# Patient Record
Sex: MALE | Race: WHITE | NOT HISPANIC OR LATINO | Employment: OTHER | URBAN - METROPOLITAN AREA
[De-identification: names, ages, dates, MRNs, and addresses within clinical notes are randomized per-mention and may not be internally consistent; named-entity substitution may affect disease eponyms.]

---

## 2017-01-03 ENCOUNTER — ALLSCRIPTS OFFICE VISIT (OUTPATIENT)
Dept: OTHER | Facility: OTHER | Age: 77
End: 2017-01-03

## 2017-01-03 DIAGNOSIS — I50.43 ACUTE ON CHRONIC COMBINED SYSTOLIC AND DIASTOLIC CONGESTIVE HEART FAILURE (HCC): ICD-10-CM

## 2017-01-03 DIAGNOSIS — I50.32 CHRONIC DIASTOLIC HEART FAILURE (HCC): ICD-10-CM

## 2017-01-03 DIAGNOSIS — N18.9 CHRONIC KIDNEY DISEASE: ICD-10-CM

## 2017-01-03 DIAGNOSIS — I10 ESSENTIAL (PRIMARY) HYPERTENSION: ICD-10-CM

## 2017-01-06 ENCOUNTER — GENERIC CONVERSION - ENCOUNTER (OUTPATIENT)
Dept: OTHER | Facility: OTHER | Age: 77
End: 2017-01-06

## 2017-01-06 ENCOUNTER — APPOINTMENT (OUTPATIENT)
Dept: LAB | Facility: CLINIC | Age: 77
End: 2017-01-06
Payer: MEDICARE

## 2017-01-06 DIAGNOSIS — I50.43 ACUTE ON CHRONIC COMBINED SYSTOLIC AND DIASTOLIC CONGESTIVE HEART FAILURE (HCC): ICD-10-CM

## 2017-01-06 LAB
ANION GAP SERPL CALCULATED.3IONS-SCNC: 8 MMOL/L (ref 4–13)
BUN SERPL-MCNC: 58 MG/DL (ref 5–25)
CALCIUM SERPL-MCNC: 8.7 MG/DL (ref 8.3–10.1)
CHLORIDE SERPL-SCNC: 104 MMOL/L (ref 100–108)
CO2 SERPL-SCNC: 30 MMOL/L (ref 21–32)
CREAT SERPL-MCNC: 3.34 MG/DL (ref 0.6–1.3)
ERYTHROCYTE [DISTWIDTH] IN BLOOD BY AUTOMATED COUNT: 16.2 % (ref 11.6–15.1)
GFR SERPL CREATININE-BSD FRML MDRD: 18 ML/MIN/1.73SQ M
GLUCOSE SERPL-MCNC: 262 MG/DL (ref 65–140)
HCT VFR BLD AUTO: 39.6 % (ref 36.5–49.3)
HGB BLD-MCNC: 12.5 G/DL (ref 12–17)
MCH RBC QN AUTO: 27.9 PG (ref 26.8–34.3)
MCHC RBC AUTO-ENTMCNC: 31.6 G/DL (ref 31.4–37.4)
MCV RBC AUTO: 88 FL (ref 82–98)
PLATELET # BLD AUTO: 268 THOUSANDS/UL (ref 149–390)
PMV BLD AUTO: 10.4 FL (ref 8.9–12.7)
POTASSIUM SERPL-SCNC: 4 MMOL/L (ref 3.5–5.3)
RBC # BLD AUTO: 4.48 MILLION/UL (ref 3.88–5.62)
SODIUM SERPL-SCNC: 142 MMOL/L (ref 136–145)
WBC # BLD AUTO: 7.79 THOUSAND/UL (ref 4.31–10.16)

## 2017-01-06 PROCEDURE — 36415 COLL VENOUS BLD VENIPUNCTURE: CPT

## 2017-01-06 PROCEDURE — 80048 BASIC METABOLIC PNL TOTAL CA: CPT

## 2017-01-06 PROCEDURE — 85027 COMPLETE CBC AUTOMATED: CPT

## 2017-01-12 ENCOUNTER — ALLSCRIPTS OFFICE VISIT (OUTPATIENT)
Dept: OTHER | Facility: OTHER | Age: 77
End: 2017-01-12

## 2017-01-12 ENCOUNTER — TRANSCRIBE ORDERS (OUTPATIENT)
Dept: ADMINISTRATIVE | Facility: HOSPITAL | Age: 77
End: 2017-01-12

## 2017-01-12 ENCOUNTER — TRANSCRIBE ORDERS (OUTPATIENT)
Dept: LAB | Facility: MEDICAL CENTER | Age: 77
End: 2017-01-12

## 2017-01-12 ENCOUNTER — APPOINTMENT (OUTPATIENT)
Dept: LAB | Facility: MEDICAL CENTER | Age: 77
End: 2017-01-12
Payer: MEDICARE

## 2017-01-12 DIAGNOSIS — I50.32 CHRONIC DIASTOLIC HEART FAILURE (HCC): ICD-10-CM

## 2017-01-12 DIAGNOSIS — N17.9 ACUTE RENAL FAILURE, UNSPECIFIED ACUTE RENAL FAILURE TYPE (HCC): Primary | ICD-10-CM

## 2017-01-12 DIAGNOSIS — N18.9 CHRONIC KIDNEY DISEASE: ICD-10-CM

## 2017-01-12 LAB
ANION GAP SERPL CALCULATED.3IONS-SCNC: 6 MMOL/L (ref 4–13)
BUN SERPL-MCNC: 63 MG/DL (ref 5–25)
CALCIUM SERPL-MCNC: 9.1 MG/DL (ref 8.3–10.1)
CHLORIDE SERPL-SCNC: 99 MMOL/L (ref 100–108)
CO2 SERPL-SCNC: 32 MMOL/L (ref 21–32)
CREAT SERPL-MCNC: 3.2 MG/DL (ref 0.6–1.3)
GFR SERPL CREATININE-BSD FRML MDRD: 18.9 ML/MIN/1.73SQ M
GLUCOSE SERPL-MCNC: 221 MG/DL (ref 65–140)
POTASSIUM SERPL-SCNC: 4.5 MMOL/L (ref 3.5–5.3)
SODIUM SERPL-SCNC: 137 MMOL/L (ref 136–145)

## 2017-01-12 PROCEDURE — 80048 BASIC METABOLIC PNL TOTAL CA: CPT

## 2017-01-12 PROCEDURE — 36415 COLL VENOUS BLD VENIPUNCTURE: CPT

## 2017-01-16 ENCOUNTER — HOSPITAL ENCOUNTER (OUTPATIENT)
Dept: CT IMAGING | Facility: HOSPITAL | Age: 77
Discharge: HOME | End: 2017-01-16
Attending: FAMILY MEDICINE
Payer: MEDICARE

## 2017-01-16 ENCOUNTER — GENERIC CONVERSION - ENCOUNTER (OUTPATIENT)
Dept: OTHER | Facility: OTHER | Age: 77
End: 2017-01-16

## 2017-01-16 ENCOUNTER — HOSPITAL ENCOUNTER (OUTPATIENT)
Dept: ULTRASOUND IMAGING | Facility: HOSPITAL | Age: 77
Discharge: HOME | End: 2017-01-16
Attending: INTERNAL MEDICINE
Payer: MEDICARE

## 2017-01-16 DIAGNOSIS — N18.9 CHRONIC KIDNEY DISEASE: ICD-10-CM

## 2017-01-16 DIAGNOSIS — N17.9 ACUTE RENAL FAILURE, UNSPECIFIED ACUTE RENAL FAILURE TYPE (HCC): ICD-10-CM

## 2017-01-16 PROCEDURE — 76770 US EXAM ABDO BACK WALL COMP: CPT

## 2017-01-16 PROCEDURE — 70450 CT HEAD/BRAIN W/O DYE: CPT

## 2017-01-19 ENCOUNTER — TRANSCRIBE ORDERS (OUTPATIENT)
Dept: LAB | Facility: CLINIC | Age: 77
End: 2017-01-19

## 2017-01-19 ENCOUNTER — APPOINTMENT (OUTPATIENT)
Dept: LAB | Facility: CLINIC | Age: 77
End: 2017-01-19
Payer: MEDICARE

## 2017-01-19 DIAGNOSIS — N18.9 CHRONIC KIDNEY DISEASE: ICD-10-CM

## 2017-01-19 DIAGNOSIS — I10 ESSENTIAL (PRIMARY) HYPERTENSION: ICD-10-CM

## 2017-01-19 LAB
ANION GAP SERPL CALCULATED.3IONS-SCNC: 6 MMOL/L (ref 4–13)
BUN SERPL-MCNC: 48 MG/DL (ref 5–25)
CALCIUM SERPL-MCNC: 8.5 MG/DL (ref 8.3–10.1)
CHLORIDE SERPL-SCNC: 103 MMOL/L (ref 100–108)
CO2 SERPL-SCNC: 30 MMOL/L (ref 21–32)
CREAT SERPL-MCNC: 2.78 MG/DL (ref 0.6–1.3)
GFR SERPL CREATININE-BSD FRML MDRD: 22.2 ML/MIN/1.73SQ M
GLUCOSE SERPL-MCNC: 268 MG/DL (ref 65–140)
POTASSIUM SERPL-SCNC: 4.5 MMOL/L (ref 3.5–5.3)
SODIUM SERPL-SCNC: 139 MMOL/L (ref 136–145)

## 2017-01-19 PROCEDURE — 36415 COLL VENOUS BLD VENIPUNCTURE: CPT

## 2017-01-19 PROCEDURE — 80048 BASIC METABOLIC PNL TOTAL CA: CPT

## 2017-01-25 ENCOUNTER — GENERIC CONVERSION - ENCOUNTER (OUTPATIENT)
Dept: OTHER | Facility: OTHER | Age: 77
End: 2017-01-25

## 2017-02-01 ENCOUNTER — APPOINTMENT (OUTPATIENT)
Dept: LAB | Facility: HOSPITAL | Age: 77
End: 2017-02-01
Attending: UROLOGY
Payer: MEDICARE

## 2017-02-01 ENCOUNTER — HOSPITAL ENCOUNTER (OUTPATIENT)
Dept: CT IMAGING | Facility: HOSPITAL | Age: 77
Discharge: HOME | End: 2017-02-01
Attending: UROLOGY
Payer: MEDICARE

## 2017-02-01 ENCOUNTER — TRANSCRIBE ORDERS (OUTPATIENT)
Dept: ADMINISTRATIVE | Facility: HOSPITAL | Age: 77
End: 2017-02-01

## 2017-02-01 DIAGNOSIS — N28.1 ACQUIRED CYST OF KIDNEY: ICD-10-CM

## 2017-02-01 DIAGNOSIS — C61 MALIGNANT NEOPLASM OF PROSTATE (HCC): Primary | ICD-10-CM

## 2017-02-01 DIAGNOSIS — C61 MALIGNANT NEOPLASM OF PROSTATE (HCC): ICD-10-CM

## 2017-02-01 LAB — PSA SERPL-MCNC: 2.3 NG/ML (ref 0–4)

## 2017-02-01 PROCEDURE — 74150 CT ABDOMEN W/O CONTRAST: CPT

## 2017-02-01 PROCEDURE — 84153 ASSAY OF PSA TOTAL: CPT

## 2017-02-06 ENCOUNTER — ALLSCRIPTS OFFICE VISIT (OUTPATIENT)
Dept: OTHER | Facility: OTHER | Age: 77
End: 2017-02-06

## 2017-02-16 ENCOUNTER — ALLSCRIPTS OFFICE VISIT (OUTPATIENT)
Dept: OTHER | Facility: OTHER | Age: 77
End: 2017-02-16

## 2017-02-17 ENCOUNTER — ALLSCRIPTS OFFICE VISIT (OUTPATIENT)
Dept: OTHER | Facility: OTHER | Age: 77
End: 2017-02-17

## 2017-03-01 ENCOUNTER — GENERIC CONVERSION - ENCOUNTER (OUTPATIENT)
Dept: OTHER | Facility: OTHER | Age: 77
End: 2017-03-01

## 2017-03-01 ENCOUNTER — HOSPITAL ENCOUNTER (INPATIENT)
Facility: HOSPITAL | Age: 77
LOS: 5 days | Discharge: HOME HEALTH | DRG: 292 | End: 2017-03-06
Attending: INTERNAL MEDICINE | Admitting: INTERNAL MEDICINE
Payer: MEDICARE

## 2017-03-01 ENCOUNTER — APPOINTMENT (INPATIENT)
Dept: RADIOLOGY | Facility: HOSPITAL | Age: 77
DRG: 292 | End: 2017-03-01
Payer: MEDICARE

## 2017-03-01 ENCOUNTER — TRANSCRIBE ORDERS (OUTPATIENT)
Dept: LAB | Facility: CLINIC | Age: 77
End: 2017-03-01

## 2017-03-01 ENCOUNTER — APPOINTMENT (OUTPATIENT)
Dept: LAB | Facility: CLINIC | Age: 77
DRG: 292 | End: 2017-03-01
Payer: MEDICARE

## 2017-03-01 DIAGNOSIS — I50.43 ACUTE ON CHRONIC COMBINED SYSTOLIC AND DIASTOLIC HEART FAILURE (HCC): ICD-10-CM

## 2017-03-01 DIAGNOSIS — I48.91 ATRIAL FIBRILLATION, UNSPECIFIED TYPE (HCC): ICD-10-CM

## 2017-03-01 DIAGNOSIS — I50.22 CHRONIC SYSTOLIC HEART FAILURE (HCC): ICD-10-CM

## 2017-03-01 DIAGNOSIS — D64.9 ANEMIA, UNSPECIFIED: ICD-10-CM

## 2017-03-01 DIAGNOSIS — S81.802A WOUND OF LEFT LOWER EXTREMITY: ICD-10-CM

## 2017-03-01 DIAGNOSIS — N17.9 ACUTE KIDNEY INJURY (HCC): ICD-10-CM

## 2017-03-01 DIAGNOSIS — E08.00 DIABETES MELLITUS DUE TO UNDERLYING CONDITION WITH HYPEROSMOLARITY WITHOUT COMA, WITHOUT LONG-TERM CURRENT USE OF INSULIN (HCC): ICD-10-CM

## 2017-03-01 DIAGNOSIS — I50.33 ACUTE ON CHRONIC DIASTOLIC CHF (CONGESTIVE HEART FAILURE) (HCC): ICD-10-CM

## 2017-03-01 DIAGNOSIS — I25.5 ISCHEMIC CARDIOMYOPATHY: ICD-10-CM

## 2017-03-01 DIAGNOSIS — S81.801A WOUND OF RIGHT LOWER EXTREMITY: ICD-10-CM

## 2017-03-01 DIAGNOSIS — I12.0 PARENCHYMAL RENAL HYPERTENSION, STAGE 5 CHRONIC KIDNEY DISEASE OR END STAGE RENAL DISEASE (HCC): ICD-10-CM

## 2017-03-01 DIAGNOSIS — I87.8 VENOUS STASIS: ICD-10-CM

## 2017-03-01 DIAGNOSIS — I50.22 CHRONIC SYSTOLIC HEART FAILURE (HCC): Primary | ICD-10-CM

## 2017-03-01 DIAGNOSIS — N18.4 CKD (CHRONIC KIDNEY DISEASE) STAGE 4, GFR 15-29 ML/MIN (HCC): Primary | Chronic | ICD-10-CM

## 2017-03-01 PROBLEM — R09.02 HYPOXIA: Status: ACTIVE | Noted: 2017-03-01

## 2017-03-01 PROBLEM — I50.9 CHF (CONGESTIVE HEART FAILURE) (HCC): Status: RESOLVED | Noted: 2017-03-01 | Resolved: 2017-03-01

## 2017-03-01 PROBLEM — I50.9 CHF (CONGESTIVE HEART FAILURE) (HCC): Status: ACTIVE | Noted: 2017-03-01

## 2017-03-01 LAB
ALBUMIN SERPL BCP-MCNC: 2.9 G/DL (ref 3.5–5)
ALP SERPL-CCNC: 44 U/L (ref 46–116)
ALT SERPL W P-5'-P-CCNC: 28 U/L (ref 12–78)
ANION GAP SERPL CALCULATED.3IONS-SCNC: 6 MMOL/L (ref 4–13)
ANION GAP SERPL CALCULATED.3IONS-SCNC: 6 MMOL/L (ref 4–13)
AST SERPL W P-5'-P-CCNC: 30 U/L (ref 5–45)
BACTERIA UR QL AUTO: ABNORMAL /HPF
BASOPHILS # BLD AUTO: 0.06 THOUSANDS/ΜL (ref 0–0.1)
BASOPHILS # BLD AUTO: 0.08 THOUSANDS/ΜL (ref 0–0.1)
BASOPHILS NFR BLD AUTO: 1 % (ref 0–1)
BASOPHILS NFR BLD AUTO: 1 % (ref 0–1)
BILIRUB SERPL-MCNC: 0.3 MG/DL (ref 0.2–1)
BILIRUB UR QL STRIP: NEGATIVE
BUN SERPL-MCNC: 42 MG/DL (ref 5–25)
BUN SERPL-MCNC: 43 MG/DL (ref 5–25)
CALCIUM SERPL-MCNC: 8.4 MG/DL (ref 8.3–10.1)
CALCIUM SERPL-MCNC: 8.7 MG/DL (ref 8.3–10.1)
CHLORIDE SERPL-SCNC: 103 MMOL/L (ref 100–108)
CHLORIDE SERPL-SCNC: 105 MMOL/L (ref 100–108)
CLARITY UR: CLEAR
CO2 SERPL-SCNC: 31 MMOL/L (ref 21–32)
CO2 SERPL-SCNC: 31 MMOL/L (ref 21–32)
COLOR UR: YELLOW
CREAT SERPL-MCNC: 2.68 MG/DL (ref 0.6–1.3)
CREAT SERPL-MCNC: 2.76 MG/DL (ref 0.6–1.3)
EOSINOPHIL # BLD AUTO: 0.22 THOUSAND/ΜL (ref 0–0.61)
EOSINOPHIL # BLD AUTO: 0.28 THOUSAND/ΜL (ref 0–0.61)
EOSINOPHIL NFR BLD AUTO: 3 % (ref 0–6)
EOSINOPHIL NFR BLD AUTO: 4 % (ref 0–6)
ERYTHROCYTE [DISTWIDTH] IN BLOOD BY AUTOMATED COUNT: 15.9 % (ref 11.6–15.1)
ERYTHROCYTE [DISTWIDTH] IN BLOOD BY AUTOMATED COUNT: 16 % (ref 11.6–15.1)
EST. AVERAGE GLUCOSE BLD GHB EST-MCNC: 171 MG/DL
GFR SERPL CREATININE-BSD FRML MDRD: 22.3 ML/MIN/1.73SQ M
GFR SERPL CREATININE-BSD FRML MDRD: 23.1 ML/MIN/1.73SQ M
GLUCOSE SERPL-MCNC: 127 MG/DL (ref 65–140)
GLUCOSE SERPL-MCNC: 136 MG/DL (ref 65–140)
GLUCOSE SERPL-MCNC: 162 MG/DL (ref 65–140)
GLUCOSE SERPL-MCNC: 76 MG/DL (ref 65–140)
GLUCOSE UR STRIP-MCNC: NEGATIVE MG/DL
HBA1C MFR BLD: 7.6 % (ref 4.2–6.3)
HCT VFR BLD AUTO: 37.4 % (ref 36.5–49.3)
HCT VFR BLD AUTO: 38.5 % (ref 36.5–49.3)
HGB BLD-MCNC: 11.5 G/DL (ref 12–17)
HGB BLD-MCNC: 11.8 G/DL (ref 12–17)
HGB UR QL STRIP.AUTO: NORMAL
INR PPP: 1.24 (ref 0.86–1.16)
KETONES UR STRIP-MCNC: NEGATIVE MG/DL
LEUKOCYTE ESTERASE UR QL STRIP: NEGATIVE
LYMPHOCYTES # BLD AUTO: 0.89 THOUSANDS/ΜL (ref 0.6–4.47)
LYMPHOCYTES # BLD AUTO: 1.06 THOUSANDS/ΜL (ref 0.6–4.47)
LYMPHOCYTES NFR BLD AUTO: 12 % (ref 14–44)
LYMPHOCYTES NFR BLD AUTO: 14 % (ref 14–44)
MAGNESIUM SERPL-MCNC: 2.2 MG/DL (ref 1.6–2.6)
MCH RBC QN AUTO: 27.1 PG (ref 26.8–34.3)
MCH RBC QN AUTO: 27.4 PG (ref 26.8–34.3)
MCHC RBC AUTO-ENTMCNC: 30.6 G/DL (ref 31.4–37.4)
MCHC RBC AUTO-ENTMCNC: 30.7 G/DL (ref 31.4–37.4)
MCV RBC AUTO: 88 FL (ref 82–98)
MCV RBC AUTO: 90 FL (ref 82–98)
MONOCYTES # BLD AUTO: 0.55 THOUSAND/ΜL (ref 0.17–1.22)
MONOCYTES # BLD AUTO: 0.66 THOUSAND/ΜL (ref 0.17–1.22)
MONOCYTES NFR BLD AUTO: 7 % (ref 4–12)
MONOCYTES NFR BLD AUTO: 8 % (ref 4–12)
NEUTROPHILS # BLD AUTO: 5.7 THOUSANDS/ΜL (ref 1.85–7.62)
NEUTROPHILS # BLD AUTO: 5.76 THOUSANDS/ΜL (ref 1.85–7.62)
NEUTS SEG NFR BLD AUTO: 73 % (ref 43–75)
NEUTS SEG NFR BLD AUTO: 77 % (ref 43–75)
NITRITE UR QL STRIP: NEGATIVE
NON-SQ EPI CELLS URNS QL MICRO: ABNORMAL /HPF
NRBC BLD AUTO-RTO: 0 /100 WBCS
NT-PROBNP SERPL-MCNC: 5559 PG/ML
PH UR STRIP.AUTO: 6.5 [PH] (ref 4.5–8)
PLATELET # BLD AUTO: 247 THOUSANDS/UL (ref 149–390)
PLATELET # BLD AUTO: 263 THOUSANDS/UL (ref 149–390)
PMV BLD AUTO: 10.4 FL (ref 8.9–12.7)
PMV BLD AUTO: 10.4 FL (ref 8.9–12.7)
POTASSIUM SERPL-SCNC: 4.7 MMOL/L (ref 3.5–5.3)
POTASSIUM SERPL-SCNC: 4.9 MMOL/L (ref 3.5–5.3)
PROT SERPL-MCNC: 6.2 G/DL (ref 6.4–8.2)
PROT UR STRIP-MCNC: NEGATIVE MG/DL
PROTHROMBIN TIME: 15.2 SECONDS (ref 12–14.3)
RBC # BLD AUTO: 4.24 MILLION/UL (ref 3.88–5.62)
RBC # BLD AUTO: 4.3 MILLION/UL (ref 3.88–5.62)
RBC #/AREA URNS AUTO: ABNORMAL /HPF
SODIUM SERPL-SCNC: 140 MMOL/L (ref 136–145)
SODIUM SERPL-SCNC: 142 MMOL/L (ref 136–145)
SP GR UR STRIP.AUTO: 1.01 (ref 1–1.03)
TSH SERPL DL<=0.05 MIU/L-ACNC: 2.1 UIU/ML (ref 0.36–3.74)
UROBILINOGEN UR QL STRIP.AUTO: 0.2 E.U./DL
WBC # BLD AUTO: 7.46 THOUSAND/UL (ref 4.31–10.16)
WBC # BLD AUTO: 7.82 THOUSAND/UL (ref 4.31–10.16)
WBC #/AREA URNS AUTO: ABNORMAL /HPF

## 2017-03-01 PROCEDURE — 83880 ASSAY OF NATRIURETIC PEPTIDE: CPT

## 2017-03-01 PROCEDURE — 87086 URINE CULTURE/COLONY COUNT: CPT | Performed by: INTERNAL MEDICINE

## 2017-03-01 PROCEDURE — 84443 ASSAY THYROID STIM HORMONE: CPT | Performed by: INTERNAL MEDICINE

## 2017-03-01 PROCEDURE — 83036 HEMOGLOBIN GLYCOSYLATED A1C: CPT | Performed by: INTERNAL MEDICINE

## 2017-03-01 PROCEDURE — 36415 COLL VENOUS BLD VENIPUNCTURE: CPT

## 2017-03-01 PROCEDURE — 83735 ASSAY OF MAGNESIUM: CPT | Performed by: INTERNAL MEDICINE

## 2017-03-01 PROCEDURE — 82948 REAGENT STRIP/BLOOD GLUCOSE: CPT

## 2017-03-01 PROCEDURE — 85025 COMPLETE CBC W/AUTO DIFF WBC: CPT

## 2017-03-01 PROCEDURE — 87040 BLOOD CULTURE FOR BACTERIA: CPT | Performed by: INTERNAL MEDICINE

## 2017-03-01 PROCEDURE — 94760 N-INVAS EAR/PLS OXIMETRY 1: CPT

## 2017-03-01 PROCEDURE — 93005 ELECTROCARDIOGRAM TRACING: CPT | Performed by: INTERNAL MEDICINE

## 2017-03-01 PROCEDURE — 94640 AIRWAY INHALATION TREATMENT: CPT

## 2017-03-01 PROCEDURE — 81001 URINALYSIS AUTO W/SCOPE: CPT | Performed by: INTERNAL MEDICINE

## 2017-03-01 PROCEDURE — 85610 PROTHROMBIN TIME: CPT | Performed by: INTERNAL MEDICINE

## 2017-03-01 PROCEDURE — 80048 BASIC METABOLIC PNL TOTAL CA: CPT

## 2017-03-01 PROCEDURE — 80053 COMPREHEN METABOLIC PANEL: CPT | Performed by: INTERNAL MEDICINE

## 2017-03-01 PROCEDURE — 85025 COMPLETE CBC W/AUTO DIFF WBC: CPT | Performed by: INTERNAL MEDICINE

## 2017-03-01 PROCEDURE — 71020 HB CHEST X-RAY 2VW FRONTAL&LATL: CPT

## 2017-03-01 RX ORDER — LANOLIN ALCOHOL/MO/W.PET/CERES
1000 CREAM (GRAM) TOPICAL DAILY
Status: DISCONTINUED | OUTPATIENT
Start: 2017-03-02 | End: 2017-03-06 | Stop reason: HOSPADM

## 2017-03-01 RX ORDER — ONDANSETRON 2 MG/ML
4 INJECTION INTRAMUSCULAR; INTRAVENOUS EVERY 6 HOURS PRN
Status: DISCONTINUED | OUTPATIENT
Start: 2017-03-01 | End: 2017-03-06 | Stop reason: HOSPADM

## 2017-03-01 RX ORDER — ASPIRIN 81 MG/1
81 TABLET ORAL 2 TIMES DAILY
COMMUNITY

## 2017-03-01 RX ORDER — GABAPENTIN 300 MG/1
300 CAPSULE ORAL
Status: DISCONTINUED | OUTPATIENT
Start: 2017-03-01 | End: 2017-03-06 | Stop reason: HOSPADM

## 2017-03-01 RX ORDER — ASPIRIN 81 MG/1
81 TABLET ORAL 2 TIMES DAILY
Status: DISCONTINUED | OUTPATIENT
Start: 2017-03-01 | End: 2017-03-05

## 2017-03-01 RX ORDER — PRAVASTATIN SODIUM 40 MG
40 TABLET ORAL
Status: DISCONTINUED | OUTPATIENT
Start: 2017-03-01 | End: 2017-03-06 | Stop reason: HOSPADM

## 2017-03-01 RX ORDER — LEVALBUTEROL 1.25 MG/.5ML
1.25 SOLUTION, CONCENTRATE RESPIRATORY (INHALATION)
Status: DISCONTINUED | OUTPATIENT
Start: 2017-03-01 | End: 2017-03-06 | Stop reason: HOSPADM

## 2017-03-01 RX ORDER — GABAPENTIN 300 MG/1
300 CAPSULE ORAL
COMMUNITY
End: 2018-07-09 | Stop reason: SDUPTHER

## 2017-03-01 RX ORDER — FUROSEMIDE 10 MG/ML
80 INJECTION INTRAMUSCULAR; INTRAVENOUS ONCE
Status: COMPLETED | OUTPATIENT
Start: 2017-03-01 | End: 2017-03-01

## 2017-03-01 RX ORDER — TAMSULOSIN HYDROCHLORIDE 0.4 MG/1
0.4 CAPSULE ORAL
Status: DISCONTINUED | OUTPATIENT
Start: 2017-03-01 | End: 2017-03-06 | Stop reason: HOSPADM

## 2017-03-01 RX ORDER — LEVALBUTEROL 1.25 MG/.5ML
1.25 SOLUTION, CONCENTRATE RESPIRATORY (INHALATION) EVERY 6 HOURS PRN
Status: DISCONTINUED | OUTPATIENT
Start: 2017-03-01 | End: 2017-03-06 | Stop reason: HOSPADM

## 2017-03-01 RX ORDER — HEPARIN SODIUM 5000 [USP'U]/ML
5000 INJECTION, SOLUTION INTRAVENOUS; SUBCUTANEOUS EVERY 8 HOURS SCHEDULED
Status: DISCONTINUED | OUTPATIENT
Start: 2017-03-01 | End: 2017-03-03

## 2017-03-01 RX ORDER — AMIODARONE HYDROCHLORIDE 200 MG/1
200 TABLET ORAL DAILY
Status: DISCONTINUED | OUTPATIENT
Start: 2017-03-02 | End: 2017-03-06 | Stop reason: HOSPADM

## 2017-03-01 RX ADMIN — TAMSULOSIN HYDROCHLORIDE 0.4 MG: 0.4 CAPSULE ORAL at 22:26

## 2017-03-01 RX ADMIN — GABAPENTIN 300 MG: 300 CAPSULE ORAL at 22:26

## 2017-03-01 RX ADMIN — HEPARIN SODIUM 5000 UNITS: 5000 INJECTION, SOLUTION INTRAVENOUS; SUBCUTANEOUS at 22:25

## 2017-03-01 RX ADMIN — IPRATROPIUM BROMIDE 0.5 MG: 0.5 SOLUTION RESPIRATORY (INHALATION) at 20:59

## 2017-03-01 RX ADMIN — METOPROLOL TARTRATE 25 MG: 25 TABLET ORAL at 18:18

## 2017-03-01 RX ADMIN — FUROSEMIDE 80 MG: 10 INJECTION, SOLUTION INTRAMUSCULAR; INTRAVENOUS at 20:42

## 2017-03-01 RX ADMIN — INSULIN DETEMIR 35 UNITS: 100 INJECTION, SOLUTION SUBCUTANEOUS at 22:25

## 2017-03-01 RX ADMIN — ASPIRIN 81 MG: 81 TABLET, COATED ORAL at 18:17

## 2017-03-01 RX ADMIN — LEVALBUTEROL 1.25 MG: 1.25 SOLUTION, CONCENTRATE RESPIRATORY (INHALATION) at 20:59

## 2017-03-01 RX ADMIN — PRAVASTATIN SODIUM 40 MG: 40 TABLET ORAL at 16:59

## 2017-03-02 ENCOUNTER — APPOINTMENT (INPATIENT)
Dept: PHYSICAL THERAPY | Facility: HOSPITAL | Age: 77
DRG: 292 | End: 2017-03-02
Payer: MEDICARE

## 2017-03-02 ENCOUNTER — APPOINTMENT (INPATIENT)
Dept: OCCUPATIONAL THERAPY | Facility: HOSPITAL | Age: 77
DRG: 292 | End: 2017-03-02
Payer: MEDICARE

## 2017-03-02 LAB
ALBUMIN SERPL BCP-MCNC: 2.8 G/DL (ref 3.5–5)
ALP SERPL-CCNC: 35 U/L (ref 46–116)
ALT SERPL W P-5'-P-CCNC: 24 U/L (ref 12–78)
ANION GAP SERPL CALCULATED.3IONS-SCNC: 10 MMOL/L (ref 4–13)
AST SERPL W P-5'-P-CCNC: 23 U/L (ref 5–45)
ATRIAL RATE: 61 BPM
BACTERIA UR CULT: NORMAL
BILIRUB SERPL-MCNC: 0.3 MG/DL (ref 0.2–1)
BUN SERPL-MCNC: 44 MG/DL (ref 5–25)
CALCIUM SERPL-MCNC: 8.5 MG/DL (ref 8.3–10.1)
CHLORIDE SERPL-SCNC: 104 MMOL/L (ref 100–108)
CO2 SERPL-SCNC: 30 MMOL/L (ref 21–32)
CREAT SERPL-MCNC: 2.67 MG/DL (ref 0.6–1.3)
ERYTHROCYTE [DISTWIDTH] IN BLOOD BY AUTOMATED COUNT: 15.8 % (ref 11.6–15.1)
GFR SERPL CREATININE-BSD FRML MDRD: 23.2 ML/MIN/1.73SQ M
GLUCOSE SERPL-MCNC: 107 MG/DL (ref 65–140)
GLUCOSE SERPL-MCNC: 204 MG/DL (ref 65–140)
GLUCOSE SERPL-MCNC: 214 MG/DL (ref 65–140)
GLUCOSE SERPL-MCNC: 93 MG/DL (ref 65–140)
HCT VFR BLD AUTO: 38.8 % (ref 36.5–49.3)
HGB BLD-MCNC: 11.7 G/DL (ref 12–17)
INR PPP: 1.21 (ref 0.86–1.16)
MAGNESIUM SERPL-MCNC: 2.2 MG/DL (ref 1.6–2.6)
MCH RBC QN AUTO: 27 PG (ref 26.8–34.3)
MCHC RBC AUTO-ENTMCNC: 30.2 G/DL (ref 31.4–37.4)
MCV RBC AUTO: 90 FL (ref 82–98)
P AXIS: 56 DEGREES
PLATELET # BLD AUTO: 252 THOUSANDS/UL (ref 149–390)
PMV BLD AUTO: 10.2 FL (ref 8.9–12.7)
POTASSIUM SERPL-SCNC: 4.2 MMOL/L (ref 3.5–5.3)
PR INTERVAL: 190 MS
PROT SERPL-MCNC: 6 G/DL (ref 6.4–8.2)
PROTHROMBIN TIME: 14.9 SECONDS (ref 12–14.3)
QRS AXIS: -66 DEGREES
QRSD INTERVAL: 162 MS
QT INTERVAL: 472 MS
QTC INTERVAL: 475 MS
RBC # BLD AUTO: 4.33 MILLION/UL (ref 3.88–5.62)
SODIUM SERPL-SCNC: 144 MMOL/L (ref 136–145)
T WAVE AXIS: 42 DEGREES
VENTRICULAR RATE: 61 BPM
WBC # BLD AUTO: 6.3 THOUSAND/UL (ref 4.31–10.16)

## 2017-03-02 PROCEDURE — G8978 MOBILITY CURRENT STATUS: HCPCS | Performed by: PHYSICAL THERAPIST

## 2017-03-02 PROCEDURE — 80053 COMPREHEN METABOLIC PANEL: CPT | Performed by: INTERNAL MEDICINE

## 2017-03-02 PROCEDURE — 85610 PROTHROMBIN TIME: CPT | Performed by: INTERNAL MEDICINE

## 2017-03-02 PROCEDURE — 97167 OT EVAL HIGH COMPLEX 60 MIN: CPT

## 2017-03-02 PROCEDURE — 94640 AIRWAY INHALATION TREATMENT: CPT

## 2017-03-02 PROCEDURE — 85027 COMPLETE CBC AUTOMATED: CPT | Performed by: INTERNAL MEDICINE

## 2017-03-02 PROCEDURE — G8988 SELF CARE GOAL STATUS: HCPCS

## 2017-03-02 PROCEDURE — G8987 SELF CARE CURRENT STATUS: HCPCS

## 2017-03-02 PROCEDURE — G8979 MOBILITY GOAL STATUS: HCPCS | Performed by: PHYSICAL THERAPIST

## 2017-03-02 PROCEDURE — 97116 GAIT TRAINING THERAPY: CPT | Performed by: PHYSICAL THERAPIST

## 2017-03-02 PROCEDURE — 94762 N-INVAS EAR/PLS OXIMTRY CONT: CPT

## 2017-03-02 PROCEDURE — 82948 REAGENT STRIP/BLOOD GLUCOSE: CPT

## 2017-03-02 PROCEDURE — 94760 N-INVAS EAR/PLS OXIMETRY 1: CPT

## 2017-03-02 PROCEDURE — 97163 PT EVAL HIGH COMPLEX 45 MIN: CPT | Performed by: PHYSICAL THERAPIST

## 2017-03-02 PROCEDURE — 83735 ASSAY OF MAGNESIUM: CPT | Performed by: INTERNAL MEDICINE

## 2017-03-02 PROCEDURE — 97530 THERAPEUTIC ACTIVITIES: CPT

## 2017-03-02 RX ORDER — POTASSIUM CHLORIDE 20 MEQ/1
20 TABLET, EXTENDED RELEASE ORAL 2 TIMES DAILY
Status: COMPLETED | OUTPATIENT
Start: 2017-03-02 | End: 2017-03-04

## 2017-03-02 RX ORDER — FUROSEMIDE 10 MG/ML
80 INJECTION INTRAMUSCULAR; INTRAVENOUS
Status: DISCONTINUED | OUTPATIENT
Start: 2017-03-02 | End: 2017-03-04

## 2017-03-02 RX ADMIN — CYANOCOBALAMIN TAB 1000 MCG 1000 MCG: 1000 TAB at 09:18

## 2017-03-02 RX ADMIN — HEPARIN SODIUM 5000 UNITS: 5000 INJECTION, SOLUTION INTRAVENOUS; SUBCUTANEOUS at 05:08

## 2017-03-02 RX ADMIN — ASPIRIN 81 MG: 81 TABLET, COATED ORAL at 18:29

## 2017-03-02 RX ADMIN — HEPARIN SODIUM 5000 UNITS: 5000 INJECTION, SOLUTION INTRAVENOUS; SUBCUTANEOUS at 15:41

## 2017-03-02 RX ADMIN — AMIODARONE HYDROCHLORIDE 200 MG: 200 TABLET ORAL at 09:17

## 2017-03-02 RX ADMIN — FUROSEMIDE 80 MG: 10 INJECTION, SOLUTION INTRAMUSCULAR; INTRAVENOUS at 12:59

## 2017-03-02 RX ADMIN — ASPIRIN 81 MG: 81 TABLET, COATED ORAL at 09:17

## 2017-03-02 RX ADMIN — POTASSIUM CHLORIDE 20 MEQ: 1500 TABLET, EXTENDED RELEASE ORAL at 18:28

## 2017-03-02 RX ADMIN — HEPARIN SODIUM 5000 UNITS: 5000 INJECTION, SOLUTION INTRAVENOUS; SUBCUTANEOUS at 21:48

## 2017-03-02 RX ADMIN — LEVALBUTEROL 1.25 MG: 1.25 SOLUTION, CONCENTRATE RESPIRATORY (INHALATION) at 20:57

## 2017-03-02 RX ADMIN — IPRATROPIUM BROMIDE 0.5 MG: 0.5 SOLUTION RESPIRATORY (INHALATION) at 20:57

## 2017-03-02 RX ADMIN — POTASSIUM CHLORIDE 20 MEQ: 1500 TABLET, EXTENDED RELEASE ORAL at 13:00

## 2017-03-02 RX ADMIN — INSULIN LISPRO 1 UNITS: 100 INJECTION, SOLUTION INTRAVENOUS; SUBCUTANEOUS at 12:59

## 2017-03-02 RX ADMIN — GABAPENTIN 300 MG: 300 CAPSULE ORAL at 21:43

## 2017-03-02 RX ADMIN — METOPROLOL TARTRATE 25 MG: 25 TABLET ORAL at 09:18

## 2017-03-02 RX ADMIN — LEVALBUTEROL 1.25 MG: 1.25 SOLUTION, CONCENTRATE RESPIRATORY (INHALATION) at 09:12

## 2017-03-02 RX ADMIN — FUROSEMIDE 80 MG: 10 INJECTION, SOLUTION INTRAMUSCULAR; INTRAVENOUS at 18:29

## 2017-03-02 RX ADMIN — INSULIN LISPRO 2 UNITS: 100 INJECTION, SOLUTION INTRAVENOUS; SUBCUTANEOUS at 15:41

## 2017-03-02 RX ADMIN — INSULIN DETEMIR 35 UNITS: 100 INJECTION, SOLUTION SUBCUTANEOUS at 21:49

## 2017-03-02 RX ADMIN — PRAVASTATIN SODIUM 40 MG: 40 TABLET ORAL at 15:41

## 2017-03-02 RX ADMIN — TAMSULOSIN HYDROCHLORIDE 0.4 MG: 0.4 CAPSULE ORAL at 21:43

## 2017-03-02 RX ADMIN — IPRATROPIUM BROMIDE 0.5 MG: 0.5 SOLUTION RESPIRATORY (INHALATION) at 09:11

## 2017-03-02 RX ADMIN — METOPROLOL TARTRATE 25 MG: 25 TABLET ORAL at 18:29

## 2017-03-03 ENCOUNTER — APPOINTMENT (INPATIENT)
Dept: PHYSICAL THERAPY | Facility: HOSPITAL | Age: 77
DRG: 292 | End: 2017-03-03
Payer: MEDICARE

## 2017-03-03 ENCOUNTER — APPOINTMENT (INPATIENT)
Dept: OCCUPATIONAL THERAPY | Facility: HOSPITAL | Age: 77
DRG: 292 | End: 2017-03-03
Payer: MEDICARE

## 2017-03-03 LAB
ALBUMIN SERPL BCP-MCNC: 2.9 G/DL (ref 3.5–5)
ALP SERPL-CCNC: 35 U/L (ref 46–116)
ALT SERPL W P-5'-P-CCNC: 24 U/L (ref 12–78)
ANION GAP SERPL CALCULATED.3IONS-SCNC: 5 MMOL/L (ref 4–13)
AST SERPL W P-5'-P-CCNC: 18 U/L (ref 5–45)
BASOPHILS # BLD AUTO: 0.06 THOUSANDS/ΜL (ref 0–0.1)
BASOPHILS NFR BLD AUTO: 1 % (ref 0–1)
BILIRUB SERPL-MCNC: 0.3 MG/DL (ref 0.2–1)
BUN SERPL-MCNC: 47 MG/DL (ref 5–25)
CALCIUM SERPL-MCNC: 8.9 MG/DL (ref 8.3–10.1)
CHLORIDE SERPL-SCNC: 102 MMOL/L (ref 100–108)
CO2 SERPL-SCNC: 37 MMOL/L (ref 21–32)
CREAT SERPL-MCNC: 3.04 MG/DL (ref 0.6–1.3)
EOSINOPHIL # BLD AUTO: 0.2 THOUSAND/ΜL (ref 0–0.61)
EOSINOPHIL NFR BLD AUTO: 3 % (ref 0–6)
ERYTHROCYTE [DISTWIDTH] IN BLOOD BY AUTOMATED COUNT: 15.9 % (ref 11.6–15.1)
GFR SERPL CREATININE-BSD FRML MDRD: 20 ML/MIN/1.73SQ M
GLUCOSE SERPL-MCNC: 119 MG/DL (ref 65–140)
GLUCOSE SERPL-MCNC: 127 MG/DL (ref 65–140)
GLUCOSE SERPL-MCNC: 180 MG/DL (ref 65–140)
GLUCOSE SERPL-MCNC: 196 MG/DL (ref 65–140)
GLUCOSE SERPL-MCNC: 214 MG/DL (ref 65–140)
HCT VFR BLD AUTO: 38.4 % (ref 36.5–49.3)
HGB BLD-MCNC: 11.8 G/DL (ref 12–17)
LYMPHOCYTES # BLD AUTO: 0.97 THOUSANDS/ΜL (ref 0.6–4.47)
LYMPHOCYTES NFR BLD AUTO: 16 % (ref 14–44)
MAGNESIUM SERPL-MCNC: 2.2 MG/DL (ref 1.6–2.6)
MCH RBC QN AUTO: 27.5 PG (ref 26.8–34.3)
MCHC RBC AUTO-ENTMCNC: 30.7 G/DL (ref 31.4–37.4)
MCV RBC AUTO: 90 FL (ref 82–98)
MONOCYTES # BLD AUTO: 0.54 THOUSAND/ΜL (ref 0.17–1.22)
MONOCYTES NFR BLD AUTO: 9 % (ref 4–12)
NEUTROPHILS # BLD AUTO: 4.34 THOUSANDS/ΜL (ref 1.85–7.62)
NEUTS SEG NFR BLD AUTO: 71 % (ref 43–75)
PLATELET # BLD AUTO: 259 THOUSANDS/UL (ref 149–390)
PMV BLD AUTO: 9.8 FL (ref 8.9–12.7)
POTASSIUM SERPL-SCNC: 4 MMOL/L (ref 3.5–5.3)
PROT SERPL-MCNC: 6.3 G/DL (ref 6.4–8.2)
RBC # BLD AUTO: 4.29 MILLION/UL (ref 3.88–5.62)
SODIUM SERPL-SCNC: 144 MMOL/L (ref 136–145)
WBC # BLD AUTO: 6.11 THOUSAND/UL (ref 4.31–10.16)

## 2017-03-03 PROCEDURE — 97110 THERAPEUTIC EXERCISES: CPT

## 2017-03-03 PROCEDURE — 80053 COMPREHEN METABOLIC PANEL: CPT | Performed by: INTERNAL MEDICINE

## 2017-03-03 PROCEDURE — 94760 N-INVAS EAR/PLS OXIMETRY 1: CPT

## 2017-03-03 PROCEDURE — 97530 THERAPEUTIC ACTIVITIES: CPT

## 2017-03-03 PROCEDURE — 94640 AIRWAY INHALATION TREATMENT: CPT

## 2017-03-03 PROCEDURE — 82948 REAGENT STRIP/BLOOD GLUCOSE: CPT

## 2017-03-03 PROCEDURE — 97116 GAIT TRAINING THERAPY: CPT

## 2017-03-03 PROCEDURE — 83735 ASSAY OF MAGNESIUM: CPT | Performed by: INTERNAL MEDICINE

## 2017-03-03 PROCEDURE — 85025 COMPLETE CBC W/AUTO DIFF WBC: CPT | Performed by: INTERNAL MEDICINE

## 2017-03-03 RX ADMIN — METOPROLOL TARTRATE 25 MG: 25 TABLET ORAL at 17:54

## 2017-03-03 RX ADMIN — IPRATROPIUM BROMIDE 0.5 MG: 0.5 SOLUTION RESPIRATORY (INHALATION) at 08:15

## 2017-03-03 RX ADMIN — HEPARIN SODIUM 5000 UNITS: 5000 INJECTION, SOLUTION INTRAVENOUS; SUBCUTANEOUS at 05:14

## 2017-03-03 RX ADMIN — TAMSULOSIN HYDROCHLORIDE 0.4 MG: 0.4 CAPSULE ORAL at 21:16

## 2017-03-03 RX ADMIN — IPRATROPIUM BROMIDE 0.5 MG: 0.5 SOLUTION RESPIRATORY (INHALATION) at 20:29

## 2017-03-03 RX ADMIN — APIXABAN 5 MG: 5 TABLET, FILM COATED ORAL at 21:16

## 2017-03-03 RX ADMIN — FUROSEMIDE 80 MG: 10 INJECTION, SOLUTION INTRAMUSCULAR; INTRAVENOUS at 12:27

## 2017-03-03 RX ADMIN — LEVALBUTEROL 1.25 MG: 1.25 SOLUTION, CONCENTRATE RESPIRATORY (INHALATION) at 08:15

## 2017-03-03 RX ADMIN — METOPROLOL TARTRATE 25 MG: 25 TABLET ORAL at 08:48

## 2017-03-03 RX ADMIN — ASPIRIN 81 MG: 81 TABLET, COATED ORAL at 08:47

## 2017-03-03 RX ADMIN — PRAVASTATIN SODIUM 40 MG: 40 TABLET ORAL at 17:54

## 2017-03-03 RX ADMIN — FUROSEMIDE 80 MG: 10 INJECTION, SOLUTION INTRAMUSCULAR; INTRAVENOUS at 05:13

## 2017-03-03 RX ADMIN — INSULIN LISPRO 1 UNITS: 100 INJECTION, SOLUTION INTRAVENOUS; SUBCUTANEOUS at 16:05

## 2017-03-03 RX ADMIN — LEVALBUTEROL 1.25 MG: 1.25 SOLUTION, CONCENTRATE RESPIRATORY (INHALATION) at 20:29

## 2017-03-03 RX ADMIN — FUROSEMIDE 80 MG: 10 INJECTION, SOLUTION INTRAMUSCULAR; INTRAVENOUS at 17:54

## 2017-03-03 RX ADMIN — POTASSIUM CHLORIDE 20 MEQ: 1500 TABLET, EXTENDED RELEASE ORAL at 17:54

## 2017-03-03 RX ADMIN — POTASSIUM CHLORIDE 20 MEQ: 1500 TABLET, EXTENDED RELEASE ORAL at 08:49

## 2017-03-03 RX ADMIN — INSULIN LISPRO 2 UNITS: 100 INJECTION, SOLUTION INTRAVENOUS; SUBCUTANEOUS at 12:27

## 2017-03-03 RX ADMIN — INSULIN DETEMIR 35 UNITS: 100 INJECTION, SOLUTION SUBCUTANEOUS at 21:16

## 2017-03-03 RX ADMIN — APIXABAN 5 MG: 5 TABLET, FILM COATED ORAL at 12:27

## 2017-03-03 RX ADMIN — AMIODARONE HYDROCHLORIDE 200 MG: 200 TABLET ORAL at 08:47

## 2017-03-03 RX ADMIN — GABAPENTIN 300 MG: 300 CAPSULE ORAL at 21:16

## 2017-03-03 RX ADMIN — ASPIRIN 81 MG: 81 TABLET, COATED ORAL at 17:54

## 2017-03-03 RX ADMIN — CYANOCOBALAMIN TAB 1000 MCG 1000 MCG: 1000 TAB at 08:48

## 2017-03-04 PROBLEM — I50.33 ACUTE ON CHRONIC DIASTOLIC CHF (CONGESTIVE HEART FAILURE) (HCC): Status: ACTIVE | Noted: 2017-03-01

## 2017-03-04 PROBLEM — IMO0002: Status: ACTIVE | Noted: 2017-03-04

## 2017-03-04 LAB
ALBUMIN SERPL BCP-MCNC: 2.8 G/DL (ref 3.5–5)
ALP SERPL-CCNC: 34 U/L (ref 46–116)
ALT SERPL W P-5'-P-CCNC: 18 U/L (ref 12–78)
ANION GAP SERPL CALCULATED.3IONS-SCNC: 6 MMOL/L (ref 4–13)
AST SERPL W P-5'-P-CCNC: 14 U/L (ref 5–45)
BASOPHILS # BLD AUTO: 0.07 THOUSANDS/ΜL (ref 0–0.1)
BASOPHILS NFR BLD AUTO: 1 % (ref 0–1)
BILIRUB SERPL-MCNC: 0.4 MG/DL (ref 0.2–1)
BUN SERPL-MCNC: 53 MG/DL (ref 5–25)
CALCIUM SERPL-MCNC: 8.5 MG/DL (ref 8.3–10.1)
CHLORIDE SERPL-SCNC: 102 MMOL/L (ref 100–108)
CO2 SERPL-SCNC: 35 MMOL/L (ref 21–32)
CREAT SERPL-MCNC: 3.06 MG/DL (ref 0.6–1.3)
EOSINOPHIL # BLD AUTO: 0.22 THOUSAND/ΜL (ref 0–0.61)
EOSINOPHIL NFR BLD AUTO: 3 % (ref 0–6)
ERYTHROCYTE [DISTWIDTH] IN BLOOD BY AUTOMATED COUNT: 15.8 % (ref 11.6–15.1)
GFR SERPL CREATININE-BSD FRML MDRD: 19.8 ML/MIN/1.73SQ M
GLUCOSE SERPL-MCNC: 104 MG/DL (ref 65–140)
GLUCOSE SERPL-MCNC: 122 MG/DL (ref 65–140)
GLUCOSE SERPL-MCNC: 169 MG/DL (ref 65–140)
GLUCOSE SERPL-MCNC: 204 MG/DL (ref 65–140)
GLUCOSE SERPL-MCNC: 209 MG/DL (ref 65–140)
HCT VFR BLD AUTO: 37.8 % (ref 36.5–49.3)
HGB BLD-MCNC: 11.5 G/DL (ref 12–17)
INR PPP: 1.38 (ref 0.86–1.16)
LYMPHOCYTES # BLD AUTO: 0.89 THOUSANDS/ΜL (ref 0.6–4.47)
LYMPHOCYTES NFR BLD AUTO: 14 % (ref 14–44)
MAGNESIUM SERPL-MCNC: 2.1 MG/DL (ref 1.6–2.6)
MCH RBC QN AUTO: 27 PG (ref 26.8–34.3)
MCHC RBC AUTO-ENTMCNC: 30.4 G/DL (ref 31.4–37.4)
MCV RBC AUTO: 89 FL (ref 82–98)
MONOCYTES # BLD AUTO: 0.66 THOUSAND/ΜL (ref 0.17–1.22)
MONOCYTES NFR BLD AUTO: 10 % (ref 4–12)
NEUTROPHILS # BLD AUTO: 4.55 THOUSANDS/ΜL (ref 1.85–7.62)
NEUTS SEG NFR BLD AUTO: 72 % (ref 43–75)
PLATELET # BLD AUTO: 250 THOUSANDS/UL (ref 149–390)
PMV BLD AUTO: 9.6 FL (ref 8.9–12.7)
POTASSIUM SERPL-SCNC: 3.7 MMOL/L (ref 3.5–5.3)
PROT SERPL-MCNC: 6.1 G/DL (ref 6.4–8.2)
PROTHROMBIN TIME: 16.5 SECONDS (ref 12–14.3)
RBC # BLD AUTO: 4.26 MILLION/UL (ref 3.88–5.62)
SODIUM SERPL-SCNC: 143 MMOL/L (ref 136–145)
WBC # BLD AUTO: 6.39 THOUSAND/UL (ref 4.31–10.16)

## 2017-03-04 PROCEDURE — 83735 ASSAY OF MAGNESIUM: CPT | Performed by: INTERNAL MEDICINE

## 2017-03-04 PROCEDURE — 85610 PROTHROMBIN TIME: CPT | Performed by: INTERNAL MEDICINE

## 2017-03-04 PROCEDURE — 82948 REAGENT STRIP/BLOOD GLUCOSE: CPT

## 2017-03-04 PROCEDURE — 85025 COMPLETE CBC W/AUTO DIFF WBC: CPT | Performed by: INTERNAL MEDICINE

## 2017-03-04 PROCEDURE — 94640 AIRWAY INHALATION TREATMENT: CPT

## 2017-03-04 PROCEDURE — 80053 COMPREHEN METABOLIC PANEL: CPT | Performed by: INTERNAL MEDICINE

## 2017-03-04 PROCEDURE — 94760 N-INVAS EAR/PLS OXIMETRY 1: CPT

## 2017-03-04 RX ORDER — FUROSEMIDE 10 MG/ML
80 INJECTION INTRAMUSCULAR; INTRAVENOUS
Status: DISCONTINUED | OUTPATIENT
Start: 2017-03-04 | End: 2017-03-04

## 2017-03-04 RX ORDER — FUROSEMIDE 10 MG/ML
80 INJECTION INTRAMUSCULAR; INTRAVENOUS
Status: DISCONTINUED | OUTPATIENT
Start: 2017-03-04 | End: 2017-03-06

## 2017-03-04 RX ADMIN — TAMSULOSIN HYDROCHLORIDE 0.4 MG: 0.4 CAPSULE ORAL at 21:23

## 2017-03-04 RX ADMIN — CYANOCOBALAMIN TAB 1000 MCG 1000 MCG: 1000 TAB at 08:43

## 2017-03-04 RX ADMIN — FUROSEMIDE 80 MG: 10 INJECTION, SOLUTION INTRAMUSCULAR; INTRAVENOUS at 12:43

## 2017-03-04 RX ADMIN — IPRATROPIUM BROMIDE 0.5 MG: 0.5 SOLUTION RESPIRATORY (INHALATION) at 20:41

## 2017-03-04 RX ADMIN — PRAVASTATIN SODIUM 40 MG: 40 TABLET ORAL at 17:50

## 2017-03-04 RX ADMIN — METOPROLOL TARTRATE 25 MG: 25 TABLET ORAL at 08:43

## 2017-03-04 RX ADMIN — INSULIN LISPRO 1 UNITS: 100 INJECTION, SOLUTION INTRAVENOUS; SUBCUTANEOUS at 16:22

## 2017-03-04 RX ADMIN — POTASSIUM CHLORIDE 20 MEQ: 1500 TABLET, EXTENDED RELEASE ORAL at 17:51

## 2017-03-04 RX ADMIN — APIXABAN 5 MG: 5 TABLET, FILM COATED ORAL at 21:22

## 2017-03-04 RX ADMIN — LEVALBUTEROL 1.25 MG: 1.25 SOLUTION, CONCENTRATE RESPIRATORY (INHALATION) at 20:41

## 2017-03-04 RX ADMIN — FUROSEMIDE 80 MG: 10 INJECTION, SOLUTION INTRAMUSCULAR; INTRAVENOUS at 06:01

## 2017-03-04 RX ADMIN — INSULIN LISPRO 1 UNITS: 100 INJECTION, SOLUTION INTRAVENOUS; SUBCUTANEOUS at 11:24

## 2017-03-04 RX ADMIN — IPRATROPIUM BROMIDE 0.5 MG: 0.5 SOLUTION RESPIRATORY (INHALATION) at 08:15

## 2017-03-04 RX ADMIN — POTASSIUM CHLORIDE 20 MEQ: 1500 TABLET, EXTENDED RELEASE ORAL at 08:43

## 2017-03-04 RX ADMIN — FUROSEMIDE 80 MG: 10 INJECTION, SOLUTION INTRAMUSCULAR; INTRAVENOUS at 17:51

## 2017-03-04 RX ADMIN — AMIODARONE HYDROCHLORIDE 200 MG: 200 TABLET ORAL at 08:42

## 2017-03-04 RX ADMIN — ASPIRIN 81 MG: 81 TABLET, COATED ORAL at 08:43

## 2017-03-04 RX ADMIN — APIXABAN 5 MG: 5 TABLET, FILM COATED ORAL at 08:43

## 2017-03-04 RX ADMIN — GABAPENTIN 300 MG: 300 CAPSULE ORAL at 21:23

## 2017-03-04 RX ADMIN — INSULIN DETEMIR 35 UNITS: 100 INJECTION, SOLUTION SUBCUTANEOUS at 21:22

## 2017-03-04 RX ADMIN — ASPIRIN 81 MG: 81 TABLET, COATED ORAL at 17:51

## 2017-03-04 RX ADMIN — LEVALBUTEROL 1.25 MG: 1.25 SOLUTION, CONCENTRATE RESPIRATORY (INHALATION) at 08:15

## 2017-03-05 PROBLEM — E83.39 HYPERPHOSPHATEMIA: Status: ACTIVE | Noted: 2017-03-05

## 2017-03-05 PROBLEM — K59.00 CONSTIPATION: Status: ACTIVE | Noted: 2017-03-05

## 2017-03-05 PROBLEM — S81.802A WOUND OF LEFT LOWER EXTREMITY: Status: ACTIVE | Noted: 2017-03-05

## 2017-03-05 PROBLEM — N28.1 BILATERAL RENAL CYSTS: Status: ACTIVE | Noted: 2017-03-05

## 2017-03-05 PROBLEM — E55.9 VITAMIN D DEFICIENCY: Status: ACTIVE | Noted: 2017-03-05

## 2017-03-05 PROBLEM — S81.801A WOUND OF RIGHT LOWER EXTREMITY: Status: ACTIVE | Noted: 2017-03-05

## 2017-03-05 LAB
25(OH)D3 SERPL-MCNC: 16.4 NG/ML (ref 30–100)
ALBUMIN SERPL BCP-MCNC: 2.9 G/DL (ref 3.5–5)
ALP SERPL-CCNC: 35 U/L (ref 46–116)
ALT SERPL W P-5'-P-CCNC: 18 U/L (ref 12–78)
ANION GAP SERPL CALCULATED.3IONS-SCNC: 5 MMOL/L (ref 4–13)
AST SERPL W P-5'-P-CCNC: 12 U/L (ref 5–45)
BASOPHILS # BLD AUTO: 0.07 THOUSANDS/ΜL (ref 0–0.1)
BASOPHILS NFR BLD AUTO: 1 % (ref 0–1)
BILIRUB SERPL-MCNC: 0.4 MG/DL (ref 0.2–1)
BUN SERPL-MCNC: 56 MG/DL (ref 5–25)
CALCIUM SERPL-MCNC: 8.6 MG/DL (ref 8.3–10.1)
CHLORIDE SERPL-SCNC: 100 MMOL/L (ref 100–108)
CO2 SERPL-SCNC: 38 MMOL/L (ref 21–32)
CREAT SERPL-MCNC: 3.03 MG/DL (ref 0.6–1.3)
EOSINOPHIL # BLD AUTO: 0.23 THOUSAND/ΜL (ref 0–0.61)
EOSINOPHIL NFR BLD AUTO: 4 % (ref 0–6)
ERYTHROCYTE [DISTWIDTH] IN BLOOD BY AUTOMATED COUNT: 15.7 % (ref 11.6–15.1)
GFR SERPL CREATININE-BSD FRML MDRD: 20.1 ML/MIN/1.73SQ M
GLUCOSE SERPL-MCNC: 121 MG/DL (ref 65–140)
GLUCOSE SERPL-MCNC: 128 MG/DL (ref 65–140)
GLUCOSE SERPL-MCNC: 179 MG/DL (ref 65–140)
GLUCOSE SERPL-MCNC: 193 MG/DL (ref 65–140)
GLUCOSE SERPL-MCNC: 208 MG/DL (ref 65–140)
HCT VFR BLD AUTO: 38.7 % (ref 36.5–49.3)
HGB BLD-MCNC: 12 G/DL (ref 12–17)
LACTATE SERPL-SCNC: 0.8 MMOL/L (ref 0.5–2)
LYMPHOCYTES # BLD AUTO: 0.79 THOUSANDS/ΜL (ref 0.6–4.47)
LYMPHOCYTES NFR BLD AUTO: 12 % (ref 14–44)
MAGNESIUM SERPL-MCNC: 2.1 MG/DL (ref 1.6–2.6)
MCH RBC QN AUTO: 27.5 PG (ref 26.8–34.3)
MCHC RBC AUTO-ENTMCNC: 31 G/DL (ref 31.4–37.4)
MCV RBC AUTO: 89 FL (ref 82–98)
MONOCYTES # BLD AUTO: 0.64 THOUSAND/ΜL (ref 0.17–1.22)
MONOCYTES NFR BLD AUTO: 10 % (ref 4–12)
NEUTROPHILS # BLD AUTO: 4.8 THOUSANDS/ΜL (ref 1.85–7.62)
NEUTS SEG NFR BLD AUTO: 73 % (ref 43–75)
PHOSPHATE SERPL-MCNC: 5.8 MG/DL (ref 2.3–4.1)
PLATELET # BLD AUTO: 233 THOUSANDS/UL (ref 149–390)
PMV BLD AUTO: 9.5 FL (ref 8.9–12.7)
POTASSIUM SERPL-SCNC: 4 MMOL/L (ref 3.5–5.3)
PROT SERPL-MCNC: 6.2 G/DL (ref 6.4–8.2)
RBC # BLD AUTO: 4.37 MILLION/UL (ref 3.88–5.62)
SODIUM SERPL-SCNC: 143 MMOL/L (ref 136–145)
WBC # BLD AUTO: 6.53 THOUSAND/UL (ref 4.31–10.16)

## 2017-03-05 PROCEDURE — 83605 ASSAY OF LACTIC ACID: CPT | Performed by: INTERNAL MEDICINE

## 2017-03-05 PROCEDURE — 82306 VITAMIN D 25 HYDROXY: CPT | Performed by: INTERNAL MEDICINE

## 2017-03-05 PROCEDURE — 84100 ASSAY OF PHOSPHORUS: CPT | Performed by: INTERNAL MEDICINE

## 2017-03-05 PROCEDURE — 85025 COMPLETE CBC W/AUTO DIFF WBC: CPT | Performed by: INTERNAL MEDICINE

## 2017-03-05 PROCEDURE — 94640 AIRWAY INHALATION TREATMENT: CPT

## 2017-03-05 PROCEDURE — 82948 REAGENT STRIP/BLOOD GLUCOSE: CPT

## 2017-03-05 PROCEDURE — 80053 COMPREHEN METABOLIC PANEL: CPT | Performed by: INTERNAL MEDICINE

## 2017-03-05 PROCEDURE — 94760 N-INVAS EAR/PLS OXIMETRY 1: CPT

## 2017-03-05 PROCEDURE — 83735 ASSAY OF MAGNESIUM: CPT | Performed by: INTERNAL MEDICINE

## 2017-03-05 RX ORDER — ASPIRIN 81 MG/1
81 TABLET ORAL DAILY
Qty: 30 TABLET | Refills: 0 | Status: CANCELLED
Start: 2017-03-07

## 2017-03-05 RX ORDER — POLYETHYLENE GLYCOL 3350 17 G/17G
17 POWDER, FOR SOLUTION ORAL ONCE
Status: COMPLETED | OUTPATIENT
Start: 2017-03-05 | End: 2017-03-05

## 2017-03-05 RX ORDER — MELATONIN
2000 DAILY
Status: DISCONTINUED | OUTPATIENT
Start: 2017-03-06 | End: 2017-03-06 | Stop reason: HOSPADM

## 2017-03-05 RX ORDER — DOCUSATE SODIUM 100 MG/1
100 CAPSULE, LIQUID FILLED ORAL 2 TIMES DAILY
Status: DISCONTINUED | OUTPATIENT
Start: 2017-03-05 | End: 2017-03-06 | Stop reason: HOSPADM

## 2017-03-05 RX ORDER — ASPIRIN 81 MG/1
81 TABLET ORAL DAILY
Status: DISCONTINUED | OUTPATIENT
Start: 2017-03-06 | End: 2017-03-06 | Stop reason: HOSPADM

## 2017-03-05 RX ADMIN — PRAVASTATIN SODIUM 40 MG: 40 TABLET ORAL at 17:45

## 2017-03-05 RX ADMIN — FUROSEMIDE 80 MG: 10 INJECTION, SOLUTION INTRAMUSCULAR; INTRAVENOUS at 17:45

## 2017-03-05 RX ADMIN — POLYETHYLENE GLYCOL 3350 17 G: 17 POWDER, FOR SOLUTION ORAL at 10:23

## 2017-03-05 RX ADMIN — LEVALBUTEROL 1.25 MG: 1.25 SOLUTION, CONCENTRATE RESPIRATORY (INHALATION) at 19:49

## 2017-03-05 RX ADMIN — INSULIN DETEMIR 35 UNITS: 100 INJECTION, SOLUTION SUBCUTANEOUS at 21:37

## 2017-03-05 RX ADMIN — METOPROLOL TARTRATE 25 MG: 25 TABLET ORAL at 09:30

## 2017-03-05 RX ADMIN — GABAPENTIN 300 MG: 300 CAPSULE ORAL at 21:37

## 2017-03-05 RX ADMIN — APIXABAN 5 MG: 5 TABLET, FILM COATED ORAL at 21:37

## 2017-03-05 RX ADMIN — IPRATROPIUM BROMIDE 0.5 MG: 0.5 SOLUTION RESPIRATORY (INHALATION) at 19:49

## 2017-03-05 RX ADMIN — APIXABAN 5 MG: 5 TABLET, FILM COATED ORAL at 09:30

## 2017-03-05 RX ADMIN — CYANOCOBALAMIN TAB 1000 MCG 1000 MCG: 1000 TAB at 09:30

## 2017-03-05 RX ADMIN — DOCUSATE SODIUM 100 MG: 100 CAPSULE, LIQUID FILLED ORAL at 17:48

## 2017-03-05 RX ADMIN — INSULIN LISPRO 1 UNITS: 100 INJECTION, SOLUTION INTRAVENOUS; SUBCUTANEOUS at 16:37

## 2017-03-05 RX ADMIN — LEVALBUTEROL 1.25 MG: 1.25 SOLUTION, CONCENTRATE RESPIRATORY (INHALATION) at 08:59

## 2017-03-05 RX ADMIN — ASPIRIN 81 MG: 81 TABLET, COATED ORAL at 09:30

## 2017-03-05 RX ADMIN — FUROSEMIDE 80 MG: 10 INJECTION, SOLUTION INTRAMUSCULAR; INTRAVENOUS at 05:25

## 2017-03-05 RX ADMIN — TAMSULOSIN HYDROCHLORIDE 0.4 MG: 0.4 CAPSULE ORAL at 21:37

## 2017-03-05 RX ADMIN — INSULIN LISPRO 1 UNITS: 100 INJECTION, SOLUTION INTRAVENOUS; SUBCUTANEOUS at 11:39

## 2017-03-05 RX ADMIN — FUROSEMIDE 80 MG: 10 INJECTION, SOLUTION INTRAMUSCULAR; INTRAVENOUS at 11:39

## 2017-03-05 RX ADMIN — IPRATROPIUM BROMIDE 0.5 MG: 0.5 SOLUTION RESPIRATORY (INHALATION) at 08:59

## 2017-03-05 RX ADMIN — DOCUSATE SODIUM 100 MG: 100 CAPSULE, LIQUID FILLED ORAL at 10:23

## 2017-03-05 RX ADMIN — AMIODARONE HYDROCHLORIDE 200 MG: 200 TABLET ORAL at 09:30

## 2017-03-06 ENCOUNTER — APPOINTMENT (INPATIENT)
Dept: OCCUPATIONAL THERAPY | Facility: HOSPITAL | Age: 77
DRG: 292 | End: 2017-03-06
Payer: MEDICARE

## 2017-03-06 ENCOUNTER — APPOINTMENT (INPATIENT)
Dept: PHYSICAL THERAPY | Facility: HOSPITAL | Age: 77
DRG: 292 | End: 2017-03-06
Payer: MEDICARE

## 2017-03-06 VITALS
DIASTOLIC BLOOD PRESSURE: 63 MMHG | HEART RATE: 77 BPM | HEIGHT: 67 IN | WEIGHT: 295.42 LBS | RESPIRATION RATE: 20 BRPM | TEMPERATURE: 96.9 F | BODY MASS INDEX: 46.37 KG/M2 | SYSTOLIC BLOOD PRESSURE: 116 MMHG | OXYGEN SATURATION: 93 %

## 2017-03-06 PROBLEM — R94.31 LEFT AXIS DEVIATION: Status: ACTIVE | Noted: 2017-03-06

## 2017-03-06 LAB
ANION GAP SERPL CALCULATED.3IONS-SCNC: 7 MMOL/L (ref 4–13)
BACTERIA BLD CULT: NORMAL
BACTERIA BLD CULT: NORMAL
BUN SERPL-MCNC: 63 MG/DL (ref 5–25)
CALCIUM SERPL-MCNC: 8.9 MG/DL (ref 8.3–10.1)
CHLORIDE SERPL-SCNC: 98 MMOL/L (ref 100–108)
CO2 SERPL-SCNC: 36 MMOL/L (ref 21–32)
CREAT SERPL-MCNC: 3.02 MG/DL (ref 0.6–1.3)
GFR SERPL CREATININE-BSD FRML MDRD: 20.1 ML/MIN/1.73SQ M
GLUCOSE SERPL-MCNC: 158 MG/DL (ref 65–140)
GLUCOSE SERPL-MCNC: 171 MG/DL (ref 65–140)
MAGNESIUM SERPL-MCNC: 2.2 MG/DL (ref 1.6–2.6)
PHOSPHATE SERPL-MCNC: 5.6 MG/DL (ref 2.3–4.1)
POTASSIUM SERPL-SCNC: 3.9 MMOL/L (ref 3.5–5.3)
SODIUM SERPL-SCNC: 141 MMOL/L (ref 136–145)

## 2017-03-06 PROCEDURE — 94760 N-INVAS EAR/PLS OXIMETRY 1: CPT

## 2017-03-06 PROCEDURE — 94761 N-INVAS EAR/PLS OXIMETRY MLT: CPT

## 2017-03-06 PROCEDURE — 94762 N-INVAS EAR/PLS OXIMTRY CONT: CPT

## 2017-03-06 PROCEDURE — 97110 THERAPEUTIC EXERCISES: CPT

## 2017-03-06 PROCEDURE — 83735 ASSAY OF MAGNESIUM: CPT | Performed by: INTERNAL MEDICINE

## 2017-03-06 PROCEDURE — 94640 AIRWAY INHALATION TREATMENT: CPT

## 2017-03-06 PROCEDURE — 84100 ASSAY OF PHOSPHORUS: CPT | Performed by: INTERNAL MEDICINE

## 2017-03-06 PROCEDURE — 97116 GAIT TRAINING THERAPY: CPT

## 2017-03-06 PROCEDURE — 82948 REAGENT STRIP/BLOOD GLUCOSE: CPT

## 2017-03-06 PROCEDURE — 80048 BASIC METABOLIC PNL TOTAL CA: CPT | Performed by: INTERNAL MEDICINE

## 2017-03-06 RX ORDER — FUROSEMIDE 80 MG
80 TABLET ORAL DAILY
Qty: 30 TABLET | Refills: 0 | Status: ON HOLD
Start: 2017-03-07 | End: 2017-10-27

## 2017-03-06 RX ORDER — DOCUSATE SODIUM 100 MG/1
100 CAPSULE, LIQUID FILLED ORAL 2 TIMES DAILY PRN
Qty: 60 CAPSULE | Refills: 0
Start: 2017-03-06 | End: 2017-08-15 | Stop reason: ALTCHOICE

## 2017-03-06 RX ORDER — ATORVASTATIN CALCIUM 80 MG/1
80 TABLET, FILM COATED ORAL
Qty: 30 TABLET | Refills: 1 | Status: SHIPPED | OUTPATIENT
Start: 2017-03-06 | End: 2017-08-15 | Stop reason: ALTCHOICE

## 2017-03-06 RX ORDER — FUROSEMIDE 80 MG
80 TABLET ORAL ONCE
Status: COMPLETED | OUTPATIENT
Start: 2017-03-06 | End: 2017-03-06

## 2017-03-06 RX ORDER — FUROSEMIDE 10 MG/ML
80 SOLUTION ORAL ONCE
Status: DISCONTINUED | OUTPATIENT
Start: 2017-03-06 | End: 2017-03-06

## 2017-03-06 RX ADMIN — INSULIN LISPRO 1 UNITS: 100 INJECTION, SOLUTION INTRAVENOUS; SUBCUTANEOUS at 07:42

## 2017-03-06 RX ADMIN — IPRATROPIUM BROMIDE 0.5 MG: 0.5 SOLUTION RESPIRATORY (INHALATION) at 09:34

## 2017-03-06 RX ADMIN — APIXABAN 5 MG: 5 TABLET, FILM COATED ORAL at 09:15

## 2017-03-06 RX ADMIN — AMIODARONE HYDROCHLORIDE 200 MG: 200 TABLET ORAL at 09:16

## 2017-03-06 RX ADMIN — CYANOCOBALAMIN TAB 1000 MCG 1000 MCG: 1000 TAB at 09:16

## 2017-03-06 RX ADMIN — FUROSEMIDE 80 MG: 80 TABLET ORAL at 06:30

## 2017-03-06 RX ADMIN — DOCUSATE SODIUM 100 MG: 100 CAPSULE, LIQUID FILLED ORAL at 09:15

## 2017-03-06 RX ADMIN — LEVALBUTEROL 1.25 MG: 1.25 SOLUTION, CONCENTRATE RESPIRATORY (INHALATION) at 09:35

## 2017-03-06 RX ADMIN — VITAMIN D, TAB 1000IU (100/BT) 2000 UNITS: 25 TAB at 09:16

## 2017-03-06 RX ADMIN — METOPROLOL TARTRATE 25 MG: 25 TABLET ORAL at 09:15

## 2017-03-06 RX ADMIN — ASPIRIN 81 MG: 81 TABLET, COATED ORAL at 09:15

## 2017-03-08 ENCOUNTER — TRANSCRIBE ORDERS (OUTPATIENT)
Dept: SLEEP CENTER | Facility: HOSPITAL | Age: 77
End: 2017-03-08

## 2017-03-08 DIAGNOSIS — G47.33 OBSTRUCTIVE SLEEP APNEA (ADULT) (PEDIATRIC): Primary | ICD-10-CM

## 2017-03-10 ENCOUNTER — LAB REQUISITION (OUTPATIENT)
Dept: LAB | Facility: HOSPITAL | Age: 77
End: 2017-03-10
Payer: MEDICARE

## 2017-03-10 DIAGNOSIS — D64.9 ANEMIA: ICD-10-CM

## 2017-03-10 DIAGNOSIS — I12.9 HYPERTENSIVE CHRONIC KIDNEY DISEASE WITH STAGE 1 THROUGH STAGE 4 CHRONIC KIDNEY DISEASE, OR UNSPECIFIED CHRONIC KIDNEY DISEASE: ICD-10-CM

## 2017-03-10 DIAGNOSIS — N17.0 ACUTE KIDNEY FAILURE WITH TUBULAR NECROSIS (HCC): ICD-10-CM

## 2017-03-10 DIAGNOSIS — N18.30 CHRONIC KIDNEY DISEASE, STAGE III (MODERATE) (HCC): ICD-10-CM

## 2017-03-10 DIAGNOSIS — I25.5 ISCHEMIC CARDIOMYOPATHY: ICD-10-CM

## 2017-03-10 LAB
ALBUMIN SERPL BCP-MCNC: 3 G/DL (ref 3.5–5)
ALP SERPL-CCNC: 42 U/L (ref 46–116)
ALT SERPL W P-5'-P-CCNC: 22 U/L (ref 12–78)
ANION GAP SERPL CALCULATED.3IONS-SCNC: 7 MMOL/L (ref 4–13)
AST SERPL W P-5'-P-CCNC: 15 U/L (ref 5–45)
BASOPHILS # BLD AUTO: 0.06 THOUSANDS/ΜL (ref 0–0.1)
BASOPHILS NFR BLD AUTO: 1 % (ref 0–1)
BILIRUB SERPL-MCNC: 0.4 MG/DL (ref 0.2–1)
BUN SERPL-MCNC: 73 MG/DL (ref 5–25)
CALCIUM SERPL-MCNC: 8.3 MG/DL (ref 8.3–10.1)
CHLORIDE SERPL-SCNC: 99 MMOL/L (ref 100–108)
CO2 SERPL-SCNC: 36 MMOL/L (ref 21–32)
CREAT SERPL-MCNC: 3.39 MG/DL (ref 0.6–1.3)
EOSINOPHIL # BLD AUTO: 0.22 THOUSAND/ΜL (ref 0–0.61)
EOSINOPHIL NFR BLD AUTO: 4 % (ref 0–6)
ERYTHROCYTE [DISTWIDTH] IN BLOOD BY AUTOMATED COUNT: 15.9 % (ref 11.6–15.1)
GFR SERPL CREATININE-BSD FRML MDRD: 17.6 ML/MIN/1.73SQ M
GLUCOSE SERPL-MCNC: 324 MG/DL (ref 65–140)
HCT VFR BLD AUTO: 38.7 % (ref 36.5–49.3)
HGB BLD-MCNC: 11.6 G/DL (ref 12–17)
LYMPHOCYTES # BLD AUTO: 0.56 THOUSANDS/ΜL (ref 0.6–4.47)
LYMPHOCYTES NFR BLD AUTO: 10 % (ref 14–44)
MAGNESIUM SERPL-MCNC: 2.4 MG/DL (ref 1.6–2.6)
MCH RBC QN AUTO: 27.4 PG (ref 26.8–34.3)
MCHC RBC AUTO-ENTMCNC: 30 G/DL (ref 31.4–37.4)
MCV RBC AUTO: 91 FL (ref 82–98)
MONOCYTES # BLD AUTO: 0.36 THOUSAND/ΜL (ref 0.17–1.22)
MONOCYTES NFR BLD AUTO: 6 % (ref 4–12)
NEUTROPHILS # BLD AUTO: 4.7 THOUSANDS/ΜL (ref 1.85–7.62)
NEUTS SEG NFR BLD AUTO: 79 % (ref 43–75)
NT-PROBNP SERPL-MCNC: 3661 PG/ML
PHOSPHATE SERPL-MCNC: 5.4 MG/DL (ref 2.3–4.1)
PLATELET # BLD AUTO: 244 THOUSANDS/UL (ref 149–390)
PMV BLD AUTO: 11.3 FL (ref 8.9–12.7)
POTASSIUM SERPL-SCNC: 4.6 MMOL/L (ref 3.5–5.3)
PROT SERPL-MCNC: 5.6 G/DL (ref 6.4–8.2)
RBC # BLD AUTO: 4.24 MILLION/UL (ref 3.88–5.62)
SODIUM SERPL-SCNC: 142 MMOL/L (ref 136–145)
WBC # BLD AUTO: 5.9 THOUSAND/UL (ref 4.31–10.16)

## 2017-03-10 PROCEDURE — 83735 ASSAY OF MAGNESIUM: CPT | Performed by: FAMILY MEDICINE

## 2017-03-10 PROCEDURE — 80053 COMPREHEN METABOLIC PANEL: CPT | Performed by: FAMILY MEDICINE

## 2017-03-10 PROCEDURE — 85025 COMPLETE CBC W/AUTO DIFF WBC: CPT | Performed by: FAMILY MEDICINE

## 2017-03-10 PROCEDURE — 83880 ASSAY OF NATRIURETIC PEPTIDE: CPT | Performed by: FAMILY MEDICINE

## 2017-03-10 PROCEDURE — 84100 ASSAY OF PHOSPHORUS: CPT | Performed by: FAMILY MEDICINE

## 2017-03-13 ENCOUNTER — GENERIC CONVERSION - ENCOUNTER (OUTPATIENT)
Dept: OTHER | Facility: OTHER | Age: 77
End: 2017-03-13

## 2017-03-13 ENCOUNTER — ALLSCRIPTS OFFICE VISIT (OUTPATIENT)
Dept: OTHER | Facility: OTHER | Age: 77
End: 2017-03-13

## 2017-03-15 ENCOUNTER — GENERIC CONVERSION - ENCOUNTER (OUTPATIENT)
Dept: OTHER | Facility: OTHER | Age: 77
End: 2017-03-15

## 2017-03-20 ENCOUNTER — TRANSCRIBE ORDERS (OUTPATIENT)
Dept: ADMINISTRATIVE | Facility: HOSPITAL | Age: 77
End: 2017-03-20

## 2017-03-20 DIAGNOSIS — N18.30 CHRONIC KIDNEY DISEASE, STAGE III (MODERATE) (HCC): Primary | ICD-10-CM

## 2017-03-21 ENCOUNTER — APPOINTMENT (OUTPATIENT)
Dept: LAB | Facility: HOSPITAL | Age: 77
End: 2017-03-21
Attending: INTERNAL MEDICINE
Payer: MEDICARE

## 2017-03-21 ENCOUNTER — TRANSCRIBE ORDERS (OUTPATIENT)
Dept: ADMINISTRATIVE | Facility: HOSPITAL | Age: 77
End: 2017-03-21

## 2017-03-21 ENCOUNTER — ALLSCRIPTS OFFICE VISIT (OUTPATIENT)
Dept: OTHER | Facility: OTHER | Age: 77
End: 2017-03-21

## 2017-03-21 DIAGNOSIS — N18.30 CHRONIC KIDNEY DISEASE, STAGE III (MODERATE) (HCC): Primary | ICD-10-CM

## 2017-03-21 DIAGNOSIS — N18.30 CHRONIC KIDNEY DISEASE, STAGE III (MODERATE) (HCC): ICD-10-CM

## 2017-03-21 LAB
ANION GAP SERPL CALCULATED.3IONS-SCNC: 7 MMOL/L (ref 4–13)
BUN SERPL-MCNC: 53 MG/DL (ref 5–25)
CALCIUM SERPL-MCNC: 8.8 MG/DL (ref 8.3–10.1)
CHLORIDE SERPL-SCNC: 103 MMOL/L (ref 100–108)
CO2 SERPL-SCNC: 34 MMOL/L (ref 21–32)
CREAT SERPL-MCNC: 2.97 MG/DL (ref 0.6–1.3)
GFR SERPL CREATININE-BSD FRML MDRD: 20.5 ML/MIN/1.73SQ M
GLUCOSE P FAST SERPL-MCNC: 114 MG/DL (ref 65–99)
POTASSIUM SERPL-SCNC: 4.6 MMOL/L (ref 3.5–5.3)
SODIUM SERPL-SCNC: 144 MMOL/L (ref 136–145)

## 2017-03-21 PROCEDURE — 36415 COLL VENOUS BLD VENIPUNCTURE: CPT

## 2017-03-21 PROCEDURE — 80048 BASIC METABOLIC PNL TOTAL CA: CPT

## 2017-03-22 ENCOUNTER — GENERIC CONVERSION - ENCOUNTER (OUTPATIENT)
Dept: OTHER | Facility: OTHER | Age: 77
End: 2017-03-22

## 2017-03-23 ENCOUNTER — GENERIC CONVERSION - ENCOUNTER (OUTPATIENT)
Dept: OTHER | Facility: OTHER | Age: 77
End: 2017-03-23

## 2017-03-28 DIAGNOSIS — N18.9 CHRONIC KIDNEY DISEASE: ICD-10-CM

## 2017-03-28 DIAGNOSIS — I50.30 DIASTOLIC CONGESTIVE HEART FAILURE (HCC): ICD-10-CM

## 2017-03-29 ENCOUNTER — GENERIC CONVERSION - ENCOUNTER (OUTPATIENT)
Dept: OTHER | Facility: OTHER | Age: 77
End: 2017-03-29

## 2017-03-30 ENCOUNTER — HOSPITAL ENCOUNTER (OUTPATIENT)
Dept: SLEEP CENTER | Facility: HOSPITAL | Age: 77
Discharge: HOME | End: 2017-03-30
Attending: INTERNAL MEDICINE
Payer: MEDICARE

## 2017-03-30 ENCOUNTER — LAB REQUISITION (OUTPATIENT)
Dept: LAB | Facility: HOSPITAL | Age: 77
End: 2017-03-30
Payer: MEDICARE

## 2017-03-30 DIAGNOSIS — G47.33 OBSTRUCTIVE SLEEP APNEA (ADULT) (PEDIATRIC): ICD-10-CM

## 2017-03-30 DIAGNOSIS — I50.43 ACUTE ON CHRONIC COMBINED SYSTOLIC AND DIASTOLIC CONGESTIVE HEART FAILURE (HCC): ICD-10-CM

## 2017-03-30 LAB
ANION GAP SERPL CALCULATED.3IONS-SCNC: 11 MMOL/L (ref 4–13)
BUN SERPL-MCNC: 69 MG/DL (ref 5–25)
CALCIUM SERPL-MCNC: 8.9 MG/DL (ref 8.3–10.1)
CHLORIDE SERPL-SCNC: 96 MMOL/L (ref 100–108)
CO2 SERPL-SCNC: 30 MMOL/L (ref 21–32)
CREAT SERPL-MCNC: 3.05 MG/DL (ref 0.6–1.3)
GFR SERPL CREATININE-BSD FRML MDRD: 19.9 ML/MIN/1.73SQ M
GLUCOSE P FAST SERPL-MCNC: 319 MG/DL (ref 65–99)
NT-PROBNP SERPL-MCNC: 4366 PG/ML
POTASSIUM SERPL-SCNC: 3.8 MMOL/L (ref 3.5–5.3)
SODIUM SERPL-SCNC: 137 MMOL/L (ref 136–145)

## 2017-03-30 PROCEDURE — 80048 BASIC METABOLIC PNL TOTAL CA: CPT | Performed by: INTERNAL MEDICINE

## 2017-03-30 PROCEDURE — 95810 POLYSOM 6/> YRS 4/> PARAM: CPT

## 2017-03-30 PROCEDURE — 83880 ASSAY OF NATRIURETIC PEPTIDE: CPT | Performed by: INTERNAL MEDICINE

## 2017-04-12 ENCOUNTER — LAB REQUISITION (OUTPATIENT)
Dept: LAB | Facility: HOSPITAL | Age: 77
End: 2017-04-12
Payer: MEDICARE

## 2017-04-12 DIAGNOSIS — I50.43 ACUTE ON CHRONIC COMBINED SYSTOLIC AND DIASTOLIC CONGESTIVE HEART FAILURE (HCC): ICD-10-CM

## 2017-04-12 LAB
ANION GAP SERPL CALCULATED.3IONS-SCNC: 11 MMOL/L (ref 4–13)
BUN SERPL-MCNC: 74 MG/DL (ref 5–25)
CALCIUM SERPL-MCNC: 8.8 MG/DL (ref 8.3–10.1)
CHLORIDE SERPL-SCNC: 95 MMOL/L (ref 100–108)
CO2 SERPL-SCNC: 30 MMOL/L (ref 21–32)
CREAT SERPL-MCNC: 3.46 MG/DL (ref 0.6–1.3)
GFR SERPL CREATININE-BSD FRML MDRD: 17.2 ML/MIN/1.73SQ M
GLUCOSE P FAST SERPL-MCNC: 394 MG/DL (ref 65–99)
NT-PROBNP SERPL-MCNC: 1726 PG/ML
POTASSIUM SERPL-SCNC: 4.2 MMOL/L (ref 3.5–5.3)
SODIUM SERPL-SCNC: 136 MMOL/L (ref 136–145)

## 2017-04-12 PROCEDURE — 83880 ASSAY OF NATRIURETIC PEPTIDE: CPT | Performed by: INTERNAL MEDICINE

## 2017-04-12 PROCEDURE — 80048 BASIC METABOLIC PNL TOTAL CA: CPT | Performed by: INTERNAL MEDICINE

## 2017-04-13 ENCOUNTER — TRANSCRIBE ORDERS (OUTPATIENT)
Dept: SLEEP CENTER | Facility: HOSPITAL | Age: 77
End: 2017-04-13

## 2017-04-13 ENCOUNTER — ALLSCRIPTS OFFICE VISIT (OUTPATIENT)
Dept: OTHER | Facility: OTHER | Age: 77
End: 2017-04-13

## 2017-04-13 DIAGNOSIS — G47.33 OBSTRUCTIVE SLEEP APNEA (ADULT) (PEDIATRIC): Primary | ICD-10-CM

## 2017-04-14 ENCOUNTER — ALLSCRIPTS OFFICE VISIT (OUTPATIENT)
Dept: OTHER | Facility: OTHER | Age: 77
End: 2017-04-14

## 2017-04-20 ENCOUNTER — HOSPITAL ENCOUNTER (OUTPATIENT)
Dept: SLEEP CENTER | Facility: HOSPITAL | Age: 77
Discharge: HOME | End: 2017-04-20
Attending: FAMILY MEDICINE
Payer: MEDICARE

## 2017-04-20 DIAGNOSIS — G47.33 OBSTRUCTIVE SLEEP APNEA (ADULT) (PEDIATRIC): ICD-10-CM

## 2017-04-20 PROCEDURE — 95811 POLYSOM 6/>YRS CPAP 4/> PARM: CPT

## 2017-04-25 ENCOUNTER — GENERIC CONVERSION - ENCOUNTER (OUTPATIENT)
Dept: OTHER | Facility: OTHER | Age: 77
End: 2017-04-25

## 2017-04-26 ENCOUNTER — GENERIC CONVERSION - ENCOUNTER (OUTPATIENT)
Dept: OTHER | Facility: OTHER | Age: 77
End: 2017-04-26

## 2017-04-27 ENCOUNTER — LAB REQUISITION (OUTPATIENT)
Dept: LAB | Facility: HOSPITAL | Age: 77
End: 2017-04-27
Payer: MEDICARE

## 2017-04-27 DIAGNOSIS — I13.0 HYPERTENSIVE HEART AND CHRONIC KIDNEY DISEASE WITH HEART FAILURE AND STAGE 1 THROUGH STAGE 4 CHRONIC KIDNEY DISEASE, OR UNSPECIFIED CHRONIC KIDNEY DISEASE (CODE): ICD-10-CM

## 2017-04-27 DIAGNOSIS — I50.43 ACUTE ON CHRONIC COMBINED SYSTOLIC AND DIASTOLIC CONGESTIVE HEART FAILURE (HCC): ICD-10-CM

## 2017-04-27 LAB
ANION GAP SERPL CALCULATED.3IONS-SCNC: 11 MMOL/L (ref 4–13)
BUN SERPL-MCNC: 79 MG/DL (ref 5–25)
CALCIUM SERPL-MCNC: 8.6 MG/DL (ref 8.3–10.1)
CHLORIDE SERPL-SCNC: 93 MMOL/L (ref 100–108)
CO2 SERPL-SCNC: 32 MMOL/L (ref 21–32)
CREAT SERPL-MCNC: 3.62 MG/DL (ref 0.6–1.3)
GFR SERPL CREATININE-BSD FRML MDRD: 16.3 ML/MIN/1.73SQ M
GLUCOSE P FAST SERPL-MCNC: 388 MG/DL (ref 65–99)
NT-PROBNP SERPL-MCNC: 2348 PG/ML
POTASSIUM SERPL-SCNC: 3.9 MMOL/L (ref 3.5–5.3)
SODIUM SERPL-SCNC: 136 MMOL/L (ref 136–145)

## 2017-04-27 PROCEDURE — 80048 BASIC METABOLIC PNL TOTAL CA: CPT | Performed by: INTERNAL MEDICINE

## 2017-04-27 PROCEDURE — 83880 ASSAY OF NATRIURETIC PEPTIDE: CPT | Performed by: INTERNAL MEDICINE

## 2017-05-01 ENCOUNTER — GENERIC CONVERSION - ENCOUNTER (OUTPATIENT)
Dept: OTHER | Facility: OTHER | Age: 77
End: 2017-05-01

## 2017-05-04 ENCOUNTER — LAB REQUISITION (OUTPATIENT)
Dept: LAB | Facility: HOSPITAL | Age: 77
End: 2017-05-04
Payer: MEDICARE

## 2017-05-04 DIAGNOSIS — I13.0 HYPERTENSIVE HEART AND CHRONIC KIDNEY DISEASE WITH HEART FAILURE AND STAGE 1 THROUGH STAGE 4 CHRONIC KIDNEY DISEASE, OR UNSPECIFIED CHRONIC KIDNEY DISEASE (CODE): ICD-10-CM

## 2017-05-04 LAB
ANION GAP SERPL CALCULATED.3IONS-SCNC: 11 MMOL/L (ref 4–13)
BUN SERPL-MCNC: 69 MG/DL (ref 5–25)
CALCIUM SERPL-MCNC: 8.7 MG/DL (ref 8.3–10.1)
CHLORIDE SERPL-SCNC: 101 MMOL/L (ref 100–108)
CO2 SERPL-SCNC: 34 MMOL/L (ref 21–32)
CREAT SERPL-MCNC: 3.35 MG/DL (ref 0.6–1.3)
GFR SERPL CREATININE-BSD FRML MDRD: 17.9 ML/MIN/1.73SQ M
GLUCOSE P FAST SERPL-MCNC: 367 MG/DL (ref 65–99)
POTASSIUM SERPL-SCNC: 4.2 MMOL/L (ref 3.5–5.3)
SODIUM SERPL-SCNC: 146 MMOL/L (ref 136–145)

## 2017-05-04 PROCEDURE — 80048 BASIC METABOLIC PNL TOTAL CA: CPT | Performed by: INTERNAL MEDICINE

## 2017-05-06 DIAGNOSIS — C61 MALIGNANT NEOPLASM OF PROSTATE (HCC): ICD-10-CM

## 2017-05-12 ENCOUNTER — LAB REQUISITION (OUTPATIENT)
Dept: LAB | Facility: HOSPITAL | Age: 77
End: 2017-05-12
Payer: MEDICARE

## 2017-05-12 DIAGNOSIS — I50.43 ACUTE ON CHRONIC COMBINED SYSTOLIC AND DIASTOLIC CONGESTIVE HEART FAILURE (HCC): ICD-10-CM

## 2017-05-12 LAB
ANION GAP SERPL CALCULATED.3IONS-SCNC: 9 MMOL/L (ref 4–13)
BUN SERPL-MCNC: 76 MG/DL (ref 5–25)
CALCIUM SERPL-MCNC: 8.9 MG/DL (ref 8.3–10.1)
CHLORIDE SERPL-SCNC: 96 MMOL/L (ref 100–108)
CO2 SERPL-SCNC: 33 MMOL/L (ref 21–32)
CREAT SERPL-MCNC: 3.4 MG/DL (ref 0.6–1.3)
GFR SERPL CREATININE-BSD FRML MDRD: 17.6 ML/MIN/1.73SQ M
GLUCOSE P FAST SERPL-MCNC: 304 MG/DL (ref 65–99)
NT-PROBNP SERPL-MCNC: 2231 PG/ML
POTASSIUM SERPL-SCNC: 3.9 MMOL/L (ref 3.5–5.3)
SODIUM SERPL-SCNC: 138 MMOL/L (ref 136–145)

## 2017-05-12 PROCEDURE — 83880 ASSAY OF NATRIURETIC PEPTIDE: CPT | Performed by: INTERNAL MEDICINE

## 2017-05-12 PROCEDURE — 80048 BASIC METABOLIC PNL TOTAL CA: CPT | Performed by: INTERNAL MEDICINE

## 2017-05-22 ENCOUNTER — GENERIC CONVERSION - ENCOUNTER (OUTPATIENT)
Dept: OTHER | Facility: OTHER | Age: 77
End: 2017-05-22

## 2017-05-22 ENCOUNTER — LAB REQUISITION (OUTPATIENT)
Dept: LAB | Facility: HOSPITAL | Age: 77
End: 2017-05-22
Payer: MEDICARE

## 2017-05-22 DIAGNOSIS — Z85.46 PERSONAL HISTORY OF MALIGNANT NEOPLASM OF PROSTATE: ICD-10-CM

## 2017-05-22 LAB — PSA SERPL-MCNC: 1.1 NG/ML (ref 0–4)

## 2017-05-22 PROCEDURE — G0103 PSA SCREENING: HCPCS | Performed by: UROLOGY

## 2017-05-24 ENCOUNTER — ALLSCRIPTS OFFICE VISIT (OUTPATIENT)
Dept: OTHER | Facility: OTHER | Age: 77
End: 2017-05-24

## 2017-05-26 ENCOUNTER — GENERIC CONVERSION - ENCOUNTER (OUTPATIENT)
Dept: OTHER | Facility: OTHER | Age: 77
End: 2017-05-26

## 2017-05-30 ENCOUNTER — TRANSCRIBE ORDERS (OUTPATIENT)
Dept: ADMINISTRATIVE | Facility: HOSPITAL | Age: 77
End: 2017-05-30

## 2017-05-30 DIAGNOSIS — N18.5 CHRONIC KIDNEY DISEASE, STAGE V (HCC): Primary | ICD-10-CM

## 2017-06-01 ENCOUNTER — ALLSCRIPTS OFFICE VISIT (OUTPATIENT)
Dept: OTHER | Facility: OTHER | Age: 77
End: 2017-06-01

## 2017-06-06 ENCOUNTER — HOSPITAL ENCOUNTER (OUTPATIENT)
Dept: ULTRASOUND IMAGING | Facility: HOSPITAL | Age: 77
Discharge: HOME | End: 2017-06-06
Attending: INTERNAL MEDICINE
Payer: MEDICARE

## 2017-06-06 DIAGNOSIS — N18.5 CHRONIC KIDNEY DISEASE, STAGE V (HCC): ICD-10-CM

## 2017-06-06 PROCEDURE — G0365 VESSEL MAPPING HEMO ACCESS: HCPCS

## 2017-06-15 ENCOUNTER — LAB REQUISITION (OUTPATIENT)
Dept: LAB | Facility: HOSPITAL | Age: 77
End: 2017-06-15
Payer: MEDICARE

## 2017-06-15 DIAGNOSIS — N18.30 CHRONIC KIDNEY DISEASE, STAGE III (MODERATE) (HCC): ICD-10-CM

## 2017-06-15 DIAGNOSIS — N17.0 ACUTE KIDNEY FAILURE WITH TUBULAR NECROSIS (HCC): ICD-10-CM

## 2017-06-15 LAB
ANION GAP SERPL CALCULATED.3IONS-SCNC: 12 MMOL/L (ref 4–13)
BUN SERPL-MCNC: 68 MG/DL (ref 5–25)
CALCIUM SERPL-MCNC: 8.7 MG/DL (ref 8.3–10.1)
CHLORIDE SERPL-SCNC: 96 MMOL/L (ref 100–108)
CO2 SERPL-SCNC: 33 MMOL/L (ref 21–32)
CREAT SERPL-MCNC: 3.15 MG/DL (ref 0.6–1.3)
GFR SERPL CREATININE-BSD FRML MDRD: 19.2 ML/MIN/1.73SQ M
GLUCOSE P FAST SERPL-MCNC: 395 MG/DL (ref 65–99)
POTASSIUM SERPL-SCNC: 3.7 MMOL/L (ref 3.5–5.3)
SODIUM SERPL-SCNC: 141 MMOL/L (ref 136–145)

## 2017-06-15 PROCEDURE — 80048 BASIC METABOLIC PNL TOTAL CA: CPT | Performed by: INTERNAL MEDICINE

## 2017-07-05 ENCOUNTER — ALLSCRIPTS OFFICE VISIT (OUTPATIENT)
Dept: OTHER | Facility: OTHER | Age: 77
End: 2017-07-05

## 2017-07-05 ENCOUNTER — TRANSCRIBE ORDERS (OUTPATIENT)
Dept: LAB | Facility: CLINIC | Age: 77
End: 2017-07-05

## 2017-07-05 ENCOUNTER — LAB REQUISITION (OUTPATIENT)
Dept: LAB | Facility: HOSPITAL | Age: 77
End: 2017-07-05
Payer: MEDICARE

## 2017-07-05 DIAGNOSIS — I50.43 ACUTE ON CHRONIC COMBINED SYSTOLIC AND DIASTOLIC CONGESTIVE HEART FAILURE (HCC): ICD-10-CM

## 2017-07-05 DIAGNOSIS — I50.43 ACUTE ON CHRONIC COMBINED SYSTOLIC AND DIASTOLIC HEART FAILURE (HCC): Primary | ICD-10-CM

## 2017-07-05 DIAGNOSIS — N18.4 CHRONIC KIDNEY DISEASE, STAGE IV (SEVERE) (HCC): ICD-10-CM

## 2017-07-05 LAB
ANION GAP SERPL CALCULATED.3IONS-SCNC: 8 MMOL/L (ref 4–13)
BUN SERPL-MCNC: 58 MG/DL (ref 5–25)
CALCIUM SERPL-MCNC: 8.8 MG/DL (ref 8.3–10.1)
CHLORIDE SERPL-SCNC: 99 MMOL/L (ref 100–108)
CO2 SERPL-SCNC: 32 MMOL/L (ref 21–32)
CREAT SERPL-MCNC: 2.95 MG/DL (ref 0.6–1.3)
GFR SERPL CREATININE-BSD FRML MDRD: 20.7 ML/MIN/1.73SQ M
GLUCOSE SERPL-MCNC: 324 MG/DL (ref 65–140)
POTASSIUM SERPL-SCNC: 4.3 MMOL/L (ref 3.5–5.3)
SODIUM SERPL-SCNC: 139 MMOL/L (ref 136–145)

## 2017-07-05 PROCEDURE — 80048 BASIC METABOLIC PNL TOTAL CA: CPT | Performed by: INTERNAL MEDICINE

## 2017-07-12 DIAGNOSIS — E87.6 HYPOKALEMIA: ICD-10-CM

## 2017-07-14 ENCOUNTER — LAB REQUISITION (OUTPATIENT)
Dept: LAB | Facility: HOSPITAL | Age: 77
End: 2017-07-14
Payer: MEDICARE

## 2017-07-14 DIAGNOSIS — D64.9 ANEMIA: ICD-10-CM

## 2017-07-14 DIAGNOSIS — I50.41 ACUTE COMBINED SYSTOLIC AND DIASTOLIC CONGESTIVE HEART FAILURE (HCC): ICD-10-CM

## 2017-07-14 DIAGNOSIS — N18.4 CHRONIC KIDNEY DISEASE, STAGE IV (SEVERE) (HCC): ICD-10-CM

## 2017-07-14 LAB
ANION GAP SERPL CALCULATED.3IONS-SCNC: 7 MMOL/L (ref 4–13)
BASOPHILS # BLD AUTO: 0.05 THOUSANDS/ΜL (ref 0–0.1)
BASOPHILS NFR BLD AUTO: 1 % (ref 0–1)
BUN SERPL-MCNC: 66 MG/DL (ref 5–25)
CALCIUM SERPL-MCNC: 8.8 MG/DL (ref 8.3–10.1)
CHLORIDE SERPL-SCNC: 97 MMOL/L (ref 100–108)
CO2 SERPL-SCNC: 32 MMOL/L (ref 21–32)
CREAT SERPL-MCNC: 3.09 MG/DL (ref 0.6–1.3)
EOSINOPHIL # BLD AUTO: 0.24 THOUSAND/ΜL (ref 0–0.61)
EOSINOPHIL NFR BLD AUTO: 4 % (ref 0–6)
ERYTHROCYTE [DISTWIDTH] IN BLOOD BY AUTOMATED COUNT: 16.8 % (ref 11.6–15.1)
GFR SERPL CREATININE-BSD FRML MDRD: 19.6 ML/MIN/1.73SQ M
GLUCOSE SERPL-MCNC: 269 MG/DL (ref 65–140)
HCT VFR BLD AUTO: 36.8 % (ref 36.5–49.3)
HGB BLD-MCNC: 11.8 G/DL (ref 12–17)
LYMPHOCYTES # BLD AUTO: 0.74 THOUSANDS/ΜL (ref 0.6–4.47)
LYMPHOCYTES NFR BLD AUTO: 13 % (ref 14–44)
MCH RBC QN AUTO: 29.6 PG (ref 26.8–34.3)
MCHC RBC AUTO-ENTMCNC: 32.1 G/DL (ref 31.4–37.4)
MCV RBC AUTO: 93 FL (ref 82–98)
MONOCYTES # BLD AUTO: 0.45 THOUSAND/ΜL (ref 0.17–1.22)
MONOCYTES NFR BLD AUTO: 8 % (ref 4–12)
NEUTROPHILS # BLD AUTO: 4.39 THOUSANDS/ΜL (ref 1.85–7.62)
NEUTS SEG NFR BLD AUTO: 74 % (ref 43–75)
NRBC BLD AUTO-RTO: 0 /100 WBCS
PLATELET # BLD AUTO: 258 THOUSANDS/UL (ref 149–390)
PMV BLD AUTO: 11.2 FL (ref 8.9–12.7)
POTASSIUM SERPL-SCNC: 3.8 MMOL/L (ref 3.5–5.3)
RBC # BLD AUTO: 3.98 MILLION/UL (ref 3.88–5.62)
SODIUM SERPL-SCNC: 136 MMOL/L (ref 136–145)
WBC # BLD AUTO: 5.9 THOUSAND/UL (ref 4.31–10.16)

## 2017-07-14 PROCEDURE — 80048 BASIC METABOLIC PNL TOTAL CA: CPT | Performed by: INTERNAL MEDICINE

## 2017-07-14 PROCEDURE — 85025 COMPLETE CBC W/AUTO DIFF WBC: CPT | Performed by: INTERNAL MEDICINE

## 2017-07-21 ENCOUNTER — ALLSCRIPTS OFFICE VISIT (OUTPATIENT)
Dept: OTHER | Facility: OTHER | Age: 77
End: 2017-07-21

## 2017-07-26 ENCOUNTER — GENERIC CONVERSION - ENCOUNTER (OUTPATIENT)
Dept: OTHER | Facility: OTHER | Age: 77
End: 2017-07-26

## 2017-07-28 ENCOUNTER — GENERIC CONVERSION - ENCOUNTER (OUTPATIENT)
Dept: OTHER | Facility: OTHER | Age: 77
End: 2017-07-28

## 2017-07-28 ENCOUNTER — ALLSCRIPTS OFFICE VISIT (OUTPATIENT)
Dept: OTHER | Facility: OTHER | Age: 77
End: 2017-07-28

## 2017-08-04 ENCOUNTER — GENERIC CONVERSION - ENCOUNTER (OUTPATIENT)
Dept: OTHER | Facility: OTHER | Age: 77
End: 2017-08-04

## 2017-08-07 ENCOUNTER — GENERIC CONVERSION - ENCOUNTER (OUTPATIENT)
Dept: OTHER | Facility: OTHER | Age: 77
End: 2017-08-07

## 2017-08-10 DIAGNOSIS — C61 MALIGNANT NEOPLASM OF PROSTATE (HCC): ICD-10-CM

## 2017-08-10 DIAGNOSIS — M70.71 OTHER BURSITIS OF HIP, RIGHT HIP: ICD-10-CM

## 2017-08-10 DIAGNOSIS — S62.525A CLOSED NONDISPLACED FRACTURE OF DISTAL PHALANX OF LEFT THUMB: ICD-10-CM

## 2017-08-15 ENCOUNTER — HOSPITAL ENCOUNTER (EMERGENCY)
Facility: HOSPITAL | Age: 77
Discharge: HOME | End: 2017-08-15
Attending: EMERGENCY MEDICINE | Admitting: EMERGENCY MEDICINE
Payer: MEDICARE

## 2017-08-15 ENCOUNTER — APPOINTMENT (EMERGENCY)
Dept: CT IMAGING | Facility: HOSPITAL | Age: 77
End: 2017-08-15
Payer: MEDICARE

## 2017-08-15 VITALS
DIASTOLIC BLOOD PRESSURE: 66 MMHG | WEIGHT: 315 LBS | HEART RATE: 68 BPM | OXYGEN SATURATION: 98 % | TEMPERATURE: 98.2 F | SYSTOLIC BLOOD PRESSURE: 132 MMHG | RESPIRATION RATE: 19 BRPM | BODY MASS INDEX: 49.79 KG/M2

## 2017-08-15 DIAGNOSIS — M25.551 RIGHT HIP PAIN: Primary | ICD-10-CM

## 2017-08-15 PROCEDURE — 99284 EMERGENCY DEPT VISIT MOD MDM: CPT

## 2017-08-15 PROCEDURE — 73700 CT LOWER EXTREMITY W/O DYE: CPT

## 2017-08-15 PROCEDURE — 96372 THER/PROPH/DIAG INJ SC/IM: CPT

## 2017-08-15 RX ORDER — METOLAZONE 5 MG/1
5 TABLET ORAL AS NEEDED
COMMUNITY
End: 2017-10-27 | Stop reason: HOSPADM

## 2017-08-15 RX ORDER — NAPROXEN 250 MG/1
500 TABLET ORAL ONCE
Status: COMPLETED | OUTPATIENT
Start: 2017-08-15 | End: 2017-08-15

## 2017-08-15 RX ORDER — SIMVASTATIN 40 MG
20 TABLET ORAL
COMMUNITY
End: 2019-08-15 | Stop reason: HOSPADM

## 2017-08-15 RX ORDER — MORPHINE SULFATE 15 MG/1
7.5 TABLET ORAL EVERY 6 HOURS PRN
Qty: 8 TABLET | Refills: 0 | Status: SHIPPED | OUTPATIENT
Start: 2017-08-15 | End: 2017-08-25

## 2017-08-15 RX ADMIN — HYDROMORPHONE HYDROCHLORIDE 1 MG: 1 INJECTION, SOLUTION INTRAMUSCULAR; INTRAVENOUS; SUBCUTANEOUS at 14:53

## 2017-08-15 RX ADMIN — NAPROXEN 500 MG: 250 TABLET ORAL at 12:45

## 2017-08-15 RX ADMIN — HYDROMORPHONE HYDROCHLORIDE 1 MG: 1 INJECTION, SOLUTION INTRAMUSCULAR; INTRAVENOUS; SUBCUTANEOUS at 11:19

## 2017-08-16 ENCOUNTER — LAB REQUISITION (OUTPATIENT)
Dept: LAB | Facility: HOSPITAL | Age: 77
End: 2017-08-16
Payer: MEDICARE

## 2017-08-16 DIAGNOSIS — N18.4 CHRONIC KIDNEY DISEASE, STAGE IV (SEVERE) (HCC): ICD-10-CM

## 2017-08-16 LAB
ALBUMIN SERPL BCP-MCNC: 3.2 G/DL (ref 3.5–5)
ALP SERPL-CCNC: 40 U/L (ref 46–116)
ALT SERPL W P-5'-P-CCNC: 23 U/L (ref 12–78)
ANION GAP SERPL CALCULATED.3IONS-SCNC: 10 MMOL/L (ref 4–13)
ANION GAP SERPL CALCULATED.3IONS-SCNC: 10 MMOL/L (ref 4–13)
AST SERPL W P-5'-P-CCNC: 17 U/L (ref 5–45)
BASOPHILS # BLD AUTO: 0.06 THOUSANDS/ΜL (ref 0–0.1)
BASOPHILS NFR BLD AUTO: 1 % (ref 0–1)
BILIRUB SERPL-MCNC: 0.37 MG/DL (ref 0.2–1)
BUN SERPL-MCNC: 58 MG/DL (ref 5–25)
BUN SERPL-MCNC: 58 MG/DL (ref 5–25)
CALCIUM SERPL-MCNC: 9.1 MG/DL (ref 8.3–10.1)
CALCIUM SERPL-MCNC: 9.1 MG/DL (ref 8.3–10.1)
CHLORIDE SERPL-SCNC: 101 MMOL/L (ref 100–108)
CHLORIDE SERPL-SCNC: 101 MMOL/L (ref 100–108)
CO2 SERPL-SCNC: 30 MMOL/L (ref 21–32)
CO2 SERPL-SCNC: 30 MMOL/L (ref 21–32)
CREAT SERPL-MCNC: 2.86 MG/DL (ref 0.6–1.3)
CREAT SERPL-MCNC: 2.86 MG/DL (ref 0.6–1.3)
EOSINOPHIL # BLD AUTO: 0.21 THOUSAND/ΜL (ref 0–0.61)
EOSINOPHIL NFR BLD AUTO: 3 % (ref 0–6)
ERYTHROCYTE [DISTWIDTH] IN BLOOD BY AUTOMATED COUNT: 16 % (ref 11.6–15.1)
FERRITIN SERPL-MCNC: 36 NG/ML (ref 8–388)
GFR SERPL CREATININE-BSD FRML MDRD: 20 ML/MIN/1.73SQ M
GFR SERPL CREATININE-BSD FRML MDRD: 20 ML/MIN/1.73SQ M
GLUCOSE SERPL-MCNC: 142 MG/DL (ref 65–140)
GLUCOSE SERPL-MCNC: 142 MG/DL (ref 65–140)
HCT VFR BLD AUTO: 38.5 % (ref 36.5–49.3)
HGB BLD-MCNC: 12.3 G/DL (ref 12–17)
IRON SATN MFR SERPL: 13 %
IRON SERPL-MCNC: 48 UG/DL (ref 65–175)
LYMPHOCYTES # BLD AUTO: 0.92 THOUSANDS/ΜL (ref 0.6–4.47)
LYMPHOCYTES NFR BLD AUTO: 14 % (ref 14–44)
MCH RBC QN AUTO: 29.9 PG (ref 26.8–34.3)
MCHC RBC AUTO-ENTMCNC: 31.9 G/DL (ref 31.4–37.4)
MCV RBC AUTO: 94 FL (ref 82–98)
MONOCYTES # BLD AUTO: 0.36 THOUSAND/ΜL (ref 0.17–1.22)
MONOCYTES NFR BLD AUTO: 5 % (ref 4–12)
NEUTROPHILS # BLD AUTO: 5.12 THOUSANDS/ΜL (ref 1.85–7.62)
NEUTS SEG NFR BLD AUTO: 77 % (ref 43–75)
NRBC BLD AUTO-RTO: 0 /100 WBCS
PLATELET # BLD AUTO: 218 THOUSANDS/UL (ref 149–390)
PLATELET # BLD AUTO: 218 THOUSANDS/UL (ref 149–390)
PMV BLD AUTO: 11.7 FL (ref 8.9–12.7)
PMV BLD AUTO: 11.7 FL (ref 8.9–12.7)
POTASSIUM SERPL-SCNC: 3.8 MMOL/L (ref 3.5–5.3)
POTASSIUM SERPL-SCNC: 3.8 MMOL/L (ref 3.5–5.3)
PROT SERPL-MCNC: 6.7 G/DL (ref 6.4–8.2)
PSA SERPL-MCNC: 0.6 NG/ML (ref 0–4)
RBC # BLD AUTO: 4.11 MILLION/UL (ref 3.88–5.62)
SODIUM SERPL-SCNC: 141 MMOL/L (ref 136–145)
SODIUM SERPL-SCNC: 141 MMOL/L (ref 136–145)
TIBC SERPL-MCNC: 371 UG/DL (ref 250–450)
VIT B12 SERPL-MCNC: 1052 PG/ML (ref 100–900)
WBC # BLD AUTO: 6.69 THOUSAND/UL (ref 4.31–10.16)

## 2017-08-16 PROCEDURE — 82728 ASSAY OF FERRITIN: CPT | Performed by: INTERNAL MEDICINE

## 2017-08-16 PROCEDURE — 83540 ASSAY OF IRON: CPT | Performed by: INTERNAL MEDICINE

## 2017-08-16 PROCEDURE — 84153 ASSAY OF PSA TOTAL: CPT | Performed by: INTERNAL MEDICINE

## 2017-08-16 PROCEDURE — 85025 COMPLETE CBC W/AUTO DIFF WBC: CPT | Performed by: INTERNAL MEDICINE

## 2017-08-16 PROCEDURE — 83550 IRON BINDING TEST: CPT | Performed by: INTERNAL MEDICINE

## 2017-08-16 PROCEDURE — 85049 AUTOMATED PLATELET COUNT: CPT | Performed by: INTERNAL MEDICINE

## 2017-08-16 PROCEDURE — 82607 VITAMIN B-12: CPT | Performed by: INTERNAL MEDICINE

## 2017-08-16 PROCEDURE — 80048 BASIC METABOLIC PNL TOTAL CA: CPT | Performed by: INTERNAL MEDICINE

## 2017-08-16 PROCEDURE — 80053 COMPREHEN METABOLIC PANEL: CPT | Performed by: INTERNAL MEDICINE

## 2017-08-18 ENCOUNTER — ALLSCRIPTS OFFICE VISIT (OUTPATIENT)
Dept: OTHER | Facility: OTHER | Age: 77
End: 2017-08-18

## 2017-08-25 ENCOUNTER — APPOINTMENT (EMERGENCY)
Dept: RADIOLOGY | Facility: HOSPITAL | Age: 77
End: 2017-08-25
Payer: MEDICARE

## 2017-08-25 ENCOUNTER — APPOINTMENT (EMERGENCY)
Dept: CT IMAGING | Facility: HOSPITAL | Age: 77
End: 2017-08-25
Payer: MEDICARE

## 2017-08-25 ENCOUNTER — HOSPITAL ENCOUNTER (EMERGENCY)
Facility: HOSPITAL | Age: 77
Discharge: HOME | End: 2017-08-25
Admitting: EMERGENCY MEDICINE
Payer: MEDICARE

## 2017-08-25 VITALS
DIASTOLIC BLOOD PRESSURE: 73 MMHG | HEART RATE: 63 BPM | SYSTOLIC BLOOD PRESSURE: 115 MMHG | OXYGEN SATURATION: 96 % | BODY MASS INDEX: 50.45 KG/M2 | RESPIRATION RATE: 18 BRPM | WEIGHT: 315 LBS | TEMPERATURE: 97.1 F

## 2017-08-25 DIAGNOSIS — S62.515A NONDISPLACED FRACTURE OF PROXIMAL PHALANX OF LEFT THUMB, INITIAL ENCOUNTER FOR CLOSED FRACTURE: ICD-10-CM

## 2017-08-25 DIAGNOSIS — S80.01XA CONTUSION OF RIGHT KNEE, INITIAL ENCOUNTER: ICD-10-CM

## 2017-08-25 DIAGNOSIS — S09.90XA CLOSED HEAD INJURY, INITIAL ENCOUNTER: Primary | ICD-10-CM
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
## 2017-08-25 DIAGNOSIS — N18.9 CKD (CHRONIC KIDNEY DISEASE): ICD-10-CM

## 2017-08-25 DIAGNOSIS — S01.81XA FOREHEAD LACERATION, INITIAL ENCOUNTER: ICD-10-CM

## 2017-08-25 LAB
ABO GROUP BLD: NORMAL
ANION GAP SERPL CALCULATED.3IONS-SCNC: 7 MMOL/L (ref 4–13)
APTT PPP: 33 SECONDS (ref 23–35)
BASOPHILS # BLD AUTO: 0.05 THOUSANDS/ΜL (ref 0–0.1)
BASOPHILS NFR BLD AUTO: 0 % (ref 0–1)
BLD GP AB SCN SERPL QL: NEGATIVE
BUN SERPL-MCNC: 74 MG/DL (ref 5–25)
CALCIUM SERPL-MCNC: 8.4 MG/DL (ref 8.3–10.1)
CHLORIDE SERPL-SCNC: 99 MMOL/L (ref 100–108)
CO2 SERPL-SCNC: 33 MMOL/L (ref 21–32)
CREAT SERPL-MCNC: 3.08 MG/DL (ref 0.6–1.3)
EOSINOPHIL # BLD AUTO: 0.19 THOUSAND/ΜL (ref 0–0.61)
EOSINOPHIL NFR BLD AUTO: 1 % (ref 0–6)
ERYTHROCYTE [DISTWIDTH] IN BLOOD BY AUTOMATED COUNT: 15.5 % (ref 11.6–15.1)
GFR SERPL CREATININE-BSD FRML MDRD: 18 ML/MIN/1.73SQ M
GLUCOSE SERPL-MCNC: 246 MG/DL (ref 65–140)
GLUCOSE SERPL-MCNC: 274 MG/DL (ref 65–140)
HCT VFR BLD AUTO: 39.8 % (ref 36.5–49.3)
HGB BLD-MCNC: 12.9 G/DL (ref 12–17)
INR PPP: 1.3 (ref 0.86–1.16)
LYMPHOCYTES # BLD AUTO: 1.06 THOUSANDS/ΜL (ref 0.6–4.47)
LYMPHOCYTES NFR BLD AUTO: 7 % (ref 14–44)
MCH RBC QN AUTO: 30.1 PG (ref 26.8–34.3)
MCHC RBC AUTO-ENTMCNC: 32.4 G/DL (ref 31.4–37.4)
MCV RBC AUTO: 93 FL (ref 82–98)
MONOCYTES # BLD AUTO: 0.96 THOUSAND/ΜL (ref 0.17–1.22)
MONOCYTES NFR BLD AUTO: 7 % (ref 4–12)
NEUTROPHILS # BLD AUTO: 12.19 THOUSANDS/ΜL (ref 1.85–7.62)
NEUTS SEG NFR BLD AUTO: 85 % (ref 43–75)
PLATELET # BLD AUTO: 253 THOUSANDS/UL (ref 149–390)
PMV BLD AUTO: 10.2 FL (ref 8.9–12.7)
POTASSIUM SERPL-SCNC: 4 MMOL/L (ref 3.5–5.3)
PROTHROMBIN TIME: 16.1 SECONDS (ref 12.1–14.4)
RBC # BLD AUTO: 4.29 MILLION/UL (ref 3.88–5.62)
RH BLD: NEGATIVE
SODIUM SERPL-SCNC: 139 MMOL/L (ref 136–145)
SPECIMEN EXPIRATION DATE: NORMAL
TROPONIN I SERPL-MCNC: 0.06 NG/ML
WBC # BLD AUTO: 14.45 THOUSAND/UL (ref 4.31–10.16)

## 2017-08-25 PROCEDURE — 80048 BASIC METABOLIC PNL TOTAL CA: CPT | Performed by: PHYSICIAN ASSISTANT

## 2017-08-25 PROCEDURE — 99285 EMERGENCY DEPT VISIT HI MDM: CPT

## 2017-08-25 PROCEDURE — 85025 COMPLETE CBC W/AUTO DIFF WBC: CPT | Performed by: PHYSICIAN ASSISTANT

## 2017-08-25 PROCEDURE — 86850 RBC ANTIBODY SCREEN: CPT | Performed by: PHYSICIAN ASSISTANT

## 2017-08-25 PROCEDURE — 90715 TDAP VACCINE 7 YRS/> IM: CPT | Performed by: PHYSICIAN ASSISTANT

## 2017-08-25 PROCEDURE — 90471 IMMUNIZATION ADMIN: CPT

## 2017-08-25 PROCEDURE — 85730 THROMBOPLASTIN TIME PARTIAL: CPT | Performed by: PHYSICIAN ASSISTANT

## 2017-08-25 PROCEDURE — 82948 REAGENT STRIP/BLOOD GLUCOSE: CPT

## 2017-08-25 PROCEDURE — 73564 X-RAY EXAM KNEE 4 OR MORE: CPT

## 2017-08-25 PROCEDURE — 85610 PROTHROMBIN TIME: CPT | Performed by: PHYSICIAN ASSISTANT

## 2017-08-25 PROCEDURE — 36415 COLL VENOUS BLD VENIPUNCTURE: CPT | Performed by: PHYSICIAN ASSISTANT

## 2017-08-25 PROCEDURE — 73140 X-RAY EXAM OF FINGER(S): CPT

## 2017-08-25 PROCEDURE — 84484 ASSAY OF TROPONIN QUANT: CPT | Performed by: PHYSICIAN ASSISTANT

## 2017-08-25 PROCEDURE — 86901 BLOOD TYPING SEROLOGIC RH(D): CPT | Performed by: PHYSICIAN ASSISTANT

## 2017-08-25 PROCEDURE — 70450 CT HEAD/BRAIN W/O DYE: CPT

## 2017-08-25 PROCEDURE — 86900 BLOOD TYPING SEROLOGIC ABO: CPT | Performed by: PHYSICIAN ASSISTANT

## 2017-08-25 PROCEDURE — 93005 ELECTROCARDIOGRAM TRACING: CPT | Performed by: PHYSICIAN ASSISTANT

## 2017-08-25 RX ORDER — GINSENG 100 MG
1 CAPSULE ORAL ONCE
Status: COMPLETED | OUTPATIENT
Start: 2017-08-25 | End: 2017-08-25

## 2017-08-25 RX ADMIN — TETANUS TOXOID, REDUCED DIPHTHERIA TOXOID AND ACELLULAR PERTUSSIS VACCINE, ADSORBED 0.5 ML: 5; 2.5; 8; 8; 2.5 SUSPENSION INTRAMUSCULAR at 12:41

## 2017-08-25 RX ADMIN — BACITRACIN ZINC 1 SMALL APPLICATION: 500 OINTMENT TOPICAL at 12:41

## 2017-08-26 LAB
ATRIAL RATE: 116 BPM
P AXIS: 52 DEGREES
PR INTERVAL: 160 MS
QRS AXIS: -65 DEGREES
QRSD INTERVAL: 156 MS
QT INTERVAL: 476 MS
QTC INTERVAL: 467 MS
T WAVE AXIS: 30 DEGREES
VENTRICULAR RATE: 58 BPM

## 2017-08-29 ENCOUNTER — ALLSCRIPTS OFFICE VISIT (OUTPATIENT)
Dept: OTHER | Facility: OTHER | Age: 77
End: 2017-08-29

## 2017-09-07 ENCOUNTER — GENERIC CONVERSION - ENCOUNTER (OUTPATIENT)
Dept: FAMILY MEDICINE CLINIC | Facility: CLINIC | Age: 77
End: 2017-09-07

## 2017-09-07 ENCOUNTER — ALLSCRIPTS OFFICE VISIT (OUTPATIENT)
Dept: OTHER | Facility: OTHER | Age: 77
End: 2017-09-07

## 2017-09-08 ENCOUNTER — GENERIC CONVERSION - ENCOUNTER (OUTPATIENT)
Dept: OTHER | Facility: OTHER | Age: 77
End: 2017-09-08

## 2017-09-08 ENCOUNTER — APPOINTMENT (OUTPATIENT)
Dept: RADIOLOGY | Facility: MEDICAL CENTER | Age: 77
End: 2017-09-08
Payer: MEDICARE

## 2017-09-08 DIAGNOSIS — S62.525A CLOSED NONDISPLACED FRACTURE OF DISTAL PHALANX OF LEFT THUMB: ICD-10-CM

## 2017-09-08 PROCEDURE — 73140 X-RAY EXAM OF FINGER(S): CPT

## 2017-09-15 ENCOUNTER — LAB REQUISITION (OUTPATIENT)
Dept: LAB | Facility: HOSPITAL | Age: 77
End: 2017-09-15
Payer: MEDICARE

## 2017-09-15 DIAGNOSIS — Z79.01 LONG TERM CURRENT USE OF ANTICOAGULANT: ICD-10-CM

## 2017-09-15 DIAGNOSIS — I48.20 CHRONIC ATRIAL FIBRILLATION (HCC): ICD-10-CM

## 2017-09-15 DIAGNOSIS — I13.0 HYPERTENSIVE HEART AND CHRONIC KIDNEY DISEASE WITH HEART FAILURE AND STAGE 1 THROUGH STAGE 4 CHRONIC KIDNEY DISEASE, OR UNSPECIFIED CHRONIC KIDNEY DISEASE (CODE): ICD-10-CM

## 2017-09-15 DIAGNOSIS — N18.4 CHRONIC KIDNEY DISEASE, STAGE IV (SEVERE) (HCC): ICD-10-CM

## 2017-09-15 DIAGNOSIS — E11.40 TYPE 2 DIABETES MELLITUS WITH DIABETIC NEUROPATHY (HCC): ICD-10-CM

## 2017-09-15 LAB
ANION GAP SERPL CALCULATED.3IONS-SCNC: 12 MMOL/L (ref 4–13)
BASOPHILS # BLD AUTO: 0.05 THOUSANDS/ΜL (ref 0–0.1)
BASOPHILS NFR BLD AUTO: 1 % (ref 0–1)
BUN SERPL-MCNC: 47 MG/DL (ref 5–25)
CALCIUM SERPL-MCNC: 9.1 MG/DL (ref 8.3–10.1)
CHLORIDE SERPL-SCNC: 98 MMOL/L (ref 100–108)
CO2 SERPL-SCNC: 30 MMOL/L (ref 21–32)
CREAT SERPL-MCNC: 3.24 MG/DL (ref 0.6–1.3)
EOSINOPHIL # BLD AUTO: 0.28 THOUSAND/ΜL (ref 0–0.61)
EOSINOPHIL NFR BLD AUTO: 5 % (ref 0–6)
ERYTHROCYTE [DISTWIDTH] IN BLOOD BY AUTOMATED COUNT: 15.8 % (ref 11.6–15.1)
FERRITIN SERPL-MCNC: 47 NG/ML (ref 8–388)
GFR SERPL CREATININE-BSD FRML MDRD: 17 ML/MIN/1.73SQ M
GLUCOSE P FAST SERPL-MCNC: 272 MG/DL (ref 65–99)
HCT VFR BLD AUTO: 39.5 % (ref 36.5–49.3)
HGB BLD-MCNC: 12.7 G/DL (ref 12–17)
IRON SATN MFR SERPL: 10 %
IRON SERPL-MCNC: 37 UG/DL (ref 65–175)
LYMPHOCYTES # BLD AUTO: 0.79 THOUSANDS/ΜL (ref 0.6–4.47)
LYMPHOCYTES NFR BLD AUTO: 13 % (ref 14–44)
MCH RBC QN AUTO: 29.7 PG (ref 26.8–34.3)
MCHC RBC AUTO-ENTMCNC: 32.2 G/DL (ref 31.4–37.4)
MCV RBC AUTO: 92 FL (ref 82–98)
MONOCYTES # BLD AUTO: 0.52 THOUSAND/ΜL (ref 0.17–1.22)
MONOCYTES NFR BLD AUTO: 8 % (ref 4–12)
NEUTROPHILS # BLD AUTO: 4.53 THOUSANDS/ΜL (ref 1.85–7.62)
NEUTS SEG NFR BLD AUTO: 73 % (ref 43–75)
NRBC BLD AUTO-RTO: 0 /100 WBCS
PLATELET # BLD AUTO: 243 THOUSANDS/UL (ref 149–390)
PMV BLD AUTO: 10.6 FL (ref 8.9–12.7)
POTASSIUM SERPL-SCNC: 3.7 MMOL/L (ref 3.5–5.3)
RBC # BLD AUTO: 4.28 MILLION/UL (ref 3.88–5.62)
SODIUM SERPL-SCNC: 140 MMOL/L (ref 136–145)
TIBC SERPL-MCNC: 364 UG/DL (ref 250–450)
VIT B12 SERPL-MCNC: 1156 PG/ML (ref 100–900)
WBC # BLD AUTO: 6.19 THOUSAND/UL (ref 4.31–10.16)

## 2017-09-15 PROCEDURE — 83540 ASSAY OF IRON: CPT | Performed by: FAMILY MEDICINE

## 2017-09-15 PROCEDURE — 82607 VITAMIN B-12: CPT | Performed by: FAMILY MEDICINE

## 2017-09-15 PROCEDURE — 80048 BASIC METABOLIC PNL TOTAL CA: CPT | Performed by: FAMILY MEDICINE

## 2017-09-15 PROCEDURE — 83550 IRON BINDING TEST: CPT | Performed by: FAMILY MEDICINE

## 2017-09-15 PROCEDURE — 85025 COMPLETE CBC W/AUTO DIFF WBC: CPT | Performed by: FAMILY MEDICINE

## 2017-09-15 PROCEDURE — 82728 ASSAY OF FERRITIN: CPT | Performed by: FAMILY MEDICINE

## 2017-09-18 ENCOUNTER — GENERIC CONVERSION - ENCOUNTER (OUTPATIENT)
Dept: OTHER | Facility: OTHER | Age: 77
End: 2017-09-18

## 2017-09-22 ENCOUNTER — GENERIC CONVERSION - ENCOUNTER (OUTPATIENT)
Dept: OTHER | Facility: OTHER | Age: 77
End: 2017-09-22

## 2017-09-28 ENCOUNTER — ALLSCRIPTS OFFICE VISIT (OUTPATIENT)
Dept: OTHER | Facility: OTHER | Age: 77
End: 2017-09-28

## 2017-10-13 ENCOUNTER — GENERIC CONVERSION - ENCOUNTER (OUTPATIENT)
Dept: RADIOLOGY | Facility: HOSPITAL | Age: 77
End: 2017-10-13

## 2017-10-18 ENCOUNTER — APPOINTMENT (OUTPATIENT)
Dept: RADIOLOGY | Facility: MEDICAL CENTER | Age: 77
End: 2017-10-18
Payer: MEDICARE

## 2017-10-18 ENCOUNTER — ALLSCRIPTS OFFICE VISIT (OUTPATIENT)
Dept: OTHER | Facility: OTHER | Age: 77
End: 2017-10-18

## 2017-10-18 ENCOUNTER — TRANSCRIBE ORDERS (OUTPATIENT)
Dept: RADIOLOGY | Facility: MEDICAL CENTER | Age: 77
End: 2017-10-18

## 2017-10-18 ENCOUNTER — APPOINTMENT (OUTPATIENT)
Dept: LAB | Facility: MEDICAL CENTER | Age: 77
End: 2017-10-18
Payer: MEDICARE

## 2017-10-18 DIAGNOSIS — S62.525D: ICD-10-CM

## 2017-10-18 DIAGNOSIS — D64.9 ANEMIA, UNSPECIFIED TYPE: ICD-10-CM

## 2017-10-18 DIAGNOSIS — I50.41 ACUTE COMBINED SYSTOLIC AND DIASTOLIC HEART FAILURE (HCC): ICD-10-CM

## 2017-10-18 DIAGNOSIS — N18.9 CHRONIC KIDNEY DISEASE: ICD-10-CM

## 2017-10-18 DIAGNOSIS — R60.9 EDEMA, UNSPECIFIED TYPE: ICD-10-CM

## 2017-10-18 DIAGNOSIS — N18.4 CHRONIC KIDNEY DISEASE, STAGE IV (SEVERE) (HCC): Primary | ICD-10-CM

## 2017-10-18 DIAGNOSIS — N18.4 CHRONIC KIDNEY DISEASE, STAGE IV (SEVERE) (HCC): ICD-10-CM

## 2017-10-18 LAB
ANION GAP SERPL CALCULATED.3IONS-SCNC: 11 MMOL/L (ref 4–13)
BASOPHILS # BLD AUTO: 0.07 THOUSANDS/ΜL (ref 0–0.1)
BASOPHILS NFR BLD AUTO: 1 % (ref 0–1)
BUN SERPL-MCNC: 65 MG/DL (ref 5–25)
CALCIUM SERPL-MCNC: 8.9 MG/DL (ref 8.3–10.1)
CHLORIDE SERPL-SCNC: 96 MMOL/L (ref 100–108)
CO2 SERPL-SCNC: 31 MMOL/L (ref 21–32)
CREAT SERPL-MCNC: 3.3 MG/DL (ref 0.6–1.3)
EOSINOPHIL # BLD AUTO: 0.26 THOUSAND/ΜL (ref 0–0.61)
EOSINOPHIL NFR BLD AUTO: 4 % (ref 0–6)
ERYTHROCYTE [DISTWIDTH] IN BLOOD BY AUTOMATED COUNT: 15.8 % (ref 11.6–15.1)
FERRITIN SERPL-MCNC: 48 NG/ML (ref 8–388)
GFR SERPL CREATININE-BSD FRML MDRD: 17 ML/MIN/1.73SQ M
GLUCOSE P FAST SERPL-MCNC: 252 MG/DL (ref 65–99)
HCT VFR BLD AUTO: 40.4 % (ref 36.5–49.3)
HGB BLD-MCNC: 13.3 G/DL (ref 12–17)
IRON SATN MFR SERPL: 13 %
IRON SERPL-MCNC: 48 UG/DL (ref 65–175)
LYMPHOCYTES # BLD AUTO: 0.9 THOUSANDS/ΜL (ref 0.6–4.47)
LYMPHOCYTES NFR BLD AUTO: 12 % (ref 14–44)
MCH RBC QN AUTO: 29.8 PG (ref 26.8–34.3)
MCHC RBC AUTO-ENTMCNC: 32.9 G/DL (ref 31.4–37.4)
MCV RBC AUTO: 90 FL (ref 82–98)
MONOCYTES # BLD AUTO: 0.67 THOUSAND/ΜL (ref 0.17–1.22)
MONOCYTES NFR BLD AUTO: 9 % (ref 4–12)
NEUTROPHILS # BLD AUTO: 5.52 THOUSANDS/ΜL (ref 1.85–7.62)
NEUTS SEG NFR BLD AUTO: 74 % (ref 43–75)
NRBC BLD AUTO-RTO: 0 /100 WBCS
PLATELET # BLD AUTO: 248 THOUSANDS/UL (ref 149–390)
PMV BLD AUTO: 12.1 FL (ref 8.9–12.7)
POTASSIUM SERPL-SCNC: 3.8 MMOL/L (ref 3.5–5.3)
RBC # BLD AUTO: 4.47 MILLION/UL (ref 3.88–5.62)
SODIUM SERPL-SCNC: 138 MMOL/L (ref 136–145)
TIBC SERPL-MCNC: 378 UG/DL (ref 250–450)
VIT B12 SERPL-MCNC: 1077 PG/ML (ref 100–900)
WBC # BLD AUTO: 7.45 THOUSAND/UL (ref 4.31–10.16)

## 2017-10-18 PROCEDURE — 36415 COLL VENOUS BLD VENIPUNCTURE: CPT

## 2017-10-18 PROCEDURE — 80048 BASIC METABOLIC PNL TOTAL CA: CPT

## 2017-10-18 PROCEDURE — 73140 X-RAY EXAM OF FINGER(S): CPT

## 2017-10-18 PROCEDURE — 83550 IRON BINDING TEST: CPT

## 2017-10-18 PROCEDURE — 82607 VITAMIN B-12: CPT

## 2017-10-18 PROCEDURE — 83540 ASSAY OF IRON: CPT

## 2017-10-18 PROCEDURE — 85025 COMPLETE CBC W/AUTO DIFF WBC: CPT

## 2017-10-18 PROCEDURE — 82728 ASSAY OF FERRITIN: CPT

## 2017-10-19 NOTE — PROGRESS NOTES
Assessment  1  Closed nondisplaced fracture of distal phalanx of left thumb with routine healing,   subsequent encounter (V54 19) (R50 [id number])    Plan  Closed nondisplaced fracture of distal phalanx of left thumb with routine healing,  subsequent encounter    · * XR THUMB LEFT FIRST DIGIT-MIN 2V; Status:Active; Requested for:18Oct2017;     Discussion/Summary    Left thumb fracture is healed clinically  Radiographs satisfactory  Patient allowed all activities as tolerated  Follow-up in 2 months x-ray left thumb on arrival       Chief Complaint  1  Finger Problem  Left thumb fracture      History of Present Illness  HPI: Patient seen for follow-up status post left thumb fracture  Patient presents for clinical reevaluation with radiographs  Left thumb is relatively asymptomatic      Active Problems  1  Acute idiopathic gout of right foot (274 01) (M10 071)   2  Acute on chronic combined systolic and diastolic congestive heart failure, NYHA class 2   (428 43,428 0) (I50 43)   3  Acute pain of left foot (729 5) (M79 672)   4  Advanced directives, counseling/discussion (V65 49) (Z71 89)   5  Anemia (285 9) (D64 9)   6  Arteriosclerotic coronary artery disease (414 00) (I25 10)   7  Arthralgia of right shoulder region (719 41) (M25 511)   8  Atrial fibrillation (427 31) (I48 91)   9  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)   10  Benign essential hypertension (401 1) (I10)   11  Benign hypertension with CKD (chronic kidney disease) stage III (403 10,585 3)    (I12 9,N18 3)   12  Bilateral leg edema (782 3) (R60 0)   13  Bilateral renal cysts (753 10) (N28 1)   14  Blind hypertensive eye, 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)   15  Bursitis of hip, right (726 5) (M70 71)   16  Cerebrovascular accident, 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)   17  Chronic combined systolic and diastolic CHF (congestive heart failure) (428 42,428 0)    (I50 42)   18  Chronic diastolic congestive heart failure (428 32,428 0) (I50 32)   19   Chronic kidney disease (585  9) (N18 9)   20  Chronic venous stasis dermatitis of both lower extremities (454 1) (I87 2)   21  CKD (chronic kidney disease), stage IV (585 4) (N18 4)   22  Closed nondisplaced fracture of distal phalanx of left thumb with routine healing,    subsequent encounter (V54 19) (S62 525D)   23  Closed nondisplaced fracture of distal phalanx of left thumb, initial encounter (816 02)    (S62 525A)   24  Coronary artery disease (414 00) (I25 10)   25  Diabetes mellitus type 2, insulin dependent (250 00,V58 67) (E11 9,Z79 4)   26  Diabetic retinopathy screening (V80 2) (Z13 5)   27  Diastolic CHF (272 78,878 7) (I50 30)   28  Disorder of ankle and foot joint (719 97) (M25 9)   29  BAER (dyspnea on exertion) (786 09) (R06 09)   30  Edema (782 3) (R60 9)   31  Encounter for prostate cancer screening (V76 44) (Z12 5)   32  Epistaxis (784 7) (R04 0)   33  Epistaxis, recurrent (784 7) (R04 0)   34  Exercise counseling (V65 41) (Z71 82)   35  Flu vaccine need (V04 81) (Z23)   36  Glaucoma screening (V80 1) (Z13 5)   37  Hiatal hernia (553 3) (K44 9)   38  Hip pain, acute, right (719 45) (M25 551)   39  Hydronephrosis of left kidney (591) (N13 30)   40  Hyperlipidemia (272 4) (E78 5)   41  Hypoxemia (799 02) (R09 02)   42  Ischemic cardiomyopathy (414 8) (I25 5)   43  Low blood potassium (276 8) (E87 6)   44  Medicare annual wellness visit, subsequent (V70 0) (Z00 00)   45  Myocardial infarction (410 90) (I21 9)   46  Need for influenza vaccination (V04 81) (Z23)   47  Need for pneumococcal vaccination (V03 82) (Z23)   48  Neuropathy (355 9) (G62 9)   49  No advance directives (V49 89) (Z78 9)   50  Open wound of right lower extremity, initial encounter (891 0) (S81 801A)   51  Poorly controlled type 2 diabetes mellitus (250 00) (E11 65)   52  Prostate cancer (185) (C61)   53  Pyelonephritis, acute (590 10) (N10)   54  S/P CABG x 3 (V45 81) (Z95 1)   55  Screening for depression (V79 0) (Z13 89)   56   Screening for diabetic peripheral neuropathy (V80 09) (Z13 89)   57  Screening for genitourinary condition (V81 6) (Z13 89)   58  Screening for neurological condition (V80 09) (Z13 89)   59  Severe obstructive sleep apnea-hypopnea syndrome (327 23) (G47 33)   60  Trochanteric bursitis of left hip (726 5) (M70 62)   61  Ulcers of both lower legs (707 10) (L97 919,L97 929)   62  Urinary retention (788 20) (R33 9)   63  Vitamin B 12 deficiency (266 2) (E53 8)   64   Vitamin D deficiency (268 9) (E55 9)    Past Medical History   · History of Abnormal blood chemistry (790 6) (R79 9)   · History of Acute urinary retention (788 29) (R33 8)   · History of Ankle Joint Swelling (719 07)   · History of Cellulitis (682 9) (L03 90)   · History of Cellulitis (682 9) (L03 90)   · History of Cellulitis of calf (682 6) (L03 119)   · History of Diabetes Mellitus (250 00)   · History of Easy Bruising Tendency   · History of Gross hematuria (599 71) (R31 0)   · History of cardiac disorder (V12 50) (Z86 79)   · History of chronic obstructive lung disease (V12 69) (Z87 09)   · History of diarrhea (V12 79) (C05 201)   · History of fatigue (V13 89) (V93 254)   · History of hepatitis (V12 09) (Z86 19)   · History of herpes zoster (V12 09) (Z86 19)   · History of hypercholesterolemia (V12 29) (Z86 39)   · History of hyperlipidemia (V12 29) (Z86 39)   · History of hypertension (V12 59) (Z86 79)   · History of jaundice (V12 29) (J33 133)   · History of shortness of breath (V13 89) (W28 515)   · History of urinary tract infection (V13 02) (Z87 440)   · History of Lung mass (786 6) (R91 8)   · History of Morbid or severe obesity due to excess calories (278 01) (E66 01)   · History of Pneumonia (V12 61)   · History of Pre-operative cardiovascular examination (V72 81) (Z01 810)   · History of Vaccine for streptococcus pneumoniae and influenza (V06 6) (Z23)    Surgical History   · History of CABG   · History of Cataract Surgery   · History of Cystoscopy With Removal Of Object   · History of Lung Surgery   · History of Pilonidal Cyst Resection - Simple   · History of Previous Stent Placement   · History of Radiation Therapy   · History of Transcath Intravascular Stent Placement Percutaneous Renal   · History of Umbilical Hernia Repair    Family History  Mother    · Family history of Diabetes Mellitus (V18 0)   · Family history of hypertension (V17 49) (Z82 49)   · Family history of Type 2 diabetes mellitus (250 00) (E11 9)  Father    · Family history of Diabetes Mellitus (V18 0)  Maternal Grandmother    · Family history of Diabetes Mellitus (V18 0)  Family History    · Family history of Cancer    Social History   · Denied: Drug Use (305 90)   · Former smoker (V15 82) (J70 358)   · Marital History - Currently    · Never Drank Alcohol   · No advance directives (V49 89) (Z78 9)   · No living will   · Patient has living will (V49 89) (Z78 9)    Current Meds   1  Aspirin EC 81 MG Oral Tablet Delayed Release; TAKE 1 TABLET DAILY; Last   Rx:03Jan2017 Ordered   2  Cephalexin 500 MG Oral Tablet (Cephalexin Monohydrate); TAKE 1 TABLET 4 TIMES   DAILY; Therapy: 26Sra7776 to (Evaluate:92Tou4188)  Requested for: 86Suq9773; Last   Rx:19Ecn2466 Ordered   3  Cholecalciferol 2000 UNIT TABS; One daily; Therapy: (Recorded:10Mar2017) to Recorded   4  Colchicine 0 6 MG Oral Tablet; TAKE 2 TABLETS BY MOUTH FOR ONE DAY AND THEN   TAKE ONE  TABLET DAILY FOR 10 DAYS; Therapy: 36MXY2434 to (Magy Gandhi)  Requested for: 13Apr2017; Last   Rx:13Apr2017 Ordered   5  Eliquis 5 MG Oral Tablet; Take 1 tablet twice daily  Requested for: 42Ezm5319; Last   Rx:77Zqs1422 Ordered   6  Ferrex 150 150 MG Oral Capsule; Take 1  tablet daily Recorded   7  Furosemide 80 MG Oral Tablet (Lasix); take one tablet by mouth twice daily; Therapy: 97NXZ1148 to (05 12 73 93 30)  Requested for: 26Ybk7428; Last   Rx:22Mwa0728 Ordered   8   Gabapentin 300 MG Oral Capsule; TAKE 1 CAPSULE AT BEDTIME  Requested for:   12YXT0217; Last Rx:2017 Ordered   9  Klor-Con 10 10 MEQ Oral Tablet Extended Release; TAKE 1 TABLET DAILY; Therapy: 82CMD2165 to ()  Requested for: 2017; Last   Rx:2017 Ordered   10  Levemir FlexTouch 100 UNIT/ML Subcutaneous Solution Pen-injector; inject 40 units at    bedtime; Therapy: (Recorded:2017) to Recorded   11  MetOLazone 5 MG Oral Tablet; Take one tab as directed by MD  Requested for:    87WLA4678; Last [id number] Ordered   12  Metoprolol Tartrate 25 MG Oral Tablet; Take 1 tablet twice daily Recorded   13  NovoLOG FlexPen 100 UNIT/ML Subcutaneous Solution Pen-injector; Sliding scale 5-15    units TID depending on B/S Recorded   14  Simvastatin 40 MG Oral Tablet; take 0 5 tablet bedtime; Therapy: 85MOD4443 to (Evaluate:2017); Last Rx:2017 Ordered   15  Tamsulosin HCl 0 4 MG CP24; TAKE 1 CAPSULE AT BEDTIME; Therapy: (Recorded:2016) to Recorded   16  Triple Antibiotic 3 5-400-5000 External Ointment; APPLY SPARINGLY TO AFFECTED    AREA(S) 3 TIMES A DAY; Therapy: 11Evv8634 to (Evaluate:2017)  Requested for: 67Vjy8079; Last    Rx:2017 Ordered   17  Vitamin B-12 1000 MCG Oral Tablet; TAKE 1 TABLET DAILY AS DIRECTED; Therapy: 87KPS3625 to (Evaluate:2017)  Requested for: 16ZXK8958; Last    Rx:2016 Ordered    Allergies  1  Adhesive Tape TAPE  2  Adhesive Tape    Vitals   Recorded: 44VMU1630 10:13AM   Heart Rate 92   Systolic 845   Diastolic 69   Height 5 ft 7 in   Weight 314 lb 8 0 oz   BMI Calculated 49 26   BSA Calculated 2 45     Physical Exam    Left First Digit: no deformity, nodules, erythema, ecchymosis, edema, or tenderness,-- ROM within normal limits in flexion and extension at the MCP and IP joints,-- strength within normal limits-- and-- no evidence of laxity or instability at the MCP or IP joints     Constitutional - General appearance: Normal    Musculoskeletal - Gait and station: Normal -- Digits and nails: Normal -- Muscle strength/tone: Normal    Cardiovascular - Pulses: Normal -- Examination of extremities for edema and/or varicosities: Normal    Skin - Skin and subcutaneous tissue: Normal    Neurologic - Sensation: Normal    Psychiatric - Orientation to person, place, and time: Normal -- Mood and affect: Normal    Eyes   Conjunctiva and lids: Normal     Pupils and irises: Normal        Future Appointments    Date/Time Provider Specialty Site   03/23/2018 01:00 PM BAHMAN Treviño , [facility]   12/18/2017 09:30 AM Huang Lofton DO Family Medicine [address] [place] [address]   11/27/2017 01:00 PM Abdoulaye [facility]  [address]     Signatures   Electronically signed by : BAHMAN Owen ; Oct 18 2017 10:28AM EST                       (Author)

## 2017-10-20 ENCOUNTER — LAB CONVERSION - ENCOUNTER (OUTPATIENT)
Dept: OTHER | Facility: OTHER | Age: 77
End: 2017-10-20

## 2017-10-20 ENCOUNTER — APPOINTMENT (OUTPATIENT)
Dept: LAB | Facility: HOSPITAL | Age: 77
End: 2017-10-20
Payer: MEDICARE

## 2017-10-20 ENCOUNTER — TRANSCRIBE ORDERS (OUTPATIENT)
Dept: ADMINISTRATIVE | Facility: HOSPITAL | Age: 77
End: 2017-10-20

## 2017-10-20 DIAGNOSIS — H46.9 OPTIC NEUROPATHY: Primary | ICD-10-CM

## 2017-10-20 DIAGNOSIS — H46.9 OPTIC NEUROPATHY: ICD-10-CM

## 2017-10-20 LAB
CRP SERPL QL: 16.4 MG/L
ERYTHROCYTE [SEDIMENTATION RATE] IN BLOOD: 37 MM/HOUR (ref 0–10)

## 2017-10-20 PROCEDURE — 86140 C-REACTIVE PROTEIN: CPT

## 2017-10-20 PROCEDURE — 85652 RBC SED RATE AUTOMATED: CPT

## 2017-10-20 PROCEDURE — 36415 COLL VENOUS BLD VENIPUNCTURE: CPT

## 2017-10-21 ENCOUNTER — APPOINTMENT (EMERGENCY)
Dept: RADIOLOGY | Facility: HOSPITAL | Age: 77
DRG: 123 | End: 2017-10-21
Payer: MEDICARE

## 2017-10-21 ENCOUNTER — HOSPITAL ENCOUNTER (INPATIENT)
Facility: HOSPITAL | Age: 77
LOS: 6 days | Discharge: HOME HEALTH | DRG: 123 | End: 2017-10-27
Attending: EMERGENCY MEDICINE | Admitting: INTERNAL MEDICINE
Payer: MEDICARE

## 2017-10-21 DIAGNOSIS — Z86.73 HISTORY OF STROKE: ICD-10-CM

## 2017-10-21 DIAGNOSIS — M31.6 GIANT CELL ARTERITIS (HCC): Primary | ICD-10-CM

## 2017-10-21 DIAGNOSIS — N18.30 CKD (CHRONIC KIDNEY DISEASE) STAGE 3, GFR 30-59 ML/MIN (HCC): ICD-10-CM

## 2017-10-21 DIAGNOSIS — E11.65 TYPE 2 DIABETES MELLITUS WITH HYPERGLYCEMIA (HCC): ICD-10-CM

## 2017-10-21 DIAGNOSIS — H54.62 VISION LOSS, LEFT EYE: ICD-10-CM

## 2017-10-21 LAB
ANION GAP SERPL CALCULATED.3IONS-SCNC: 9 MMOL/L (ref 4–13)
ATRIAL RATE: 73 BPM
BASOPHILS # BLD AUTO: 0.06 THOUSANDS/ΜL (ref 0–0.1)
BASOPHILS NFR BLD AUTO: 1 % (ref 0–1)
BUN SERPL-MCNC: 56 MG/DL (ref 5–25)
CALCIUM SERPL-MCNC: 8.9 MG/DL (ref 8.3–10.1)
CHLORIDE SERPL-SCNC: 101 MMOL/L (ref 100–108)
CO2 SERPL-SCNC: 28 MMOL/L (ref 21–32)
CREAT SERPL-MCNC: 2.91 MG/DL (ref 0.6–1.3)
EOSINOPHIL # BLD AUTO: 0.21 THOUSAND/ΜL (ref 0–0.61)
EOSINOPHIL NFR BLD AUTO: 3 % (ref 0–6)
ERYTHROCYTE [DISTWIDTH] IN BLOOD BY AUTOMATED COUNT: 15.7 % (ref 11.6–15.1)
GFR SERPL CREATININE-BSD FRML MDRD: 20 ML/MIN/1.73SQ M
GLUCOSE SERPL-MCNC: 266 MG/DL (ref 65–140)
GLUCOSE SERPL-MCNC: 346 MG/DL (ref 65–140)
HCT VFR BLD AUTO: 41.9 % (ref 36.5–49.3)
HGB BLD-MCNC: 13.4 G/DL (ref 12–17)
LYMPHOCYTES # BLD AUTO: 1.07 THOUSANDS/ΜL (ref 0.6–4.47)
LYMPHOCYTES NFR BLD AUTO: 16 % (ref 14–44)
MCH RBC QN AUTO: 29.5 PG (ref 26.8–34.3)
MCHC RBC AUTO-ENTMCNC: 32 G/DL (ref 31.4–37.4)
MCV RBC AUTO: 92 FL (ref 82–98)
MONOCYTES # BLD AUTO: 0.5 THOUSAND/ΜL (ref 0.17–1.22)
MONOCYTES NFR BLD AUTO: 7 % (ref 4–12)
NEUTROPHILS # BLD AUTO: 4.95 THOUSANDS/ΜL (ref 1.85–7.62)
NEUTS SEG NFR BLD AUTO: 73 % (ref 43–75)
NRBC BLD AUTO-RTO: 0 /100 WBCS
P AXIS: 59 DEGREES
PLATELET # BLD AUTO: 233 THOUSANDS/UL (ref 149–390)
PMV BLD AUTO: 10.6 FL (ref 8.9–12.7)
POTASSIUM SERPL-SCNC: 4.4 MMOL/L (ref 3.5–5.3)
PR INTERVAL: 184 MS
QRS AXIS: -69 DEGREES
QRSD INTERVAL: 168 MS
QT INTERVAL: 438 MS
QTC INTERVAL: 482 MS
RBC # BLD AUTO: 4.54 MILLION/UL (ref 3.88–5.62)
SODIUM SERPL-SCNC: 138 MMOL/L (ref 136–145)
SPECIMEN SOURCE: NORMAL
T WAVE AXIS: 74 DEGREES
TROPONIN I BLD-MCNC: 0.01 NG/ML (ref 0–0.08)
VENTRICULAR RATE: 73 BPM
WBC # BLD AUTO: 6.83 THOUSAND/UL (ref 4.31–10.16)

## 2017-10-21 PROCEDURE — 84484 ASSAY OF TROPONIN QUANT: CPT

## 2017-10-21 PROCEDURE — 94760 N-INVAS EAR/PLS OXIMETRY 1: CPT

## 2017-10-21 PROCEDURE — 93005 ELECTROCARDIOGRAM TRACING: CPT | Performed by: EMERGENCY MEDICINE

## 2017-10-21 PROCEDURE — 85025 COMPLETE CBC W/AUTO DIFF WBC: CPT | Performed by: EMERGENCY MEDICINE

## 2017-10-21 PROCEDURE — 82948 REAGENT STRIP/BLOOD GLUCOSE: CPT

## 2017-10-21 PROCEDURE — 96365 THER/PROPH/DIAG IV INF INIT: CPT

## 2017-10-21 PROCEDURE — 70450 CT HEAD/BRAIN W/O DYE: CPT

## 2017-10-21 PROCEDURE — 99285 EMERGENCY DEPT VISIT HI MDM: CPT

## 2017-10-21 PROCEDURE — 94660 CPAP INITIATION&MGMT: CPT

## 2017-10-21 PROCEDURE — 36415 COLL VENOUS BLD VENIPUNCTURE: CPT | Performed by: EMERGENCY MEDICINE

## 2017-10-21 PROCEDURE — 80048 BASIC METABOLIC PNL TOTAL CA: CPT | Performed by: EMERGENCY MEDICINE

## 2017-10-21 RX ORDER — PRAVASTATIN SODIUM 80 MG/1
80 TABLET ORAL
Status: DISCONTINUED | OUTPATIENT
Start: 2017-10-21 | End: 2017-10-23

## 2017-10-21 RX ORDER — MELATONIN
2000 DAILY
Status: DISCONTINUED | OUTPATIENT
Start: 2017-10-22 | End: 2017-10-27 | Stop reason: HOSPADM

## 2017-10-21 RX ORDER — DOCUSATE SODIUM 100 MG/1
100 CAPSULE, LIQUID FILLED ORAL 2 TIMES DAILY PRN
Status: DISCONTINUED | OUTPATIENT
Start: 2017-10-21 | End: 2017-10-27 | Stop reason: HOSPADM

## 2017-10-21 RX ORDER — ACETAMINOPHEN 325 MG/1
650 TABLET ORAL EVERY 4 HOURS PRN
Status: DISCONTINUED | OUTPATIENT
Start: 2017-10-21 | End: 2017-10-27 | Stop reason: HOSPADM

## 2017-10-21 RX ORDER — ONDANSETRON 2 MG/ML
4 INJECTION INTRAMUSCULAR; INTRAVENOUS EVERY 6 HOURS PRN
Status: DISCONTINUED | OUTPATIENT
Start: 2017-10-21 | End: 2017-10-27 | Stop reason: HOSPADM

## 2017-10-21 RX ORDER — PROPARACAINE HYDROCHLORIDE 5 MG/ML
1 SOLUTION/ DROPS OPHTHALMIC ONCE
Status: DISCONTINUED | OUTPATIENT
Start: 2017-10-21 | End: 2017-10-21

## 2017-10-21 RX ORDER — IRON POLYSACCHARIDE COMPLEX 150 MG
150 CAPSULE ORAL DAILY
COMMUNITY
End: 2019-06-27 | Stop reason: ALTCHOICE

## 2017-10-21 RX ORDER — POTASSIUM CHLORIDE 750 MG/1
10 CAPSULE, EXTENDED RELEASE ORAL DAILY
Status: ON HOLD | COMMUNITY
End: 2017-10-27

## 2017-10-21 RX ORDER — TAMSULOSIN HYDROCHLORIDE 0.4 MG/1
0.4 CAPSULE ORAL
Status: DISCONTINUED | OUTPATIENT
Start: 2017-10-21 | End: 2017-10-27 | Stop reason: HOSPADM

## 2017-10-21 RX ORDER — FUROSEMIDE 80 MG
80 TABLET ORAL DAILY
Status: DISCONTINUED | OUTPATIENT
Start: 2017-10-22 | End: 2017-10-25

## 2017-10-21 RX ORDER — GABAPENTIN 300 MG/1
300 CAPSULE ORAL
Status: DISCONTINUED | OUTPATIENT
Start: 2017-10-21 | End: 2017-10-27 | Stop reason: HOSPADM

## 2017-10-21 RX ORDER — METOLAZONE 5 MG/1
5 TABLET ORAL DAILY PRN
Status: DISCONTINUED | OUTPATIENT
Start: 2017-10-21 | End: 2017-10-25

## 2017-10-21 RX ORDER — ASPIRIN 81 MG/1
81 TABLET ORAL 2 TIMES DAILY
Status: DISCONTINUED | OUTPATIENT
Start: 2017-10-21 | End: 2017-10-27 | Stop reason: HOSPADM

## 2017-10-21 RX ORDER — IRON POLYSACCHARIDE COMPLEX 150 MG
150 CAPSULE ORAL DAILY
Status: DISCONTINUED | OUTPATIENT
Start: 2017-10-22 | End: 2017-10-23

## 2017-10-21 RX ADMIN — TAMSULOSIN HYDROCHLORIDE 0.4 MG: 0.4 CAPSULE ORAL at 22:45

## 2017-10-21 RX ADMIN — PRAVASTATIN SODIUM 80 MG: 80 TABLET ORAL at 20:12

## 2017-10-21 RX ADMIN — APIXABAN 5 MG: 5 TABLET, FILM COATED ORAL at 20:12

## 2017-10-21 RX ADMIN — METOPROLOL TARTRATE 25 MG: 25 TABLET ORAL at 20:12

## 2017-10-21 RX ADMIN — GABAPENTIN 300 MG: 300 CAPSULE ORAL at 22:44

## 2017-10-21 RX ADMIN — ACETAMINOPHEN 650 MG: 325 TABLET, FILM COATED ORAL at 22:44

## 2017-10-21 RX ADMIN — INSULIN DETEMIR 40 UNITS: 100 INJECTION, SOLUTION SUBCUTANEOUS at 22:44

## 2017-10-21 RX ADMIN — SODIUM CHLORIDE 1000 MG: 0.9 INJECTION, SOLUTION INTRAVENOUS at 16:10

## 2017-10-21 RX ADMIN — ASPIRIN 81 MG: 81 TABLET, COATED ORAL at 20:12

## 2017-10-21 RX ADMIN — INSULIN LISPRO 5 UNITS: 100 INJECTION, SOLUTION INTRAVENOUS; SUBCUTANEOUS at 22:46

## 2017-10-21 NOTE — H&P
History and Physical - [facility] Internal Medicine    Patient Information: Ev Grubbs 76 y o  male MRN: [id number]  Unit/Bed#: ABDON Encounter: [id number]  Admitting Physician: Regla Delgado DO  PCP: Cristina Bear DO  Date of Admission:  10/21/17    Assessment/Plan:    Hospital Problem List:     Principal Problem:    Vision loss, left eye  Active Problems: Morbid obesity (HCC)    Neuropathy    CKD (chronic kidney disease) stage 4, GFR 15-29 ml/min ([facility])    Atrial fibrillation ([facility])    Blind right eye    Chronic combined systolic and diastolic heart failure ([facility] )    Coronary artery disease    DM (diabetes mellitus), type 2 ([facility])    Obstructive sleep apnea syndrome, severe      Plan for the Primary Problem(s):  · Left Eye Vision Loss -   · Evaluate for Temporal Arteritis -   · Treatment -   · Solumedrol 1000 mg given in ER  Will provide additional pulse doses of solumedrol 1000 mg IV daily  · Continue Eliquis and low dose aspirin from prehospital setting  · Evaluations -   · Noted is that ESR here is only 37 and CRP is 16 4  · Vascular surgery consultation for possible temporal artery biopsy  · Consider rheumatology evaluation after the weekend  · Patient was seen by opthalmology at [facility] yesterday, but will consult ophthalmology here for evaluation  · Rule out Central Etiology - Given history of vision loss with stroke in past, will ask for neurology evaluation as well  Get MRI brain  Plan for Additional Problems:   · CKD IV (Baseline Cr 2 8 - 3 4) - Patient is at baseline currently  Continue to monitor renal function  Dye studies will need to be avoided due to the LESLIE  · Chronic Combined Heart Failure -   · Diuretic - Lasix  · Beta Blocker - Lopressor  · ACEI / ARB - none due to renal dysfunction  · Monitor I/O and Daily Weights  · Atrial Fibrillation -   · Rate Control - Lopressor  · Anticoagulation - Eliquis continues  · CAD -   · Antiplatelet - low dose aspirin    · Beta Blocker - Lopressor  · Statin - Pravastatin substitution for simvastatin  · Nitrate - PRN nitroglycerin  · Diabetes Mellitus, type 2 with Neuropathy -   · Start by using home regimen and adding insulin sliding scale  · Check A1c for assessment of vascular risk factors  · Low threshold to add insulin drip if needed as steroids may drive sugars up  · Obstructive Sleep Apnea - uses CPAP but is uncertain of his settings  Will start at CPAP 10 and titrate for effect  Respiratory protocol placed  · Chronic Right Eye Blindness - listed to be clear that this is a chronic issue  VTE Prophylaxis: Apixaban (Eliquis)  / sequential compression device   Code Status: discussed with patient  He doesn't seem very clear in his decision but opts to be full code  POLST: There is no POLST form on file for this patient (pre-hospital)    Anticipated Length of Stay:  Patient will be admitted on an Inpatient basis with an anticipated length of stay of  > 2 midnights  Justification for Hospital Stay: Evaluation for vision loss  Total Time for Visit, including Counseling / Coordination of Care: 45 minutes  Greater than 50% of this total time spent on direct patient counseling and coordination of care  Chief Complaint:   Subacute vision loss / impairment in left eye  History of Present Illness:    Heri Li is a 76 y o  male who presents to the emergency department at the request of his ophthalmologist at [facility] in [place]  The patient had reported sudden onset of visual "cloudiness" 6 days ago on Monday that has persisted through the week  The patient was then seen and examined by his ophthalmologist at [facility] yesterday and then sent for bloodwork  The patient had apparently had some disc swelling on the exam per discussion between ER physician and on call physician for that ophthalmology group  The patient apparently had an elevated ESR and CRP and there was concern for temporal arteritis  The patient was told to get to hospital for high dose steroids  The patient did not want to go to [place], so he came to the ER here instead  Before that phone call ended, the ophthalmologist on call had mentioned that the recommendation for patient follow up after discharge would be to follow up with Dr Connor Vargas from neuro-opthalmology (Phone )  The patient is also noted to have some tenderness in the left temporal region of the head  Of note, the patient is blind in his right eye as well, but this is chronic and related to prior stroke / hypertensive injury  In terms of associated symptoms, the patient denied any headaches, nausea, lightheadedness, or dizziness  The patient denied any associated fevers or chills  The patient has no complaints of jaw claudication  He denies any tinnitus  He has had no numbness or tingling to any of the arms or legs aside from his chronic neuropathy  The patient denies any focal weakness and has had no problems with facial droop or slurred speech  In terms of the timing of his symptoms, the symptoms have been present since Monday and have not changed at all during the week  Additionally, the patient tells me that there are short periods of time where he cannot see anything in the temporal field of his left eye but sometimes all he has to do is blink his eyes to make it go away  The patient is had no recent head trauma  Review of Systems:    Review of Systems   Constitutional: Negative for activity change, appetite change, chills, diaphoresis and fever  HENT: Negative for congestion, rhinorrhea, sinus pain, sore throat, tinnitus and trouble swallowing  Eyes: Positive for visual disturbance  Negative for pain and redness  Respiratory: Negative for cough, chest tightness, shortness of breath and wheezing  Cardiovascular: Negative for chest pain, palpitations and leg swelling     Gastrointestinal: Negative for constipation, diarrhea, nausea and vomiting  Endocrine: Negative  Genitourinary: Negative  Musculoskeletal: Negative for arthralgias, back pain, gait problem and neck pain  Skin: Negative for rash  Allergic/Immunologic: Negative  Neurological: Positive for numbness (chronic neuropathy but no new numbness or paresthesias)  Negative for dizziness, syncope, speech difficulty, light-headedness and headaches  Hematological: Negative  Psychiatric/Behavioral: Negative  All other systems reviewed and are negative  Past Medical and Surgical History:     Past Medical History:   Diagnosis Date    Anemia     Arthritis     Atrial fibrillation ([facility] [address] )     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[facility] [address] )     B12 deficiency     Back pain     Blind right eye     Chronic combined systolic and diastolic heart failure (HCC)     Chronic venous stasis dermatitis of both lower extremities     CKD (chronic kidney disease), stage IV (HCC)     Coronary artery disease     DDD (degenerative disc disease)     DM (diabetes mellitus), type 2 (HCC)     Type II, on insulin    BAER (dyspnea on exertion)     Epistaxis     Gout     Hiatal hernia     History of cellulitis     BLE    History of prostate cancer     Radiation treatments   Hypertension     Ischemic cardiomyopathy     MI, old     Neuropathy     BLE    Obesity     Obstructive sleep apnea syndrome, severe     TIA (transient ischemic attack)     Urinary incontinence     Urinary retention     Use of cane as ambulatory aid     Independent with personal care         Past Surgical History:   Procedure Laterality Date    ABDOMINAL SURGERY      CARDIAC SURGERY      CATARACT EXTRACTION, BILATERAL      CORONARY ANGIOPLASTY WITH STENT PLACEMENT      2 stents    CORONARY ARTERY BYPASS GRAFT N/A 7/15/2016    Procedure: Intraop ADRIAN, CABG x 3 using LIMA to LAD, RSVG to OM1 and D1;  Surgeon: Jaxon Osei MD;  [facility]: [facility];  Service:    Randolph Michelle      has stents    EYE SURGERY Bilateral cataract removal    HERNIA REPAIR      umbilical hernia repair    LUNG CANCER SURGERY      Partial upper right lobectomy    PILONIDAL CYST EXCISION      ID CYSTOURETHROSCOPY,URETER CATHETER Left 3/9/2016    Procedure: CYSTOSCOPY WITH left RETROGRADE PYELOGRAM left double J stent removal;  Surgeon: Sam Collier MD;  Location: [facility];  Service: Urology    VASCULAR SURGERY         Meds/Allergies:    Prior to Admission medications    Medication Sig Start Date End Date Taking? Authorizing Provider   apixaban (ELIQUIS) 5 mg Take 1 tablet by mouth 2 (two) times a day 3/6/17   [facility], DO   aspirin (ECOTRIN LOW STRENGTH) 81 mg EC tablet Take 81 mg by mouth 2 (two) times a day    Historical Provider, MD   Cholecalciferol 2000 units TABS Take 1 tablet by mouth daily 3/7/17   [facility], DO   furosemide (LASIX) 80 mg tablet Take 1 tablet by mouth daily 3/7/17   [facility], DO   gabapentin (NEURONTIN) 300 mg capsule Take 300 mg by mouth daily at bedtime    Historical Provider, MD   glucagon (GLUCAGON EMERGENCY) 1 MG injection Inject 1 mg under the skin once as needed for low blood sugar (as needed for a blood sugar less than 70 if unconscious or unable to correct by oral means) for up to 1 dose 3/6/17   [facility], DO   insulin aspart (NovoLOG) 100 units/mL injection Inject under the skin 3 (three) times a day before meals Sliding scale    Historical Provider, MD   insulin detemir (LEVEMIR) 100 units/mL subcutaneous injection Inject 35 Units under the skin daily at bedtime 3/6/17   [facility], DO   metolazone (ZAROXOLYN) 5 mg tablet Take 5 mg by mouth as needed    Historical Provider, MD   metoprolol tartrate (LOPRESSOR) 25 mg tablet Take 25 mg by mouth 2 (two) times a day    Historical Provider, MD   Naproxen Sodium (ALEVE PO) Take 2 tablets by mouth daily    Historical Provider, MD   PREDNISONE PO Take by mouth DECREASING DOSE  h[facility]      Historical Provider, MD   simvastatin (ZOCOR) 40 mg tablet Take 40 mg by mouth daily at bedtime    Historical Provider, MD   tamsulosin (FLOMAX) 0 4 mg Take 0 4 mg by mouth daily at bedtime      Historical Provider, MD   vitamin B-12 (CYANOCOBALAMIN) 100 MCG tablet Take 1,000 mcg by mouth daily      Historical Provider, MD     I have reviewed home medications with patient personally  Allergies: Allergies   Allergen Reactions    Other Rash     Adhesive tape       Social History:     Marital Status: /Civil Union     Substance Use History:   History   Alcohol Use No     History   Smoking Status    Former Smoker    Packs/day: 2 00    Years: 54 00    Quit date: 2004   Smokeless Tobacco    Never Used     Comment: Pt is a non smoker     History   Drug Use No       Family History:    Family History   Problem Relation Age of Onset    Diabetes Other     Hypertension Other     Cancer Other     Diabetes Mother     Heart disease Mother     Hypertension Mother     Diabetes Father     Diabetes Maternal Grandmother        Physical Exam:     Vitals:   Blood Pressure: 145/67 (10/21/17 1751)  Pulse: 74 (10/21/17 1751)  Temperature: (!) 97 3 °F (36 3 °C) (10/21/17 1359)  Temp Source: Oral (10/21/17 1359)  Respirations: 22 (10/21/17 1751)  Weight - Scale: (!) 137 kg (302 lb) (10/21/17 1359)  SpO2: 96 % (10/21/17 1751)    Physical Exam   Constitutional: He is oriented to person, place, and time  No distress  HENT:   Mouth/Throat: Oropharynx is clear and moist  No oropharyngeal exudate  Eyes: Conjunctivae and EOM are normal    Severely diminished peripheral field vision on the left and a moderate peripheral field loss on the right  Patient is not able to read print, but is able to identify proper number fingers on 4/5 attempts  Pupils equal round reactive to light  Small pupil size makes very difficult to do funduscopic exam without dilation  No nystagmus  Neck: Neck supple  No JVD present  No carotid bruits  Cardiovascular: Normal rate and regular rhythm  No murmur heard  Abdominal: Soft  Bowel sounds are normal  He exhibits no distension  There is no tenderness  Obese   Musculoskeletal: He exhibits edema (chronic appearing symmetric edema of lower extremities  )  Lymphadenopathy:     He has no cervical adenopathy  Neurological: He is alert and oriented to person, place, and time  No cranial nerve deficit  He exhibits normal muscle tone  Skin: No rash noted  Psychiatric: He has a normal mood and affect  Vitals reviewed  Additional Data:     Lab Results: I have personally reviewed pertinent reports  Results from last 7 days  Lab Units 10/21/17  1601   WBC Thousand/uL 6 83   HEMOGLOBIN g/dL 13 4   HEMATOCRIT % 41 9   PLATELETS Thousands/uL 233   NEUTROS PCT % 73   LYMPHS PCT % 16   MONOS PCT % 7   EOS PCT % 3       Results from last 7 days  Lab Units 10/21/17  1601   SODIUM mmol/L 138   POTASSIUM mmol/L 4 4   CHLORIDE mmol/L 101   CO2 mmol/L 28   BUN mg/dL 56*   CREATININE mg/dL 2 91*   CALCIUM mg/dL 8 9   GLUCOSE RANDOM mg/dL 266*           Imaging: I have personally reviewed pertinent reports  Ct Head Without Contrast    Result Date: 10/21/2017  Narrative: CT BRAIN - WITHOUT CONTRAST INDICATION:  Decreased vision in the left eye COMPARISON:  August 25, 2017 TECHNIQUE:  CT examination of the brain was performed  In addition to axial images, coronal reformatted images were created and submitted for interpretation  Radiation dose length product (DLP) for this visit:  1103 mGy-cm   This examination, like all CT scans performed in the [facility], was performed utilizing techniques to minimize radiation dose exposure, including the use of iterative reconstruction and automated exposure control  IMAGE QUALITY:  Diagnostic  FINDINGS:  PARENCHYMA:  No intracranial mass, mass effect or midline shift  No CT signs of acute infarction  There is no parenchymal hemorrhage  VENTRICLES AND EXTRA-AXIAL SPACES:  Normal for patient's age  VISUALIZED ORBITS AND PARANASAL SINUSES:  Small mucous retention cyst is present inferiorly in the left maxillary sinus  The remaining sinuses are clear  No acute orbital pathology seen  There is evidence of periodontal disease of the maxillary teeth  Mastoids are clear  Congenital nonunion posterior arch of C1 noted incidentally  CALVARIUM AND EXTRACRANIAL SOFT TISSUES:   Normal      Impression: No acute intracranial abnormality  Workstation performed: [id number]     Xr Thumb Left First Digit-min 2v    Result Date: 10/18/2017  Narrative: LEFT FINGER INDICATION: Follow-up left thumb proximal phalanx fracture  COMPARISON: August 25, 2017 and September 8, 2017 VIEWS:  PA view hand and 2 cone-down views of the left 1st digit IMAGES:  3 For the purposes of institution wide universal language the following terms will apply: (thumb=1st digit/finger, index finger=2nd digit/finger, long finger=3rd digit/finger, ring=4th digit/finger and small finger=5th digit/finger) FINDINGS: Nondisplaced fractures of the base of the 1st proximal phalanx, unchanged in position and alignment  Oblique intra-articular nondisplaced fracture the base of the 1st distal phalanx is suggested  Degenerative changes, 1st carpometacarpal joint and distal interphalangeal joints  No lytic or blastic lesion  Soft tissues are unremarkable  Impression: Unchanged position alignment of 1st ray fracture fragments  Workstation performed: [id number]       EKG, Pathology, and Other Studies Reviewed on Admission:   · EKG: Normal sinus rhythm with rate 73 bpm   RBBB noted  Allscripts / Epic Records Reviewed: Yes     ** Please Note: This note has been constructed using a voice recognition system   **

## 2017-10-21 NOTE — ED ATTENDING ATTESTATION
Jeevan Mcdermott MD, saw and evaluated the patient  I have discussed the patient with the resident/non-physician practitioner and agree with the resident's/non-physician practitioner's findings, Plan of Care, and MDM as documented in the resident's/non-physician practitioner's note, except where noted  All available labs and Radiology studies were reviewed  At this point I agree with the current assessment done in the Emergency Department  I have conducted an independent evaluation of this patient a history and physical is as follows:  Patient here with left eye blurriness  Patient has a history of right eye blindness secondary to what he believes is a stroke, patient woke up on Monday with decreased vision in his left eye  The patient was seen at [facility], where he was noted to have some optic nerve edema  The patient had labs drawn as an outpatient, and was sent to the emergency department because he had an elevated ESR  The patient states his vision has not changed since the initial event  He does not have fevers, vomiting, headache, abdominal pain, chest pain, or shortness of breath  He denies other complaints at this time and a 12 point review of systems was completed  On exam the patient has decreased vision in his eyes, and can only read the top 2 lines of the Snellen chart  His exam is otherwise noncontributory  Impression:  Decreased vision, concern for temporal arteritis versus stroke    Will plan to check labs, CT, high dose steroids, will discuss with Ophthalmology, admit to the hospital    [address] [place] [address] Time

## 2017-10-21 NOTE — RESPIRATORY THERAPY NOTE
RT Protocol Note  Genesis Holland 76 y o  male MRN: [id number]  Unit/Bed#: [facility] 731-01 Encounter: [id number]    Assessment     Principal Problem:    Vision loss, left eye  Active Problems: Morbid obesity (HCC)    Neuropathy    CKD (chronic kidney disease) stage 4, GFR 15-29 ml/min (HCC)    Atrial fibrillation (HCC)    Blind right eye    Chronic combined systolic and diastolic heart failure (HCC)    Coronary artery disease    DM (diabetes mellitus), type 2 (HCC)    Obstructive sleep apnea syndrome, severe      Home Pulmonary Medications:  n/a    Past Medical History:   Diagnosis Date    Anemia     Arthritis     Atrial fibrillation (HCC)     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HCC)     B12 deficiency     Back pain     Blind right eye     Chronic combined systolic and diastolic heart failure (HCC)     Chronic venous stasis dermatitis of both lower extremities     CKD (chronic kidney disease), stage IV ([address] )     Coronary artery disease     DDD (degenerative disc disease)     DM (diabetes mellitus), type 2 (HCC)     Type II, on insulin    BAER (dyspnea on exertion)     Epistaxis     Gout     Hiatal hernia     History of cellulitis     BLE    History of prostate cancer     Radiation treatments   Hypertension     Ischemic cardiomyopathy     MI, old     Neuropathy     BLE    Obesity     Obstructive sleep apnea syndrome, severe     TIA (transient ischemic attack)     Urinary incontinence     Urinary retention     Use of cane as ambulatory aid     Independent with personal care       Social History     Social History    Marital status: /Civil Union     Spouse name: N/A    Number of children: N/A    Years of education: N/A     Social History Main Topics    Smoking status: Former Smoker     Packs/day: 2 00     Years: 54 00     Quit date: 2004    Smokeless tobacco: Never Used      Comment: Pt is a non smoker    Alcohol use No    Drug use: No    Sexual activity: No     Other Topics Concern  None     Social History Narrative    None       Subjective          Objective     Physical Exam:   Assessment Type: Assess only  General Appearance: Alert, Awake  Respiratory Pattern: Normal  Chest Assessment: Chest expansion symmetrical  Bilateral Breath Sounds: Diminished    Vitals:  Blood pressure 145/67, pulse 74, temperature 98 1 °F (36 7 °C), temperature source Oral, resp  rate 22, height 5' 7" (1 702 m), weight (!) 142 kg (312 lb 2 7 oz), SpO2 96 %  Imaging and other studies: I have personally reviewed pertinent reports  Plan     Respiratory Plan: Discontinue Protocol        Resp Comments: Pt in no distress at this time  Pt states he takes no meds at home and has no pulmonary hx  Ptotocol d'cd

## 2017-10-21 NOTE — ED PROVIDER NOTES
History  Chief Complaint   Patient presents with    Decreased Visual Acuity     pt has complete loss of vision right eye since stroke 1 1/2 yrs ago, now starting to have visual disturbance in left eye, saw Dr Jone Gallegos from [facility] yesterday, and called pt today and instructed him to come to ER     75 yo M presents to ED with decreased vision of L eye since Monday  He says he woke up Monday and his vision was "cloudy " His vision has been unchanged since then, does not wax/wane  No eye pain  No eye redness  No headache, dizziness, nausea/vomiting  He says he sometimes sees flickering "like a butterfly" on the far L side of his vision  Otherwise no flashing lights, black spots  He was seen by his ophthalmologist yesterday and was told to get blood work  Today pt says he received a call from his ophthalmologist asking him to come to the ED for treatment  Pt does not know the results of his exam yesterday or of the blood work  He does not know what kind of treatment he is here for  Spoke to ophthalmologist on call at [facility], where pt was seen  Pt's ophthalmologist had seen disc swelling of the L eye and ordered ESR and CRP, which are both elevated, so he is concerned for giant cell arteritis  Apparently pt was told to go to [place] for high dose steroids, but pt did not want to go that far, so he was told to go to the ED instead  Per ophthalmologist on call, pt is to follow up with neuro-ophthalmology Dr Татьяна Banda on discharge (phone# [phone])  Pt also has 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 Taking?    Cholecalciferol 2000 units TABS   No No   Sig: Take 1 tablet by mouth daily   Naproxen Sodium (ALEVE PO)   Yes No   Sig: Take 2 tablets by mouth daily   PREDNISONE PO Yes No   Sig: Take by mouth DECREASING DOSE  [facility] [id number] [facility]  apixaban (ELIQUIS) 5 mg   No No   Sig: Take 1 tablet by mouth 2 (two) times a day   aspirin (ECOTRIN LOW STRENGTH) 81 mg EC tablet   Yes No   Sig: Take 81 mg by mouth 2 (two) times a day   furosemide (LASIX) 80 mg tablet   No No   Sig: Take 1 tablet by mouth daily   gabapentin (NEURONTIN) 300 mg capsule   Yes No   Sig: Take 300 mg by mouth daily at bedtime   glucagon (GLUCAGON EMERGENCY) 1 MG injection   No No   Sig: Inject 1 mg under the skin once as needed for low blood sugar (as needed for a blood sugar less than 70 if unconscious or unable to correct by oral means) for up to 1 dose   insulin aspart (NovoLOG) 100 units/mL injection   Yes No   Sig: Inject under the skin 3 (three) times a day before meals Sliding scale   insulin detemir (LEVEMIR) 100 units/mL subcutaneous injection   No No   Sig: Inject 35 Units under the skin daily at bedtime   metolazone (ZAROXOLYN) 5 mg tablet   Yes No   Sig: Take 5 mg by mouth as needed   metoprolol tartrate (LOPRESSOR) 25 mg tablet   Yes No   Sig: Take 25 mg by mouth 2 (two) times a day   simvastatin (ZOCOR) 40 mg tablet   Yes No   Sig: Take 40 mg by mouth daily at bedtime   tamsulosin (FLOMAX) 0 4 mg   Yes No   Sig: Take 0 4 mg by mouth daily at bedtime     vitamin B-12 (CYANOCOBALAMIN) 100 MCG tablet   Yes No   Sig: Take 1,000 mcg by mouth daily        Facility-Administered Medications: None       Past Medical History:   Diagnosis Date    Arthritis     Back pain     CHF (congestive heart failure) (HCC)     Coronary artery disease     DDD (degenerative disc disease)     Diabetes mellitus (HCC)     Type II, on insulin    BAER (dyspnea on exertion)     Edema extremities     BLE    History of cellulitis     BLE    History of lung cancer     Right lung    History of prostate cancer     Radiation treatments      MI, old     Neuropathy     BLE    Obesity     Renal disorder     CKD    TIA (transient ischemic attack)     Urinary incontinence     Use of cane as ambulatory aid     Independent with personal care  Past Surgical History:   Procedure Laterality Date    ABDOMINAL SURGERY      CARDIAC SURGERY      CATARACT EXTRACTION, BILATERAL      CORONARY ANGIOPLASTY WITH STENT PLACEMENT      2 stents    CORONARY ARTERY BYPASS GRAFT N/A 7/15/2016    Procedure: Intraop ADRIAN, CABG x 3 using LIMA to LAD, RSVG to OM1 and D1;  Surgeon: Larry Trevino MD;  Location:  MAIN OR;  Service:    Francesca Win      has stents    EYE SURGERY      Bilateral cataract removal    HERNIA REPAIR      umbilical hernia repair    LUNG CANCER SURGERY      Partial upper right lobectomy    ND CYSTOURETHROSCOPY,URETER CATHETER Left 3/9/2016    Procedure: CYSTOSCOPY WITH left RETROGRADE PYELOGRAM left double J stent removal;  Surgeon: Miriam Be MD;  Location: [facility];  Service: Urology    SPINE BIOPSY      cyst on spine    VASCULAR SURGERY         Family History   Problem Relation Age of Onset    Diabetes Other     Hypertension Other     Cancer Other     Diabetes Mother     Heart disease Mother      I have reviewed and agree with the history as documented  Social History   Substance Use Topics    Smoking status: Former Smoker     Packs/day: 2 00     Years: 54 00     Quit date: 2004    Smokeless tobacco: Never Used      Comment: Pt is a non smoker    Alcohol use No        Review of Systems   Constitutional: Negative for activity change, chills and fever  HENT: Negative for congestion, rhinorrhea and trouble swallowing  Eyes: Positive for visual disturbance  Negative for photophobia, pain, discharge, redness and itching  Respiratory: Negative for cough and shortness of breath  Cardiovascular: Negative for chest pain and palpitations  Gastrointestinal: Negative for abdominal pain, nausea and vomiting  Genitourinary: Negative for dysuria, frequency and urgency     Musculoskeletal: Negative for neck pain and neck stiffness  Skin: Negative for color change and rash  Neurological: Negative for dizziness, syncope, speech difficulty, weakness, light-headedness and headaches  Physical Exam  ED Triage Vitals   Temperature Pulse Respirations Blood Pressure SpO2   10/21/17 1359 10/21/17 1359 10/21/17 1359 10/21/17 1359 10/21/17 1359   (!) 97 3 °F (36 3 °C) 75 22 140/83 97 %      Temp Source Heart Rate Source Patient Position - Orthostatic VS BP Location FiO2 (%)   10/21/17 1359 10/21/17 1558 10/21/17 1558 10/21/17 1558 --   Oral Monitor Lying Right arm       Pain Score       10/21/17 1359       No Pain           Physical Exam   Constitutional: He is oriented to person, place, and time  He appears well-developed and well-nourished  No distress  HENT:   Head: Normocephalic and atraumatic  Minimal L temporal tenderness   Eyes: Conjunctivae and EOM are normal  Pupils are equal, round, and reactive to light  Right eye exhibits no discharge  Left eye exhibits no discharge  Neck: Normal range of motion  Neck supple  Cardiovascular: Normal rate, regular rhythm and intact distal pulses  Pulmonary/Chest: Effort normal  No respiratory distress  Abdominal: Soft  There is no tenderness  Musculoskeletal: Normal range of motion  He exhibits no tenderness  Neurological: He is alert and oriented to person, place, and time  Skin: Skin is warm and dry  Nursing note and vitals reviewed        ED Medications  Medications   methylPREDNISolone sodium succinate (Solu-MEDROL) 1,000 mg in sodium chloride 0 9 % 250 mL IVPB (not administered)       Diagnostic Studies  Labs Reviewed   CBC AND DIFFERENTIAL - Abnormal        Result Value Ref Range Status    RDW 15 7 (*) 11 6 - 15 1 % Final    WBC 6 83  4 31 - 10 16 Thousand/uL Final    RBC 4 54  3 88 - 5 62 Million/uL Final    Hemoglobin 13 4  12 0 - 17 0 g/dL Final    Hematocrit 41 9  36 5 - 49 3 % Final    MCV 92  82 - 98 fL Final    MCH 29 5  26 8 - 34 3 pg Final    MCHC 32 0  31 4 - 37 4 g/dL Final    MPV 10 6  8 9 - 12 7 fL Final    Platelets 702  774 - 390 Thousands/uL Final    nRBC 0  /100 WBCs Final    Neutrophils Relative 73  43 - 75 % Final    Lymphocytes Relative 16  14 - 44 % Final    Monocytes Relative 7  4 - 12 % Final    Eosinophils Relative 3  0 - 6 % Final    Basophils Relative 1  0 - 1 % Final    Neutrophils Absolute 4 95  1 85 - 7 62 Thousands/µL Final    Lymphocytes Absolute 1 07  0 60 - 4 47 Thousands/µL Final    Monocytes Absolute 0 50  0 17 - 1 22 Thousand/µL Final    Eosinophils Absolute 0 21  0 00 - 0 61 Thousand/µL Final    Basophils Absolute 0 06  0 00 - 0 10 Thousands/µL Final   BASIC METABOLIC PANEL - Abnormal     BUN 56 (*) 5 - 25 mg/dL Final    Creatinine 2 91 (*) 0 60 - 1 30 mg/dL Final    Comment: Standardized to IDMS reference method    Glucose 266 (*) 65 - 140 mg/dL Final    Comment:   If the patient is fasting, the ADA then defines impaired fasting glucose as > 100 mg/dL and diabetes as > or equal to 123 mg/dL  Specimen collection should occur prior to Sulfasalazine administration due to the potential for falsely depressed results  Specimen collection should occur prior to Sulfapyridine administration due to the potential for falsely elevated results  Sodium 138  136 - 145 mmol/L Final    Potassium 4 4  3 5 - 5 3 mmol/L Final    Comment: Slightly Hemolyzed; Results May be Affected    Chloride 101  100 - 108 mmol/L Final    CO2 28  21 - 32 mmol/L Final    Anion Gap 9  4 - 13 mmol/L Final    Calcium 8 9  8 3 - 10 1 mg/dL Final    eGFR 20  ml/min/1 73sq m Final    Narrative:     National Kidney Disease Education Program recommendations are as follows:  GFR calculation is accurate only with a steady state creatinine  Chronic Kidney disease less than 60 ml/min/1 73 sq  meters  Kidney failure less than 15 ml/min/1 73 sq  meters     POCT TROPONIN - Normal    POC Troponin I 0 01  0 00 - 0 08 ng/ml Final    Specimen Type VENOUS   Final Narrative:     [employer] i-Stat handheld analyzer 99% cutoff is > 0 08ng/mL in network Emergency Departments    o cTnI 99% cutoff is useful only when applied to patients in the clinical setting of myocardial ischemia  o cTnI 99% cutoff should be interpreted in the context of clinical history, ECG findings and possibly cardiac imaging to establish correct diagnosis  o cTnI 99% cutoff may be suggestive but clearly not indicative of a coronary event without the clinical setting of myocardial ischemia  CT head without contrast   Final Result      No acute intracranial abnormality  Workstation performed: [id number]             Procedures  Procedures      Phone Consults  ED Phone Contact    ED Course  ED Course            Identification of Seniors at [facility] Most Recent Value   (ISAR) Identification of Seniors at Risk   Before the illness or injury that brought you to the Emergency, did you need someone to help you on a regular basis? 1 Filed at: 10/21/2017 1404   In the last 24 hours, have you needed more help than usual?  0 Filed at: 10/21/2017 1404   Have you been hospitalized for one or more nights during the past 6 months? 1 Filed at: 10/21/2017 1404   In general, do you see well? 1 Filed at: 10/21/2017 1404   In general, do you have serious problems with your memory? 0 Filed at: 10/21/2017 1404   Do you take more than three different medications every day? 1 Filed at: 10/21/2017 1404   ISAR Score  4 Filed at: 10/21/2017 1404                          MDM  Number of Diagnoses or Management Options  Giant cell arteritis [facility]):   Diagnosis management comments: 77 yo with decreased vision in L eye since Monday  Visual acuity about 20/100  Sent to ED by ophthalmologist for concern for temporal arteritis  Will start high dose steroid and admit pt for further treatment/evaluation      CritCare Time    Disposition  Final diagnoses:   Giant cell arteritis [facility])     ED Disposition     ED Disposition Condition Comment    Admit  Case was discussed with ADAM and the patient's admission status was agreed to be Admission Status: inpatient status to the service of Dr Mark Raygoza  Follow-up Information    None       Patient's Medications   Discharge Prescriptions    No medications on file     No discharge procedures on file  ED Provider  Attending physically available and evaluated Mae Johnson I managed the patient along with the ED Attending      Electronically Signed by       Arsenio Casillas MD  Resident  10/21/17 4640

## 2017-10-22 LAB
ANION GAP SERPL CALCULATED.3IONS-SCNC: 11 MMOL/L (ref 4–13)
BUN SERPL-MCNC: 62 MG/DL (ref 5–25)
CALCIUM SERPL-MCNC: 8.9 MG/DL (ref 8.3–10.1)
CHLORIDE SERPL-SCNC: 99 MMOL/L (ref 100–108)
CHOLEST SERPL-MCNC: 160 MG/DL (ref 50–200)
CO2 SERPL-SCNC: 28 MMOL/L (ref 21–32)
CREAT SERPL-MCNC: 2.93 MG/DL (ref 0.6–1.3)
EST. AVERAGE GLUCOSE BLD GHB EST-MCNC: 177 MG/DL
GFR SERPL CREATININE-BSD FRML MDRD: 19 ML/MIN/1.73SQ M
GLUCOSE SERPL-MCNC: 372 MG/DL (ref 65–140)
GLUCOSE SERPL-MCNC: 414 MG/DL (ref 65–140)
GLUCOSE SERPL-MCNC: 422 MG/DL (ref 65–140)
GLUCOSE SERPL-MCNC: 449 MG/DL (ref 65–140)
GLUCOSE SERPL-MCNC: 471 MG/DL (ref 65–140)
HBA1C MFR BLD: 7.8 % (ref 4.2–6.3)
HDLC SERPL-MCNC: 56 MG/DL (ref 40–60)
LDLC SERPL CALC-MCNC: 83 MG/DL (ref 0–100)
LDLC SERPL DIRECT ASSAY-MCNC: 96 MG/DL (ref 0–100)
POTASSIUM SERPL-SCNC: 4 MMOL/L (ref 3.5–5.3)
SODIUM SERPL-SCNC: 138 MMOL/L (ref 136–145)
TRIGL SERPL-MCNC: 105 MG/DL

## 2017-10-22 PROCEDURE — 83036 HEMOGLOBIN GLYCOSYLATED A1C: CPT | Performed by: INTERNAL MEDICINE

## 2017-10-22 PROCEDURE — 80061 LIPID PANEL: CPT | Performed by: INTERNAL MEDICINE

## 2017-10-22 PROCEDURE — 82948 REAGENT STRIP/BLOOD GLUCOSE: CPT

## 2017-10-22 PROCEDURE — G8988 SELF CARE GOAL STATUS: HCPCS

## 2017-10-22 PROCEDURE — 97167 OT EVAL HIGH COMPLEX 60 MIN: CPT

## 2017-10-22 PROCEDURE — 80048 BASIC METABOLIC PNL TOTAL CA: CPT | Performed by: INTERNAL MEDICINE

## 2017-10-22 PROCEDURE — 83721 ASSAY OF BLOOD LIPOPROTEIN: CPT | Performed by: INTERNAL MEDICINE

## 2017-10-22 PROCEDURE — 94760 N-INVAS EAR/PLS OXIMETRY 1: CPT

## 2017-10-22 PROCEDURE — G8987 SELF CARE CURRENT STATUS: HCPCS

## 2017-10-22 PROCEDURE — 94660 CPAP INITIATION&MGMT: CPT

## 2017-10-22 RX ADMIN — FUROSEMIDE 80 MG: 80 TABLET ORAL at 08:38

## 2017-10-22 RX ADMIN — INSULIN LISPRO 12 UNITS: 100 INJECTION, SOLUTION INTRAVENOUS; SUBCUTANEOUS at 08:39

## 2017-10-22 RX ADMIN — INSULIN DETEMIR 50 UNITS: 100 INJECTION, SOLUTION SUBCUTANEOUS at 21:12

## 2017-10-22 RX ADMIN — INSULIN LISPRO 12 UNITS: 100 INJECTION, SOLUTION INTRAVENOUS; SUBCUTANEOUS at 15:32

## 2017-10-22 RX ADMIN — ACETAMINOPHEN 650 MG: 325 TABLET, FILM COATED ORAL at 22:22

## 2017-10-22 RX ADMIN — TAMSULOSIN HYDROCHLORIDE 0.4 MG: 0.4 CAPSULE ORAL at 21:12

## 2017-10-22 RX ADMIN — ASPIRIN 81 MG: 81 TABLET, COATED ORAL at 08:38

## 2017-10-22 RX ADMIN — INSULIN LISPRO 6 UNITS: 100 INJECTION, SOLUTION INTRAVENOUS; SUBCUTANEOUS at 21:13

## 2017-10-22 RX ADMIN — PRAVASTATIN SODIUM 80 MG: 80 TABLET ORAL at 17:25

## 2017-10-22 RX ADMIN — METOPROLOL TARTRATE 25 MG: 25 TABLET ORAL at 08:37

## 2017-10-22 RX ADMIN — SODIUM CHLORIDE 1000 MG: 0.9 INJECTION, SOLUTION INTRAVENOUS at 08:38

## 2017-10-22 RX ADMIN — APIXABAN 5 MG: 5 TABLET, FILM COATED ORAL at 08:38

## 2017-10-22 RX ADMIN — GABAPENTIN 300 MG: 300 CAPSULE ORAL at 21:12

## 2017-10-22 RX ADMIN — APIXABAN 5 MG: 5 TABLET, FILM COATED ORAL at 17:25

## 2017-10-22 RX ADMIN — ASPIRIN 81 MG: 81 TABLET, COATED ORAL at 17:25

## 2017-10-22 RX ADMIN — INSULIN LISPRO 10 UNITS: 100 INJECTION, SOLUTION INTRAVENOUS; SUBCUTANEOUS at 16:53

## 2017-10-22 RX ADMIN — INSULIN LISPRO 12 UNITS: 100 INJECTION, SOLUTION INTRAVENOUS; SUBCUTANEOUS at 16:53

## 2017-10-22 RX ADMIN — VITAMIN D, TAB 1000IU (100/BT) 2000 UNITS: 25 TAB at 08:37

## 2017-10-22 RX ADMIN — Medication 150 MG: at 08:38

## 2017-10-22 NOTE — CONSULTS
Consultation - General Surgery   Donovan Baugh 76 y o  male MRN: [id number]  Unit/Bed#: [facility] 731-01 Encounter: [id number]    Assessment/Plan     Assessment:  Pt is a 72y o  M with with decreased left eye vision suspected of having giant cell arteritis   Plan:  Possible temporal arteritis-ESR, CRP elevated  Left vision decrease  Should have rheumatology consult to weigh in on whether or not biopsy is indicated  If so, please inform vascular surgery for any indicated procedure  Opthalmology gave 1000mg solumedral for presumed temporal GCA  ddx arteritic ischemic optic neropathy vs non artery ischemic optic neuropathy  Primary care per [facility]  History of Present Illness   HPI:  Donovan Baugh is a 76 y o  male w/ pmh coronary artery bypass on eliqus who presents with one week of right eye blurriness  He says he does not have vision of his left eye from one year ago for previous stroke  Since that visual change the blurriness has remained same  He has not had any headaches, jaw pain, pain over his temporal arteries  No stroke like symptoms  Says he has never seen a vascular surgeon before  Inpatient consult to Vascular Surgery  Date/Time: 10/21/2017 9:31 PM  Performed by: Glenny Goldstein  Authorized by: Kathrine Pena           Review of Systems   Constitutional: Negative for activity change, appetite change, chills and fever  Eyes: Positive for visual disturbance  Respiratory: Negative for cough and chest tightness  Cardiovascular: Negative for chest pain  Gastrointestinal: Negative for abdominal distention, abdominal pain, anal bleeding, diarrhea, nausea and vomiting  Skin: Negative for color change  Neurological: Negative for dizziness         Historical Information   Past Medical History:   Diagnosis Date    Anemia     Arthritis     Atrial fibrillation ([facility] [address] )     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[address] )     B12 deficiency     Back pain     Blind right eye     Chronic combined systolic and diastolic heart failure (HCC)     Chronic venous stasis dermatitis of both lower extremities     CKD (chronic kidney disease), stage IV (HCC)     Coronary artery disease     DDD (degenerative disc disease)     DM (diabetes mellitus), type 2 (HCC)     Type II, on insulin    BAER (dyspnea on exertion)     Epistaxis     Gout     Hiatal hernia     History of cellulitis     BLE    History of prostate cancer     Radiation treatments   Hypertension     Ischemic cardiomyopathy     MI, old     Neuropathy     BLE    Obesity     Obstructive sleep apnea syndrome, severe     TIA (transient ischemic attack)     Urinary incontinence     Urinary retention     Use of cane as ambulatory aid     Independent with personal care       Past Surgical History:   Procedure Laterality Date    ABDOMINAL SURGERY      CARDIAC SURGERY      CATARACT EXTRACTION, BILATERAL      CORONARY ANGIOPLASTY WITH STENT PLACEMENT      2 stents    CORONARY ARTERY BYPASS GRAFT N/A 7/15/2016    Procedure: Intraop ADRIAN, CABG x 3 using LIMA to LAD, RSVG to OM1 and D1;  Surgeon: Dudley Small MD;  Location:  MAIN OR;  Service:    Emerita Doll      has stents    EYE SURGERY      Bilateral cataract removal    HERNIA REPAIR      umbilical hernia repair    LUNG CANCER SURGERY      Partial upper right lobectomy    PILONIDAL CYST EXCISION      ID CYSTOURETHROSCOPY,URETER CATHETER Left 3/9/2016    Procedure: CYSTOSCOPY WITH left RETROGRADE PYELOGRAM left double J stent removal;  Surgeon: Katlyn Redman MD;  Location: [facility];  Service: Urology    VASCULAR SURGERY       Social History   History   Alcohol Use No     History   Drug Use No     History   Smoking Status    Former Smoker    Packs/day: 2 00    Years: 54 00    Quit date: 2004   Smokeless Tobacco    Never Used     Comment: Pt is a non smoker     Family History: non-contributory    Meds/Allergies   all current active meds have been reviewed  Allergies   Allergen Reactions  Other Rash     Adhesive tape       Objective   First Vitals:   Blood Pressure: 140/83 (10/21/17 1359)  Pulse: 75 (10/21/17 1359)  Temperature: (!) 97 3 °F (36 3 °C) (10/21/17 1359)  Temp Source: Oral (10/21/17 1359)  Respirations: 22 (10/21/17 1359)  Height: 5' 7" (170 2 cm) (10/21/17 1751)  Weight - Scale: (!) 137 kg (302 lb) (10/21/17 1359)  SpO2: 97 % (10/21/17 1359)    Current Vitals:   Blood Pressure: 167/66 (10/21/17 1953)  Pulse: 63 (10/21/17 1953)  Temperature: 97 6 °F (36 4 °C) (10/21/17 1953)  Temp Source: Oral (10/21/17 1953)  Respirations: 20 (10/21/17 1953)  Height: 5' 7" (170 2 cm) (10/21/17 1751)  Weight - Scale: (!) 142 kg (312 lb 2 7 oz) (10/21/17 1916)  SpO2: 95 % (10/21/17 1953)      Intake/Output Summary (Last 24 hours) at 10/21/17 2131  Last data filed at 10/21/17 2013   Gross per 24 hour   Intake                0 ml   Output              475 ml   Net             -475 ml       Invasive Devices     Peripheral Intravenous Line            Peripheral IV 08/25/17 57 days                Physical Exam   Constitutional: He is oriented to person, place, and time  He appears well-developed and well-nourished  HENT:   Head: Normocephalic and atraumatic  Eyes:   Decreased vision in L and R eyes   Cardiovascular: Normal rate  Abdominal: Soft  He exhibits no distension  There is no tenderness  There is no rebound  Obese   Neurological: He is alert and oriented to person, place, and time  Skin: Skin is warm and dry  Psychiatric: He has a normal mood and affect  5/5 strength upper and lower extremities    Lab Results:   I have personally reviewed pertinent lab results    , CBC:   Lab Results   Component Value Date    WBC 6 83 10/21/2017    HGB 13 4 10/21/2017    HCT 41 9 10/21/2017    MCV 92 10/21/2017     10/21/2017    MCH 29 5 10/21/2017    MCHC 32 0 10/21/2017    RDW 15 7 (H) 10/21/2017    MPV 10 6 10/21/2017    NRBC 0 10/21/2017   , CMP:   Lab Results   Component Value Date    NA 138 10/21/2017    K 4 4 10/21/2017     10/21/2017    CO2 28 10/21/2017    ANIONGAP 9 10/21/2017    BUN 56 (H) 10/21/2017    CREATININE 2 91 (H) 10/21/2017    GLUCOSE 266 (H) 10/21/2017    CALCIUM 8 9 10/21/2017    EGFR 20 10/21/2017     Imaging: I have personally reviewed pertinent reports  EKG, Pathology, and Other Studies: I have personally reviewed pertinent reports

## 2017-10-22 NOTE — OCCUPATIONAL THERAPY NOTE
OccupationalTherapy Evaluation     Patient Name: Heri Li  OIDDA'B Date: 10/22/2017  Problem List  Patient Active Problem List   Diagnosis    Morbid obesity ([facility]    Neuropathy    History of prostate cancer    Type 2 diabetes mellitus with hyperglycemia ([facility] )    S/P CABG x 3    CKD (chronic kidney disease) stage 4, GFR 15-29 ml/min ([facility])    Volume overload    Body mass index (BMI) of 40 0 to 49 9    Wound of right lower extremity    Wound of left lower extremity    Hyperphosphatemia    Vitamin D deficiency    Bilateral renal cysts    Atrial fibrillation ([facility]    Blind right eye    [facility] systolic [facility]    Coronary artery disease    DM (diabetes mellitus), type 2 ([facility]    Obstructive sleep apnea syndrome, severe    Vision loss, left eye     Past Medical History  Past Medical History:   Diagnosis Date    Anemia     Arthritis     Atrial fibrillation ([facility])     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[facility]     B12 deficiency     Back pain     Blind right eye     Chronic combined systolic and diastolic heart failure ([facility])     Chronic venous stasis dermatitis of both lower extremities     CKD (chronic kidney disease), stage IV ([facility]     Coronary artery disease     DDD (degenerative disc disease)     DM (diabetes mellitus), type 2 ([facility])     Type II, on insulin    BAER (dyspnea on exertion)     Epistaxis     Gout     Hiatal hernia     History of cellulitis     BLE    History of prostate cancer     Radiation treatments   Hypertension     Ischemic cardiomyopathy     MI, old     Neuropathy     BLE    Obesity     Obstructive sleep apnea syndrome, severe     TIA (transient ischemic attack)     Urinary incontinence     Urinary retention     Use of cane as ambulatory aid     Independent with personal care       Past Surgical History  Past Surgical History:   Procedure Laterality Date    ABDOMINAL SURGERY      CARDIAC SURGERY      CATARACT EXTRACTION, BILATERAL      CORONARY ANGIOPLASTY WITH STENT PLACEMENT      2 stents    CORONARY ARTERY BYPASS GRAFT N/A 7/15/2016    Procedure: Intraop ARDIAN, CABG x 3 using LIMA to LAD, RSVG to OM1 and D1;  Surgeon: José Antonio Wilson MD;  Location:  MAIN OR;  Service:    Venetia Cabot      has stents    EYE SURGERY      Bilateral cataract removal    HERNIA REPAIR      umbilical hernia repair    LUNG CANCER SURGERY      Partial upper right lobectomy    PILONIDAL CYST EXCISION      MD CYSTOURETHROSCOPY,URETER CATHETER Left 3/9/2016    Procedure: CYSTOSCOPY WITH left RETROGRADE PYELOGRAM left double J stent removal;  Surgeon: Nowell Crigler, MD;  Location: [facility];  Service: Urology    VASCULAR SURGERY           10/22/17 1201   Note Type   Note type Eval/Treat   Restrictions/Precautions   Weight Bearing Precautions Per Order No   Other Precautions O2;Fall Risk   Pain Assessment   Pain Assessment No/denies pain   Pain Score No Pain   Home Living   Type of Home Mobile home   Home Layout Ramped entrance   Bathroom Shower/Tub Walk-in shower   Bathroom Toilet Standard   Bathroom Equipment Shower chair;Commode   Bathroom Accessibility Accessible   Home Equipment Walker;Cane   Additional Comments Pt lives with his wife and son in a mobile home with a ramped entrance  Prior Function   Level of [place] Needs assistance with ADLs and functional mobility; Needs assistance with IADLs   Lives With Spouse; Son   Receives Help From Family   ADL Assistance Needs assistance   IADLs Needs assistance   Falls in the last 6 months 1 to 4  (1)   Vocational Retired   Comments Pt required A w/ ADLS and IADLS & required use of RW or SPC in the community PTA  Lifestyle   Autonomy Pt required A with ADLs/IADLs PTA  Reports wife provided assist for dressing and bathing   Pt reports I with mobility in the house and mod I mobility in the community with rw or SPC   Reciprocal Relationships Pt lives with his wife and son    Service to Others Pt is retired   Intrinsic Gratification Pt mostly sedentary PTA     Psychosocial   Psychosocial (WDL) WDL   Subjective   Subjective "My right eye had a stroke, the rest of me didn't, but I can kind of see out of my right eye again now "   ADL   Eating Assistance 5  Supervision/Setup   Grooming Assistance 5  Supervision/Setup   UB Bathing Assistance 4  Minimal Assistance   LB Bathing Assistance 2  Maximal Assistance    [address] 3  Moderate Assistance    [address] 2  Maximal [address]  4  Minimal Assistance   Bed Mobility   Additional Comments Pt seated EOB upon arrival eating lunch and requested to remain seated EOB at end of therapy session    Transfers   Sit to Stand 4  Minimal assistance   Additional items Assist x 1;Verbal cues   Stand to Sit 5  Supervision   Additional items Assist x 1;Verbal cues   Additional Comments Pt performed sit to stand transfer with CGA and verbal cues for safe hand placement    Functional Mobility   Functional Mobility 4  Minimal assistance   Additional Comments Pt performed functional mobility using rw with CGA for steadying/balance   Pt mobility limited 2* SOB   Additional items Rolling walker   Balance   Static Sitting Good   Dynamic Sitting Fair   Static Standing Fair   Dynamic Standing Fair   Ambulatory Fair -   Activity Tolerance   Activity Tolerance Patient limited by fatigue;Patient tolerated treatment well   Nurse Made Aware RN confirm pt appropriate for OT eval    RUE Assessment   RUE Assessment WFL   LUE Assessment   LUE Assessment X  (prior L shoulder injury resulting with ROM/strength deficits)   Hand Function   Gross Motor Coordination Functional   Fine Motor Coordination Functional   Vision-Basic Assessment   Visual History Other (Comment)  (Partially blind in R eye s/p stroke )   Patient Visual Report Blurring of print when reading   Vision - Complex Assessment   Additional Comments Pt unable to read employee name [email or web address], can somewhat read clock but not completely accurate, cannot read receipt on desk however can see yellow highlight marks, and reports blurriness when watching tv  opthalmologist recommend follow up, OT agree  Cognition   Overall Cognitive Status WFL   Arousal/Participation Alert; Responsive; Cooperative   Attention Within functional limits   Memory Within functional limits   Following Commands Follows all commands and directions without difficulty   Assessment   Limitation Visual deficit; Decreased endurance;Decreased high-level ADLs; Decreased self-care trans   Prognosis Good   Assessment Pt is a 77 y/o male seen for OT eval s/p adm to [facility] w/ L eye cloudiness and elevated ESR and CRP per opthalmologist who requested pt go to ER  Pt dx'd w/ 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  Pt with extensive PMH including a h/o morbid obesity, neuropathy, CKD, a-fib, 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, MI, CHF, CAD s/p CABG, DM, REYNA with CPAP and O2 at home, and BAER  Please see chart review for entire medical history and extensive past surgical history  Pt with active OT orders and up and OOB as tolerated orders  Pt lives with his wife and son in a mobile home with a ramped entrance  Pt required A w/ ADLS and IADLS & required use of RW or SPC in the community PTA  Pt is currently demonstrating the following occupational deficits: mod A UB ADLS, max A LB ADLS, CGA functional transfers and CGA functional mobility using rw  Pt is limited 2* significant SOB, decreased endurance, decreased activity tolerance, decreased functional forward reach, decreased ADL status, decreased mobility, deconditioning/generalized weakness, obesity, and visual deficits  The following Occupational Performance Areas to address include: bathing/shower, toilet hygiene, dressing, functional mobility, community mobility, clothing management, meal prep and household maintenance  Pt scored overall 60/100 on the Barthel Index   Based on the aforementioned OT evaluation, functional performance deficits, and assessments, pt has been identified as a high complexity evaluation  Anticipate pt may be able to return home with home OT pending progress if family is able to provide assistance required upon D/C  Pt to continue to benefit from acute immediate OT services to address the following goals 3-5x/wk to  w/in 7-10 days:   Goals   Patient Goals to go home    Plan   Treatment Interventions Visual perceptual retraining;ADL retraining;Functional transfer training;UE strengthening/ROM; Endurance training;Patient/family training;Equipment evaluation/education; Compensatory technique education;Continued evaluation; Energy conservation; Activityengagement   Goal Expiration Date 17   OT Frequency 3-5x/wk   Recommendation   OT Discharge Recommendation (please see assessment section )   Barthel Index   Feeding 10   Bathing 0   Grooming Score 5   Dressing Score 5   Bladder Score 5   Bowels Score 10   Toilet Use Score 5   Transfers (Bed/Chair) Score 10   Mobility (Level Surface) Score 10   Stairs Score 0   Barthel Index Score 60       GOALS    1) Pt will improve activity tolerance to G for min 30 min txment sessions  2) Pt will complete ADLs/self care w/ mod I using adaptive equipment and DME as needed w/ G hyiene/thoroughness w/ min cues fro cog support  3) Pt will complete toileting w/ mod I w/ G hygiene/thoroughness using DME as needed  4) Pt will improve functional transfers on/off all surfaces using DME as needed w/ G balance/safety including toileting w/ mod I  5) Pt will improve functional mobility during ADL/IADL/leisure tasks using DME as needed w/ g balance/safety w/ mod I  6) Pt will demonstrate G carryover of pt/caregiver education and training as appropriate w/ mod I w/o cues w/ G tolerance  7) Pt will demonstrate 100% carryover of learned E C  techniques s/p skilled education w/o cues t/o functional ADL/ IADL/leisure interest tasks w/ mod I        Vena Bala, MS, OTR/L

## 2017-10-22 NOTE — CONSULTS
PATIENT NAME: Angelic Ivey  YOB: 1940   MEDICAL RECORD NUMBER: [id number]  Chief Complaint/Reason for Consult: decreased vision left eye     HPI: Angelic Ivey is a 76 y o  male with history of A fib on eliquis, atypical lung carcinoid, B12 deficiency, HTN, HLD, obesity, CAD who presented with vision loss in the left eye for 6 days  He has a history of signifcant vision loss in the right eye from a "mini stroke"  He was seen by Dr Suki Griffith and had decreased visual acuity in both eyes  ESR was 37 and CRP was 16 4  He was able to see hand motion in the right eye and count fingers in the left eye  He was noted to have an APD in the right eye and his optic nerve was noted to be very pale by Dr Aileen Mae with attenuated and tortuous vessels  Dr Aileen Mae 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 )     B12 deficiency     Back pain     Blind right eye     Chronic combined systolic and diastolic heart failure ([facility])     Chronic venous stasis dermatitis of both lower extremities     CKD (chronic kidney disease), stage IV ([facility])     Coronary artery disease     DDD (degenerative disc disease)     DM (diabetes mellitus), type 2 ([facility])     Type II, on insulin    BAER (dyspnea on exertion)     Epistaxis     Gout     Hiatal hernia     History of cellulitis     BLE    History of prostate cancer     Radiation treatments      Hypertension     Ischemic cardiomyopathy     MI, old     Neuropathy     BLE    Obesity     Obstructive sleep apnea syndrome, severe     TIA (transient ischemic attack)     Urinary incontinence     Urinary retention     Use of cane as ambulatory aid     Independent with personal care  PAST SURGICAL HISTORY  Past Surgical History:   Procedure Laterality Date    ABDOMINAL SURGERY      CARDIAC SURGERY      CATARACT EXTRACTION, BILATERAL      CORONARY ANGIOPLASTY WITH STENT PLACEMENT      2 stents    CORONARY ARTERY BYPASS GRAFT N/A 7/15/2016    Procedure: Intraop ADRIAN, CABG x 3 using LIMA to LAD, RSVG to OM1 and D1;  Surgeon: Dontae Lorenzo MD;  Location:  MAIN OR;  Service:    Christinemaxine Murillo      has stents    EYE SURGERY      Bilateral cataract removal    HERNIA REPAIR      umbilical hernia repair    LUNG CANCER SURGERY      Partial upper right lobectomy    PILONIDAL CYST EXCISION      CA CYSTOURETHROSCOPY,URETER CATHETER Left 3/9/2016    Procedure: CYSTOSCOPY WITH left RETROGRADE PYELOGRAM left double J stent removal;  Surgeon: Sam Collier MD;  Location: [facility];  Service: Urology    VASCULAR SURGERY          ALLERGIES: Other     CURRENT MEDICATIONS  Scheduled Meds:  apixaban 5 mg Oral BID   aspirin 81 mg Oral BID   cholecalciferol 2,000 Units Oral Daily   furosemide 80 mg Oral Daily   gabapentin 300 mg Oral HS   insulin detemir 40 Units Subcutaneous HS   insulin lispro 1-6 Units Subcutaneous HS   insulin lispro 2-12 Units Subcutaneous TID AC   iron polysaccharides 150 mg Oral Daily   methylPREDNISolone sodium succinate 1,000 mg Intravenous Daily   metoprolol tartrate 25 mg Oral BID   pravastatin 80 mg Oral Daily With Dinner   tamsulosin 0 4 mg Oral HS     Continuous Infusions:   PRN Meds:   acetaminophen    docusate sodium    metolazone    ondansetron     SOCIAL HISTORY   reports that he quit smoking about 13 years ago  He has a 108 00 pack-year smoking history   He has never used smokeless tobacco  He reports that he does not drink alcohol or use drugs  FAMILY HISTORY  Family History   Problem Relation Age of Onset    Diabetes Other     Hypertension Other     Cancer Other     Diabetes Mother     Heart disease Mother     Hypertension Mother     Diabetes Father     Diabetes Maternal Grandmother         REVIEW OF SYSTEMS   Extensive 12 point ROS conducted, negative except fro above and leg swelling, skin changes, neuropathic changes  Ambulated with cane  Otherwise negative  PHYSICAL EXAMINATION  Temp:  [97 3 °F (36 3 °C)-98 1 °F (36 7 °C)] 97 7 °F (36 5 °C)  HR:  [63-85] 85  Resp:  [18-23] 20  BP: (103-182)/(52-88) 182/88   General Examination: Obese, NAD  In bed  HEENT: Normocephalic, Atraumatic  Moist mucus membranes  Anicteric  PPC    CVS: Regular rate and rhythm  S1 S2 noted  No audible murmurs  No carotids bruits  Peripheral pulses palpable throughout   Lungs: Clear to auscultation bilaterally  No rales, rhonchi, wheezing  Abdomen: Obse  Bowel sounds positive  Non- tender  Non-distende   Ext: +1-+2 edema b/l lower extremities up to mid ankle  Psych: Thought content - No VH/AH  No delusions  Though Process - logical    Skin - b/l lower extremity chronic skin darkening/skin changes  Neurological Examination:   Mental Status: The patient was awake, alert, attentive, oriented to person, place, and time  Able to name, repeat  Cranial Nerves:   I: smell Not tested   II:? APD on the right, mild  Unabl to visualize fundi  ??? Left sided homonymous visual field defect? III,IV,VI: extraocular muscles EOMI, no nystagmus   V:  Sensation in the V1 through V3 distributions intact to pinprick and light touch bilaterally  VII: Face is symmetric with no weakness noted  VIII: Audition intact to finger rub bilaterally  IX/X: Uvula midline  Soft palate elevation symmetric  XI: Trapezius and SCM strength 5/5 B/L     XII: Tongue midline with no atrophy or fasciculations with appropriate movement  Motor Examination:   No+ left arm drift no pronation    Bulk: Normal  No atrophy Tone: Normal  Fasciculations: None  Deltoid Biceps Triceps WE   WF   FF IO     Right        5         5          5         5      5      5   5        Left           5        5          5          5      5     5   5                       IP        Quad   Ham     TA       Gastroc   Right      5            5          5         4+                5  Left         5            5         5         4+              5       Reflexes:   Trace throughout  No pathological reflexes  Coordination: Patient able to perform normal finger-to-nose and foot tapping  appropriately  Sensory: + glove/stocking distribution sensory loss       Gait:deferred     Labs:  Recent Results (from the past 24 hour(s))   ECG 12 lead    Collection Time: 10/21/17  3:57 PM   Result Value Ref Range    Ventricular Rate 73 BPM    Atrial Rate 73 BPM    SD Interval 184 ms    QRSD Interval 168 ms    QT Interval 438 ms    QTC Interval 482 ms    P [place] 59 degrees    QRS Axis -69 degrees    T Wave Axis 74 degrees   CBC and differential    Collection Time: 10/21/17  4:01 PM   Result Value Ref Range    WBC 6 83 4 31 - 10 16 Thousand/uL    RBC 4 54 3 88 - 5 62 Million/uL    Hemoglobin 13 4 12 0 - 17 0 g/dL    Hematocrit 41 9 36 5 - 49 3 %    MCV 92 82 - 98 fL    MCH 29 5 26 8 - 34 3 pg    MCHC 32 0 31 4 - 37 4 g/dL    RDW 15 7 (H) 11 6 - 15 1 %    MPV 10 6 8 9 - 12 7 fL    Platelets 070 381 - 365 Thousands/uL    nRBC 0 /100 WBCs    Neutrophils Relative 73 43 - 75 %    Lymphocytes Relative 16 14 - 44 %    Monocytes Relative 7 4 - 12 %    Eosinophils Relative 3 0 - 6 %    Basophils Relative 1 0 - 1 %    Neutrophils Absolute 4 95 1 85 - 7 62 Thousands/µL    Lymphocytes Absolute 1 07 0 60 - 4 47 Thousands/µL    Monocytes Absolute 0 50 0 17 - 1 22 Thousand/µL    Eosinophils Absolute 0 21 0 00 - 0 61 Thousand/µL    Basophils Absolute 0 06 0 00 - 0 10 Thousands/µL Basic metabolic panel    Collection Time: 10/21/17  4:01 PM   Result Value Ref Range    Sodium 138 136 - 145 mmol/L    Potassium 4 4 3 5 - 5 3 mmol/L    Chloride 101 100 - 108 mmol/L    CO2 28 21 - 32 mmol/L    Anion Gap 9 4 - 13 mmol/L    BUN 56 (H) 5 - 25 mg/dL    Creatinine 2 91 (H) 0 60 - 1 30 mg/dL    Glucose 266 (H) 65 - 140 mg/dL    Calcium 8 9 8 3 - 10 1 mg/dL    eGFR 20 ml/min/1 73sq m   POCT troponin    Collection Time: 10/21/17  4:04 PM   Result Value Ref Range    POC Troponin I 0 01 0 00 - 0 08 ng/ml    Specimen Type VENOUS    Fingerstick Glucose (POCT)    Collection Time: 10/21/17  9:43 PM   Result Value Ref Range    POC Glucose 346 (H) 65 - 140 mg/dl   Basic metabolic panel    Collection Time: 10/22/17  5:08 AM   Result Value Ref Range    Sodium 138 136 - 145 mmol/L    Potassium 4 0 3 5 - 5 3 mmol/L    Chloride 99 (L) 100 - 108 mmol/L    CO2 28 21 - 32 mmol/L    Anion Gap 11 4 - 13 mmol/L    BUN 62 (H) 5 - 25 mg/dL    Creatinine 2 93 (H) 0 60 - 1 30 mg/dL    Glucose 471 (H) 65 - 140 mg/dL    Calcium 8 9 8 3 - 10 1 mg/dL    eGFR 19 ml/min/1 73sq m   Hemoglobin A1c    Collection Time: 10/22/17  5:08 AM   Result Value Ref Range    Hemoglobin A1C 7 8 (H) 4 2 - 6 3 %     mg/dl   Lipid panel    Collection Time: 10/22/17  5:08 AM   Result Value Ref Range    Cholesterol 160 50 - 200 mg/dL    Triglycerides 105 <=150 mg/dL    HDL, Direct 56 40 - 60 mg/dL    LDL Calculated 83 0 - 100 mg/dL   LDL cholesterol, direct    Collection Time: 10/22/17  5:08 AM   Result Value Ref Range    LDL Direct 96 0 - 100 mg/dl   Fingerstick Glucose (POCT)    Collection Time: 10/22/17  6:44 AM   Result Value Ref Range    POC Glucose 414 (H) 65 - 140 mg/dl            panel  Results from last 7 days  Lab Units 10/22/17  0508   SODIUM mmol/L 138   POTASSIUM mmol/L 4 0   CHLORIDE mmol/L 99*   GLUCOSE RANDOM mg/dL 471*   BUN mg/dL 62*   CREATININE mg/dL 2 93*      Results from last 7 days  Lab Units 10/21/17  1601   HEMATOCRIT % 41 9   HEMOGLOBIN g/dL 13 4   MCH pg 29 5   MCHC g/dL 32 0   MCV fL 92   MPV fL 10 6   PLATELETS Thousands/uL 233   RDW % 15 7*   WBC Thousand/uL 6 83          Invalid input(s): LABPT   Results from last 7 days  Lab Units 10/22/17  0508   CO2 mmol/L 28   BUN mg/dL 62*   CREATININE mg/dL 2 93*   GLUCOSE RANDOM mg/dL 471*    Lab Results   Component Value Date    ALT 23 08/16/2017    AST 17 08/16/2017    ALKPHOS 40 (L) 08/16/2017      Results from last 7 days  Lab Units 10/22/17  0508   HDL mg/dL 56    Lab Results   Component Value Date    HGBA1C 7 8 (H) 10/22/2017    TSH i: No components found for: TSH Imaging: CT head         ASSESSMENT/PLAN  75 yo 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 etiologically unclear is simple vascular/ischemic vs inflammatory etiology  Has multiple vascular risk factors and chronic pathology and overall situation c/w severe vasculopathy  He clearly has opthalmological evidence of ischemic optic neuropathy and on exam has a questionable left hemianopsia which may either be occipital pathology or equally likely just optic pathology mimicking a "hemianopsia"  ESR very low for GCA from my perspective and vascular risk factors whigh which makes GCA much less likely but unfortunately can be elusive diagnosis and cases have been described with this level of ESR  Given concern/suspicion, would treat as GCA until proven otherwise and would continue on high dose steroid and obtain temporal artery ultrasound and biopsy of the temporal arteries  Will obtain CTA of the head and neck to look for any evidence of vasculitis in other vessels  If in fact GCA on biopsy we should consider adding acyclovir given recent association of GCA with VZV  Team will continue to follow  Rheum consult pending  Please call with questions

## 2017-10-22 NOTE — ASSESSMENT & PLAN NOTE
· Without acute exacerbation, continue home meds, BB, lasix  · Edema BLE appears chronic and no increase in baseline SOB  · Check daily weights and I/O

## 2017-10-22 NOTE — SUBJECTIVE & OBJECTIVE
VTE Pharmacologic Prophylaxis:   Pharmacologic: Apixaban (Eliquis)  Mechanical VTE Prophylaxis in Place: Yes    Patient Centered Rounds: I have performed bedside rounds with nursing staff today  Discussions with Specialists or Other Care Team Provider: reviewed Vascular consult and opth consult    Education and Discussions with Family / Patient: patient    Time Spent for Care: 30 minutes  More than 50% of total time spent on counseling and coordination of care as described above  Current Length of Stay: 1 day(s)    Current Patient Status: Inpatient   Certification Statement: The patient will continue to require additional inpatient hospital stay due to IV steroids and further workup for temporal arteritis, visual loss    Discharge Plan: unclear at this time, undergoing workup  Will likely go home    Code Status: Level 1 - Full Code      Subjective:   Reports visual loss left eye described as able to see on cloudy  Has hx of visual loss right eye  No pain  No CP  Reports SOB with exertion only, uses oxygen at baseline PRN  No HA or dizziness    Objective:     Vitals:   Temp (24hrs), Av 8 °F (36 6 °C), Min:97 6 °F (36 4 °C), Max:98 1 °F (36 7 °C)    HR:  [63-85] 67  Resp:  [18-23] 20  BP: (103-182)/(52-88) 156/78  SpO2:  [91 %-97 %] 93 %  Body mass index is 48 89 kg/m²  Input and Output Summary (last 24 hours): Intake/Output Summary (Last 24 hours) at 10/22/17 1437  Last data filed at 10/22/17 0906   Gross per 24 hour   Intake              180 ml   Output             1525 ml   Net            -1345 ml       Physical Exam:     Physical Exam   Constitutional: He is oriented to person, place, and time  He appears well-developed and well-nourished  No distress  HENT:   Head: Normocephalic  Eyes: Pupils are equal, round, and reactive to light  Right eye exhibits no discharge  Left eye exhibits no discharge  Decreased visual acuity    Cardiovascular: Normal rate  Exam reveals distant heart sounds  Pulmonary/Chest: Effort normal  No respiratory distress  He has decreased breath sounds  Abdominal: Soft  Bowel sounds are normal  He exhibits no distension  There is no tenderness  obese   Musculoskeletal: Normal range of motion  He exhibits edema  2+ BLE edema (appears chronic)   Neurological: He is alert and oriented to person, place, and time  No cranial nerve deficit  Skin: Skin is warm and dry  Chronic skin changes BLE, RLE small ulcer weeping serous   Psychiatric: He has a normal mood and affect  His behavior is normal    Vitals reviewed  Additional Data:     Labs:      Results from last 7 days  Lab Units 10/21/17  1601   WBC Thousand/uL 6 83   HEMOGLOBIN g/dL 13 4   HEMATOCRIT % 41 9   PLATELETS Thousands/uL 233   NEUTROS PCT % 73   LYMPHS PCT % 16   MONOS PCT % 7   EOS PCT % 3       Results from last 7 days  Lab Units 10/22/17  0508   SODIUM mmol/L 138   POTASSIUM mmol/L 4 0   CHLORIDE mmol/L 99*   CO2 mmol/L 28   BUN mg/dL 62*   CREATININE mg/dL 2 93*   CALCIUM mg/dL 8 9   GLUCOSE RANDOM mg/dL 471*           * I Have Reviewed All Lab Data Listed Above  * Additional Pertinent Lab Tests Reviewed:  Angie 66 Admission Reviewed    Imaging:    Imaging Reports Reviewed Today Include: ct head, EKG  Imaging Personally Reviewed by Myself Includes:  none    Recent Cultures (last 7 days):           Last 24 Hours Medication List:     apixaban 5 mg Oral BID   aspirin 81 mg Oral BID   cholecalciferol 2,000 Units Oral Daily   furosemide 80 mg Oral Daily   gabapentin 300 mg Oral HS   insulin detemir 40 Units Subcutaneous HS   insulin lispro 1-6 Units Subcutaneous HS   insulin lispro 2-12 Units Subcutaneous TID AC   iron polysaccharides 150 mg Oral Daily   methylPREDNISolone sodium succinate 1,000 mg Intravenous Daily   metoprolol tartrate 25 mg Oral BID   pravastatin 80 mg Oral Daily With Dinner   tamsulosin 0 4 mg Oral HS        Today, Patient Was Seen By: RETA Bullock    ** Please Note: Dictation voice to text software may have been used in the creation of this document   **

## 2017-10-22 NOTE — CONSULTS
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   Instead he elected to come here to [facility] at 2 o'clock this afternoon  I was consulted at 7:38 pm this evening  On admission his visual acuity was decreased in both eyes  his sed rate was 37 and his CRP was 16  4  he was treated with 1000 mg of Solu-Medrol for presumed giant cell arteritis  on my examination I find without correction his visual acuity to be hand motion in the right eye and count fingers in the left eye  there is a 1+ afferent pupil defect in the right eye  the penlight exam is otherwise unremarkable  I dilated his pupils today and found a very pale right optic nerve with a 0 3 cup  the left optic nerve was congested, raised with a 0 0 cup and a splinter hemorrhage temporally  Vessels were notably attenuated and tortuous ou  The maculae and periphery were unremarkable  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

## 2017-10-22 NOTE — PROGRESS NOTES
Progress Note - General Surgery   Donovan Baugh 76 y o  male MRN: [id number]  Unit/Bed#: [facility] 731-01 Encounter: [id number]    Assessment:  Pt is a 72y o  M with with decreased left eye vision suspected of having giant cell arteritis Pt is a 72y o  M with with decreased left eye vision suspected of having giant cell arteritis     Plan:  Await rheumatology/neurology plan  Solumedrol  Primary care per [facility]    Subjective/Objective   Chief Complaint:     Subjective: SUZE  No changes in vision or neurologic status  No N/V  Objective:     Blood pressure 120/56, pulse 70, temperature 97 9 °F (36 6 °C), temperature source Axillary, resp  rate 20, height 5' 7" (1 702 m), weight (!) 142 kg (312 lb 2 7 oz), SpO2 92 %  ,Body mass index is 48 89 kg/m²  Intake/Output Summary (Last 24 hours) at 10/22/17 0208  Last data filed at 10/22/17 0016   Gross per 24 hour   Intake                0 ml   Output              925 ml   Net             -925 ml       Invasive Devices     Peripheral Intravenous Line            Peripheral IV 08/25/17 57 days                Physical Exam: NAD  AAOx3  RRR  Normal respiratory effort  Soft, NT, ND  Left eye still blurry     Lab, Imaging and other studies:I have personally reviewed pertinent lab results      VTE Pharmacologic Prophylaxis: eliquis  VTE Mechanical Prophylaxis: sequential compression device

## 2017-10-22 NOTE — PLAN OF CARE
Problem: OCCUPATIONAL THERAPY ADULT  Goal: Performs self-care activities at highest level of function for planned discharge setting  See evaluation for individualized goals  Treatment Interventions: Visual perceptual retraining, ADL retraining, Functional transfer training, UE strengthening/ROM, Endurance training, Patient/family training, Equipment evaluation/education, Compensatory technique education, Continued evaluation, Energy conservation, Activityengagement          See flowsheet documentation for full assessment, interventions and recommendations  Limitation: Visual deficit, Decreased endurance, Decreased high-level ADLs, Decreased self-care trans  Prognosis: Good  Assessment: Pt is a 75 y/o male seen for OT eval s/p adm to [facility] w/ L eye cloudiness and elevated ESR and CRP per opthalmologist who requested pt go to ER  Pt dx'd w/ 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  Pt with extensive PMH including a h/o morbid obesity, neuropathy, CKD, a-fib, 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, MI, CHF, CAD s/p CABG, DM, REYNA with CPAP and O2 at home, and BAER  Please see chart review for entire medical history and extensive past surgical history  Pt with active OT orders and up and OOB as tolerated orders  Pt lives with his wife and son in a mobile home with a ramped entrance  Pt required A w/ ADLS and IADLS & required use of RW or SPC in the community PTA  Pt is currently demonstrating the following occupational deficits: mod A UB ADLS, max A LB ADLS, CGA functional transfers and CGA functional mobility using rw  Pt is limited 2* significant SOB, decreased endurance, decreased activity tolerance, decreased functional forward reach, decreased ADL status, decreased mobility, deconditioning/generalized weakness, obesity, and visual deficits   The following Occupational Performance Areas to address include: bathing/shower, toilet hygiene, dressing, functional mobility, community mobility, clothing management, meal prep and household maintenance  Pt scored overall 60/100 on the Barthel Index  Based on the aforementioned OT evaluation, functional performance deficits, and assessments, pt has been identified as a high complexity evaluation  Anticipate pt may be able to return home with home OT pending progress if family is able to provide assistance required upon D/C   Pt to continue to benefit from acute immediate OT services to address the following goals 3-5x/wk to  w/in 7-10 days:     OT Discharge Recommendation:  (please see assessment section )     Diamond Mckay MS, OTR/L

## 2017-10-22 NOTE — ASSESSMENT & PLAN NOTE
· With hyperglycemia and A1C 7 8%   · Blood sugars elevated suspected exacerbated by steroids  · Will add mealtime insulin and continue levemir HS  · Continue accu checks AC/HS with SSI

## 2017-10-22 NOTE — ASSESSMENT & PLAN NOTE
· Appreciate opth, neurology, and vascular consults  · Concern for vascular/ischemic versus inflammatory etiology  · Mildly elevated ESR and CRP  · Ct head with no acute intracranial abnormality   · Continue IV steroids  · Temporal artery duplex pending  · Biopsy of bilat temporal arteries pending, defer to vascular  · Consult Rheumatology  · Further recommendations are for follow-up with [facility] physicians upon discharge

## 2017-10-22 NOTE — PROGRESS NOTES
Progress Note - Yumiko  76 y o  male MRN: [id number]    Unit/Bed#: [facility] 731-01 Encounter: [id number]        * Vision loss, 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   Assessment & Plan    · Appreciate opth, neurology, and vascular consults  · Concern for vascular/ischemic versus inflammatory etiology  · Mildly elevated ESR and CRP  · Ct head with no acute intracranial abnormality   · Continue IV steroids  · Temporal artery duplex pending  · Biopsy of bilat temporal arteries pending, defer to vascular  · Consult Rheumatology  · Further recommendations are for follow-up with [facility] physicians upon discharge        DM (diabetes mellitus), type 2 with peripheral neuropathy   Assessment & Plan    · With hyperglycemia and A1C 7 8%   · Blood sugars elevated suspected exacerbated by steroids  · Will add mealtime insulin and continue levemir HS  · Continue accu checks AC/HS with SSI        Coronary artery disease   Assessment & Plan    · Stable   · Continue ASA, BB, statin        Chronic combined systolic and diastolic heart failure (HCC)   Assessment & Plan    · Without acute exacerbation, continue home meds, BB, lasix  · Edema BLE appears chronic and no increase in baseline SOB  · Check daily weights and I/O        Atrial fibrillation (HCC)   Assessment & Plan    · Rate controlled  · Continue BB  · Continue Eliquis        Obstructive sleep apnea syndrome, severe   Assessment & Plan    · Cpap HS        Blind right eye   Assessment & Plan    · Hx of visual loss secondary to CVA  · Supportive care        CKD (chronic kidney disease) stage 4, GFR 15-29 ml/min ([facility])   Assessment & Plan    · Creatinine stable, currently at baseline  · Avoid nephrotoxins  · Monitor BMP              VTE Pharmacologic Prophylaxis:   Pharmacologic: Apixaban (Eliquis)  Mechanical VTE Prophylaxis in Place: Yes    Patient Centered Rounds: I have performed bedside rounds with nursing staff today      Discussions with Specialists or Other Care Team Provider: reviewed Vascular consult and opth consult    Education and Discussions with Family / Patient: patient    Time Spent for Care: 30 minutes  More than 50% of total time spent on counseling and coordination of care as described above  Current Length of Stay: 1 day(s)    Current Patient Status: Inpatient   Certification Statement: The patient will continue to require additional inpatient hospital stay due to IV steroids and further workup for temporal arteritis, visual loss    Discharge Plan: unclear at this time, undergoing workup  Will likely go home    Code Status: Level 1 - Full Code      Subjective:   Reports visual loss left eye described as able to see on cloudy  Has hx of visual loss right eye  No pain  No CP  Reports SOB with exertion only, uses oxygen at baseline PRN  No HA or dizziness    Objective:     Vitals:   Temp (24hrs), Av 8 °F (36 6 °C), Min:97 6 °F (36 4 °C), Max:98 1 °F (36 7 °C)    HR:  [63-85] 67  Resp:  [18-23] 20  BP: (103-182)/(52-88) 156/78  SpO2:  [91 %-97 %] 93 %  Body mass index is 48 89 kg/m²  Input and Output Summary (last 24 hours): Intake/Output Summary (Last 24 hours) at 10/22/17 1437  Last data filed at 10/22/17 0906   Gross per 24 hour   Intake              180 ml   Output             1525 ml   Net            -1345 ml       Physical Exam:     Physical Exam   Constitutional: He is oriented to person, place, and time  He appears well-developed and well-nourished  No distress  HENT:   Head: Normocephalic  Eyes: Pupils are equal, round, and reactive to light  Right eye exhibits no discharge  Left eye exhibits no discharge  Decreased visual acuity    Cardiovascular: Normal rate  Exam reveals distant heart sounds  Pulmonary/Chest: Effort normal  No respiratory distress  He has decreased breath sounds  Abdominal: Soft  Bowel sounds are normal  He exhibits no distension  There is no tenderness  obese   Musculoskeletal: Normal range of motion   He exhibits edema  2+ BLE edema (appears chronic)   Neurological: He is alert and oriented to person, place, and time  No cranial nerve deficit  Skin: Skin is warm and dry  Chronic skin changes BLE, RLE small ulcer weeping serous   Psychiatric: He has a normal mood and affect  His behavior is normal    Vitals reviewed  Additional Data:     Labs:      Results from last 7 days  Lab Units 10/21/17  1601   WBC Thousand/uL 6 83   HEMOGLOBIN g/dL 13 4   HEMATOCRIT % 41 9   PLATELETS Thousands/uL 233   NEUTROS PCT % 73   LYMPHS PCT % 16   MONOS PCT % 7   EOS PCT % 3       Results from last 7 days  Lab Units 10/22/17  0508   SODIUM mmol/L 138   POTASSIUM mmol/L 4 0   CHLORIDE mmol/L 99*   CO2 mmol/L 28   BUN mg/dL 62*   CREATININE mg/dL 2 93*   CALCIUM mg/dL 8 9   GLUCOSE RANDOM mg/dL 471*           * I Have Reviewed All Lab Data Listed Above  * Additional Pertinent Lab Tests Reviewed: Angie 66 Admission Reviewed    Imaging:    Imaging Reports Reviewed Today Include: ct head, EKG  Imaging Personally Reviewed by Myself Includes:  none    Recent Cultures (last 7 days):           Last 24 Hours Medication List:     apixaban 5 mg Oral BID   aspirin 81 mg Oral BID   cholecalciferol 2,000 Units Oral Daily   furosemide 80 mg Oral Daily   gabapentin 300 mg Oral HS   insulin detemir 40 Units Subcutaneous HS   insulin lispro 1-6 Units Subcutaneous HS   insulin lispro 2-12 Units Subcutaneous TID AC   iron polysaccharides 150 mg Oral Daily   methylPREDNISolone sodium succinate 1,000 mg Intravenous Daily   metoprolol tartrate 25 mg Oral BID   pravastatin 80 mg Oral Daily With Dinner   tamsulosin 0 4 mg Oral HS        Today, Patient Was Seen By: RETA Lewis    ** Please Note: Dictation voice to text software may have been used in the creation of this document   **

## 2017-10-23 ENCOUNTER — APPOINTMENT (INPATIENT)
Dept: RADIOLOGY | Facility: HOSPITAL | Age: 77
DRG: 123 | End: 2017-10-23
Payer: MEDICARE

## 2017-10-23 ENCOUNTER — APPOINTMENT (INPATIENT)
Dept: NON INVASIVE DIAGNOSTICS | Facility: HOSPITAL | Age: 77
DRG: 123 | End: 2017-10-23
Payer: MEDICARE

## 2017-10-23 LAB
GLUCOSE SERPL-MCNC: 283 MG/DL (ref 65–140)
GLUCOSE SERPL-MCNC: 298 MG/DL (ref 65–140)
GLUCOSE SERPL-MCNC: 364 MG/DL (ref 65–140)
GLUCOSE SERPL-MCNC: 382 MG/DL (ref 65–140)

## 2017-10-23 PROCEDURE — 82948 REAGENT STRIP/BLOOD GLUCOSE: CPT

## 2017-10-23 PROCEDURE — 94760 N-INVAS EAR/PLS OXIMETRY 1: CPT

## 2017-10-23 PROCEDURE — 97110 THERAPEUTIC EXERCISES: CPT

## 2017-10-23 PROCEDURE — G8978 MOBILITY CURRENT STATUS: HCPCS

## 2017-10-23 PROCEDURE — 93880 EXTRACRANIAL BILAT STUDY: CPT

## 2017-10-23 PROCEDURE — 97162 PT EVAL MOD COMPLEX 30 MIN: CPT

## 2017-10-23 PROCEDURE — 70551 MRI BRAIN STEM W/O DYE: CPT

## 2017-10-23 PROCEDURE — G8979 MOBILITY GOAL STATUS: HCPCS

## 2017-10-23 RX ORDER — LORAZEPAM 2 MG/ML
1 INJECTION INTRAMUSCULAR ONCE
Status: COMPLETED | OUTPATIENT
Start: 2017-10-23 | End: 2017-10-23

## 2017-10-23 RX ORDER — IRON POLYSACCHARIDE COMPLEX 150 MG
150 CAPSULE ORAL 2 TIMES DAILY
Status: DISCONTINUED | OUTPATIENT
Start: 2017-10-24 | End: 2017-10-27 | Stop reason: HOSPADM

## 2017-10-23 RX ORDER — ATORVASTATIN CALCIUM 40 MG/1
40 TABLET, FILM COATED ORAL
Status: DISCONTINUED | OUTPATIENT
Start: 2017-10-23 | End: 2017-10-27 | Stop reason: HOSPADM

## 2017-10-23 RX ADMIN — VITAMIN D, TAB 1000IU (100/BT) 2000 UNITS: 25 TAB at 08:13

## 2017-10-23 RX ADMIN — ATORVASTATIN CALCIUM 40 MG: 40 TABLET, FILM COATED ORAL at 16:57

## 2017-10-23 RX ADMIN — APIXABAN 5 MG: 5 TABLET, FILM COATED ORAL at 16:57

## 2017-10-23 RX ADMIN — INSULIN LISPRO 6 UNITS: 100 INJECTION, SOLUTION INTRAVENOUS; SUBCUTANEOUS at 16:57

## 2017-10-23 RX ADMIN — METOPROLOL TARTRATE 25 MG: 25 TABLET ORAL at 16:58

## 2017-10-23 RX ADMIN — Medication 150 MG: at 08:13

## 2017-10-23 RX ADMIN — ASPIRIN 81 MG: 81 TABLET, COATED ORAL at 08:13

## 2017-10-23 RX ADMIN — SODIUM CHLORIDE 1000 MG: 0.9 INJECTION, SOLUTION INTRAVENOUS at 10:14

## 2017-10-23 RX ADMIN — FUROSEMIDE 80 MG: 80 TABLET ORAL at 08:13

## 2017-10-23 RX ADMIN — INSULIN LISPRO 10 UNITS: 100 INJECTION, SOLUTION INTRAVENOUS; SUBCUTANEOUS at 12:24

## 2017-10-23 RX ADMIN — METOPROLOL TARTRATE 25 MG: 25 TABLET ORAL at 08:13

## 2017-10-23 RX ADMIN — LORAZEPAM 1 MG: 2 INJECTION INTRAMUSCULAR; INTRAVENOUS at 22:08

## 2017-10-23 RX ADMIN — INSULIN LISPRO 6 UNITS: 100 INJECTION, SOLUTION INTRAVENOUS; SUBCUTANEOUS at 20:52

## 2017-10-23 RX ADMIN — APIXABAN 5 MG: 5 TABLET, FILM COATED ORAL at 08:13

## 2017-10-23 RX ADMIN — METOLAZONE 5 MG: 5 TABLET ORAL at 08:13

## 2017-10-23 RX ADMIN — TAMSULOSIN HYDROCHLORIDE 0.4 MG: 0.4 CAPSULE ORAL at 20:54

## 2017-10-23 RX ADMIN — INSULIN DETEMIR 25 UNITS: 100 INJECTION, SOLUTION SUBCUTANEOUS at 08:24

## 2017-10-23 RX ADMIN — INSULIN DETEMIR 50 UNITS: 100 INJECTION, SOLUTION SUBCUTANEOUS at 20:53

## 2017-10-23 RX ADMIN — INSULIN LISPRO 10 UNITS: 100 INJECTION, SOLUTION INTRAVENOUS; SUBCUTANEOUS at 08:14

## 2017-10-23 RX ADMIN — GABAPENTIN 300 MG: 300 CAPSULE ORAL at 20:53

## 2017-10-23 RX ADMIN — ASPIRIN 81 MG: 81 TABLET, COATED ORAL at 16:58

## 2017-10-23 RX ADMIN — INSULIN LISPRO 6 UNITS: 100 INJECTION, SOLUTION INTRAVENOUS; SUBCUTANEOUS at 08:14

## 2017-10-23 NOTE — CASE MANAGEMENT
Initial Clinical Review    Admission: Date/Time/Statement: 10/21/17 @ 1658     Orders Placed This Encounter   Procedures    Inpatient Admission (expected length of stay for this patient is greater than two midnights)     Standing Status:   Standing     Number of Occurrences:   1     Order Specific Question:   Admitting Physician     Answer:   Sandip Perla [[id number]]     Order Specific Question:   Level of Care     Answer:   Med Surg [[id number]]     Order Specific Question:   Estimated length of stay     Answer:   More than 2 Midnights     Order Specific Question:   Certification     Answer:   I certify that inpatient services are medically necessary for this patient for a duration of greater than two midnights  See H&P and MD Progress Notes for additional information about the patient's course of treatment  ED: Date/Time/Mode of Arrival:   ED Arrival Information     Expected Arrival Acuity Means of Arrival Escorted By Service Admission Type    - 10/21/2017 13:45 Emergent Walk-In Self General Medicine Emergency    Arrival Complaint    VISION LOSS          Chief Complaint:   Chief Complaint   Patient presents with    Decreased Visual Acuity     pt has complete loss of vision right eye since stroke 1 1/2 yrs ago, now starting to have visual disturbance in left eye, saw Dr Rosa Isela Sanchez from [facility] yesterday, and called pt today and instructed him to come to ER       History of Illness:     77 yo M presents to ED with decreased vision of L eye since Monday  He says he woke up Monday and his vision was "cloudy " His vision has been unchanged since then, does not wax/wane  No eye pain  No eye redness  No headache, dizziness, nausea/vomiting  He says he sometimes sees flickering "like a butterfly" on the far L side of his vision  Otherwise no flashing lights, black spots  He was seen by his ophthalmologist yesterday and was told to get blood work   Today pt says he received a call from his ophthalmologist asking him to come to the ED for treatment  Pt does not know the results of his exam yesterday or of the blood work  He does not know what kind of treatment he is here for  Spoke to ophthalmologist on call at [facility], where pt was seen  Pt's ophthalmologist had seen disc swelling of the L eye and ordered ESR and CRP, which are both elevated, so he is concerned for giant cell arteritis  Apparently pt was told to go to [place] for high dose steroids, but pt did not want to go that far,     ED Vital Signs:   ED Triage Vitals   Temperature Pulse Respirations Blood Pressure SpO2   10/21/17 1359 10/21/17 1359 10/21/17 1359 10/21/17 1359 10/21/17 1359   (!) 97 3 °F (36 3 °C) 75 22 140/83 97 %      Temp Source Heart Rate Source Patient Position - Orthostatic VS BP Location FiO2 (%)   10/21/17 1359 10/21/17 1558 10/21/17 1558 10/21/17 1558 --   Oral Monitor Lying Right arm       Pain Score       10/21/17 1359       No Pain        Wt Readings from Last 1 Encounters:   10/23/17 (!) 142 kg (313 lb 0 9 oz)         Abnormal Labs/Diagnostic Test Results:   BASIC METABOLIC PANEL - Abnormal      BUN 56 (*) 5 - 25 mg/dL Final     Creatinine 2 91 (*) 0 60 - 1 30 mg/dL Final     Glucose 266 (*) 65 - 140 mg/dL Final     ED Treatment:   Medication Administration from 10/21/2017 1345 to 10/21/2017 1811       Date/Time Order Dose Route     10/21/2017 1610 methylPREDNISolone sodium succinate (Solu-MEDROL) 1,000 mg in sodium chloride 0 9 % 250 mL IVPB 1,000 mg Intravenous          Past Medical/Surgical History:    Active Ambulatory Problems     Diagnosis Date Noted    Morbid obesity ([address] )     History of prostate cancer     Type 2 diabetes mellitus with hyperglycemia ([address] )     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    CKD (chronic kidney disease) stage 4, GFR 15-29 ml/min [facility]) 07/16/2016    Volume overload 07/17/2016    Body mass index (BMI) of 40 0 to 49 9 03/04/2017    Wound of right lower extremity 03/05/2017    Wound of left lower extremity 03/05/2017    Hyperphosphatemia 03/05/2017    Vitamin D deficiency 03/05/2017    Bilateral renal cysts 03/05/2017       Past Medical History:    Anemia    Arthritis    Atrial fibrillation ([facility] )    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[facility] )    B12 deficiency    Back pain    Blind right eye    Chronic combined systolic and diastolic heart failure (HCC)    Chronic venous stasis dermatitis of both lower extremities    CKD (chronic kidney disease), stage IV ([facility])    Coronary artery disease    DDD (degenerative disc disease)    DM (diabetes mellitus), type 2 ([facility]    BAER (dyspnea on exertion)    Epistaxis    Gout    Hiatal hernia    History of cellulitis    History of prostate cancer    Hypertension    Ischemic cardiomyopathy    MI, old    Neuropathy    Obesity    Obstructive sleep apnea syndrome, severe    TIA (transient ischemic attack)    Urinary incontinence    Urinary retention    Use of cane as ambulatory aid       Admitting Diagnosis: Giant cell arteritis ([place] [address] ) [M31 6]  Vision loss [H54 7]    Age/Sex: 76 y o  male    Assessment/Plan:    Principal Problem:    Vision loss, left eye  Active Problems: Morbid obesity ([facility])    Neuropathy    CKD (chronic kidney disease) stage 4, GFR 15-29 ml/min ([facility])    Atrial fibrillation ([facility])    Blind right eye    Chronic combined systolic and diastolic heart failure ([facility] )    Coronary artery disease    DM (diabetes mellitus), type 2 ([place] [address] )    Obstructive sleep apnea syndrome, severe        Plan for the Primary Problem(s):  · Left Eye Vision Loss -   ? Evaluate for Temporal Arteritis -   § Treatment -   § Solumedrol 1000 mg given in ER  Will provide additional pulse doses of solumedrol 1000 mg IV daily  § Continue Eliquis and low dose aspirin from prehospital setting  § Evaluations -   § Noted is that ESR here is only 37 and CRP is 16 4  § Vascular surgery consultation for possible temporal artery biopsy    § Consider rheumatology evaluation after the weekend  § Patient was seen by opthalmology at [facility] yesterday, but will consult ophthalmology here for evaluation  ? Rule out Central Etiology - Given history of vision loss with stroke in past, will ask for neurology evaluation as well  Get MRI brain      Plan for Additional Problems:   · CKD IV (Baseline Cr 2 8 - 3 4) - Patient is at baseline currently  Continue to monitor renal function  Dye studies will need to be avoided due to the LESLIE  · Chronic Combined Heart Failure -   ? Diuretic - Lasix  ? Beta Blocker - Lopressor  ? ACEI / ARB - none due to renal dysfunction  ? Monitor I/O and Daily Weights  · Atrial Fibrillation -   ? Rate Control - Lopressor  ? Anticoagulation - Eliquis continues  · CAD -   ? Antiplatelet - low dose aspirin  ? Beta Blocker - Lopressor  ? Statin - Pravastatin substitution for simvastatin  ? Nitrate - PRN nitroglycerin  · Diabetes Mellitus, type 2 with Neuropathy -   ? Start by using home regimen and adding insulin sliding scale  ? Check A1c for assessment of vascular risk factors  ? Low threshold to add insulin drip if needed as steroids may drive sugars up  · Obstructive Sleep Apnea - uses CPAP but is uncertain of his settings  Will start at CPAP 10 and titrate for effect  Respiratory protocol placed  · Chronic Right Eye Blindness - listed to be clear that this is a chronic issue          Admission Orders:      TEMPORAL ARTERY DUPLEX   MRI BRAIN   HEMODIALYSIS   CPAP   CONSULT NEPHROLOGY  CONSULT RHEUMATOLOGY  FINGERSTICK GLUCOSE   NEURO CHECKS Q4 HR   SEQUENTIAL COMPRESSION DEVICE   TELEMETRY  CONSULT OPTHALMOLOGY  CONSULT NEUROLOGY  CONSULT VASCULAR SURGERY  PT AND OT EVAL AND TREAT    Scheduled Meds:   apixaban 5 mg Oral BID   aspirin 81 mg Oral BID   cholecalciferol 2,000 Units Oral Daily   furosemide 80 mg Oral Daily   gabapentin 300 mg Oral HS   [START ON 10/24/2017] insulin detemir 30 Units Subcutaneous QAM   insulin detemir 50 Units Subcutaneous HS   insulin lispro 1-6 Units Subcutaneous HS   insulin lispro 12 Units Subcutaneous TID With Meals   insulin lispro 2-12 Units Subcutaneous TID AC   iron polysaccharides 150 mg Oral Daily   methylPREDNISolone sodium succinate 1,000 mg Intravenous Daily   metoprolol tartrate 25 mg Oral BID   pravastatin 80 mg Oral Daily With Dinner   tamsulosin 0 4 mg Oral HS     Continuous Infusions:    PRN Meds:   acetaminophen    docusate sodium    metolazone    ondansetron

## 2017-10-23 NOTE — CONSULTS
Consultation - Nephrology   Michela Kerr 76 y o  male MRN: [id number]  Unit/Bed#: [facility] 731-01 Encounter: [id number]      Assessment/Plan     1  Chronic kidney disease, stage IV   Renal function appears to be at baseline - creatinine 2 93, eGFR 19   Patient is nonoliguric and appears euvolemic at this time   Continue diuretic regimen with Lasix, metolazone p r n  for weight gain greater than 4 lb in 1 day   Monitor renal function and electrolytes with daily BMPs   Avoid additional nephrotoxic agents   Closely monitor volume status with daily I&Os and accurate weights   Outpatient follow-up with Dr Bowman Ran - in discussion for possibly starting hemodialysis   Not on HD at this time    2  Diabetic nephropathy   Hyperglycemia likely worsened due to high-dose steroid therapy   Last hemoglobin A1c 7 8%   Continue insulin regimen per primary team    3  Edema   Leg edema appears to be chronic at approximately baseline appearance   Continue diuresis as noted above   Continue to monitor clinically    4  Chronic systolic/diastolic congestive heart failure   No apparent exacerbation   Continue home medications including diuretics   Monitor daily weights and I&Os as noted above    5  Acute visual loss of left eye   Opthalmology, Neurology, and vascular surgery following   Mildly elevated ESR and CRP, concern for vascular/ischemic versus inflammatory etiology   Temporal artery duplex pending with possible bilateral temporal artery biopsy to follow   Continue management per primary and consulting teams   Outpatient follow up with [facility] physicians upon discharge      History of Present Illness   Physician Requesting Consult: Scot Mcgill MD  Reason for Consult / Principal Problem:  Chronic kidney disease, stage IV    HPI: Michela Kerr is a 76y o  year old male who presents with sudden onset of visual "cloudiness" of the left eye that began approximately a week ago and has persisted throughout the week    Patient reportedly seen his ophthalmologist at [facility] and was sent for blood work  With apparent optic disc swelling per the ophthalmologist   Given elevated ESR and CRP, there was some concern for temporal arteritis  The patient was instructed to go to the hospital for high-dose steroids by his ophthalmologist   The patient presented to the ER here at [facility] instead of going all the way to [place] to [facility]  The patient has been receiving high-dose steroids and has been evaluated by vascular surgery for possible biopsy  A temple ultrasound is pending to further dictate treatment  Nephrology consultation requested for further recommendations and management of CKD  The patient offers no other complaints at this time apart from his visual complaints  The patient follows regularly with Dr Arnold Lamb in the office, who he saw several weeks ago  Patient reports that he has been compliant with his medications since that time  Review of labs suggests patient's renal function is at baseline - CKD stage IV, eGFR at approximately 20  The patient also has a history of chronic systolic and diastolic congestive heart failure  There appears to be no exacerbation at this time  The patient's weight is approximately 10 lb below his last measured office weight  The patient appears euvolemic at this time  Inpatient consult to Nephrology  Date/Time: 10/23/2017 9:12 AM  Performed by: Vivian Bartlett  Authorized by: Rose Acosta           Review of Systems   Eyes: Positive for visual disturbance (Left eye, blurriness, loss of vision right eye which is chronic secondary to previous stroke)  Cardiovascular: Positive for leg swelling (Chronic)  All other systems reviewed and are negative        Historical Information   Past Medical History:   Diagnosis Date    Anemia     Arthritis     Atrial fibrillation ([address] )     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HCC)     B12 deficiency     Back pain     Blind right eye     Chronic combined systolic and diastolic heart failure (HCC)     Chronic venous stasis dermatitis of both lower extremities     CKD (chronic kidney disease), stage IV (HCC)     Coronary artery disease     DDD (degenerative disc disease)     DM (diabetes mellitus), type 2 (HCC)     Type II, on insulin    BAER (dyspnea on exertion)     Epistaxis     Gout     Hiatal hernia     History of cellulitis     BLE    History of prostate cancer     Radiation treatments   Hypertension     Ischemic cardiomyopathy     MI, old     Neuropathy     BLE    Obesity     Obstructive sleep apnea syndrome, severe     TIA (transient ischemic attack)     Urinary incontinence     Urinary retention     Use of cane as ambulatory aid     Independent with personal care       Past Surgical History:   Procedure Laterality Date    ABDOMINAL SURGERY      CARDIAC SURGERY      CATARACT EXTRACTION, BILATERAL      CORONARY ANGIOPLASTY WITH STENT PLACEMENT      2 stents    CORONARY ARTERY BYPASS GRAFT N/A 7/15/2016    Procedure: Intraop ADRIAN, CABG x 3 using LIMA to LAD, RSVG to OM1 and D1;  Surgeon: Bari Owen MD;  Location:  MAIN OR;  Service:    Sharri Benitez      has stents    EYE SURGERY      Bilateral cataract removal    HERNIA REPAIR      umbilical hernia repair    LUNG CANCER SURGERY      Partial upper right lobectomy    PILONIDAL CYST EXCISION      NV CYSTOURETHROSCOPY,URETER CATHETER Left 3/9/2016    Procedure: CYSTOSCOPY WITH left RETROGRADE PYELOGRAM left double J stent removal;  Surgeon: Onofre Davis MD;  Location: [facility];  Service: Urology    VASCULAR SURGERY       Social History   History   Alcohol Use No     History   Drug Use No     History   Smoking Status    Former Smoker    Packs/day: 2 00    Years: 54 00    Quit date: 2004   Smokeless Tobacco    Never Used     Comment: Pt is a non smoker     Family History   Problem Relation Age of Onset    Diabetes Other     Hypertension Other     Cancer Other     Diabetes Mother     Heart disease Mother     Hypertension Mother     Diabetes Father     Diabetes Maternal Grandmother        Meds/Allergies   all current active meds have been reviewed    Allergies   Allergen Reactions    Other Rash     Adhesive tape       Objective     Intake/Output Summary (Last 24 hours) at 10/23/17 0912  Last data filed at 10/23/17 0201   Gross per 24 hour   Intake             1340 ml   Output             2900 ml   Net            -1560 ml       Invasive Devices:        Physical Exam   Constitutional: He is oriented to person, place, and time  He appears well-developed  No distress  HENT:   Head: Normocephalic and atraumatic  Nose: Nose normal    Mouth/Throat: Oropharynx is clear and moist    Eyes: No scleral icterus  Loss of vision right eye, visual disturbance left eye   Neck: Neck supple  No JVD present  No tracheal deviation present  Cardiovascular: Normal rate and regular rhythm  Distant heart sounds   Pulmonary/Chest: Effort normal and breath sounds normal  No respiratory distress  He has no wheezes  He has no rales  Abdominal: Soft  Bowel sounds are normal  He exhibits no distension  There is no tenderness  There is no rebound and no guarding  Obese   Musculoskeletal: He exhibits edema (Bilateral lower extremities, appears chronic)  He exhibits no tenderness or deformity  Neurological: He is alert and oriented to person, place, and time  A cranial nerve deficit (Loss of vision right eye, visual disturbance left eye) is present  Skin: Skin is warm and dry  He is not diaphoretic  No pallor  Psychiatric: He has a normal mood and affect  His behavior is normal  Judgment and thought content normal        Current Weight: Weight - Scale: (!) 142 kg (313 lb 0 9 oz)  First Weight: Weight - Scale: (!) 137 kg (302 lb)    Lab Results:  I have personally reviewed pertinent labs      EKG, Pathology, and Other Studies:  I personally reviewed pertinent studies  Counseling / Coordination of Care  Total floor / unit time spent today 40 minutes  Greater than 50% of total time was spent with the patient and / or family counseling and / or coordination of care  A description of the counseling / coordination of care:  See above

## 2017-10-23 NOTE — PHYSICAL THERAPY NOTE
Physical Therapy Evaluation    Patient's Name:Den Norman    Today's Date:10/23/17    Patient Active Problem List   Diagnosis    Morbid [facility]    History of prostate cancer    Type 2 diabetes mellitus with hyperglycemia ([facility]    S/P CABG x 3    CKD (chronic kidney disease) stage 4, GFR 15-29 ml/min ([facility])    Volume overload    Body mass index (BMI) of 40 0 to 49 9    Wound of right lower extremity    Wound of left lower extremity    Hyperphosphatemia    Vitamin D deficiency    Bilateral renal cysts    Atrial fibrillation (HCC)    Blind right eye    Chronic combined systolic and diastolic heart failure (HCC)    Coronary artery disease    DM (diabetes mellitus), type 2 with peripheral neuropathy    Obstructive sleep apnea syndrome, severe    Vision loss, 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       Past Medical History:   Diagnosis Date    Anemia     Arthritis     Atrial fibrillation (HCC)     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HCC)     B12 deficiency     Back pain     Blind right eye     Chronic combined systolic and diastolic heart failure (HCC)     Chronic venous stasis dermatitis of both lower extremities     CKD (chronic kidney disease), stage IV (HCC)     Coronary artery disease     DDD (degenerative disc disease)     DM (diabetes mellitus), type 2 (HCC)     Type II, on insulin    BAER (dyspnea on exertion)     Epistaxis     Gout     Hiatal hernia     History of cellulitis     BLE    History of prostate cancer     Radiation treatments   Hypertension     Ischemic cardiomyopathy     MI, old     Neuropathy     BLE    Obesity     Obstructive sleep apnea syndrome, severe     TIA (transient ischemic attack)     Urinary incontinence     Urinary retention     Use of cane as ambulatory aid     Independent with personal care         Past Surgical History:   Procedure Laterality Date    ABDOMINAL SURGERY      CARDIAC SURGERY      CATARACT EXTRACTION, BILATERAL      CORONARY ANGIOPLASTY WITH STENT PLACEMENT      2 stents    CORONARY ARTERY BYPASS GRAFT N/A 7/15/2016    Procedure: Intraop ADRIAN, CABG x 3 using LIMA to LAD, RSVG to OM1 and D1;  Surgeon: Saundra Gaitan MD;  Location:  MAIN OR;  Service:    Ari Gallardo      has stents    EYE SURGERY      Bilateral cataract removal    HERNIA REPAIR      umbilical hernia repair    LUNG CANCER SURGERY      Partial upper right lobectomy    PILONIDAL CYST EXCISION      [place] CYSTOURETHROSCOPY,URETER CATHETER Left 3/9/2016    Procedure: CYSTOSCOPY WITH left RETROGRADE PYELOGRAM left double J stent removal;  Surgeon: Lisa Devlin MD;  Location: [facility];  Service: Urology    VASCULAR SURGERY            10/23/17 1055   Note Type   Note type Eval only   Pain Assessment   Pain Assessment No/denies pain   Pain Score No Pain   Home Living   Type of Home Mobile home   Home Layout One level;Ramped entrance   Home Equipment Walker;Cane   Additional Comments Pt lives with wife and son in [place] home with ramped entrance  Prior Function   Level of [place] Needs assistance with IADLs; Needs assistance with ADLs and functional mobility   Lives With Spouse; Son   Receives Help From Family   ADL Assistance Needs assistance   IADLs Needs assistance   Falls in the last 6 months 1 to 4   Vocational Retired   Restrictions/Precautions   [facility] Bearing Precautions Per Order No   Other Precautions O2;Fall Risk   Cognition   Overall Cognitive Status WFL   Arousal/Participation Responsive   Attention Within functional limits   Orientation Level Oriented X4   Following Commands Follows all commands and directions without difficulty   RLE Assessment   RLE Assessment WFL   LLE Assessment   LLE Assessment WFL   Coordination   Movements are Fluid and Coordinated 1   Bed Mobility   Supine to Sit 5  Supervision   Sit to Supine 5  Supervision   Transfers   Sit to Stand 5  Supervision   Stand to Sit 5  Supervision   Ambulation/Elevation   Gait pattern Wide NICCI;Decreased foot clearance; Short stride   Gait Assistance 5  Supervision   Assistive Device Rolling walker   Distance 75'x2  (standing rest break in between)   Balance   Static Sitting Good   Dynamic Sitting Fair   Static Standing Fair   Dynamic Standing Fair -   Ambulatory Fair -   Endurance Deficit   Endurance Deficit Yes   Activity Tolerance   Activity Tolerance Patient limited by fatigue   Assessment   Prognosis Fair   Problem List Decreased strength;Decreased endurance; Impaired balance;Decreased mobility   Assessment Pt is a 77 y/o male who presented with sudden onset of visual "cloudiness" x6 days and tenderness over temporal region of head  Pt admitted for 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  Pt has a history of CAD, a-fib, stage 4 CKD, DDD, type 2 DM, BLE cellulitis, MI, neuropathy, UI, TIA, and CHF  PTA, pt needed assistance with ADLs and was mostly using RW for ambulation and cane at times  During IE, pt was able to perform bed mobility with S and was able to perform transfers with S  Pt was able to ambualte 76' x2 with Rw and S  Pt required standing rest break in between  Pt O2 was at 88% after ambulating both times on room air  Pt was not symptomatic  Pt would benefit from continued inpatient physical therapy to improve strength, endurance, and balance  Recommend home with home PT upon d/c  Goals   Patient Goals Return home   STG Expiration Date 11/02/17   Short Term Goal #1 Pt will be able to ambulate 300' with Rw (I)ly; pt will perform transfers (I)ly; pt will perform bed mobility (I)ly; pt will improve standing balance to fair +   Treatment Day 0   Plan   Treatment/Interventions LE strengthening/ROM; Therapeutic exercise; Functional transfer training; Endurance training;Gait training;Bed mobility   PT Frequency 5x/wk   Recommendation   Recommendation Home PT; Home with family support   PT - OK to Discharge Yes  (when medically stable return home with home PT) Modified [place] Scale   Modified [place] Scale 3   Barthel Index   Feeding 10   Bathing 0   Grooming Score 5   Dressing Score 5   Bladder Score 0   Bowels Score 10   Toilet Use Score 5   Transfers (Bed/Chair) Score 10   Mobility (Level Surface) Score 10   Stairs Score 0   Barthel Index Score 55   Laura Fu, SPT

## 2017-10-23 NOTE — PLAN OF CARE
Problem: PHYSICAL THERAPY ADULT  Goal: Performs mobility at highest level of function for planned discharge setting  See evaluation for individualized goals  Treatment/Interventions: LE strengthening/ROM, Therapeutic exercise, Functional transfer training, Endurance training, Gait training, Bed mobility          See flowsheet documentation for full assessment, interventions and recommendations  Prognosis: Fair  Problem List: Decreased strength, Decreased endurance, Impaired balance, Decreased mobility  Assessment: Pt is a 75 y/o male who presented with sudden onset of visual "cloudiness" x6 days and tenderness over temporal region of head  Pt admitted for 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  Pt has a history of CAD, a-fib, stage 4 CKD, DDD, type 2 DM, BLE cellulitis, MI, neuropathy, UI, TIA, and CHF  PTA, pt needed assistance with ADLs and was mostly using RW for ambulation and cane at times  During IE, pt was able to perform bed mobility with S and was able to perform transfers with S  Pt was able to ambualte 76' x2 with Rw and S  Pt required standing rest break in between  Pt O2 was at 88% after ambulating both times on room air  Pt was not symptomatic  Pt would benefit from continued inpatient physical therapy to improve strength, endurance, and balance  Recommend home with home PT upon d/c  Recommendation: Home PT, Home with family support     PT - OK to Discharge: Yes (when medically stable return home with home PT)    See flowsheet documentation for full assessment

## 2017-10-23 NOTE — OCCUPATIONAL THERAPY NOTE
Occupational Therapy Progress Note      Patient Name: Shayna Poole    FFDAG'R Date: 10/23/2017    Problem List:   Patient Active Problem List   Diagnosis    Morbid obesity ([facility] )    History of prostate cancer    Type 2 diabetes mellitus with hyperglycemia ([facility] )    S/P CABG x 3    CKD (chronic kidney disease) stage 4, GFR 15-29 ml/min ([facility])    Volume overload    Body mass index (BMI) of 40 0 to 49 9    Wound of right lower extremity    Wound of left lower extremity    Hyperphosphatemia    Vitamin D deficiency    Bilateral renal cysts    Atrial fibrillation ([place] [address] )    Blind right eye    Chronic combined systolic and diastolic heart failure ([place] [address] )    Coronary artery disease    DM (diabetes mellitus), type 2 with peripheral neuropathy    Obstructive sleep apnea syndrome, severe    Vision loss, 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        10/23/17 1000   Restrictions/Precautions   Weight Bearing Precautions Per Order No   Other Precautions Bed Alarm; Chair Alarm; Fall Risk;Telemetry;Multiple lines   Lifestyle   Autonomy Pt was completely I w/ ADLS, A for IADLS per wife, did return to driving s/p CABG though since recent visual changes, pt had not been driving wife drives, & required use of SPC and 2L02NC PRN PTA though has RW PRN PTA  Reciprocal Relationships Supportive wife Chandler Garcia is retired and home to A as needed, two sons also work FT during am hours, though are home to A as needed  Service to Others Retired [employer] [facility] [employer] from [employer] also worked in [employer], retired from a [employer] for 28 years also     Intrinsic Gratification Enjoys spending time reading, watching television and being outdoors, likes to be called "Hessy"   Pain Assessment   Pain Assessment No/denies pain   Pain Score No Pain   ADL   Eating Assistance 6  Modified independent   Grooming Assistance 5  Supervision/Setup   LB Dressing Assistance 5  Supervision/Setup   Toileting Assistance  5  Supervision/Setup   Bed Mobility   Rolling R 5 Supervision   Rolling L 5  Supervision   Supine to Sit 5  Supervision   Sit to Supine 5  Supervision   Transfers   Sit to Stand 5  Supervision   Stand to Sit 5  Supervision   Functional Mobility   Functional Mobility 5  Supervision   Additional items Rolling walker   Cognition   Overall Cognitive Status WFL   Arousal/Participation Alert   Attention Within functional limits   Orientation Level Oriented X4   Memory Within functional limits   Following Commands Follows all commands and directions without difficulty   Activity Tolerance   Activity Tolerance Patient limited by fatigue   Assessment   Assessment Pt seen for OT treatment session focusing on activity tolerance, bed mobility, fx'l tfers, fx'l mobility, E C  Techniques, LB dressing, shimon/anal care, toileting via urinal, and hygiene /thoruoghness as well as discussion re: d/c planning disposition  Pt tolerated session fairly well  Pt continues to demonstrate decreased endurance/activity tolerance, generalized weakness, deconditioning, SOB, BAER, fatigue, fall risk, impaired balance, large body habitus, limited fx'l LB reaching, chronic impaired visual acuity R eye, 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, acute L eye impaired visual acuity, able to identify motions and gross objects in R eye and count fingers in L eye, L hemianopsia, field cut deficits- pt subjective reports only able to see B/L upper quadrants only of R eye, on 1L02NC, dsat t/o fx'l mobility on RW to 88%, admittedly noncompliant w/ home o2 PTA "I should be on 2 but I take it off whenever I moved"  Pt requires overall SBA for ADLs/slef care and SBA for fx'l mobility w/ RW and 1L01NC  Pt scored overall 70/100 on the Barthel Index and 3/6 on the Modified Ziebach Scale  Pt to continue to benefit from acute immediate OT services to address goals as stated per initial eval  Recommend home OT/PT/RW and compliance w/ home o2 when med stable  OT to follow     Plan   Treatment Interventions ADL retraining;Functional transfer training;UE strengthening/ROM; Endurance training;Patient/family training;Equipment evaluation/education; Compensatory technique education;Continued evaluation; Energy conservation; Activityengagement   Goal Expiration Date 11/01/17   Treatment Day 1   OT Frequency 3-5x/wk   Recommendation   OT Discharge Recommendation Home OT  (to home w/ family support w/ home OT/PT/RW, use of home o2)   OT - OK to Discharge Yes  (to home w/ family support w/ home OT/PT/RW, use of home o2)   Barthel Index   Feeding 10   Bathing 5   Grooming Score 5   Dressing Score 5   Bladder Score 10   Bowels Score 10   Toilet Use Score 5   Transfers (Bed/Chair) Score 10   Mobility (Level Surface) Score 10   Stairs Score 0   Barthel Index Score 70   Modified [place] Scale   Modified Ash Scale 3     Thank you for the consult!  Please call if you have any questions: Katlyn Oh, OTD, OTR/L, C-GCBAHMAN, CSRS  Neuro [facility]

## 2017-10-23 NOTE — PROGRESS NOTES
Progress Note - General Surgery   Shayna Poole 76 y o  male MRN: [id number]  Unit/Bed#: [facility] 731-01 Encounter: [id number]    Assessment:  Pt is a 72y o  M with with decreased left eye vision suspected of having giant cell arteritis Pt is a 72y o  M with with decreased left eye vision suspected of having giant cell arteritis     Symptoms unchanged overnight    Plan:  Cont steroids  F/u temporal u/s  Will likely need biopsy if u/s unrevealing      Subjective/Objective   Chief Complaint:     Subjective: SUZE  Denies h/a or vision changes overnight     Objective:     Blood pressure 140/88, pulse 73, temperature 97 6 °F (36 4 °C), temperature source Oral, resp  rate 18, height 5' 7" (1 702 m), weight (!) 142 kg (313 lb 0 9 oz), SpO2 98 %  ,Body mass index is 49 03 kg/m²  Intake/Output Summary (Last 24 hours) at 10/23/17 0811  Last data filed at 10/23/17 0201   Gross per 24 hour   Intake             1520 ml   Output             3300 ml   Net            -1780 ml       Invasive Devices     Peripheral Intravenous Line            Peripheral IV 08/25/17 58 days    Peripheral IV 10/22/17 Left Antecubital less than 1 day                Physical Exam: NAD  AAOx3  RRR  Normal respiratory effort  Soft, NT, ND  Left eye still blurry     Lab, Imaging and other studies:I have personally reviewed pertinent lab results      VTE Pharmacologic Prophylaxis: eliquis  VTE Mechanical Prophylaxis: sequential compression device

## 2017-10-23 NOTE — CONSULTS
Rheumatology consultation on Jen Joya    Impression:    1  Possible temporal/giant cell arteritis as cause of left visual loss  However, he has atypical findings including lack of any headaches, excellent pulsations of the temporal artery throughout their bilateral course, only mildly elevated sed rate of 37  Left eye visual loss is hemianopsia and did not change in the 5-6 days prior to admission  which might suggest occipital lobe infarct  He did have prior acute visual loss of his right eye 1 year ago said to be stroke  Patient denies any pain of PMR, jaw claudication, headache or other warning signs  2   Longstanding diabetes mellitus that is insulin dependent, associated with neuropathy etc    3  Multiple medical problems including resection of lung cancer, probable COPD, oxygen dependency, coronary artery bypass, obstructive sleep apnea and chronic kidney disease  Recommendation:    1  Agree with bilateral temporal artery biopsy to help guide whether he needs steroids are not  Await temp artery ultrasound as well  Unfortunately there has been no improvement in his vision with the 36 hours of IV steroids  2   Recheck sed rate and CRP tomorrow  3   After he has finished his 3rd day of IV Solu-Medrol 1000 mg, suggest using prednisone about 50 mg p o  daily  If the TA biopsy has come back negative, then rapidly taper over 10 days  History of present illness: This 57-year-old male presents with a 5-6 day history of partial left eye visual loss at began on Monday of last week  He noted the left temporal portion of his vision had disappeared and he could see though out of his left eye on the nasal with side  He had denied ever having any systemic complaints such as headache, jaw pain, scalp tenderness, fever, weight loss or rheumatism symptoms  He has a pre-existing history of right eye visual loss a year or 2 ago that was said to be stroke    He denies any prior history of inflammatory arthritis or other systemic complaints  His past medical history is complicated by lung cancer that resected several years ago-partial right upper lobe resection  He has been oxygen-dependent with COPD  He has also had coronary bypass surgery x3, cataract surgery, abdominal surgery, hernia repair, and some vascular surgery  He has long-standing diabetes history of atrial fib, chief heart failure, CAD, degenerative disc disease, gout?, prostate cancer treated with radiation, hypertension, chronic renal insufficiency stage III or 4 as well as neuropathy changes of diabetes  Social history:  Patient used to smoke at least a pack per pack and half per day daily  Years ago  Rare beer  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 Review of systems:  Negative for rash  Denies headaches or any pain at all  Vision loss completed his right eye and left hemianopsia type of change left eye  Short of breath  No chest pain  Musculoskeletal:  Negative for any joint pain or arthritis at the moment  Neuro positive for numbness of his legs  Physical exam:  Extremely short stature male obese sitting on the side of the bed with nasal oxygen running  Skin:  Very dry  HEENT:  Temporal artery were 2+ palpated and nontender in friend the year and could feel the parietal and frontal branches which were nontender bilaterally  Left eye pupil did react slightly to light  Right eye nonreactive  Oral mucosa moist without ulcer  Chest revealed bibasilar coarse crackles  Abdomen obese and not well palpated  Extremities revealed stasis dermatitis changes distally  Muscle skeletal left shoulder abduction limited to less than 90° due to old injury or rotator cuff tear  Right shoulder about 105° abduction    No synovitis of the wrist or fingers and no knee effusions noted  Neuro not done      Review of systems:

## 2017-10-23 NOTE — SOCIAL WORK
Initial interview and DC Dash:     CM met with the patient to explain the CM role and discuss possible dc needs  Pt lives with his wife and one of their adult sons in a mobile home in [place], [place]  There is a ramp to entry  The patient is independent with min assist prn, drives and is retired  Pt uses a roller walker and has a walk-in shower  The patient states that he also has a SPC, shower chair and bedside commode  He reported that he has a glucometer and a scale to manage his diabetes and CHF  Pt reported that he is open to [facility] for RN services 3x week for LE wound care  The patient is requested resumption of services upon dc; ECIN sent to [facility]  Pt reported a history of IP rehab "somewhere in [place]"; pt was unable to remember name of the facility  Pt denied any history of drug/etoh or mental heatlh  Prescriptions are filled at [place] [facility]  Main contact: Wife Ginger Ocampo [phone]    Admit Dx Left Eye Vision Loss  Hx DM2, CAD, Systolic & Diastolic CHF, CKD, REYNA  Pt expressed understanding of diagnoses and treatment regimens  Pt reports having the availability and help needed from his two local adult sons  CM reviewed d/c planning process including the following: identifying help at home, patient preference for d/c planning needs, Discharge Lounge, [facility] Meds to Bed program, availability of treatment team to discuss questions or concerns patient and/or family may have regarding understanding medications and recognizing signs and symptoms once discharged  CM also encouraged patient to follow up with all recommended appointments after discharge  Patient advised of importance for patient and family to participate in managing patients medical well being

## 2017-10-23 NOTE — PROGRESS NOTES
[address] Internal Medicine Progress Note  Patient: Aida Garrido 76 y o  male   MRN: [id number]  PCP: Cameron Joshi DO  [facility] Encounter: [id number]  Date Of Visit: 10/23/17    Assessment:    Principal Problem:    Vision loss, 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  Active Problems: Morbid obesity ([facility] )    CKD (chronic kidney disease) stage 4, GFR 15-29 ml/min ([facility])    Atrial fibrillation ([facility])    Blind right eye    Chronic combined systolic and diastolic heart failure ([facility] )    Coronary artery disease    DM (diabetes mellitus), type 2 with peripheral neuropathy    Obstructive sleep apnea syndrome, severe      Plan:    · Acute left eye vision loss  · Ophthalmology, neurology, and vascular surgery following  Await rheumatology consultation  · Unclear if vascular/ishchemic versus inflammatory cause  · CRP and ESR elevated  · Temporal artery duplex and MRI pending    · CT head with no acute intracranial abnormality  · Continue IV steroids per vascular surgery  · If patient requires biopsy, Eliquis will need to be held for 2 days prior per vascular  - Await rheumatology consult   · [facility] up with [facility] upon discharge  · Type 2 diabetes mellitus with hyperglycemia  · Hyperglycemia likely related to IV steroids  Patient's nighttime Levemir was increased yesterday from 40 units to 50 units along with the addition of 25 units AM Levemir and sugars are still trending high  We will increase AM Levemir to 30 units and increase scheduled mealtime HumaLog from 10 to 12 units  Continue SSI  · CAD  · Continue aspirin, statin, and BB  Patient denies CP  · Chronic systolic and diastolic CHF without acute exacerbation  · Continue 80mg PO daily Lasix  Daily weights have been stable (312-313lb)  · A  Fib  · Continue Eliquis  HR controlled, continue BB  · REYNA  · Continue CPAP QHS  · Chronic right eye vision loss  · 2/2 prior CVA  Continue ASA, statin  Supportive care   Follow up with [facility] as outpatient   · CKD Stage 4  · Nephrology following  Baseline creatinine appears to be 2 7-3 3  Creatinine at baseline  Monitor BMP      VTE Pharmacologic Prophylaxis:   Pharmacologic: Apixaban (Eliquis)  Mechanical VTE Prophylaxis in Place: Yes    Patient Centered Rounds: I have performed bedside rounds with nursing staff today  Giovanny Nava    Discussions with Specialists or Other Care Team Provider: Discussed insulin adjustments with [facility] attending Dr Illona Hashimoto    Education and Discussions with Family / Patient: Patient; when asked if there was anyone he would like me to call with an update, the patient kindly declined and stated he would update his wife when she came in to visit later    Time Spent for Care: 30 minutes  More than 50% of total time spent on counseling and coordination of care as described above  Current Length of Stay: 2 day(s)    Current Patient Status: Inpatient   Certification Statement: The patient will continue to require additional inpatient hospital stay due to continued work up for acute Left eye vision loss    Discharge Plan: pending clinical course    Code Status: Level 1 - Full Code      Subjective:   Mr Rekha Mejia reports his left eye vision is "cloudy" today  He reports unchanged chronic right eye vision loss which he states he can only really see around the lateral edge of his right visual field  He denies headache, chest pain, abdominal pain, or pain whatsoever  He denies SOB  Objective:     Vitals:   Temp (24hrs), Av 8 °F (36 6 °C), Min:97 5 °F (36 4 °C), Max:98 3 °F (36 8 °C)    HR:  [58-73] 68  Resp:  [18-20] 18  BP: (140-156)/(66-88) 151/72  SpO2:  [93 %-98 %] 94 %  Body mass index is 49 03 kg/m²  Input and Output Summary (last 24 hours):        Intake/Output Summary (Last 24 hours) at 10/23/17 1250  Last data filed at 10/23/17 0900   Gross per 24 hour   Intake             1520 ml   Output             2550 ml   Net            -1030 ml       Physical Exam:     Physical Exam   Constitutional: He is oriented to person, place, and time  No distress  Patient seen sitting at edge of bed  He is cooperative   Eyes: EOM are normal    Cardiovascular: Normal rate and regular rhythm  Distant hear sounds   Pulmonary/Chest: Effort normal and breath sounds normal  No respiratory distress  He has no wheezes  Distant lung sounds   Abdominal: Soft  Bowel sounds are normal  There is no tenderness  Musculoskeletal: He exhibits edema  Neurological: He is alert and oriented to person, place, and time  Skin: Skin is warm  Chronic venous stasis changes b/l LE   Vitals reviewed  Additional Data:     Labs:      Results from last 7 days  Lab Units 10/21/17  1601   WBC Thousand/uL 6 83   HEMOGLOBIN g/dL 13 4   HEMATOCRIT % 41 9   PLATELETS Thousands/uL 233   NEUTROS PCT % 73   LYMPHS PCT % 16   MONOS PCT % 7   EOS PCT % 3       Results from last 7 days  Lab Units 10/22/17  0508   SODIUM mmol/L 138   POTASSIUM mmol/L 4 0   CHLORIDE mmol/L 99*   CO2 mmol/L 28   BUN mg/dL 62*   CREATININE mg/dL 2 93*   CALCIUM mg/dL 8 9   GLUCOSE RANDOM mg/dL 471*           * I Have Reviewed All Lab Data Listed Above  * Additional Pertinent Lab Tests Reviewed:  All Labs Within Last 24 Hours Reviewed    Imaging:    Imaging Reports Reviewed Today Include: CT head  Imaging Personally Reviewed by Myself Includes:  none    Recent Cultures (last 7 days):           Last 24 Hours Medication List:     apixaban 5 mg Oral BID   aspirin 81 mg Oral BID   cholecalciferol 2,000 Units Oral Daily   furosemide 80 mg Oral Daily   gabapentin 300 mg Oral HS   insulin detemir 25 Units Subcutaneous QAM   insulin detemir 50 Units Subcutaneous HS   insulin lispro 1-6 Units Subcutaneous HS   insulin lispro 10 Units Subcutaneous TID With Meals   insulin lispro 2-12 Units Subcutaneous TID AC   iron polysaccharides 150 mg Oral Daily   methylPREDNISolone sodium succinate 1,000 mg Intravenous Daily   metoprolol tartrate 25 mg Oral BID   pravastatin 80 mg Oral Daily With Dinner   tamsulosin 0 4 mg Oral HS        Today, Patient Was Seen By: Anu Ruiz PA-C    ** Please Note: Dragon 360 Dictation voice to text software may have been used in the creation of this document   **

## 2017-10-23 NOTE — PLAN OF CARE
Problem: OCCUPATIONAL THERAPY ADULT  Goal: Performs self-care activities at highest level of function for planned discharge setting  See evaluation for individualized goals  Treatment Interventions: Visual perceptual retraining, ADL retraining, Functional transfer training, UE strengthening/ROM, Endurance training, Patient/family training, Equipment evaluation/education, Compensatory technique education, Continued evaluation, Energy conservation, Activityengagement          See flowsheet documentation for full assessment, interventions and recommendations  Outcome: Progressing  Limitation: Visual deficit, Decreased endurance, Decreased high-level ADLs, Decreased self-care trans  Prognosis: Good  Assessment: Pt seen for OT treatment session focusing on activity tolerance, bed mobility, fx'l tfers, fx'l mobility, E C  Techniques, LB dressing, shimon/anal care, toileting via urinal, and hygiene /thoruoghness as well as discussion re: d/c planning disposition  Pt tolerated session fairly well  Pt continues to demonstrate decreased endurance/activity tolerance, generalized weakness, deconditioning, SOB, BAER, fatigue, fall risk, impaired balance, large body habitus, limited fx'l LB reaching, chronic impaired visual acuity R eye, 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, acute L eye impaired visual acuity, able to identify motions and gross objects in R eye and count fingers in L eye, L hemianopsia, field cut deficits- pt subjective reports only able to see B/L upper quadrants only of R eye, on 1L02NC, dsat t/o fx'l mobility on RW to 88%, admittedly noncompliant w/ home o2 PTA "I should be on 2 but I take it off whenever I moved"  Pt requires overall SBA for ADLs/slef care and SBA for fx'l mobility w/ RW and 1L01NC  Pt scored overall 70/100 on the Barthel Index and 3/6 on the Modified [place] Scale  Pt to continue to benefit from acute immediate OT services to address goals as stated per initial eval  Recommend home OT/PT/RW and compliance w/ home o2 when med stable  OT to follow  OT Discharge Recommendation: Home OT (to home w/ family support w/ home OT/PT/RW, use of home o2)  OT - OK to Discharge: Yes (to home w/ family support w/ home OT/PT/RW, use of home o2)    Thank you for the consult!  Please call if you have any questions: Johana Liu, OTD, OTR/L, C-GCM, CSRS  Neuro [facility]

## 2017-10-23 NOTE — PROGRESS NOTES
Progress Note - Neurology   Pablo Link 76 y o  male MRN: [id number]  Unit/Bed#: [facility] 731-01 Encounter: [id number]    Assessment:  3 76year old male admitted with L vision change  2  Atrial fibrillation  3  HTN   4  CKD  5  Atypical lung carcinoid  6  HLD  7  CAD    Plan:  1  Unclear etiology of vision change, but suspect ophthalmologic origin  2  Recommend check MRI brain, carotid dopplers, and echocardiogram    3  Rheumatology consult and temporal artery ultrasound pending for evaluation of GCA  4  Would continue on Apixaban for stroke prevention in setting of A fib  5  Change Pravastatin to Lipitor 40 mg QPM    6  Defer management of DM, CHF to primary team  Defer management of CKD to nephrology  7  Monitor exam and notify with changes  Subjective:   Linsey Abbott is a 76year old male admitted with vision loss in the left eye and 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  The patient continues to note blurred vision in his left eye and states that it has not changed since being admitted to the hospital  He denies any pain, HA, jaw pain       ROS:  History obtained from the patient  General ROS: negative for - chills, fatigue or fever  Ophthalmic ROS: positive for - blurry vision and decreased vision  Respiratory ROS: negative for - shortness of breath  Cardiovascular ROS: negative for - chest pain  Gastrointestinal ROS: negative for - abdominal pain or nausea/vomiting  Musculoskeletal ROS: negative for - muscular weakness  Neurological ROS: positive for - visual changes  negative for - confusion, dizziness, headaches, numbness/tingling, seizures or weakness    Medications  Scheduled Meds:  apixaban 5 mg Oral BID   aspirin 81 mg Oral BID   cholecalciferol 2,000 Units Oral Daily   furosemide 80 mg Oral Daily   gabapentin 300 mg Oral HS   [START ON 10/24/2017] insulin detemir 30 Units Subcutaneous QAM   insulin detemir 50 Units Subcutaneous HS   insulin lispro 1-6 Units Subcutaneous HS insulin lispro 12 Units Subcutaneous TID With Meals   insulin lispro 2-12 Units Subcutaneous TID AC   iron polysaccharides 150 mg Oral Daily   methylPREDNISolone sodium succinate 1,000 mg Intravenous Daily   metoprolol tartrate 25 mg Oral BID   pravastatin 80 mg Oral Daily With Dinner   tamsulosin 0 4 mg Oral HS     Continuous Infusions:   PRN Meds:   acetaminophen    docusate sodium    metolazone    ondansetron    Vitals: Blood pressure 151/72, pulse 68, temperature 98 3 °F (36 8 °C), temperature source Axillary, resp  rate 18, height 5' 7" (1 702 m), weight (!) 142 kg (313 lb 0 9 oz), SpO2 94 %  ,Body mass index is 49 03 kg/m²      Physical Exam: /72   Pulse 68   Temp 98 3 °F (36 8 °C) (Axillary)   Resp 18   Ht 5' 7" (1 702 m)   Wt (!) 142 kg (313 lb 0 9 oz)   SpO2 94%   BMI 49 03 kg/m²   General appearance: alert, appears stated age and cooperative  Head: Normocephalic, without obvious abnormality, atraumatic, no tenderness to palpation temporal regions  Eyes: negative findings: lids and lashes normal, conjunctivae and sclerae normal, pupils equal, round, reactive to light and accomodation and left inferior quadrantanopia  Lungs: clear to auscultation bilaterally  Heart: irregularly irregular rhythm  Abdomen: soft, non-tender; bowel sounds normal; no masses,  no organomegaly  Extremities: extremities normal, atraumatic, no cyanosis or edema  Skin: Skin color, texture, turgor normal  No rashes or lesions  Neurologic: Mental status: Alert, oriented, thought content appropriate  Cranial nerves: II: pupils equal, round, reactive to light and accommodation, III,VII: ptosis No ptosis noted, III,IV,VI: extraocular muscles extra-ocular motions intact, VII: upper facial muscle function normal bilaterally, VII: lower facial muscle function normal bilaterally, XII: tongue strength  normal  Sensory: normal, Grossly intact to crude touch  Motor:Motor strength 5/5 B/L UE and LE    Labs  Recent Results (from the past 24 hour(s))   Fingerstick Glucose (POCT)    Collection Time: 10/22/17  3:18 PM   Result Value Ref Range    POC Glucose 422 (H) 65 - 140 mg/dl   Fingerstick Glucose (POCT)    Collection Time: 10/22/17  4:50 PM   Result Value Ref Range    POC Glucose 449 (H) 65 - 140 mg/dl   Fingerstick Glucose (POCT)    Collection Time: 10/22/17  9:11 PM   Result Value Ref Range    POC Glucose 372 (H) 65 - 140 mg/dl   Fingerstick Glucose (POCT)    Collection Time: 10/23/17  7:00 AM   Result Value Ref Range    POC Glucose 298 (H) 65 - 140 mg/dl   Fingerstick Glucose (POCT)    Collection Time: 10/23/17 10:35 AM   Result Value Ref Range    POC Glucose 364 (H) 65 - 140 mg/dl     Imaging   No new neuro imaging available at this time  VTE Prophylaxis: Apixaban    Counseling / Coordination of Care  Total time spent today 20 minutes  Greater than 50% of total time was spent with the patient and / or family counseling and / or coordination of care  A description of the counseling / coordination of care: Discussed plan of care with patient  Will check MRI brain, carotid dopplers and echocardiogram to further assess  Rheumatology evaluation pending for evaluation of possible giant cell arteritis

## 2017-10-24 ENCOUNTER — APPOINTMENT (INPATIENT)
Dept: NON INVASIVE DIAGNOSTICS | Facility: HOSPITAL | Age: 77
DRG: 123 | End: 2017-10-24
Payer: MEDICARE

## 2017-10-24 LAB
ANION GAP SERPL CALCULATED.3IONS-SCNC: 11 MMOL/L (ref 4–13)
BUN SERPL-MCNC: 73 MG/DL (ref 5–25)
CALCIUM SERPL-MCNC: 9.1 MG/DL (ref 8.3–10.1)
CHLORIDE SERPL-SCNC: 96 MMOL/L (ref 100–108)
CO2 SERPL-SCNC: 30 MMOL/L (ref 21–32)
CREAT SERPL-MCNC: 2.73 MG/DL (ref 0.6–1.3)
CRP SERPL QL: 8.4 MG/L
ERYTHROCYTE [DISTWIDTH] IN BLOOD BY AUTOMATED COUNT: 15.3 % (ref 11.6–15.1)
ERYTHROCYTE [SEDIMENTATION RATE] IN BLOOD: 18 MM/HOUR (ref 0–10)
GFR SERPL CREATININE-BSD FRML MDRD: 21 ML/MIN/1.73SQ M
GLUCOSE SERPL-MCNC: 210 MG/DL (ref 65–140)
GLUCOSE SERPL-MCNC: 270 MG/DL (ref 65–140)
GLUCOSE SERPL-MCNC: 283 MG/DL (ref 65–140)
GLUCOSE SERPL-MCNC: 307 MG/DL (ref 65–140)
GLUCOSE SERPL-MCNC: 312 MG/DL (ref 65–140)
HCT VFR BLD AUTO: 39.3 % (ref 36.5–49.3)
HGB BLD-MCNC: 13.5 G/DL (ref 12–17)
MCH RBC QN AUTO: 30.1 PG (ref 26.8–34.3)
MCHC RBC AUTO-ENTMCNC: 34.4 G/DL (ref 31.4–37.4)
MCV RBC AUTO: 88 FL (ref 82–98)
PLATELET # BLD AUTO: 237 THOUSANDS/UL (ref 149–390)
PMV BLD AUTO: 10.3 FL (ref 8.9–12.7)
POTASSIUM SERPL-SCNC: 3 MMOL/L (ref 3.5–5.3)
RBC # BLD AUTO: 4.49 MILLION/UL (ref 3.88–5.62)
SODIUM SERPL-SCNC: 137 MMOL/L (ref 136–145)
WBC # BLD AUTO: 11.19 THOUSAND/UL (ref 4.31–10.16)

## 2017-10-24 PROCEDURE — 85027 COMPLETE CBC AUTOMATED: CPT | Performed by: PHYSICIAN ASSISTANT

## 2017-10-24 PROCEDURE — 94660 CPAP INITIATION&MGMT: CPT

## 2017-10-24 PROCEDURE — 82948 REAGENT STRIP/BLOOD GLUCOSE: CPT

## 2017-10-24 PROCEDURE — 94760 N-INVAS EAR/PLS OXIMETRY 1: CPT

## 2017-10-24 PROCEDURE — 80048 BASIC METABOLIC PNL TOTAL CA: CPT | Performed by: PHYSICIAN ASSISTANT

## 2017-10-24 PROCEDURE — 93306 TTE W/DOPPLER COMPLETE: CPT

## 2017-10-24 PROCEDURE — 97116 GAIT TRAINING THERAPY: CPT

## 2017-10-24 PROCEDURE — 86140 C-REACTIVE PROTEIN: CPT | Performed by: INTERNAL MEDICINE

## 2017-10-24 PROCEDURE — 85652 RBC SED RATE AUTOMATED: CPT | Performed by: INTERNAL MEDICINE

## 2017-10-24 RX ORDER — POTASSIUM CHLORIDE 20 MEQ/1
40 TABLET, EXTENDED RELEASE ORAL ONCE
Status: COMPLETED | OUTPATIENT
Start: 2017-10-24 | End: 2017-10-24

## 2017-10-24 RX ADMIN — Medication 150 MG: at 08:42

## 2017-10-24 RX ADMIN — ASPIRIN 81 MG: 81 TABLET, COATED ORAL at 08:42

## 2017-10-24 RX ADMIN — METOPROLOL TARTRATE 25 MG: 25 TABLET ORAL at 08:42

## 2017-10-24 RX ADMIN — INSULIN LISPRO 8 UNITS: 100 INJECTION, SOLUTION INTRAVENOUS; SUBCUTANEOUS at 12:07

## 2017-10-24 RX ADMIN — ATORVASTATIN CALCIUM 40 MG: 40 TABLET, FILM COATED ORAL at 17:28

## 2017-10-24 RX ADMIN — INSULIN LISPRO 4 UNITS: 100 INJECTION, SOLUTION INTRAVENOUS; SUBCUTANEOUS at 17:30

## 2017-10-24 RX ADMIN — INSULIN DETEMIR 50 UNITS: 100 INJECTION, SOLUTION SUBCUTANEOUS at 22:26

## 2017-10-24 RX ADMIN — TAMSULOSIN HYDROCHLORIDE 0.4 MG: 0.4 CAPSULE ORAL at 22:26

## 2017-10-24 RX ADMIN — METOLAZONE 5 MG: 5 TABLET ORAL at 08:42

## 2017-10-24 RX ADMIN — VITAMIN D, TAB 1000IU (100/BT) 2000 UNITS: 25 TAB at 08:42

## 2017-10-24 RX ADMIN — INSULIN DETEMIR 30 UNITS: 100 INJECTION, SOLUTION SUBCUTANEOUS at 08:43

## 2017-10-24 RX ADMIN — FUROSEMIDE 80 MG: 80 TABLET ORAL at 08:42

## 2017-10-24 RX ADMIN — Medication 150 MG: at 17:28

## 2017-10-24 RX ADMIN — METOPROLOL TARTRATE 25 MG: 25 TABLET ORAL at 17:28

## 2017-10-24 RX ADMIN — ASPIRIN 81 MG: 81 TABLET, COATED ORAL at 17:28

## 2017-10-24 RX ADMIN — SODIUM CHLORIDE 1000 MG: 0.9 INJECTION, SOLUTION INTRAVENOUS at 08:43

## 2017-10-24 RX ADMIN — INSULIN LISPRO 4 UNITS: 100 INJECTION, SOLUTION INTRAVENOUS; SUBCUTANEOUS at 22:27

## 2017-10-24 RX ADMIN — POTASSIUM CHLORIDE 40 MEQ: 1500 TABLET, EXTENDED RELEASE ORAL at 12:07

## 2017-10-24 RX ADMIN — GABAPENTIN 300 MG: 300 CAPSULE ORAL at 22:26

## 2017-10-24 RX ADMIN — INSULIN LISPRO 6 UNITS: 100 INJECTION, SOLUTION INTRAVENOUS; SUBCUTANEOUS at 08:44

## 2017-10-24 NOTE — PROGRESS NOTES
NEPHROLOGY PROGRESS NOTE   Daron Can 76 y o  male MRN: [id number]  Unit/Bed#: [facility] 731-01 Encounter: [id number]  Reason for Consult: CKD IV    ASSESSMENT/PLAN:  1  Chronic Kidney Disease IV:  Most likely secondary to HTN and DM  -Follows Dr Arnold Lamb as outpatient  -Baseline creatinine 2 8-3 2 and remains stable  -Continues to follow I/O, labs and volume status while here    2  CHF:  Currently compensated without issues  -Remains on Furosemide 80 mg daily  -Follow for now, but may need to increase to BID depending of volume status  -Currently examines euvolemic    3  Visual Loss:  Concerned for temporal arteritis  -Vascular and Rheumatology following  -For biopsy this week  -on steroids  -Defer to primary team    4  Hypertension:  BP better on current medication  -Monitor for now  -Avoid hypotension    5  Hypokalemia:  Potassium 3 0  -Will replace with 40 meq orally today  -Trend, may need daily supplements with diuretics      Disposition:  Okay from renal standpoint for discharge once medical stable  SUBJECTIVE:   Patient seen and examined  Denies chest pain or shortness of breath    Already tired of being in the hospital   No other issues reported    OBJECTIVE:  Current Weight: Weight - Scale: (!) 144 kg (317 lb 7 4 oz)  Vitals:    10/23/17 1606 10/23/17 2329 10/24/17 0600 10/24/17 0759   BP: 146/58 131/68  (P) 128/69   Pulse: 61 69  (P) 71   Resp: 18 18  (P) 20   Temp: 98 1 °F (36 7 °C) (!) 97 4 °F (36 3 °C)  (P) 97 8 °F (36 6 °C)   TempSrc: Oral Oral  (P) Oral   SpO2: 94% 95%  (P) 96%   Weight:   (!) 144 kg (317 lb 7 4 oz)    Height:           Intake/Output Summary (Last 24 hours) at 10/24/17 1036  Last data filed at 10/24/17 0500   Gross per 24 hour   Intake              720 ml   Output             2500 ml   Net            -1780 ml     General: NAD, sitting on side of bed  Skin: Warm and dry, no rash  HEENT: MMM  Neck: supple, No JVD  Chest: Essentially clear without crackles  Heart: RRR  Abdomen: Large, soft, NT, ND  Extremities: Trace edema bilaterally, with venous stasis discolorization   Neuro: AAO  Psych: appropriate affect     Medications:    Current Facility-Administered Medications:     acetaminophen (TYLENOL) tablet 650 mg, 650 mg, Oral, Q4H PRN, Bhargavi Oh, DO, 650 mg at 10/22/17 2222    aspirin (ECOTRIN LOW STRENGTH) EC tablet 81 mg, 81 mg, Oral, BID, Burkandrea Oh DO, 81 mg at 10/24/17 8904    atorvastatin (LIPITOR) tablet 40 mg, 40 mg, Oral, Daily With Ebb LANDON, 40 mg at 10/23/17 1657    cholecalciferol (VITAMIN D3) tablet 2,000 Units, 2,000 Units, Oral, Daily, Bhargavi Oh DO, 2,000 Units at 10/24/17 0773    docusate sodium (COLACE) capsule 100 mg, 100 mg, Oral, BID PRN, Bhargavi Oh DO    furosemide (LASIX) tablet 80 mg, 80 mg, Oral, Daily, Bhargavi Oh DO, 80 mg at 10/24/17 9768    gabapentin (NEURONTIN) capsule 300 mg, 300 mg, Oral, HS, Burkandrea Oh DO, 300 mg at 10/23/17 2053    insulin detemir (LEVEMIR) subcutaneous injection 30 Units, 30 Units, Subcutaneous, QAM, Frankie Quiroz PA-C, 30 Units at 10/24/17 0843    insulin detemir (LEVEMIR) subcutaneous injection 50 Units, 50 Units, Subcutaneous, HS, Digna Walters MD, 50 Units at 10/23/17 2053    insulin lispro (HumaLOG) 100 units/mL subcutaneous injection 1-6 Units, 1-6 Units, Subcutaneous, HS, Saira Lion PA-C, 6 Units at 10/23/17 2052    insulin lispro (HumaLOG) 100 units/mL subcutaneous injection 12 Units, 12 Units, Subcutaneous, TID With Meals, Frankie Quiroz PA-C, 12 Units at 10/24/17 0843    insulin lispro (HumaLOG) 100 units/mL subcutaneous injection 2-12 Units, 2-12 Units, Subcutaneous, TID AC, 6 Units at 10/24/17 0844 **AND** Fingerstick Glucose (POCT), , , TID ACMat PA-C    iron polysaccharides (NIFEREX) capsule 150 mg, 150 mg, Oral, BID, Digna Walters MD, 150 mg at 10/24/17 0842    methylPREDNISolone sodium succinate (Solu-MEDROL) 1,000 mg in sodium chloride 0 9 % 250 mL IVPB, 1,000 mg, Intravenous, Daily, Gisela Rodriguez DO, Last Rate: 250 mL/hr at 10/24/17 0843, 1,000 mg at 10/24/17 0843    metolazone (ZAROXOLYN) tablet 5 mg, 5 mg, Oral, Daily PRN, Gisela Becerrils, DO, 5 mg at 10/24/17 9504    metoprolol tartrate (LOPRESSOR) tablet 25 mg, 25 mg, Oral, BID, Giselajosh Rodriguez DO, 25 mg at 10/24/17 0842    ondansetron (ZOFRAN) injection 4 mg, 4 mg, Intravenous, Q6H PRN, Gisela Rodriguez DO    tamsulosin [facility]) capsule 0 4 mg, 0 4 mg, Oral, HS, Gisela Rodriguez DO, 0 4 mg at 10/23/17 2054    Laboratory Results:    Results from last 7 days  Lab Units 10/24/17  0650 10/22/17  0508 10/21/17  1601 10/18/17  1045   WBC Thousand/uL 11 19*  --  6 83 7 45   HEMOGLOBIN g/dL 13 5  --  13 4 13 3   HEMATOCRIT % 39 3  --  41 9 40 4   PLATELETS Thousands/uL 237  --  233 248   SODIUM mmol/L 137 138 138 138   POTASSIUM mmol/L 3 0* 4 0 4 4 3 8   CHLORIDE mmol/L 96* 99* 101 96*   CO2 mmol/L 30 28 28 31   BUN mg/dL 73* 62* 56* 65*   CREATININE mg/dL 2 73* 2 93* 2 91* 3 30*   CALCIUM mg/dL 9 1 8 9 8 9 8 9   GLUCOSE RANDOM mg/dL 283* 471* 266*  --

## 2017-10-24 NOTE — PROGRESS NOTES
Progress Note - General Surgery   Nonda Amabile 76 y o  male MRN: [id number]  Unit/Bed#: [facility] 731-01 Encounter: [id number]    Assessment:  Pt is a 72y o  M with with decreased left eye vision suspected of having giant cell arteritis Pt is a 72y o  M with with decreased left eye vision suspected of having giant cell arteritis     Symptoms unchanged overnight    Plan:  Cont steroids  F/u temporal u/s  Will need biopsy per rheum, hold eliquis for possible OR later this week      Subjective/Objective   Chief Complaint:     Subjective: SUZE  Denies h/a or vision changes overnight     Objective:     Blood pressure 131/68, pulse 69, temperature (!) 97 4 °F (36 3 °C), temperature source Oral, resp  rate 18, height 5' 7" (1 702 m), weight (!) 144 kg (317 lb 7 4 oz), SpO2 95 %  ,Body mass index is 49 72 kg/m²  Intake/Output Summary (Last 24 hours) at 10/24/17 0733  Last data filed at 10/24/17 0500   Gross per 24 hour   Intake              900 ml   Output             3300 ml   Net            -2400 ml       Invasive Devices     Peripheral Intravenous Line            Peripheral IV 08/25/17 59 days    Peripheral IV 10/22/17 Left Antecubital 1 day                Physical Exam: NAD  AAOx3  RRR  Normal respiratory effort  Soft, NT, ND  Left eye still blurry     Lab, Imaging and other studies:I have personally reviewed pertinent lab results      VTE Pharmacologic Prophylaxis: eliquis  VTE Mechanical Prophylaxis: sequential compression device

## 2017-10-24 NOTE — PLAN OF CARE
Problem: PHYSICAL THERAPY ADULT  Goal: Performs mobility at highest level of function for planned discharge setting  See evaluation for individualized goals  Treatment/Interventions: LE strengthening/ROM, Therapeutic exercise, Functional transfer training, Endurance training, Gait training, Bed mobility          See flowsheet documentation for full assessment, interventions and recommendations  Outcome: Progressing  Prognosis: Fair  Problem List: Decreased strength, Decreased endurance, Impaired balance, Decreased mobility  Assessment: Pt seen for PT treatment session  Pt reported being tired upon arrival and wanting to sleep but was willing to go for a short walk  Pt able to perform transfers with S and performed bed mobility with S  Pt able to stand at bedside with S for 5 mins to use urinal  Pt then able to ambualte 75'x2 with RW and standing rest break in between  Pt would continue to benefit from inpatient physical therapy to improve strength, endurance, and balance  Recommend home with home PT upon d/c  Recommendation: Home PT, Home with family support     PT - OK to Discharge: Yes (when medically stable home with home PT)    See flowsheet documentation for full assessment

## 2017-10-24 NOTE — PROGRESS NOTES
[facility] Medicine Progress Note  Patient: Aida Garrido 76 y o  male   MRN: [id number]  PCP: Cameron Joshi DO  Unit/Bed#: [facility] 731-01 Encounter: [id number]  Date Of Visit: 10/24/17    Assessment:    Principal Problem:    Vision loss, 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  Active Problems: Morbid obesity ([facility] )    CKD (chronic kidney disease) stage 4, GFR 15-29 ml/min ([facility])    Atrial fibrillation ([facility])    Blind right eye    Chronic combined systolic and diastolic heart failure ([facility] )    Coronary artery disease    DM (diabetes mellitus), type 2 with peripheral neuropathy    Obstructive sleep apnea syndrome, severe      Plan:    · Acute left eye vision loss  · Ophthalmology, Neurology, vascular surgery, and rheumatology are following  · Patient reports slight improvement in "cloudiness" of left vision today  · Concern for vascular versus inflammatory cause  MRI of the brain was negative for acute intracranial abnormality  His ESR is elevated (37 on admission, 18 on repeat today) however he denies headache  Vascular surgery to proceed with bilateral temporal artery biopsy - Eliquis on hold by vascular surgery for this - defer to vascular surgery when patient may resume anticoagulation  He has received 4 doses of 1,000mg IV solu-medrol  We will de-escalate steroids to PO (50mg daily) per the recommendations of Dr Alena Gallo   · Follow up with ophthalmologist at [facility] upon discharge  · Type 2 diabetes mellitus with hyperglycemia  · HbA1c 7 8  Patient has been on IV steroids, 1,000mg daily for the last 4 days  Suspect this is cause of hyperglycemia  His insulin was increased yesterday to his current dosage which is 30 units Levemir QAM and 50 units Levemir QPM along with scheduled mealtime Humalog 12 units with sliding scale  Steroid plan to de-escalate from IV to PO beginning tomorrow, thus would appreciate the consultation of the endocrinology team regarding recommendations given steroid dosage changes     · Coronary artery disease  · Continue aspirin, statin, beta-blocker  Patient continues to deny chest pain today  · Chronic systolic and diastolic congestive heart failure without acute exacerbation  · Continue 80 mg p o  daily Lasix  Echo pending  · Atrial fibrillation  · Heart rate controlled  Continue beta-blocker  Eliquis is on hold per vascular surgery for temporal artery biopsy  Defer to vascular surgery when anticoagulation may be resumed status post biopsy  · Obstructive sleep apnea  · Continue CPAP QHS - patient reports compliance with this  · Chronic right eye vision loss  · Secondary to prior CVA  Continue aspirin and statin  Supportive care  Follow up with [facility] as outpatient  · Chronic kidney disease stage 4  · Nephrology following  Baseline creatinine 2 8-3 2  Creatinine at baseline  · Hypokalemia  · Replete PO and monitor  Consider daily supplementation since he is on daily diuretics   · Leukocytosis  · Suspect steroid-induced       VTE Pharmacologic Prophylaxis:   Pharmacologic: Pharmacologic VTE Prophylaxis contraindicated due to temporal artery biopsy  Mechanical VTE Prophylaxis in Place: Yes    Patient Centered Rounds: I have performed bedside rounds with nursing staff today  Claudy Lyon    Discussions with Specialists or Other Care Team Provider: Discussed with [facility] attending Dr Sanya Rivera  Discussed with nursing, Claudy Lyon  Education and Discussions with Family / Patient: Patient; when asked if there was anyone he would like me to call with an update, the patient kindly declined and informed me that he would inform his wife who is coming in later today to visit him  Time Spent for Care: 30 minutes  More than 50% of total time spent on counseling and coordination of care as described above      Current Length of Stay: 3 day(s)    Current Patient Status: Inpatient   Certification Statement: The patient will continue to require additional inpatient hospital stay due to continued work up for left eye vision loss    Discharge Plan: Pending clinical course    Code Status: Level 1 - Full Code      Subjective:   Mr  Akin Meraz reports that his left eye is still "cloudy" although he believes this is slightly improved from yesterday  He denies any pain whatsoever including headache or chest pain  Objective:     Vitals:   Temp (24hrs), Av 7 °F (36 5 °C), Min:97 4 °F (36 3 °C), Max:97 8 °F (36 6 °C)    HR:  [65-71] 65  Resp:  [18-22] 22  BP: (121-131)/(68-76) 121/76  SpO2:  [95 %-96 %] 96 %  Body mass index is 49 72 kg/m²  Input and Output Summary (last 24 hours): Intake/Output Summary (Last 24 hours) at 10/24/17 1607  Last data filed at 10/24/17 1447   Gross per 24 hour   Intake             1020 ml   Output             3150 ml   Net            -2130 ml       Physical Exam:     Physical Exam   Constitutional: He is oriented to person, place, and time  Patient seen lying on his back in bed as well as sitting upright at bedside  He is cooperative   Eyes: EOM are normal  Pupils are equal, round, and reactive to light  Both eyes with decreased acuity reported by patient   Cardiovascular: Normal rate and regular rhythm  Distant heart sounds   Pulmonary/Chest: Effort normal and breath sounds normal  No respiratory distress  He has no wheezes  He exhibits no tenderness  Abdominal: Soft  Bowel sounds are normal  There is no tenderness  Musculoskeletal: He exhibits edema  Neurological: He is alert and oriented to person, place, and time  Skin: Skin is warm  Vitals reviewed        Additional Data:     Labs:      Results from last 7 days  Lab Units 10/24/17  0650 10/21/17  1601   WBC Thousand/uL 11 19* 6 83   HEMOGLOBIN g/dL 13 5 13 4   HEMATOCRIT % 39 3 41 9   PLATELETS Thousands/uL 237 233   NEUTROS PCT %  --  73   LYMPHS PCT %  --  16   MONOS PCT %  --  7   EOS PCT %  --  3       Results from last 7 days  Lab Units 10/24/17  0650   SODIUM mmol/L 137   POTASSIUM mmol/L 3 0*   CHLORIDE mmol/L 96*   CO2 mmol/L 30   BUN mg/dL 73*   CREATININE mg/dL 2 73*   CALCIUM mg/dL 9 1   GLUCOSE RANDOM mg/dL 283*           * I Have Reviewed All Lab Data Listed Above  * Additional Pertinent Lab Tests Reviewed: All Labs Within Last 24 Hours Reviewed    Imaging:    Imaging Reports Reviewed Today Include: MRI brain  Imaging Personally Reviewed by Myself Includes:  none    Recent Cultures (last 7 days):           Last 24 Hours Medication List:     aspirin 81 mg Oral BID   atorvastatin 40 mg Oral Daily With Dinner   cholecalciferol 2,000 Units Oral Daily   furosemide 80 mg Oral Daily   gabapentin 300 mg Oral HS   insulin detemir 30 Units Subcutaneous QAM   insulin detemir 50 Units Subcutaneous HS   insulin lispro 1-6 Units Subcutaneous HS   insulin lispro 12 Units Subcutaneous TID With Meals   insulin lispro 2-12 Units Subcutaneous TID AC   iron polysaccharides 150 mg Oral BID   methylPREDNISolone sodium succinate 1,000 mg Intravenous Daily   metoprolol tartrate 25 mg Oral BID   tamsulosin 0 4 mg Oral HS        Today, Patient Was Seen By: Lory Echavarria PA-C    ** Please Note: Dragon 360 Dictation voice to text software may have been used in the creation of this document   **

## 2017-10-24 NOTE — PHYSICAL THERAPY NOTE
Physical Therapy Treatment Note     10/24/17 0900   Pain Assessment   Pain Assessment No/denies pain   Pain Score No Pain   Restrictions/Precautions   Weight Bearing Precautions Per Order No   Other Precautions Telemetry;O2;Fall Risk   General   Chart Reviewed Yes   Cognition   Overall Cognitive Status WFL   Arousal/Participation Alert; Responsive   Attention Within functional limits   Orientation Level Oriented X4   Following Commands Follows all commands and directions without difficulty   Subjective   Subjective Pt reported being tired and wanting to sleep  Bed Mobility   Supine to Sit 5  Supervision   Sit to Supine 5  Supervision   Transfers   Sit to Stand 5  Supervision   Stand to Sit 5  Supervision   Ambulation/Elevation   Gait pattern Wide NICCI; Decreased foot clearance; Short stride   Gait Assistance 5  Supervision   Assistive Device Rolling walker   Distance 75'x2  (standing rest break in between)   Balance   Static Sitting Good   Dynamic Sitting Fair   Static Standing Fair   Dynamic Standing Fair -   Ambulatory Fair -   Endurance Deficit   Endurance Deficit Yes   Activity Tolerance   Activity Tolerance Patient limited by fatigue   Assessment   Prognosis Fair   Problem List Decreased strength;Decreased endurance; Impaired balance;Decreased mobility   Assessment Pt seen for PT treatment session  Pt reported being tired upon arrival and wanting to sleep but was willing to go for a short walk  Pt able to perform transfers with S and performed bed mobility with S  Pt able to stand at bedside with S for 5 mins to use urinal  Pt then able to ambualte 75'x2 with RW and standing rest break in between  Pt would continue to benefit from inpatient physical therapy to improve strength, endurance, and balance  Recommend home with home PT upon d/c  Goals   STG Expiration Date 11/02/17   Treatment Day 1   Plan   Treatment/Interventions Functional transfer training;LE strengthening/ROM; Therapeutic exercise; Endurance training;Bed mobility;Gait training   Progress Progressing toward goals   PT Frequency 5x/wk   Recommendation   Recommendation Home PT; Home with family support   PT - OK to Discharge Yes  (when medically stable home with home PT)   July Pierre, SPT

## 2017-10-25 ENCOUNTER — APPOINTMENT (INPATIENT)
Dept: NON INVASIVE DIAGNOSTICS | Facility: HOSPITAL | Age: 77
DRG: 123 | End: 2017-10-25
Payer: MEDICARE

## 2017-10-25 PROBLEM — E87.6 HYPOKALEMIA: Status: ACTIVE | Noted: 2017-10-25

## 2017-10-25 LAB
ANION GAP SERPL CALCULATED.3IONS-SCNC: 11 MMOL/L (ref 4–13)
BUN SERPL-MCNC: 81 MG/DL (ref 5–25)
CALCIUM SERPL-MCNC: 9.5 MG/DL (ref 8.3–10.1)
CHLORIDE SERPL-SCNC: 92 MMOL/L (ref 100–108)
CO2 SERPL-SCNC: 32 MMOL/L (ref 21–32)
CREAT SERPL-MCNC: 3.02 MG/DL (ref 0.6–1.3)
ERYTHROCYTE [DISTWIDTH] IN BLOOD BY AUTOMATED COUNT: 14.9 % (ref 11.6–15.1)
GFR SERPL CREATININE-BSD FRML MDRD: 19 ML/MIN/1.73SQ M
GLUCOSE SERPL-MCNC: 149 MG/DL (ref 65–140)
GLUCOSE SERPL-MCNC: 156 MG/DL (ref 65–140)
GLUCOSE SERPL-MCNC: 202 MG/DL (ref 65–140)
GLUCOSE SERPL-MCNC: 220 MG/DL (ref 65–140)
GLUCOSE SERPL-MCNC: 292 MG/DL (ref 65–140)
HCT VFR BLD AUTO: 42 % (ref 36.5–49.3)
HGB BLD-MCNC: 14.3 G/DL (ref 12–17)
MAGNESIUM SERPL-MCNC: 2.8 MG/DL (ref 1.6–2.6)
MCH RBC QN AUTO: 29.7 PG (ref 26.8–34.3)
MCHC RBC AUTO-ENTMCNC: 34 G/DL (ref 31.4–37.4)
MCV RBC AUTO: 87 FL (ref 82–98)
PLATELET # BLD AUTO: 247 THOUSANDS/UL (ref 149–390)
PMV BLD AUTO: 10.4 FL (ref 8.9–12.7)
POTASSIUM SERPL-SCNC: 2.9 MMOL/L (ref 3.5–5.3)
RBC # BLD AUTO: 4.82 MILLION/UL (ref 3.88–5.62)
SODIUM SERPL-SCNC: 135 MMOL/L (ref 136–145)
WBC # BLD AUTO: 11.72 THOUSAND/UL (ref 4.31–10.16)

## 2017-10-25 PROCEDURE — 85027 COMPLETE CBC AUTOMATED: CPT | Performed by: PHYSICIAN ASSISTANT

## 2017-10-25 PROCEDURE — 97110 THERAPEUTIC EXERCISES: CPT

## 2017-10-25 PROCEDURE — 83735 ASSAY OF MAGNESIUM: CPT | Performed by: NURSE PRACTITIONER

## 2017-10-25 PROCEDURE — 94660 CPAP INITIATION&MGMT: CPT

## 2017-10-25 PROCEDURE — 97116 GAIT TRAINING THERAPY: CPT

## 2017-10-25 PROCEDURE — 80048 BASIC METABOLIC PNL TOTAL CA: CPT | Performed by: NURSE PRACTITIONER

## 2017-10-25 PROCEDURE — 82948 REAGENT STRIP/BLOOD GLUCOSE: CPT

## 2017-10-25 PROCEDURE — 93882 EXTRACRANIAL UNI/LTD STUDY: CPT

## 2017-10-25 PROCEDURE — 97535 SELF CARE MNGMENT TRAINING: CPT

## 2017-10-25 PROCEDURE — 94760 N-INVAS EAR/PLS OXIMETRY 1: CPT

## 2017-10-25 RX ORDER — POTASSIUM CHLORIDE 20 MEQ/1
40 TABLET, EXTENDED RELEASE ORAL 2 TIMES DAILY
Status: DISCONTINUED | OUTPATIENT
Start: 2017-10-25 | End: 2017-10-25

## 2017-10-25 RX ORDER — POTASSIUM CHLORIDE 20 MEQ/1
40 TABLET, EXTENDED RELEASE ORAL 2 TIMES DAILY
Status: COMPLETED | OUTPATIENT
Start: 2017-10-25 | End: 2017-10-25

## 2017-10-25 RX ADMIN — FUROSEMIDE 80 MG: 80 TABLET ORAL at 08:21

## 2017-10-25 RX ADMIN — INSULIN LISPRO 2 UNITS: 100 INJECTION, SOLUTION INTRAVENOUS; SUBCUTANEOUS at 23:37

## 2017-10-25 RX ADMIN — VITAMIN D, TAB 1000IU (100/BT) 2000 UNITS: 25 TAB at 08:20

## 2017-10-25 RX ADMIN — Medication 150 MG: at 17:41

## 2017-10-25 RX ADMIN — INSULIN LISPRO 4 UNITS: 100 INJECTION, SOLUTION INTRAVENOUS; SUBCUTANEOUS at 08:20

## 2017-10-25 RX ADMIN — METOPROLOL TARTRATE 25 MG: 25 TABLET ORAL at 17:32

## 2017-10-25 RX ADMIN — GABAPENTIN 300 MG: 300 CAPSULE ORAL at 21:30

## 2017-10-25 RX ADMIN — ACETAMINOPHEN 650 MG: 325 TABLET, FILM COATED ORAL at 20:28

## 2017-10-25 RX ADMIN — INSULIN LISPRO 6 UNITS: 100 INJECTION, SOLUTION INTRAVENOUS; SUBCUTANEOUS at 11:44

## 2017-10-25 RX ADMIN — METOPROLOL TARTRATE 25 MG: 25 TABLET ORAL at 08:20

## 2017-10-25 RX ADMIN — PREDNISONE 50 MG: 20 TABLET ORAL at 08:21

## 2017-10-25 RX ADMIN — ASPIRIN 81 MG: 81 TABLET, COATED ORAL at 17:32

## 2017-10-25 RX ADMIN — TAMSULOSIN HYDROCHLORIDE 0.4 MG: 0.4 CAPSULE ORAL at 21:30

## 2017-10-25 RX ADMIN — ASPIRIN 81 MG: 81 TABLET, COATED ORAL at 08:21

## 2017-10-25 RX ADMIN — ATORVASTATIN CALCIUM 40 MG: 40 TABLET, FILM COATED ORAL at 17:32

## 2017-10-25 RX ADMIN — POTASSIUM CHLORIDE 40 MEQ: 1500 TABLET, EXTENDED RELEASE ORAL at 11:45

## 2017-10-25 RX ADMIN — INSULIN DETEMIR 30 UNITS: 100 INJECTION, SOLUTION SUBCUTANEOUS at 08:25

## 2017-10-25 RX ADMIN — INSULIN DETEMIR 30 UNITS: 100 INJECTION, SOLUTION SUBCUTANEOUS at 23:36

## 2017-10-25 RX ADMIN — Medication 150 MG: at 08:22

## 2017-10-25 RX ADMIN — POTASSIUM CHLORIDE 40 MEQ: 1500 TABLET, EXTENDED RELEASE ORAL at 17:32

## 2017-10-25 NOTE — PROGRESS NOTES
Consultation - Guerda Garcia 76 y o  male MRN: [id number]    Unit/Bed#: 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 for which the patient is s/p high-dose solumedrol currently maintained on high-dose prednisone resulting in hyperglycemia     Plan:  1  Type 2 diabetes mellitus with hyperglycema- 7 8 on 10/22  Hyperglycemia as a result of high-dose steroids  Currently on 50u levemir in AM and 30u qHS  12u humalog TID w/meals with correction scale alg 3 qHS and alg4 with meals  BG has been ranging between 202-471 since admission  · Would increase prandial insulin to 16u TID with meals  · Continue to decrease insulin during hospital course as hyperglycemia resolves and needs decrease  Hopefully bx will be done tomorrow with results in a few days  If negative, per rheum OK to taper steroids over 10 days  · Increase correction scale to alg 4 qHS and alg 5 with meals  · Goal -180 while in hospital  · Check microalbumin/creatinine ratio  · Patient was counseled on weight loss as well as lifestyle change    2  Left eye vision loss, possible temporal arteritis - s/p 1,000mg IV solumedrol  Currently on Prednisone 50mg daily started 10/25  · Management per vascular and Rheumatology  · Pt planned for temporal artery Bx tomorrow 10/26    CC: Diabetes Consult    History of Present Illness     HPI: Guerda Garcia is a 76y o  year old male with a past medical history of type 2 diabetes mellitus, CKD stage 4, Atrial fibrillation, chronic combined systolic/diastolic heart failure, CAD, REYNA admitted for acute Left eye vision loss with 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  Endocrinology is consulted for hyperglycemia in the setting of high-dose steroid administration  Patient is currently s/p 1,000mg IV steroids x4days    He is now on 50mg Prednisone which was started today  Per Rheumatology, the steroids will be continued at 50mg PO daily until patient has bx done and it is negative - then steroid can be rapidly taped over 10 days  Patient is planned for temporal artery bx with vascular surgery tomorrow  Patient has a history of Type II diabetes mellitus with fair control in the outpatient setting with insulin therapy for approximately 5 years  He is on Levemir 40u qHS and 12u Humalog TID with sliding scale at home  Reports compliance  Patient states he was diagnosed about 5 years ago, however he did not routinely see physicians after this  He follows with Ophtalmology  Denies Neuropathy  Denies episodes of hypoglycemia    Inpatient consult to Endocrinology  Date/Time: 10/25/2017 12:43 PM  Performed by: Toya Conrad by: Bucky Anand           Review of Systems   Constitutional: Negative for chills, fatigue and fever  Eyes: Positive for visual disturbance  Respiratory: Positive for shortness of breath  Cardiovascular: Negative for chest pain  Gastrointestinal: Negative  Endocrine: Positive for polydipsia, polyphagia and polyuria  Negative for cold intolerance and heat intolerance  Genitourinary: Positive for urgency  Musculoskeletal: Positive for arthralgias  Allergic/Immunologic: Negative  Neurological: Negative  Hematological: Negative  Psychiatric/Behavioral: Negative          Historical Information   Past Medical History:   Diagnosis Date    Anemia     Arthritis     Atrial fibrillation (HCC)     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[facility] [address] )     B12 deficiency     Back pain     Blind right eye     Chronic combined systolic and diastolic heart failure (HCC)     Chronic venous stasis dermatitis of both lower extremities     CKD (chronic kidney disease), stage IV (HCC)     Coronary artery disease     DDD (degenerative disc disease)     DM (diabetes mellitus), type 2 (HCC)     Type II, on insulin  BAER (dyspnea on exertion)     Epistaxis     Gout     Hiatal hernia     History of cellulitis     BLE    History of prostate cancer     Radiation treatments   Hypertension     Ischemic cardiomyopathy     MI, old     Neuropathy     BLE    Obesity     Obstructive sleep apnea syndrome, severe     TIA (transient ischemic attack)     Urinary incontinence     Urinary retention     Use of cane as ambulatory aid     Independent with personal care       Past Surgical History:   Procedure Laterality Date    ABDOMINAL SURGERY      CARDIAC SURGERY      CATARACT EXTRACTION, BILATERAL      CORONARY ANGIOPLASTY WITH STENT PLACEMENT      2 stents    CORONARY ARTERY BYPASS GRAFT N/A 7/15/2016    Procedure: Intraop ADRIAN, CABG x 3 using LIMA to LAD, RSVG to OM1 and D1;  Surgeon: Keshawn Quiñonez MD;  Location:  MAIN OR;  Service:    [facility]      has stents    EYE SURGERY      Bilateral cataract removal    HERNIA REPAIR      umbilical hernia repair    LUNG CANCER SURGERY      Partial upper right lobectomy    PILONIDAL CYST EXCISION      RI CYSTOURETHROSCOPY,URETER CATHETER Left 3/9/2016    Procedure: CYSTOSCOPY WITH left RETROGRADE PYELOGRAM left double J stent removal;  Surgeon: Lizz Miller MD;  Location: [facility];  Service: Urology    VASCULAR SURGERY       Social History   History   Alcohol Use No     History   Drug Use No     History   Smoking Status    Former Smoker    Packs/day: 2 00    Years: 54 00    Quit date: 2004   Smokeless Tobacco    Never Used     Comment: Pt is a non smoker     Family History:   Family History   Problem Relation Age of Onset    Diabetes Other     Hypertension Other     Cancer Other     Diabetes Mother     Heart disease Mother     Hypertension Mother     Diabetes Father     Diabetes Maternal Grandmother        Meds/Allergies   Current Facility-Administered Medications   Medication Dose Route Frequency Provider Last Rate Last Dose    acetaminophen (TYLENOL) tablet 650 mg  650 mg Oral Q4H PRN Burk Adriano, DO   650 mg at 10/22/17 2222    aspirin (ECOTRIN LOW STRENGTH) EC tablet 81 mg  81 mg Oral BID Burk Adriano, DO   81 mg at 10/25/17 7470    atorvastatin (LIPITOR) tablet 40 mg  40 mg Oral Daily With Driss Cortez PA-C   40 mg at 10/24/17 1728    cholecalciferol (VITAMIN D3) tablet 2,000 Units  2,000 Units Oral Daily Burk Adriano, DO   2,000 Units at 10/25/17 0820    docusate sodium (COLACE) capsule 100 mg  100 mg Oral BID PRN Burk Adriano, DO        gabapentin (NEURONTIN) capsule 300 mg  300 mg Oral HS Burk Adriano, DO   300 mg at 10/24/17 2226    insulin detemir (LEVEMIR) subcutaneous injection 30 Units  30 Units Subcutaneous QAM Frankie Quiroz PA-C   30 Units at 10/25/17 0825    insulin detemir (LEVEMIR) subcutaneous injection 50 Units  50 Units Subcutaneous HS Digna Walters MD   50 Units at 10/24/17 2226    insulin lispro (HumaLOG) 100 units/mL subcutaneous injection 1-6 Units  1-6 Units Subcutaneous HS Saira Lion PA-C   4 Units at 10/24/17 2227    insulin lispro (HumaLOG) 100 units/mL subcutaneous injection 12 Units  12 Units Subcutaneous TID With Meals Frankie Quiroz PA-C   12 Units at 10/25/17 1144    insulin lispro (HumaLOG) 100 units/mL subcutaneous injection 2-12 Units  2-12 Units Subcutaneous TID [facility] Saira Lion PA-C   6 Units at 10/25/17 1144    iron polysaccharides (NIFEREX) capsule 150 mg  150 mg Oral BID Digna Walters MD   150 mg at 10/25/17 0903    metoprolol tartrate (LOPRESSOR) tablet 25 mg  25 mg Oral BID Burk Adriano, DO   25 mg at 10/25/17 0820    ondansetron (ZOFRAN) injection 4 mg  4 mg Intravenous Q6H PRN Burk Adriano, DO        perflutren lipid microsphere (DEFINITY) injection  1 mL/min Intravenous Once in imaging Yamini Courtney MD        potassium chloride (K-DUR,KLOR-CON) CR tablet 40 mEq  40 mEq Oral BID Lenora Aguilar PA-C   40 mEq at 10/25/17 1145    predniSONE tablet 50 mg  50 mg Oral Daily Kimmy Hurtado PA-C   50 mg at 10/25/17 6412    tamsulosin (FLOMAX) capsule 0 4 mg  0 4 mg Oral ROYER Smith DO   0 4 mg at 10/24/17 2226     Allergies   Allergen Reactions    Other Rash     Adhesive tape       Objective   Vitals: Blood pressure 119/61, pulse 76, temperature 98 3 °F (36 8 °C), resp  rate 20, height 5' 7" (1 702 m), weight (!) 138 kg (303 lb 5 7 oz), SpO2 97 %  Intake/Output Summary (Last 24 hours) at 10/25/17 1242  Last data filed at 10/25/17 0900   Gross per 24 hour   Intake              680 ml   Output             3725 ml   Net            -3045 ml     Invasive Devices     Peripheral Intravenous Line            Peripheral IV 08/25/17 61 days    Peripheral IV 10/22/17 Left Antecubital 3 days                Physical Exam   Constitutional: He is oriented to person, place, and time  He appears well-developed and well-nourished  Obese habitus   HENT:   Head: Normocephalic and atraumatic  Eyes: Pupils are equal, round, and reactive to light  Cardiovascular: Normal rate and regular rhythm  Exam reveals friction rub  Exam reveals no gallop  No murmur heard  Pulmonary/Chest: Effort normal  No respiratory distress  He has no wheezes  He has no rales  Abdominal: Soft  Musculoskeletal: Normal range of motion  Neurological: He is alert and oriented to person, place, and time  Skin: Skin is warm and dry  Vitals reviewed        The history was obtained from the review of the chart and patient    Lab Results:     Results from last 7 days  Lab Units 10/22/17  0508   HEMOGLOBIN A1C % 7 8*     Lab Results   Component Value Date    WBC 11 72 (H) 10/25/2017    HGB 14 3 10/25/2017    HCT 42 0 10/25/2017    MCV 87 10/25/2017     10/25/2017     Lab Results   Component Value Date/Time    BUN 81 (H) 10/25/2017 06:06 AM    BUN 30 (H) 09/16/2015 11:37 AM     (L) 10/25/2017 06:06 AM     09/16/2015 11:37 AM    K 2 9 (L) 10/25/2017 06:06 AM    K 5 3 09/16/2015 11:37 AM    CL 92 (L) 10/25/2017 06:06 AM     09/16/2015 11:37 AM    CO2 32 10/25/2017 06:06 AM    CO2 26 9 09/16/2015 11:37 AM    CREATININE 3 02 (H) 10/25/2017 06:06 AM    CREATININE 1 62 (H) 09/16/2015 11:37 AM    AST 17 08/16/2017 06:33 PM    AST 18 09/16/2015 11:37 AM    ALT 23 08/16/2017 06:33 PM    ALT 31 09/16/2015 11:37 AM     No results for input(s): CHOL, HDL, LDL, TRIG, VLDL in the last 72 hours  No results found for: Iker Arreola  POC Glucose (mg/dl)   Date Value   10/25/2017 292 (H)   10/25/2017 220 (H)   10/24/2017 307 (H)   10/24/2017 210 (H)   10/24/2017 312 (H)   10/24/2017 270 (H)   10/23/2017 382 (H)   10/23/2017 283 (H)   10/23/2017 364 (H)   10/23/2017 298 (H)       Imaging Studies: I have personally reviewed pertinent reports  Ct Head Without Contrast    Result Date: 10/21/2017  Impression: No acute intracranial abnormality  Workstation performed: [id number]     Mri Brain Wo Contrast    Result Date: 10/24/2017  Impression: No acute intracranial abnormality  Mild chronic microvascular ischemia  Workstation performed: [id number]         Portions of the record may have been created with voice recognition software      Lenard Tamayo MD PGY2

## 2017-10-25 NOTE — PHYSICAL THERAPY NOTE
Physical Therapy Progress Note     10/25/17 0843   Pain Assessment   Pain Assessment No/denies pain   Pain Score No Pain   Restrictions/Precautions   Weight Bearing Precautions Per Order No   Other Precautions O2;Telemetry; Fall Risk   General   Chart Reviewed Yes   Response to Previous Treatment Patient with no complaints from previous session  Family/Caregiver Present No   Cognition   Overall Cognitive Status WFL   Arousal/Participation Alert; Cooperative   Attention Within functional limits   Orientation Level Oriented X4   Memory Within functional limits   Following Commands Follows all commands and directions without difficulty   Subjective   Subjective THE PT  REPORTS BEIGN ANXIOUS TO RETURN HOME  PT  REPORTS HAVIGN RAMP TO ENTER  Bed Mobility   Rolling R 5  Supervision   Rolling L 5  Supervision   Supine to Sit 5  Supervision   Transfers   Sit to Stand 5  Supervision   Additional items Assist x 1; Armrests; Verbal cues  (HAND PLACEMEN)   Stand to Sit 5  Supervision   Additional items Assist x 1;Verbal cues  (HAND PLACEMENT)   Ambulation/Elevation   Gait pattern Wide NICCI; Decreased foot clearance  (PT  LAGS SLIGHT BEHIND ROLLER WALKER )   Gait Assistance 5  Supervision   Additional items Assist x 1;Verbal cues; Tactile cues  (POSTURE/PACING)   Assistive Device Bariatric Rolling walker   Distance 120'X2 WITH APPROPIAT RESTS  (ROOM AIR AT REST 98% GAIT 89%)   Stair Management Assistance Not tested  (RAMP TO ENTER)   Balance   Static Sitting Good   Static Standing Fair   Ambulatory Fair -   Endurance Deficit   Endurance Deficit Yes   Endurance Deficit Description GENERALIZED DECONDITIONING  Activity Tolerance   Activity Tolerance Patient limited by fatigue   Nurse Made Aware SPOK WITH NATIVIDAD   Exercises   TKR Sitting;10 reps;AROM; Bilateral  (WRITTEN EXERCISE PRGORAM ISSUED )   Assessment   Prognosis Fair   Problem List Decreased endurance;Decreased mobility; Decreased strength; Impaired balance  (DECREASED VISUAL ACCUITY)   Assessment AT THIS TIME, THE PT  REQURIES ONGOING VERBAL INSTRUCTION RELATED TO PACING WITH NOTED DROP IN ROOM AIR SAT FROM 98% AT REST TO 89% WITH UPRIGHT MOBILITY  GIVEN SUSTAINED FUNCTIONAL GAINS, THE RECCOMENDATION FOR RETURN TO HOME AND SERVICES REMAINS APPPROPIATE  Goals   Patient Goals TO GO HOME   STG Expiration Date 11/02/17   Treatment Day 2   Plan   Treatment/Interventions Functional transfer training;LE strengthening/ROM; Therapeutic exercise; Endurance training;Bed mobility;Gait training; Compensatory technique education   Progress Improving as expected   PT Frequency 5x/wk   Recommendation   Recommendation Home PT; Home with family support   Equipment Recommended Bernadine Nj   PT - OK to Discharge ([address], PTA

## 2017-10-25 NOTE — PROGRESS NOTES
NEPHROLOGY PROGRESS NOTE   Dustin Schroeder 76 y o  male MRN: [id number]  Unit/Bed#: [facility] 731-01 Encounter: [id number]      ASSESSMENT and PLAN:  1  CKD IV: baseline creatinine 2 8-3 2    -due to DM and hypertension   -follows with Dr Michael West    -creatinine increased from 2 7 to 3 today which could be intravascular volume depletion    -negative 7 2L since admission and weights trending down    -had prn metolazone given past 2 days although he did not have 4lb weight gain as ordered    -d/c metolazone    -hold lasix (had dose today) until seen by nephrology tomorrow   2  Possible temporal arteritis: possible biopsy later this week per vascular note and eliquis is on hold    -rheum on board as well   3  Diastolic CHF: diuretic adjustment as above   -if creatinine stable tomorrow can give lasix but continue to hold metolazone   4  Hypokalemia: given 40meq x 1 today  Will add another 40meq x 2 for a total of 120meq today as K dropped from yesterday with 40meq x 1 and he had diuretics today   5  Hypertension: BP ok overall       SUBJECTIVE:  Feeling ok today  Some mild dyspnea after going for a walk but none at rest  High urine output the past few days       OBJECTIVE:  Current Weight: Weight - Scale: (!) 138 kg (303 lb 5 7 oz)  Vitals:    10/25/17 0802   BP: 119/61   Pulse: 76   Resp: 20   Temp: 98 3 °F (36 8 °C)   SpO2: 97%       Intake/Output Summary (Last 24 hours) at 10/25/17 1114  Last data filed at 10/25/17 0900   Gross per 24 hour   Intake              680 ml   Output             3725 ml   Net            -3045 ml     General: no acute distress   Skin: no rash   Eyes: sclera anicteric   ENT: moist mucous membranes   Neck: supple, symmetric   Chest: clear to auscultation    CVS: regular rate and rhythm  Abdomen:  Soft, non-tender, non-distended   Extremities: trace edema   Neuro: awake and alert   Psych: appropriate affect     Medications:  Scheduled Meds:  aspirin 81 mg Oral BID   atorvastatin 40 mg Oral Daily With Dinner   cholecalciferol 2,000 Units Oral Daily   gabapentin 300 mg Oral HS   insulin detemir 30 Units Subcutaneous QAM   insulin detemir 50 Units Subcutaneous HS   insulin lispro 1-6 Units Subcutaneous HS   insulin lispro 12 Units Subcutaneous TID With Meals   insulin lispro 2-12 Units Subcutaneous TID AC   iron polysaccharides 150 mg Oral BID   metoprolol tartrate 25 mg Oral BID   potassium chloride 40 mEq Oral BID   predniSONE 50 mg Oral Daily   tamsulosin 0 4 mg Oral HS       PRN Meds:   acetaminophen    docusate sodium    ondansetron    perflutren lipid microsphere    Laboratory Results:  Lab Results   Component Value Date    WBC 11 72 (H) 10/25/2017    HGB 14 3 10/25/2017    HCT 42 0 10/25/2017    MCV 87 10/25/2017     10/25/2017     Lab Results   Component Value Date    GLUCOSE 202 (H) 10/25/2017    CALCIUM 9 5 10/25/2017     (L) 10/25/2017    K 2 9 (L) 10/25/2017    CO2 32 10/25/2017    CL 92 (L) 10/25/2017    BUN 81 (H) 10/25/2017    CREATININE 3 02 (H) 10/25/2017     Lab Results   Component Value Date    CALCIUM 9 5 10/25/2017    PHOS 5 4 (H) 03/10/2017

## 2017-10-25 NOTE — PLAN OF CARE
Problem: PHYSICAL THERAPY ADULT  Goal: Performs mobility at highest level of function for planned discharge setting  See evaluation for individualized goals  Treatment/Interventions: LE strengthening/ROM, Therapeutic exercise, Functional transfer training, Endurance training, Gait training, Bed mobility          See flowsheet documentation for full assessment, interventions and recommendations  Outcome: Progressing  Prognosis: Fair  Problem List: Decreased endurance, Decreased mobility, Decreased strength, Impaired balance (DECREASED VISUAL ACCUITY)  Assessment: AT THIS TIME, THE PT  REQURIES ONGOING VERBAL INSTRUCTION RELATED TO PACING WITH NOTED DROP IN ROOM AIR SAT FROM 98% AT REST TO 89% WITH UPRIGHT MOBILITY  GIVEN SUSTAINED FUNCTIONAL GAINS, THE RECCOMENDATION FOR RETURN TO HOME AND SERVICES REMAINS APPPROPIATE  Recommendation: Home PT, Home with family support     PT - OK to Discharge: (S)  (ONCE MEDICALLY CLEARED O RETURN OME )    See flowsheet documentation for full assessment     Ginger Mcrae PTA

## 2017-10-25 NOTE — PROGRESS NOTES
Vascular Surgery  Progress Note  Addendum    Pt planned for b/l temporal artery bx tomorrow, Thurs, 10/26  Temp a   Duplex (p)      Rafal Lewis PA-C  10/25/2017

## 2017-10-25 NOTE — PROGRESS NOTES
[facility] Internal Medicine Progress Note  Patient: Yumiko Driver 76 y o  male   MRN: [id number]  PCP: Laura Dillon DO  [facility] Encounter: [id number]  Date Of Visit: 10/25/17    Assessment:    Principal Problem:    Vision loss, 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  Active Problems: Morbid obesity ([address] )    Type 2 diabetes mellitus with hyperglycemia ([facility])    CKD (chronic kidney disease) stage 4, GFR 15-29 ml/min ([facility])    Atrial fibrillation (HCC)    Blind right eye    Chronic combined systolic and diastolic heart failure (HCC)    Coronary artery disease    Obstructive sleep apnea syndrome, severe    Hypokalemia      Plan:    Acute left eye vision loss, concern for possible temporal arteritis  · Appreciate involvement from Rheumatology, vascular surgery, Ophthalmology and Neurology  · Temporal artery biopsy is planned for tomorrow  · Continue high-dose steroids in the meantime    Type 2 diabetes with hyperglycemia related to steroids  · Appreciate Endocrinology assistance given persistent hyperglycemia--defer to them regarding plan for blood sugars given nothing by mouth after midnight    CKD4  · Appreciate involvement from Nephrology, his creatinine remains within his baseline which is 2 8-3 2  · Diuretics were placed on hold and volume status will be monitored closely  · Hypokalemia-repletion order by renal    Chronic combined systolic and diastolic heart failure  · Currently appears euvolemic  · Also on asa/statin/BB for history of CAD    Chronic afib  · eliquis on hold for biopsy tomorrow    REYNA  · Continue CPAP at night    Patient also apparently has supplemental oxygen which he reportedly does not wear consistently  Morbid obesity  · Recommend weight loss    VTE Pharmacologic Prophylaxis:   Pharmacologic: eliquis on hold  Mechanical VTE Prophylaxis in Place: No    Patient Centered Rounds: spoke with nurse    Discussions with Specialists or Other Care Team Provider: case mgmt; also rounded with neurology    Education and Discussions with Family / Patient: called wife with update    Time Spent for Care: 30 minutes  More than 50% of total time spent on counseling and coordination of care as described above  Current Length of Stay: 4 day(s)    Current Patient Status: Inpatient   Certification Statement: The patient will continue to require additional inpatient hospital stay due to biopsy tomorrow    Discharge Plan / Estimated Discharge Date:     Code Status: Level 1 - Full Code      Subjective:   Patient says he hopes to get out of here by Friday and he is tired of being here  He denies any headache, nausea, abdominal pain, shortness of breath, weakness, numbness or tingling  He reports ongoing blurry vision in the left eye which is unchanged  He barely has any vision in his right eye chronically  Objective:     Vitals:   Temp (24hrs), Av 2 °F (36 8 °C), Min:97 8 °F (36 6 °C), Max:98 5 °F (36 9 °C)    HR:  [52-76] 76  Resp:  [20-22] 20  BP: (119-134)/(61-76) 119/61  SpO2:  [96 %-98 %] 97 %  Body mass index is 47 51 kg/m²  Input and Output Summary (last 24 hours): Intake/Output Summary (Last 24 hours) at 10/25/17 1342  Last data filed at 10/25/17 0900   Gross per 24 hour   Intake              680 ml   Output             3325 ml   Net            -2645 ml       Physical Exam:     Physical Exam   Constitutional: No distress  HENT:   Head: Normocephalic and atraumatic  Eyes: Conjunctivae are normal  Right eye exhibits no discharge  Left eye exhibits no discharge  No scleral icterus  Neck:   Large jowls   Cardiovascular: Normal rate and regular rhythm  No murmur heard  Pulmonary/Chest: Breath sounds normal  No respiratory distress  He has no wheezes  He has no rales  NC o2 in place   Abdominal: Soft  Morbidly obese   Musculoskeletal:   Chronic venous stasis, mild BLE edema   Neurological: He is alert  Awake alert and interactive   Skin: Skin is warm and dry  No rash noted   He is not diaphoretic  No erythema  No pallor  Nursing note and vitals reviewed  Additional Data:     Labs:      Results from last 7 days  Lab Units 10/25/17  0606  10/21/17  1601   WBC Thousand/uL 11 72*  < > 6 83   HEMOGLOBIN g/dL 14 3  < > 13 4   HEMATOCRIT % 42 0  < > 41 9   PLATELETS Thousands/uL 247  < > 233   NEUTROS PCT %  --   --  73   LYMPHS PCT %  --   --  16   MONOS PCT %  --   --  7   EOS PCT %  --   --  3   < > = values in this interval not displayed  Results from last 7 days  Lab Units 10/25/17  0606   SODIUM mmol/L 135*   POTASSIUM mmol/L 2 9*   CHLORIDE mmol/L 92*   CO2 mmol/L 32   BUN mg/dL 81*   CREATININE mg/dL 3 02*   CALCIUM mg/dL 9 5   GLUCOSE RANDOM mg/dL 202*           * I Have Reviewed All Lab Data Listed Above  * Additional Pertinent Lab Tests Reviewed: [facility] [address] Admission Reviewed    Imaging:    Imaging Reports Reviewed Today Include:   Imaging Personally Reviewed by Myself Includes:      Recent Cultures (last 7 days):           Last 24 Hours Medication List:     aspirin 81 mg Oral BID   atorvastatin 40 mg Oral Daily With Dinner   cholecalciferol 2,000 Units Oral Daily   gabapentin 300 mg Oral HS   insulin detemir 30 Units Subcutaneous QAM   insulin detemir 50 Units Subcutaneous HS   insulin lispro 1-6 Units Subcutaneous HS   insulin lispro 12 Units Subcutaneous TID With Meals   insulin lispro 2-12 Units Subcutaneous TID AC   iron polysaccharides 150 mg Oral BID   metoprolol tartrate 25 mg Oral BID   potassium chloride 40 mEq Oral BID   predniSONE 50 mg Oral Daily   tamsulosin 0 4 mg Oral HS        Today, Patient Was Seen By: America Lopez PA-C    ** Please Note: This note has been constructed using a voice recognition system   **

## 2017-10-25 NOTE — PROGRESS NOTES
Progress Note - Neurology   Manjula Gamble 76 y o  male MRN: [id number]  Unit/Bed#: [facility] 731-01 Encounter: [id number]    Assessment:  3 76year old male admitted with L vision change  2  DM   3  CKD  4  Atrial fibrillation  5  HTN    Plan:  1  No evidence of ischemia on MRI brain and carotid artery dopplers with no evidence of significant stenosis  2  Defer work-up for GCA to rheumatology  Patient to have temporal artery biopsy performed tomorrow  3  Would restart Eliquis for stroke prevention in setting of Afib when okay with vascular surgery team    4  Continue on atorvastatin 40 mg QPM    5  Defer management of DM, CHF to primary team  Defer management of CKD to nephrology   6  Neurology team will sign off at this time  Please call with questions  7  Above case and plan discussed with Dr Artem Barney and she is in agreement with above  Subjective:   Hossein Zeng is a 76year old male admitted with vision loss in the left eye and 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  Mr Ebonie Hollingsworth notes that he continues to have difficulty with vision in his left eye but otherwise denies complaints   He notes he is ready to get out of the hospital, but is aware that he will be having temporal artery biopsy first      ROS:  History obtained from the patient  General ROS: negative for - chills, fatigue or fever  Ophthalmic ROS: positive for - decreased vision  Respiratory ROS: negative for - shortness of breath  Cardiovascular ROS: negative for - chest pain  Gastrointestinal ROS: negative for - abdominal pain or nausea/vomiting  Musculoskeletal ROS: negative for - muscular weakness  Neurological ROS: negative for - dizziness, headaches, numbness/tingling, speech problems, visual changes or weakness    Medications  Scheduled Meds:  aspirin 81 mg Oral BID   atorvastatin 40 mg Oral Daily With Dinner   cholecalciferol 2,000 Units Oral Daily   gabapentin 300 mg Oral HS   insulin detemir 30 Units Subcutaneous QAM   insulin detemir 50 Units Subcutaneous HS   insulin lispro 1-6 Units Subcutaneous HS   insulin lispro 12 Units Subcutaneous TID With Meals   insulin lispro 2-12 Units Subcutaneous TID AC   iron polysaccharides 150 mg Oral BID   metoprolol tartrate 25 mg Oral BID   potassium chloride 40 mEq Oral BID   predniSONE 50 mg Oral Daily   tamsulosin 0 4 mg Oral HS     Continuous Infusions:   PRN Meds:   acetaminophen    docusate sodium    ondansetron    perflutren lipid microsphere    Vitals: Blood pressure 119/61, pulse 76, temperature 98 3 °F (36 8 °C), resp  rate 20, height 5' 7" (1 702 m), weight (!) 138 kg (303 lb 5 7 oz), SpO2 97 %  ,Body mass index is 47 51 kg/m²      Physical Exam: /61   Pulse 76   Temp 98 3 °F (36 8 °C)   Resp 20   Ht 5' 7" (1 702 m)   Wt (!) 138 kg (303 lb 5 7 oz)   SpO2 97%   BMI 47 51 kg/m²   General appearance: alert, appears stated age and cooperative  Head: Normocephalic, without obvious abnormality, atraumatic  Eyes: negative findings: lids and lashes normal, conjunctivae and sclerae normal, pupils equal, round, reactive to light and accomodation and left inferior quadrantanopia  Lungs: clear to auscultation bilaterally  Heart: irregularly irregular rhythm  Abdomen: soft, non-tender; bowel sounds normal; no masses,  no organomegaly  Extremities: extremities normal, atraumatic, no cyanosis or edema  Skin: Skin color, texture, turgor normal  No rashes or lesions  Neurologic: Mental status: Alert, oriented, thought content appropriate  Cranial nerves: II: visual field lower quadrantanopia, II: pupils equal, round, reactive to light and accommodation, III,VII: ptosis No ptosis noted, III,IV,VI: extraocular muscles extra-ocular motions intact, VII: upper facial muscle function normal bilaterally, VII: lower facial muscle function normal bilaterally, XII: tongue strength  normal  Sensory: normal, Grossly intact to crude touch  Motor:Motor strength 5/5 B/L UE and LE    Labs  Recent Results (from the past 24 hour(s))   Fingerstick Glucose (POCT)    Collection Time: 10/24/17  4:06 PM   Result Value Ref Range    POC Glucose 210 (H) 65 - 140 mg/dl   Fingerstick Glucose (POCT)    Collection Time: 10/24/17  9:15 PM   Result Value Ref Range    POC Glucose 307 (H) 65 - 140 mg/dl   Basic metabolic panel    Collection Time: 10/25/17  6:06 AM   Result Value Ref Range    Sodium 135 (L) 136 - 145 mmol/L    Potassium 2 9 (L) 3 5 - 5 3 mmol/L    Chloride 92 (L) 100 - 108 mmol/L    CO2 32 21 - 32 mmol/L    Anion Gap 11 4 - 13 mmol/L    BUN 81 (H) 5 - 25 mg/dL    Creatinine 3 02 (H) 0 60 - 1 30 mg/dL    Glucose 202 (H) 65 - 140 mg/dL    Calcium 9 5 8 3 - 10 1 mg/dL    eGFR 19 ml/min/1 73sq m   Magnesium    Collection Time: 10/25/17  6:06 AM   Result Value Ref Range    Magnesium 2 8 (H) 1 6 - 2 6 mg/dL   CBC    Collection Time: 10/25/17  6:06 AM   Result Value Ref Range    WBC 11 72 (H) 4 31 - 10 16 Thousand/uL    RBC 4 82 3 88 - 5 62 Million/uL    Hemoglobin 14 3 12 0 - 17 0 g/dL    Hematocrit 42 0 36 5 - 49 3 %    MCV 87 82 - 98 fL    MCH 29 7 26 8 - 34 3 pg    MCHC 34 0 31 4 - 37 4 g/dL    RDW 14 9 11 6 - 15 1 %    Platelets 956 932 - 698 Thousands/uL    MPV 10 4 8 9 - 12 7 fL   Fingerstick Glucose (POCT)    Collection Time: 10/25/17  7:26 AM   Result Value Ref Range    POC Glucose 220 (H) 65 - 140 mg/dl   Fingerstick Glucose (POCT)    Collection Time: 10/25/17 10:54 AM   Result Value Ref Range    POC Glucose 292 (H) 65 - 140 mg/dl     Imaging   Personally reviewed images and reports in PACs  MRI brain without contrast- No acute intracranial abnormality  Mild chronic microvascular ischemia  Carotid ultrasound- <50% stenosis noted B/L ICA, plaque is homogenous and smooth  Vertebral artery flow is antegrade B/L  No significant subclavian artery disease  VTE Prophylaxis: Eliquis held for temoral artery biopsy    Counseling / Coordination of Care  Total time spent today 20 minutes   Greater than 50% of total time was spent with the patient and / or family counseling and / or coordination of care  A description of the counseling / coordination of care: Discussed results of MRI brain and carotid dopplers with patient at bedside  No evidence of acute ischemia noted  Defer further work-up for vision change to rheumatology

## 2017-10-25 NOTE — CASE MANAGEMENT
Continued Stay Review    Date: 10-25-17   Vital Signs: /61   Pulse 76   Temp 98 3 °F (36 8 °C)   Resp 20   Ht 5' 7" (1 702 m)   Wt (!) 138 kg (303 lb 5 7 oz)   SpO2 97%   BMI 47 51 kg/m²     Medications:   Scheduled Meds:   aspirin 81 mg Oral BID   atorvastatin 40 mg Oral Daily With Dinner   cholecalciferol 2,000 Units Oral Daily   gabapentin 300 mg Oral HS   insulin detemir 30 Units Subcutaneous QAM   insulin detemir 50 Units Subcutaneous HS   insulin lispro 1-6 Units Subcutaneous HS   insulin lispro 12 Units Subcutaneous TID With Meals   insulin lispro 2-12 Units Subcutaneous TID AC   iron polysaccharides 150 mg Oral BID   metoprolol tartrate 25 mg Oral BID   potassium chloride 40 mEq Oral BID   predniSONE 50 mg Oral Daily   tamsulosin 0 4 mg Oral HS     Continuous Infusions:    PRN Meds:   acetaminophen    docusate sodium    ondansetron    perflutren lipid microsphere      Age/Sex: 76 y o  male     Assessment/Plan:     INTERNAL MEDICINE   Principal Problem:    Vision loss, 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  Active Problems: Morbid obesity ([address] )    CKD (chronic kidney disease) stage 4, GFR 15-29 ml/min ([facility])    Atrial fibrillation ([facility])    Blind right eye    Chronic combined systolic and diastolic heart failure ([address] )    Coronary artery disease    DM (diabetes mellitus), type 2 with peripheral neuropathy    Obstructive sleep apnea syndrome, severe        Plan:     · Acute left eye vision loss  ? Ophthalmology, Neurology, vascular surgery, and rheumatology are following  ? Patient reports slight improvement in "cloudiness" of left vision today  ? Concern for vascular versus inflammatory cause  MRI of the brain was negative for acute intracranial abnormality  His ESR is elevated (37 on admission, 18 on repeat today) however he denies headache   Vascular surgery to proceed with bilateral temporal artery biopsy - Eliquis on hold by vascular surgery for this - defer to vascular surgery when patient may resume anticoagulation  He has received 4 doses of 1,000mg IV solu-medrol  We will de-escalate steroids to PO (50mg daily) per the recommendations of Dr Cuauhtemoc Murphy   ? Follow up with ophthalmologist at [facility] upon discharge  · Type 2 diabetes mellitus with hyperglycemia  ? HbA1c 7 8  Patient has been on IV steroids, 1,000mg daily for the last 4 days  Suspect this is cause of hyperglycemia  His insulin was increased yesterday to his current dosage which is 30 units Levemir QAM and 50 units Levemir QPM along with scheduled mealtime Humalog 12 units with sliding scale  Steroid plan to de-escalate from IV to PO beginning tomorrow, thus would appreciate the consultation of the endocrinology team regarding recommendations given steroid dosage changes  · Coronary artery disease  ? Continue aspirin, statin, beta-blocker  Patient continues to deny chest pain today  · Chronic systolic and diastolic congestive heart failure without acute exacerbation  ? Continue 80 mg p o  daily Lasix  Echo pending  · Atrial fibrillation  ? Heart rate controlled  Continue beta-blocker  Eliquis is on hold per vascular surgery for temporal artery biopsy  Defer to vascular surgery when anticoagulation may be resumed status post biopsy  · Obstructive sleep apnea  ? Continue CPAP QHS - patient reports compliance with this  · Chronic right eye vision loss  ? Secondary to prior CVA  Continue aspirin and statin  Supportive care  Follow up with [facility] as outpatient  · Chronic kidney disease stage 4  ? Nephrology following  Baseline creatinine 2 8-3 2  Creatinine at baseline  · Hypokalemia  ? Replete PO and monitor  Consider daily supplementation since he is on daily diuretics   · Leukocytosis  ? Suspect steroid-induced         VTE Pharmacologic Prophylaxis:   Pharmacologic: Pharmacologic VTE Prophylaxis contraindicated due to temporal artery biopsy  Mechanical VTE Prophylaxis in Place:  Yes     Patient Centered Rounds: I have performed bedside rounds with nursing staff today  Claudy Lyon     Discussions with Specialists or Other Care Team Provider: Discussed with ADAM attending Dr Sanya Rivera  Discussed with nursing, Claudy Lyon       Education and Discussions with Family / Patient: Patient; when asked if there was anyone he would like me to call with an update, the patient kindly declined and informed me that he would inform his wife who is coming in later today to visit him      Time Spent for Care: 30 minutes  More than 50% of total time spent on counseling and coordination of care as described above      Current Length of Stay: 3 day(s)     Current Patient Status: Inpatient   Certification Statement: The patient will continue to require additional inpatient hospital stay due to continued work up for left eye vision loss      RHEUMATOLOGY CONSULT  10-23    Impression:     1  Possible temporal/giant cell arteritis as cause of left visual loss  However, he has atypical findings including lack of any headaches, excellent pulsations of the temporal artery throughout their bilateral course, only mildly elevated sed rate of 37  Left eye visual loss is hemianopsia and did not change in the 5-6 days prior to admission  which might suggest occipital lobe infarct  He did have prior acute visual loss of his right eye 1 year ago said to be stroke  Patient denies any pain of PMR, jaw claudication, headache or other warning signs      2   Longstanding diabetes mellitus that is insulin dependent, associated with neuropathy etc     3  Multiple medical problems including resection of lung cancer, probable COPD, oxygen dependency, coronary artery bypass, obstructive sleep apnea and chronic kidney disease      Recommendation:     1  Agree with bilateral temporal artery biopsy to help guide whether he needs steroids are not  Await temp artery ultrasound as well  Unfortunately there has been no improvement in his vision with the 36 hours of IV steroids      2  Recheck sed rate and CRP tomorrow      3  After he has finished his 3rd day of IV Solu-Medrol 1000 mg, suggest using prednisone about 50 mg p o  daily  If the TA biopsy has come back negative, then rapidly taper over 10 days  GENERAL SURGERY   Assessment:  Pt is a 72y o  M with with decreased left eye vision suspected of having giant cell arteritis      Plan:  F/u temporal duplex  Cont   Steroids  Hold eliquis for possible OR later this week, will need biopsy per Rheum      Discharge Plan: HOME

## 2017-10-25 NOTE — PROGRESS NOTES
Progress Note - General Surgery   Mojgan Fuel 76 y o  male MRN: [id number]  Unit/Bed#: [facility] 731-01 Encounter: [id number]    Assessment:  Pt is a 72y o  M with with decreased left eye vision suspected of having giant cell arteritis     Plan:  F/u temporal duplex  Cont  Steroids  Hold eliquis for possible OR later this week, will need biopsy per Rheum    Subjective/Objective   Chief Complaint:     Subjective: SUZE  No changes in vision or neuro exam      Objective:     Blood pressure 134/62, pulse (!) 52, temperature 98 5 °F (36 9 °C), temperature source Oral, resp  rate 20, height 5' 7" (1 702 m), weight (!) 144 kg (317 lb 7 4 oz), SpO2 98 %  ,Body mass index is 49 72 kg/m²  Intake/Output Summary (Last 24 hours) at 10/25/17 0403  Last data filed at 10/24/17 2350   Gross per 24 hour   Intake              680 ml   Output             3150 ml   Net            -2470 ml       Invasive Devices     Peripheral Intravenous Line            Peripheral IV 08/25/17 60 days    Peripheral IV 10/22/17 Left Antecubital 2 days                Physical Exam: NAD  AAOX3  RRR  Normal respiratory effort  Soft, NT, ND  Visual acuity b/l diminished      Lab, Imaging and other studies:I have personally reviewed pertinent lab results      VTE Pharmacologic Prophylaxis: held for OR  VTE Mechanical Prophylaxis: sequential compression device

## 2017-10-26 ENCOUNTER — GENERIC CONVERSION - ENCOUNTER (OUTPATIENT)
Dept: OTHER | Facility: OTHER | Age: 77
End: 2017-10-26

## 2017-10-26 PROBLEM — N17.9 AKI (ACUTE KIDNEY INJURY) (HCC): Status: ACTIVE | Noted: 2017-10-26

## 2017-10-26 LAB
ANION GAP SERPL CALCULATED.3IONS-SCNC: 7 MMOL/L (ref 4–13)
BACTERIA UR QL AUTO: NORMAL /HPF
BILIRUB UR QL STRIP: NEGATIVE
BUN SERPL-MCNC: 97 MG/DL (ref 5–25)
CALCIUM SERPL-MCNC: 8.6 MG/DL (ref 8.3–10.1)
CHLORIDE SERPL-SCNC: 93 MMOL/L (ref 100–108)
CLARITY UR: CLEAR
CO2 SERPL-SCNC: 38 MMOL/L (ref 21–32)
COLOR UR: YELLOW
CREAT SERPL-MCNC: 3.32 MG/DL (ref 0.6–1.3)
GFR SERPL CREATININE-BSD FRML MDRD: 17 ML/MIN/1.73SQ M
GLUCOSE SERPL-MCNC: 106 MG/DL (ref 65–140)
GLUCOSE SERPL-MCNC: 146 MG/DL (ref 65–140)
GLUCOSE SERPL-MCNC: 274 MG/DL (ref 65–140)
GLUCOSE SERPL-MCNC: 53 MG/DL (ref 65–140)
GLUCOSE SERPL-MCNC: 56 MG/DL (ref 65–140)
GLUCOSE SERPL-MCNC: 59 MG/DL (ref 65–140)
GLUCOSE SERPL-MCNC: 65 MG/DL (ref 65–140)
GLUCOSE SERPL-MCNC: 90 MG/DL (ref 65–140)
GLUCOSE UR STRIP-MCNC: NEGATIVE MG/DL
HGB UR QL STRIP.AUTO: NEGATIVE
HYALINE CASTS #/AREA URNS LPF: NORMAL /LPF
KETONES UR STRIP-MCNC: NEGATIVE MG/DL
LEUKOCYTE ESTERASE UR QL STRIP: NEGATIVE
NITRITE UR QL STRIP: NEGATIVE
NON-SQ EPI CELLS URNS QL MICRO: NORMAL /HPF
PH UR STRIP.AUTO: 6.5 [PH] (ref 4.5–8)
POTASSIUM SERPL-SCNC: 3.3 MMOL/L (ref 3.5–5.3)
PROT UR STRIP-MCNC: NEGATIVE MG/DL
RBC #/AREA URNS AUTO: NORMAL /HPF
SODIUM SERPL-SCNC: 138 MMOL/L (ref 136–145)
SP GR UR STRIP.AUTO: 1.01 (ref 1–1.03)
UROBILINOGEN UR QL STRIP.AUTO: 0.2 E.U./DL
WBC #/AREA URNS AUTO: NORMAL /HPF

## 2017-10-26 PROCEDURE — 81001 URINALYSIS AUTO W/SCOPE: CPT | Performed by: INTERNAL MEDICINE

## 2017-10-26 PROCEDURE — 94660 CPAP INITIATION&MGMT: CPT

## 2017-10-26 PROCEDURE — 82948 REAGENT STRIP/BLOOD GLUCOSE: CPT

## 2017-10-26 PROCEDURE — 80048 BASIC METABOLIC PNL TOTAL CA: CPT | Performed by: PHYSICIAN ASSISTANT

## 2017-10-26 RX ORDER — POTASSIUM CHLORIDE 20 MEQ/1
40 TABLET, EXTENDED RELEASE ORAL ONCE
Status: COMPLETED | OUTPATIENT
Start: 2017-10-26 | End: 2017-10-26

## 2017-10-26 RX ORDER — HEPARIN SODIUM 5000 [USP'U]/ML
5000 INJECTION, SOLUTION INTRAVENOUS; SUBCUTANEOUS EVERY 8 HOURS SCHEDULED
Status: DISCONTINUED | OUTPATIENT
Start: 2017-10-26 | End: 2017-10-26

## 2017-10-26 RX ORDER — DEXTROSE AND SODIUM CHLORIDE 5; .9 G/100ML; G/100ML
60 INJECTION, SOLUTION INTRAVENOUS CONTINUOUS
Status: DISCONTINUED | OUTPATIENT
Start: 2017-10-26 | End: 2017-10-26

## 2017-10-26 RX ORDER — DEXTROSE MONOHYDRATE 25 G/50ML
INJECTION, SOLUTION INTRAVENOUS
Status: COMPLETED
Start: 2017-10-26 | End: 2017-10-26

## 2017-10-26 RX ADMIN — ACETAMINOPHEN 650 MG: 325 TABLET, FILM COATED ORAL at 18:59

## 2017-10-26 RX ADMIN — Medication 150 MG: at 17:23

## 2017-10-26 RX ADMIN — METOPROLOL TARTRATE 25 MG: 25 TABLET ORAL at 17:22

## 2017-10-26 RX ADMIN — ASPIRIN 81 MG: 81 TABLET, COATED ORAL at 08:57

## 2017-10-26 RX ADMIN — TAMSULOSIN HYDROCHLORIDE 0.4 MG: 0.4 CAPSULE ORAL at 21:48

## 2017-10-26 RX ADMIN — GABAPENTIN 300 MG: 300 CAPSULE ORAL at 21:48

## 2017-10-26 RX ADMIN — ASPIRIN 81 MG: 81 TABLET, COATED ORAL at 17:22

## 2017-10-26 RX ADMIN — DEXTROSE AND SODIUM CHLORIDE 60 ML/HR: 5; 900 INJECTION, SOLUTION INTRAVENOUS at 12:25

## 2017-10-26 RX ADMIN — INSULIN LISPRO 6 UNITS: 100 INJECTION, SOLUTION INTRAVENOUS; SUBCUTANEOUS at 21:48

## 2017-10-26 RX ADMIN — VITAMIN D, TAB 1000IU (100/BT) 2000 UNITS: 25 TAB at 17:25

## 2017-10-26 RX ADMIN — POTASSIUM CHLORIDE 40 MEQ: 1500 TABLET, EXTENDED RELEASE ORAL at 08:57

## 2017-10-26 RX ADMIN — DEXTROSE MONOHYDRATE 50 ML: 25 INJECTION, SOLUTION INTRAVENOUS at 07:17

## 2017-10-26 RX ADMIN — ATORVASTATIN CALCIUM 40 MG: 40 TABLET, FILM COATED ORAL at 17:22

## 2017-10-26 RX ADMIN — POTASSIUM CHLORIDE 40 MEQ: 1500 TABLET, EXTENDED RELEASE ORAL at 17:22

## 2017-10-26 RX ADMIN — METOPROLOL TARTRATE 25 MG: 25 TABLET ORAL at 09:01

## 2017-10-26 RX ADMIN — SODIUM CHLORIDE 500 ML: 0.9 INJECTION, SOLUTION INTRAVENOUS at 16:37

## 2017-10-26 RX ADMIN — PREDNISONE 50 MG: 20 TABLET ORAL at 08:57

## 2017-10-26 RX ADMIN — INSULIN DETEMIR 50 UNITS: 100 INJECTION, SOLUTION SUBCUTANEOUS at 21:48

## 2017-10-26 RX ADMIN — APIXABAN 5 MG: 5 TABLET, FILM COATED ORAL at 17:22

## 2017-10-26 NOTE — PROGRESS NOTES
I was notified by vascular surgery that they are unable to do temporal artery biopsy today or tomorrow  They can do it as an outpatient on Tuesday  Came back to discuss with patient and his family  They are frustrated  I paged renal to discuss his creatinine  Also restarted oral diet and stopped d5 normal saline  Restarted eliquis  Hopeful dc tomorrow early --also spoke with case mgmt   Spent >30 min today

## 2017-10-26 NOTE — PROGRESS NOTES
Pt blood glucose 59, 4 ounces of orange juice given pt aware that will be rechecked in 15 minutes, patient asymptomatic

## 2017-10-26 NOTE — PROGRESS NOTES
Progress Note - Rex Arias 76 y o  male MRN: [id number]    Unit/Bed#: [facility] 731-01 Encounter: [id number]      CC: diabetes f/u    Subjective:   Rex Arias is a 76y o  year old male with type 2 diabetes  Feels well  No complaints  Had episode of hypoglycemia  Objective:     Vitals: Blood pressure 128/67, pulse 60, temperature 97 6 °F (36 4 °C), temperature source Oral, resp  rate 18, height 5' 7" (1 702 m), weight (!) 138 kg (303 lb 5 7 oz), SpO2 98 %  ,Body mass index is 47 51 kg/m²  Intake/Output Summary (Last 24 hours) at 10/26/17 1355  Last data filed at 10/26/17 0814   Gross per 24 hour   Intake              120 ml   Output             1600 ml   Net            -1480 ml       Physical Exam:  General Appearance: awake, appears stated age and cooperative  Head: Normocephalic, without obvious abnormality, atraumatic  Extremities: moves all extremities  Skin: Skin color and temperature normal    Pulm: no labored breathing    Lab, Imaging and other studies: I have personally reviewed pertinent reports  POC Glucose (mg/dl)   Date Value   10/26/2017 65   10/26/2017 90   10/26/2017 56 (L)   10/26/2017 53 (L)   10/25/2017 156 (H)   10/25/2017 149 (H)   10/25/2017 292 (H)   10/25/2017 220 (H)   10/24/2017 307 (H)   10/24/2017 210 (H)       Assessment:  diabetes with hyperglycemia and hypoglycemia    Plan:  Diabetes with hypoglycemia - IV dextrose was started  BS now is 106  Continue to monitor blood sugar over time and make adjustments to the regimen if necessary  Once he starts eating, resume previous insulin therapy  He may need a decrease in this therapy since he may not be eating how he eats at home and his steroids were changed from IV to p o  Presumed temporal arteritis-he is on steroids  Biopsy is going to be later today  Portions of the record may have been created with voice recognition software

## 2017-10-26 NOTE — PROGRESS NOTES
Progress Note - General Surgery   Suzanne Gamboa 76 y o  male MRN: [id number]  Unit/Bed#: [facility] 731-01 Encounter: [id number]    Assessment:  Pt is a 72y o  M with with decreased left eye vision suspected of having giant cell arteritis     Plan:  Hold eliquis  OR today for temporal artery biopsy    Subjective/Objective   Chief Complaint:     Subjective: SUZE  No changes in vision or neuro exam      Objective:     Blood pressure 133/68, pulse 55, temperature 97 5 °F (36 4 °C), temperature source Oral, resp  rate 18, height 5' 7" (1 702 m), weight (!) 138 kg (303 lb 5 7 oz), SpO2 99 %  ,Body mass index is 47 51 kg/m²  Intake/Output Summary (Last 24 hours) at 10/26/17 0553  Last data filed at 10/25/17 2125   Gross per 24 hour   Intake              120 ml   Output             2325 ml   Net            -2205 ml       Invasive Devices     Peripheral Intravenous Line            Peripheral IV 08/25/17 61 days    Peripheral IV 10/22/17 Left Antecubital 3 days                Physical Exam: NAD  AAOX3  RRR  Normal respiratory effort  Soft, NT, ND  Visual acuity b/l diminished      Lab, Imaging and other studies:I have personally reviewed pertinent lab results      VTE Pharmacologic Prophylaxis: held for OR  VTE Mechanical Prophylaxis: sequential compression device

## 2017-10-26 NOTE — PROGRESS NOTES
[address] Internal Medicine Progress Note  Patient: Mae Johnson 76 y o  male   MRN: [id number]  PCP: Patric Fang DO  [facility] Encounter: [id number]  Date Of Visit: 10/26/17    Assessment:    Principal Problem:    Vision loss, 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  Active Problems: Morbid obesity (HCC)    Type 2 diabetes mellitus with hyperglycemia (HCC)    CKD (chronic kidney disease) stage 4, GFR 15-29 ml/min ([facility])    Atrial fibrillation (HCC)    Blind right eye    Chronic combined systolic and diastolic heart failure (HCC)    Coronary artery disease    Obstructive sleep apnea syndrome, severe    Hypokalemia    LESLIE (acute kidney injury) ([facility] )      Plan:    Acute left eye vision loss, concern for possible temporal arteritis  · Appreciate involvement from Rheumatology, vascular surgery, Ophthalmology and Neurology  · Temporal artery biopsy is planned for today, patient is an add on  · Continue high-dose steroids in the meantime     Type 2 diabetes with hyperglycemia related to steroids and hypoglycemia r/t NPO status  · Appreciate Endocrinology Bree Guerrero started now while he is NPO  · Restart insulin once PO restarted     CKD4, now with mild LESLIE  · Appreciate involvement from Nephrology, his baseline is 2 8-3 2  · Diuretics were placed on hold and volume status will be monitored closely  · Hypokalemia-repletion order by renal     Chronic combined systolic and diastolic heart failure  · Currently appears euvolemic; not compliant with salt restriction  · Also on asa/statin/BB for history of CAD     Chronic afib  · eliquis on hold for biopsy, restart when ok with surgery     REYNA  · Continue CPAP at night    Patient also apparently has supplemental oxygen which he reportedly does not wear consistently  Morbid obesity  · Recommend weight loss      VTE Pharmacologic Prophylaxis:   Pharmacologic: eliquis on hold  Mechanical VTE Prophylaxis in Place: No    Patient Centered Rounds: spoke with rn on several occasions today    Discussions with Specialists or Other Care Team Provider: endocrine    Education and Discussions with Family / Patient:     Time Spent for Care: 20 minutes  More than 50% of total time spent on counseling and coordination of care as described above  Current Length of Stay: 5 day(s)    Current Patient Status: Inpatient   Certification Statement: The patient will continue to require additional inpatient hospital stay due to biopsy    Discharge Plan / Estimated Discharge Date:     Code Status: Level 1 - Full Code      Subjective:   Patient's frustrated and states he just wants to get the biopsy over with  Nursing alerted me that patient's blood sugar was low this morning  Patient is not reporting any new pain or other changes and states that his vision is the same  Objective:     Vitals:   Temp (24hrs), Av 5 °F (36 4 °C), Min:97 5 °F (36 4 °C), Max:97 6 °F (36 4 °C)    HR:  [55-61] 60  Resp:  [18-20] 18  BP: (123-133)/(66-68) 128/67  SpO2:  [98 %-99 %] 98 %  Body mass index is 47 51 kg/m²  Input and Output Summary (last 24 hours): Intake/Output Summary (Last 24 hours) at 10/26/17 1413  Last data filed at 10/26/17 0814   Gross per 24 hour   Intake              120 ml   Output             1600 ml   Net            -1480 ml       Physical Exam:     Physical Exam   Constitutional: He appears well-developed and well-nourished  No distress  Morbidly obese gentleman lying in bed comfortably   HENT:   Head: Normocephalic and atraumatic  Eyes: Conjunctivae are normal  Right eye exhibits no discharge  Left eye exhibits no discharge  No scleral icterus  Neck:   Large jowls   Cardiovascular: Normal rate, regular rhythm and normal heart sounds  No murmur heard  Pulmonary/Chest: No respiratory distress  He has no wheezes  Abdominal: Soft  He exhibits no distension  obese   Musculoskeletal: He exhibits edema (Stable bilateral lower extremity edema unchanged)     Neurological: He is alert  Awake alert and interactive   Skin: Skin is warm and dry  No rash noted  He is not diaphoretic  No erythema  No pallor  Psychiatric:   Limited interaction poor eye contact   Nursing note and vitals reviewed  Additional Data:     Labs:      Results from last 7 days  Lab Units 10/25/17  0606  10/21/17  1601   WBC Thousand/uL 11 72*  < > 6 83   HEMOGLOBIN g/dL 14 3  < > 13 4   HEMATOCRIT % 42 0  < > 41 9   PLATELETS Thousands/uL 247  < > 233   NEUTROS PCT %  --   --  73   LYMPHS PCT %  --   --  16   MONOS PCT %  --   --  7   EOS PCT %  --   --  3   < > = values in this interval not displayed  Results from last 7 days  Lab Units 10/26/17  0443   SODIUM mmol/L 138   POTASSIUM mmol/L 3 3*   CHLORIDE mmol/L 93*   CO2 mmol/L 38*   BUN mg/dL 97*   CREATININE mg/dL 3 32*   CALCIUM mg/dL 8 6   GLUCOSE RANDOM mg/dL 59*           * I Have Reviewed All Lab Data Listed Above  * Additional Pertinent Lab Tests Reviewed: [address] Admission Reviewed    Imaging:    Imaging Reports Reviewed Today Include:   Imaging Personally Reviewed by Myself Includes:      Recent Cultures (last 7 days):           Last 24 Hours Medication List:     aspirin 81 mg Oral BID   atorvastatin 40 mg Oral Daily With Dinner   cholecalciferol 2,000 Units Oral Daily   gabapentin 300 mg Oral HS   heparin (porcine) 5,000 Units Subcutaneous Q8H [facility]   insulin detemir 30 Units Subcutaneous QAM   insulin detemir 50 Units Subcutaneous HS   insulin lispro 2-12 Units Subcutaneous HS   insulin lispro 20 Units Subcutaneous TID With Meals   insulin lispro 4-20 Units Subcutaneous TID AC   iron polysaccharides 150 mg Oral BID   metoprolol tartrate 25 mg Oral BID   potassium chloride 40 mEq Oral Once   predniSONE 50 mg Oral Daily   tamsulosin 0 4 mg Oral HS        Today, Patient Was Seen By: Vanessa Cardenas PA-C    ** Please Note: This note has been constructed using a voice recognition system   **

## 2017-10-26 NOTE — PROGRESS NOTES
NEPHROLOGY PROGRESS NOTE   Tony Maldonado 76 y o  male MRN: [id number]  Unit/Bed#: [facility] 731-01 Encounter: [id number]  Reason for Consult: CKDIV    ASSESSMENT/PLAN:  1  Chronic Kidney Disease stage IV- Baseline creatinine 2 8-3 2  - creatinine just slightly above baseline secondary to over diuresis  - holding diuretics for now  - follows with Dr Baron Sarmiento  - etiology secondary to DM and HTN  2  Possible Temporal Arteritis- for biopsy today  3  Diastolic CHF- holding diuretics due to rising creatinine  - no need for fluids today but if worsens tomorrow, would consider 1L IVF  4  Hypokalemia- replete with 40meq x1 today  5  Hypertension- BP acceptable    Disposition:  Hold diuretics    SUBJECTIVE:  Patient planned for temporal artery biopsy today  Patient frustrated that he is too dry now from diuretics  Denies pain, denies n/v/d, denies sob  States LE edema is normal for him      OBJECTIVE:  Current Weight: Weight - Scale: (!) 138 kg (303 lb 5 7 oz)  Vitals:    10/25/17 2333 10/26/17 0552 10/26/17 0725 10/26/17 0814   BP: 133/68  128/67    Pulse: 55  61 60   Resp: 18  18    Temp: 97 5 °F (36 4 °C)  97 6 °F (36 4 °C)    TempSrc: Oral  Oral    SpO2: 99%  98% 98%   Weight:  (!) 138 kg (303 lb 5 7 oz)     Height:           Intake/Output Summary (Last 24 hours) at 10/26/17 0912  Last data filed at 10/26/17 0645   Gross per 24 hour   Intake              120 ml   Output             1200 ml   Net            -1080 ml     General: NAD  Skin: no rash  HEENT: normocephalic atraumatic  Neck: supple  Chest: CTAB  Heart: RRR  Abdomen: soft, nt, nd  Extremities: trace edema  Neuro: awake alert  Psych: mood and affect appropriate    Medications:    Current Facility-Administered Medications:     acetaminophen (TYLENOL) tablet 650 mg, 650 mg, Oral, Q4H PRN, Madhu Sullivan, DO, 650 mg at 10/25/17 2028    aspirin (ECOTRIN LOW STRENGTH) EC tablet 81 mg, 81 mg, Oral, BID, Madhu Sullivan, DO, 81 mg at 10/26/17 0857    atorvastatin (LIPITOR) tablet 40 mg, 40 mg, Oral, Daily With Kenny Toribio PA-C, 40 mg at 10/25/17 1732    cholecalciferol (VITAMIN D3) tablet 2,000 Units, 2,000 Units, Oral, Daily, Lorilee Cameron, DO, 2,000 Units at 10/25/17 0820    docusate sodium (COLACE) capsule 100 mg, 100 mg, Oral, BID PRN, Grazynailejosh Hooperis, DO    gabapentin (NEURONTIN) capsule 300 mg, 300 mg, Oral, HS, Lorilejosh Barnes, DO, 300 mg at 10/25/17 2130    insulin detemir (LEVEMIR) subcutaneous injection 30 Units, 30 Units, Subcutaneous, QAM, Lory Echavarria PA-C, 30 Units at 10/25/17 0825    insulin detemir (LEVEMIR) subcutaneous injection 50 Units, 50 Units, Subcutaneous, HS, Coni Coulter MD, 30 Units at 10/25/17 2336    insulin lispro (HumaLOG) 100 units/mL subcutaneous injection 2-12 Units, 2-12 Units, Subcutaneous, HS, Andrew Talley MD, 2 Units at 10/25/17 2337    insulin lispro (HumaLOG) 100 units/mL subcutaneous injection 20 Units, 20 Units, Subcutaneous, TID With Meals, Andrew Talley MD, 20 Units at 10/25/17 1733    insulin lispro (HumaLOG) 100 units/mL subcutaneous injection 4-20 Units, 4-20 Units, Subcutaneous, TID AC **AND** Fingerstick Glucose (POCT), , , TID AC, Andrew Talley MD    iron polysaccharides (NIFEREX) capsule 150 mg, 150 mg, Oral, BID, Coni Coulter MD, 150 mg at 10/25/17 1741    metoprolol tartrate (LOPRESSOR) tablet 25 mg, 25 mg, Oral, BID, Jahaira Barnes DO, 25 mg at 10/26/17 0901    ondansetron (ZOFRAN) injection 4 mg, 4 mg, Intravenous, Q6H PRN, Jahaira Barnes DO    perflutren lipid microsphere (DEFINITY) injection, 1 mL/min, Intravenous, Once in imaging, Elissa Bolivar MD    predniSONE tablet 50 mg, 50 mg, Oral, Daily, Lory Echavarria PA-C, 50 mg at 10/26/17 0857    tamsulosin (FLOMAX) capsule 0 4 mg, 0 4 mg, Oral, HS, Jahaira Barnes DO, 0 4 mg at 10/25/17 2130    Laboratory Results:    Results from last 7 days  Lab Units 10/26/17  0443 10/25/17  0606 10/24/17  0934 10/22/17  0508 10/21/17  1604 WBC Thousand/uL  --  11 72* 11 19*  --  6 83   HEMOGLOBIN g/dL  --  14 3 13 5  --  13 4   HEMATOCRIT %  --  42 0 39 3  --  41 9   PLATELETS Thousands/uL  --  247 237  --  233   SODIUM mmol/L 138 135* 137 138 138   POTASSIUM mmol/L 3 3* 2 9* 3 0* 4 0 4 4   CHLORIDE mmol/L 93* 92* 96* 99* 101   CO2 mmol/L 38* 32 30 28 28   BUN mg/dL 97* 81* 73* 62* 56*   CREATININE mg/dL 3 32* 3 02* 2 73* 2 93* 2 91*   CALCIUM mg/dL 8 6 9 5 9 1 8 9 8 9   MAGNESIUM mg/dL  --  2 8*  --   --   --    GLUCOSE RANDOM mg/dL 59* 202* 283* 471* 266*

## 2017-10-26 NOTE — PROGRESS NOTES
ATTEMPTED TO SEE PT  THIS AFTERNOON BUT PT  REPORTS FATIGUE,EARLIER LOW BLOOD SUGAR AND AWAITING BIOPSY TODAY  TREATMENT CANCELLED    Joe Saini, PTA

## 2017-10-27 VITALS
RESPIRATION RATE: 18 BRPM | BODY MASS INDEX: 46.16 KG/M2 | SYSTOLIC BLOOD PRESSURE: 118 MMHG | HEART RATE: 50 BPM | DIASTOLIC BLOOD PRESSURE: 51 MMHG | WEIGHT: 294.09 LBS | OXYGEN SATURATION: 93 % | HEIGHT: 67 IN | TEMPERATURE: 98.6 F

## 2017-10-27 LAB
ANION GAP SERPL CALCULATED.3IONS-SCNC: 8 MMOL/L (ref 4–13)
BUN SERPL-MCNC: 91 MG/DL (ref 5–25)
CALCIUM SERPL-MCNC: 8.5 MG/DL (ref 8.3–10.1)
CHLORIDE SERPL-SCNC: 95 MMOL/L (ref 100–108)
CO2 SERPL-SCNC: 34 MMOL/L (ref 21–32)
CREAT SERPL-MCNC: 2.99 MG/DL (ref 0.6–1.3)
GFR SERPL CREATININE-BSD FRML MDRD: 19 ML/MIN/1.73SQ M
GLUCOSE SERPL-MCNC: 102 MG/DL (ref 65–140)
GLUCOSE SERPL-MCNC: 144 MG/DL (ref 65–140)
GLUCOSE SERPL-MCNC: 185 MG/DL (ref 65–140)
POTASSIUM SERPL-SCNC: 3.4 MMOL/L (ref 3.5–5.3)
SODIUM SERPL-SCNC: 137 MMOL/L (ref 136–145)

## 2017-10-27 PROCEDURE — 80048 BASIC METABOLIC PNL TOTAL CA: CPT | Performed by: PHYSICIAN ASSISTANT

## 2017-10-27 PROCEDURE — 97110 THERAPEUTIC EXERCISES: CPT

## 2017-10-27 PROCEDURE — 97116 GAIT TRAINING THERAPY: CPT

## 2017-10-27 PROCEDURE — 82948 REAGENT STRIP/BLOOD GLUCOSE: CPT

## 2017-10-27 RX ORDER — PREDNISONE 10 MG/1
50 TABLET ORAL DAILY
Qty: 100 TABLET | Refills: 0 | Status: SHIPPED | OUTPATIENT
Start: 2017-10-28 | End: 2018-02-01 | Stop reason: SDUPTHER

## 2017-10-27 RX ORDER — FUROSEMIDE 80 MG
80 TABLET ORAL 2 TIMES DAILY
Qty: 30 TABLET | Refills: 0
Start: 2017-10-27 | End: 2019-01-28 | Stop reason: SDUPTHER

## 2017-10-27 RX ORDER — POTASSIUM CHLORIDE 20 MEQ/1
40 TABLET, EXTENDED RELEASE ORAL ONCE
Status: COMPLETED | OUTPATIENT
Start: 2017-10-27 | End: 2017-10-27

## 2017-10-27 RX ORDER — PREDNISONE 10 MG/1
50 TABLET ORAL DAILY
Qty: 30 TABLET | Refills: 0 | Status: SHIPPED | OUTPATIENT
Start: 2017-10-28 | End: 2017-10-27

## 2017-10-27 RX ORDER — POTASSIUM CHLORIDE 750 MG/1
40 CAPSULE, EXTENDED RELEASE ORAL DAILY
Qty: 30 CAPSULE | Refills: 0 | Status: SHIPPED | OUTPATIENT
Start: 2017-10-27 | End: 2018-02-21

## 2017-10-27 RX ADMIN — ASPIRIN 81 MG: 81 TABLET, COATED ORAL at 08:41

## 2017-10-27 RX ADMIN — APIXABAN 5 MG: 5 TABLET, FILM COATED ORAL at 08:40

## 2017-10-27 RX ADMIN — METOPROLOL TARTRATE 25 MG: 25 TABLET ORAL at 08:40

## 2017-10-27 RX ADMIN — INSULIN DETEMIR 30 UNITS: 100 INJECTION, SOLUTION SUBCUTANEOUS at 08:40

## 2017-10-27 RX ADMIN — POTASSIUM CHLORIDE 40 MEQ: 1500 TABLET, EXTENDED RELEASE ORAL at 11:01

## 2017-10-27 RX ADMIN — VITAMIN D, TAB 1000IU (100/BT) 2000 UNITS: 25 TAB at 08:40

## 2017-10-27 RX ADMIN — Medication 150 MG: at 08:40

## 2017-10-27 RX ADMIN — PREDNISONE 50 MG: 20 TABLET ORAL at 08:40

## 2017-10-27 NOTE — PHYSICAL THERAPY NOTE
Physical Therapy Progress Note     10/27/17 1013   Pain Assessment   Pain Assessment No/denies pain   Pain Score No Pain   Restrictions/Precautions   Weight Bearing Precautions Per Order No   Other Precautions O2;Fall Risk;Telemetry   General   Chart Reviewed Yes   Response to Previous Treatment Patient with no complaints from previous session  Family/Caregiver Present No   Cognition   Overall Cognitive Status WFL   Arousal/Participation Alert; Cooperative   Attention Within functional limits   Orientation Level Oriented X4   Memory Within functional limits   Following Commands Follows all commands and directions without difficulty   Subjective   Subjective THE PT  REPORTS BEING FRUSTRATED WITH HIS CURRENT STATE OF HEALTH AND ANXIOUS TO D/C  Bed Mobility   Supine to Sit 5  Supervision   Additional items Assist x 1; Increased time required   Sit to Supine 5  Supervision   Additional items Assist x 1; Increased time required;Verbal cues;LE management   Transfers   Sit to Stand 5  Supervision   Additional items Assist x 1; Armrests; Verbal cues   Stand to Sit 5  Supervision   Additional items Assist x 1;Verbal cues   Ambulation/Elevation   Gait pattern Improper Weight shift; Wide NICCI; Decreased foot clearance   Gait Assistance 5  Supervision   Additional items Assist x 1;Verbal cues; Tactile cues  (ONGOING FOR POSTURE AS WELL AS PACING)   Assistive Device Rolling walker   Distance 120'X2   Balance   Static Sitting Good   Static Standing Fair   Ambulatory Fair   Endurance Deficit   Endurance Deficit Yes   Endurance Deficit Description CONTINUED DECONDITIONING GIVEN BODY HABITUS AND MEDICAL STATUS   Activity Tolerance   Activity Tolerance Patient limited by fatigue   Nurse Made Aware SPOKE WITH TIZ   Exercises   TKR Sitting;10 reps;AROM; Bilateral   Assessment   Prognosis Fair   Problem List Decreased endurance;Decreased mobility; Decreased strength; Impaired vision; Impaired balance   Assessment PRESENTLY THE PT  DEMONSTRATES ONGOING DECONDITIONING WITH APPROPIATE AWARENESS  NO OVERT LOSS OF BALANCE NOTED THROUGHOUT TREATMENT SESSION  ONCE MEDICALLY CLEARED, THE PT  REMAINS APPROPIATE TO RETURN TO HOME SETTING WITH FAMILY ASSISTANCE AND SERVICES  Goals   Patient Goals TO GET OUT OF THE HOSPITAL  STG Expiration Date 11/02/17   Treatment Day 3   Plan   Treatment/Interventions Functional transfer training;LE strengthening/ROM; Therapeutic exercise; Endurance training;Bed mobility;Gait training; Compensatory technique education   Progress Improving as expected   PT Frequency 5x/wk   Recommendation   Recommendation Home with family support;Home PT   Equipment Recommended Walker   PT - OK to Discharge (ONCE CLEARED FOR D/C )     Vadim Brown, PTA

## 2017-10-27 NOTE — PROGRESS NOTES
Patient originally refused morning blood work but since patient is awake he is accepting blood work  Day shift RN getting blood work  Will continue to monitor

## 2017-10-27 NOTE — DISCHARGE SUMMARY
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  Active Problems: Morbid obesity ([facility] [address] )    Type 2 diabetes mellitus with hyperglycemia ([facility])    CKD (chronic kidney disease) stage 4, GFR 15-29 ml/min ([facility])    Atrial fibrillation ([facility])    Blind right eye    Chronic combined systolic and diastolic heart failure ([facility])    Coronary artery disease    Obstructive sleep apnea syndrome, severe    Hypokalemia    LESLIE (acute kidney injury) ([facility] [address] )  Resolved Problems:    * No resolved hospital problems  *      Consultations During Hospital Stay:  · Vascular surgery  · Endocrinology  · Neurology  · Nephrology  · Ophthalmology  · Rheumatology    Procedures Performed:     · October 21, 2017 CT scan of the head showed no acute abnormality  · October 23, 2017 carotid Doppler showed less than 50% stenosis bilaterally   · October 23, 2017 MRI of the brain with no acute intracranial abnormality  Mild chronic microvascular ischemia noted   · October 24, 2017 2D echocardiogram showed ejection fraction 60% with grade 1 diastolic dysfunction  · October 25, 2017 temporal artery Doppler showed no suggestion of giant cell arteritis, aneurysm, or stenosis bilaterally      Significant Findings / Test Results:     · See above    Incidental Findings:   · none    Test Results Pending at Discharge (will require follow up):   · none     Outpatient Tests Requested:  · Patient is to communicate on Monday with vascular surgery to make arrangements to come in Tuesday for an outpatient temporal artery biopsy    · Routine BMP should be followed by Nephrology    Complications: Severe hyperglycemia and hypoglycemia    Hospital Course:     Pablo Link is a 76 y o  male patient who originally presented to the hospital on 10/21/2017 due to partial left eye visual loss  He was seen by Ophthalmology who is overall impression was 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  He did not have any other typical findings of temporal arteritis including headache, scalp tenderness, fever, or jaw pain and his inflammatory markers were not severely elevated  He had 2D echocardiogram, carotid Dopplers, and MRI of the brain to rule out stroke and was followed by Neurology  Of note, he was already on eliquis for history of AFib  Due to suspicion for possible temporal arteritis he was seen in consultation by vascular surgery to arrange a temporal biopsy and also by Rheumatology for management of steroids  He received IV Solu-Medrol and was subsequently converted to prednisone 50 mg daily  His eliquis was held for biopsy  Unfortunately, the temporal artery biopsy which was planned for October 26th could not be performed due to lack of OR time and as such vascular surgery made arrangements for him to come back as an outpatient next Tuesday to have the procedure  His eliquis was restarted and he is to hold it again pre biopsy with instructions to be given specifically by vascular surgery  If his biopsy returns negative, Rheumatology suggests a rapid steroid taper over the course of 10 days  Due to the steroids, the patient had severe hyperglycemia requiring adjustment in his insulin regimen which were managed by Endocrinology  Because patient was nothing by mouth all day awaiting his biopsy, he subsequently had hypoglycemia and required dextrose infusion  Overnight his blood sugars remained relatively stable but he will need close follow-up  His A1c was 7 8    Patient also has underlying chronic kidney disease stage 4 and was followed by Nephrology    His creatinine did rise slightly above his baseline while here and his diuretics were held and he was given gentle IV fluids  He has underlying chronic diastolic heart failure but remained compensated  Condition at Discharge: fair     Discharge Day Visit / Exam:     Subjective:  Patient again reports he is the same as far as his vision and he had no other new events overnight including any shortness of breath or worsening leg swelling  He ate well this morning but says he does not get enough food  He says at home he does not have any issues with low blood sugars  He apparently did not have any additional once overnight  Vitals: Blood Pressure: 118/51 (10/27/17 0719)  Pulse: (!) 50 (10/27/17 0719)  Temperature: 98 6 °F (37 °C) (10/27/17 0719)  Temp Source: Oral (10/27/17 0719)  Respirations: 18 (10/27/17 0719)  Height: 5' 7" (170 2 cm) (10/21/17 1751)  Weight - Scale: 133 kg (294 lb 1 5 oz) (10/27/17 0600)  SpO2: 93 % (10/27/17 0719)  Exam:   Physical Exam   Constitutional: He appears well-developed and well-nourished  No distress  HENT:   Hoarse voice   Eyes: Conjunctivae are normal  Right eye exhibits no discharge  Left eye exhibits no discharge  No scleral icterus  Neck:   Large jowls   Cardiovascular: Normal rate, regular rhythm and normal heart sounds  No murmur heard  Pulmonary/Chest: Effort normal  No respiratory distress  He has no wheezes  He has no rales  Abdominal: Soft  There is tenderness  obese   Musculoskeletal: He exhibits edema (BLE edema unchanged, with chronic venous stasis changes)  Neurological: He is alert  Awake alert and interactive  No speech deficits noted   Skin: Skin is warm and dry  No rash noted  He is not diaphoretic  No pallor  Psychiatric:   Flat affect, appears depressed   Vitals reviewed        Discussion with Family:  10:15 a m  called patient's wife to review the case    Discharge instructions/Information to patient and family:   See after visit summary for information provided to patient and family  Provisions for Follow-Up Care:  See after visit summary for information related to follow-up care and any pertinent home health orders  Disposition:     Home with VNA Services (Reminder: Complete face to face encounter)    For Discharges to [facility]:   · Not Applicable to this Patient - Not Applicable to this Patient    Planned Readmission: planned for outpatient temporal artery biopsy on Tuesday     Discharge Statement:  I spent 45 minutes discharging the patient  This time was spent on the day of discharge  I had direct contact with the patient on the day of discharge  Greater than 50% of the total time was spent examining patient, answering all patient questions, arranging and discussing plan of care with patient as well as directly providing post-discharge instructions  Additional time then spent on discharge activities  Spoke directly with Nephrology and Endocrinology to review medication list     Discharge Medications:  See after visit summary for reconciled discharge medications provided to patient and family        ** Please Note: This note has been constructed using a voice recognition system **

## 2017-10-27 NOTE — DISCHARGE INSTRUCTIONS
On Monday night only, take 40 units at bedtime of levemir and no mealtime insulin on Tuesday if you are told not to eat for surgery tuesday

## 2017-10-27 NOTE — PLAN OF CARE
Problem: PHYSICAL THERAPY ADULT  Goal: Performs mobility at highest level of function for planned discharge setting  See evaluation for individualized goals  Treatment/Interventions: LE strengthening/ROM, Therapeutic exercise, Functional transfer training, Endurance training, Gait training, Bed mobility          See flowsheet documentation for full assessment, interventions and recommendations  Outcome: Adequate for Discharge  Prognosis: Fair  Problem List: Decreased endurance, Decreased mobility, Decreased strength, Impaired vision, Impaired balance  Assessment: PRESENTLY THE PT  DEMONSTRATES ONGOING DECONDITIONING WITH APPROPIATE AWARENESS  NO OVERT LOSS OF BALANCE NOTED THROUGHOUT TREATMENT SESSION  ONCE MEDICALLY CLEARED, THE PT  REMAINS APPROPIATE TO RETURN TO HOME SETTING WITH FAMILY ASSISTANCE AND SERVICES  Recommendation: Home with family support, Home PT     PT - OK to Discharge: (S)  (ONCE MEDICALLY CLEARED O RETURN OME )    See flowsheet documentation for full assessment     Joe Saini PTA

## 2017-10-27 NOTE — PROGRESS NOTES
NEPHROLOGY PROGRESS NOTE   Oz Gallo 76 y o  male MRN: [id number]  Unit/Bed#: [facility] 731-01 Encounter: [id number]  Reason for Consult: LESLIE, CKD    ASSESSMENT and PLAN:  1  LESLIE: Due to overdiuresis  Resolved  2  CKD IV: Baseline serum creatinine is 2 8 to 3 2 and follows with Dr Sherwin Winter  3  CHF: Currently compensated  May restart Furosemide 80 mg BID on discharge  4  Hypokalemia: Replete K    5  High bicarbonate: Improved  6  Visual loss, possible temporal arteritis: going for temporal artery biopsy next week  On steroids  7  HTN: BP acceptable       SUMMARY OF RECOMMENDATIONS:  · May be discharged from renal standpoint  · Okay to restart Furosemide 80 mg BID on discharge  · Follow up with Dr Sherwin Winter  SUBJECTIVE / INTERVAL HISTORY:  Wants to go home       OBJECTIVE:  Current Weight: Weight - Scale: 133 kg (294 lb 1 5 oz)  Vitals:    10/26/17 1519 10/26/17 2300 10/27/17 0600 10/27/17 0719   BP: 113/57 134/54  118/51   Pulse: 61 56  (!) 50   Resp: 16 18  18   Temp: 98 °F (36 7 °C) 98 5 °F (36 9 °C)  98 6 °F (37 °C)   TempSrc:    Oral   SpO2: 97% 95%  93%   Weight:   133 kg (294 lb 1 5 oz)    Height:           Intake/Output Summary (Last 24 hours) at 10/27/17 1214  Last data filed at 10/27/17 0801   Gross per 24 hour   Intake              758 ml   Output             2300 ml   Net            -1542 ml     General: conscious, cooperative, no distress  Chest/Lungs: clear  CVS: distinct heart sounds, normal rate  Abdomen: soft  Extremities: trace edema    Medications:    Current Facility-Administered Medications:     acetaminophen (TYLENOL) tablet 650 mg, 650 mg, Oral, Q4H PRN, Chelle Galvez, DO, 650 mg at 10/26/17 1859    apixaban (ELIQUIS) tablet 5 mg, 5 mg, Oral, BID, Diane Hoffman PA-C, 5 mg at 10/27/17 0840    aspirin (ECOTRIN LOW STRENGTH) EC tablet 81 mg, 81 mg, Oral, BID, Chelle Galvez, DO, 81 mg at 10/27/17 0841    atorvastatin (LIPITOR) tablet 40 mg, 40 mg, Oral, Daily With Dinner, Andi Landaverde PA-C, 40 mg at 10/26/17 1722    cholecalciferol (VITAMIN D3) tablet 2,000 Units, 2,000 Units, Oral, Daily, Cooper Baby, DO, 2,000 Units at 10/27/17 0840    docusate sodium (COLACE) capsule 100 mg, 100 mg, Oral, BID PRN, Cooper Abby, DO    gabapentin (NEURONTIN) capsule 300 mg, 300 mg, Oral, HS, Cooper Abby, DO, 300 mg at 10/26/17 2148    insulin detemir (LEVEMIR) subcutaneous injection 30 Units, 30 Units, Subcutaneous, QAM, Kathya Hutchison PA-C, 30 Units at 10/27/17 0840    insulin detemir (LEVEMIR) subcutaneous injection 50 Units, 50 Units, Subcutaneous, HS, Shashank Rae MD, 50 Units at 10/26/17 2148    insulin lispro (HumaLOG) 100 units/mL subcutaneous injection 2-12 Units, 2-12 Units, Subcutaneous, HS, Jose Humphrey MD, 6 Units at 10/26/17 2148    insulin lispro (HumaLOG) 100 units/mL subcutaneous injection 20 Units, 20 Units, Subcutaneous, TID With Meals, Jose Humphrey MD, 20 Units at 10/27/17 1142    insulin lispro (HumaLOG) 100 units/mL subcutaneous injection 4-20 Units, 4-20 Units, Subcutaneous, TID AC **AND** Fingerstick Glucose (POCT), , , TID AC, Jose Humphrey MD    iron polysaccharides (NIFEREX) capsule 150 mg, 150 mg, Oral, BID, Shashank Rae MD, 150 mg at 10/27/17 0840    metoprolol tartrate (LOPRESSOR) tablet 25 mg, 25 mg, Oral, BID, Cooper Abby, DO, 25 mg at 10/27/17 0840    ondansetron (ZOFRAN) injection 4 mg, 4 mg, Intravenous, Q6H PRN, Cooper Abby, DO    perflutren lipid microsphere (DEFINITY) injection, 1 mL/min, Intravenous, Once in imaging, Errol Tucker MD    predniSONE tablet 50 mg, 50 mg, Oral, Daily, Kathya Hutchison PA-C, 50 mg at 10/27/17 0840    tamsulosin (FLOMAX) capsule 0 4 mg, 0 4 mg, Oral, HS, Cooper Abby, DO, 0 4 mg at 10/26/17 3377    Laboratory Results:    Results from last 7 days  Lab Units 10/27/17  0813 10/26/17  0443 10/25/17  0606 10/24/17  0650 10/22/17  0508 10/21/17  1601   WBC Thousand/uL  --   --  11 72* 11 19*  -- 6  83   HEMOGLOBIN g/dL  --   --  14 3 13 5  --  13 4   HEMATOCRIT %  --   --  42 0 39 3  --  41 9   PLATELETS Thousands/uL  --   --  247 237  --  233   SODIUM mmol/L 137 138 135* 137 138 138   POTASSIUM mmol/L 3 4* 3 3* 2 9* 3 0* 4 0 4 4   CHLORIDE mmol/L 95* 93* 92* 96* 99* 101   CO2 mmol/L 34* 38* 32 30 28 28   BUN mg/dL 91* 97* 81* 73* 62* 56*   CREATININE mg/dL 2 99* 3 32* 3 02* 2 73* 2 93* 2 91*   CALCIUM mg/dL 8 5 8 6 9 5 9 1 8 9 8 9   MAGNESIUM mg/dL  --   --  2 8*  --   --   --    GLUCOSE RANDOM mg/dL 185* 59* 202* 283* 471* 266*     Previous work up:

## 2017-10-31 ENCOUNTER — HOSPITAL ENCOUNTER (OUTPATIENT)
Facility: HOSPITAL | Age: 77
Setting detail: OUTPATIENT SURGERY
Discharge: HOME | End: 2017-10-31
Attending: SURGERY | Admitting: SURGERY
Payer: MEDICARE

## 2017-10-31 ENCOUNTER — GENERIC CONVERSION - ENCOUNTER (OUTPATIENT)
Dept: OTHER | Facility: OTHER | Age: 77
End: 2017-10-31

## 2017-10-31 VITALS
HEIGHT: 67 IN | RESPIRATION RATE: 15 BRPM | DIASTOLIC BLOOD PRESSURE: 81 MMHG | TEMPERATURE: 98.9 F | OXYGEN SATURATION: 95 % | BODY MASS INDEX: 45.36 KG/M2 | WEIGHT: 289 LBS | SYSTOLIC BLOOD PRESSURE: 129 MMHG | HEART RATE: 52 BPM

## 2017-10-31 DIAGNOSIS — M31.6 OTHER GIANT CELL ARTERITIS (HCC): ICD-10-CM

## 2017-10-31 PROCEDURE — 88313 SPECIAL STAINS GROUP 2: CPT | Performed by: SURGERY

## 2017-10-31 PROCEDURE — 88305 TISSUE EXAM BY PATHOLOGIST: CPT | Performed by: SURGERY

## 2017-10-31 RX ORDER — LIDOCAINE HYDROCHLORIDE 10 MG/ML
INJECTION, SOLUTION INFILTRATION; PERINEURAL AS NEEDED
Status: DISCONTINUED | OUTPATIENT
Start: 2017-10-31 | End: 2017-10-31 | Stop reason: HOSPADM

## 2017-10-31 NOTE — OP NOTE
OPERATIVE REPORT  PATIENT NAME: Heri Li    :  1940  MRN: [id number]  Pt Location: BE OR ROOM 05    SURGERY DATE: 10/31/2017    Surgeon(s) and Role:     * Walt Cadet MD - Primary    Preop Diagnosis:  Other giant cell arteritis ([address] ) [M31 6]  Bilateral visual loss    Post-Op Diagnosis Codes:     * Other giant cell arteritis ([address] ) [M31 6]  Bilateral visual loss    Procedure(s) (LRB):  BIOPSY ARTERY TEMPORAL (Bilateral)    Specimen(s):  ID Type Source Tests Collected by Time Destination   1 : LEFT TEMPORAL ARTERY Tissue Artery TISSUE EXAM Walt Cadet MD 10/31/2017 1241    2 : RIGHT TEMPORAL ARTERY Tissue Artery TISSUE EXAM Walt Cadet MD 10/31/2017 1224        Estimated Blood Loss:   Minimal    Drains:       Anesthesia Type:   Local    Operative Indications: Other giant cell arteritis (HCC) [M326]  76year-old with visual loss and elevated sedimentation rate presents for bilateral temporal artery biopsy to rule out giant cell arteritis    Operative Findings:  Bilateral temporal arteries were relatively normal in appearance  There was some mild inflammation on the right inferior temporal artery  Complications:   None    Procedure and Technique:  Bilateral temporal arteries were marked via palpation  The head was then turned to the left in the skin overlying the temporal artery just anterior to the ear was prepped with Betadine solution  Standard draping was performed  Local anesthetic was injected in the overlying skin  A longitudinal 1-2 cm incision was made  Bovie cautery was used for hemostasis  Dissection was carried through the subcutaneous tissue  The fascia was opened and the temporal artery was visualized  A 2-3 cm segment of temporal artery was dissected free  A small branch was identified  The temporal artery was clipped proximally and distally to include the branch  The 2 cm segment was then excised and sent for permanent pathologic specimen  The wound was irrigated    Any bleeding points were coagulated  The wound was then closed with a interrupted 5-0 Monocryl suture  The skin was then cleansed and a histacryl dressing was placed  The head was then turned to the right and a similar procedure was performed on the left side with prepping of the skin with Betadine  Injecting local anesthetic and making a 2 cm longitudinal incision  Again Bovie cautery was used for hemostasis  Initially the temporal vein was identified  Once the fascia was opened more anteriorly the facial artery was visualized and again dissected free over a 2-3 cm segment  Again I branch was identified and clipped  The artery was then clipped proximally and distally in the mid 2 cm segment was excised and sent for permanent pathologic specimen  The wound was irrigated  Any bleeding points were coagulated  The wound was then closed with interrupted 5 0 Monocryl suture  The skin was then cleansed and histacryl dressing was placed         I was present for the entire procedure    Patient Disposition:  APU    SIGNATURE: Yash Cummings MD  DATE: October 31, 2017  TIME: 12:54 PM

## 2017-11-03 ENCOUNTER — LAB REQUISITION (OUTPATIENT)
Dept: LAB | Facility: HOSPITAL | Age: 77
End: 2017-11-03
Payer: MEDICARE

## 2017-11-03 DIAGNOSIS — I13.0 HYPERTENSIVE HEART AND CHRONIC KIDNEY DISEASE WITH HEART FAILURE AND STAGE 1 THROUGH STAGE 4 CHRONIC KIDNEY DISEASE, OR CHRONIC KIDNEY DISEASE (HCC): ICD-10-CM

## 2017-11-03 DIAGNOSIS — Z79.52 LONG TERM CURRENT USE OF SYSTEMIC STEROIDS: ICD-10-CM

## 2017-11-03 LAB
ANION GAP SERPL CALCULATED.3IONS-SCNC: 8 MMOL/L (ref 4–13)
BUN SERPL-MCNC: 84 MG/DL (ref 5–25)
CALCIUM SERPL-MCNC: 8.9 MG/DL (ref 8.3–10.1)
CHLORIDE SERPL-SCNC: 99 MMOL/L (ref 100–108)
CO2 SERPL-SCNC: 30 MMOL/L (ref 21–32)
CREAT SERPL-MCNC: 2.74 MG/DL (ref 0.6–1.3)
GFR SERPL CREATININE-BSD FRML MDRD: 21 ML/MIN/1.73SQ M
GLUCOSE P FAST SERPL-MCNC: 282 MG/DL (ref 65–99)
POTASSIUM SERPL-SCNC: 4.4 MMOL/L (ref 3.5–5.3)
SODIUM SERPL-SCNC: 137 MMOL/L (ref 136–145)

## 2017-11-03 PROCEDURE — 80048 BASIC METABOLIC PNL TOTAL CA: CPT | Performed by: FAMILY MEDICINE

## 2017-11-06 ENCOUNTER — GENERIC CONVERSION - ENCOUNTER (OUTPATIENT)
Dept: OTHER | Facility: OTHER | Age: 77
End: 2017-11-06

## 2017-11-07 ENCOUNTER — GENERIC CONVERSION - ENCOUNTER (OUTPATIENT)
Dept: OTHER | Facility: OTHER | Age: 77
End: 2017-11-07

## 2017-11-08 ENCOUNTER — GENERIC CONVERSION - ENCOUNTER (OUTPATIENT)
Dept: OTHER | Facility: OTHER | Age: 77
End: 2017-11-08

## 2017-11-10 NOTE — CONSULTS
Attestation signed by Matthew Mark MD at 10/25/2017 159 PM   76year old male with presumed temporal arteritis for which he is receiving steroids  He is still having significant hyperglycemia despite increase in his insulin      Awake, moves all extremities      Type 2 diabetes exacerbated by steroids - increase humalog to 20 with meals  Increase correctional insulin to algorithm 5  Continue to monitor blood sugar over time and make adjustments to the regimen if necessary  Check urine for microalbumin           Teaching Physician Statement  I performed history and exam of patient  I discussed with the Resident  I agree with the resident's documented findings and plan of care              Expand All Collapse All    []Hide copied text  []Hover for attribution information  Consultation - Margarette Juan 76 y o  male MRN: [id number]     [facility] Encounter: [id number]           Assessment/Plan         Assessment: This is a 76y o -year-old male with history of type 2 diabetes mellitus on insulin therapy, admitted for acute onset of left eye vision loss, 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 for which the patient is s/p high-dose solumedrol currently maintained on high-dose prednisone resulting in hyperglycemia     Plan:  1  Type 2 diabetes mellitus with hyperglycema- 7 8 on 10/22  Hyperglycemia as a result of high-dose steroids  Currently on 50u levemir in AM and 30u qHS  12u humalog TID w/meals with correction scale alg 3 qHS and alg4 with meals  BG has been ranging between 202-471 since admission  · Would increase prandial insulin to 16u TID with meals  · Continue to decrease insulin during hospital course as hyperglycemia resolves and needs decrease  Hopefully bx will be done tomorrow with results in a few days    If negative, per rheum OK to taper steroids over 10 days  · Increase correction scale to alg 4 qHS and alg 5 with meals  · Goal -180 while in hospital  · Check microalbumin/creatinine ratio  · Patient was counseled on weight loss as well as lifestyle change     2  Left eye vision loss, possible temporal arteritis - s/p 1,000mg IV solumedrol  Currently on Prednisone 50mg daily started 10/25  · Management per vascular and Rheumatology  · Pt planned for temporal artery Bx tomorrow 10/26     CC: Diabetes Consult        History of Present Illness        HPI: Ivy Nixon is a 76y o  year old male with a past medical history of type 2 diabetes mellitus, CKD stage 4, Atrial fibrillation, chronic combined systolic/diastolic heart failure, CAD, REYNA admitted for acute Left eye vision loss with 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  Endocrinology is consulted for hyperglycemia in the setting of high-dose steroid administration  Patient is currently s/p 1,000mg IV steroids x4days  He is now on 50mg Prednisone which was started today  Per Rheumatology, the steroids will be continued at 50mg PO daily until patient has bx done and it is negative - then steroid can be rapidly taped over 10 days  Patient is planned for temporal artery bx with vascular surgery tomorrow  Patient has a history of Type II diabetes mellitus with fair control in the outpatient setting with insulin therapy for approximately 5 years  He is on Levemir 40u qHS and 12u Humalog TID with sliding scale at home  Reports compliance  Patient states he was diagnosed about 5 years ago, however he did not routinely see physicians after this  He follows with Ophtalmology  Denies Neuropathy  Denies episodes of hypoglycemia     Inpatient consult to Endocrinology  Date/Time: 10/25/2017 12:43 PM  Performed by: Dimas Armstrong  Authorized by: Christiana Rojas            Review of Systems   Constitutional: Negative for chills, fatigue and fever  Eyes: Positive for visual disturbance  Respiratory: Positive for shortness of breath  Cardiovascular: Negative for chest pain  Gastrointestinal: Negative      Endocrine: Positive for polydipsia, polyphagia and polyuria  Negative for cold intolerance and heat intolerance  Genitourinary: Positive for urgency  Musculoskeletal: Positive for arthralgias  Allergic/Immunologic: Negative  Neurological: Negative  Hematological: Negative  Psychiatric/Behavioral: Negative              Historical Information          Past Medical History:   Diagnosis Date    Anemia      Arthritis      Atrial fibrillation (HCC)      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HCC)      B12 deficiency      Back pain      Blind right eye      Chronic combined systolic and diastolic heart failure (HCC)      Chronic venous stasis dermatitis of both lower extremities      CKD (chronic kidney disease), stage IV (HCC)      Coronary artery disease      DDD (degenerative disc disease)      DM (diabetes mellitus), type 2 (HCC)       Type II, on insulin    BAER (dyspnea on exertion)      Epistaxis      Gout      Hiatal hernia      History of cellulitis       BLE    History of prostate cancer       Radiation treatments      Hypertension      Ischemic cardiomyopathy      MI, old      Neuropathy       BLE    Obesity      Obstructive sleep apnea syndrome, severe      TIA (transient ischemic attack)      Urinary incontinence      Urinary retention      Use of cane as ambulatory aid       Independent with personal care             Past Surgical History:   Procedure Laterality Date    ABDOMINAL SURGERY        CARDIAC SURGERY        CATARACT EXTRACTION, BILATERAL        CORONARY ANGIOPLASTY WITH STENT PLACEMENT         2 stents    CORONARY ARTERY BYPASS GRAFT N/A 7/15/2016     Procedure: Intraop ADRIAN, CABG x 3 using LIMA to LAD, RSVG to OM1 and D1;  Surgeon: Saundra Gaitan MD;  Location: [facility];  Service:    Joce Navarro CYSTOSCOPY         has stents    EYE SURGERY         Bilateral cataract removal    HERNIA REPAIR         umbilical hernia repair    LUNG CANCER SURGERY         Partial upper right lobectomy    PILONIDAL CYST EXCISION        TX CYSTOURETHROSCOPY,URETER CATHETER Left 3/9/2016     Procedure: CYSTOSCOPY WITH left RETROGRADE PYELOGRAM left double J stent removal;  Surgeon: Mary Kate Whittaker MD;  Location: [facility];  Service: Urology    VASCULAR SURGERY             Social History         History   Alcohol Use No          History   Drug Use No            History   Smoking Status    Former Smoker    Packs/day: 2 00    Years: 54 00    Quit date: 2004   Smokeless Tobacco    Never Used       Comment: Pt is a non smoker      Family History:         Family History   Problem Relation Age of Onset    Diabetes Other      Hypertension Other      Cancer Other      Diabetes Mother      Heart disease Mother      Hypertension Mother      Diabetes Father      Diabetes Maternal Grandmother              Meds/Allergies               Current Facility-Administered Medications   Medication Dose Route Frequency Provider Last Rate Last Dose    acetaminophen (TYLENOL) tablet 650 mg  650 mg Oral Q4H PRN Refugia Bare, DO   650 mg at 10/22/17 2222    aspirin (ECOTRIN LOW STRENGTH) EC tablet 81 mg  81 mg Oral BID Refugia Bare, DO   81 mg at 10/25/17 5113    atorvastatin (LIPITOR) tablet 40 mg  40 mg Oral Daily With Hyphen 8LANDON   40 mg at 10/24/17 1728    cholecalciferol (VITAMIN D3) tablet 2,000 Units  2,000 Units Oral Daily Refugia Bare, DO   2,000 Units at 10/25/17 0820    docusate sodium (COLACE) capsule 100 mg  100 mg Oral BID PRN Refugia Bare, DO        gabapentin (NEURONTIN) capsule 300 mg  300 mg Oral HS Maurice Carver, DO   300 mg at 10/24/17 2226    insulin detemir (LEVEMIR) subcutaneous injection 30 Units  30 Units Subcutaneous MARY KATE Acosta PA-C   30 Units at 10/25/17 0825    insulin detemir (LEVEMIR) subcutaneous injection 50 Units  50 Units Subcutaneous HS Adam Mack MD   50 Units at 10/24/17 2226    insulin lispro (HumaLOG) 100 units/mL subcutaneous injection 1-6 Units  1-6 Units Subcutaneous HS Aleah Ackerman PA-C   4 Units at 10/24/17 2227    insulin lispro (HumaLOG) 100 units/mL subcutaneous injection 12 Units  12 Units Subcutaneous TID With Meals Rafael Antunez PA-C   12 Units at 10/25/17 1144    insulin lispro (HumaLOG) 100 units/mL subcutaneous injection 2-12 Units  2-12 Units Subcutaneous TID [facility] Aleah Ackerman PA-C   6 Units at 10/25/17 1144    iron polysaccharides (NIFEREX) capsule 150 mg  150 mg Oral BID Bria Woods MD   150 mg at 10/25/17 9406    metoprolol tartrate (LOPRESSOR) tablet 25 mg  25 mg Oral BID Jean Baba, DO   25 mg at 10/25/17 0820    ondansetron (ZOFRAN) injection 4 mg  4 mg Intravenous Q6H PRN Jean Baba, DO        perflutren lipid microsphere (DEFINITY) injection  1 mL/min Intravenous Once in imaging Waqas Corral MD        potassium chloride (K-DUR,KLOR-CON) CR tablet 40 mEq  40 mEq Oral BID Lokesh Mayo PA-C   40 mEq at 10/25/17 1145    predniSONE tablet 50 mg  50 mg Oral Daily Rafael Antunez PA-C   50 mg at 10/25/17 0793    tamsulosin (FLOMAX) capsule 0 4 mg  0 4 mg Oral HS Jean Baba, DO   0 4 mg at 10/24/17 2226            Allergies   Allergen Reactions    Other Rash       Adhesive tape            Objective      Vitals: Blood pressure 119/61, pulse 76, temperature 98 3 °F (36 8 °C), resp  rate 20, height 5' 7" (1 702 m), weight (!) 138 kg (303 lb 5 7 oz), SpO2 97 %      Intake/Output Summary (Last 24 hours) at 10/25/17 1242  Last data filed at 10/25/17 0900    Gross per 24 hour   Intake              680 ml   Output             3725 ml   Net            -3045 ml          Invasive Devices            Peripheral Intravenous Line                     Peripheral IV 08/25/17 61 days      Peripheral IV 10/22/17 Left Antecubital 3 days                      Physical Exam   Constitutional: He is oriented to person, place, and time  He appears well-developed and well-nourished  Obese habitus   HENT:   Head: Normocephalic and atraumatic  Eyes: Pupils are equal, round, and reactive to light  Cardiovascular: Normal rate and regular rhythm  Exam reveals friction rub  Exam reveals no gallop  No murmur heard  Pulmonary/Chest: Effort normal  No respiratory distress  He has no wheezes  He has no rales  Abdominal: Soft  Musculoskeletal: Normal range of motion  Neurological: He is alert and oriented to person, place, and time  Skin: Skin is warm and dry  Vitals reviewed         The history was obtained from the review of the chart and patient     Lab Results:      Results from last 7 days  Lab Units 10/22/17  0508   HEMOGLOBIN A1C % 7 8*            Lab Results   Component Value Date     WBC 11 72 (H) 10/25/2017     HGB 14 3 10/25/2017     HCT 42 0 10/25/2017     MCV 87 10/25/2017      10/25/2017            Lab Results   Component Value Date/Time     BUN 81 (H) 10/25/2017 06:06 AM     BUN 30 (H) 09/16/2015 11:37 AM      (L) 10/25/2017 06:06 AM      09/16/2015 11:37 AM     K 2 9 (L) 10/25/2017 06:06 AM     K 5 3 09/16/2015 11:37 AM     CL 92 (L) 10/25/2017 06:06 AM      09/16/2015 11:37 AM     CO2 32 10/25/2017 06:06 AM     CO2 26 9 09/16/2015 11:37 AM     CREATININE 3 02 (H) 10/25/2017 06:06 AM     CREATININE 1 62 (H) 09/16/2015 11:37 AM     AST 17 08/16/2017 06:33 PM     AST 18 09/16/2015 11:37 AM     ALT 23 08/16/2017 06:33 PM     ALT 31 09/16/2015 11:37 AM     No results for input(s): CHOL, HDL, LDL, TRIG, VLDL in the last 72 hours  No results found for: Deonte Crockett      POC Glucose (mg/dl)   Date Value   10/25/2017 292 (H)   10/25/2017 220 (H)   10/24/2017 307 (H)   10/24/2017 210 (H)   10/24/2017 312 (H)   10/24/2017 270 (H)   10/23/2017 382 (H)   10/23/2017 283 (H)   10/23/2017 364 (H)   10/23/2017 298 (H)         Imaging Studies: I have personally reviewed pertinent reports  Ct Head Without Contrast     Result Date: 10/21/2017  Impression: No acute intracranial abnormality   Workstation performed: [id number]      Mri Brain Wo Contrast     Result Date: 10/24/2017  Impression: No acute intracranial abnormality  Mild chronic microvascular ischemia   Workstation performed: [id number]            Portions of the record may have been created with voice recognition Venkat Purdy MD PGY2         Cosigned by: Eva Bateman MD at 10/25/2017  3:59 PM

## 2017-11-15 ENCOUNTER — LAB REQUISITION (OUTPATIENT)
Dept: LAB | Facility: HOSPITAL | Age: 77
End: 2017-11-15
Payer: MEDICARE

## 2017-11-15 DIAGNOSIS — E11.65 TYPE 2 DIABETES MELLITUS WITH HYPERGLYCEMIA (HCC): ICD-10-CM

## 2017-11-15 DIAGNOSIS — N18.4 CHRONIC KIDNEY DISEASE, STAGE IV (SEVERE) (HCC): ICD-10-CM

## 2017-11-15 LAB
ALBUMIN SERPL BCP-MCNC: 3.3 G/DL (ref 3.5–5)
ALP SERPL-CCNC: 45 U/L (ref 46–116)
ALT SERPL W P-5'-P-CCNC: 43 U/L (ref 12–78)
ANION GAP SERPL CALCULATED.3IONS-SCNC: 10 MMOL/L (ref 4–13)
AST SERPL W P-5'-P-CCNC: 22 U/L (ref 5–45)
BASOPHILS # BLD MANUAL: 0 THOUSAND/UL (ref 0–0.1)
BASOPHILS NFR MAR MANUAL: 0 % (ref 0–1)
BILIRUB SERPL-MCNC: 0.33 MG/DL (ref 0.2–1)
BUN SERPL-MCNC: 100 MG/DL (ref 5–25)
CALCIUM SERPL-MCNC: 9 MG/DL (ref 8.3–10.1)
CHLORIDE SERPL-SCNC: 100 MMOL/L (ref 100–108)
CO2 SERPL-SCNC: 30 MMOL/L (ref 21–32)
CREAT SERPL-MCNC: 2.83 MG/DL (ref 0.6–1.3)
EOSINOPHIL # BLD MANUAL: 0 THOUSAND/UL (ref 0–0.4)
EOSINOPHIL NFR BLD MANUAL: 0 % (ref 0–6)
ERYTHROCYTE [DISTWIDTH] IN BLOOD BY AUTOMATED COUNT: 16.6 % (ref 11.6–15.1)
FERRITIN SERPL-MCNC: 101 NG/ML (ref 8–388)
GFR SERPL CREATININE-BSD FRML MDRD: 20 ML/MIN/1.73SQ M
GLUCOSE P FAST SERPL-MCNC: 164 MG/DL (ref 65–99)
HCT VFR BLD AUTO: 44.2 % (ref 36.5–49.3)
HGB BLD-MCNC: 14.4 G/DL (ref 12–17)
IRON SATN MFR SERPL: 24 %
IRON SERPL-MCNC: 86 UG/DL (ref 65–175)
LYMPHOCYTES # BLD AUTO: 0.42 THOUSAND/UL (ref 0.6–4.47)
LYMPHOCYTES # BLD AUTO: 3 % (ref 14–44)
MCH RBC QN AUTO: 29.8 PG (ref 26.8–34.3)
MCHC RBC AUTO-ENTMCNC: 32.6 G/DL (ref 31.4–37.4)
MCV RBC AUTO: 91 FL (ref 82–98)
MONOCYTES # BLD AUTO: 0 THOUSAND/UL (ref 0–1.22)
MONOCYTES NFR BLD: 0 % (ref 4–12)
NEUTROPHILS # BLD MANUAL: 13.51 THOUSAND/UL (ref 1.85–7.62)
NEUTS SEG NFR BLD AUTO: 97 % (ref 43–75)
NRBC BLD AUTO-RTO: 0 /100 WBCS
PLATELET # BLD AUTO: 228 THOUSANDS/UL (ref 149–390)
PLATELET BLD QL SMEAR: ADEQUATE
PMV BLD AUTO: 10.7 FL (ref 8.9–12.7)
POIKILOCYTOSIS BLD QL SMEAR: PRESENT
POTASSIUM SERPL-SCNC: 4.4 MMOL/L (ref 3.5–5.3)
PROT SERPL-MCNC: 5.7 G/DL (ref 6.4–8.2)
PSA SERPL-MCNC: 0.6 NG/ML (ref 0–4)
RBC # BLD AUTO: 4.84 MILLION/UL (ref 3.88–5.62)
RBC MORPH BLD: PRESENT
SODIUM SERPL-SCNC: 140 MMOL/L (ref 136–145)
TIBC SERPL-MCNC: 364 UG/DL (ref 250–450)
VIT B12 SERPL-MCNC: 695 PG/ML (ref 100–900)
WBC # BLD AUTO: 13.93 THOUSAND/UL (ref 4.31–10.16)

## 2017-11-15 PROCEDURE — 85027 COMPLETE CBC AUTOMATED: CPT | Performed by: FAMILY MEDICINE

## 2017-11-15 PROCEDURE — 82607 VITAMIN B-12: CPT | Performed by: FAMILY MEDICINE

## 2017-11-15 PROCEDURE — 85007 BL SMEAR W/DIFF WBC COUNT: CPT | Performed by: FAMILY MEDICINE

## 2017-11-15 PROCEDURE — 82728 ASSAY OF FERRITIN: CPT | Performed by: FAMILY MEDICINE

## 2017-11-15 PROCEDURE — 83540 ASSAY OF IRON: CPT | Performed by: FAMILY MEDICINE

## 2017-11-15 PROCEDURE — G0103 PSA SCREENING: HCPCS | Performed by: FAMILY MEDICINE

## 2017-11-15 PROCEDURE — 83550 IRON BINDING TEST: CPT | Performed by: FAMILY MEDICINE

## 2017-11-15 PROCEDURE — 80053 COMPREHEN METABOLIC PANEL: CPT | Performed by: FAMILY MEDICINE

## 2017-11-16 ENCOUNTER — GENERIC CONVERSION - ENCOUNTER (OUTPATIENT)
Dept: OTHER | Facility: OTHER | Age: 77
End: 2017-11-16

## 2017-11-24 DIAGNOSIS — C61 MALIGNANT NEOPLASM OF PROSTATE (HCC): ICD-10-CM

## 2017-11-24 DIAGNOSIS — I73.9 PERIPHERAL VASCULAR DISEASE (HCC): ICD-10-CM

## 2017-11-27 ENCOUNTER — ALLSCRIPTS OFFICE VISIT (OUTPATIENT)
Dept: OTHER | Facility: OTHER | Age: 77
End: 2017-11-27

## 2017-11-28 NOTE — PROGRESS NOTES
Assessment  1  Prostate cancer (185) (C61)    Plan  Prostate cancer    · Lupron Depot (6-Month) 45 MG Intramuscular Kit   Rx By: Bernardino Hoyt; For: Prostate cancer; Dose of 45 MG; Intramuscular; MIGUEL = N; Administered by: Loli Murray ([address] [place] [address]): 11/27/2017 1:14:00 PM; Last Updated By: Loli Murray ([address] [place] [address]); 11/27/2017 1:15:23 PM   · (1) PSA, DIAGNOSTIC (FOLLOW-UP); Status:Active; Requested for:[id number];    Perform:[facility]; FZU:[id number]; Ordered;cancer; Ordered By:Mariposa Yanes;    Discussion/Summary  Discussion Summary:   68-year-old male managed by Dr Nadeen Conn  Advanced prostate cancer status post radiation therapy completed in 2004  patients PSA todau is 0 6  Received his Lupron today in the office  Will FU in another 6 months for Lupron and a PSA prior  The patient is and agrees to this treatment plan  All questions and concerns have been addressed and answered  Goals and Barriers: The patient has the current Goals: Weight loss  The patent has the current Barriers: Comorbidities  Patient's Capacity to Self-Care: Patient is able to Self-Care  Chief Complaint  Chief Complaint Free Text Note Form: pt here for 6 month follow up for prostate cancer & Lupron shot0 6 (11/15/17)      History of Present Illness  HPI: Navya Perez is a 68-year-old male here for follow-up evaluation of his advanced prostate cancer  He is status post external beam radiation therapy completed in 2004 and has been receiving intermittent androgen deprivation therapy  His last Lupron shot was November 2016  His PSA went from the mid 5 range to 2 3 and is now at 0 6  There was concern for him receiving Lupron injections due to significant cardiac comorbidities and an embolic stroke 53/0352 however it has been deemed safe by hematology oncology  Of note he recently underwent a temperoal artery biopsy and will be seeing a doctor at [facility] due to sight difficulty  Cr is 2 83 with a GFR of 20   He continues to follow with   [facility]  Review of Systems  Complete-Male Urology:  Constitutional: No fever or chills, feels well, no tiredness, no recent weight gain or weight loss  Respiratory: No complaints of shortness of breath, no wheezing, no cough, no SOB on exertion, no orthopnea or PND  Cardiovascular: No complaints of slow heart rate, no fast heart rate, no chest pain, no palpitations, no leg claudication, no lower extremity  Gastrointestinal: No complaints of abdominal pain, no constipation, no nausea or vomiting, no diarrhea or bloody stools  Genitourinary: Empty sensation,-- incontinence-- (1 pad per can),-- feelings of urinary urgency-- and-- stream quality good, but-- no dysuria,-- no urinary hesitancy-- and-- no hematuria--   The patient presents with complaints of nocturia (2-3 times per night )  Musculoskeletal: No complaints of arthralgia, no myalgias, no joint swelling or stiffness, no limb pain or swelling  Integumentary: No complaints of skin rash or skin lesions, no itching, no skin wound, no dry skin  Hematologic/Lymphatic: No complaints of swollen glands, no swollen glands in the neck, does not bleed easily, no easy bruising  Neurological: No compliants of headache, no confusion, no convulsions, no numbness or tingling, no dizziness or fainting, no limb weakness, no difficulty walking  ROS Reviewed:   ROS reviewed  Active Problems    1  Acute idiopathic gout of right foot (274 01) (M10 071)   2  Acute on chronic combined systolic and diastolic congestive heart failure, NYHA class 2 (428 43,428 0) (I50 43)   3  Acute pain of left foot (729 5) (M79 672)   4  Advanced directives, counseling/discussion (V65 49) (Z71 89)   5  Anemia (285 9) (D64 9)   6  Arteriosclerotic coronary artery disease (414 00) (I25 10)   7  Arthralgia of right shoulder region (719 41) (M25 511)   8  Atrial fibrillation (427 31) (I48 91)   9  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)   10   Benign essential hypertension (401  1) (I10)   11  Benign hypertension with CKD (chronic kidney disease) stage III (403 10,585 3)  (I12 9,N18 3)   12  Bilateral leg edema (782 3) (R60 0)   13  Bilateral renal cysts (753 10) (N28 1)   14  Blind hypertensive eye, 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)   15  Bursitis of hip, right (726 5) (M70 71)   16  Cerebrovascular accident, 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)   17  Chronic combined systolic and diastolic CHF (congestive heart failure) (428 42,428 0)  (I50 42)   18  Chronic diastolic congestive heart failure (428 32,428 0) (I50 32)   19  Chronic kidney disease (585 9) (N18 9)   20  Chronic venous stasis dermatitis of both lower extremities (454 1) (I87 2)   21  CKD (chronic kidney disease), stage IV (585 4) (N18 4)   22  Closed nondisplaced fracture of distal phalanx of left thumb with routine healing,  subsequent encounter (V54 19) (S62 525D)   23  Closed nondisplaced fracture of distal phalanx of left thumb, initial encounter (816 02)  (S62 525A)   24  Coronary artery disease (414 00) (I25 10)   25  Diabetes mellitus type 2, insulin dependent (250 00,V58 67) (E11 9,Z79 4)   26  Diabetic retinopathy screening (V80 2) (Z13 5)   27  Diastolic CHF (197 28,189 1) (I50 30)   28  Disorder of ankle and foot joint (719 97) (M25 9)   29  BAER (dyspnea on exertion) (786 09) (R06 09)   30  Edema (782 3) (R60 9)   31  Encounter for prostate cancer screening (V76 44) (Z12 5)   32  Epistaxis (784 7) (R04 0)   33  Epistaxis, recurrent (784 7) (R04 0)   34  Exercise counseling (V65 41) (Z71 82)   35  Flu vaccine need (V04 81) (Z23)   36  Glaucoma screening (V80 1) (Z13 5)   37  Hiatal hernia (553 3) (K44 9)   38  Hip pain, acute, right (719 45) (M25 551)   39  Hydronephrosis of left kidney (591) (N13 30)   40  Hyperlipidemia (272 4) (E78 5)   41  Hypoxemia (799 02) (R09 02)   42  Ischemic cardiomyopathy (414 8) (I25 5)   43  Low blood potassium (276 8) (E87 6)   44  Medicare annual wellness visit, subsequent (V70 0) (Z00 00)   45  Myocardial infarction (410 90) (I21 9)   46  Need for influenza vaccination (V04 81) (Z23)   47  Need for pneumococcal vaccination (V03 82) (Z23)   48  Neuropathy (355 9) (G62 9)   49  No advance directives (V49 89) (Z78 9)   50  Open wound of right lower extremity, initial encounter (891 0) (S81 801A)   51  Poorly controlled type 2 diabetes mellitus (250 00) (E11 65)   52  Prostate cancer (185) (C61)   53  Pyelonephritis, acute (590 10) (N10)   54  S/P CABG x 3 (V45 81) (Z95 1)   55  Screening for depression (V79 0) (Z13 89)   56  Screening for diabetic peripheral neuropathy (V80 09) (Z13 89)   57  Screening for genitourinary condition (V81 6) (Z13 89)   58  Screening for neurological condition (V80 09) (Z13 89)   59  Severe obstructive sleep apnea-hypopnea syndrome (327 23) (G47 33)   60  Temporal arteritis (446 5) (M31 6)   61  Trochanteric bursitis of left hip (726 5) (M70 62)   62  Ulcers of both lower legs (707 10) (L97 919,L97 929)   63  Urinary retention (788 20) (R33 9)   64  Vitamin B 12 deficiency (266 2) (E53 8)   65  Vitamin D deficiency (268 9) (E55 9)    Past Medical History  1  History of Abnormal blood chemistry (790 6) (R79 9)   2  History of Acute urinary retention (788 29) (R33 8)   3  History of Ankle Joint Swelling (719 07)   4  History of Cellulitis (682 9) (L03 90)   5  History of Cellulitis (682 9) (L03 90)   6  History of Cellulitis of calf (682 6) (L03 119)   7  History of Diabetes Mellitus (250 00)   8  History of Easy Bruising Tendency   9  History of Gross hematuria (599 71) (R31 0)   10  History of cardiac disorder (V12 50) (Z86 79)   11  History of chronic obstructive lung disease (V12 69) (Z87 09)   12  History of diarrhea (V12 79) (Z87 898)   13  History of fatigue (V13 89) (Z87 898)   14  History of hepatitis (V12 09) (Z86 19)   15  History of herpes zoster (V12 09) (Z86 19)   16  History of hypercholesterolemia (V12 29) (Z86 39)   17   History of hyperlipidemia (V12 29) (Z86 39) 18  History of hypertension (V12 59) (Z86 79)   19  History of jaundice (V12 29) (Z87 898)   20  History of shortness of breath (V13 89) (Z87 898)   21  History of urinary tract infection (V13 02) (Z87 440)   22  History of Lung mass (786 6) (R91 8)   23  History of Morbid or severe obesity due to excess calories (278 01) (E66 01)   24  History of Pneumonia (V12 61)   25  History of Pre-operative cardiovascular examination (V72 81) (Z01 810)   26  History of Vaccine for streptococcus pneumoniae and influenza (V06 6) (Z23)  Active Problems And Past Medical History Reviewed: The active problems and past medical history were reviewed and updated today  Surgical History  1  History of CABG   2  History of Cataract Surgery   3  History of Cystoscopy With Removal Of Object   4  History of Lung Surgery   5  History of Pilonidal Cyst Resection - Simple   6  History of Previous Stent Placement   7  History of Radiation Therapy   8  History of Transcath Intravascular Stent Placement Percutaneous Renal   9  History of Umbilical Hernia Repair  Surgical History Reviewed: The surgical history was reviewed and updated today  Family History  Mother    1  Family history of Diabetes Mellitus (V18 0)   2  Family history of hypertension (V17 49) (Z82 49)   3  Family history of Type 2 diabetes mellitus (250 00) (E11 9)  Father    4  Family history of Diabetes Mellitus (V18 0)  Maternal Grandmother    5  Family history of Diabetes Mellitus (V18 0)  Family History    6  Family history of Cancer  Family History Reviewed: The family history was reviewed and updated today  Social History     · Denied: Drug Use (305 90)   · Former smoker (V15 82) (V79 520)   · Marital History - Currently    · Never Drank Alcohol   · No advance directives (V49 89) (Z78 9)   · No living will   · Patient has living will (V49 89) (Z78 9)  Social History Reviewed: The social history was reviewed and updated today   The social history was reviewed and is unchanged  Current Meds   1  Aspirin EC 81 MG Oral Tablet Delayed Release; TAKE 1 TABLET DAILY; Last Rx:03Jan2017 Ordered   2  Cephalexin 500 MG Oral Tablet; TAKE 1 TABLET 4 TIMES DAILY; Therapy: 07Sep2017 to (Evaluate:80Bcz1214)  Requested for: 02Dss8663; Last Rx:07Sep2017 Ordered   3  Cholecalciferol 2000 UNIT TABS; One daily; Therapy: (Recorded:10Mar2017) to Recorded   4  Colchicine 0 6 MG Oral Tablet; TAKE 2 TABLETS BY MOUTH FOR ONE DAY AND THEN TAKE ONE  TABLET DAILY FOR 10 DAYS; Therapy: 40RBZ3811 to (Ivory Magallanes)  Requested for: 13Apr2017; Last Rx:13Apr2017 Ordered   5  Eliquis 5 MG Oral Tablet; Take 1 tablet twice daily  Requested for: 06Wkb9046; Last Rx:72Dcy9605 Ordered   6  Ferrex 150 150 MG Oral Capsule; Take 1  tablet daily Recorded   7  Furosemide 80 MG Oral Tablet; take one tablet by mouth twice daily; Therapy: 01PXW5707 to [phone])  Requested for: 58Qsx3835; Last Rx:83Byu9358 Ordered   8  Gabapentin 300 MG Oral Capsule; TAKE 1 CAPSULE AT BEDTIME  Requested for: 39FJF3515; Last Rx:82Jna7553 Ordered   9  Klor-Con 10 10 MEQ Oral Tablet Extended Release; TAKE 1 TABLET DAILY; Therapy: 31EKQ6583 to (Kandy Stevenson)  Requested for: 93Ndn5067; Last Rx:01Aug2017 Ordered   10  Levemir FlexTouch 100 UNIT/ML Subcutaneous Solution Pen-injector; Inject 55 units at  bedtime; Therapy: (Recorded:24Nov2017) to Recorded   11  MetOLazone 5 MG Oral Tablet; Take one tab as directed by MD  Requested for:  03YHO8812; Last EF:82LRH8447 Ordered   12  Metoprolol Tartrate 25 MG Oral Tablet; Take 1 tablet twice daily Recorded   13  NovoLOG FlexPen 100 UNIT/ML Subcutaneous Solution Pen-injector; Sliding scale 5-15  units TID depending on B/S Recorded   14  PredniSONE 10 MG Oral Tablet; TAKE 5 TABLET Daily; Therapy: 48OGS0087 to (Complete:25Lyz9975)  Requested for: 71JYH8884; Last  Rx:20Nov2017 Ordered   15  Simvastatin 40 MG Oral Tablet; take 0 5 tablet bedtime;   Therapy: 83ZNZ9194 to (Evaluate:67Rdg3759); Last Rx:01Jun2017 Ordered   16  Tamsulosin HCl 0 4 MG CP24; TAKE 1 CAPSULE AT BEDTIME; Therapy: (Recorded:11Oct2016) to Recorded   17  Triple Antibiotic 3 5-400-5000 External Ointment; APPLY SPARINGLY TO AFFECTED  AREA(S) 3 TIMES A DAY; Therapy: 53Den1324 to (Evaluate:11Oct2017)  Requested for: 67Uur9981; Last  Rx:11Sep2017 Ordered   18  Vitamin B-12 1000 MCG Oral Tablet; TAKE 1 TABLET DAILY AS DIRECTED; Therapy: 06URI4932 to (Evaluate:13Nov2017)  Requested for: 10BPS1893; Last  Rx:18Nov2016 Ordered  Medication List Reviewed: The medication list was reviewed and updated today  Allergies  1  Adhesive Tape TAPE  2  Adhesive Tape    Vitals  Vital Signs    Recorded: 49URO0909 12:57PM   Heart Rate 88   Systolic 219   Diastolic 70   Height 5 ft 7 in   Weight 311 lb    BMI Calculated 48 71   BSA Calculated 2 44       Physical Exam   Constitutional  General appearance: Abnormal    Eyes  Conjunctiva and lids: No erythema, swelling or discharge  Ears, Nose, Mouth, and Throat  Hearing: Normal    Pulmonary  Respiratory effort: No increased work of breathing or signs of respiratory distress  Abdomen  Abdomen: Non-tender, no masses  -- obese  Musculoskeletal  Gait and station: Abnormal  -- walks with a cane  Psychiatric  Mood and affect: Normal        Results/Data  (1) PSA (SCREEN) (Dx V76 44 Screen for Prostate Cancer) 14USO5187 06:26PM Asha Flores     Test Name Result Flag Reference   PROSTATE SPECIFIC ANTIGEN 0 6 ng/mL  0 0-4 0     American Urological Association Guidelines define biochemical recurrence of prostate cancer as a detectable or rising PSA value post-radical prostatectomy that is greater than or equal to 0 2 ng/mL with a second confirmatory level of greater than or equal to 0 2 ng/mL  This is a patient instruction: This test is non-fasting  Please drink two glasses of water morning of bloodwork       Future Appointments    Date/Time Provider Specialty Site 03/23/2018 01:00 PM [facility]   12/18/2017 09:30 AM Roger Keys DO [address]   12/19/2017 11:10 AM BAHMAN Hall   Orthopedic Surgery  [facility]        Signatures   Electronically signed by : [place], ; Nov 27 2017  1:19PM EST                       (Author)    Electronically signed by : BAHMAN Soto ; Nov 27 2017  1:33PM EST

## 2017-11-30 ENCOUNTER — GENERIC CONVERSION - ENCOUNTER (OUTPATIENT)
Dept: OTHER | Facility: OTHER | Age: 77
End: 2017-11-30

## 2017-11-30 ENCOUNTER — GENERIC CONVERSION - ENCOUNTER (OUTPATIENT)
Dept: FAMILY MEDICINE CLINIC | Facility: CLINIC | Age: 77
End: 2017-11-30

## 2017-12-13 ENCOUNTER — LAB REQUISITION (OUTPATIENT)
Dept: LAB | Facility: HOSPITAL | Age: 77
End: 2017-12-13
Payer: MEDICARE

## 2017-12-13 DIAGNOSIS — N18.4 CHRONIC KIDNEY DISEASE, STAGE IV (SEVERE) (HCC): ICD-10-CM

## 2017-12-13 LAB
ALBUMIN SERPL BCP-MCNC: 2.6 G/DL (ref 3.5–5)
ALP SERPL-CCNC: 36 U/L (ref 46–116)
ALT SERPL W P-5'-P-CCNC: 46 U/L (ref 12–78)
ANION GAP SERPL CALCULATED.3IONS-SCNC: 8 MMOL/L (ref 4–13)
ANISOCYTOSIS BLD QL SMEAR: PRESENT
AST SERPL W P-5'-P-CCNC: 25 U/L (ref 5–45)
BASOPHILS # BLD MANUAL: 0 THOUSAND/UL (ref 0–0.1)
BASOPHILS NFR MAR MANUAL: 0 % (ref 0–1)
BILIRUB SERPL-MCNC: 0.32 MG/DL (ref 0.2–1)
BUN SERPL-MCNC: 59 MG/DL (ref 5–25)
CALCIUM SERPL-MCNC: 8.4 MG/DL (ref 8.3–10.1)
CHLORIDE SERPL-SCNC: 96 MMOL/L (ref 100–108)
CO2 SERPL-SCNC: 33 MMOL/L (ref 21–32)
CREAT SERPL-MCNC: 2.54 MG/DL (ref 0.6–1.3)
EOSINOPHIL # BLD MANUAL: 0 THOUSAND/UL (ref 0–0.4)
EOSINOPHIL NFR BLD MANUAL: 0 % (ref 0–6)
ERYTHROCYTE [DISTWIDTH] IN BLOOD BY AUTOMATED COUNT: 17.1 % (ref 11.6–15.1)
GFR SERPL CREATININE-BSD FRML MDRD: 23 ML/MIN/1.73SQ M
GLUCOSE P FAST SERPL-MCNC: 372 MG/DL (ref 65–99)
HCT VFR BLD AUTO: 42.2 % (ref 36.5–49.3)
HGB BLD-MCNC: 13.3 G/DL (ref 12–17)
IRON SERPL-MCNC: 64 UG/DL (ref 65–175)
LYMPHOCYTES # BLD AUTO: 0.39 THOUSAND/UL (ref 0.6–4.47)
LYMPHOCYTES # BLD AUTO: 4 % (ref 14–44)
MCH RBC QN AUTO: 29.9 PG (ref 26.8–34.3)
MCHC RBC AUTO-ENTMCNC: 31.5 G/DL (ref 31.4–37.4)
MCV RBC AUTO: 95 FL (ref 82–98)
METAMYELOCYTES NFR BLD MANUAL: 1 % (ref 0–1)
MONOCYTES # BLD AUTO: 0.39 THOUSAND/UL (ref 0–1.22)
MONOCYTES NFR BLD: 4 % (ref 4–12)
NEUTROPHILS # BLD MANUAL: 8.96 THOUSAND/UL (ref 1.85–7.62)
NEUTS BAND NFR BLD MANUAL: 1 % (ref 0–8)
NEUTS SEG NFR BLD AUTO: 90 % (ref 43–75)
NRBC BLD AUTO-RTO: 0 /100 WBCS
PLATELET # BLD AUTO: 194 THOUSANDS/UL (ref 149–390)
PLATELET BLD QL SMEAR: ADEQUATE
PMV BLD AUTO: 11 FL (ref 8.9–12.7)
POTASSIUM SERPL-SCNC: 4.3 MMOL/L (ref 3.5–5.3)
PROT SERPL-MCNC: 5.4 G/DL (ref 6.4–8.2)
PSA SERPL-MCNC: 0.5 NG/ML (ref 0–4)
RBC # BLD AUTO: 4.45 MILLION/UL (ref 3.88–5.62)
RBC MORPH BLD: PRESENT
SODIUM SERPL-SCNC: 137 MMOL/L (ref 136–145)
VIT B12 SERPL-MCNC: 648 PG/ML (ref 100–900)
WBC # BLD AUTO: 9.85 THOUSAND/UL (ref 4.31–10.16)

## 2017-12-13 PROCEDURE — 83540 ASSAY OF IRON: CPT | Performed by: FAMILY MEDICINE

## 2017-12-13 PROCEDURE — 84153 ASSAY OF PSA TOTAL: CPT | Performed by: FAMILY MEDICINE

## 2017-12-13 PROCEDURE — 82607 VITAMIN B-12: CPT | Performed by: FAMILY MEDICINE

## 2017-12-13 PROCEDURE — 85027 COMPLETE CBC AUTOMATED: CPT | Performed by: FAMILY MEDICINE

## 2017-12-13 PROCEDURE — 85007 BL SMEAR W/DIFF WBC COUNT: CPT | Performed by: FAMILY MEDICINE

## 2017-12-13 PROCEDURE — 80053 COMPREHEN METABOLIC PANEL: CPT | Performed by: FAMILY MEDICINE

## 2017-12-14 ENCOUNTER — GENERIC CONVERSION - ENCOUNTER (OUTPATIENT)
Dept: OTHER | Facility: OTHER | Age: 77
End: 2017-12-14

## 2017-12-18 ENCOUNTER — ALLSCRIPTS OFFICE VISIT (OUTPATIENT)
Dept: OTHER | Facility: OTHER | Age: 77
End: 2017-12-18

## 2017-12-19 NOTE — PROGRESS NOTES
Assessment  1  Former smoker (V15 82) (B91 696)   2  Atrial fibrillation (427 31) (I48 91)   3  Benign essential hypertension (401 1) (I10)   4  CKD (chronic kidney disease), stage IV (585 4) (N18 4)   5  Peripheral vascular occlusive disease (443 9) (I73 9)    Plan  Peripheral vascular occlusive disease    · VAS LOWER LIMB ARTERIAL DUPLEX, LIMITED UNILATERAL/GRAFT; UnilateralSide:Left; Status:Hold For - Scheduling; Requested for:18Sya8348;     Chief Complaint  Patient is here today for follow up of chronic conditions described in HPI  History of Present Illness  The patient presents with permanent atrial fibrillation  The treatment strategy for this patient is rate control  He states his atrial fibrillation has been stable since the last visit  Comorbid Illnesses: temporal arteritis  He has no significant interval events  Symptoms: The patient is currently asymptomatic  Monitoring: The patient is not getting PT/INR checks  The patient presents for follow-up of essential hypertension  The patient states he has been doing well with his blood pressure control since the last visit  He has no comorbid illnesses  He has no significant interval events  Symptoms: The patient is currently asymptomatic  Review of Systems   Constitutional: morbidly obese  Eyes: macular degeneration  ENT: no complaints of earache, no hearing loss, no nosebleeds, no nasal discharge, no sore throat, no hoarseness  Cardiovascular: Atrial fibrillation  Respiratory: shortness of breath  Gastrointestinal: No complaints of abdominal pain, no constipation, no nausea or vomiting, no diarrhea or bloody stools  Genitourinary: No complaints of dysuria, no incontinence, no hesitancy, no nocturia, no genital lesion, no testicular pain  Musculoskeletal: No complaints of arthralgia, no myalgias, no joint swelling or stiffness, no limb pain or swelling  Integumentary: open lesions both legs currently off redressed    Neurological: temporal arteritis  Psychiatric: depression  Endocrine: blood sugars reviewed usually in the range of the 200s occasional elevated blood sugar  ROS reviewed  Active Problems  1  Acute idiopathic gout of right foot (274 01) (M10 071)   2  Acute on chronic combined systolic and diastolic congestive heart failure, NYHA class 2 (428 43,428 0) (I50 43)   3  Acute pain of left foot (729 5) (M79 672)   4  Advanced directives, counseling/discussion (V65 49) (Z71 89)   5  Anemia (285 9) (D64 9)   6  Arteriosclerotic coronary artery disease (414 00) (I25 10)   7  Arthralgia of right shoulder region (719 41) (M25 511)   8  Atrial fibrillation (427 31) (I48 91)   9  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)   10  Benign essential hypertension (401 1) (I10)   11  Benign hypertension with CKD (chronic kidney disease) stage III (403 10,585 3)  (I12 9,N18 3)   12  Bilateral leg edema (782 3) (R60 0)   13  Bilateral renal cysts (753 10) (N28 1)   14  Blind hypertensive eye, 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)   15  Bursitis of hip, right (726 5) (M70 71)   16  Cerebrovascular accident, 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)   17  Chronic combined systolic and diastolic CHF (congestive heart failure) (428 42,428 0)  (I50 42)   18  Chronic diastolic congestive heart failure (428 32,428 0) (I50 32)   19  Chronic kidney disease (585 9) (N18 9)   20  Chronic venous stasis dermatitis of both lower extremities (454 1) (I87 2)   21  CKD (chronic kidney disease), stage IV (585 4) (N18 4)   22  Closed nondisplaced fracture of distal phalanx of left thumb with routine healing,  subsequent encounter (V54 19) (S62 525D)   23  Closed nondisplaced fracture of distal phalanx of left thumb, initial encounter (816 02)  (S62 525A)   24  Coronary artery disease (414 00) (I25 10)   25  Diabetes mellitus type 2, insulin dependent (250 00,V58 67) (E11 9,Z79 4)   26  Diabetic retinopathy screening (V80 2) (Z13 5)   27  Diastolic CHF (484 40,134 5) (I50 30)   28  Disorder of ankle and foot joint (719 97) (M25 9)   29  BAER (dyspnea on exertion) (786 09) (R06 09)   30  Edema (782 3) (R60 9)   31  Encounter for prostate cancer screening (V76 44) (Z12 5)   32  Epistaxis (784 7) (R04 0)   33  Epistaxis, recurrent (784 7) (R04 0)   34  Exercise counseling (V65 41) (Z71 82)   35  Flu vaccine need (V04 81) (Z23)   36  Glaucoma screening (V80 1) (Z13 5)   37  Hiatal hernia (553 3) (K44 9)   38  Hip pain, acute, right (719 45) (M25 551)   39  Hydronephrosis of left kidney (591) (N13 30)   40  Hyperlipidemia (272 4) (E78 5)   41  Hypoxemia (799 02) (R09 02)   42  Ischemic cardiomyopathy (414 8) (I25 5)   43  Low blood potassium (276 8) (E87 6)   44  Medicare annual wellness visit, subsequent (V70 0) (Z00 00)   45  Myocardial infarction (410 90) (I21 9)   46  Need for influenza vaccination (V04 81) (Z23)   47  Need for pneumococcal vaccination (V03 82) (Z23)   48  Neuropathy (355 9) (G62 9)   49  No advance directives (V49 89) (Z78 9)   50  Open wound of right lower extremity, initial encounter (891 0) (S81 801A)   51  Poorly controlled type 2 diabetes mellitus (250 00) (E11 65)   52  Prostate cancer (185) (C61)   53  Pyelonephritis, acute (590 10) (N10)   54  S/P CABG x 3 (V45 81) (Z95 1)   55  Screening for depression (V79 0) (Z13 89)   56  Screening for diabetic peripheral neuropathy (V80 09) (Z13 89)   57  Screening for genitourinary condition (V81 6) (Z13 89)   58  Screening for neurological condition (V80 09) (Z13 89)   59  Severe obstructive sleep apnea-hypopnea syndrome (327 23) (G47 33)   60  Temporal arteritis (446 5) (M31 6)   61  Trochanteric bursitis of left hip (726 5) (M70 62)   62  Ulcers of both lower legs (707 10) (L97 919,L97 929)   63  Urinary retention (788 20) (R33 9)   64  Vitamin B 12 deficiency (266 2) (E53 8)   65  Vitamin D deficiency (268 9) (E55 9)    Past Medical History  1  History of Abnormal blood chemistry (790 6) (R79 9)   2   History of Acute urinary retention (788 29) (R33 8)   3  History of Ankle Joint Swelling (719 07)   4  History of Cellulitis (682 9) (L03 90)   5  History of Cellulitis (682 9) (L03 90)   6  History of Cellulitis of calf (682 6) (L03 119)   7  History of Diabetes Mellitus (250 00)   8  History of Easy Bruising Tendency   9  History of Gross hematuria (599 71) (R31 0)   10  History of cardiac disorder (V12 50) (Z86 79)   11  History of chronic obstructive lung disease (V12 69) (Z87 09)   12  History of diarrhea (V12 79) (Z87 898)   13  History of fatigue (V13 89) (Z87 898)   14  History of hepatitis (V12 09) (Z86 19)   15  History of herpes zoster (V12 09) (Z86 19)   16  History of hypercholesterolemia (V12 29) (Z86 39)   17  History of hyperlipidemia (V12 29) (Z86 39)   18  History of hypertension (V12 59) (Z86 79)   19  History of jaundice (V12 29) (Z87 898)   20  History of shortness of breath (V13 89) (Z87 898)   21  History of urinary tract infection (V13 02) (Z87 440)   22  History of Lung mass (786 6) (R91 8)   23  History of Morbid or severe obesity due to excess calories (278 01) (E66 01)   24  History of Pneumonia (V12 61)   25  History of Pre-operative cardiovascular examination (V72 81) (Z01 810)   26  History of Vaccine for streptococcus pneumoniae and influenza (V06 6) (Z23)    The active problems and past medical history were reviewed and updated today  Surgical History  1  History of CABG   2  History of Cataract Surgery   3  History of Cystoscopy With Removal Of Object   4  History of Lung Surgery   5  History of Pilonidal Cyst Resection - Simple   6  History of Previous Stent Placement   7  History of Radiation Therapy   8  History of Transcath Intravascular Stent Placement Percutaneous Renal   9  History of Umbilical Hernia Repair    The surgical history was reviewed and updated today  Family History  Mother    1  Family history of Diabetes Mellitus (V18 0)   2   Family history of hypertension (V17 49) (Z82 49) 3  Family history of Type 2 diabetes mellitus (250 00) (E11 9)  Father    4  Family history of Diabetes Mellitus (V18 0)  Maternal Grandmother    5  Family history of Diabetes Mellitus (V18 0)  Family History    6  Family history of Cancer    The family history was reviewed and updated today  Social History   · Denied: Drug Use (305 90)   · Former smoker (V15 82) (E45 538)   · Marital History - Currently    · Never Drank Alcohol   · No advance directives (V49 89) (Z78 9)   · No living will   · Patient has living will (V49 89) (Z78 9)  The social history was reviewed and updated today  The social history was reviewed and is unchanged  Current Meds   1  Aspirin EC 81 MG Oral Tablet Delayed Release; TAKE 1 TABLET DAILY; Last Rx:03Jan2017 Ordered   2  Cephalexin 500 MG Oral Tablet; TAKE 1 TABLET 4 TIMES DAILY; Therapy: 80Dzl9632 to (Evaluate:47Ndp4162)  Requested for: 83Hba3453; Last Rx:30Mmr1046 Ordered   3  Cholecalciferol 2000 UNIT TABS; One daily; Therapy: (Recorded:10Mar2017) to Recorded   4  Colchicine 0 6 MG Oral Tablet; TAKE 2 TABLETS BY MOUTH FOR ONE DAY AND THEN TAKE ONE  TABLET DAILY FOR 10 DAYS; Therapy: 17GER7143 to (Nori Peralta)  Requested for: 13Apr2017; Last Rx:13Apr2017 Ordered   5  Eliquis 5 MG Oral Tablet; Take 1 tablet twice daily  Requested for: 46Bll7508; Last Rx:01Zbr9327 Ordered   6  Ferrex 150 150 MG Oral Capsule; Take 1  tablet daily Recorded   7  Furosemide 80 MG Oral Tablet; take one tablet by mouth twice daily; Therapy: 10MGI7255 to (Reagan Alberto)  Requested for: 39FFT9188; Last Rx:90Jtm2092 Ordered   8  Gabapentin 300 MG Oral Capsule; TAKE 1 CAPSULE AT BEDTIME  Requested for: 89GRF0444; Last Rx:50Eyr6798 Ordered   9  Klor-Con 10 10 MEQ Oral Tablet Extended Release; TAKE 1 TABLET DAILY; Therapy: 02VUV0033 to (Kyle Parry)  Requested for: 76Cib9673; Last Rx:01Aug2017 Ordered   10  Levemir FlexTouch 100 UNIT/ML Subcutaneous Solution Pen-injector;  Inject 55 units at  bedtime; Therapy: (Recorded:24Nov2017) to Recorded   11  MetOLazone 5 MG Oral Tablet; Take one tab as directed by MD  Requested for:  30ZYU2317; Last IY:17NWR3314 Ordered   12  Metoprolol Tartrate 25 MG Oral Tablet; Take 1 tablet twice daily Recorded   13  NovoLOG FlexPen 100 UNIT/ML Subcutaneous Solution Pen-injector; Sliding scale 5-15  units TID depending on B/S Recorded   14  PredniSONE 10 MG Oral Tablet; TAKE 5 TABLET Daily; Therapy: 20DBC6515 to (Complete:20Dec2017)  Requested for: 03VRT0008; Last  Rx:20Nov2017 Ordered   15  Simvastatin 40 MG Oral Tablet; take 0 5 tablet bedtime; Therapy: 04ABP8027 to (Evaluate:21Slf3767); Last Rx:01Jun2017 Ordered   16  Tamsulosin HCl 0 4 MG CP24; TAKE 1 CAPSULE AT BEDTIME; Therapy: (Recorded:11Oct2016) to Recorded   17  Triple Antibiotic 3 5-400-5000 External Ointment; APPLY SPARINGLY TO AFFECTED  AREA(S) 3 TIMES A DAY; Therapy: 97Mgh9927 to (Evaluate:11Oct2017)  Requested for: 43Lgx3521; Last  Rx:11Sep2017 Ordered   18  Vitamin B-12 1000 MCG Oral Tablet; TAKE 1 TABLET DAILY AS DIRECTED; Therapy: 62ZTU7522 to (Evaluate:13Nov2017)  Requested for: 12MKG1887; Last  Rx:18Nov2016 Ordered    The medication list was reviewed and updated today  Allergies  1  Adhesive Tape TAPE  2  Adhesive Tape    Vitals  Vital Signs    Recorded: 62KZI0005 23:73XZ   Systolic 059, RLE, Sitting   Diastolic 70, RLE, Sitting   Height 5 ft 7 in   Weight 325 lb    BMI Calculated 50 9   BSA Calculated 2 49   O2 Saturation 92, Nasal Cannula   FiO2 2L/min, Nasal Cannula     Physical Exam   Constitutional morbidly obese  Eyes  Conjunctiva and lids: No swelling, erythema, or discharge  Pupils and irises: Equal, round and reactive to light  Ears, Nose, Mouth, and Throat  External inspection of ears and nose: Normal    Otoscopic examination: Tympanic membrance translucent with normal light reflex  Canals patent without erythema     Nasal mucosa, septum, and turbinates: Normal without edema or erythema  Oropharynx: Normal with no erythema, edema, exudate or lesions  Pulmonary  Respiratory effort: No increased work of breathing or signs of respiratory distress  Auscultation of lungs: Clear to auscultation, equal breath sounds bilaterally, no wheezes, no rales, no rhonci  Cardiovascular  Palpation of heart: Normal PMI, no thrills  Auscultation of heart: Normal rate and rhythm, normal S1 and S2, without murmurs  Examination of extremities for edema and/or varicosities: Normal    Carotid pulses: Normal    Abdomen  Abdomen: Non-tender, no masses  Liver and spleen: No hepatomegaly or splenomegaly  Lymphatic  Palpation of lymph nodes in neck: No lymphadenopathy  Musculoskeletal  Gait and station: Normal    Digits and nails: Normal without clubbing or cyanosis  Inspection/palpation of joints, bones, and muscles: Normal    Skin  Skin and subcutaneous tissue: Normal without rashes or lesions  Neurologic  Cranial nerves: Cranial nerves 2-12 intact  Reflexes: 2+ and symmetric  Health Management  Health Maintenance   Medicare Annual Wellness Visit; every 1 year; Last 07Sep2017; Next Due: 07Sep2018; Active    Future Appointments    Date/Time Provider Specialty Site   03/23/2018 01:00 PM BAHMAN Dickerson , DO, [facility] Hematology Oncology [facility] [place]   06/04/2018 01:00 PM BAHMAN Mckeon  Urology [facility] [place]   12/19/2017 11:10 AM BAHMAN Kendrick   Orthopedic Surgery [facility]     Signatures   Electronically signed by : Cristina Bear DO; Dec 18 2017  9:51AM EST                       (Author)

## 2017-12-21 ENCOUNTER — GENERIC CONVERSION - ENCOUNTER (OUTPATIENT)
Dept: OTHER | Facility: OTHER | Age: 77
End: 2017-12-21

## 2017-12-21 ENCOUNTER — HOSPITAL ENCOUNTER (OUTPATIENT)
Dept: ULTRASOUND IMAGING | Facility: HOSPITAL | Age: 77
Discharge: HOME | End: 2017-12-21
Attending: FAMILY MEDICINE
Payer: MEDICARE

## 2017-12-21 DIAGNOSIS — I73.9 PERIPHERAL VASCULAR DISEASE (HCC): ICD-10-CM

## 2017-12-21 PROCEDURE — 93926 LOWER EXTREMITY STUDY: CPT

## 2017-12-22 ENCOUNTER — GENERIC CONVERSION - ENCOUNTER (OUTPATIENT)
Dept: OTHER | Facility: OTHER | Age: 77
End: 2017-12-22

## 2018-01-03 ENCOUNTER — APPOINTMENT (OUTPATIENT)
Dept: WOUND CARE | Facility: HOSPITAL | Age: 78
End: 2018-01-03
Payer: MEDICARE

## 2018-01-03 PROCEDURE — 99215 OFFICE O/P EST HI 40 MIN: CPT | Performed by: REGISTERED NURSE

## 2018-01-03 PROCEDURE — 97597 DBRDMT OPN WND 1ST 20 CM/<: CPT | Performed by: REGISTERED NURSE

## 2018-01-09 NOTE — RESULT NOTES
Verified Results  * XR FOOT 3+ VIEW LEFT 17JUQ2733 03:56PM Jasson Mckeon Order Number: [id number]     Test Name Result Flag Reference   XR FOOT 3+ VW LEFT (Report)     LEFT FOOT     INDICATION: Left foot pain  COMPARISON: None     VIEWS: 3; 3 images     FINDINGS:     There is no acute fracture or dislocation  There is osteoarthritis of the 1st metatarsophalangeal joint  There are mild changes of the interphalangeal joints  There is a calcaneal plantar spur  There is osteoarthritis of the tarsometatarsal joints     There is a lucent defect in the distal portion of the 3rd cuneiform bone measuring 6 mm  Dorsal soft tissue swelling is present       IMPRESSION:   1  Dorsal soft tissue swelling and changes of arthritis and spurring of the left foot  Ca   2  Lucency in the distal portion of the 3rd cuneiform bone   Given there is arthritis of the tarsometatarsal joints this may represent a degenerative cyst        Workstation performed: [id number]     Signed by:   Pool Castro MD   3/18/16

## 2018-01-09 NOTE — PROCEDURES
Procedures by Jalen Zuniga PA-C at 7/18/2016 11:14 AM      Author:  Jalen Zuniga PA-C Service:  Critical Care/ICU Author Type:  Physician Assistant    Filed:  7/18/2016 11:21 AM Date of Service:  7/18/2016 11:14 AM Status:  Attested    :  Jalen Zuniga PA-C (Physician Assistant)  Cosigner:  Angeles Hernandez MD at 7/18/2016 11:28 AM      Procedure Orders:       1  Insert PA Catheter Nereyda Hu) [[id number]] ordered by Jalen Zuniga PA-C at 07/18/16 1111                 Post-procedure Diagnoses:       1  Postoperative cardiogenic shock Matthew Peabody              Attestation signed by Angeles Hernandez MD at 7/18/2016 11:28 AM           I was present for and participated in all aspects of this procedure  Melvi well with out complications  CXR pending                                           Insert PA Catheter Nereyda Mike)   Date/Time: 7/18/2016 11:14 AM  Performed by: Wesley Marshall  Authorized by: Wesley Marshall   Consent: Written consent obtained  Consent given by: patient (consent covered by initial cardiac surgery consent - new pa catheter needed as the prior one was no longer functioning )  Required items: required blood products, implants, devices, and special equipment available  Patient identity confirmed: arm band  Preparation: Patient was prepped and draped in the usual sterile fashion  Sedation:  Patient sedated: yes  Sedation type: moderate (conscious) sedation   Sedatives: midazolam (2mg)    Patient tolerance: Patient tolerated the procedure well with no immediate complications  Comments: Cordis prepped with chloraprep swab  Patient prepped in usual sterile fashion with full length drape  CCO swan kristen catheter was prepped  and calibrated by nursing prior to insertion  PA catheter inserted through RIJ cordis  Wave form monitoring during procedure confirmed placement in pulmonary artery  Had self-limited ectopy that resolved upon advancement of catheter   Secured in place with cathter sheath  Sterile dressing applied  CXR pending to confirm placement                          Received for:Provider  EPIC   Jul 18 2016 11:27AM [facility] Standard Time

## 2018-01-10 NOTE — PROCEDURES
Procedures by Leroy Hui PA-C at 7/15/2016  8:01 PM      Author:  Leroy Hui PA-C Service:  Trauma Author Type:  Physician Assistant    Filed:  7/15/2016  8:06 PM Date of Service:  7/15/2016  8:01 PM Status:  Attested    :  Leroy Hui PA-C (Physician Assistant)  Cosigner:  Zachariah Marvin DO at 7/15/2016 10:18 PM      Pre-procedure Diagnoses:       1  Atrial tachycardia [I47 1]                Procedures:       1  ELECTRICAL CARDIOVERSION [NDG418 (Custom)]              Attestation signed by Zachariah Marvin DO at 7/15/2016 10:18 PM           I agree with the documentation in advance practitioners note  Synchronized cardioversion was completed initially with 300 joules without success  Voltage was increased to 360 joules without success  Pad placement was changed to anterior and posterior position  and patient had two additional attempts at cardioversion with 360 Joules by Dr Lee Ann Murcia and had no successful cardioversion as well   Patient remained in atrial tachycardia with a rate of appx 110 bpm            Received for:Trista Ellington [facility]  Jul 15 2016 10:17PM [facility] Standard Time

## 2018-01-11 NOTE — MISCELLANEOUS
History of Present Illness  A call was made to the patient/patient's family  , the [facility] Doyle Nurse  The contact name and phone number is  Also left message for [facility] RN Ava Suarez  Status Check: today's weight is 316 yesterday at doctor's office  Patient is experiencing the following symptoms: edema and shortness of breath with usual activity  Concerns expressed today consisted of: didn't get a scale at office visit as planned and also concerned about bill for 02  I will follow up with cardiology office and Becca Doyle RN to resolve issues  Counseling provided to the patient  Topics counseled included diet, activities of daily living, medications, need for daily weights, importance of compliance with treatment and symptoms to report  Saw Dr Claudia Amato yesterday and has a 2 week follow up      Future Appointments    Date/Time Provider Specialty Site   08/25/2017 02:00 PM BAHMAN Hayes , , [facility] Hematology Oncology [address] [place] [address] ONCOLOGY [facility]   04/10/2017 01:20 PM BAHMAN Trent  Cardiology [facility] CARDIOLOGY [place]   04/14/2017 01:00 PM BAHMAN Trent   Cardiology [facility] CARDIOLOG[facility] [place]   04/13/2017 11:15 AM Renate Soto DO Family Medicine [address] [place] [address]     Signatures   Electronically signed by : Hilda Riojas, ; Mar 22 2017  4:39PM EST                       (Author)

## 2018-01-11 NOTE — PROCEDURES
Procedures by Kaz aCstillo PA-C at 7/26/2016  2:45 PM      Author:  Kaz Castillo PA-C Service:  Critical Care/ICU Author Type:  Physician Assistant    Filed:  7/26/2016  2:50 PM Date of Service:  7/26/2016  2:45 PM Status:  Attested    :  Kaz Castillo PA-C (Physician Assistant)  Cosigner:  Marilee Gallagher DO at 7/27/2016  1:29 PM      Procedure Orders:       1  CENTRAL LINE [[id number]] ordered by Kaz Castillo PA-C at 07/26/16 1445                 Post-procedure Diagnoses:       1  Sepsis [A41 9]              Attestation signed by Marilee Gallagher DO at 7/27/2016  1:29 PM           I performed this procedure  Central Line Insertion  Date/Time: 7/26/2016 2:45 PM  Performed by: Lesly Goff  Authorized by: Moises Hill[address]     Patient location:  Bedside  Other Assisting Provider:  Yes (comment) (Dr Salinas Half , [address] [place] [address] [facility] Assist )    Consent:     Consent obtained:  Verbal (on chart )    Consent given by:  Patient    Risks discussed:  Arterial puncture, incorrect placement, bleeding, infection, nerve damage and pneumothorax    Alternatives discussed:  No treatment and delayed treatment  Universal protocol:     Procedure explained and questions answered to patient or proxy's satisfaction: yes      Relevant documents present and verified: yes      Test results available and properly labeled: yes       Imaging studies available: yes      Required blood products, implants, devices, and special equipment available: yes      Site/side marked: yes      Immediately prior to procedure, a time out was called: yes      Patient identity confirmed:  Arm band, hospital-assigned identification number and verbally with patient  Pre-procedure details:     Hand hygiene: Hand hygiene performed prior to insertion      Sterile barrier technique:  All elements of maximal sterile technique followed      Skin preparation:  2% chlorhexidine    Skin preparation agent: Skin preparation agent completely dried prior to procedure    Indications:     Central line indications: vascular access    Anesthesia (see MAR for exact dosages): Anesthesia method:  Local infiltration    Local anesthetic:  Lidocaine 1% w/o epi  Procedure details:     Location:  Left internal jugular    Vessel type: vein      Laterality:  Left    Approach: percutaneous endoscopic technique used      Patient position:  Reverse Trendelenburg    Catheter type:  Triple lumen 20cm    Catheter size:  7 Fr    Landmarks identified: yes      Ultrasound guidance: yes      Sterile ultrasound techniques: Sterile gel and sterile probe covers were used      Number of attempts:  1    Successful placement: yes    Post-procedure details:     Post-procedure:  Dressing applied    Assessment:  Blood return through all ports, free fluid flow, no pneumothorax on x-ray and placement verified by x-ray    Post-procedure complications: none      Patient tolerance of procedure:   Tolerated well, no immediate complications                     Received for:Provider  EPIC   Jul 27 2016  1:28PM [facility] Standard Time

## 2018-01-11 NOTE — MISCELLANEOUS
History of Present Illness  A call was received from the [facility] Nurse  The contact name and phone number is  Abdirizak Moment  Status Check: today's weight is 212  Patient is experiencing the following symptoms: edema and shortness of breath with usual activity  Concerns expressed today consisted of: feels patient is very volume overloaded  Counseling provided to patient's caretaker  HF[facility] Additional Notes: Patient saw Dr Narendra Velasquez on 3/21 with instructions to take metolazone 5 mg if weigh up over 314 with baseline weight of 311  [facility] RN states patient is 212 on her scale today and that is 5# up over his original discharge weight of 207 so she had him take a metolazone  He is waiting to get a scale from the cardiology office as his is not accurate  Patient to get BMP, BNP every 2 weeks for 1 month  Future Appointments    Date/Time Provider Specialty Site   08/25/2017 02:00 PM BAHMAN Aly , [facility] Hematology Oncology [facility] [place]   04/10/2017 01:20 PM BAHMAN Hensley  Cardiology [facility] CARDIOLOGY [place]   04/14/2017 01:00 PM BAHMAN Hensley   Cardiology [facility] CARDIOLOGY [place]   04/13/2017 11:15 AM Faustino Dorado DO Family Medicine [address] FAMILY PRACTICE     Signatures   Electronically signed by : Elif Landeros, ; Mar 23 2017  3:24PM EST                       (Author)

## 2018-01-12 VITALS
DIASTOLIC BLOOD PRESSURE: 59 MMHG | WEIGHT: 315 LBS | BODY MASS INDEX: 49.44 KG/M2 | SYSTOLIC BLOOD PRESSURE: 134 MMHG | HEIGHT: 67 IN | HEART RATE: 56 BPM

## 2018-01-12 VITALS
BODY MASS INDEX: 49.44 KG/M2 | HEIGHT: 67 IN | DIASTOLIC BLOOD PRESSURE: 68 MMHG | WEIGHT: 315 LBS | SYSTOLIC BLOOD PRESSURE: 106 MMHG

## 2018-01-12 VITALS
BODY MASS INDEX: 48.65 KG/M2 | SYSTOLIC BLOOD PRESSURE: 136 MMHG | DIASTOLIC BLOOD PRESSURE: 60 MMHG | WEIGHT: 310 LBS | HEIGHT: 67 IN

## 2018-01-12 VITALS
HEIGHT: 67 IN | WEIGHT: 312 LBS | BODY MASS INDEX: 48.97 KG/M2 | SYSTOLIC BLOOD PRESSURE: 120 MMHG | DIASTOLIC BLOOD PRESSURE: 57 MMHG | HEART RATE: 58 BPM

## 2018-01-12 VITALS
WEIGHT: 297 LBS | BODY MASS INDEX: 46.62 KG/M2 | HEIGHT: 67 IN | DIASTOLIC BLOOD PRESSURE: 62 MMHG | SYSTOLIC BLOOD PRESSURE: 142 MMHG | HEART RATE: 64 BPM

## 2018-01-12 NOTE — RESULT NOTES
Verified Results  (1) TISSUE EXAM [id number] 12:24PM Terrall Eisenmenger     Test Name Result Flag Reference   LAB AP CASE REPORT (Report)     Surgical Pathology Report             Case: [id number]                   Authorizing Provider: Barbara Roman MD     Collected:      10/31/2017 1224        Ordering Location:   [address] [place] [address]   Received:      10/31/2017 [address] [place] [address] Operating Room                            Pathologist:      Hilary Tamez MD                              Specimens:  A) - Artery, LEFT TEMPORAL ARTERY                                   B) - Artery, RIGHT TEMPORAL ARTERY   LAB AP FINAL DIAGNOSIS (Report)     A  Left temporal artery:    - Patchy disruption elastic lamina with focal chronic inflammation of   branch vessels 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  B  Right temporal artery:    - Patchy disruption elastic lamina with mild chronic inflammation   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  Electronically signed by Hilary Tamez MD on 11/10/2017 at 12:37 PM   LAB AP NOTE      Special EVG stains confirm destruction of the elastic laminae   LAB AP SURGICAL ADDITIONAL INFORMATION (Report)     All controls performed with the immunohistochemical stains reported above   reacted appropriately  These tests were developed and their performance   characteristics determined by [facility] or   [facility]  They may not be cleared or approved by the [place]  Food and Drug Administration  The FDA has determined that such clearance   or approval is not necessary  These tests are used for clinical purposes  They should not be regarded as investigational or for research  This   laboratory has been approved by CLIA 88, designated as a high-complexity   laboratory and is qualified to perform these tests      - Interpretation performed at [facility], [address]   [address]   LAB AP GROSS DESCRIPTION (Report)     A  The specimen is received in formalin, labeled with the patient's name   and hospital number, and is designated left temporal artery  The   specimen consists of one white tan pink soft rubbery cylindrical portion   of tissue having a diameter of 0 2 cm and a length of 1 9 cm  The ends of   resection are inked blue  Entirely submitted  One cassette  B  The specimen is received in formalin, labeled with the patient's name   and hospital number, and is designated right temporal artery  The   specimen consists of one white-tan pink soft rubbery cylindrical portion   of tissue having a diameter of 0 2 cm and a length of 2 1 cm  The ends of   resection are inked blue  Entirely submitted  One cassette  Note: The estimated total formalin fixation time based upon information   provided by the submitting clinician and the standard processing schedule   is under 72 hours   Pablo Mendiola

## 2018-01-12 NOTE — PROCEDURES
Procedures by Es Saxena PA-C at 7/15/2016  5:08 PM      Author:  Es Saxena PA-C Service:  Critical Care/ICU Author Type:  Physician Assistant    Filed:  7/15/2016  5:15 PM Date of Service:  7/15/2016  5:08 PM Status:  Attested    :  Es Saxena PA-C (Physician Assistant)  Cosigner:  Jorge Villanueva DO at 7/15/2016 10:47 PM      Procedure Orders:       1  ELECTRICAL CARDIOVERSION [[id number]] ordered by Es Saxena PA-C at 07/15/16 1708                 Post-procedure Diagnoses:       1  Atrial fibrillation [I48 91]              Attestation signed by Jorge Villanueva DO at 7/15/2016 10:47 PM           I agree with the documentation in the advanced practitioners note  Electrical Cardioversion   Date/Time: 7/15/2016 5:08 PM  Performed by: Parish Fairchild  Authorized by: Parish Fairchild   Consent: The procedure was performed in an emergent situation  Time out: Immediately prior to procedure a time out was called to verify the correct patient, procedure, equipment, support staff and site/side marked as required   Sedation:  Patient sedated: yes  Sedatives: fentanyl    Cardioversion basis: emergent  Pre-procedure rhythm: atrial fibrillation  Patient position: patient was placed in a supine position  Chest area: chest area exposed  Electrodes: pads  Electrodes placed: anterior-lateral  Number of attempts: 1  Attempt 1 mode: synchronous  Attempt 1 shock (in Joules): 300  Attempt 1 outcome: conversion to other rhythm (sinus tachycardia)  Complications: no complications  Patient tolerance: Patient tolerated the procedure well with no immediate complications  Comments: Dr Josee Montiel and Dr Elda Triplett at bedside  Given amiodarone 150mg IV and albumin 250ml prior to  cardioversion                          Received for:Provider  EPIC   Jul 16 2016  8:19PM [facility] Standard Time

## 2018-01-12 NOTE — MISCELLANEOUS
History of Present Illness  [facility] Communication [facility]: The patient is being contacted for a f/u but wife will call back after patient's temporal artery biopsy  Hospital CT scan of the head, an MRI of the brain, a 2D Echo, Carotid Doppler, and temporal artery Doppler performed during this stay  He was hospitalized at [address] [place] [address]  The date of admission: 10/21/17, date of discharge: 10/27/17  Diagnosis: Vision loss on LT eye; Morbid obesity; Type 2 diabetes mellitus w/ hyperglycemia; CKD stage 4, GFR 15 - 29 ml/min; Atrial fibrillation; Blind RT eye; Chronic combined systolic and diastolic heart failure; Coronary artery disease; Obstructive sleep apnea syndrome, severe; Hypokalemia; LESLIE  He was discharged to home, with home health services  Medications were not reviewed today  Follow-up appointments with other specialists: [facility]; w/ Dr Nataly Maciel the Nephrologist, call Monday for f/u appt and to get a script to have BMP checked; lastly f/u w/ Dr Derrick Chou the Rheumatologist to make appt to have results of the temporal artery biopsy reviewed  Counseling was provided to patient's family  Spoke w/ wife, she will call back  Communication performed and completed by Chung Solitario      Active Problems    1  Acute idiopathic gout of right foot (274 01) (M10 071)   2  Acute on chronic combined systolic and diastolic congestive heart failure, NYHA class 2   (428 43,428 0) (I50 43)   3  Acute pain of left foot (729 5) (M79 672)   4  Advanced directives, counseling/discussion (V65 49) (Z71 89)   5  Anemia (285 9) (D64 9)   6  Arteriosclerotic coronary artery disease (414 00) (I25 10)   7  Arthralgia of right shoulder region (719 41) (M25 511)   8  Atrial fibrillation (427 31) (I48 91)   9  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)   10  Benign essential hypertension (401 1) (I10)   11   Benign hypertension with CKD (chronic kidney disease) stage III (403 10,585 3)    (I12 9,N18 3)   12  Bilateral leg edema (782 3) (R60 0)   13  Bilateral renal cysts (753 10) (N28 1)   14  Blind hypertensive eye, 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)   15  Bursitis of hip, right (726 5) (M70 71)   16  Cerebrovascular accident, 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)   17  Chronic combined systolic and diastolic CHF (congestive heart failure) (428 42,428 0)    (I50 42)   18  Chronic diastolic congestive heart failure (428 32,428 0) (I50 32)   19  Chronic kidney disease (585 9) (N18 9)   20  Chronic venous stasis dermatitis of both lower extremities (454 1) (I87 2)   21  CKD (chronic kidney disease), stage IV (585 4) (N18 4)   22  Closed nondisplaced fracture of distal phalanx of left thumb with routine healing,    subsequent encounter (V54 19) (S62 525D)   23  Closed nondisplaced fracture of distal phalanx of left thumb, initial encounter (816 02)    (S62 525A)   24  Coronary artery disease (414 00) (I25 10)   25  Diabetes mellitus type 2, insulin dependent (250 00,V58 67) (E11 9,Z79 4)   26  Diabetic retinopathy screening (V80 2) (Z13 5)   27  Diastolic CHF (567 91,216 7) (I50 30)   28  Disorder of ankle and foot joint (719 97) (M25 9)   29  BAER (dyspnea on exertion) (786 09) (R06 09)   30  Edema (782 3) (R60 9)   31  Encounter for prostate cancer screening (V76 44) (Z12 5)   32  Epistaxis (784 7) (R04 0)   33  Epistaxis, recurrent (784 7) (R04 0)   34  Exercise counseling (V65 41) (Z71 82)   35  Flu vaccine need (V04 81) (Z23)   36  Glaucoma screening (V80 1) (Z13 5)   37  Hiatal hernia (553 3) (K44 9)   38  Hip pain, acute, right (719 45) (M25 551)   39  Hydronephrosis of left kidney (591) (N13 30)   40  Hyperlipidemia (272 4) (E78 5)   41  Hypoxemia (799 02) (R09 02)   42  Ischemic cardiomyopathy (414 8) (I25 5)   43  Low blood potassium (276 8) (E87 6)   44  Medicare annual wellness visit, subsequent (V70 0) (Z00 00)   45  Myocardial infarction (410 90) (I21 9)   46   Need for influenza vaccination (V04 81) (Z23)   47  Need for pneumococcal vaccination (V03 82) (Z23)   48  Neuropathy (355 9) (G62 9)   49  No advance directives (V49 89) (Z78 9)   50  Open wound of right lower extremity, initial encounter (891 0) (S81 801A)   51  Poorly controlled type 2 diabetes mellitus (250 00) (E11 65)   52  Prostate cancer (185) (C61)   53  Pyelonephritis, acute (590 10) (N10)   54  S/P CABG x 3 (V45 81) (Z95 1)   55  Screening for depression (V79 0) (Z13 89)   56  Screening for diabetic peripheral neuropathy (V80 09) (Z13 89)   57  Screening for genitourinary condition (V81 6) (Z13 89)   58  Screening for neurological condition (V80 09) (Z13 89)   59  Severe obstructive sleep apnea-hypopnea syndrome (327 23) (G47 33)   60  Temporal arteritis (446 5) (M31 6)   61  Trochanteric bursitis of left hip (726 5) (M70 62)   62  Ulcers of both lower legs (707 10) (L97 919,L97 929)   63  Urinary retention (788 20) (R33 9)   64  Vitamin B 12 deficiency (266 2) (E53 8)   65  Vitamin D deficiency (268 9) (E55 9)    Past Medical History    1  History of Abnormal blood chemistry (790 6) (R79 9)   2  History of Acute urinary retention (788 29) (R33 8)   3  History of Ankle Joint Swelling (719 07)   4  History of Cellulitis (682 9) (L03 90)   5  History of Cellulitis (682 9) (L03 90)   6  History of Cellulitis of calf (682 6) (L03 119)   7  History of Diabetes Mellitus (250 00)   8  History of Easy Bruising Tendency   9  History of Gross hematuria (599 71) (R31 0)   10  History of cardiac disorder (V12 50) (Z86 79)   11  History of chronic obstructive lung disease (V12 69) (Z87 09)   12  History of diarrhea (V12 79) (Z87 898)   13  History of fatigue (V13 89) (Z87 898)   14  History of hepatitis (V12 09) (Z86 19)   15  History of herpes zoster (V12 09) (Z86 19)   16  History of hypercholesterolemia (V12 29) (Z86 39)   17  History of hyperlipidemia (V12 29) (Z86 39)   18  History of hypertension (V12 59) (Z86 79)   19   History of jaundice (V12 29) (Z87 898)   20  History of shortness of breath (V13 89) (Z87 898)   21  History of urinary tract infection (V13 02) (Z87 440)   22  History of Lung mass (786 6) (R91 8)   23  History of Morbid or severe obesity due to excess calories (278 01) (E66 01)   24  History of Pneumonia (V12 61)   25  History of Pre-operative cardiovascular examination (V72 81) (Z01 810)   26  History of Vaccine for streptococcus pneumoniae and influenza (V06 6) (Z23)    Surgical History    1  History of CABG   2  History of Cataract Surgery   3  History of Cystoscopy With Removal Of Object   4  History of Lung Surgery   5  History of Pilonidal Cyst Resection - Simple   6  History of Previous Stent Placement   7  History of Radiation Therapy   8  History of Transcath Intravascular Stent Placement Percutaneous Renal   9  History of Umbilical Hernia Repair    Family History  Mother    1  Family history of Diabetes Mellitus (V18 0)   2  Family history of hypertension (V17 49) (Z82 49)   3  Family history of Type 2 diabetes mellitus (250 00) (E11 9)  Father    4  Family history of Diabetes Mellitus (V18 0)  Maternal Grandmother    5  Family history of Diabetes Mellitus (V18 0)  Family History    6  Family history of Cancer    Social History    · Denied: Drug Use (305 90)   · Former smoker (G42 36) (I48 543)   · Marital History - Currently    · Never Drank Alcohol   · No advance directives (V49 89) (Z78 9)   · No living will   · Patient has living will (V49 89) (Z78 9)    Current Meds   1  Aspirin EC 81 MG Oral Tablet Delayed Release; TAKE 1 TABLET DAILY; Last   Rx:03Jan2017 Ordered   2  Cephalexin 500 MG Oral Tablet; TAKE 1 TABLET 4 TIMES DAILY; Therapy: 07Sep2017 to (Evaluate:17Sep2017)  Requested for: 07Sep2017; Last   Rx:07Sep2017 Ordered   3  Cholecalciferol 2000 UNIT TABS; One daily; Therapy: (Recorded:10Mar2017) to Recorded   4   Colchicine 0 6 MG Oral Tablet; TAKE 2 TABLETS BY MOUTH FOR ONE DAY AND THEN   TAKE ONE TABLET DAILY FOR 10 DAYS; Therapy: 04FSA7217 to (Rufina Vijay)  Requested for: 13Apr2017; Last   Rx:13Apr2017 Ordered   5  Eliquis 5 MG Oral Tablet; Take 1 tablet twice daily  Requested for: 35Fov9486; Last   Rx:48Hoc8659 Ordered   6  Ferrex 150 150 MG Oral Capsule; Take 1  tablet daily Recorded   7  Furosemide 80 MG Oral Tablet; take one tablet by mouth twice daily; Therapy: 30MXA6065 to ([phone])  Requested for: 39Vdx2151; Last   Rx:07Apr1886 Ordered   8  Gabapentin 300 MG Oral Capsule; TAKE 1 CAPSULE AT BEDTIME  Requested for:   29ZBI8106; Last Rx:09Oct2017 Ordered   9  Klor-Con 10 10 MEQ Oral Tablet Extended Release; TAKE 1 TABLET DAILY; Therapy: 44AHX4323 to (Peggy Alcocer)  Requested for: 01Aug2017; Last   Rx:01Aug2017 Ordered   10  Levemir FlexTouch 100 UNIT/ML Subcutaneous Solution Pen-injector; inject 40 units at    bedtime; Therapy: (Recorded:01Jun2017) to Recorded   11  MetOLazone 5 MG Oral Tablet; Take one tab as directed by MD  Requested for:    06YUB2590; Last PP:23XLF9233 Ordered   12  Metoprolol Tartrate 25 MG Oral Tablet; Take 1 tablet twice daily Recorded   13  NovoLOG FlexPen 100 UNIT/ML Subcutaneous Solution Pen-injector; Sliding scale 5-15    units TID depending on B/S Recorded   14  Simvastatin 40 MG Oral Tablet; take 0 5 tablet bedtime; Therapy: 50TSM5041 to (Evaluate:30Aug2017); Last Rx:01Jun2017 Ordered   15  Tamsulosin HCl 0 4 MG CP24; TAKE 1 CAPSULE AT BEDTIME; Therapy: (Recorded:11Oct2016) to Recorded   16  Triple Antibiotic 3 5-400-5000 External Ointment; APPLY SPARINGLY TO AFFECTED    AREA(S) 3 TIMES A DAY; Therapy: 71Tbu0481 to (Evaluate:11Oct2017)  Requested for: 56Fmx7998; Last    Rx:11Sep2017 Ordered   17  Vitamin B-12 1000 MCG Oral Tablet; TAKE 1 TABLET DAILY AS DIRECTED; Therapy: 55UMI7736 to (Evaluate:13Nov2017)  Requested for: 12IBC9572; Last    Rx:18Nov2016 Ordered    Allergies    1  Adhesive Tape TAPE    2   Adhesive Tape    Health Management  Health Maintenance   Medicare Annual Wellness Visit; every 1 year; Last 49Qgn8203; Next Due: 09Hkh3166; Active    Message   Recorded as Task   Date: 10/27/2017 12:39 PM, Created By: System   Task Name: Hospital MAURICE   Assigned To: Siobhan Kang   Regarding Patient: Rhianna Monroe, Status: In Progress   Comment:    System - 27 Oct 2017 12:39 PM     Patient discharged from hospital   Patient Name: Sharron Rizvi  Patient YOB: 1940  Discharge Date: 10/27/2017  Facility: [facility] 30 Oct 2017 7:04 PM     TASK EDITED  Refused MAURICE at this time  He is having o/p temporal artery biopsy done 10/31/17 and wife will call me tomorrow  Elaina Kaba - 30 Oct 2017 7:04 PM     TASK IN PROGRESS     Future Appointments    Date/Time Provider Specialty Site   03/23/2018 01:00 PM BAHMAN Bernal , [place] [facility]   11/29/2017 10:45 AM Dale Gutiérrez MD Vascular Surgery [facility]   12/18/2017 09:30 AM Siobhan Kang DO Family Medicine [address] [place] [address]   12/19/2017 11:10 AM BAHMAN Green   Orthopedic Surgery [facility]   11/27/2017 01:00 PM [facility] Urology [facility]     Signatures   Electronically signed by : Yahaira Kelly DO; Nov 13 2017  8:09AM EST                       (Author)

## 2018-01-12 NOTE — RESULT NOTES
Verified Results  (1) CBC/PLT/DIFF 48Tya5646 05:45PM Mimi Minilennox     Test Name Result Flag Reference   WBC COUNT 6 19 Thousand/uL  4 31-10 16   RBC COUNT 4 28 Million/uL  3 88-5 62   HEMOGLOBIN 12 7 g/dL  12 0-17 0   HEMATOCRIT 39 5 %  36 5-49 3   MCV 92 fL  82-98   MCH 29 7 pg  26 8-34 3   MCHC 32 2 g/dL  31 4-37 4   RDW 15 8 % H 11 6-15 1   MPV 10 6 fL  8 9-12 7   PLATELET COUNT 895 Thousands/uL  149-390   nRBC AUTOMATED 0 /100 WBCs     NEUTROPHILS RELATIVE PERCENT 73 %  43-75   LYMPHOCYTES RELATIVE PERCENT 13 % L 14-44   MONOCYTES RELATIVE PERCENT 8 %  4-12   EOSINOPHILS RELATIVE PERCENT 5 %  0-6   BASOPHILS RELATIVE PERCENT 1 %  0-1   NEUTROPHILS ABSOLUTE COUNT 4 53 Thousands/? ??L  1 85-7 62   LYMPHOCYTES ABSOLUTE COUNT 0 79 Thousands/? ??L  0 60-4 47   MONOCYTES ABSOLUTE COUNT 0 52 Thousand/? ??L  0 17-1 22   EOSINOPHILS ABSOLUTE COUNT 0 28 Thousand/? ??L  0 00-0 61   BASOPHILS ABSOLUTE COUNT 0 05 Thousands/? ??L  0 00-0 10   This is a patient instruction: This test is non-fasting  Please drink two glasses of water morning of bloodwork  (1) BASIC METABOLIC PROFILE 48XBO1819 05:45PM Mimi Minilennox     Test Name Result Flag Reference   SODIUM 140 mmol/L  136-145   POTASSIUM 3 7 mmol/L  3 5-5 3   CHLORIDE 98 mmol/L L 100-108   CARBON DIOXIDE 30 mmol/L  21-32   ANION GAP (CALC) 12 mmol/L  4-13   BLOOD UREA NITROGEN 47 mg/dL H 5-25   CREATININE 3 24 mg/dL H 0 60-1 30   Standardized to IDMS reference method   CALCIUM 9 1 mg/dL  8 3-10 1   eGFR 17 ml/min/1 73sq m     National Kidney Disease Education Program recommendations are as follows:  GFR calculation is accurate only with a steady state creatinine  Chronic Kidney disease less than 60 ml/min/1 73 sq  meters  Kidney failure less than 15 ml/min/1 73 sq  meters  GLUCOSE FASTING 272 mg/dL H 65-99   Specimen collection should occur prior to Sulfasalazine administration due to the potential for falsely depressed results   Specimen collection should occur prior to Sulfapyridine administration due to the potential for falsely elevated results       (1) VITAMIN B12 94Xii4713 05:45PM [facility]     Test Name Result Flag Reference   VITAMIN B12 1156 pg/mL H 100-900

## 2018-01-12 NOTE — RESULT NOTES
Verified Results  * CT HEAD WO CONTRAST [id number] 02:41PM Kenrick Commander Order Number: [id number]    - Patient Instructions: To schedule this appointment, please contact Central Scheduling at [phone]  Test Name Result Flag Reference   CT HEAD WO CONTRAST (Report)     CT BRAIN - WITHOUT CONTRAST     INDICATION: Chronic kidney disease  Right facial droop  Weakness  Evaluate for stroke  COMPARISON: None  TECHNIQUE: CT examination of the brain was performed  In addition to axial images, coronal reformatted images were created and submitted for interpretation  This examination, like all CT scans performed in the [facility], was    performed utilizing techniques to minimize radiation dose exposure, including the use of iterative reconstruction and automated exposure control  IMAGE QUALITY: Diagnostic  FINDINGS:      PARENCHYMA: No intracranial mass, mass effect or midline shift  No acute intracranial hemorrhage  No CT signs of acute infarction  VENTRICLES AND EXTRA-AXIAL SPACES: Normal for patient's age  VISUALIZED ORBITS AND PARANASAL SINUSES: Orbits intact  Retention cysts or polyps are noted at the base of the maxillary sinuses, right greater than left  CALVARIUM AND EXTRACRANIAL SOFT TISSUES:  Normal        IMPRESSION:     No acute intracranial abnormality  Bilateral maxillary sinus retention cysts or polyps  Workstation performed: [id number]     Signed by:   Jerson Najera MD   1/16/17

## 2018-01-12 NOTE — MISCELLANEOUS
Message  Call from [facility] ([facility]) OLEGARIOA  Pt has had 6# weight gain overnight  Weight was 304# yesterday and today 310#s'  Dr Rusty Swan informed and requests that she call Dr Cory Lantigua since pt has CKD stage IV  Left message on her vm re: same [phone]  Active Problems    1  Acute idiopathic gout of right foot (274 01) (M10 071)   2  Acute on chronic combined systolic and diastolic congestive heart failure, NYHA class 2   (428 43,428 0) (I50 43)   3  Acute pain of left foot (729 5) (M79 672)   4  Advanced directives, counseling/discussion (V65 49) (Z71 89)   5  Anemia (285 9) (D64 9)   6  Arteriosclerotic coronary artery disease (414 00) (I25 10)   7  Arthralgia of right shoulder region (719 41) (M25 511)   8  Atrial fibrillation (427 31) (I48 91)   9  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)   10  Benign essential hypertension (401 1) (I10)   11  Benign hypertension with CKD (chronic kidney disease) stage III (403 10,585 3)    (I12 9,N18 3)   12  Bilateral leg edema (782 3) (R60 0)   13  Bilateral renal cysts (753 10) (N28 1)   14  Blind hypertensive eye, 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)   15  Cerebrovascular accident, 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)   16  Chronic combined systolic and diastolic CHF (congestive heart failure) (428 42,428 0)    (I50 42)   17  Chronic diastolic congestive heart failure (428 32,428 0) (I50 32)   18  Chronic kidney disease (585 9) (N18 9)   19  Chronic venous stasis dermatitis of both lower extremities (454 1) (I87 2)   20  CKD (chronic kidney disease), stage IV (585 4) (N18 4)   21  Coronary artery disease (414 00) (I25 10)   22  Diabetes mellitus type 2, insulin dependent (250 00,V58 67) (E11 9,Z79 4)   23  Diabetic retinopathy screening (V80 2) (Z13 5)   24  Diastolic CHF (260 06,214 9) (I50 30)   25  Disorder of ankle and foot joint (719 97) (M25 9)   26  BAER (dyspnea on exertion) (786 09) (R06 09)   27  Edema (782 3) (R60 9)   28   Encounter for prostate cancer screening (V76 44) (Z12 5)   29  Epistaxis (784 7) (R04 0)   30  Epistaxis, recurrent (784 7) (R04 0)   31  Exercise counseling (V65 41) (Z71 89)   32  Flu vaccine need (V04 81) (Z23)   33  Glaucoma screening (V80 1) (Z13 5)   34  Hiatal hernia (553 3) (K44 9)   35  Hydronephrosis of left kidney (591) (N13 30)   36  Hyperlipidemia (272 4) (E78 5)   37  Hypoxemia (799 02) (R09 02)   38  Ischemic cardiomyopathy (414 8) (I25 5)   39  Low blood potassium (276 8) (E87 6)   40  Myocardial infarction (410 90) (I21 3)   41  Need for pneumococcal vaccination (V03 82) (Z23)   42  Neuropathy (355 9) (G62 9)   43  No advance directives (V49 89) (Z78 9)   44  Poorly controlled type 2 diabetes mellitus (250 00) (E11 65)   45  Prostate cancer (185) (C61)   46  Pyelonephritis, acute (590 10) (N10)   47  S/P CABG x 3 (V45 81) (Z95 1)   48  Screening for depression (V79 0) (Z13 89)   49  Screening for diabetic peripheral neuropathy (V80 09) (Z13 89)   50  Screening for genitourinary condition (V81 6) (Z13 89)   51  Screening for neurological condition (V80 09) (Z13 89)   52  Severe obstructive sleep apnea-hypopnea syndrome (327 23) (G47 33)   53  Trochanteric bursitis of left hip (726 5) (M70 62)   54  Ulcers of both lower legs (707 10) (L97 919,L97 929)   55  Urinary retention (788 20) (R33 9)   56  Vitamin B 12 deficiency (266 2) (E53 8)   57  Vitamin D deficiency (268 9) (E55 9)    Current Meds   1  Aspirin EC 81 MG Oral Tablet Delayed Release; TAKE 1 TABLET DAILY; Last   Rx:03Jan2017 Ordered   2  Atorvastatin Calcium 80 MG Oral Tablet; take once daily until done scipt then start taking   simvastatin; Therapy: (Recorded:01Jun2017) to Recorded   3  Cholecalciferol 2000 UNIT TABS; One daily; Therapy: (Recorded:82Qjr9679) to Recorded   4  Colchicine 0 6 MG Oral Tablet; TAKE 2 TABLETS BY MOUTH FOR ONE DAY AND THEN   TAKE ONE  TABLET DAILY FOR 10 DAYS;    Therapy: 78EZS0833 to (Lul Maldonado)  Requested for: 13Apr2017; Last   Rx:13Apr2017 Ordered   5  Eliquis 5 MG Oral Tablet; Take 1 tablet twice daily  Requested for: 27MJL7192; Last   Rx:69Bdz0357 Ordered   6  Ferrex 150 150 MG Oral Capsule; Take 1  tablet daily Recorded   7  Furosemide 80 MG Oral Tablet; TAKE 1 TABLET 2 TIMES DAILY; Last Rx:23Mar2017   Ordered   8  Gabapentin 300 MG Oral Capsule; TAKE 1 CAPSULE AT BEDTIME  Requested for:   30YHL1945; Last Rx:01Jun2017 Ordered   9  Klor-Con 10 10 MEQ Oral Tablet Extended Release; TAKE 1 TABLET DAILY; Therapy: 41ZRF3401 to (Evaluate:04Aug2017)  Requested for: 12VKK3633; Last   Rx:71Umk7026 Ordered   10  Levemir FlexTouch 100 UNIT/ML Subcutaneous Solution Pen-injector; inject 40 units at    bedtime; Therapy: (Recorded:01Jun2017) to Recorded   11  MetOLazone 5 MG Oral Tablet; Take one tab as directed by MD  Requested for:    79MKM7864; Last [place]:64LKU8161 Ordered   12  Metoprolol Tartrate 25 MG Oral Tablet; Take 1 tablet twice daily Recorded   13  Niferex-150 CAPS; One daily; Therapy: (Recorded:10Mar2017) to Recorded   14  NovoLOG FlexPen 100 UNIT/ML Subcutaneous Solution Pen-injector; Sliding scale 5-15    units TID depending on B/S Recorded   15  Simvastatin 40 MG Oral Tablet; take 0 5 tablet bedtime; Therapy: 30TGU4956 to (Evaluate:06Bgj3606); Last Rx:01Jun2017 Ordered   16  Tamsulosin HCl 0 4 MG CP24; TAKE 1 CAPSULE AT BEDTIME; Therapy: (Recorded:11Oct2016) to Recorded   17  Vitamin B-12 1000 MCG Oral Tablet; TAKE 1 TABLET DAILY AS DIRECTED; Therapy: 82ULH4429 to (Evaluate:13Nov2017)  Requested for: 10EQT0236; Last    Rx:18Nov2016 Ordered    Allergies    1  Adhesive Tape TAPE    2   Adhesive Tape    Signatures   Electronically signed by : Esvin Brito RN; Jul 26 2017  4:58PM EST                       (Author)

## 2018-01-12 NOTE — PROGRESS NOTES
Preliminary Nursing Report                Patient Information    Initial Encounter Entry Date:   10/26/2017 4:28 PM EST (Automated Transmission Automated Transmission)       Last Modified:   {Elaina Cortes}              Legal Name: Oziel Jordan Number:        YOB: 1940        Age (years): 76        Gender: M        Body Mass Index (BMI): 49 kg/m2        Height: 67 in  Weight: 314 lbs (142 kgs)           Address:   [address] [place] [id number]               Phone: [phone]   (consent to leave messages)        Email:        Ethnicity: Decline to State        Judaism:        Marital Status:        Preferred Language: English        Race: Other Race                    Patient Insurance Information        Primary Insurance Information Carrier Name: {Primary  CarrierName}           Carrier Address:   {Primary  CarrierAddress}              Carrier Phone: {Primary  CarrierPhone}          Group Number: {Primary  GroupNumber}          Policy Number: {Primary  PolicyNumber}          Insured Name: {Primary  InsuredName}          Insured : {Primary  InsuredDOB}          Relationship to Insured: {Primary  RelationshiptoInsured}           Secondary Insurance Information Carrier Name: {Secondary  CarrierName}           Carrier Address:   {Secondary  CarrierAddress}              Carrier Phone: {Secondary  CarrierPhone}          Group Number: {Secondary  GroupNumber}          Policy Number: {Secondary  PolicyNumber}          Insured Name: {Secondary  InsuredName}          Insured : {Secondary  InsuredDOB}          Relationship to Insured: {Secondary  RelationshiptoInsured}                       Health Profile   Booking #:   Franki Chang #: [id number]               DOS: 10/31/2017    Surgery : TEMPORAL ARTERY PROCEDURE    Add'l Procedures/Notes:     Surgery Risk: Minor          Precautions     Age or Facility Rec       Arteriosclerotic coronary artery disease Atrial fibrillation       Benign essential hypertension       Benign hypertension with CKD (chronic kidney disease) stage III       BMI       Cerebrovascular accident, embolic       Coronary artery disease       Diabetes mellitus type 2, insulin dependent       Diastolic CHF       Hydronephrosis of left kidney       Ischemic cardiomyopathy       Myocardial infarction       Poorly controlled type 2 diabetes mellitus       Pyelonephritis, acute       Severe obstructive sleep apnea-hypopnea syndrome                   Allergies    Adhesive Tape       Adhesive Tape TAPE       Clinical Comments: Reaction Type: , Reaction: , Severity:              Medications    Aspirin EC 81 MG Oral Tablet Delayed Release       Cephalexin 500 MG Oral Tablet       Cholecalciferol 2000 UNIT TABS       Colchicine 0 6 MG Oral Tablet       Eliquis 5 MG Oral Tablet       Ferrex 150 150 MG Oral Capsule       Furosemide 80 MG Oral Tablet       Gabapentin 300 MG Oral Capsule       Klor-Con 10 10 MEQ Oral Tablet Extended Release       Levemir FlexTouch 100 UNIT/ML Subcutaneous Solution Pen-injector       MetOLazone 5 MG Oral Tablet       Metoprolol Tartrate 25 MG Oral Tablet       NovoLOG FlexPen 100 UNIT/ML Subcutaneous Solution Pen-injector       Simvastatin 40 MG Oral Tablet       Tamsulosin HCl 0 4 MG CP24       Triple Antibiotic 3 5-400-5000 External Ointment       Vitamin B-12 1000 MCG Oral Tablet               Conditions    Acute idiopathic gout of right foot       Acute on chronic combined systolic and diastolic congestive heart failure, NYHA class 2       Acute pain of left foot       Advanced directives, counseling/discussion       Anemia       Arteriosclerotic coronary artery disease       Arthralgia of right shoulder region       Atrial fibrillation       A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       Benign essential hypertension       Benign hypertension with CKD (chronic kidney disease) stage III       Bilateral leg edema       Bilateral renal cysts       Blind hypertensive eye, right       Bursitis of hip, right       Cerebrovascular accident, embolic       Chronic combined systolic and diastolic CHF (congestive heart failure)       Chronic diastolic congestive heart failure       Chronic kidney disease       Chronic venous stasis dermatitis of both lower extremities       CKD (chronic kidney disease), stage IV       Closed nondisplaced fracture of distal phalanx of left thumb with routine healing, subsequent encounter       Closed nondisplaced fracture of distal phalanx of left thumb, initial encounter       Coronary artery disease       Diabetes mellitus type 2, insulin dependent       Diabetic retinopathy screening       Diastolic CHF       Disorder of ankle and foot joint       BAER (dyspnea on exertion)       Edema       Encounter for prostate cancer screening       Epistaxis       Epistaxis, recurrent       Exercise counseling       Flu vaccine need       Glaucoma screening       Hiatal hernia       Hip pain, acute, right       Hydronephrosis of left kidney       Hyperlipidemia       Hypoxemia       Ischemic cardiomyopathy       Low blood potassium       Medicare annual wellness visit, subsequent       Myocardial infarction       Need for influenza vaccination       Need for pneumococcal vaccination       Neuropathy       No advance directives       Open wound of right lower extremity, initial encounter       Poorly controlled type 2 diabetes mellitus       Prostate cancer       Pyelonephritis, acute       S/P CABG x 3       Screening for depression       Screening for diabetic peripheral neuropathy       Screening for genitourinary condition       Screening for neurological condition       Severe obstructive sleep apnea-hypopnea syndrome       Trochanteric bursitis of left hip       Ulcers of both lower legs       Urinary retention       Vitamin B 12 deficiency       Vitamin D deficiency               Family History    None             Surgical History    None             Social History    Denies Drug Use       Former smoker       Marital History - Currently        Never Drank Alcohol       No advance directives       No living will       Patient has living will                               Patient Instructions       Medical Procedure Risk  NPO Instructions   The day before surgery it is recommended to have a light dinner at your usual time and you are allowed a light snack early in the evening  Do not eat anything heavy or eat a big meal after 7pm  Do not eat or drink anything after midnight prior to your surgery  If you are supposed to take any of your medications, do so with a sip of water  Failure to follow these instructions can lead to an increased risk of lung complications and may result in a delay or cancellation of your procedure  If you have any questions, contact your institution for further instructions  No candy, no gum, no mints, no chewing tobacco          Aspirin EC 81 MG Oral Tablet Delayed Release  Medication Instruction (Aspirin - Blood Thinners) 112, 104, 105, 114, 113, 103, 110, 107, 108, 109, 111, 106  Please continue to take this medication on your normal schedule  If this is an oral medication and you take in the morning, you may do so with a sip of water  However, your surgeon may have you stop aspirin up to a week early if you are having intracranial, middle ear, posterior eye, spine surgery or prostate surgery  Metoprolol Tartrate 25 MG Oral Tablet  Medication Instruction (Beta Blocker) 3, 2, c, 4, 6, 5  Please continue to take this medication on your normal schedule  If this is an oral medication and you take in the morning, you may do so with a sip of water  Levemir FlexTouch 100 UNIT/ML Subcutaneous Solution Pen-injector  Medication Instruction (Diabetic Medication)   Please decrease your morning insulin dose to one-half of normal dose   If you are taking oral diabetes medications, the morning dose should be omitted  If taking metformin (Glucophage), discontinue the medication for 24 hours prior to surgery  If you have an insulin pump, continue at a basal rate only  Furosemide 80 MG Oral Tablet  Medication Instruction (Diuretics - Water Pills) 28, 29  Please continue the following medications up to the evening before surgery, but do not take it on the day of surgery  Colchicine 0 6 MG Oral Tablet  Medication Instruction (Gout Medications) 33  Please continue the following medications up to the evening before surgery, but do not take it on the day of surgery  Gabapentin 300 MG Oral Capsule  Medication Instruction (Neurological Medication) 8  Please continue to take this medication on your normal schedule  If this is an oral medication and you take in the morning, you may do so with a sip of water  Severe obstructive sleep apnea-hypopnea syndrome  Sleep Apnea   Please notify surgeon and anesthesiologist if you have sleep apnea, severe snoring, or daytime somnolence  For those sleep apnea patients with continuous positive airway pressure (CPAP or BiPAP) machines, please bring your machine to the hospital on the day of surgery  Chronic kidney disease, CKD (chronic kidney disease), stage IV  Dialysis   If undergoing dialysis, please perform 24 to 48 before surgery and avoid interfering with dialysis schedule  Testing Considerations       ? Blood Glucose on Day of Surgery t  Please check the blood sugar on the morning of surgery  Triggered by: Poorly controlled type 2 diabetes mellitus, Diabetes mellitus type 2, insulin dependent         ? Serum Potassium (K) on the morning of surgery t  Triggered by: Chronic kidney disease, CKD (chronic kidney disease), stage IV               Consultations       ? Cardiac Consult (Major CHF)   If the patient is having increasing difficulty breathing or fluid retention then a cardiac consult is indicated   Otherwise, optimization of medical therapy is recommended under the direction of the PCP or Cardiologist   Triggered by: Diastolic CHF         ? Cardiac Consult (Major MI) c  If the patient has had a Myocardial Infarction within 30 days then a cardiac consult is indicated  Also, if the patient is having increasing frequency or intensity of chest pain then a cardiac consult is indicated  Otherwise, optimization of medical therapy is recommended under the direction of the PCP or cardiologist   Triggered by: Coronary artery disease, Arteriosclerotic coronary artery disease, Myocardial infarction, Ischemic cardiomyopathy         ? Cardiac Consult (Major Rate) c  If the patient has a heart rate of greater than 100 bpm, or if the heart rhythm disturbance is new, then a cardiac consult is indicated  Otherwise, optimization of medical therapy is recommended under the direction of the PCP or cardiologist   Triggered by: Atrial fibrillation               Miscellaneous Questions         Question: Are you able to walk up a flight of stairs, walk up a hill or do heavy housework WITHOUT having chest pain or shortness of breath? Answer: YES                   Allergies/Conditions/Medications Not Found        The following were not recognized by our system when generating the recommendations  Please consider if this would impact any preoperative protocols  ? Acute on chronic combined systolic and diastolic congestive heart failure, NYHA class 2       ? Chronic combined systolic and diastolic CHF (congestive heart failure)       ? Chronic diastolic congestive heart failure       ? Marital History - Currently        ? Never Drank Alcohol       ? No advance directives       ? No living will       ? Patient has living will       ? S/P CABG x 3       ? Screening for diabetic peripheral neuropathy       ? Screening for neurological condition       ? Cholecalciferol 2000 UNIT TABS       ? Eliquis 5 MG Oral Tablet       ?  Klor-Con 10 10 MEQ Oral Tablet Extended Release       ? NovoLOG FlexPen 100 UNIT/ML Subcutaneous Solution Pen-injector       ? Simvastatin 40 MG Oral Tablet       ? Tamsulosin HCl 0 4 MG CP24       ? Triple Antibiotic 3 5-400-5000 External Ointment                  Appointment Information         Date:    10/31/2017        Location:    [facility]        Address:           Directions:                      Footnotes revision 14      ?? Denotes a free-text entry  Legal Disclaimer: Any and all recommendations and services provided herein are designed to assist in the preoperative care of the patient  Nothing contained herein is designed to replace, eliminate or alleviate the responsibility of the attending physician to supervise and determine the patient?s preoperative care and course of treatment  Failure to provide complete, accurate information may negatively impact the system?s ability to recommend the proper preoperative protocol  THE ATTENDING PHYSICIAN IS RESPONSIBLE TO REVIEW THE SUGGESTED PREOPERATIVE PROTOCOLS/COURSE OF TREATMENT AND PRESCRIBE THE FINAL COURSE OF PREOPERATIVE TREATMENT IN CONSULTATION WITH THE PATIENT  THE ePREOP SYSTEM AND ITS MATERIALS ARE PROVIDED ? AS IS? WITHOUT WARRANTY OF ANY KIND, EXPRESS OR IMPLIED, INCLUDING, BUT NOT LIMITED TO, WARRANTIES OF PERFORMANCE OR MERCHANTABILITY OR FITNESS FOR A PARTICULAR PURPOSE  PATIENT AND PHYSICIANS HEREBY AGREE THAT THEIR USE OF THE MATERIALS AND RESOURCES ACT AS A CONSENT TO RELEASE AND WAIVE ePREOP FROM ANY AND ALL CLAIMS OF WARRANTY, TORT OR CONTRACT LAW OF ANY KIND             Electronically signed by:Ren Coon MD  Oct 26 2017  5:12PM EST

## 2018-01-13 VITALS
HEART RATE: 72 BPM | SYSTOLIC BLOOD PRESSURE: 123 MMHG | DIASTOLIC BLOOD PRESSURE: 74 MMHG | WEIGHT: 313.63 LBS | HEIGHT: 67 IN | BODY MASS INDEX: 49.22 KG/M2

## 2018-01-13 VITALS
HEIGHT: 67 IN | BODY MASS INDEX: 49.44 KG/M2 | WEIGHT: 315 LBS | DIASTOLIC BLOOD PRESSURE: 68 MMHG | SYSTOLIC BLOOD PRESSURE: 110 MMHG

## 2018-01-13 VITALS
BODY MASS INDEX: 47.24 KG/M2 | DIASTOLIC BLOOD PRESSURE: 54 MMHG | WEIGHT: 301 LBS | HEIGHT: 67 IN | SYSTOLIC BLOOD PRESSURE: 100 MMHG

## 2018-01-13 VITALS
HEIGHT: 67 IN | DIASTOLIC BLOOD PRESSURE: 70 MMHG | HEART RATE: 88 BPM | WEIGHT: 311 LBS | BODY MASS INDEX: 48.81 KG/M2 | SYSTOLIC BLOOD PRESSURE: 140 MMHG

## 2018-01-13 VITALS
SYSTOLIC BLOOD PRESSURE: 154 MMHG | BODY MASS INDEX: 49.44 KG/M2 | DIASTOLIC BLOOD PRESSURE: 74 MMHG | HEART RATE: 75 BPM | HEIGHT: 67 IN | WEIGHT: 315 LBS

## 2018-01-13 VITALS
SYSTOLIC BLOOD PRESSURE: 100 MMHG | BODY MASS INDEX: 47.46 KG/M2 | HEIGHT: 67 IN | WEIGHT: 302.38 LBS | DIASTOLIC BLOOD PRESSURE: 50 MMHG

## 2018-01-13 NOTE — RESULT NOTES
Verified Results  (1) COMPREHENSIVE METABOLIC PANEL [id number] 09:40VQ Robert Avilez     Test Name Result Flag Reference   SODIUM 140 mmol/L  136-145   POTASSIUM 4 4 mmol/L  3 5-5 3   CHLORIDE 100 mmol/L  100-108   CARBON DIOXIDE 30 mmol/L  21-32   ANION GAP (CALC) 10 mmol/L  4-13   BLOOD UREA NITROGEN 100 mg/dL H 5-25   CREATININE 2 83 mg/dL H 0 60-1 30   Standardized to IDMS reference method   CALCIUM 9 0 mg/dL  8 3-10 1   BILI, TOTAL 0 33 mg/dL  0 20-1 00   ALK PHOSPHATAS 45 U/L L    ALT (SGPT) 43 U/L  12-78   Specimen collection should occur prior to Sulfasalazine and/or Sulfapyridine administration due to the potential for falsely depressed results  AST(SGOT) 22 U/L  5-45   Specimen collection should occur prior to Sulfasalazine administration due to the potential for falsely depressed results  ALBUMIN 3 3 g/dL L 3 5-5 0   TOTAL PROTEIN 5 7 g/dL L 6 4-8 2   eGFR 20 ml/min/1 73sq m     National Kidney Disease Education Program recommendations are as follows:  GFR calculation is accurate only with a steady state creatinine  Chronic Kidney disease less than 60 ml/min/1 73 sq  meters  Kidney failure less than 15 ml/min/1 73 sq  meters  GLUCOSE FASTING 164 mg/dL H 65-99   Specimen collection should occur prior to Sulfasalazine administration due to the potential for falsely depressed results  Specimen collection should occur prior to Sulfapyridine administration due to the potential for falsely elevated results

## 2018-01-13 NOTE — PROGRESS NOTES
Assessment    1  Anemia (285 9) (D64 9)   2  Vitamin B 12 deficiency (266 2) (E53 8)   3  Prostate cancer (185) (C61)   4  Edema (782 3) (R60 9)    Plan  Edema    · Vitamin B-12 1000 MCG Oral Tablet; TAKE 1 TABLET DAILY AS DIRECTED   Rx By: Chantell Cerrato; Dispense: 30 Days ; #:30 Tablet; Refill: 11; For: Edema; MIGUEL = N; Sent To: [facility]   · Drink plenty of fluids ; Status:Complete;   Done: 69LEJ6395   Ordered;  For:Edema; Ordered By:Greg Higgins;   · Follow-up visit in 3 months Evaluation and Treatment  Follow-up  Status: Hold For -  Scheduling  Requested for: 09XFS1029   Ordered; For: Edema; Ordered By: Chantell Cerrato Performed:  Due: 75GGK7886    Discussion/Summary  Discussion Summary:   In summary, this is a 68-year-old male history of lung cancer in the past   Recently blood work showed an elevated methylmalonic acid consistent with vitamin B-12 deficiency  Vitamin B-12 1000 Âµg by mouth daily was initiated  Iron studies are depressed  He is on oral iron replacement at this time  Workup seems of low utility given his overall medical status  He does not wish to pursue parenteral iron replacement at this time  He is taking an iron tablet every day  Additionally, his PSA has increased from previous 0 7 to current value of 5 1  This occurred while he was on [facility] agonist  If this rises further, oral testosterone blockade is anticipated  Lastly, he has moderate to severe bilateral lower extremity edema, right greater than left  He had been on a diuretic but his creatinine went up to 3 3  The diuretic was discontinued  Current creatinine is 2 7  Sequential inflatable compression stockings will likely not be covered by insurance  If he does not try the other ones first   I reviewed the above with the patient and his wife  They voiced understanding and agreement  Understands and agrees with treatment plan: The treatment plan was reviewed with the patient/guardian   The patient/guardian understands and agrees with the treatment plan   Counseling Documentation With Imm: The patient was counseled regarding diagnostic results, instructions for management, patient and family education, impressions  total time of encounter was 25 minutes  History of Present Illness  HPI: Mr Emilio Fan is 64year old male with 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, involving multiple lymph nodes, status post resection of a right paratracheal mass 8/29/12  This was the only site of disease  He completed 4 cycles of chemotherapy with cisplatin and etoposide  He has history of prostate cancer, s/p radiation 2009 by Dr Alfornia Gitelman  December 2014- presented with gross hematuria  Found to have recurrence of PCA  with left hydro  Stent placed  Treated with Nghia Cintron with decrease in PSA from 67 to 10  in march 2015  Active Problems    1  Acute pain of left foot (729 5) (M79 672)   2  Acute urinary retention (788 29) (R33 8)   3  Anemia (285 9) (D64 9)   4  Arteriosclerotic coronary artery disease (414 00) (I25 10)   5  Arthralgia of right shoulder region (719 41) (M25 511)   6  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)   7  Benign essential hypertension (401 1) (I10)   8  Benign hypertension with CKD (chronic kidney disease) stage III (403 10,585 3)   (I12 9,N18 3)   9  Bilateral leg edema (782 3) (R60 0)   10  Cellulitis (682 9) (L03 90)   11  Chronic combined systolic and diastolic CHF (congestive heart failure) (428 42,428 0)    (I50 42)   12  Chronic diastolic congestive heart failure (428 32,428 0) (I50 32)   13  Chronic kidney disease (585 9) (N18 9)   14  Chronic venous stasis dermatitis of both lower extremities (454 1) (I83 11,I83 12)   15  Diabetes mellitus type 2, insulin dependent (250 00,V58 67) (E11 9,Z79 4)   16  Diarrhea (787 91) (R19 7)   17  Disorder of ankle and foot joint (719 97) (M25 9)   18  BAER (dyspnea on exertion) (786 09) (R06 09)   19  Edema (782 3) (R60 9)   20   Encounter for prostate cancer screening (V76 44) (Z12 5)   21  Flu vaccine need (V04 81) (Z23)   22  Gross hematuria (599 71) (R31 0)   23  Hiatal hernia (553 3) (K44 9)   24  Hydronephrosis of left kidney (591) (N13 30)   25  Hyperlipidemia (272 4) (E78 5)   26  Myocardial infarction (410 90) (I21 3)   27  Poorly controlled type 2 diabetes mellitus (250 00) (E11 65)   28  Pre-operative cardiovascular examination (V72 81) (Z01 810)   29  Prostate cancer (185) (C61)   30  Pyelonephritis, acute (590 10) (N10)   31  S/P CABG x 3 (V45 81) (Z95 1)   32  Screening for depression (V79 0) (Z13 89)   33  Screening for genitourinary condition (V81 6) (Z13 89)   34  Screening for neurological condition (V80 09) (Z13 89)   35  Trochanteric bursitis of left hip (726 5) (M70 62)   36  Ulcers of both lower legs (707 10) (L97 919,L97 929)   37  Urinary retention (788 20) (R33 9)   38  UTI (urinary tract infection) (599 0) (N39 0)    Past Medical History    1  History of Abnormal blood chemistry (790 6) (R79 9)   2  History of Ankle Joint Swelling (719 07)   3  History of Cellulitis (682 9) (L03 90)   4  History of Diabetes Mellitus (250 00)   5  History of Easy Bruising Tendency   6  History of cardiac disorder (V12 50) (Z86 79)   7  History of chronic obstructive lung disease (V12 69) (Z87 09)   8  History of fatigue (V13 89) (Z87 898)   9  History of hepatitis (V12 09) (Z86 19)   10  History of herpes zoster (V12 09) (Z86 19)   11  History of hypercholesterolemia (V12 29) (Z86 39)   12  History of hyperlipidemia (V12 29) (Z86 39)   13  History of hypertension (V12 59) (Z86 79)   14  History of jaundice (V12 29) (Z87 898)   15  History of shortness of breath (V13 89) (Z87 898)   16  History of Lung mass (786 6) (R91 8)   17  History of Morbid or severe obesity due to excess calories (278 01) (E66 01)   18  History of Pneumonia (V12 61)   19  History of Vaccine for streptococcus pneumoniae and influenza (V06 6) (Z23)    Surgical History    1   History of CABG   2  History of Cataract Surgery   3  History of Cystoscopy With Removal Of Object   4  History of Lung Surgery   5  History of Pilonidal Cyst Resection - Simple   6  History of Previous Stent Placement   7  History of Radiation Therapy   8  History of Transcath Intravascular Stent Placement Percutaneous Renal   9  History of Umbilical Hernia Repair    Family History  Mother    1  Family history of Diabetes Mellitus (V18 0)   2  Family history of hypertension (V17 49) (Z82 49)   3  Family history of Type 2 diabetes mellitus (250 00) (E11 9)  Father    4  Family history of Diabetes Mellitus (V18 0)  Maternal Grandmother    5  Family history of Diabetes Mellitus (V18 0)  Family History    6  Family history of Cancer    Social History    · Denied: Drug Use (305 90)   · Former smoker (V15 82) (E09 976)   · Marital History - Currently    · Never Drank Alcohol   · No living will   · Patient has living will (V49 89) (Z78 9)    Current Meds   1  Amiodarone HCl - 200 MG Oral Tablet; TAKE 1 TABLET DAILY  Requested for: 71WLT2953;   Last Rx:11Oct2016 Ordered   2  Aspirin  MG Oral Tablet Delayed Release; Therapy: (Recorded:11Aug2015) to Recorded   3  Flomax 0 4 MG Oral Capsule; Therapy: (Recorded:19Kld1216) to Recorded   4  iFerex 150 150 MG Oral Capsule; 1 DAILY; Therapy: (Recorded:11Oct2016) to Recorded   5  Levemir FlexTouch 100 UNIT/ML Subcutaneous Solution Pen-injector; Use as directed   40U daily Recorded   6  MethylPREDNISolone Acetate 80 MG/ML Injection Suspension; INJECT 1  ML   Intramuscular; To Be Done: 81DKQ7155; Status: HOLD FOR - Administration Ordered   7  Metoprolol Tartrate 50 MG Oral Tablet; take 1/2 tablet twice daily Recorded   8  Mupirocin 2 % External Ointment; APPLY A SMALL AMOUNT 3 TIMES DAILY AS   DIRECTED; Therapy: 89ZPK1550 to (Last Rx:31Oct2016)  Requested for: 31Oct2016 Ordered   9   NovoLOG FlexPen 100 UNIT/ML Subcutaneous Solution Pen-injector; Sliding scale 5-15   units TID depending on B/S Recorded   10  Simvastatin 20 MG Oral Tablet; TAKE 1 TABLET DAILY; Last Rx:12Oct2016 Ordered   11  Tamsulosin HCl 0 4 MG CP24; TAKE 1 CAPSULE AT BEDTIME; Therapy: (Recorded:11Oct2016) to Recorded  Medication List Reviewed: The medication list was reviewed and updated today  Allergies    1  No Known Drug Allergies    2  Adhesive Tape    Vitals  Vital Signs    Recorded: 79ZIU7932 91:89LH   Systolic 447   Diastolic 88   Heart Rate 67   Respiration 20   Temperature 97 2 F   Pain Scale 0   O2 Saturation 92   Height 5 ft 7 in   Weight 311 lb 4 oz   BMI Calculated 48 75   BSA Calculated 2 44     Physical Exam    Constitutional   General appearance: No acute distress, well appearing and well nourished  Eyes   Conjunctiva and lids: No swelling, erythema, or discharge  Ears, Nose, Mouth, and Throat   External inspection of ears and nose: Normal     Oropharynx: Normal with no erythema, edema, exudate or lesions  Pulmonary   Auscultation of lungs: Clear to auscultation, equal breath sounds bilaterally, no wheezes, no rales, no rhonci  Cardiovascular   Auscultation of heart: Normal rate and rhythm, normal S1 and S2, without murmurs  Examination of extremities for edema and/or varicosities: Abnormal   bilateral ankle ~U+ pitting edema and bilateral pretibial ~U+ pitting edema  Abdomen   Abdomen: Non-tender, no masses  Liver and spleen: No hepatomegaly or splenomegaly  Lymphatic   Palpation of lymph nodes in neck: No lymphadenopathy  Musculoskeletal   Gait and station: Normal     Skin   Skin and subcutaneous tissue: Normal without rashes or lesions  Neurologic   Cranial nerves: Cranial nerves 2-12 intact  Psychiatric   Orientation to person, place and time: Normal          Health Management  Health Maintenance   Medicare Annual Wellness Visit; every 1 year; Last 18VSI4801; Next Due: 94Bfw4903;  Overdue    Future Appointments    Date/Time Provider Specialty Site 01/10/2017 10:00 AM BAHMAN Davis   Cardiology [facility] [place]     Signatures   Electronically signed by : BAHMAN Perez DOM D ,DO; Nov 18 2016 11:19AM EST                       (Author)

## 2018-01-13 NOTE — PROCEDURES
Procedures by Carola Simental MD at 8/2/2016  10:54 AM      Author:  Carola Simental MD Service:  Interventional Radiology  Author Type:  Physician     Filed:  8/2/2016 10:56 AM Date of Service:  8/2/2016 10:54 AM Status:  Signed     :  Carola Simental MD (Physician)         Procedure Orders:       1  CENTRAL LINE [[id number]] ordered by Carola Simental MD at 08/02/16 1054                    Central Line Insertion  Date/Time: 8/2/2016 10:54 AM  Performed by: Jose Angel Salvador by: Teena Lesches     Patient location:  Other (comment)  Other Assisting Provider:  No    Consent:     Consent obtained:  Written    Consent given by:  Patient    Risks discussed:  Bleeding and infection    Alternatives discussed:  No treatment and delayed treatment  Universal protocol:     Procedure explained and questions answered to patient or proxy's satisfaction: yes      Relevant documents present and verified: yes      Test results available and properly labeled: yes       Imaging studies available: yes      Required blood products, implants, devices, and special equipment available: yes      Site/side marked: yes      Immediately prior to procedure, a time out was called: yes      Patient identity confirmed:  Arm band and verbally with patient  Pre-procedure details:     Hand hygiene: Hand hygiene performed prior to insertion      Sterile barrier technique: All elements of maximal sterile technique followed      Skin preparation:  2% chlorhexidine    Skin preparation agent: Skin preparation agent completely dried prior to procedure    Indications:     Central line indications: vascular access      Central line indications comment:  Antibiotics  Anesthesia (see MAR for exact dosages):      Anesthesia method:  Local infiltration    Local anesthetic:  Lidocaine 1% w/o epi  Procedure details:     Location:  Right internal jugular    Vessel type: vein      Laterality:  Right    Approach: percutaneous technique used Patient position:  Flat    Catheter size:  5 5 Fr    Landmarks identified: yes      Ultrasound guidance: yes      Sterile ultrasound techniques: Sterile gel and sterile probe covers were used      Number of attempts:  1    Successful placement: yes    Post-procedure details:     Post-procedure:  Dressing applied and line sutured    Assessment:  Blood return through all ports and placement verified by x-ray    Post-procedure complications: none      Patient tolerance of procedure:   Tolerated well, no immediate complications                 Received for:Izabela White MD  Aug  2 2016 10:54AM [facility] Standard Time

## 2018-01-13 NOTE — PROGRESS NOTES
History of Present Illness  Care Coordination Encounter Information:   Type of Encounter: Telephonic   Contact: Initial Contact        Care Coordination [facility] Nurse [facility]:   The reason for call is to discuss outreach for follow up/needed services  Patient called to assess condition post hospitalization and address any questions or concerns  Message left on voicemail  Active Problems    1  Acute on chronic combined systolic and diastolic congestive heart failure, NYHA class 2   (428 43,428 0) (I50 43)   2  Acute pain of left foot (729 5) (M79 672)   3  Acute urinary retention (788 29) (R33 8)   4  Advanced directives, counseling/discussion (V65 49) (Z71 89)   5  Anemia (285 9) (D64 9)   6  Arteriosclerotic coronary artery disease (414 00) (I25 10)   7  Arthralgia of right shoulder region (719 41) (M25 511)   8  Atrial fibrillation (427 31) (I48 91)   9  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)   10  Benign essential hypertension (401 1) (I10)   11  Benign hypertension with CKD (chronic kidney disease) stage III (403 10,585 3)    (I12 9,N18 3)   12  Bilateral leg edema (782 3) (R60 0)   13  Bilateral renal cysts (753 10) (N28 1)   14  Blind hypertensive eye, 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)   15  Cellulitis (682 9) (L03 90)   16  Cellulitis of calf (682 6) (L03 119)   17  Cerebrovascular accident, 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)   18  Chronic combined systolic and diastolic CHF (congestive heart failure) (428 42,428 0)    (I50 42)   19  Chronic diastolic congestive heart failure (428 32,428 0) (I50 32)   20  Chronic kidney disease (585 9) (N18 9)   21  Chronic venous stasis dermatitis of both lower extremities (454 1) (I83 11,I83 12)   22  Coronary artery disease (414 00) (I25 10)   23  Diabetes mellitus type 2, insulin dependent (250 00,V58 67) (E11 9,Z79 4)   24  Diabetic retinopathy screening (V80 2) (Z13 5)   25  Diarrhea (787 91) (R19 7)   26  Diastolic CHF (337 59,822 7) (I50 30)   27   Disorder of ankle and foot joint (719 97) (M25 9)   28  BAER (dyspnea on exertion) (786 09) (R06 09)   29  Edema (782 3) (R60 9)   30  Encounter for prostate cancer screening (V76 44) (Z12 5)   31  Exercise counseling (V65 41) (Z71 89)   32  Flu vaccine need (V04 81) (Z23)   33  Glaucoma screening (V80 1) (Z13 5)   34  Gross hematuria (599 71) (R31 0)   35  Hiatal hernia (553 3) (K44 9)   36  Hydronephrosis of left kidney (591) (N13 30)   37  Hyperlipidemia (272 4) (E78 5)   38  Ischemic cardiomyopathy (414 8) (I25 5)   39  Myocardial infarction (410 90) (I21 3)   40  Need for pneumococcal vaccination (V03 82) (Z23)   41  Neuropathy (355 9) (G62 9)   42  No advance directives (V49 89) (Z78 9)   43  Poorly controlled type 2 diabetes mellitus (250 00) (E11 65)   44  Pre-operative cardiovascular examination (V72 81) (Z01 810)   45  Prostate cancer (185) (C61)   46  Pyelonephritis, acute (590 10) (N10)   47  S/P CABG x 3 (V45 81) (Z95 1)   48  Screening for depression (V79 0) (Z13 89)   49  Screening for diabetic peripheral neuropathy (V80 09) (Z13 89)   50  Screening for genitourinary condition (V81 6) (Z13 89)   51  Screening for neurological condition (V80 09) (Z13 89)   52  Trochanteric bursitis of left hip (726 5) (M70 62)   53  Ulcers of both lower legs (707 10) (L97 919,L97 929)   54  Urinary retention (788 20) (R33 9)   55  UTI (urinary tract infection) (599 0) (N39 0)   56  Vitamin B 12 deficiency (266 2) (E53 8)    Past Medical History    1  History of Abnormal blood chemistry (790 6) (R79 9)   2  History of Ankle Joint Swelling (719 07)   3  History of Cellulitis (682 9) (L03 90)   4  History of Diabetes Mellitus (250 00)   5  History of Easy Bruising Tendency   6  History of cardiac disorder (V12 50) (Z86 79)   7  History of chronic obstructive lung disease (V12 69) (Z87 09)   8  History of fatigue (V13 89) (Z87 898)   9  History of hepatitis (V12 09) (Z86 19)   10  History of herpes zoster (V12 09) (Z86 19)   11   History of hypercholesterolemia (V12 29) (Z86 39)   12  History of hyperlipidemia (V12 29) (Z86 39)   13  History of hypertension (V12 59) (Z86 79)   14  History of jaundice (V12 29) (Z87 898)   15  History of shortness of breath (V13 89) (Z87 898)   16  History of Lung mass (786 6) (R91 8)   17  History of Morbid or severe obesity due to excess calories (278 01) (E66 01)   18  History of Pneumonia (V12 61)   19  History of Vaccine for streptococcus pneumoniae and influenza (V06 6) (Z23)    Surgical History    1  History of CABG   2  History of Cataract Surgery   3  History of Cystoscopy With Removal Of Object   4  History of Lung Surgery   5  History of Pilonidal Cyst Resection - Simple   6  History of Previous Stent Placement   7  History of Radiation Therapy   8  History of Transcath Intravascular Stent Placement Percutaneous Renal   9  History of Umbilical Hernia Repair    Family History  Mother    1  Family history of Diabetes Mellitus (V18 0)   2  Family history of hypertension (V17 49) (Z82 49)   3  Family history of Type 2 diabetes mellitus (250 00) (E11 9)  Father    4  Family history of Diabetes Mellitus (V18 0)  Maternal Grandmother    5  Family history of Diabetes Mellitus (V18 0)  Family History    6  Family history of Cancer    Social History    · Denied: Drug Use (305 90)   · Former smoker (A24 57) (H23 868)   · Marital History - Currently    · Never Drank Alcohol   · No advance directives (V49 89) (Z78 9)   · No advance directives (V49 89) (Z78 9)   · No living will   · Patient has living will (V49 89) (Z78 9)    Current Meds    1  Furosemide 80 MG Oral Tablet (Lasix); TAKE 1 TABLET DAILY; Therapy: 83GKN7109 to (Evaluate:13Jan2017); Last Rx:51Fps0156 Ordered    2  Amiodarone HCl - 200 MG Oral Tablet; TAKE 1 TABLET DAILY  Requested for: 16EZN7593;   Last Rx:11Oct2016 Ordered    3   Cephalexin 500 MG Oral Capsule; 1 tab qid MDD:4 TDD:4;   Therapy: 68WRQ0190 to (Complete:22Jan2017)  Requested for: 47UYE6264; Last   Rx:12Jan2017 Ordered    4  Aspirin EC 81 MG Oral Tablet Delayed Release; TAKE 1 TABLET DAILY; Last   Rx:03Jan2017 Ordered    5  Vitamin B-12 1000 MCG Oral Tablet; TAKE 1 TABLET DAILY AS DIRECTED; Therapy: 55LNG8185 to (Evaluate:13Nov2017)  Requested for: 88NPT2206; Last   Rx:18Nov2016 Ordered    6  Simvastatin 20 MG Oral Tablet; TAKE 1 TABLET DAILY; Last Rx:12Oct2016 Ordered    7  Aspirin 81 MG Oral Tablet Delayed Release; Take 1 tablet daily Recorded   8  Eliquis 2 5 MG Oral Tablet; Take 1 tablet twice daily Recorded   9  Ferrex 150 150 MG Oral Capsule; Take 1  tablet daily Recorded   10  Gabapentin 300 MG Oral Capsule; TAKE 1 CAPSULE AT BEDTIME Recorded   11  Levemir FlexTouch 100 UNIT/ML Subcutaneous Solution Pen-injector; INJECT 35 UNITS    AT BEDTIME Recorded   12  Metoprolol Tartrate 25 MG Oral Tablet; Take 1 tablet twice daily Recorded   13  NovoLOG FlexPen 100 UNIT/ML Subcutaneous Solution Pen-injector; Sliding scale 5-15    units TID depending on B/S Recorded   14  Tamsulosin HCl 0 4 MG CP24; TAKE 1 CAPSULE AT BEDTIME; Therapy: (Recorded:11Oct2016) to Recorded    Allergies    1  No Known Drug Allergies    2  Adhesive Tape    Health Management   Medicare Annual Wellness Visit; every 1 year; Last 30IXO1540; Next Due: 61Fcu5091; Overdue    End of Encounter Meds    1  Furosemide 80 MG Oral Tablet (Lasix); TAKE 1 TABLET DAILY; Therapy: 63OYS5023 to (Evaluate:13Jan2017); Last Rx:71Lta5184 Ordered    2  Amiodarone HCl - 200 MG Oral Tablet; TAKE 1 TABLET DAILY  Requested for: 41HNA3418;   Last Rx:11Oct2016 Ordered    3  Cephalexin 500 MG Oral Capsule; 1 tab qid MDD:4 TDD:4;   Therapy: 45RRQ7399 to (Complete:22Jan2017)  Requested for: 92MRV3914; Last   Rx:12Jan2017 Ordered    4  Aspirin EC 81 MG Oral Tablet Delayed Release; TAKE 1 TABLET DAILY; Last   Rx:03Jan2017 Ordered    5  Vitamin B-12 1000 MCG Oral Tablet; TAKE 1 TABLET DAILY AS DIRECTED;    Therapy: 25GJU0332 to (Evaluate:13Nov2017) Requested for: 10QUO6397; Last   Rx:18Nov2016 Ordered    6  Simvastatin 20 MG Oral Tablet; TAKE 1 TABLET DAILY; Last Rx:12Oct2016 Ordered    7  Aspirin 81 MG Oral Tablet Delayed Release; Take 1 tablet daily Recorded   8  Eliquis 2 5 MG Oral Tablet; Take 1 tablet twice daily Recorded   9  Ferrex 150 150 MG Oral Capsule; Take 1  tablet daily Recorded   10  Gabapentin 300 MG Oral Capsule; TAKE 1 CAPSULE AT BEDTIME Recorded   11  Levemir FlexTouch 100 UNIT/ML Subcutaneous Solution Pen-injector; INJECT 35 UNITS    AT BEDTIME Recorded   12  Metoprolol Tartrate 25 MG Oral Tablet; Take 1 tablet twice daily Recorded   13  NovoLOG FlexPen 100 UNIT/ML Subcutaneous Solution Pen-injector; Sliding scale 5-15    units TID depending on B/S Recorded   14  Tamsulosin HCl 0 4 MG CP24; TAKE 1 CAPSULE AT BEDTIME; Therapy: (Recorded:11Oct2016) to Recorded    Future Appointments    Date/Time Provider Specialty Site   02/17/2017 10:30 AM BAHMAN Mae , [facility] Hematology Oncology [facility] [place]   02/16/2017 10:00 AM BAHMAN Lopes  Cardiology [facility]ARDIOLOG[facility] [place]   03/16/2017 11:00 AM Nghia Lundy DO Family Medicine [address] [facility]     Patient Care Team    Care Team Member Role Specialty Office Number   [facility]  Family Medicine [phone]   Lee Ann Booth MD  Internal Medicine [phone]   Robert Chowdhury MD  Surgical Oncology [phone]   Olivia Palmer MD  Hematology Oncology [phone]   Merry BEGUM  Cardiology [phone]   [address] [phone]   Scotty Po   [phone]   PTOM Camron MCCLELLAND   [phone]   Ese Alcaraz   [phone]   Lenny Alejandre  Nephrology [phone]   Farooq BEGUM    Urology [phone]     Signatures   Electronically signed by : Santana Pugh, ; Jan 16 2017  3:02PM EST                       (Author)

## 2018-01-13 NOTE — CONSULTS
Chief Complaint  Chief Complaint Free Text Note Form: NOSE BLEEDS/REF BY DR Jeri Stewart      History of Present Illness  HPI: 29-year-old male presents for evaluation of right-sided epistaxis  He was recently started on nasal cannula oxygen 2 L  Since that time he has noted ongoing epistaxis  Right greater than left  He has no previous nasal injuries  No nasal obstruction  He has a history of COPD and cardiac disease  Atrial fibrillation and anticoagulation  Past medical history of smoking one pack per day for greater than 50 years  Review of Systems  Complete ENT ROS [facility]:   Eyes: No complaints of itching, excessive tearing or vision changes  Ears: No complaints of hearing loss, discharge, imbalance, recent ear infections, or tinnitus  Nose: epistaxis  Mouth: No sores in mouth, no altered taste, no dental problems  Throat: No complaints of throat pain, no difficulty swallowing, no hoarseness  Neck: No neck soreness, no neck pain, no neck lumps or swelling  Genitourinary: No complaints of dysuria, flank pain or frequent urination  Cardiovascular: No complaints of chest pain or palpitations  Respiratory: No complaints of shortness of breath, cough or wheezing  Gastrointestinal: No complaints of heartburn, nausea/vomiting, or constipation  Neurological: No complaints of headache, convulsions or memory loss  ROS Reviewed:   ROS reviewed  Active Problems    1  Acute idiopathic gout of right foot (274 01) (M10 071)   2  Acute on chronic combined systolic and diastolic congestive heart failure, NYHA class 2   (428 43,428 0) (I50 43)   3  Acute pain of left foot (729 5) (M79 672)   4  Advanced directives, counseling/discussion (V65 49) (Z71 89)   5  Anemia (285 9) (D64 9)   6  Arteriosclerotic coronary artery disease (414 00) (I25 10)   7  Arthralgia of right shoulder region (719 41) (M25 511)   8  Atrial fibrillation (427 31) (I48 91)   9   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)   10  Benign essential hypertension (401 1) (I10)   11  Benign hypertension with CKD (chronic kidney disease) stage III (403 10,585 3)    (I12 9,N18 3)   12  Bilateral leg edema (782 3) (R60 0)   13  Bilateral renal cysts (753 10) (N28 1)   14  Blind hypertensive eye, 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)   15  Cerebrovascular accident, 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)   16  Chronic combined systolic and diastolic CHF (congestive heart failure) (428 42,428 0)    (I50 42)   17  Chronic diastolic congestive heart failure (428 32,428 0) (I50 32)   18  Chronic kidney disease (585 9) (N18 9)   19  Chronic venous stasis dermatitis of both lower extremities (454 1) (I87 2)   20  CKD (chronic kidney disease), stage IV (585 4) (N18 4)   21  Coronary artery disease (414 00) (I25 10)   22  Diabetes mellitus type 2, insulin dependent (250 00,V58 67) (E11 9,Z79 4)   23  Diabetic retinopathy screening (V80 2) (Z13 5)   24  Diastolic CHF (168 91,412 2) (I50 30)   25  Disorder of ankle and foot joint (719 97) (M25 9)   26  BAER (dyspnea on exertion) (786 09) (R06 09)   27  Edema (782 3) (R60 9)   28  Encounter for prostate cancer screening (V76 44) (Z12 5)   29  Epistaxis (784 7) (R04 0)   30  Exercise counseling (V65 41) (Z71 89)   31  Flu vaccine need (V04 81) (Z23)   32  Glaucoma screening (V80 1) (Z13 5)   33  Hiatal hernia (553 3) (K44 9)   34  Hydronephrosis of left kidney (591) (N13 30)   35  Hyperlipidemia (272 4) (E78 5)   36  Hypoxemia (799 02) (R09 02)   37  Ischemic cardiomyopathy (414 8) (I25 5)   38  Low blood potassium (276 8) (E87 6)   39  Myocardial infarction (410 90) (I21 3)   40  Need for pneumococcal vaccination (V03 82) (Z23)   41  Neuropathy (355 9) (G62 9)   42  No advance directives (V49 89) (Z78 9)   43  Poorly controlled type 2 diabetes mellitus (250 00) (E11 65)   44  Prostate cancer (185) (C61)   39  Pyelonephritis, acute (590 10) (N10)   46  S/P CABG x 3 (V45 81) (Z95 1)   47   Screening for depression (V79 0) (Z13 89)   48  Screening for diabetic peripheral neuropathy (V80 09) (Z13 89)   49  Screening for genitourinary condition (V81 6) (Z13 89)   50  Screening for neurological condition (V80 09) (Z13 89)   51  Severe obstructive sleep apnea-hypopnea syndrome (327 23) (G47 33)   52  Trochanteric bursitis of left hip (726 5) (M70 62)   53  Ulcers of both lower legs (707 10) (L97 919,L97 929)   54  Urinary retention (788 20) (R33 9)   55  Vitamin B 12 deficiency (266 2) (E53 8)   56  Vitamin D deficiency (268 9) (E55 9)    Past Medical History    1  History of Abnormal blood chemistry (790 6) (R79 9)   2  History of Acute urinary retention (788 29) (R33 8)   3  History of Ankle Joint Swelling (719 07)   4  History of Cellulitis (682 9) (L03 90)   5  History of Cellulitis (682 9) (L03 90)   6  History of Cellulitis of calf (682 6) (L03 119)   7  History of Diabetes Mellitus (250 00)   8  History of Easy Bruising Tendency   9  History of Gross hematuria (599 71) (R31 0)   10  History of cardiac disorder (V12 50) (Z86 79)   11  History of chronic obstructive lung disease (V12 69) (Z87 09)   12  History of diarrhea (V12 79) (Z87 898)   13  History of fatigue (V13 89) (Z87 898)   14  History of hepatitis (V12 09) (Z86 19)   15  History of herpes zoster (V12 09) (Z86 19)   16  History of hypercholesterolemia (V12 29) (Z86 39)   17  History of hyperlipidemia (V12 29) (Z86 39)   18  History of hypertension (V12 59) (Z86 79)   19  History of jaundice (V12 29) (Z87 898)   20  History of shortness of breath (V13 89) (Z87 898)   21  History of urinary tract infection (V13 02) (Z87 440)   22  History of Lung mass (786 6) (R91 8)   23  History of Morbid or severe obesity due to excess calories (278 01) (E66 01)   24  History of Pneumonia (V12 61)   25  History of Pre-operative cardiovascular examination (V72 81) (Z01 810)   26   History of Vaccine for streptococcus pneumoniae and influenza (V06 6) (Z23)  Past Medical History Reviewed: The past medical history was reviewed and updated today  Surgical History    1  History of CABG   2  History of Cataract Surgery   3  History of Cystoscopy With Removal Of Object   4  History of Lung Surgery   5  History of Pilonidal Cyst Resection - Simple   6  History of Previous Stent Placement   7  History of Radiation Therapy   8  History of Transcath Intravascular Stent Placement Percutaneous Renal   9  History of Umbilical Hernia Repair  Surgical History Reviewed: The surgical history was reviewed and updated today  Family History    1  Family history of Diabetes Mellitus (V18 0)   2  Family history of hypertension (V17 49) (Z82 49)   3  Family history of Type 2 diabetes mellitus (250 00) (E11 9)    4  Family history of Diabetes Mellitus (V18 0)    5  Family history of Diabetes Mellitus (V18 0)    6  Family history of Cancer  Family History Reviewed: The family history was reviewed and updated today  Social History    · Denied: Drug Use (305 90)   · Former smoker (V15 82) (Y85 129)   · Marital History - Currently    · Never Drank Alcohol   · No advance directives (V49 89) (Z78 9)   · No advance directives (V49 89) (Z78 9)   · No living will   · Patient has living will (V49 89) (Z78 9)  Social History Reviewed: The social history was reviewed and updated today  The social history was reviewed and is unchanged  Current Meds   1  Aspirin EC 81 MG Oral Tablet Delayed Release; TAKE 1 TABLET DAILY; Last   Rx:03Jan2017 Ordered   2  Atorvastatin Calcium 80 MG Oral Tablet; take once daily until done scipt then start taking   simvastatin; Therapy: (Recorded:01Jun2017) to Recorded   3  Cholecalciferol 2000 UNIT TABS; One daily; Therapy: (Recorded:10Mar2017) to Recorded   4  Colchicine 0 6 MG Oral Tablet; TAKE 2 TABLETS BY MOUTH FOR ONE DAY AND THEN   TAKE ONE  TABLET DAILY FOR 10 DAYS;    Therapy: 63WHJ6561 to (Isi Griffin)  Requested for: 13Apr2017; Last   Rx:13Apr2017 Ordered   5  Eliquis 5 MG Oral Tablet; Take 1 tablet twice daily  Requested for: 46QVI0090; Last   Rx:36Qrw5457 Ordered   6  Ferrex 150 150 MG Oral Capsule; Take 1  tablet daily Recorded   7  Furosemide 80 MG Oral Tablet; TAKE 1 TABLET 2 TIMES DAILY; Last Rx:23Mar2017   Ordered   8  Gabapentin 300 MG Oral Capsule; TAKE 1 CAPSULE AT BEDTIME  Requested for:   83VQF5723; Last Rx:01Jun2017 Ordered   9  Klor-Con 10 10 MEQ Oral Tablet Extended Release; TAKE 1 TABLET DAILY; Therapy: 71OLG9014 to (Evaluate:87Vpu1107)  Requested for: 86HWP8255; Last   Rx:34Ift7377 Ordered   10  Levemir FlexTouch 100 UNIT/ML Subcutaneous Solution Pen-injector; inject 40 units at    bedtime; Therapy: (Recorded:01Jun2017) to Recorded   11  MetOLazone 5 MG Oral Tablet; Take one tab as directed by MD  Requested for:    44TRC8938; Last RB:30OOI2810 Ordered   12  Metoprolol Tartrate 25 MG Oral Tablet; Take 1 tablet twice daily Recorded   13  Niferex-150 CAPS; One daily; Therapy: (Recorded:10Mar2017) to Recorded   14  NovoLOG FlexPen 100 UNIT/ML Subcutaneous Solution Pen-injector; Sliding scale 5-15    units TID depending on B/S Recorded   15  Simvastatin 40 MG Oral Tablet; take 0 5 tablet bedtime; Therapy: 86TEK3814 to (Evaluate:37Ozt8101); Last Rx:01Jun2017 Ordered   16  Tamsulosin HCl 0 4 MG CP24; TAKE 1 CAPSULE AT BEDTIME; Therapy: (Recorded:11Oct2016) to Recorded   17  Vitamin B-12 1000 MCG Oral Tablet; TAKE 1 TABLET DAILY AS DIRECTED; Therapy: 36QPP6259 to (Evaluate:13Nov2017)  Requested for: 39JFO1793; Last    Rx:18Nov2016 Ordered    Allergies    1  Adhesive Tape TAPE    2  Adhesive Tape    Vitals  Signs   Recorded: 21Jul2017 11:20AM   Heart Rate: 61  Systolic: 862, LUE, Sitting  Diastolic: 60, LUE, Sitting  Height: 5 ft 7 in  Weight: 321 lb 4 oz  BMI Calculated: 50 32  BSA Calculated: 2 47  O2 Saturation: 88    Physical Exam    Constitutional:   General appearance: Well developed, well nourished      Ability to communicate: Voice normal  Speech normal    Head and Face:   Head and face: Head normocephalic, atraumatic with no lesions or palpable masses  Submandibular glands and parotid glands: non tender, no masses  Eyes:   Test of Ocular Motility: Gaze normal  No nystagmus  Ears:   Otoscopic Examination: Tympanic membranes intact and normal in appearance, no retraction of tympanic membranes observed, no serous effusion observed, no evidence of tympanosclerosis  Hearing: Normal    Nose:   External auditory canals: No cerumen impaction noted, no drainage observed, no edema noted in EAC, no exostoses present, no osteoma present, no tenderness noted  External Inspection of Nose: No deformities observed, no deviation of bone structure, no skin lesion present, no swelling present  Nares are symmetric, no deviation of caudal portion of septum  Nasal mucosa, septum, and turbinates: Abnormal  Right-sided septal ulcer, right septal deviation, septal ulcer was cauterized with good result  No active bleeding  Mouth: Inspection of Lips, Teeth, Gums: Lips normal in color, moist, no cracks or lesions  No loose teeth, no missing teeth  Gingiva: no bleeding observed, no inflammation present  Hard Palate: no asymmetry observed, no torus present  Soft palate normal with no ulcers noted  Throat:   Examination of Oropharynx: Oral Mucosa: no masses, lesions, leukoplakia, or scarring  Normal Ford's ducts, pink and moist, no discoloration noted  Floor of mouth: normal Warthin's ducts, no lesions, ulcerations, leukoplakia or torus mandibularis  Tonsils: no hypertrophy or ulcerations noted  Tongue: normal mobility, surfaces without fissures, leukoplakia, ulceration or masses, not enlarged, no pallor noted, no white patches present  Neck:   Examination of Neck: No decreased range of motion, trachea midline  Examination of Thyroid: Normal size, non-tender, no palpable masses     Lymphatic:   Palpation of Lymph Nodes: Neck: No generalized lymphadenopathy  Neurological/Psychiatric:   Cranial nerves II-VII grossly intact  Oriented to person, place, and time  Cooperative, in no acute distress  Procedure  Procedure: Nasal cautery    Indication: Recurrent/persistent epistaxis    Procedure in Detail: After informed verbal consent was obtained from the patient the nose was anesthetized with topical lidociane and phenylephrine  After adequate time for anesthesia the concerning areas were cauterized as below using topical silver nitrate  The patient tolerated the procedure well and was discharged in good condition  Findings: Ulcer right septum  Assessment    1  Former smoker (V15 82) (W42 120)   2  Atrial fibrillation (427 31) (I48 91)   3  Coronary artery disease (414 00) (I25 10)   4  Chronic combined systolic and diastolic CHF (congestive heart failure) (428 42,428 0)   (I50 42)   5  Benign essential hypertension (401 1) (I10)   6  S/P CABG x 3 (V45 81) (Z95 1)   7  Epistaxis, recurrent (784 7) (R04 0)    Discussion/Summary  Discussion Summary:   Recurrent epistaxis: We discussed ongoing management of epistaxis with nasal moisturization, humidification, antibiotic ointment to the nasal septum once daily, hypertension management, and management of acute epistaxis with Afrin and pressure  Literature was given  Patient will return p r n  basis         Signatures   Electronically signed by : BAHMAN Torres ; Jul 21 2017  1:12PM EST                       (Author)

## 2018-01-13 NOTE — PROCEDURES
Procedures by Jalen Zuniga PA-C at 7/17/2016 11:05 AM      Author:  Jalen Zuniga PA-C Service:  Critical Care/ICU Author Type:  Physician Assistant    Filed:  7/17/2016 11:12 AM Date of Service:  7/17/2016 11:05 AM Status:  Attested    :  Jalen Zuniga PA-C (Physician Assistant)  Cosigner:  Angeles Hernandez MD at 7/18/2016 11:16 AM      Procedure Orders:       1  CENTRAL LINE [[id number]] ordered by Jalen Zuniga PA-C at 07/17/16 1110                 Post-procedure Diagnoses:       1  LESLIE (acute kidney injury) [N17 9]       2  Volume overload [E87 70]              Attestation signed by Angeles Hernandez MD at 7/18/2016 11:16 AM           I was scrubbed and present for and participated in all aspects of the Dialysis catheter placement  CXR reviewed with good positioning and no PTX  This note is an accurate account of events  Central Line Insertion  Date/Time: 7/17/2016 11:00 AM  Performed by: Wesley Marshall  Authorized by: Wesley Marshall     Patient location:  Bedside  Consent:     Consent obtained:  Verbal    Consent given by:  Spouse    Risks discussed:  Arterial puncture, bleeding, incorrect placement, infection, nerve damage and pneumothorax  Universal protocol:     Patient identity confirmed:  Arm band  Pre-procedure details:     Hand hygiene: Hand hygiene performed prior to insertion      Sterile barrier technique: All elements of maximal sterile technique followed      Skin preparation:  ChloraPrep    Skin preparation agent: Skin preparation agent completely dried prior to procedure    Indications:     Central line indications: other (comment)      Central line indications comment:  Temporary dialysis  Sedation:     Sedation type: Moderate (conscious) sedation (versed)  Anesthesia (see MAR for exact dosages):      Anesthesia method:  Local infiltration    Local anesthetic:  Lidocaine 1% w/o epi  Procedure details:     Location: Left subclavian    Vessel type: vein      Laterality:  Left    Approach: percutaneous technique used      Patient position:  Trendelenburg    Catheter type:  Triple lumen 20cm (temporary dialysis catheter)    Catheter size:  9 Fr    Landmarks identified: yes      Number of attempts:  1    Successful placement: yes    Post-procedure details:     Post-procedure:  Dressing applied and line sutured    Assessment:  Blood return through all ports and free fluid flow    Patient tolerance of procedure:   Tolerated well, no immediate complications  Comments:      CXR pending                     Received for:Provider  EPIC   Jul 18 2016 11:15AM [facility] Standard Time

## 2018-01-14 VITALS
WEIGHT: 315 LBS | SYSTOLIC BLOOD PRESSURE: 108 MMHG | HEIGHT: 67 IN | BODY MASS INDEX: 49.44 KG/M2 | DIASTOLIC BLOOD PRESSURE: 58 MMHG

## 2018-01-14 VITALS
SYSTOLIC BLOOD PRESSURE: 130 MMHG | HEIGHT: 67 IN | WEIGHT: 315 LBS | BODY MASS INDEX: 49.44 KG/M2 | DIASTOLIC BLOOD PRESSURE: 80 MMHG | HEART RATE: 68 BPM

## 2018-01-14 VITALS
HEART RATE: 66 BPM | HEIGHT: 67 IN | WEIGHT: 306 LBS | DIASTOLIC BLOOD PRESSURE: 62 MMHG | SYSTOLIC BLOOD PRESSURE: 129 MMHG | BODY MASS INDEX: 48.03 KG/M2 | RESPIRATION RATE: 22 BRPM

## 2018-01-14 VITALS
HEIGHT: 67 IN | HEART RATE: 92 BPM | SYSTOLIC BLOOD PRESSURE: 121 MMHG | DIASTOLIC BLOOD PRESSURE: 69 MMHG | WEIGHT: 314.5 LBS | BODY MASS INDEX: 49.36 KG/M2

## 2018-01-14 VITALS
DIASTOLIC BLOOD PRESSURE: 78 MMHG | SYSTOLIC BLOOD PRESSURE: 120 MMHG | BODY MASS INDEX: 47.93 KG/M2 | HEART RATE: 64 BPM | WEIGHT: 306 LBS

## 2018-01-14 NOTE — MISCELLANEOUS
History of Present Illness  A call was made to the patient/patient's family  Status Check: today's weight is 292   received new scale and states weight is stable  Patient is experiencing the following symptoms: edema  edema is better/"same"  Does have wounds/blister   Concerns expressed today consisted of: [facility] nurse did not come today for dressing changes  Counseling provided to the patient and patient's family  Topics counseled included need for daily weights, importance of compliance with treatment, symptoms to report and instructed her to call [facility] in am if not seen   Future Appointments    Date/Time Provider Specialty Site   08/25/2017 02:00 PM BAHMAN King , [facility] [place]   04/10/2017 01:20 PM BAHMAN Haynes  Cardiology [facility] CARDIOLOG[facility] [place]   04/14/2017 01:00 PM BAHMAN Haynes   Cardiology [facility]ARDIOLOGY [place]   04/13/2017 11:15 AM Yessy Marcano DO Family Medicine [address] [place] [address]     Signatures   Electronically signed by : Janey Castañeda, ; Mar 29 2017  4:38PM EST                       (Author)

## 2018-01-14 NOTE — RESULT NOTES
Verified Results  (1) CBC/PLT/DIFF [id number] 02:35PM Bruno Chew     Test Name Result Flag Reference   WBC COUNT 5 90 Thousand/uL  4 31-10 16   RBC COUNT 4 24 Million/uL  3 88-5 62   HEMOGLOBIN 11 6 g/dL L 12 0-17 0   HEMATOCRIT 38 7 %  36 5-49 3   MCV 91 fL  82-98   MCH 27 4 pg  26 8-34 3   MCHC 30 0 g/dL L 31 4-37 4   RDW 15 9 % H 11 6-15 1   MPV 11 3 fL  8 9-12 7   PLATELET COUNT 241 Thousands/uL  149-390   NEUTROPHILS RELATIVE PERCENT 79 % H 43-75   LYMPHOCYTES RELATIVE PERCENT 10 % L 14-44   MONOCYTES RELATIVE PERCENT 6 %  4-12   EOSINOPHILS RELATIVE PERCENT 4 %  0-6   BASOPHILS RELATIVE PERCENT 1 %  0-1   NEUTROPHILS ABSOLUTE COUNT 4 70 Thousands/? ??L  1 85-7 62   LYMPHOCYTES ABSOLUTE COUNT 0 56 Thousands/? ??L L 0 60-4 47   MONOCYTES ABSOLUTE COUNT 0 36 Thousand/? ??L  0 17-1 22   EOSINOPHILS ABSOLUTE COUNT 0 22 Thousand/? ??L  0 00-0 61   BASOPHILS ABSOLUTE COUNT 0 06 Thousands/? ??L  0 00-0 10     (1) MAGNESIUM [id number] 02:35PM Bruno Chew     Test Name Result Flag Reference   MAGNESIUM 2 4 mg/dL  1 6-2 6     (1) PHOSPHORUS [id number] 02:35PM Bruno Chew     Test Name Result Flag Reference   PHOSPHORUS 5 4 mg/dL H 2 3-4 1     (1) COMPREHENSIVE METABOLIC PANEL [id number] 04:43EF Bruno Chew     Test Name Result Flag Reference   GLUCOSE,RANDM 324 mg/dL H    If the patient is fasting, the ADA then defines impaired fasting glucose as > 100 mg/dL and diabetes as > or equal to 123 mg/dL     SODIUM 142 mmol/L  136-145   POTASSIUM 4 6 mmol/L  3 5-5 3   CHLORIDE 99 mmol/L L 100-108   CARBON DIOXIDE 36 mmol/L H 21-32   ANION GAP (CALC) 7 mmol/L  4-13   BLOOD UREA NITROGEN 73 mg/dL H 5-25   CREATININE 3 39 mg/dL H 0 60-1 30   Standardized to IDMS reference method   CALCIUM 8 3 mg/dL  8 3-10 1   BILI, TOTAL 0 40 mg/dL  0 20-1 00   ALK PHOSPHATAS 42 U/L L    ALT (SGPT) 22 U/L  12-78   AST(SGOT) 15 U/L  5-45   ALBUMIN 3 0 g/dL L 3 5-5 0   TOTAL PROTEIN 5 6 g/dL L 6 4-8 2   eGFR Non- 17 6 ml/min/1 73sq m     [facility] Education Program recommendations are as follows:  GFR calculation is accurate only with a steady state creatinine  Chronic Kidney disease less than 60 ml/min/1 73 sq  meters  Kidney failure less than 15 ml/min/1 73 sq  meters       (1) NT- BNP (PRO BRAIN NATRIURETIC PEPTIDE) [id number] 02:35PM Haley Killian     Test Name Result Flag Reference   NT-PRO BNP 3661 pg/mL H <450

## 2018-01-14 NOTE — PROCEDURES
Procedures by Gina Hand PA-C  at 7/26/2016  9:31 PM      Author:  Gina Hand PA-C Service:  Critical Care/ICU Author Type:  Physician Assistant    Filed:  7/26/2016  9:36 PM Date of Service:  7/26/2016  9:31 PM Status:  Attested    :  Gina Hand PA-C (Physician Assistant)  Cosigner:  Juan Mccormick MD at 7/26/2016 11:12 PM      Procedure Orders:       1  Insert Temp Dialysis Zada Gregory) [[id number]] ordered by Gina Hand PA-C at 07/26/16 2131                 Post-procedure Diagnoses:       1  Acute pulmonary edema [J81 0]              Attestation signed by Juan Mccormick MD at 7/26/2016 11:12 PM           I was present and participated in this procedure  Insert Temp Dialysis Zada Gregory)   Date/Time: 7/26/2016 9:32 PM  Performed by: Carlos Ham by: Ailyn Gallo   Consent: Verbal consent obtained  Consent given by: patient  Patient understanding: patient states understanding of the procedure being performed  Patient consent: the patient's understanding of the procedure matches consent given  Procedure consent: procedure consent matches procedure scheduled  Relevant documents: relevant documents present and verified  Test results: test results available and properly labeled  Site marked: the operative site was marked  Imaging studies: imaging studies available  Patient identity confirmed: verbally with patient, arm band and hospital-assigned identification number  Time out: Immediately prior to procedure a time out was called to verify the correct patient, procedure, equipment, support staff and site/side marked as required  Preparation: Patient was prepped and draped in the usual sterile  fashion    Local anesthesia used: yes  Anesthesia: local infiltration    Anesthesia:  Local anesthesia used: yes  Anesthesia: local infiltration  Local Anesthetic: lidocaine 1% without epinephrine   Anesthetic total (ml): 5 ml   Sedation:  Patient sedated: no    Patient tolerance: Patient tolerated the procedure well with no immediate complications  Comments: Real time ultrasound guidance verified R carotid and IJ vessels  Confirmed entry into IJ by both ultrasound w/ guidewire visualization  as well as manometry w/ visualization of dark non pulsatile column of venous blood that returned into IJ on gravity  No issues w/ placement  Choraprepped site and catheter post procedure and sutured in place  All ports flushed w/ saline and tego caps  placed    Sterile CHG dressing applied                         Received for:Provider  EPIC   Jul 26 2016 11:11PM [facility] Standard Time

## 2018-01-14 NOTE — MISCELLANEOUS
Assessment    1  Former smoker (V15 82) (X01 614)   2  No advance directives (V49 89) (Z78 9)   3  Atrial fibrillation (427 31) (I48 91)   4  Benign hypertension with CKD (chronic kidney disease) stage III (403 10,585 3)   (I12 9,N18 3)   5  Acute idiopathic gout of right foot (274 01) (M10 071)    Plan  Acute idiopathic gout of right foot    · Colchicine 0 6 MG Oral Tablet; TAKE 2 TABLETS BY MOUTH FOR ONE DAY AND  THEN TAKE ONE  TABLET DAILY FOR 10 DAYS   Rx By: Layton Bull; Dispense: 11 Days ; #:12 Tablet; Refill: 3; For: Acute idiopathic gout of right foot; MIGUEL = N; Sent To: [facility]323  Diabetic retinopathy screening    · *VB - Eye Exam; Status:Temporary Deferral - Patient reports item recently done;     9/13/2017   Perform:Other; Due:18Mar2017; Last Updated By:Sammie Black; 3/13/2017 11:14:43 AM;Ordered; For:Diabetic retinopathy screening; Ordered By:Stephen Renteria; Discussion/Summary  Discussion Summary:   Patient is here for a dye transition of care visit  He was recently hospitalized  He's already been set up for his sleep study, which is one of the requests of the hospitalists  Also, he is in acute gouty arthropathy of the left foot which is a recurring problem for him  He doesn't take any allopurinol or any [facility] patient's left foot is acutely painful  Goals and Barriers: The patient has the current Goals: Goal will be to stabilize his respiratory status and also treat his acute gouty arthropathy  The patent has the current Barriers: The patient's barriers are that he has no prescription drug plan  We will need to use medicines which are more economical    Patient's Capacity to Self-Care: Patient is able to Self-Care        History of Present Illness  TCM Communication [facility]: The patient is being contacted for follow-up after hospitalization and Shaneka Ford from [facility] called to schedule MAURICE appt on 3/13/2017 at 11:15 am  He has no pending appts scheduled with our office at this time  Hospital Patient will need an outpatient Sleep Study completed as soon as possible and also a Vitamin D 25-OH level checked in 1 month by Dr Dai December  He was hospitalized at [facility]  The date of admission: 03/01/2017, date of discharge: 03/06/2017  Diagnosis: Bilateral lower extremity edema; SOB; CHF  He was discharged to home, with home health services  Medications were not reviewed today  He scheduled a follow up appointment  Follow-up appointments with other specialists: Cardiologist Dr Vini Conrad; Renal; Dr Jose Angel Phoenix per Ashley Becerra from [facility]  The patient is currently asymptomatic  per Ashley Becerra Counseling was provided to  Ashley Becerra from [facility] called to set up appt  Topics counseled included instructions for management  Diabetic management for close f/u with PCP  Communication performed and completed by Emanuel Pro      Review of Systems  Complete-Male:   Constitutional: No fever or chills, feels well, no tiredness, no recent weight gain or weight loss  Eyes: No complaints of eye pain, no red eyes, no discharge from eyes, no itchy eyes  ENT: no complaints of earache, no hearing loss, no nosebleeds, no nasal discharge, no sore throat, no hoarseness  Cardiovascular: Atrial fibrillation with congestive heart failure  Respiratory: shortness of breath and shortness of breath during exertion  Gastrointestinal: No complaints of abdominal pain, no constipation, no nausea or vomiting, no diarrhea or bloody stools  Genitourinary: No complaints of dysuria, no incontinence, no hesitancy, no nocturia, no genital lesion, no testicular pain  Musculoskeletal: No complaints of arthralgia, no myalgias, no joint swelling or stiffness, no limb pain or swelling  Integumentary: No complaints of skin rash or skin lesions, no itching, no skin wound, no dry skin     Neurological: No compliants of headache, no confusion, no convulsions, no numbness or tingling, no dizziness or fainting, no limb weakness, no difficulty walking  Active Problems    1  Acute on chronic combined systolic and diastolic congestive heart failure, NYHA class 2   (428 43,428 0) (I50 43)   2  Acute pain of left foot (729 5) (M79 672)   3  Acute urinary retention (788 29) (R33 8)   4  Advanced directives, counseling/discussion (V65 49) (Z71 89)   5  Anemia (285 9) (D64 9)   6  Arteriosclerotic coronary artery disease (414 00) (I25 10)   7  Arthralgia of right shoulder region (719 41) (M25 511)   8  Atrial fibrillation (427 31) (I48 91)   9  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)   10  Benign essential hypertension (401 1) (I10)   11  Benign hypertension with CKD (chronic kidney disease) stage III (403 10,585 3)    (I12 9,N18 3)   12  Bilateral leg edema (782 3) (R60 0)   13  Bilateral renal cysts (753 10) (N28 1)   14  Blind hypertensive eye, 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)   15  Cellulitis (682 9) (L03 90)   16  Cellulitis of calf (682 6) (L03 119)   17  Cerebrovascular accident, 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)   18  Chronic combined systolic and diastolic CHF (congestive heart failure) (428 42,428 0)    (I50 42)   19  Chronic diastolic congestive heart failure (428 32,428 0) (I50 32)   20  Chronic kidney disease (585 9) (N18 9)   21  Chronic venous stasis dermatitis of both lower extremities (454 1) (I83 11,I83 12)   22  Coronary artery disease (414 00) (I25 10)   23  Diabetes mellitus type 2, insulin dependent (250 00,V58 67) (E11 9,Z79 4)   24  Diabetic retinopathy screening (V80 2) (Z13 5)   25  Diarrhea (787 91) (R19 7)   26  Diastolic CHF (627 24,406 7) (I50 30)   27  Disorder of ankle and foot joint (719 97) (M25 9)   28  BAER (dyspnea on exertion) (786 09) (R06 09)   29  Edema (782 3) (R60 9)   30  Encounter for prostate cancer screening (V76 44) (Z12 5)   31  Exercise counseling (V65 41) (Z71 89)   32  Flu vaccine need (V04 81) (Z23)   33  Glaucoma screening (V80 1) (Z13 5)   34   Gross hematuria (428 61) (R31 0)   35  Hiatal hernia (553 3) (K44 9)   36  Hydronephrosis of left kidney (591) (N13 30)   37  Hyperlipidemia (272 4) (E78 5)   38  Ischemic cardiomyopathy (414 8) (I25 5)   39  Myocardial infarction (410 90) (I21 3)   40  Need for pneumococcal vaccination (V03 82) (Z23)   41  Neuropathy (355 9) (G62 9)   42  No advance directives (V49 89) (Z78 9)   43  Poorly controlled type 2 diabetes mellitus (250 00) (E11 65)   44  Pre-operative cardiovascular examination (V72 81) (Z01 810)   45  Prostate cancer (185) (C61)   46  Pyelonephritis, acute (590 10) (N10)   47  S/P CABG x 3 (V45 81) (Z95 1)   48  Screening for depression (V79 0) (Z13 89)   49  Screening for diabetic peripheral neuropathy (V80 09) (Z13 89)   50  Screening for genitourinary condition (V81 6) (Z13 89)   51  Screening for neurological condition (V80 09) (Z13 89)   52  Trochanteric bursitis of left hip (726 5) (M70 62)   53  Ulcers of both lower legs (707 10) (L97 919,L97 929)   54  Urinary retention (788 20) (R33 9)   55  UTI (urinary tract infection) (599 0) (N39 0)   56  Vitamin B 12 deficiency (266 2) (E53 8)    Past Medical History    1  History of Abnormal blood chemistry (790 6) (R79 9)   2  History of Ankle Joint Swelling (719 07)   3  History of Cellulitis (682 9) (L03 90)   4  History of Diabetes Mellitus (250 00)   5  History of Easy Bruising Tendency   6  History of cardiac disorder (V12 50) (Z86 79)   7  History of chronic obstructive lung disease (V12 69) (Z87 09)   8  History of fatigue (V13 89) (Z87 898)   9  History of hepatitis (V12 09) (Z86 19)   10  History of herpes zoster (V12 09) (Z86 19)   11  History of hypercholesterolemia (V12 29) (Z86 39)   12  History of hyperlipidemia (V12 29) (Z86 39)   13  History of hypertension (V12 59) (Z86 79)   14  History of jaundice (V12 29) (Z87 898)   15  History of shortness of breath (V13 89) (Z87 898)   16  History of Lung mass (786 6) (R91 8)   17   History of Morbid or severe obesity due to excess calories (278 01) (E66 01)   18  History of Pneumonia (V12 61)   19  History of Vaccine for streptococcus pneumoniae and influenza (V06 6) (Z23)    Surgical History    1  History of CABG   2  History of Cataract Surgery   3  History of Cystoscopy With Removal Of Object   4  History of Lung Surgery   5  History of Pilonidal Cyst Resection - Simple   6  History of Previous Stent Placement   7  History of Radiation Therapy   8  History of Transcath Intravascular Stent Placement Percutaneous Renal   9  History of Umbilical Hernia Repair  Surgical History Reviewed: The surgical history was reviewed and updated today  Family History  Mother    1  Family history of Diabetes Mellitus (V18 0)   2  Family history of hypertension (V17 49) (Z82 49)   3  Family history of Type 2 diabetes mellitus (250 00) (E11 9)  Father    4  Family history of Diabetes Mellitus (V18 0)  Maternal Grandmother    5  Family history of Diabetes Mellitus (V18 0)  Family History    6  Family history of Cancer  Family History Reviewed: The family history was reviewed and updated today  Social History    · Denied: Drug Use (305 90)   · Former smoker (V15 82) (A89 769)   · Marital History - Currently    · Never Drank Alcohol   · No advance directives (V49 89) (Z78 9)   · No advance directives (V49 89) (Z78 9)   · No living will   · Patient has living will (V49 89) (Z78 9)  Social History Reviewed: The social history was reviewed and updated today  The social history was reviewed and is unchanged  Current Meds   1  Amiodarone HCl - 200 MG Oral Tablet; TAKE 1 TABLET DAILY  Requested for:   53YTJ0476; Last Rx:70Jvj9119 Ordered   2  Aspirin EC 81 MG Oral Tablet Delayed Release; TAKE 1 TABLET DAILY; Last   Rx:03Jan2017 Ordered   3  Eliquis 5 MG Oral Tablet; Take 1 tablet twice daily  Requested for: 14ZTF7303; Last   Rx:49Pyl2148 Ordered   4  Ferrex 150 150 MG Oral Capsule; Take 1  tablet daily Recorded   5   Furosemide 80 MG Oral Tablet; Take 1 tablet twice daily; Therapy: 36PVJ4133 to (Evaluate:45Jrs1637)  Requested for: 59DPL1307; Last   Rx:50Vdp4612 Ordered   6  Gabapentin 300 MG Oral Capsule; TAKE 1 CAPSULE AT BEDTIME Recorded   7  Levemir FlexTouch 100 UNIT/ML Subcutaneous Solution Pen-injector; INJECT 35 UNITS   AT BEDTIME Recorded   8  Metoprolol Tartrate 25 MG Oral Tablet; Take 1 tablet twice daily Recorded   9  NovoLOG FlexPen 100 UNIT/ML Subcutaneous Solution Pen-injector; Sliding scale 5-15   units TID depending on B/S Recorded   10  Simvastatin 20 MG Oral Tablet; TAKE 1 TABLET DAILY; Last Rx:12Oct2016 Ordered   11  Tamsulosin HCl 0 4 MG CP24; TAKE 1 CAPSULE AT BEDTIME; Therapy: (Recorded:11Oct2016) to Recorded   12  Vitamin B-12 1000 MCG Oral Tablet; TAKE 1 TABLET DAILY AS DIRECTED; Therapy: 03ZIW5450 to (Evaluate:13Nov2017)  Requested for: 33UUV3383; Last    Rx:18Nov2016 Ordered  Medication List Reviewed: The medication list was reviewed and updated today  Allergies    1  Adhesive Tape    Vitals  Signs   Recorded: 12VHG2430 00:92VL   Systolic: 98, LUE, Sitting  Diastolic: 60, LUE, Sitting  Height: 5 ft 7 in  Weight: 311 lb   BMI Calculated: 48 71  BSA Calculated: 2 44  O2 Saturation: 95  FiO2: 2L/min    Physical Exam    Constitutional   General appearance: No acute distress, well appearing and well nourished  Eyes   Conjunctiva and lids: No swelling, erythema, or discharge  Pupils and irises: Equal, round and reactive to light  Ears, Nose, Mouth, and Throat   External inspection of ears and nose: Normal     Otoscopic examination: Tympanic membrance translucent with normal light reflex  Canals patent without erythema  Nasal mucosa, septum, and turbinates: Normal without edema or erythema  Oropharynx: Normal with no erythema, edema, exudate or lesions  Pulmonary   Respiratory effort: No increased work of breathing or signs of respiratory distress      Auscultation of lungs: Clear to auscultation, equal breath sounds bilaterally, no wheezes, no rales, no rhonci  Cardiovascular   Palpation of heart: Normal PMI, no thrills  Auscultation of heart: Normal rate and rhythm, normal S1 and S2, without murmurs  Examination of extremities for edema and/or varicosities: Normal     Carotid pulses: Normal     Abdomen   Abdomen: Non-tender, no masses  Liver and spleen: No hepatomegaly or splenomegaly  Lymphatic   Palpation of lymph nodes in neck: No lymphadenopathy  Musculoskeletal   Gait and station: Normal     Digits and nails: Normal without clubbing or cyanosis  Inspection/palpation of joints, bones, and muscles: Normal     Skin   Skin and subcutaneous tissue: Normal without rashes or lesions  Neurologic   Cranial nerves: Cranial nerves 2-12 intact  Reflexes: 2+ and symmetric  Sensation: No sensory loss  Psychiatric   Orientation to person, place and time: Normal     Mood and affect: Normal          Health Management  Health Maintenance   Medicare Annual Wellness Visit; every 1 year; Last 52M[facility]9166; Next Due: 42Imj7941; Overdue    Future Appointments    Date/Time Provider Specialty Site   08/25/2017 02:00 PM BAHMAN Alcocer , [facility] H[facility]ology On[facility] [place]   03/21/2017 11:20 AM BAHMAN Rodriguez  Cardiology [facility] CARDIOLOG[facility] [place]   04/10/2017 01:20 PM BAHMAN Rodriguez   Cardiology [facility] CARDIOLOGY [place]   03/13/2017 11:15 AM Gage Shannon DO Family Medicine [address] [place] [address]     Signatures   Electronically signed by : Myrna Piedra, ; Mar  8 2017 11:38AM EST                       (Author)    Electronically signed by : Zack Cintron DO; Mar 13 2017 11:56AM EST                       (Author)

## 2018-01-15 ENCOUNTER — LAB REQUISITION (OUTPATIENT)
Dept: LAB | Facility: HOSPITAL | Age: 78
End: 2018-01-15
Payer: MEDICARE

## 2018-01-15 VITALS
HEART RATE: 63 BPM | SYSTOLIC BLOOD PRESSURE: 152 MMHG | DIASTOLIC BLOOD PRESSURE: 70 MMHG | HEIGHT: 67 IN | TEMPERATURE: 97.3 F | OXYGEN SATURATION: 91 % | BODY MASS INDEX: 49.44 KG/M2 | RESPIRATION RATE: 22 BRPM | WEIGHT: 315 LBS

## 2018-01-15 VITALS
WEIGHT: 301 LBS | DIASTOLIC BLOOD PRESSURE: 63 MMHG | BODY MASS INDEX: 47.24 KG/M2 | SYSTOLIC BLOOD PRESSURE: 132 MMHG | HEIGHT: 67 IN | HEART RATE: 65 BPM

## 2018-01-15 DIAGNOSIS — N18.4 CHRONIC KIDNEY DISEASE, STAGE IV (SEVERE) (HCC): ICD-10-CM

## 2018-01-15 DIAGNOSIS — C61 MALIGNANT NEOPLASM OF PROSTATE (HCC): ICD-10-CM

## 2018-01-15 LAB
ALBUMIN SERPL BCP-MCNC: 3 G/DL (ref 3.5–5)
ALP SERPL-CCNC: 39 U/L (ref 46–116)
ALT SERPL W P-5'-P-CCNC: 55 U/L (ref 12–78)
ANION GAP SERPL CALCULATED.3IONS-SCNC: 7 MMOL/L (ref 4–13)
ANISOCYTOSIS BLD QL SMEAR: PRESENT
AST SERPL W P-5'-P-CCNC: 35 U/L (ref 5–45)
BASOPHILS # BLD MANUAL: 0 THOUSAND/UL (ref 0–0.1)
BASOPHILS NFR MAR MANUAL: 0 % (ref 0–1)
BILIRUB SERPL-MCNC: 0.3 MG/DL (ref 0.2–1)
BUN SERPL-MCNC: 60 MG/DL (ref 5–25)
CALCIUM SERPL-MCNC: 8 MG/DL (ref 8.3–10.1)
CHLORIDE SERPL-SCNC: 97 MMOL/L (ref 100–108)
CO2 SERPL-SCNC: 35 MMOL/L (ref 21–32)
CREAT SERPL-MCNC: 2.39 MG/DL (ref 0.6–1.3)
EOSINOPHIL # BLD MANUAL: 0 THOUSAND/UL (ref 0–0.4)
EOSINOPHIL NFR BLD MANUAL: 0 % (ref 0–6)
ERYTHROCYTE [DISTWIDTH] IN BLOOD BY AUTOMATED COUNT: 18.2 % (ref 11.6–15.1)
FERRITIN SERPL-MCNC: 109 NG/ML (ref 8–388)
GFR SERPL CREATININE-BSD FRML MDRD: 25 ML/MIN/1.73SQ M
GLUCOSE SERPL-MCNC: 309 MG/DL (ref 65–140)
HCT VFR BLD AUTO: 42.7 % (ref 36.5–49.3)
HGB BLD-MCNC: 13.7 G/DL (ref 12–17)
IRON SATN MFR SERPL: 20 %
IRON SERPL-MCNC: 71 UG/DL (ref 65–175)
LG PLATELETS BLD QL SMEAR: PRESENT
LYMPHOCYTES # BLD AUTO: 0.8 THOUSAND/UL (ref 0.6–4.47)
LYMPHOCYTES # BLD AUTO: 7 % (ref 14–44)
MCH RBC QN AUTO: 31.1 PG (ref 26.8–34.3)
MCHC RBC AUTO-ENTMCNC: 32.1 G/DL (ref 31.4–37.4)
MCV RBC AUTO: 97 FL (ref 82–98)
MONOCYTES # BLD AUTO: 0.57 THOUSAND/UL (ref 0–1.22)
MONOCYTES NFR BLD: 5 % (ref 4–12)
NEUTROPHILS # BLD MANUAL: 10.01 THOUSAND/UL (ref 1.85–7.62)
NEUTS SEG NFR BLD AUTO: 88 % (ref 43–75)
NRBC BLD AUTO-RTO: 2 /100 WBC (ref 0–2)
PLATELET # BLD AUTO: 251 THOUSANDS/UL (ref 149–390)
PLATELET BLD QL SMEAR: ADEQUATE
PMV BLD AUTO: 10.5 FL (ref 8.9–12.7)
POLYCHROMASIA BLD QL SMEAR: PRESENT
POTASSIUM SERPL-SCNC: 4.7 MMOL/L (ref 3.5–5.3)
PROT SERPL-MCNC: 5.6 G/DL (ref 6.4–8.2)
PSA SERPL-MCNC: 0.4 NG/ML (ref 0–4)
RBC # BLD AUTO: 4.41 MILLION/UL (ref 3.88–5.62)
SODIUM SERPL-SCNC: 139 MMOL/L (ref 136–145)
TIBC SERPL-MCNC: 358 UG/DL (ref 250–450)
TOTAL CELLS COUNTED SPEC: 100
VIT B12 SERPL-MCNC: 909 PG/ML (ref 100–900)
WBC # BLD AUTO: 11.37 THOUSAND/UL (ref 4.31–10.16)

## 2018-01-15 PROCEDURE — 82607 VITAMIN B-12: CPT | Performed by: FAMILY MEDICINE

## 2018-01-15 PROCEDURE — 82728 ASSAY OF FERRITIN: CPT | Performed by: FAMILY MEDICINE

## 2018-01-15 PROCEDURE — 80053 COMPREHEN METABOLIC PANEL: CPT | Performed by: FAMILY MEDICINE

## 2018-01-15 PROCEDURE — 85027 COMPLETE CBC AUTOMATED: CPT | Performed by: FAMILY MEDICINE

## 2018-01-15 PROCEDURE — 83540 ASSAY OF IRON: CPT | Performed by: FAMILY MEDICINE

## 2018-01-15 PROCEDURE — 83550 IRON BINDING TEST: CPT | Performed by: FAMILY MEDICINE

## 2018-01-15 PROCEDURE — 85007 BL SMEAR W/DIFF WBC COUNT: CPT | Performed by: FAMILY MEDICINE

## 2018-01-15 PROCEDURE — 84153 ASSAY OF PSA TOTAL: CPT | Performed by: FAMILY MEDICINE

## 2018-01-15 NOTE — RESULT NOTES
Verified Results  (1) BASIC METABOLIC PROFILE [id number] 06:29PM Destini Schneider     Test Name Result Flag Reference   SODIUM 137 mmol/L  136-145   POTASSIUM 4 4 mmol/L  3 5-5 3   CHLORIDE 99 mmol/L L 100-108   CARBON DIOXIDE 30 mmol/L  21-32   ANION GAP (CALC) 8 mmol/L  4-13   BLOOD UREA NITROGEN 84 mg/dL H 5-25   CREATININE 2 74 mg/dL H 0 60-1 30   Standardized to IDMS reference method   CALCIUM 8 9 mg/dL  8 3-10 1   eGFR 21 ml/min/1 73sq m     National Kidney Disease Education Program recommendations are as follows:  GFR calculation is accurate only with a steady state creatinine  Chronic Kidney disease less than 60 ml/min/1 73 sq  meters  Kidney failure less than 15 ml/min/1 73 sq  meters  GLUCOSE FASTING 282 mg/dL H 65-99   Specimen collection should occur prior to Sulfasalazine administration due to the potential for falsely depressed results  Specimen collection should occur prior to Sulfapyridine administration due to the potential for falsely elevated results

## 2018-01-15 NOTE — MISCELLANEOUS
Assessment    1  Former smoker (V15 82) (Z89 488)   2  Acute on chronic combined systolic and diastolic congestive heart failure, NYHA class 2   (428 43,428 0) (I50 43)   3  Atrial fibrillation (427 31) (I48 91)   4  Benign essential hypertension (401 1) (I10)    Plan  Advanced directives, counseling/discussion    · We recommend that you create an advance directive ; Status:Complete - Retrospective  Authorization;   Done: 58GXR6747   Last Updated By:Sammie Black; 12/13/2016 3:08:23 PM;Ordered; For:Advanced directives, counseling/discussion; Ordered By:Jasson Renteria;  Benign essential hypertension    · Gabapentin 300 MG Oral Capsule; TAKE 1 CAPSULE AT BEDTIME   Rx By: Bella Mendez; Dispense: 30 Days ; #:30 Capsule; Refill: 3; For: Benign essential hypertension; MIGUEL = N; Sent To: [facility]  Exercise counseling    · Benefits of Exercise/Physical Activity; Status:Complete - Retrospective Authorization;    Done: 36XBD6630   Last Updated By:Sammie Black; 12/13/2016 3:08:23 PM;Ordered;  For:Exercise counseling; Ordered By:Mikaela Renteria; History of Present Illness  TCM Communication [facility]: The patient is being contacted for follow-up after hospitalization and 12-9-16  He was hospitalized at [facility]  The date of admission: 12-2-16, date of discharge: 12-7-16  Diagnosis: EDEMA IN LEGS  He was discharged to home  Medications were not reviewed today  Follow-up appointments with other specialists: DR Henrique Sharma 12-14-16  Symptoms: swelling and swelling location LEGS  Communication performed and completed by Dinora Munroe      Review of Systems  Complete-Male:   Eyes: No complaints of eye pain, no red eyes, no discharge from eyes, no itchy eyes  ENT: no complaints of earache, no hearing loss, no nosebleeds, no nasal discharge, no sore throat, no hoarseness  Cardiovascular: as noted in HPI  Respiratory: cough and shortness of breath during exertion     Gastrointestinal: No complaints of abdominal pain, no constipation, no nausea or vomiting, no diarrhea or bloody stools  Genitourinary: No complaints of dysuria, no incontinence, no hesitancy, no nocturia, no genital lesion, no testicular pain  Musculoskeletal: limb pain and limb swelling  Integumentary: No complaints of skin rash or skin lesions, no itching, no skin wound, no dry skin  Neurological: tingling  Psychiatric: Is not suicidal, no sleep disturbances, no anxiety or depression, no change in personality, no emotional problems  Endocrine: No complaints of proptosis, no hot flashes, no muscle weakness, no erectile dysfunction, no deepening of the voice, no feelings of weakness  ROS Reviewed:   ROS reviewed  Active Problems    1  Acute on chronic combined systolic and diastolic congestive heart failure, NYHA class 2   (428 43,428 0) (I50 43)   2  Acute pain of left foot (729 5) (M79 672)   3  Acute urinary retention (788 29) (R33 8)   4  Anemia (285 9) (D64 9)   5  Arteriosclerotic coronary artery disease (414 00) (I25 10)   6  Arthralgia of right shoulder region (719 41) (M25 511)   7  Atrial fibrillation (427 31) (I48 91)   8  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)   9  Benign essential hypertension (401 1) (I10)   10  Benign hypertension with CKD (chronic kidney disease) stage III (403 10,585 3)    (I12 9,N18 3)   11  Bilateral leg edema (782 3) (R60 0)   12  Cellulitis (682 9) (L03 90)   13  Chronic combined systolic and diastolic CHF (congestive heart failure) (428 42,428 0)    (I50 42)   14  Chronic diastolic congestive heart failure (428 32,428 0) (I50 32)   15  Chronic kidney disease (585 9) (N18 9)   16  Chronic venous stasis dermatitis of both lower extremities (454 1) (I83 11,I83 12)   17  Coronary artery disease (414 00) (I25 10)   18  Diabetes mellitus type 2, insulin dependent (250 00,V58 67) (E11 9,Z79 4)   19  Diabetic retinopathy screening (V80 2) (Z13 5)   20  Diarrhea (787 91) (R19 7)   21  Diastolic CHF (228 77,633 7) (I50 30)   22  Disorder of ankle and foot joint (719 97) (M25 9)   23  BAER (dyspnea on exertion) (786 09) (R06 09)   24  Edema (782 3) (R60 9)   25  Encounter for prostate cancer screening (V76 44) (Z12 5)   26  Flu vaccine need (V04 81) (Z23)   27  Glaucoma screening (V80 1) (Z13 5)   28  Gross hematuria (599 71) (R31 0)   29  Hiatal hernia (553 3) (K44 9)   30  Hydronephrosis of left kidney (591) (N13 30)   31  Hyperlipidemia (272 4) (E78 5)   32  Myocardial infarction (410 90) (I21 3)   33  Need for pneumococcal vaccination (V03 82) (Z23)   34  Neuropathy (355 9) (G62 9)   35  No advance directives (V49 89) (Z78 9)   36  Poorly controlled type 2 diabetes mellitus (250 00) (E11 65)   37  Pre-operative cardiovascular examination (V72 81) (Z01 810)   38  Prostate cancer (185) (C61)   39  Pyelonephritis, acute (590 10) (N10)   40  S/P CABG x 3 (V45 81) (Z95 1)   41  Screening for depression (V79 0) (Z13 89)   42  Screening for diabetic peripheral neuropathy (V80 09) (Z13 89)   43  Screening for genitourinary condition (V81 6) (Z13 89)   44  Screening for neurological condition (V80 09) (Z13 89)   45  Trochanteric bursitis of left hip (726 5) (M70 62)   46  Ulcers of both lower legs (707 10) (L97 919,L97 929)   47  Urinary retention (788 20) (R33 9)   48  UTI (urinary tract infection) (599 0) (N39 0)   49  Vitamin B 12 deficiency (266 2) (E53 8)    Past Medical History    1  History of Abnormal blood chemistry (790 6) (R79 9)   2  History of Ankle Joint Swelling (719 07)   3  History of Cellulitis (682 9) (L03 90)   4  History of Diabetes Mellitus (250 00)   5  History of Easy Bruising Tendency   6  History of cardiac disorder (V12 50) (Z86 79)   7  History of chronic obstructive lung disease (V12 69) (Z87 09)   8  History of fatigue (V13 89) (Z87 898)   9  History of hepatitis (V12 09) (Z86 19)   10  History of herpes zoster (V12 09) (Z86 19)   11   History of hypercholesterolemia (V12 29) (Z86 39)   12  History of hyperlipidemia (V12 29) (Z86 39)   13  History of hypertension (V12 59) (Z86 79)   14  History of jaundice (V12 29) (Z87 898)   15  History of shortness of breath (V13 89) (Z87 898)   16  History of Lung mass (786 6) (R91 8)   17  History of Morbid or severe obesity due to excess calories (278 01) (E66 01)   18  History of Pneumonia (V12 61)   19  History of Vaccine for streptococcus pneumoniae and influenza (V06 6) (Z23)    Surgical History    1  History of CABG   2  History of Cataract Surgery   3  History of Cystoscopy With Removal Of Object   4  History of Lung Surgery   5  History of Pilonidal Cyst Resection - Simple   6  History of Previous Stent Placement   7  History of Radiation Therapy   8  History of Transcath Intravascular Stent Placement Percutaneous Renal   9  History of Umbilical Hernia Repair  Surgical History Reviewed: The surgical history was reviewed and updated today  Family History  Mother    1  Family history of Diabetes Mellitus (V18 0)   2  Family history of hypertension (V17 49) (Z82 49)   3  Family history of Type 2 diabetes mellitus (250 00) (E11 9)  Father    4  Family history of Diabetes Mellitus (V18 0)  Maternal Grandmother    5  Family history of Diabetes Mellitus (V18 0)  Family History    6  Family history of Cancer  Family History Reviewed: The family history was reviewed and updated today  Social History    · Denied: Drug Use (305 90)   · Former smoker (V15 82) (D59 033)   · Marital History - Currently    · Never Drank Alcohol   · No advance directives (V49 89) (Z78 9)   · No living will   · Patient has living will (V49 89) (Z78 9)  Social History Reviewed: The social history was reviewed and updated today  The social history was reviewed and is unchanged  Current Meds   1  Amiodarone HCl - 200 MG Oral Tablet; TAKE 1 TABLET DAILY  Requested for: 23JPR1372;   Last Rx:11Oct2016 Ordered   2   Aspirin  MG Oral Tablet Delayed Release; One daily; Therapy: (Recorded:42Agp8309) to Recorded   3  Ferrex 150 150 MG Oral Capsule; Take 1  tablet daily Recorded   4  Levemir FlexTouch 100 UNIT/ML Subcutaneous Solution Pen-injector; INJECT 35 UNITS   AT BEDTIME Recorded   5  MethylPREDNISolone Acetate 80 MG/ML Injection Suspension; INJECT 1  ML   Intramuscular; To Be Done: 05TLX6281; Status: HOLD FOR - Administration Ordered   6  Metoprolol Tartrate 25 MG Oral Tablet; Take 1 tablet twice daily Recorded   7  Mupirocin 2 % External Ointment; APPLY A SMALL AMOUNT 3 TIMES DAILY AS   DIRECTED; Therapy: 77BDQ1030 to (Last Rx:31Oct2016)  Requested for: 31Oct2016 Ordered   8  Niferex-150 CAPS; One daily; Therapy: (Recorded:59Jcy7467) to Recorded   9  NovoLOG FlexPen 100 UNIT/ML Subcutaneous Solution Pen-injector; Sliding scale 5-15   units TID depending on B/S Recorded   10  Simvastatin 20 MG Oral Tablet; TAKE 1 TABLET DAILY; Last Rx:12Oct2016 Ordered   11  Tamsulosin HCl 0 4 MG CP24; TAKE 1 CAPSULE AT BEDTIME; Therapy: (Recorded:11Oct2016) to Recorded   12  Vitamin B-12 1000 MCG Oral Tablet; TAKE 1 TABLET DAILY AS DIRECTED; Therapy: 78WYJ5854 to (Evaluate:13Nov2017)  Requested for: 27TRD4375; Last    Rx:18Nov2016 Ordered  Medication List Reviewed: The medication list was reviewed and updated today  Allergies    1  No Known Drug Allergies    2  Adhesive Tape    Vitals  Signs   Recorded: 44AAO1700 43:83PH   Systolic: 429, LUE, Sitting  Diastolic: 50, LUE, Sitting  Height: 5 ft 7 in  Weight: 291 lb   BMI Calculated: 45 58  BSA Calculated: 2 37    Physical Exam    Constitutional   General appearance: No acute distress, well appearing and well nourished  Ears, Nose, Mouth, and Throat   External inspection of ears and nose: Normal     Otoscopic examination: Tympanic membrance translucent with normal light reflex  Canals patent without erythema  Nasal mucosa, septum, and turbinates: Normal without edema or erythema      Oropharynx: Normal with no erythema, edema, exudate or lesions  Pulmonary   Respiratory effort: No increased work of breathing or signs of respiratory distress  Auscultation of lungs: Clear to auscultation, equal breath sounds bilaterally, no wheezes, no rales, no rhonci  Cardiovascular   Palpation of heart: Normal PMI, no thrills  Auscultation of heart: Normal rate and rhythm, normal S1 and S2, without murmurs  Examination of extremities for edema and/or varicosities: Normal     Carotid pulses: Normal     Abdomen   Abdomen: Non-tender, no masses  Liver and spleen: No hepatomegaly or splenomegaly  Lymphatic   Palpation of lymph nodes in neck: No lymphadenopathy  Musculoskeletal   Gait and station: Abnormal   Patient ambulating with a Rollator walker  Digits and nails: Normal without clubbing or cyanosis  Inspection/palpation of joints, bones, and muscles: Abnormal   +3 pitting edema both lower extremities  Skin   Skin and subcutaneous tissue: Abnormal   Anus stasis dermatitis with losing  Neurologic   Cranial nerves: Cranial nerves 2-12 intact  Reflexes: 2+ and symmetric  Sensation: No sensory loss  Psychiatric   Orientation to person, place and time: Normal     Mood and affect: Normal          Health Management  Health Maintenance   Medicare Annual Wellness Visit; every 1 year; Last 67MFO6585; Next Due: 95Ksc1970; Overdue    Future Appointments    Date/Time Provider Specialty Site   02/17/2017 10:30 AM BAHMAN Higgins , [facility] Hematology Oncology [facility] [place]   12/14/2016 10:40 AM BAHMAN Tian  Cardiology [facility] CARDIOLOG[facility] [place]   01/10/2017 10:00 AM BAHMAN Tian   Cardiology [facility] [place]   12/13/2016 03:00 PM Fuentes Aldana DO Family Medicine [facility]     Signatures   Electronically signed by : Gissel White, ; Dec  9 2016  9:40AM EST                       (Author)    Electronically signed by : Arsh Calloway DO; Dec 13 2016  4:01PM EST                       (Author)

## 2018-01-15 NOTE — MISCELLANEOUS
History of Present Illness    The patient is being contacted for follow-up after hospitalization  This was not a readmission  The date of discharge was   He has a moderate risk for readmission  Spoke with the patient  He was discharged home with home health services  Spoke to The name and phone number of the home health agency is [facility] Posey -[phone]  Discharge instructions were reviewed and medications were reviewed at time of discharge   doesn't have a good scale  Patient has follow-up appointment(s) on:   has seen PCP and to see Dr VENTURA [facility] next week  Patient is experiencing the following symptoms: edema and shortness of breath with moderate activity   chronic  Knowledge Assessment/Teachback Questions: the patient is following diet, foods to limit/avoid, the patient is obtaining daily weights, he knows the name of his medications/water pill, the patient understands the activity/exercise plan and the patient knows when to report symptoms  Counseling was provided to the patient  Topics counseled included diet, need for daily weights, medications, patient and family education, importance of compliance with treatment, symptoms to report and home health agency benefits  [facility] Additional Notes:   Needs a scale- will try to get him a digital at his cardiology appointment  Current Meds   1  Aspirin EC 81 MG Oral Tablet Delayed Release; TAKE 1 TABLET DAILY; Last   Rx:03Jan2017 Ordered   2  Atorvastatin Calcium 80 MG Oral Tablet; TAKE 1 TABLET DAILY; Therapy: (Recorded:10Mar2017) to Recorded   3  Cholecalciferol 2000 UNIT TABS; One daily; Therapy: (Recorded:10Mar2017) to Recorded   4  Colchicine 0 6 MG Oral Tablet; TAKE 2 TABLETS BY MOUTH FOR ONE DAY AND THEN   TAKE ONE  TABLET DAILY FOR 10 DAYS; Therapy: 38DHE0728 to (Evaluate:26Apr2017)  Requested for: 56FWA6760; Last   Rx:13Mar2017 Ordered   5  Eliquis 5 MG Oral Tablet;  Take 1 tablet twice daily  Requested for: 53KGD6624; Last   Rx:43Usa3195 Ordered   6  Ferrex 150 150 MG Oral Capsule; Take 1  tablet daily Recorded   7  Furosemide 80 MG Oral Tablet; TAKE 1 TABLET DAILY; Therapy: (Recorded:10Mar2017) to Recorded   8  Gabapentin 300 MG Oral Capsule; TAKE 1 CAPSULE AT BEDTIME Recorded   9  Levemir FlexTouch 100 UNIT/ML Subcutaneous Solution Pen-injector; INJECT 35 UNITS   AT BEDTIME Recorded   10  Metoprolol Tartrate 25 MG Oral Tablet; Take 1 tablet twice daily Recorded   11  Niferex-150 CAPS; One daily; Therapy: (Recorded:10Mar2017) to Recorded   12  NovoLOG FlexPen 100 UNIT/ML Subcutaneous Solution Pen-injector; Sliding scale 5-15    units TID depending on B/S Recorded   13  Tamsulosin HCl 0 4 MG CP24; TAKE 1 CAPSULE AT BEDTIME; Therapy: (Recorded:11Oct2016) to Recorded   14  Vitamin B-12 1000 MCG Oral Tablet; TAKE 1 TABLET DAILY AS DIRECTED; Therapy: 22GJJ0325 to (Evaluate:13Nov2017)  Requested for: 87SYE6686; Last    Rx:86Nlr8377 Ordered    Future Appointments    Date/Time Provider Specialty Site   08/25/2017 02:00 PM BAHMAN Luciano , [facility] Hematology Oncology [facility] [place]   03/21/2017 11:20 AM BAHMAN Goldsmith  Cardiology [facility] CARDIOLOG[facility] [place]   04/10/2017 01:20 PM BAHMAN Goldsmith  Cardiology [facility]ARDIOLOG[facility] [place]   04/13/2017 11:15 AM Bruno Nassar DO Family Medicine [facility]     Allergies    1  Adhesive Tape TAPE    2   Adhesive Tape    Signatures   Electronically signed by : Deena Gutierrez, ; Mar 15 2017  5:31PM EST                       (Author)

## 2018-01-15 NOTE — MISCELLANEOUS
Message  Dr Braden Reinoso received ov note from Dr Satya Casey from 8/4 ? when graft will be placed  Per Dr Braden Reinoso he will place 4-6 weeks prior to pt needing to start w/ dialysis  he req I call their office to find out when that will be  called and s/w Priscila, she verified w/ Dr Satya Casey pt is approx 4-6 mos out from hd, he has f/u ov there in 2 mos and Dr Satya Casey will call Dr Braden Reinoso after that ov  Dr Braden Reinoso notified of same via email  Active Problems    1  Acute idiopathic gout of right foot (274 01) (M10 071)   2  Acute on chronic combined systolic and diastolic congestive heart failure, NYHA class 2   (428 43,428 0) (I50 43)   3  Acute pain of left foot (729 5) (M79 672)   4  Advanced directives, counseling/discussion (V65 49) (Z71 89)   5  Anemia (285 9) (D64 9)   6  Arteriosclerotic coronary artery disease (414 00) (I25 10)   7  Arthralgia of right shoulder region (719 41) (M25 511)   8  Atrial fibrillation (427 31) (I48 91)   9  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)   10  Benign essential hypertension (401 1) (I10)   11  Benign hypertension with CKD (chronic kidney disease) stage III (403 10,585 3)    (I12 9,N18 3)   12  Bilateral leg edema (782 3) (R60 0)   13  Bilateral renal cysts (753 10) (N28 1)   14  Blind hypertensive eye, 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)   15  Cerebrovascular accident, 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)   16  Chronic combined systolic and diastolic CHF (congestive heart failure) (428 42,428 0)    (I50 42)   17  Chronic diastolic congestive heart failure (428 32,428 0) (I50 32)   18  Chronic kidney disease (585 9) (N18 9)   19  Chronic venous stasis dermatitis of both lower extremities (454 1) (I87 2)   20  CKD (chronic kidney disease), stage IV (585 4) (N18 4)   21  Coronary artery disease (414 00) (I25 10)   22  Diabetes mellitus type 2, insulin dependent (250 00,V58 67) (E11 9,Z79 4)   23  Diabetic retinopathy screening (V80 2) (Z13 5)   24   Diastolic CHF (080 76,908 1) (I50 30) 25  Disorder of ankle and foot joint (719 97) (M25 9)   26  BAER (dyspnea on exertion) (786 09) (R06 09)   27  Edema (782 3) (R60 9)   28  Encounter for prostate cancer screening (V76 44) (Z12 5)   29  Epistaxis (784 7) (R04 0)   30  Epistaxis, recurrent (784 7) (R04 0)   31  Exercise counseling (V65 41) (Z71 89)   32  Flu vaccine need (V04 81) (Z23)   33  Glaucoma screening (V80 1) (Z13 5)   34  Hiatal hernia (553 3) (K44 9)   35  Hydronephrosis of left kidney (591) (N13 30)   36  Hyperlipidemia (272 4) (E78 5)   37  Hypoxemia (799 02) (R09 02)   38  Ischemic cardiomyopathy (414 8) (I25 5)   39  Low blood potassium (276 8) (E87 6)   40  Myocardial infarction (410 90) (I21 3)   41  Need for pneumococcal vaccination (V03 82) (Z23)   42  Neuropathy (355 9) (G62 9)   43  No advance directives (V49 89) (Z78 9)   44  Poorly controlled type 2 diabetes mellitus (250 00) (E11 65)   45  Prostate cancer (185) (C61)   46  Pyelonephritis, acute (590 10) (N10)   47  S/P CABG x 3 (V45 81) (Z95 1)   48  Screening for depression (V79 0) (Z13 89)   49  Screening for diabetic peripheral neuropathy (V80 09) (Z13 89)   50  Screening for genitourinary condition (V81 6) (Z13 89)   51  Screening for neurological condition (V80 09) (Z13 89)   52  Severe obstructive sleep apnea-hypopnea syndrome (327 23) (G47 33)   53  Trochanteric bursitis of left hip (726 5) (M70 62)   54  Ulcers of both lower legs (707 10) (L97 919,L97 929)   55  Urinary retention (788 20) (R33 9)   56  Vitamin B 12 deficiency (266 2) (E53 8)   57  Vitamin D deficiency (268 9) (E55 9)    Current Meds   1  Aspirin EC 81 MG Oral Tablet Delayed Release; TAKE 1 TABLET DAILY; Last   Rx:03Jan2017 Ordered   2  Atorvastatin Calcium 80 MG Oral Tablet; take once daily until done scipt then start taking   simvastatin; Therapy: (Recorded:01Jun2017) to Recorded   3  Cholecalciferol 2000 UNIT TABS; One daily; Therapy: (Recorded:10Mar2017) to Recorded   4   Colchicine 0 6 MG Oral Tablet; TAKE 2 TABLETS BY MOUTH FOR ONE DAY AND THEN   TAKE ONE  TABLET DAILY FOR 10 DAYS; Therapy: 77SAS5977 to (Mai Cashdaniel)  Requested for: 13Apr2017; Last   Rx:13Apr2017 Ordered   5  Eliquis 5 MG Oral Tablet; Take 1 tablet twice daily  Requested for: 55OUE4270; Last   Rx:87Qkj8786 Ordered   6  Ferrex 150 150 MG Oral Capsule; Take 1  tablet daily Recorded   7  Furosemide 80 MG Oral Tablet; TAKE 1 TABLET 2 TIMES DAILY; Last Rx:23Mar2017   Ordered   8  Furosemide 80 MG Oral Tablet; take one tablet by mouth twice daily; Therapy: 27DLX4580 to [phone])  Requested for: 43Atd3625; Last   Rx:73Aus4423 Ordered   9  Gabapentin 300 MG Oral Capsule; TAKE 1 CAPSULE AT BEDTIME  Requested for:   58JII7638; Last Rx:01Jun2017 Ordered   10  Klor-Con 10 10 MEQ Oral Tablet Extended Release; TAKE 1 TABLET DAILY; Therapy: 99PWX3251 to (Janne Sacks)  Requested for: 01Aug2017; Last    Rx:01Aug2017 Ordered   11  Levemir FlexTouch 100 UNIT/ML Subcutaneous Solution Pen-injector; inject 40 units at    bedtime; Therapy: (Recorded:01Jun2017) to Recorded   12  MetOLazone 5 MG Oral Tablet; Take one tab as directed by MD  Requested for:    43PTP6523; Last YJ:18HIP6839 Ordered   13  Metoprolol Tartrate 25 MG Oral Tablet; Take 1 tablet twice daily Recorded   14  Niferex-150 CAPS; One daily; Therapy: (Recorded:10Mar2017) to Recorded   15  NovoLOG FlexPen 100 UNIT/ML Subcutaneous Solution Pen-injector; Sliding scale 5-15    units TID depending on B/S Recorded   16  Simvastatin 40 MG Oral Tablet; take 0 5 tablet bedtime; Therapy: 69CYJ1024 to (Evaluate:87Ypz5516); Last Rx:01Jun2017 Ordered   17  Tamsulosin HCl 0 4 MG CP24; TAKE 1 CAPSULE AT BEDTIME; Therapy: (Recorded:11Oct2016) to Recorded   18  Vitamin B-12 1000 MCG Oral Tablet; TAKE 1 TABLET DAILY AS DIRECTED; Therapy: 36ZBW1768 to (Evaluate:13Nov2017)  Requested for: 99NJX0090; Last    Rx:18Nov2016 Ordered    Allergies    1  Adhesive Tape TAPE    2  Adhesive Tape    Signatures   Electronically signed by : Berenice Rao, ; Aug  7 2017  2:52PM EST                       (Author)

## 2018-01-15 NOTE — PROCEDURES
Procedures by Marcela Higgins MD at 7/26/2016 10:51 PM      Author:  Marcela Higgins MD Service:  Critical Care/ICU Author Type:  Physician     Filed:  7/26/2016 10:54 PM Date of Service:  7/26/2016 10:51 PM Status:  Signed     :  Marcela Higgins MD (Physician)         Procedure Orders:       1  Insert arterial line [[id number]] ordered by Marcela Higgins MD at 07/26/16 2251                 Post-procedure Diagnoses:       1  Sepsis [A41 9]                   Arterial Line Insertion  Date/Time: 7/26/2016 10:51 PM  Performed by: Wilfrid Baugh by: Сергей Akins     Patient location:  Bedside  Other Assisting Provider:  No    Consent:     Consent obtained:  Verbal    Consent given by:  Patient    Risks discussed:  Pain, repeat procedure and infection  Universal protocol:     Patient identity confirmed:  Verbally with patient and arm band  Indications:     Indications: multiple ABGs and continuous blood pressure monitoring    Pre-procedure details:     Skin preparation:  Chlorhexidine    Preparation: Patient was prepped and draped in sterile fashion    Procedure details:     Location / Tip of Catheter:  Radial    Laterality:  Right    Needle gauge:  20 G    Placement technique:  Percutaneous    Number of attempts:  2    Transducer: waveform confirmed    Post-procedure details:     Post-procedure:  Biopatch applied, secured with tape, sutured, sterile dressing applied and wrist guard applied    CMS:  Normal    Patient tolerance of procedure:   Tolerated well, no immediate complications                     Received for:Provider  EPIC   Jul 26 2016 10:52PM [facility] Standard Time

## 2018-01-16 ENCOUNTER — GENERIC CONVERSION - ENCOUNTER (OUTPATIENT)
Dept: OTHER | Facility: OTHER | Age: 78
End: 2018-01-16

## 2018-01-16 NOTE — RESULT NOTES
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

## 2018-01-16 NOTE — MISCELLANEOUS
History of Present Illness    The patient is being contacted for follow-up after hospitalization  This was not a readmission  The date of discharge was 12/7/16  He has a moderate risk for readmission  Spoke with the patient  He was discharged to home  states no one offered him [facility]  Discharge instructions were reviewed and medications were reviewed at time of discharge   has not started weighing himself- he does have a scale  Patient has follow-up appointment(s) on:   to see Dr Ashkan Hauser tomorrow and Dr Olu Bryan Wednesday 12/14  Patient is experiencing the following symptoms: edema and shortness of breath with usual activity   much better though- no acute symptoms  Knowledge Assessment/Teachback Questions: the patient is following diet, foods to limit/avoid, he knows the name of his medications/water pill and the patient understands the activity/exercise plan, but the patient is not obtaining daily weights   Does his own medication  Counseling was provided to the patient  Topics counseled included diet, need for daily weights, medications, patient and family education, importance of compliance with treatment, symptoms to report and home health agency benefits  Current Meds   1  Amiodarone HCl - 200 MG Oral Tablet; TAKE 1 TABLET DAILY  Requested for: [id number];   Last Rx:11Oct2016 Ordered   2  Aspirin  MG Oral Tablet Delayed Release; One daily; Therapy: (Recorded:01Cas2003) to Recorded   3  Flomax 0 4 MG Oral Capsule (Tamsulosin HCl); Therapy: (783 0285) to Recorded   4  Furosemide 80 MG Oral Tablet; TAKE 1 TABLET DAILY; Therapy: (Recorded:55Zuw1626) to Recorded   5  Levemir FlexTouch 100 UNIT/ML Subcutaneous Solution Pen-injector; inject 30 units at   bedtime; Therapy: (Recorded:84Wkh5384) to Recorded   6  MethylPREDNISolone Acetate 80 MG/ML Injection Suspension (DEPO-Medrol); INJECT 1    ML Intramuscular;  To Be Done: [id number]; Status: HOLD FOR   - Administration Ordered 7  Metoprolol Tartrate 50 MG Oral Tablet; take 1/2 tablet twice daily Recorded   8  Mupirocin 2 % External Ointment; APPLY A SMALL AMOUNT 3 TIMES DAILY AS   DIRECTED; Therapy: 24HQT5539 to (Last Rx:31Oct2016)  Requested for: 31Oct2016 Ordered   9  Niferex-150 CAPS; One daily; Therapy: (Recorded:98Oth2339) to Recorded   10  NovoLOG FlexPen 100 UNIT/ML Subcutaneous Solution Pen-injector; Sliding scale 5-15    units TID depending on B/S Recorded   11  Simvastatin 20 MG Oral Tablet; TAKE 1 TABLET DAILY; Last Rx:12Oct2016 Ordered   12  Tamsulosin HCl 0 4 MG CP24; TAKE 1 CAPSULE AT BEDTIME; Therapy: (Recorded:11Oct2016) to Recorded   13  Vitamin B-12 1000 MCG Oral Tablet; TAKE 1 TABLET DAILY AS DIRECTED; Therapy: 23BIA9644 to (Evaluate:13Nov2017)  Requested for: 94RUJ7570; Last    Rx:65Olh0495 Ordered    Future Appointments    Date/Time Provider Specialty Site   02/17/2017 10:30 AM BAHMAN King , [facility] Hematology Oncology [facility] [place]   12/14/2016 10:40 AM BAHMAN Haynes  Cardiology [facility] CARDIOLOGY [place]   01/10/2017 10:00 AM BAHMAN Haynes  Cardiology [facility] CARDIOLOGY [place]   12/13/2016 03:00 PM Yessy Marcano DO Family Medicine [facility] FAMILY PRACTICE     Allergies    1  No Known Drug Allergies    2   Adhesive Tape    Signatures   Electronically signed by : Janey Castañeda, ; Dec 12 2016  4:25PM EST                       (Author)

## 2018-01-16 NOTE — PROCEDURES
Procedures by William Montoya MD at 7/21/2016 12:26 PM      Author:  William Montoya MD Service:  Critical Care/ICU Author Type:  Physician     Filed:  7/21/2016 12:34 PM Date of Service:  7/21/2016 12:26 PM Status:  Signed     :  William Montoya MD (Physician)         Procedure Orders:       1  Bronchoscopy [[id number]] ordered by William Montoya MD at 07/21/16 1226                 Post-procedure Diagnoses:       1  Acute respiratory failure with hypoxia [J96 01]                   Bronchoscopy  Date/Time: 7/21/2016 12:26 PM  Performed by: Blaze Roa  Authorized by: Blaze Roa     Patient location:  Bedside  Other Assisting Provider:  Yes (comment) (Glena Galeazzi PA-C)    Consent:     Consent obtained:  Verbal    Consent given by:  Spouse    Risks discussed:  Bleeding, infection and pneumothorax    Alternatives discussed:  No treatment  Universal protocol:     Procedure explained and questions answered to patient or proxy's satisfaction: yes      Relevant documents present and verified: yes      Imaging studies available: yes      Immediately prior  to procedure a time out was called: yes      Patient identity confirmed:  Hospital-assigned identification number and arm band  Indications:     Procedure Purpose: diagnostic      Indications: pneumonia/infiltrate    Sedation:     Sedation type: Moderate (conscious) sedation  Anesthesia (see MAR for exact dosages): Anesthesia method:  Topical application and local infiltration    Local anesthetic:  Lidocaine 1% w/o epi  Airway:     Airway:  Trachea nl, Yolis sharp, Nl L main bronchus, PRADIP and LLL bronchi and bronchioles without any secretions  R Main, UL, ML and LL all free of secretions  Mild erythema noted along R main bronchus (Suction Catheter)    No BAL performed due to lack of secretions  Post-procedure details:     Chest x-ray performed: yes      Patient tolerance of procedure:   Tolerated well, no immediate complications  Final Diagnosis/Findings:      Nl Tracheobronchial Tree, No evidence of PNA                     Received for:Provider  EPIC   Jul 21 2016 12:33PM [facility] Standard Time

## 2018-01-17 NOTE — MISCELLANEOUS
Message  Call from Viry Vidales, [address] [facility]  Patient has +3 pitting edema of legs with opened blisters and his weight has gone up from 288#'s to 295#'s in the past week  He is taking Lasix 80 mgm bid  D/W Dr Poppy Hughes and he has ordered pt to take Metolazone 5 mgm either today or tomorrow  Spoke to pt  He does not have any on hand but does have refills on Rx and someone can pick it up @ pharmacy for him  Advised him to only take as Dr Poppy Hughes orders for him  Also instructed him to take 1/2 hour before dose of Lasix  Verbalizes understanding of instructions given  Active Problems    1  Acute idiopathic gout of right foot (274 01) (M10 071)   2  Acute on chronic combined systolic and diastolic congestive heart failure, NYHA class 2   (428 43,428 0) (I50 43)   3  Acute pain of left foot (729 5) (M79 672)   4  Acute urinary retention (788 29) (R33 8)   5  Advanced directives, counseling/discussion (V65 49) (Z71 89)   6  Anemia (285 9) (D64 9)   7  Arteriosclerotic coronary artery disease (414 00) (I25 10)   8  Arthralgia of right shoulder region (719 41) (M25 511)   9  Atrial fibrillation (427 31) (I48 91)   10  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)   11  Benign essential hypertension (401 1) (I10)   12  Benign hypertension with CKD (chronic kidney disease) stage III (403 10,585 3)    (I12 9,N18 3)   13  Bilateral leg edema (782 3) (R60 0)   14  Bilateral renal cysts (753 10) (N28 1)   15  Blind hypertensive eye, 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)   16  Cellulitis (682 9) (L03 90)   17  Cellulitis of calf (682 6) (L03 119)   18  Cerebrovascular accident, 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)   19  Chronic combined systolic and diastolic CHF (congestive heart failure) (428 42,428 0)    (I50 42)   20  Chronic diastolic congestive heart failure (428 32,428 0) (I50 32)   21  Chronic kidney disease (585 9) (N18 9)   22  Chronic venous stasis dermatitis of both lower extremities (454 1) (I87 2)   23   Coronary artery disease (414 00) (I25 10)   24  Diabetes mellitus type 2, insulin dependent (250 00,V58 67) (E11 9,Z79 4)   25  Diabetic retinopathy screening (V80 2) (Z13 5)   26  Diarrhea (787 91) (R19 7)   27  Diastolic CHF (228 58,637 6) (I50 30)   28  Disorder of ankle and foot joint (719 97) (M25 9)   29  BAER (dyspnea on exertion) (786 09) (R06 09)   30  Edema (782 3) (R60 9)   31  Encounter for prostate cancer screening (V76 44) (Z12 5)   32  Exercise counseling (V65 41) (Z71 89)   33  Flu vaccine need (V04 81) (Z23)   34  Glaucoma screening (V80 1) (Z13 5)   35  Gross hematuria (599 71) (R31 0)   36  Hiatal hernia (553 3) (K44 9)   37  Hydronephrosis of left kidney (591) (N13 30)   38  Hyperlipidemia (272 4) (E78 5)   39  Hypoxemia (799 02) (R09 02)   40  Ischemic cardiomyopathy (414 8) (I25 5)   41  Myocardial infarction (410 90) (I21 3)   42  Need for pneumococcal vaccination (V03 82) (Z23)   43  Neuropathy (355 9) (G62 9)   44  No advance directives (V49 89) (Z78 9)   45  Poorly controlled type 2 diabetes mellitus (250 00) (E11 65)   46  Pre-operative cardiovascular examination (V72 81) (Z01 810)   47  Prostate cancer (185) (C61)   48  Pyelonephritis, acute (590 10) (N10)   49  S/P CABG x 3 (V45 81) (Z95 1)   50  Screening for depression (V79 0) (Z13 89)   51  Screening for diabetic peripheral neuropathy (V80 09) (Z13 89)   52  Screening for genitourinary condition (V81 6) (Z13 89)   53  Screening for neurological condition (V80 09) (Z13 89)   54  Severe obstructive sleep apnea-hypopnea syndrome (327 23) (G47 33)   55  Trochanteric bursitis of left hip (726 5) (M70 62)   56  Ulcers of both lower legs (707 10) (L97 919,L97 929)   57  Urinary retention (788 20) (R33 9)   58  UTI (urinary tract infection) (599 0) (N39 0)   59  Vitamin B 12 deficiency (266 2) (E53 8)   60  Vitamin D deficiency (268 9) (E55 9)    Current Meds   1  Aspirin EC 81 MG Oral Tablet Delayed Release; TAKE 1 TABLET DAILY; Last   Rx:03Jan2017 Ordered   2   Atorvastatin Calcium 80 MG Oral Tablet; TAKE 1 TABLET DAILY; Therapy: (Recorded:10Mar2017) to Recorded   3  Cholecalciferol 2000 UNIT TABS; One daily; Therapy: (Recorded:10Mar2017) to Recorded   4  Colchicine 0 6 MG Oral Tablet; TAKE 2 TABLETS BY MOUTH FOR ONE DAY AND THEN   TAKE ONE  TABLET DAILY FOR 10 DAYS; Therapy: 08QMJ9284 to (Juan Luis Chang)  Requested for: 13Apr2017; Last   Rx:13Apr2017 Ordered   5  Eliquis 5 MG Oral Tablet; Take 1 tablet twice daily  Requested for: 12XNW4087; Last   Rx:00Prp1892 Ordered   6  Ferrex 150 150 MG Oral Capsule; Take 1  tablet daily Recorded   7  Furosemide 80 MG Oral Tablet; TAKE 1 TABLET 2 TIMES DAILY; Last Rx:23Mar2017   Ordered   8  Gabapentin 300 MG Oral Capsule; TAKE 1 CAPSULE AT BEDTIME Recorded   9  Levemir FlexTouch 100 UNIT/ML Subcutaneous Solution Pen-injector; INJECT 35   UNITS AT BEDTIME Recorded   10  Metoprolol Tartrate 25 MG Oral Tablet; Take 1 tablet twice daily Recorded   11  Niferex-150 CAPS; One daily; Therapy: (Recorded:10Mar2017) to Recorded   12  NovoLOG FlexPen 100 UNIT/ML Subcutaneous Solution Pen-injector; Sliding scale 5-15    units TID depending on B/S Recorded   13  PredniSONE 5 MG Oral Tablet; 2 tablets daily for 2 days, then one tablet daily; Therapy: 53MTQ8989 to (Last Rx:28Mar2017)  Requested for: 28Mar2017 Ordered   14  Tamsulosin HCl 0 4 MG CP24; TAKE 1 CAPSULE AT BEDTIME; Therapy: (Recorded:11Oct2016) to Recorded   15  Vitamin B-12 1000 MCG Oral Tablet; TAKE 1 TABLET DAILY AS DIRECTED; Therapy: 45VZN7444 to (Evaluate:13Nov2017)  Requested for: 61TWY9392; Last    Rx:18Nov2016 Ordered    Allergies    1  Adhesive Tape TAPE    2   Adhesive Tape    Signatures   Electronically signed by : Paula Ramos RN; May  1 2017  1:42PM EST                       (Author)

## 2018-01-22 ENCOUNTER — APPOINTMENT (OUTPATIENT)
Dept: WOUND CARE | Facility: HOSPITAL | Age: 78
End: 2018-01-22
Payer: MEDICARE

## 2018-01-22 VITALS
SYSTOLIC BLOOD PRESSURE: 98 MMHG | WEIGHT: 311 LBS | HEIGHT: 67 IN | BODY MASS INDEX: 48.81 KG/M2 | OXYGEN SATURATION: 95 % | DIASTOLIC BLOOD PRESSURE: 60 MMHG

## 2018-01-22 VITALS — BODY MASS INDEX: 49.44 KG/M2 | HEIGHT: 67 IN | WEIGHT: 315 LBS

## 2018-01-22 VITALS
DIASTOLIC BLOOD PRESSURE: 76 MMHG | RESPIRATION RATE: 20 BRPM | WEIGHT: 315 LBS | BODY MASS INDEX: 49.44 KG/M2 | HEART RATE: 72 BPM | SYSTOLIC BLOOD PRESSURE: 128 MMHG | TEMPERATURE: 97.2 F | HEIGHT: 67 IN | OXYGEN SATURATION: 92 %

## 2018-01-22 VITALS
DIASTOLIC BLOOD PRESSURE: 58 MMHG | OXYGEN SATURATION: 95 % | HEART RATE: 71 BPM | BODY MASS INDEX: 47.87 KG/M2 | WEIGHT: 305 LBS | SYSTOLIC BLOOD PRESSURE: 114 MMHG | HEIGHT: 67 IN

## 2018-01-22 VITALS — HEART RATE: 78 BPM | DIASTOLIC BLOOD PRESSURE: 72 MMHG | SYSTOLIC BLOOD PRESSURE: 139 MMHG

## 2018-01-22 VITALS — DIASTOLIC BLOOD PRESSURE: 68 MMHG | SYSTOLIC BLOOD PRESSURE: 122 MMHG

## 2018-01-22 VITALS
WEIGHT: 315 LBS | SYSTOLIC BLOOD PRESSURE: 122 MMHG | HEART RATE: 61 BPM | HEIGHT: 67 IN | BODY MASS INDEX: 49.44 KG/M2 | DIASTOLIC BLOOD PRESSURE: 60 MMHG | OXYGEN SATURATION: 88 %

## 2018-01-22 PROCEDURE — 97597 DBRDMT OPN WND 1ST 20 CM/<: CPT | Performed by: REGISTERED NURSE

## 2018-01-23 VITALS
HEIGHT: 67 IN | OXYGEN SATURATION: 92 % | DIASTOLIC BLOOD PRESSURE: 70 MMHG | SYSTOLIC BLOOD PRESSURE: 124 MMHG | WEIGHT: 315 LBS | BODY MASS INDEX: 49.44 KG/M2

## 2018-01-23 NOTE — RESULT NOTES
Verified Results  (1) IRON [id number] 06:08PM [facility] [employer]     Test Name Result Flag Reference   IRON 64 ug/dL L    Patients treated with metal-binding drugs (ie  Deferoxamine) may have depressed iron values  (1) CBC/PLT/DIFF [id number] 06:08PM Bella [employer]     Test Name Result Flag Reference   WBC COUNT 9 85 Thousand/uL  4 31-10 16   RBC COUNT 4 45 Million/uL  3 88-5 62   HEMOGLOBIN 13 3 g/dL  12 0-17 0   HEMATOCRIT 42 2 %  36 5-49 3   MCV 95 fL  82-98   MCH 29 9 pg  26 8-34 3   MCHC 31 5 g/dL  31 4-37 4   RDW 17 1 % H 11 6-15 1   MPV 11 0 fL  8 9-12 7   PLATELET COUNT 301 Thousands/uL  149-390   nRBC AUTOMATED 0 /100 WBCs     This is an appended report  These results have been appended to a previously preliminary verified report  This is a patient instruction: This test is non-fasting  Please drink two glasses of water morning of bloodwork  This is an appended report  These results have been appended to a previously verified report

## 2018-01-23 NOTE — RESULT NOTES
Verified Results  (1) CBC/PLT/DIFF 41BLV9721 12:50PM Kathren Lower     Test Name Result Flag Reference   WBC COUNT 11 37 Thousand/uL H 4 31-10 16   RBC COUNT 4 41 Million/uL  3 88-5 62   HEMOGLOBIN 13 7 g/dL  12 0-17 0   HEMATOCRIT 42 7 %  36 5-49 3   MCV 97 fL  82-98   MCH 31 1 pg  26 8-34 3   MCHC 32 1 g/dL  31 4-37 4   RDW 18 2 % H 11 6-15 1   MPV 10 5 fL  8 9-12 7   PLATELET COUNT 617 Thousands/uL  149-390   This is a patient instruction: This test is non-fasting  Please drink two glasses of water morning of bloodwork  This is an appended report  These results have been appended to a previously verified report  (1) CBC/PLT/DIFF 23IXC0427 12:50PM Kathren Lower     Test Name Result Flag Reference   NEUTROPHILS - REL 88 % H 43-75   LYMPHOCYTES - REL 7 % L 14-44   MONOCYTES - REL 5 %  4-12   EOSINOPHILS - REL 0 %  0-6   BASOPHILS - REL 0 %  0-1   NEUTROPHILS ABS 10 01 Thousand/uL H 1 85-7 62   LYMPHOTCYTES ABS 0 80 Thousand/uL  0 60-4 47   MONOCYTES ABS 0 57 Thousand/uL  0 00-1 22   EOSINOPHILS ABS 0 00 Thousand/uL  0 00-0 40   BASOPHILS ABS 0 00 Thousand/uL  0 00-0 10   TOTAL COUNTED 100     nRBC MANUAL 2 /100 WBC  0-2   Anisocytosis Present     Polychromasia Present     PLT ESTIMATE Adequate  Adequate   Large Platelets Present     This is a patient instruction: This test is non-fasting  Please drink two glasses of water morning of bloodwork  (1) COMPREHENSIVE METABOLIC PANEL 12ZCF0124 63:29OU Kathren Lower     Test Name Result Flag Reference   GLUCOSE,RANDM 309 mg/dL H    If the patient is fasting, the ADA then defines impaired fasting glucose as > 100 mg/dL and diabetes as > or equal to 123 mg/dL  Specimen collection should occur prior to Sulfasalazine administration due to the potential for falsely depressed results  Specimen collection should occur prior to Sulfapyridine administration due to the potential for falsely elevated results     SODIUM 139 mmol/L  136-145   POTASSIUM 4 7 mmol/L 3 5-5 3   CHLORIDE 97 mmol/L L 100-108   CARBON DIOXIDE 35 mmol/L H 21-32   ANION GAP (CALC) 7 mmol/L  4-13   BLOOD UREA NITROGEN 60 mg/dL H 5-25   CREATININE 2 39 mg/dL H 0 60-1 30   Standardized to IDMS reference method   CALCIUM 8 0 mg/dL L 8 3-10 1   BILI, TOTAL 0 30 mg/dL  0 20-1 00   ALK PHOSPHATAS 39 U/L L    ALT (SGPT) 55 U/L  12-78   Specimen collection should occur prior to Sulfasalazine administration due to the potential for falsely depressed results  AST(SGOT) 35 U/L  5-45   Specimen collection should occur prior to Sulfasalazine administration due to the potential for falsely depressed results  ALBUMIN 3 0 g/dL L 3 5-5 0   TOTAL PROTEIN 5 6 g/dL L 6 4-8 2   eGFR 25 ml/min/1 73sq m     This is a patient instruction: Patient fasting for 8 hours or longer recommended  National Kidney Disease Education Program recommendations are as follows:  GFR calculation is accurate only with a steady state creatinine  Chronic Kidney disease less than 60 ml/min/1 73 sq  meters  Kidney failure less than 15 ml/min/1 73 sq  meters  (1) PSA, DIAGNOSTIC (FOLLOW-UP) [id number] 12:50PM [employer]     Test Name Result Flag Reference   PSA 0 4 ng/mL  0 0-4 0   American Urological Association Guidelines define biochemical recurrence of prostate cancer as a detectable or rising PSA value post-radical prostatectomy that is greater than or equal to 0 2 ng/mL with a second confirmatory level of greater than or equal to 0 2 ng/mL  (1) VITAMIN B12 45TYF7333 12:50PM [employer]     Test Name Result Flag Reference   VITAMIN B12 909 pg/mL H 100-900     (1) IRON PANEL 62FHA6497 12:50PM [employer]     Test Name Result Flag Reference   IRON 71 ug/dL     Patients treated with metal-binding drugs (ie  Deferoxamine) may have depressed iron values     FERRITIN 109 ng/mL  8-388   TOTAL IRON BINDING CAPACITY 358 ug/dL  250-450   IRON SATURATION 20 %         Plan  Diabetes mellitus type 2, insulin dependent    · Levemir FlexTouch 100 UNIT/ML Subcutaneous Solution Pen-injector;  Inject 55  units at bedtime

## 2018-01-23 NOTE — RESULT NOTES
Verified Results  VAS LOWER LIMB ARTERIAL DUPLEX, LIMITED UNILATERAL/GRAFT [id number] 10:49AM Destini Schneider    Order Number: [id number]    - Patient Instructions: To schedule this appointment, please contact Central Scheduling at [phone]  Test Name Result Flag Reference   VAS LOWER LIMB ARTERIAL DUPLEX, LIMITED UNILATERAL/GRAFT (Report)     [facility] REPORT   CLINICAL:   Indications: PVD, Unspecified [I73 9]  Operative History   10/31/2017 Bilateral Temporal artery biopsy   CABG   Cardiac Cath   Cardiac stents         FINDINGS:      Left          PSV EDV    Common Femoral Artery  60  0    Prox Profunda      36  12    Dist  Profunda     49  0    Prox SFA        113  0    Mid SFA         81  0    Dist SFA        95  0    Proximal Pop      44  0    Distal Pop       70  0    Prox Post Tibial    13  0    Dist Post Tibial    80  0             CONCLUSION:   Impression:   Right Lower Limb      Ankle Pressure: 165 mm Hg , KARTIK: 1 33  Right Metatarsal Pressure: 157 mm Hg  Right Great Toe Pressure 120 mm Hg ,      Left Lower Limb   There is no evidence of a high grade stenosis  Limited evaluation of the calf arteries due to massive edema  Posterior tibial artery proven to be patent with color and doppler  Ankle Pressure 211 mm Hg , KARTIK: 1 7 artificially elevated due to calcified   vessels  Left Metatarsal Pressure: 117 mm Hg  Left Great Toe Pressure 94 mm Hg , above   healing threshold for a diabetic        SIGNATURE:   Electronically Signed by: Rohini Cruz MD on 2017-12-21 06:25:39 PM

## 2018-01-23 NOTE — RESULT NOTES
Verified Results  (1) PSA, DIAGNOSTIC (FOLLOW-UP) [id number] 06:08PM Elizabeth Todd     Test Name Result Flag Reference   PSA 0 5 ng/mL  0 0-4 0   American Urological Association Guidelines define biochemical recurrence of prostate cancer as a detectable or rising PSA value post-radical prostatectomy that is greater than or equal to 0 2 ng/mL with a second confirmatory level of greater than or equal to 0 2 ng/mL  (1) VITAMIN B12 [id number] 06:08PM Elizabeth Pickezra     Test Name Result Flag Reference   VITAMIN B12 648 pg/mL  100-900     (1) CBC/PLT/DIFF [id number] 06:08PM Elizabeth Pickezra     Test Name Result Flag Reference   NEUTROPHILS - REL 90 % H 43-75   BANDS - REL 1 %  0-8   LYMPHOCYTES - REL 4 % L 14-44   MONOCYTES - REL 4 %  4-12   EOSINOPHILS - REL 0 %  0-6   BASOPHILS - REL 0 %  0-1   METAMYELOCYTES 1 %  0-1   NEUTROPHILS ABS 8 96 Thousand/uL H 1 85-7 62   LYMPHOTCYTES ABS 0 39 Thousand/uL L 0 60-4 47   MONOCYTES ABS 0 39 Thousand/uL  0 00-1 22   EOSINOPHILS ABS 0 00 Thousand/uL  0 00-0 40   BASOPHILS ABS 0 00 Thousand/uL  0 00-0 10   TOTAL COUNTED      RBC MORPHOLOGY Present     Anisocytosis Present     PLT ESTIMATE Adequate  Adequate   This is a patient instruction: This test is non-fasting  Please drink two glasses of water morning of bloodwork  (1) COMPREHENSIVE METABOLIC PANEL 22IJF6541 12:32LN Elizabeth Pickezra     Test Name Result Flag Reference   SODIUM 137 mmol/L  136-145   POTASSIUM 4 3 mmol/L  3 5-5 3   CHLORIDE 96 mmol/L L 100-108   CARBON DIOXIDE 33 mmol/L H 21-32   ANION GAP (CALC) 8 mmol/L  4-13   BLOOD UREA NITROGEN 59 mg/dL H 5-25   CREATININE 2 54 mg/dL H 0 60-1 30   Standardized to IDMS reference method   CALCIUM 8 4 mg/dL  8 3-10 1   BILI, TOTAL 0 32 mg/dL  0 20-1 00   ALK PHOSPHATAS 36 U/L L    ALT (SGPT) 46 U/L  12-78   Specimen collection should occur prior to Sulfasalazine and/or Sulfapyridine administration due to the potential for falsely depressed results     AST(SGOT) 25 U/L 5-45   Specimen collection should occur prior to Sulfasalazine administration due to the potential for falsely depressed results  ALBUMIN 2 6 g/dL L 3 5-5 0   TOTAL PROTEIN 5 4 g/dL L 6 4-8 2   eGFR 23 ml/min/1 73sq m     This is a patient instruction: Patient fasting for 8 hours or longer recommended  National Kidney Disease Education Program recommendations are as follows:  GFR calculation is accurate only with a steady state creatinine  Chronic Kidney disease less than 60 ml/min/1 73 sq  meters  Kidney failure less than 15 ml/min/1 73 sq  meters  GLUCOSE FASTING 372 mg/dL H 65-99   Specimen collection should occur prior to Sulfasalazine administration due to the potential for falsely depressed results  Specimen collection should occur prior to Sulfapyridine administration due to the potential for falsely elevated results

## 2018-01-30 ENCOUNTER — TELEPHONE (OUTPATIENT)
Dept: FAMILY MEDICINE CLINIC | Facility: CLINIC | Age: 78
End: 2018-01-30

## 2018-01-30 NOTE — TELEPHONE ENCOUNTER
Am                Lunch              Supper   Jan 20     73                     284                  468  Jan 21     109                   267                  313   Jan 22     104                   183                  432  Jan 23     115                   214                  306  Jan 24     120                   435                  344  Jan 25     105                   267                  229  Jan 26     133                   174                  183  Jan 27     175                   286                  213  Jan 28     174                   260                  338  Jan 29     152                   309                  537  Jan 30     505

## 2018-02-01 DIAGNOSIS — Z00.00 HEALTH CARE MAINTENANCE: Primary | ICD-10-CM

## 2018-02-01 RX ORDER — PREDNISONE 10 MG/1
30 TABLET ORAL DAILY
Qty: 100 TABLET | Refills: 0 | Status: SHIPPED | OUTPATIENT
Start: 2018-02-01 | End: 2018-02-21

## 2018-02-09 ENCOUNTER — TELEPHONE (OUTPATIENT)
Dept: FAMILY MEDICINE CLINIC | Facility: CLINIC | Age: 78
End: 2018-02-09

## 2018-02-09 NOTE — TELEPHONE ENCOUNTER
Patient has increased edema, not sure if it is from the prednisone, and his blood sugars are elevated and wife will be calling with numbers

## 2018-02-09 NOTE — TELEPHONE ENCOUNTER
Please instruct patient to decrease his prednisone by another 10 units I agree that the prednisone is causing problems

## 2018-02-14 ENCOUNTER — TRANSCRIBE ORDERS (OUTPATIENT)
Dept: LAB | Facility: CLINIC | Age: 78
End: 2018-02-14

## 2018-02-14 ENCOUNTER — APPOINTMENT (OUTPATIENT)
Dept: LAB | Facility: CLINIC | Age: 78
End: 2018-02-14
Payer: MEDICARE

## 2018-02-14 DIAGNOSIS — E11.22 TYPE 2 DIABETES MELLITUS WITH ESRD (END-STAGE RENAL DISEASE) (HCC): Primary | ICD-10-CM

## 2018-02-14 DIAGNOSIS — N18.6 TYPE 2 DIABETES MELLITUS WITH ESRD (END-STAGE RENAL DISEASE) (HCC): ICD-10-CM

## 2018-02-14 DIAGNOSIS — C61 MALIGNANT NEOPLASM OF PROSTATE (HCC): ICD-10-CM

## 2018-02-14 DIAGNOSIS — N18.4 CHRONIC KIDNEY DISEASE, STAGE IV (SEVERE) (HCC): ICD-10-CM

## 2018-02-14 DIAGNOSIS — E11.22 TYPE 2 DIABETES MELLITUS WITH ESRD (END-STAGE RENAL DISEASE) (HCC): ICD-10-CM

## 2018-02-14 DIAGNOSIS — N18.6 TYPE 2 DIABETES MELLITUS WITH ESRD (END-STAGE RENAL DISEASE) (HCC): Primary | ICD-10-CM

## 2018-02-14 LAB
ALBUMIN SERPL BCP-MCNC: 3 G/DL (ref 3.5–5)
ALP SERPL-CCNC: 47 U/L (ref 46–116)
ALT SERPL W P-5'-P-CCNC: 43 U/L (ref 12–78)
ANION GAP SERPL CALCULATED.3IONS-SCNC: 9 MMOL/L (ref 4–13)
ANISOCYTOSIS BLD QL SMEAR: PRESENT
AST SERPL W P-5'-P-CCNC: 23 U/L (ref 5–45)
BASOPHILS # BLD MANUAL: 0 THOUSAND/UL (ref 0–0.1)
BASOPHILS NFR MAR MANUAL: 0 % (ref 0–1)
BILIRUB SERPL-MCNC: 0.41 MG/DL (ref 0.2–1)
BUN SERPL-MCNC: 55 MG/DL (ref 5–25)
CALCIUM SERPL-MCNC: 8.4 MG/DL (ref 8.3–10.1)
CHLORIDE SERPL-SCNC: 94 MMOL/L (ref 100–108)
CO2 SERPL-SCNC: 33 MMOL/L (ref 21–32)
CREAT SERPL-MCNC: 2.68 MG/DL (ref 0.6–1.3)
EOSINOPHIL # BLD MANUAL: 0.19 THOUSAND/UL (ref 0–0.4)
EOSINOPHIL NFR BLD MANUAL: 2 % (ref 0–6)
ERYTHROCYTE [DISTWIDTH] IN BLOOD BY AUTOMATED COUNT: 17.1 % (ref 11.6–15.1)
FERRITIN SERPL-MCNC: 70 NG/ML (ref 8–388)
GFR SERPL CREATININE-BSD FRML MDRD: 21 ML/MIN/1.73SQ M
GLUCOSE SERPL-MCNC: 259 MG/DL (ref 65–140)
HCT VFR BLD AUTO: 40.5 % (ref 36.5–49.3)
HGB BLD-MCNC: 13.3 G/DL (ref 12–17)
IRON SATN MFR SERPL: 17 %
IRON SERPL-MCNC: 58 UG/DL (ref 65–175)
LYMPHOCYTES # BLD AUTO: 0.48 THOUSAND/UL (ref 0.6–4.47)
LYMPHOCYTES # BLD AUTO: 5 % (ref 14–44)
MCH RBC QN AUTO: 31.7 PG (ref 26.8–34.3)
MCHC RBC AUTO-ENTMCNC: 32.8 G/DL (ref 31.4–37.4)
MCV RBC AUTO: 97 FL (ref 82–98)
MONOCYTES # BLD AUTO: 0.48 THOUSAND/UL (ref 0–1.22)
MONOCYTES NFR BLD: 5 % (ref 4–12)
NEUTROPHILS # BLD MANUAL: 8.4 THOUSAND/UL (ref 1.85–7.62)
NEUTS BAND NFR BLD MANUAL: 1 % (ref 0–8)
NEUTS SEG NFR BLD AUTO: 87 % (ref 43–75)
NRBC BLD AUTO-RTO: 0 /100 WBCS
PLATELET # BLD AUTO: 208 THOUSANDS/UL (ref 149–390)
PLATELET BLD QL SMEAR: ADEQUATE
PMV BLD AUTO: 10.4 FL (ref 8.9–12.7)
POLYCHROMASIA BLD QL SMEAR: PRESENT
POTASSIUM SERPL-SCNC: 3.7 MMOL/L (ref 3.5–5.3)
PROT SERPL-MCNC: 5.6 G/DL (ref 6.4–8.2)
PSA SERPL-MCNC: 0.2 NG/ML (ref 0–4)
RBC # BLD AUTO: 4.19 MILLION/UL (ref 3.88–5.62)
RBC MORPH BLD: PRESENT
SODIUM SERPL-SCNC: 136 MMOL/L (ref 136–145)
TIBC SERPL-MCNC: 348 UG/DL (ref 250–450)
VIT B12 SERPL-MCNC: 878 PG/ML (ref 100–900)
WBC # BLD AUTO: 9.54 THOUSAND/UL (ref 4.31–10.16)

## 2018-02-14 PROCEDURE — G0103 PSA SCREENING: HCPCS

## 2018-02-14 PROCEDURE — 83540 ASSAY OF IRON: CPT

## 2018-02-14 PROCEDURE — 85027 COMPLETE CBC AUTOMATED: CPT

## 2018-02-14 PROCEDURE — 83550 IRON BINDING TEST: CPT

## 2018-02-14 PROCEDURE — 82728 ASSAY OF FERRITIN: CPT

## 2018-02-14 PROCEDURE — 82607 VITAMIN B-12: CPT

## 2018-02-14 PROCEDURE — 80053 COMPREHEN METABOLIC PANEL: CPT

## 2018-02-14 PROCEDURE — 85007 BL SMEAR W/DIFF WBC COUNT: CPT

## 2018-02-14 PROCEDURE — 36415 COLL VENOUS BLD VENIPUNCTURE: CPT

## 2018-02-19 ENCOUNTER — APPOINTMENT (OUTPATIENT)
Dept: WOUND CARE | Facility: HOSPITAL | Age: 78
End: 2018-02-19
Payer: MEDICARE

## 2018-02-19 PROCEDURE — 97597 DBRDMT OPN WND 1ST 20 CM/<: CPT | Performed by: REGISTERED NURSE

## 2018-02-21 ENCOUNTER — OFFICE VISIT (OUTPATIENT)
Dept: FAMILY MEDICINE CLINIC | Facility: CLINIC | Age: 78
End: 2018-02-21
Payer: MEDICARE

## 2018-02-21 VITALS
BODY MASS INDEX: 49.44 KG/M2 | OXYGEN SATURATION: 97 % | HEIGHT: 67 IN | DIASTOLIC BLOOD PRESSURE: 58 MMHG | WEIGHT: 315 LBS | SYSTOLIC BLOOD PRESSURE: 110 MMHG | HEART RATE: 88 BPM | TEMPERATURE: 97.8 F

## 2018-02-21 DIAGNOSIS — Z79.4 TYPE 2 DIABETES MELLITUS WITH STAGE 4 CHRONIC KIDNEY DISEASE, WITH LONG-TERM CURRENT USE OF INSULIN (HCC): ICD-10-CM

## 2018-02-21 DIAGNOSIS — M31.6 TEMPORAL ARTERITIS (HCC): Primary | ICD-10-CM

## 2018-02-21 DIAGNOSIS — E11.22 TYPE 2 DIABETES MELLITUS WITH STAGE 4 CHRONIC KIDNEY DISEASE, WITH LONG-TERM CURRENT USE OF INSULIN (HCC): ICD-10-CM

## 2018-02-21 DIAGNOSIS — E87.6 HYPOKALEMIA: ICD-10-CM

## 2018-02-21 DIAGNOSIS — N18.4 TYPE 2 DIABETES MELLITUS WITH STAGE 4 CHRONIC KIDNEY DISEASE, WITH LONG-TERM CURRENT USE OF INSULIN (HCC): ICD-10-CM

## 2018-02-21 PROBLEM — G47.33 SEVERE OBSTRUCTIVE SLEEP APNEA-HYPOPNEA SYNDROME: Status: ACTIVE | Noted: 2017-04-13

## 2018-02-21 PROCEDURE — 99214 OFFICE O/P EST MOD 30 MIN: CPT | Performed by: FAMILY MEDICINE

## 2018-02-21 RX ORDER — PREDNISONE 2.5 MG
2.5 TABLET ORAL DAILY
Qty: 30 TABLET | Refills: 5 | Status: SHIPPED | OUTPATIENT
Start: 2018-02-21 | End: 2018-03-23

## 2018-02-21 RX ORDER — POTASSIUM CHLORIDE 750 MG/1
10 CAPSULE, EXTENDED RELEASE ORAL 2 TIMES DAILY
Qty: 60 CAPSULE | Refills: 5 | Status: SHIPPED | OUTPATIENT
Start: 2018-02-21 | End: 2019-09-17 | Stop reason: SDUPTHER

## 2018-02-21 NOTE — ASSESSMENT & PLAN NOTE
Patient follows with Dr Taylor Jim of Nephrology his GFR is stable at 21 he has not yet had a shunt placed for dialysis but I encouraged him to follow through and get that done so that has time to mature in case he does need dialysis I explained to the patient a lot of his edema would be corrected by dialysis because the be able to dialyze off some of his excess fluid he is still resistant to dialysis

## 2018-02-21 NOTE — ASSESSMENT & PLAN NOTE
Patient has not seen his oncologist in about 4 months he had canceled appointment because of bad weather but I encouraged him to get back with his oncologist

## 2018-02-21 NOTE — PROGRESS NOTES
Assessment/Plan:    No problem-specific Assessment & Plan notes found for this encounter  Diagnoses and all orders for this visit:    Type 2 diabetes mellitus with stage 4 chronic kidney disease, with long-term current use of insulin ([facility])          Subjective:      Patient ID: Thuy Arnold is a [de-identified] y o  male  Blood sugar is elevated I did review his diabetic logs on have patient's insulin is becoming cost prohibitive because he takes so much she is using the pens I am going to call is pharmacist at the [place] and ask if they stocked receive a because it comes in a 200 unit/mL dose which would then decrease the volume and make his pens last longer for his insulin therapy is seen is nephrologist and again is prepping for events will dialysis        The following portions of the patient's history were reviewed and updated as appropriate:   He  has a past medical history of Anemia; Arthritis; Atrial fibrillation ([address] ); 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[address] ); B12 deficiency; Back pain; Blind right eye; Cardiac disorder; Chronic combined systolic and diastolic heart failure ([address] ); Chronic obstructive lung disease ([address] ); Chronic venous stasis dermatitis of both lower extremities; CKD (chronic kidney disease), stage IV ([address] ); Coronary artery disease; DDD (degenerative disc disease); DM (diabetes mellitus), type 2 ([address] ); BAER (dyspnea on exertion); Easy bruising; Epistaxis; Gout; Gross hematuria; Hepatitis; Herpes zoster; Hiatal hernia; History of cellulitis; History of prostate cancer; Hypercholesterolemia; Hyperlipidemia; Hypertension; Ischemic cardiomyopathy; Lung mass; MI, old; Morbid obesity due to excess calories ([address] ); Neuropathy; Obesity; Obstructive sleep apnea syndrome, severe; Pneumonia; Shortness of breath; TIA (transient ischemic attack); Urinary incontinence; Urinary retention; and Use of cane as ambulatory aid    He   Patient Active Problem List    Diagnosis Date Noted    LESLIE (acute kidney injury) ([address] ) 10/26/2017    Hypokalemia 10/25/2017    Vision loss, 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 10/21/2017    Atrial fibrillation (HCC)     Blind right eye     Chronic combined systolic and diastolic heart failure ([facility] )     Coronary artery disease     DM (diabetes mellitus), type 2 with peripheral neuropathy     Obstructive sleep apnea syndrome, severe     Severe obstructive sleep apnea-hypopnea syndrome 04/13/2017    Wound of right lower extremity 03/05/2017    Wound of left lower extremity 03/05/2017    Hyperphosphatemia 03/05/2017    Vitamin D deficiency 03/05/2017    Bilateral renal cysts 03/05/2017    Body mass index (BMI) of 40 0 to 49 9 03/04/2017    Anemia 10/04/2016    Volume overload 07/17/2016    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    CKD (chronic kidney disease) stage 4, GFR 15-29 ml/min ([facility] ) 07/16/2016    Morbid obesity ([facility] )     History of prostate cancer     Type 2 diabetes mellitus with hyperglycemia (HCC)     Trochanteric bursitis of left hip 06/30/2016    Bilateral leg edema 01/19/2016    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[facility] ) 05/17/2013    Prostate cancer ([facility] ) 07/24/2012     He  has a past surgical history that includes Coronary angioplasty with stent; Cystoscopy; Lung cancer surgery; Cataract extraction, bilateral; Eye surgery; Hernia repair; Vascular surgery; Cardiac surgery; Abdominal surgery; Coronary artery bypass graft (N/A, 7/15/2016); pr cystourethroscopy,ureter catheter (Left, 3/9/2016); PILONIDAL CYST EXCISION; pr temporal artery ligatn or bx (Bilateral, 10/31/2017); Lung surgery (Left, 2012); Other surgical history; and Renal artery stent (02/16/2015)  His family history includes Cancer in his mother, other, other, and sister; Diabetes in his father, maternal grandmother, mother, and other; Diabetes type II in his mother; Heart disease in his mother; Hypertension in his mother and other  He  reports that he quit smoking about 14 years ago  He has a 108 00 pack-year smoking history   He has never used smokeless tobacco  He reports that he does not drink alcohol or use drugs  Current Outpatient Prescriptions   Medication Sig Dispense Refill    apixaban (ELIQUIS) 5 mg Take 1 tablet by mouth 2 (two) times a day 60 tablet 0    aspirin (ECOTRIN LOW STRENGTH) 81 mg EC tablet Take 81 mg by mouth 2 (two) times a day      Cholecalciferol 2000 units TABS Take 1 tablet by mouth daily 30 tablet 1    furosemide (LASIX) 80 mg tablet Take 1 tablet by mouth 2 (two) times a day 30 tablet 0    gabapentin (NEURONTIN) 300 mg capsule Take 300 mg by mouth daily at bedtime      glucagon (GLUCAGON EMERGENCY) 1 MG injection Inject 1 mg under the skin once as needed for low blood sugar (as needed for a blood sugar less than 70 if unconscious or unable to correct by oral means) for up to 1 dose 1 each 0    insulin detemir (LEVEMIR) 100 units/mL subcutaneous injection Inject 50 Units under the skin daily at bedtime  0    insulin lispro (HumaLOG) 100 units/mL injection Inject 15 Units under the skin 3 (three) times a day with meals  0    iron polysaccharides (NIFEREX) 150 mg capsule Take 150 mg by mouth daily      metoprolol tartrate (LOPRESSOR) 25 mg tablet Take 25 mg by mouth 2 (two) times a day      potassium chloride (MICRO-K) 10 MEQ CR capsule Take 4 capsules by mouth daily 30 capsule 0    predniSONE 10 mg tablet Take 3 tablets (30 mg total) by mouth daily (Patient taking differently: Take 10 mg by mouth daily  ) 100 tablet 0    simvastatin (ZOCOR) 40 mg tablet Take 40 mg by mouth daily at bedtime      tamsulosin (FLOMAX) 0 4 mg Take 0 4 mg by mouth daily at bedtime         No current facility-administered medications for this visit        Current Outpatient Prescriptions on File Prior to Visit   Medication Sig    apixaban (ELIQUIS) 5 mg Take 1 tablet by mouth 2 (two) times a day    aspirin (ECOTRIN LOW STRENGTH) 81 mg EC tablet Take 81 mg by mouth 2 (two) times a day    Cholecalciferol 2000 units TABS Take 1 tablet by mouth daily    furosemide (LASIX) 80 mg tablet Take 1 tablet by mouth 2 (two) times a day    gabapentin (NEURONTIN) 300 mg capsule Take 300 mg by mouth daily at bedtime    glucagon (GLUCAGON EMERGENCY) 1 MG injection Inject 1 mg under the skin once as needed for low blood sugar (as needed for a blood sugar less than 70 if unconscious or unable to correct by oral means) for up to 1 dose    insulin detemir (LEVEMIR) 100 units/mL subcutaneous injection Inject 50 Units under the skin daily at bedtime    insulin lispro (HumaLOG) 100 units/mL injection Inject 15 Units under the skin 3 (three) times a day with meals    iron polysaccharides (NIFEREX) 150 mg capsule Take 150 mg by mouth daily    metoprolol tartrate (LOPRESSOR) 25 mg tablet Take 25 mg by mouth 2 (two) times a day    potassium chloride (MICRO-K) 10 MEQ CR capsule Take 4 capsules by mouth daily    predniSONE 10 mg tablet Take 3 tablets (30 mg total) by mouth daily (Patient taking differently: Take 10 mg by mouth daily  )    simvastatin (ZOCOR) 40 mg tablet Take 40 mg by mouth daily at bedtime    tamsulosin (FLOMAX) 0 4 mg Take 0 4 mg by mouth daily at bedtime       No current facility-administered medications on file prior to visit  He is allergic to other       Review of Systems   Constitutional: Positive for activity change, fatigue and unexpected weight change  Morbidly obese and cushingoid from long-term prednisone therapy for temporal arteritis   Respiratory: Positive for apnea and shortness of breath  Oxygen dependent nasal O2 patient wear CPAP patient has marked dyspnea   Cardiovascular: Positive for leg swelling  Chronic atrial fibrillation currently on Eliquis   Musculoskeletal: Positive for back pain and gait problem           Objective:      /58 (BP Location: Right arm, Patient Position: Sitting, Cuff Size: Large)   Pulse 88   Temp 97 8 °F (36 6 °C) (Tympanic)   Ht 5' 7" (1 702 m)   Wt (!) 147 kg (325 lb)   SpO2 97%   BMI 50 90 kg/m²          Physical Exam   Constitutional: He is oriented to person, place, and time  He appears well-developed and well-nourished  No distress  Markedly obese and cushingoid   HENT:   Head: Normocephalic  Right Ear: External ear normal    Left Ear: External ear normal    Nose: Nose normal    Mouth/Throat: Oropharynx is clear and moist    Eyes: Conjunctivae and EOM are normal  Pupils are equal, round, and reactive to light  Right eye exhibits no discharge  Left eye exhibits no discharge  No scleral icterus  Neck: Normal range of motion  No tracheal deviation present  No thyromegaly present  Cardiovascular: Normal rate, regular rhythm and normal heart sounds  Exam reveals no gallop and no friction rub  No murmur heard  Pulmonary/Chest: Effort normal  No respiratory distress  He has wheezes  He has rales  On oxygen therapy   Abdominal: Soft  Bowel sounds are normal  He exhibits no mass  There is no tenderness  There is no guarding  Morbidly obese   Musculoskeletal: He exhibits no edema or deformity  Legs are in a compression wrappings   Lymphadenopathy:     He has no cervical adenopathy  Neurological: He is alert and oriented to person, place, and time  No cranial nerve deficit  Skin: Skin is warm and dry  No rash noted  He is not diaphoretic  No erythema  Psychiatric: He has a normal mood and affect   Thought content normal

## 2018-02-21 NOTE — ASSESSMENT & PLAN NOTE
Patient is a diabetic log was reviewed sugars in the morning or in the 110-150 range in the mid day in the evenings they go up into the throat high 300s he had 1 up in the 500s taking 55 units daily of Levemir he is taking anywhere from 10-15 units of Humalog at mealtime and patient is still on 10 mg of prednisone he is hardly could she go aid and I am sure this has a lot to do with his elevated blood

## 2018-02-23 ENCOUNTER — TELEPHONE (OUTPATIENT)
Dept: FAMILY MEDICINE CLINIC | Facility: CLINIC | Age: 78
End: 2018-02-23

## 2018-03-07 NOTE — CONSULTS
Assessment    1  Former smoker (V15 82) (L38 497)   2  Atrial fibrillation (427 31) (I48 91)   3  Coronary artery disease (414 00) (I25 10)   4  Chronic combined systolic and diastolic CHF (congestive heart failure) (428 42,428 0)   (I50 42)   5  Benign essential hypertension (401 1) (I10)   6  S/P CABG x 3 (V45 81) (Z95 1)   7  Epistaxis, recurrent (784 7) (R04 0)    Chief Complaint  Chief Complaint Free Text Note Form: NOSE BLEEDS/REF BY DR Alexandria Charles      History of Present Illness  HPI: 78-year-old male presents for evaluation of right-sided epistaxis  He was recently started on nasal cannula oxygen 2 L  Since that time he has noted ongoing epistaxis  Right greater than left  He has no previous nasal injuries  No nasal obstruction  He has a history of COPD and cardiac disease  Atrial fibrillation and anticoagulation  Past medical history of smoking one pack per day for greater than 50 years  Review of Systems  Complete ENT ROS [facility]:   Eyes: No complaints of itching, excessive tearing or vision changes  Ears: No complaints of hearing loss, discharge, imbalance, recent ear infections, or tinnitus  Nose: epistaxis  Mouth: No sores in mouth, no altered taste, no dental problems  Throat: No complaints of throat pain, no difficulty swallowing, no hoarseness  Neck: No neck soreness, no neck pain, no neck lumps or swelling  Genitourinary: No complaints of dysuria, flank pain or frequent urination  Cardiovascular: No complaints of chest pain or palpitations  Respiratory: No complaints of shortness of breath, cough or wheezing  Gastrointestinal: No complaints of heartburn, nausea/vomiting, or constipation  Neurological: No complaints of headache, convulsions or memory loss  ROS Reviewed:   ROS reviewed  Active Problems    1  Acute idiopathic gout of right foot (274 01) (M10 071)   2   Acute on chronic combined systolic and diastolic congestive heart failure, NYHA class 2 (218 50,962 7) (I50 43)   3  Acute pain of left foot (729 5) (M79 672)   4  Advanced directives, counseling/discussion (V65 49) (Z71 89)   5  Anemia (285 9) (D64 9)   6  Arteriosclerotic coronary artery disease (414 00) (I25 10)   7  Arthralgia of right shoulder region (719 41) (M25 511)   8  Atrial fibrillation (427 31) (I48 91)   9  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)   10  Benign essential hypertension (401 1) (I10)   11  Benign hypertension with CKD (chronic kidney disease) stage III (403 10,585 3)    (I12 9,N18 3)   12  Bilateral leg edema (782 3) (R60 0)   13  Bilateral renal cysts (753 10) (N28 1)   14  Blind hypertensive eye, 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)   15  Cerebrovascular accident, embolic (486 65) (D00 0)   16  Chronic combined systolic and diastolic CHF (congestive heart failure) (428 42,428 0)    (I50 42)   17  Chronic diastolic congestive heart failure (428 32,428 0) (I50 32)   18  Chronic kidney disease (585 9) (N18 9)   19  Chronic venous stasis dermatitis of both lower extremities (454 1) (I87 2)   20  CKD (chronic kidney disease), stage IV (585 4) (N18 4)   21  Coronary artery disease (414 00) (I25 10)   22  Diabetes mellitus type 2, insulin dependent (250 00,V58 67) (E11 9,Z79 4)   23  Diabetic retinopathy screening (V80 2) (Z13 5)   24  Diastolic CHF (715 43,602 3) (I50 30)   25  Disorder of ankle and foot joint (719 97) (M25 9)   26  BAER (dyspnea on exertion) (786 09) (R06 09)   27  Edema (782 3) (R60 9)   28  Encounter for prostate cancer screening (V76 44) (Z12 5)   29  Epistaxis (784 7) (R04 0)   30  Exercise counseling (V65 41) (Z71 89)   31  Flu vaccine need (V04 81) (Z23)   32  Glaucoma screening (V80 1) (Z13 5)   33  Hiatal hernia (553 3) (K44 9)   34  Hydronephrosis of left kidney (591) (N13 30)   35  Hyperlipidemia (272 4) (E78 5)   36  Hypoxemia (799 02) (R09 02)   37  Ischemic cardiomyopathy (414 8) (I25 5)   38  Low blood potassium (276 8) (E87 6)   39   Myocardial infarction (410 90) (I21 3)   40  Need for pneumococcal vaccination (V03 82) (Z23)   41  Neuropathy (355 9) (G62 9)   42  No advance directives (V49 89) (Z78 9)   43  Poorly controlled type 2 diabetes mellitus (250 00) (E11 65)   44  Prostate cancer (185) (C61)   39  Pyelonephritis, acute (590 10) (N10)   46  S/P CABG x 3 (V45 81) (Z95 1)   47  Screening for depression (V79 0) (Z13 89)   48  Screening for diabetic peripheral neuropathy (V80 09) (Z13 89)   49  Screening for genitourinary condition (V81 6) (Z13 89)   50  Screening for neurological condition (V80 09) (Z13 89)   51  Severe obstructive sleep apnea-hypopnea syndrome (327 23) (G47 33)   52  Trochanteric bursitis of left hip (726 5) (M70 62)   53  Ulcers of both lower legs (707 10) (L97 919,L97 929)   54  Urinary retention (788 20) (R33 9)   55  Vitamin B 12 deficiency (266 2) (E53 8)   56  Vitamin D deficiency (268 9) (E55 9)    Past Medical History    1  History of Abnormal blood chemistry (790 6) (R79 9)   2  History of Acute urinary retention (788 29) (R33 8)   3  History of Ankle Joint Swelling (719 07)   4  History of Cellulitis (682 9) (L03 90)   5  History of Cellulitis (682 9) (L03 90)   6  History of Cellulitis of calf (682 6) (L03 119)   7  History of Diabetes Mellitus (250 00)   8  History of Easy Bruising Tendency   9  History of Gross hematuria (599 71) (R31 0)   10  History of cardiac disorder (V12 50) (Z86 79)   11  History of chronic obstructive lung disease (V12 69) (Z87 09)   12  History of diarrhea (V12 79) (Z87 898)   13  History of fatigue (V13 89) (Z87 898)   14  History of hepatitis (V12 09) (Z86 19)   15  History of herpes zoster (V12 09) (Z86 19)   16  History of hypercholesterolemia (V12 29) (Z86 39)   17  History of hyperlipidemia (V12 29) (Z86 39)   18  History of hypertension (V12 59) (Z86 79)   19  History of jaundice (V12 29) (Z87 898)   20  History of shortness of breath (V13 89) (Z87 898)   21   History of urinary tract infection (V13 02) (Z87 440)   22  History of Lung mass (786 6) (R91 8)   23  History of Morbid or severe obesity due to excess calories (278 01) (E66 01)   24  History of Pneumonia (V12 61)   25  History of Pre-operative cardiovascular examination (V72 81) (Z01 810)   26  History of Vaccine for streptococcus pneumoniae and influenza (V06 6) (Z23)  Past Medical History Reviewed: The past medical history was reviewed and updated today  Surgical History    1  History of CABG   2  History of Cataract Surgery   3  History of Cystoscopy With Removal Of Object   4  History of Lung Surgery   5  History of Pilonidal Cyst Resection - Simple   6  History of Previous Stent Placement   7  History of Radiation Therapy   8  History of Transcath Intravascular Stent Placement Percutaneous Renal   9  History of Umbilical Hernia Repair  Surgical History Reviewed: The surgical history was reviewed and updated today  Family History  Mother    1  Family history of Diabetes Mellitus (V18 0)   2  Family history of hypertension (V17 49) (Z82 49)   3  Family history of Type 2 diabetes mellitus (250 00) (E11 9)  Father    4  Family history of Diabetes Mellitus (V18 0)  Maternal Grandmother    5  Family history of Diabetes Mellitus (V18 0)  Family History    6  Family history of Cancer  Family History Reviewed: The family history was reviewed and updated today  Social History    · Denied: Drug Use (305 90)   · Former smoker (V15 82) (V84 212)   · Marital History - Currently    · Never Drank Alcohol   · No advance directives (V49 89) (Z78 9)   · No advance directives (V49 89) (Z78 9)   · No living will   · Patient has living will (V49 89) (Z78 9)  Social History Reviewed: The social history was reviewed and updated today  The social history was reviewed and is unchanged  Current Meds   1  Aspirin EC 81 MG Oral Tablet Delayed Release; TAKE 1 TABLET DAILY; Last   Rx:03Jan2017 Ordered   2   Atorvastatin Calcium 80 MG Oral Tablet; take once daily until done scipt then start taking   simvastatin; Therapy: (Recorded:01Jun2017) to Recorded   3  Cholecalciferol 2000 UNIT TABS; One daily; Therapy: (Recorded:10Mar2017) to Recorded   4  Colchicine 0 6 MG Oral Tablet; TAKE 2 TABLETS BY MOUTH FOR ONE DAY AND THEN   TAKE ONE  TABLET DAILY FOR 10 DAYS; Therapy: [id number] to (Gabriela Liu)  Requested for: 13Apr2017; Last   Rx:13Apr2017 Ordered   5  Eliquis 5 MG Oral Tablet; Take 1 tablet twice daily  Requested for: 05IDX8707; Last   Rx:07Eri8443 Ordered   6  Ferrex 150 150 MG Oral Capsule; Take 1  tablet daily Recorded   7  Furosemide 80 MG Oral Tablet; TAKE 1 TABLET 2 TIMES DAILY; Last Rx:23Mar2017   Ordered   8  Gabapentin 300 MG Oral Capsule; TAKE 1 CAPSULE AT BEDTIME  Requested for:   32SQA4246; Last Rx:01Jun2017 Ordered   9  Klor-Con 10 10 MEQ Oral Tablet Extended Release; TAKE 1 TABLET DAILY; Therapy: 37VIY2650 to (Evaluate:84Xeq3016)  Requested for: 74GAR0792; Last   Rx:[id number] Ordered   10  Levemir FlexTouch 100 UNIT/ML Subcutaneous Solution Pen-injector; inject 40 units at    bedtime; Therapy: (Recorded:01Jun2017) to Recorded   11  MetOLazone 5 MG Oral Tablet; Take one tab as directed by MD  Requested for:    06AYS3365; Last DP:34KTU3739 Ordered   12  Metoprolol Tartrate 25 MG Oral Tablet; Take 1 tablet twice daily Recorded   13  Niferex-150 CAPS; One daily; Therapy: (Recorded:10Mar2017) to Recorded   14  NovoLOG FlexPen 100 UNIT/ML Subcutaneous Solution Pen-injector; Sliding scale 5-15    units TID depending on B/S Recorded   15  Simvastatin 40 MG Oral Tablet; take 0 5 tablet bedtime; Therapy: 03QFO4364 to (Evaluate:60Dkl0572); Last Rx:01Jun2017 Ordered   16  Tamsulosin HCl 0 4 MG CP24; TAKE 1 CAPSULE AT BEDTIME; Therapy: (Recorded:11Oct2016) to Recorded   17  Vitamin B-12 1000 MCG Oral Tablet; TAKE 1 TABLET DAILY AS DIRECTED;     Therapy: 54WMG4139 to (Evaluate:13Nov2017)  Requested for: 36HWU9192; Last    Rx:60Pii1617 Ordered    Allergies    1  Adhesive Tape TAPE    2  Adhesive Tape    Vitals  Signs   Recorded: 14Mff6756 11:20AM   Heart Rate: 61  Systolic: 519, LUE, Sitting  Diastolic: 60, LUE, Sitting  Height: 5 ft 7 in  Weight: 321 lb 4 oz  BMI Calculated: 50 32  BSA Calculated: 2 47  O2 Saturation: 88    Physical Exam    Constitutional:   General appearance: Well developed, well nourished  Ability to communicate: Voice normal  Speech normal    Head and Face:   Head and face: Head normocephalic, atraumatic with no lesions or palpable masses  Submandibular glands and parotid glands: non tender, no masses  Eyes:   Test of Ocular Motility: Gaze normal  No nystagmus  Ears:   Otoscopic Examination: Tympanic membranes intact and normal in appearance, no retraction of tympanic membranes observed, no serous effusion observed, no evidence of tympanosclerosis  Hearing: Normal    Nose:   External auditory canals: No cerumen impaction noted, no drainage observed, no edema noted in EAC, no exostoses present, no osteoma present, no tenderness noted  External Inspection of Nose: No deformities observed, no deviation of bone structure, no skin lesion present, no swelling present  Nares are symmetric, no deviation of caudal portion of septum  Nasal mucosa, septum, and turbinates: Abnormal  Right-sided septal ulcer, right septal deviation, septal ulcer was cauterized with good result  No active bleeding  Mouth: Inspection of Lips, Teeth, Gums: Lips normal in color, moist, no cracks or lesions  No loose teeth, no missing teeth  Gingiva: no bleeding observed, no inflammation present  Hard Palate: no asymmetry observed, no torus present  Soft palate normal with no ulcers noted  Throat:   Examination of Oropharynx: Oral Mucosa: no masses, lesions, leukoplakia, or scarring  Normal Ford's ducts, pink and moist, no discoloration noted   Floor of mouth: normal Warthin's ducts, no lesions, ulcerations, leukoplakia or torus mandibularis  Tonsils: no hypertrophy or ulcerations noted  Tongue: normal mobility, surfaces without fissures, leukoplakia, ulceration or masses, not enlarged, no pallor noted, no white patches present  Neck:   Examination of Neck: No decreased range of motion, trachea midline  Examination of Thyroid: Normal size, non-tender, no palpable masses  Lymphatic:   Palpation of Lymph Nodes: Neck: No generalized lymphadenopathy  Neurological/Psychiatric:   Cranial nerves II-VII grossly intact  Oriented to person, place, and time  Cooperative, in no acute distress  Procedure  Procedure: Nasal cautery    Indication: Recurrent/persistent epistaxis    Procedure in Detail: After informed verbal consent was obtained from the patient the nose was anesthetized with topical lidociane and phenylephrine  After adequate time for anesthesia the concerning areas were cauterized as below using topical silver nitrate  The patient tolerated the procedure well and was discharged in good condition  Findings: Ulcer right septum  Discussion/Summary  Discussion Summary:   Recurrent epistaxis: We discussed ongoing management of epistaxis with nasal moisturization, humidification, antibiotic ointment to the nasal septum once daily, hypertension management, and management of acute epistaxis with Afrin and pressure  Literature was given  Patient will return p r n  basis         Signatures   Electronically signed by : BAHMAN Beaver ; Jul 21 2017  1:12PM EST                       (Author)

## 2018-03-07 NOTE — PROGRESS NOTES
Assessment    1  Epistaxis, recurrent (784 7) (R04 0)    Chief Complaint  Chief Complaint Free Text Note Form: F/U NOSE BLEEDS, PT IS STILL HAVING NOSE BLEEDS/REF BY DR Niyah Mi      History of Present Illness  HPI: Brian Lindo is a 76year old male presents for ongoing evaluation of recurrent epistaxis  Reports after last visit he had 2 occurrences that following Saturday  Currently on home oxygen therapy, nasal cannula  Denies using Affrin to stop epistaxis, rhinorrhea and any nasal trauma  Otherwise, he reports doing well  Review of Systems  Complete ENT ROS [facility]:   Eyes: No complaints of itching, excessive tearing or vision changes  Ears: No complaints of hearing loss, discharge, imbalance, recent ear infections, or tinnitus  Nose: epistaxis  Mouth: No sores in mouth, no altered taste, no dental problems  Throat: No complaints of throat pain, no difficulty swallowing, no hoarseness  Neck: No neck soreness, no neck pain, no neck lumps or swelling  Genitourinary: No complaints of dysuria, flank pain or frequent urination  Cardiovascular: No complaints of chest pain or palpitations  Respiratory: No complaints of shortness of breath, cough or wheezing  Gastrointestinal: No complaints of heartburn, nausea/vomiting, or constipation  Neurological: No complaints of headache, convulsions or memory loss  ROS Reviewed:   ROS reviewed  Active Problems    1  Acute idiopathic gout of right foot (274 01) (M10 071)   2  Acute on chronic combined systolic and diastolic congestive heart failure, NYHA class 2   (428 43,428 0) (I50 43)   3  Acute pain of left foot (729 5) (M79 672)   4  Advanced directives, counseling/discussion (V65 49) (Z71 89)   5  Anemia (285 9) (D64 9)   6  Arteriosclerotic coronary artery disease (414 00) (I25 10)   7  Arthralgia of right shoulder region (719 41) (M25 511)   8  Atrial fibrillation (427 31) (I48 91)   9  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)   10   Benign essential hypertension (401 1) (I10)   11  Benign hypertension with CKD (chronic kidney disease) stage III (403 10,585 3)    (I12 9,N18 3)   12  Bilateral leg edema (782 3) (R60 0)   13  Bilateral renal cysts (753 10) (N28 1)   14  Blind hypertensive eye, 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)   15  Cerebrovascular accident, 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)   16  Chronic combined systolic and diastolic CHF (congestive heart failure) (428 42,428 0)    (I50 42)   17  Chronic diastolic congestive heart failure (428 32,428 0) (I50 32)   18  Chronic kidney disease (585 9) (N18 9)   19  Chronic venous stasis dermatitis of both lower extremities (454 1) (I87 2)   20  CKD (chronic kidney disease), stage IV (585 4) (N18 4)   21  Coronary artery disease (414 00) (I25 10)   22  Diabetes mellitus type 2, insulin dependent (250 00,V58 67) (E11 9,Z79 4)   23  Diabetic retinopathy screening (V80 2) (Z13 5)   24  Diastolic CHF (532 56,007 9) (I50 30)   25  Disorder of ankle and foot joint (719 97) (M25 9)   26  BAER (dyspnea on exertion) (786 09) (R06 09)   27  Edema (782 3) (R60 9)   28  Encounter for prostate cancer screening (V76 44) (Z12 5)   29  Epistaxis (784 7) (R04 0)   30  Epistaxis, recurrent (784 7) (R04 0)   31  Exercise counseling (V65 41) (Z71 89)   32  Flu vaccine need (V04 81) (Z23)   33  Glaucoma screening (V80 1) (Z13 5)   34  Hiatal hernia (553 3) (K44 9)   35  Hydronephrosis of left kidney (591) (N13 30)   36  Hyperlipidemia (272 4) (E78 5)   37  Hypoxemia (799 02) (R09 02)   38  Ischemic cardiomyopathy (414 8) (I25 5)   39  Low blood potassium (276 8) (E87 6)   40  Myocardial infarction (410 90) (I21 3)   41  Need for pneumococcal vaccination (V03 82) (Z23)   42  Neuropathy (355 9) (G62 9)   43  No advance directives (V49 89) (Z78 9)   44  Poorly controlled type 2 diabetes mellitus (250 00) (E11 65)   45  Prostate cancer (185) (C61)   46  Pyelonephritis, acute (590 10) (N10)   47  S/P CABG x 3 (V45 81) (Z95 1)   48   Screening for depression (V79 0) (Z13 89)   49  Screening for diabetic peripheral neuropathy (V80 09) (Z13 89)   50  Screening for genitourinary condition (V81 6) (Z13 89)   51  Screening for neurological condition (V80 09) (Z13 89)   52  Severe obstructive sleep apnea-hypopnea syndrome (327 23) (G47 33)   53  Trochanteric bursitis of left hip (726 5) (M70 62)   54  Ulcers of both lower legs (707 10) (L97 919,L97 929)   55  Urinary retention (788 20) (R33 9)   56  Vitamin B 12 deficiency (266 2) (E53 8)   57  Vitamin D deficiency (268 9) (E55 9)    Past Medical History    1  History of Abnormal blood chemistry (790 6) (R79 9)   2  History of Acute urinary retention (788 29) (R33 8)   3  History of Ankle Joint Swelling (719 07)   4  History of Cellulitis (682 9) (L03 90)   5  History of Cellulitis (682 9) (L03 90)   6  History of Cellulitis of calf (682 6) (L03 119)   7  History of Diabetes Mellitus (250 00)   8  History of Easy Bruising Tendency   9  History of Gross hematuria (599 71) (R31 0)   10  History of cardiac disorder (V12 50) (Z86 79)   11  History of chronic obstructive lung disease (V12 69) (Z87 09)   12  History of diarrhea (V12 79) (Z87 898)   13  History of fatigue (V13 89) (Z87 898)   14  History of hepatitis (V12 09) (Z86 19)   15  History of herpes zoster (V12 09) (Z86 19)   16  History of hypercholesterolemia (V12 29) (Z86 39)   17  History of hyperlipidemia (V12 29) (Z86 39)   18  History of hypertension (V12 59) (Z86 79)   19  History of jaundice (V12 29) (Z87 898)   20  History of shortness of breath (V13 89) (Z87 898)   21  History of urinary tract infection (V13 02) (Z87 440)   22  History of Lung mass (786 6) (R91 8)   23  History of Morbid or severe obesity due to excess calories (278 01) (E66 01)   24  History of Pneumonia (V12 61)   25  History of Pre-operative cardiovascular examination (V72 81) (Z01 810)   26   History of Vaccine for streptococcus pneumoniae and influenza (V06 6) (Z23)  Past Medical History Reviewed: The past medical history was reviewed and updated today  Surgical History    1  History of CABG   2  History of Cataract Surgery   3  History of Cystoscopy With Removal Of Object   4  History of Lung Surgery   5  History of Pilonidal Cyst Resection - Simple   6  History of Previous Stent Placement   7  History of Radiation Therapy   8  History of Transcath Intravascular Stent Placement Percutaneous Renal   9  History of Umbilical Hernia Repair  Surgical History Reviewed: The surgical history was reviewed and updated today  Family History  Mother    1  Family history of Diabetes Mellitus (V18 0)   2  Family history of hypertension (V17 49) (Z82 49)   3  Family history of Type 2 diabetes mellitus (250 00) (E11 9)  Father    4  Family history of Diabetes Mellitus (V18 0)  Maternal Grandmother    5  Family history of Diabetes Mellitus (V18 0)  Family History    6  Family history of Cancer  Family History Reviewed: The family history was reviewed and updated today  Social History    · Denied: Drug Use (305 90)   · Former smoker (V15 82) (K59 222)   · Marital History - Currently    · Never Drank Alcohol   · No advance directives (V49 89) (Z78 9)   · No advance directives (V49 89) (Z78 9)   · No living will   · Patient has living will (V49 89) (Z78 9)  Social History Reviewed: The social history was reviewed and updated today  The social history was reviewed and is unchanged  Current Meds   1  Aspirin EC 81 MG Oral Tablet Delayed Release; TAKE 1 TABLET DAILY; Last   Rx:03Jan2017 Ordered   2  Atorvastatin Calcium 80 MG Oral Tablet; take once daily until done scipt then start taking   simvastatin; Therapy: (Recorded:01Jun2017) to Recorded   3  Cholecalciferol 2000 UNIT TABS; One daily; Therapy: (Recorded:10Mar2017) to Recorded   4  Colchicine 0 6 MG Oral Tablet; TAKE 2 TABLETS BY MOUTH FOR ONE DAY AND THEN   TAKE ONE  TABLET DAILY FOR 10 DAYS;    Therapy: 75LLO4055 to (Elihu Dural)  Requested for: 13Apr2017; Last   Rx:13Apr2017 Ordered   5  Eliquis 5 MG Oral Tablet; Take 1 tablet twice daily  Requested for: 62CWA4979; Last   Rx:28Usr8755 Ordered   6  Ferrex 150 150 MG Oral Capsule; Take 1  tablet daily Recorded   7  Furosemide 80 MG Oral Tablet; TAKE 1 TABLET 2 TIMES DAILY; Last Rx:23Mar2017   Ordered   8  Gabapentin 300 MG Oral Capsule; TAKE 1 CAPSULE AT BEDTIME  Requested for:   06QZY2803; Last Rx:01Jun2017 Ordered   9  Klor-Con 10 10 MEQ Oral Tablet Extended Release; TAKE 1 TABLET DAILY; Therapy: 93BXU2544 to (Evaluate:22Llh6851)  Requested for: 16JOH1369; Last   Rx:56Ylq7871 Ordered   10  Levemir FlexTouch 100 UNIT/ML Subcutaneous Solution Pen-injector; inject 40 units at    bedtime; Therapy: (Recorded:01Jun2017) to Recorded   11  MetOLazone 5 MG Oral Tablet; Take one tab as directed by MD  Requested for:    78UAC1652; Last SO:70OXC4967 Ordered   12  Metoprolol Tartrate 25 MG Oral Tablet; Take 1 tablet twice daily Recorded   13  Niferex-150 CAPS; One daily; Therapy: (Recorded:10Mar2017) to Recorded   14  NovoLOG FlexPen 100 UNIT/ML Subcutaneous Solution Pen-injector; Sliding scale 5-15    units TID depending on B/S Recorded   15  Simvastatin 40 MG Oral Tablet; take 0 5 tablet bedtime; Therapy: 20HCK4340 to (Evaluate:86Qrr1410); Last Rx:01Jun2017 Ordered   16  Tamsulosin HCl 0 4 MG CP24; TAKE 1 CAPSULE AT BEDTIME; Therapy: (Recorded:11Oct2016) to Recorded   17  Vitamin B-12 1000 MCG Oral Tablet; TAKE 1 TABLET DAILY AS DIRECTED; Therapy: 34QGK4892 to (Evaluate:13Nov2017)  Requested for: 17INE1829; Last    Rx:18Nov2016 Ordered    Allergies    1  Adhesive Tape TAPE    2   Adhesive Tape    Vitals  Signs   Recorded: 28Jul2017 01:03PM   Heart Rate: 71  Systolic: 617, LUE, Sitting  Diastolic: 58, LUE, Sitting  Height: 5 ft 7 in  Weight: 305 lb   BMI Calculated: 47 77  BSA Calculated: 2 42  O2 Saturation: 95    Physical Exam    Constitutional:   General appearance: Well developed, well nourished  Ability to communicate: Voice normal  Speech normal    Head and Face:   Head and face: Head normocephalic, atraumatic with no lesions or palpable masses  Submandibular glands and parotid glands: non tender, no masses  Eyes:   Test of Ocular Motility: Gaze normal  No nystagmus  Ears:   Otoscopic Examination: Tympanic membranes intact and normal in appearance, no retraction of tympanic membranes observed, no serous effusion observed, no evidence of tympanosclerosis  Hearing: Normal    Nose:   External auditory canals: No cerumen impaction noted, no drainage observed, no edema noted in EAC, no exostoses present, no osteoma present, no tenderness noted  External Inspection of Nose: No deformities observed, no deviation of bone structure, no skin lesion present, no swelling present  Nares are symmetric, no deviation of caudal portion of septum  Nasal mucosa, septum, and turbinates: Abnormal  Right-sided septal ulcer, right septal deviation, septal ulcer was cauterized with good result  No active bleeding  Mouth: Inspection of Lips, Teeth, Gums: Lips normal in color, moist, no cracks or lesions  No loose teeth, no missing teeth  Gingiva: no bleeding observed, no inflammation present  Hard Palate: no asymmetry observed, no torus present  Soft palate normal with no ulcers noted  Throat:   Examination of Oropharynx: Oral Mucosa: no masses, lesions, leukoplakia, or scarring  Normal Ford's ducts, pink and moist, no discoloration noted  Floor of mouth: normal Warthin's ducts, no lesions, ulcerations, leukoplakia or torus mandibularis  Tonsils: no hypertrophy or ulcerations noted  Tongue: normal mobility, surfaces without fissures, leukoplakia, ulceration or masses, not enlarged, no pallor noted, no white patches present  Neck:   Examination of Neck: No decreased range of motion, trachea midline      Examination of Thyroid: Normal size, non-tender, no palpable masses  Lymphatic:   Palpation of Lymph Nodes: Neck: No generalized lymphadenopathy  Neurological/Psychiatric:   Cranial nerves II-VII grossly intact  Oriented to person, place, and time  Cooperative, in no acute distress  Procedure  Procedure: Nasal cautery of right septal ulcer    Indication: Recurrent/persistent epistaxis    Procedure in Detail: After informed verbal consent was obtained from the patient the nose was anesthetized with topical lidociane and phenylephrine  After adequate time for anesthesia the concerning areas were cauterized as below using topical silver nitrate  The patient tolerated the procedure well and was discharged in good condition  Findings: Engorged blood vessel along right septum  Discussion/Summary  Discussion Summary:   Recurrent epistaxis of right septal ulcer: We discussed ongoing management of epistaxis with nasal moisturization, humidification, antibiotic ointment to the nasal septum once daily, hypertension management, and management of acute epistaxis with Afrin and pressure  Literature was given  Patient will return p r n  basis  Follow up PRN        Signatures   Electronically signed by : BAHMAN Aragon ; Jul 28 2017  5:30PM EST                       (Author)

## 2018-03-09 ENCOUNTER — TELEPHONE (OUTPATIENT)
Dept: FAMILY MEDICINE CLINIC | Facility: CLINIC | Age: 78
End: 2018-03-09

## 2018-03-09 NOTE — TELEPHONE ENCOUNTER
Blood sugar readings;              Am            Noon         Supper   3/1     118              249             285  3/2     119             196             221  3/3     142            249              297  3/4     244             277             208  3/5     100             277             335   3/6      181            279             214  3/7      165            371             201  3/8      106             224            264  3/9       126            325

## 2018-03-12 ENCOUNTER — TELEPHONE (OUTPATIENT)
Dept: FAMILY MEDICINE CLINIC | Facility: CLINIC | Age: 78
End: 2018-03-12

## 2018-03-12 DIAGNOSIS — L03.116 CELLULITIS AND ABSCESS OF LEFT LEG: Primary | ICD-10-CM

## 2018-03-12 DIAGNOSIS — L02.416 CELLULITIS AND ABSCESS OF LEFT LEG: Primary | ICD-10-CM

## 2018-03-12 RX ORDER — CEPHALEXIN 500 MG/1
500 CAPSULE ORAL EVERY 6 HOURS SCHEDULED
Qty: 40 CAPSULE | Refills: 0 | Status: SHIPPED | OUTPATIENT
Start: 2018-03-12 | End: 2018-03-22

## 2018-03-12 NOTE — TELEPHONE ENCOUNTER
CC: Cellulitis of Lower Extremity - Calf Swollen, warm to touch & beginning show signs of wheeping    Asking for Rx of Keflex    NKDA    Pharm: [facility]

## 2018-03-14 ENCOUNTER — LAB REQUISITION (OUTPATIENT)
Dept: LAB | Facility: HOSPITAL | Age: 78
End: 2018-03-14
Payer: MEDICARE

## 2018-03-14 DIAGNOSIS — E11.22 TYPE 2 DIABETES MELLITUS WITH DIABETIC CHRONIC KIDNEY DISEASE (HCC): ICD-10-CM

## 2018-03-14 DIAGNOSIS — N18.4 CHRONIC KIDNEY DISEASE, STAGE IV (SEVERE) (HCC): ICD-10-CM

## 2018-03-14 LAB
ALBUMIN SERPL BCP-MCNC: 2.8 G/DL (ref 3.5–5)
ALP SERPL-CCNC: 33 U/L (ref 46–116)
ALT SERPL W P-5'-P-CCNC: 29 U/L (ref 12–78)
ANION GAP SERPL CALCULATED.3IONS-SCNC: 10 MMOL/L (ref 4–13)
AST SERPL W P-5'-P-CCNC: 20 U/L (ref 5–45)
BASOPHILS # BLD AUTO: 0.08 THOUSANDS/ΜL (ref 0–0.1)
BASOPHILS NFR BLD AUTO: 1 % (ref 0–1)
BILIRUB SERPL-MCNC: 0.3 MG/DL (ref 0.2–1)
BUN SERPL-MCNC: 47 MG/DL (ref 5–25)
CALCIUM SERPL-MCNC: 9 MG/DL (ref 8.3–10.1)
CHLORIDE SERPL-SCNC: 99 MMOL/L (ref 100–108)
CO2 SERPL-SCNC: 33 MMOL/L (ref 21–32)
CREAT SERPL-MCNC: 2.47 MG/DL (ref 0.6–1.3)
EOSINOPHIL # BLD AUTO: 0.12 THOUSAND/ΜL (ref 0–0.61)
EOSINOPHIL NFR BLD AUTO: 2 % (ref 0–6)
ERYTHROCYTE [DISTWIDTH] IN BLOOD BY AUTOMATED COUNT: 16.2 % (ref 11.6–15.1)
FERRITIN SERPL-MCNC: 58 NG/ML (ref 8–388)
GFR SERPL CREATININE-BSD FRML MDRD: 24 ML/MIN/1.73SQ M
GLUCOSE SERPL-MCNC: 246 MG/DL (ref 65–140)
HCT VFR BLD AUTO: 40.9 % (ref 36.5–49.3)
HGB BLD-MCNC: 12.8 G/DL (ref 12–17)
IRON SATN MFR SERPL: 12 %
IRON SERPL-MCNC: 43 UG/DL (ref 65–175)
LYMPHOCYTES # BLD AUTO: 0.77 THOUSANDS/ΜL (ref 0.6–4.47)
LYMPHOCYTES NFR BLD AUTO: 10 % (ref 14–44)
MCH RBC QN AUTO: 30.8 PG (ref 26.8–34.3)
MCHC RBC AUTO-ENTMCNC: 31.3 G/DL (ref 31.4–37.4)
MCV RBC AUTO: 98 FL (ref 82–98)
MONOCYTES # BLD AUTO: 0.73 THOUSAND/ΜL (ref 0.17–1.22)
MONOCYTES NFR BLD AUTO: 9 % (ref 4–12)
NEUTROPHILS # BLD AUTO: 6.14 THOUSANDS/ΜL (ref 1.85–7.62)
NEUTS SEG NFR BLD AUTO: 78 % (ref 43–75)
PLATELET # BLD AUTO: 290 THOUSANDS/UL (ref 149–390)
PMV BLD AUTO: 10.8 FL (ref 8.9–12.7)
POTASSIUM SERPL-SCNC: 3.8 MMOL/L (ref 3.5–5.3)
PROT SERPL-MCNC: 5.5 G/DL (ref 6.4–8.2)
RBC # BLD AUTO: 4.16 MILLION/UL (ref 3.88–5.62)
SODIUM SERPL-SCNC: 142 MMOL/L (ref 136–145)
TIBC SERPL-MCNC: 371 UG/DL (ref 250–450)
WBC # BLD AUTO: 7.84 THOUSAND/UL (ref 4.31–10.16)

## 2018-03-14 PROCEDURE — 85025 COMPLETE CBC W/AUTO DIFF WBC: CPT | Performed by: INTERNAL MEDICINE

## 2018-03-14 PROCEDURE — 83540 ASSAY OF IRON: CPT | Performed by: INTERNAL MEDICINE

## 2018-03-14 PROCEDURE — 82728 ASSAY OF FERRITIN: CPT | Performed by: INTERNAL MEDICINE

## 2018-03-14 PROCEDURE — 83550 IRON BINDING TEST: CPT | Performed by: INTERNAL MEDICINE

## 2018-03-14 PROCEDURE — 80053 COMPREHEN METABOLIC PANEL: CPT | Performed by: INTERNAL MEDICINE

## 2018-03-15 DIAGNOSIS — C7A.090 MALIGNANT CARCINOID TUMOR OF BRONCHUS AND LUNG (HCC): ICD-10-CM

## 2018-03-19 ENCOUNTER — APPOINTMENT (OUTPATIENT)
Dept: WOUND CARE | Facility: HOSPITAL | Age: 78
End: 2018-03-19
Payer: MEDICARE

## 2018-03-19 PROCEDURE — 97597 DBRDMT OPN WND 1ST 20 CM/<: CPT

## 2018-03-20 ENCOUNTER — TELEPHONE (OUTPATIENT)
Dept: FAMILY MEDICINE CLINIC | Facility: CLINIC | Age: 78
End: 2018-03-20

## 2018-03-20 NOTE — TELEPHONE ENCOUNTER
Have patient increase his Levemir by 6 units and instead of injecting 15 units at each meal time inject 18 units at each meal time of his short-acting insulin

## 2018-03-20 NOTE — TELEPHONE ENCOUNTER
Patients blood sugar readings    3/9-      325/264    3/10-    133/366/218    3/11-    87/301/175    3/12-    97/270/239    3/13-    277/174/211    3/15-    188/214/307    3/16-    159/275/277    3/17-    166/255/226    3/18-    194/232/178    3/19-    226/254/499    3/20-    260/226

## 2018-03-26 ENCOUNTER — APPOINTMENT (EMERGENCY)
Dept: RADIOLOGY | Facility: HOSPITAL | Age: 78
DRG: 291 | End: 2018-03-26
Payer: MEDICARE

## 2018-03-26 ENCOUNTER — TELEPHONE (OUTPATIENT)
Dept: FAMILY MEDICINE CLINIC | Facility: CLINIC | Age: 78
End: 2018-03-26

## 2018-03-26 ENCOUNTER — HOSPITAL ENCOUNTER (INPATIENT)
Facility: HOSPITAL | Age: 78
LOS: 4 days | Discharge: HOME | DRG: 291 | End: 2018-03-30
Attending: EMERGENCY MEDICINE | Admitting: INTERNAL MEDICINE
Payer: MEDICARE

## 2018-03-26 DIAGNOSIS — E66.01 MORBID OBESITY (HCC): ICD-10-CM

## 2018-03-26 DIAGNOSIS — I50.9 ACUTE EXACERBATION OF CONGESTIVE HEART FAILURE (HCC): Primary | ICD-10-CM

## 2018-03-26 DIAGNOSIS — N18.4 CKD (CHRONIC KIDNEY DISEASE) STAGE 4, GFR 15-29 ML/MIN (HCC): Chronic | ICD-10-CM

## 2018-03-26 DIAGNOSIS — I48.20 CHRONIC ATRIAL FIBRILLATION (HCC): ICD-10-CM

## 2018-03-26 DIAGNOSIS — E87.70 HYPERVOLEMIA, UNSPECIFIED HYPERVOLEMIA TYPE: ICD-10-CM

## 2018-03-26 DIAGNOSIS — L03.119 CELLULITIS OF LOWER EXTREMITY, UNSPECIFIED LATERALITY: ICD-10-CM

## 2018-03-26 LAB
ALBUMIN SERPL BCP-MCNC: 2.8 G/DL (ref 3.5–5)
ALP SERPL-CCNC: 38 U/L (ref 46–116)
ALT SERPL W P-5'-P-CCNC: 27 U/L (ref 12–78)
ANION GAP SERPL CALCULATED.3IONS-SCNC: 9 MMOL/L (ref 4–13)
AST SERPL W P-5'-P-CCNC: 19 U/L (ref 5–45)
BASOPHILS # BLD AUTO: 0.07 THOUSANDS/ΜL (ref 0–0.1)
BASOPHILS NFR BLD AUTO: 1 % (ref 0–1)
BILIRUB SERPL-MCNC: 0.3 MG/DL (ref 0.2–1)
BILIRUB UR QL STRIP: NEGATIVE
BUN SERPL-MCNC: 39 MG/DL (ref 5–25)
CALCIUM SERPL-MCNC: 9.2 MG/DL (ref 8.3–10.1)
CHLORIDE SERPL-SCNC: 98 MMOL/L (ref 100–108)
CLARITY UR: CLEAR
CO2 SERPL-SCNC: 34 MMOL/L (ref 21–32)
COLOR UR: YELLOW
CREAT SERPL-MCNC: 2.55 MG/DL (ref 0.6–1.3)
EOSINOPHIL # BLD AUTO: 0.11 THOUSAND/ΜL (ref 0–0.61)
EOSINOPHIL NFR BLD AUTO: 1 % (ref 0–6)
ERYTHROCYTE [DISTWIDTH] IN BLOOD BY AUTOMATED COUNT: 16.1 % (ref 11.6–15.1)
GFR SERPL CREATININE-BSD FRML MDRD: 23 ML/MIN/1.73SQ M
GLUCOSE SERPL-MCNC: 206 MG/DL (ref 65–140)
GLUCOSE UR STRIP-MCNC: NEGATIVE MG/DL
HCT VFR BLD AUTO: 41.3 % (ref 36.5–49.3)
HGB BLD-MCNC: 13.1 G/DL (ref 12–17)
HGB UR QL STRIP.AUTO: NEGATIVE
KETONES UR STRIP-MCNC: NEGATIVE MG/DL
LACTATE SERPL-SCNC: 2.4 MMOL/L (ref 0.5–2)
LEUKOCYTE ESTERASE UR QL STRIP: NEGATIVE
LYMPHOCYTES # BLD AUTO: 0.96 THOUSANDS/ΜL (ref 0.6–4.47)
LYMPHOCYTES NFR BLD AUTO: 11 % (ref 14–44)
MCH RBC QN AUTO: 30.5 PG (ref 26.8–34.3)
MCHC RBC AUTO-ENTMCNC: 31.7 G/DL (ref 31.4–37.4)
MCV RBC AUTO: 96 FL (ref 82–98)
MONOCYTES # BLD AUTO: 0.62 THOUSAND/ΜL (ref 0.17–1.22)
MONOCYTES NFR BLD AUTO: 7 % (ref 4–12)
NEUTROPHILS # BLD AUTO: 6.89 THOUSANDS/ΜL (ref 1.85–7.62)
NEUTS SEG NFR BLD AUTO: 80 % (ref 43–75)
NITRITE UR QL STRIP: NEGATIVE
NT-PROBNP SERPL-MCNC: 3089 PG/ML
PH UR STRIP.AUTO: 6 [PH] (ref 4.5–8)
PLATELET # BLD AUTO: 317 THOUSANDS/UL (ref 149–390)
PMV BLD AUTO: 9.6 FL (ref 8.9–12.7)
POTASSIUM SERPL-SCNC: 4.1 MMOL/L (ref 3.5–5.3)
PROT SERPL-MCNC: 6.5 G/DL (ref 6.4–8.2)
PROT UR STRIP-MCNC: NEGATIVE MG/DL
RBC # BLD AUTO: 4.3 MILLION/UL (ref 3.88–5.62)
SODIUM SERPL-SCNC: 141 MMOL/L (ref 136–145)
SP GR UR STRIP.AUTO: 1.01 (ref 1–1.03)
TROPONIN I SERPL-MCNC: 0.09 NG/ML
TSH SERPL DL<=0.05 MIU/L-ACNC: 1.75 UIU/ML (ref 0.36–3.74)
UROBILINOGEN UR QL STRIP.AUTO: 0.2 E.U./DL
WBC # BLD AUTO: 8.65 THOUSAND/UL (ref 4.31–10.16)

## 2018-03-26 PROCEDURE — 83605 ASSAY OF LACTIC ACID: CPT | Performed by: EMERGENCY MEDICINE

## 2018-03-26 PROCEDURE — 36415 COLL VENOUS BLD VENIPUNCTURE: CPT | Performed by: EMERGENCY MEDICINE

## 2018-03-26 PROCEDURE — 80053 COMPREHEN METABOLIC PANEL: CPT | Performed by: EMERGENCY MEDICINE

## 2018-03-26 PROCEDURE — 87040 BLOOD CULTURE FOR BACTERIA: CPT | Performed by: EMERGENCY MEDICINE

## 2018-03-26 PROCEDURE — 84484 ASSAY OF TROPONIN QUANT: CPT | Performed by: EMERGENCY MEDICINE

## 2018-03-26 PROCEDURE — 84443 ASSAY THYROID STIM HORMONE: CPT | Performed by: EMERGENCY MEDICINE

## 2018-03-26 PROCEDURE — 96365 THER/PROPH/DIAG IV INF INIT: CPT

## 2018-03-26 PROCEDURE — 81003 URINALYSIS AUTO W/O SCOPE: CPT | Performed by: EMERGENCY MEDICINE

## 2018-03-26 PROCEDURE — 73630 X-RAY EXAM OF FOOT: CPT

## 2018-03-26 PROCEDURE — 93005 ELECTROCARDIOGRAM TRACING: CPT | Performed by: EMERGENCY MEDICINE

## 2018-03-26 PROCEDURE — 83880 ASSAY OF NATRIURETIC PEPTIDE: CPT | Performed by: EMERGENCY MEDICINE

## 2018-03-26 PROCEDURE — 85025 COMPLETE CBC W/AUTO DIFF WBC: CPT | Performed by: EMERGENCY MEDICINE

## 2018-03-26 PROCEDURE — 71046 X-RAY EXAM CHEST 2 VIEWS: CPT

## 2018-03-26 RX ORDER — PREDNISONE 1 MG/1
0.25 TABLET ORAL EVERY OTHER DAY
COMMUNITY
End: 2018-07-12 | Stop reason: ALTCHOICE

## 2018-03-26 RX ORDER — IPRATROPIUM BROMIDE AND ALBUTEROL SULFATE 2.5; .5 MG/3ML; MG/3ML
3 SOLUTION RESPIRATORY (INHALATION) ONCE
Status: COMPLETED | OUTPATIENT
Start: 2018-03-26 | End: 2018-03-26

## 2018-03-26 RX ADMIN — Medication 120 MG: at 22:14

## 2018-03-26 RX ADMIN — IPRATROPIUM BROMIDE AND ALBUTEROL SULFATE 3 ML: .5; 3 SOLUTION RESPIRATORY (INHALATION) at 19:59

## 2018-03-26 RX ADMIN — Medication 3.38 G: at 20:18

## 2018-03-26 NOTE — ED PROVIDER NOTES
History  Chief Complaint   Patient presents with    Foot Pain     Patient states that his left foot started to hurt him last night  Patient also states that he has been gaining a lot of weight because of fluid retention  Patient is an 78-year-old male  He presents from his family doctor for evaluation of possible fluid overload  He is complaining of increased swelling to his legs and lower abdomen  He does have a history of congestive heart failure  He also has renal disease  He is on oral anticoagulants  He recently was on antibiotics for cellulitis to his legs  He is complaining of increased pain to his left foot  No fever or chills  No chest pain  He is complaining of wheezing and congestion in his chest   Symptoms are moderately severe  There been no relieving factors  Foot hurts worse to walk on it  The pain in the foot is worse since last night  Prior to Admission Medications   Prescriptions Last Dose Informant Patient Reported? Taking?    Cholecalciferol 2000 units TABS   No Yes   Sig: Take 1 tablet by mouth daily   apixaban (ELIQUIS) 5 mg   No Yes   Sig: Take 1 tablet by mouth 2 (two) times a day   aspirin (ECOTRIN LOW STRENGTH) 81 mg EC tablet   Yes Yes   Sig: Take 81 mg by mouth 2 (two) times a day   furosemide (LASIX) 80 mg tablet   No Yes   Sig: Take 1 tablet by mouth 2 (two) times a day   gabapentin (NEURONTIN) 300 mg capsule   Yes Yes   Sig: Take 300 mg by mouth daily at bedtime   glucagon (GLUCAGON EMERGENCY) 1 MG injection   No Yes   Sig: Inject 1 mg under the skin once as needed for low blood sugar (as needed for a blood sugar less than 70 if unconscious or unable to correct by oral means) for up to 1 dose   insulin detemir (LEVEMIR) 100 units/mL subcutaneous injection   No Yes   Sig: Inject 50 Units under the skin daily at bedtime   Patient taking differently: Inject 55 Units under the skin daily at bedtime     insulin lispro (HumaLOG) 100 units/mL injection   No Yes Sig: Inject 15 Units under the skin 3 (three) times a day with meals   iron polysaccharides (NIFEREX) 150 mg capsule   Yes Yes   Sig: Take 150 mg by mouth daily   metoprolol tartrate (LOPRESSOR) 25 mg tablet   Yes Yes   Sig: Take 25 mg by mouth 2 (two) times a day   potassium chloride (MICRO-K) 10 MEQ CR capsule   No Yes   Sig: Take 1 capsule (10 mEq total) by mouth 2 (two) times a day for 30 days   predniSONE 1 mg tablet 3/26/2018 at Unknown time  Yes Yes   Sig: Take 0 25 mg by mouth daily   simvastatin (ZOCOR) 40 mg tablet   Yes Yes   Sig: Take 40 mg by mouth daily at bedtime   tamsulosin (FLOMAX) 0 4 mg   Yes Yes   Sig: Take 0 4 mg by mouth daily at bedtime        Facility-Administered Medications: None       Past Medical History:   Diagnosis Date    Anemia     Arthritis     Atrial fibrillation (HCC)     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HCC)     B12 deficiency     Back pain     Blind right eye     Cardiac disorder     Chronic combined systolic and diastolic heart failure (HCC)     Chronic obstructive lung disease (HCC)     Chronic venous stasis dermatitis of both lower extremities     CKD (chronic kidney disease), stage IV (HCC)     Coronary artery disease     DDD (degenerative disc disease)     DM (diabetes mellitus), type 2 (HCC)     Type II, on insulin    BAER (dyspnea on exertion)     Easy bruising     Epistaxis     Gout     Gross hematuria     last assessed: 3/18/2015    Hepatitis     Herpes zoster     Hiatal hernia     History of cellulitis     BLE    History of prostate cancer     Radiation treatments      Hypercholesterolemia     Hyperlipidemia     Hypertension     Ischemic cardiomyopathy     Lung mass     last assessed: 9/21/2012    MI, old    Terri Rogel Morbid obesity due to excess calories (HCC)     or severe obesity    Neuropathy     BLE    Obesity     Obstructive sleep apnea syndrome, severe     Pneumonia     last assessed: 7/24/2012    Shortness of breath     TIA (transient ischemic attack)     Urinary incontinence     Urinary retention     Use of cane as ambulatory aid     Independent with personal care  Past Surgical History:   Procedure Laterality Date    ABDOMINAL SURGERY      CARDIAC SURGERY      CATARACT EXTRACTION, BILATERAL      CORONARY ANGIOPLASTY WITH STENT PLACEMENT      2 stents    CORONARY ARTERY BYPASS GRAFT N/A 7/15/2016    Procedure: Intraop ADRIAN, CABG x 3 using LIMA to LAD, RSVG to OM1 and D1;  Surgeon: Bari Owen MD;  Location: [facility];  Service:    Sharri Benitez      has stents    EYE SURGERY      Bilateral cataract removal    HERNIA REPAIR      umbilical hernia repair    LUNG CANCER SURGERY      Partial upper right lobectomy    LUNG SURGERY Left 2012    OTHER SURGICAL HISTORY      previous stent placement-no anatomy given    PILONIDAL CYST EXCISION      RI CYSTOURETHROSCOPY,URETER CATHETER Left 3/9/2016    Procedure: CYSTOSCOPY WITH left RETROGRADE PYELOGRAM left double J stent removal;  Surgeon: Onofre Davis MD;  Location: [facility];  Service: Urology    RI TEMPORAL ARTERY LIGATN OR BX Bilateral 10/31/2017    Procedure: BIOPSY ARTERY TEMPORAL;  Surgeon: Migdalia Rios MD;  Location: [facility];  Service: Vascular    RENAL ARTERY STENT  02/16/2015    transcath intravascular stent placement percutaneous renal    VASCULAR SURGERY         Family History   Problem Relation Age of Onset    Diabetes Other     Hypertension Other     Cancer Other     Diabetes Mother     Heart disease Mother     Hypertension Mother     Diabetes type II Mother     Cancer Mother      unknown type    Diabetes Father     Diabetes Maternal Grandmother     Cancer Sister      unknown type    Cancer Other      unknown type     I have reviewed and agree with the history as documented      Social History   Substance Use Topics    Smoking status: Former Smoker     Packs/day: 2 00     Years: 54 00     Quit date: 2004    Smokeless tobacco: Never Used Comment: Pt is a non smoker    Alcohol use No        Review of Systems   Constitutional: Negative for chills and fever  HENT: Positive for voice change  Negative for rhinorrhea and sore throat  Eyes: Negative for pain, redness and visual disturbance  Respiratory: Positive for cough, shortness of breath and wheezing  Cardiovascular: Positive for leg swelling  Negative for chest pain  Gastrointestinal: Negative for abdominal pain, diarrhea and vomiting  Endocrine: Negative for polydipsia and polyuria  Genitourinary: Negative for dysuria, frequency and hematuria  Musculoskeletal: Negative for back pain and neck pain  Skin: Positive for rash and wound  Allergic/Immunologic: Negative for immunocompromised state  Neurological: Negative for weakness, numbness and headaches  Psychiatric/Behavioral: Negative for hallucinations and suicidal ideas  All other systems reviewed and are negative  Physical Exam  ED Triage Vitals [03/26/18 1436]   Temperature Pulse Respirations Blood Pressure SpO2   (!) 97 1 °F (36 2 °C) 84 18 117/58 96 %      Temp Source Heart Rate Source Patient Position - Orthostatic VS BP Location FiO2 (%)   Temporal Monitor Sitting Right arm --      Pain Score       9           Orthostatic Vital Signs  Vitals:    03/28/18 0700 03/28/18 1500 03/28/18 1723 03/28/18 1900   BP: 139/60 118/73 140/63 132/74   Pulse: 88 86 86 80   Patient Position - Orthostatic VS: Sitting Lying Sitting Sitting       Physical Exam   Constitutional: He is oriented to person, place, and time  He appears well-developed and well-nourished  No distress  Obese male  HENT:   Head: Normocephalic and atraumatic  Mouth/Throat: Oropharynx is clear and moist    Eyes: Conjunctivae are normal  Right eye exhibits no discharge  Left eye exhibits no discharge  No scleral icterus  Neck: Normal range of motion  Neck supple     Cardiovascular: Normal rate, regular rhythm, normal heart sounds and intact distal pulses  Exam reveals no gallop and no friction rub  No murmur heard  Pulmonary/Chest: Effort normal  No respiratory distress  He has wheezes  He has no rales  Abdominal: Soft  Bowel sounds are normal  He exhibits no distension  There is no tenderness  There is no rebound and no guarding  Musculoskeletal: Normal range of motion  He exhibits edema  He exhibits no tenderness or deformity  4+ edema to both lower extremities  There is erythema and warmth to the skin of both lower legs  There is erythema and warmth and swelling to the dorsum of the left foot  Neurological: He is alert and oriented to person, place, and time  He has normal strength  No sensory deficit  GCS eye subscore is 4  GCS verbal subscore is 5  GCS motor subscore is 6  Skin: Skin is warm and dry  He is not diaphoretic  Psychiatric: He has a normal mood and affect  His behavior is normal    Vitals reviewed        ED Medications  Medications   aspirin (ECOTRIN LOW STRENGTH) EC tablet 81 mg (81 mg Oral Given 3/28/18 1916)   cholecalciferol (VITAMIN D3) tablet 2,000 Units (2,000 Units Oral Given 3/28/18 0804)   gabapentin (NEURONTIN) capsule 300 mg (300 mg Oral Given 3/28/18 2139)   insulin detemir (LEVEMIR) subcutaneous injection 50 Units (50 Units Subcutaneous Given 3/28/18 2140)   insulin lispro (HumaLOG) 100 units/mL subcutaneous injection 15 Units (15 Units Subcutaneous Given 3/28/18 1720)   metoprolol tartrate (LOPRESSOR) tablet 25 mg (25 mg Oral Given 3/28/18 1915)   pravastatin (PRAVACHOL) tablet 80 mg (80 mg Oral Given 3/28/18 1914)   tamsulosin (FLOMAX) capsule 0 4 mg (0 4 mg Oral Given 3/28/18 2139)   ondansetron (ZOFRAN) injection 4 mg (not administered)   acetaminophen (TYLENOL) tablet 650 mg (650 mg Oral Given 3/28/18 0758)   insulin lispro (HumaLOG) 100 units/mL subcutaneous injection 2-12 Units (2 Units Subcutaneous Given 3/28/18 1720)   insulin lispro (HumaLOG) 100 units/mL subcutaneous injection 2-12 Units (4 Units Subcutaneous Given 3/28/18 2140)   albuterol inhalation solution 2 5 mg (not administered)   apixaban (ELIQUIS) tablet 2 5 mg (2 5 mg Oral Given 3/28/18 1914)   predniSONE tablet 10 mg (10 mg Oral Given 3/28/18 0803)   furosemide (LASIX) injection 80 mg (80 mg Intravenous Given 3/28/18 1915)   potassium chloride (K-DUR,KLOR-CON) CR tablet 20 mEq (20 mEq Oral Given 3/28/18 0905)   cefTRIAXone (ROCEPHIN) IVPB (premix) 1,000 mg (0 mg Intravenous Stopped 3/28/18 1100)   piperacillin-tazobactam (ZOSYN) 3 375 g in sodium chloride 0 9 % 50 mL IVPB (0 g Intravenous Stopped 3/26/18 2100)   ipratropium-albuterol (DUO-NEB) 0 5-2 5 mg/3 mL inhalation solution 3 mL (3 mL Nebulization Given 3/26/18 1959)   furosemide (LASIX) 120 mg in dextrose 5 % 50 mL IVPB (0 mg Intravenous Stopped 3/26/18 2303)   potassium chloride (K-DUR,KLOR-CON) CR tablet 40 mEq (40 mEq Oral Given 3/27/18 0729)   potassium chloride (K-DUR,KLOR-CON) CR tablet 40 mEq (40 mEq Oral Given 3/28/18 0809)       Diagnostic Studies  Results Reviewed     Procedure Component Value Units Date/Time    Blood culture #1 [[id number]] Collected:  03/26/18 1907    Lab Status:  Preliminary result Specimen:  Blood from Arm, Right Updated:  03/28/18 0901     Blood Culture No Growth at 24 hrs  Blood culture #2 [[id number]] Collected:  03/26/18 2011    Lab Status:  Preliminary result Specimen:  Blood from Line, Venous Updated:  03/28/18 0901     Blood Culture No Growth at 24 hrs      Troponin I [[id number]]  (Abnormal) Collected:  03/27/18 1134    Lab Status:  Final result Specimen:  Blood from Arm, Right Updated:  03/27/18 1241     Troponin I 0 11 (HH) ng/mL     Narrative:         [employer] Chemistry analyzer 99% cutoff is > 0 04 ng/mL in network labs    o cTnI 99% cutoff is useful only when applied to patients in the clinical setting of myocardial ischemia  o cTnI 99% cutoff should be interpreted in the context of clinical history, ECG findings and possibly cardiac imaging to establish correct diagnosis  o cTnI 99% cutoff may be suggestive but clearly not indicative of a coronary event without the clinical setting of myocardial ischemia  Hemoglobin A1c w/EAG Estimation (Orders if not completed within the last 90 days) [[id number]]  (Abnormal) Collected:  03/27/18 0526    Lab Status:  Final result Specimen:  Blood from Arm, Right Updated:  03/27/18 1229     Hemoglobin A1C 8 3 (H) %       mg/dl     Troponin I [[id number]]  (Abnormal) Collected:  03/27/18 7425    Lab Status:  Final result Specimen:  Blood from Arm, Right Updated:  03/27/18 0906     Troponin I 0 10 (HH) ng/mL     Narrative:         [employer] Chemistry analyzer 99% cutoff is > 0 04 ng/mL in network labs    o cTnI 99% cutoff is useful only when applied to patients in the clinical setting of myocardial ischemia  o cTnI 99% cutoff should be interpreted in the context of clinical history, ECG findings and possibly cardiac imaging to establish correct diagnosis  o cTnI 99% cutoff may be suggestive but clearly not indicative of a coronary event without the clinical setting of myocardial ischemia      Fingerstick Glucose (POCT) [[id number]]  (Abnormal) Collected:  03/27/18 0648    Lab Status:  Final result Updated:  03/27/18 0650     POC Glucose 362 (H) mg/dl     Comprehensive metabolic panel [[id number]]  (Abnormal) Collected:  03/27/18 0526    Lab Status:  Final result Specimen:  Blood from Arm, Right Updated:  03/27/18 0610     Sodium 139 mmol/L      Potassium 3 4 (L) mmol/L      Chloride 100 mmol/L      CO2 32 mmol/L      Anion Gap 7 mmol/L      BUN 36 (H) mg/dL      Creatinine 2 30 (H) mg/dL      Glucose 335 (H) mg/dL      Calcium 8 3 mg/dL      AST 16 U/L      ALT 23 U/L      Alkaline Phosphatase 31 (L) U/L      Total Protein 5 5 (L) g/dL      Albumin 2 4 (L) g/dL      Total Bilirubin 0 30 mg/dL      eGFR 26 ml/min/1 73sq m     Narrative:         National Kidney Disease Education Program recommendations are as follows:  GFR calculation is accurate only with a steady state creatinine  Chronic Kidney disease less than 60 ml/min/1 73 sq  meters  Kidney failure less than 15 ml/min/1 73 sq  meters  Magnesium [[id number]]  (Normal) Collected:  03/27/18 0526    Lab Status:  Final result Specimen:  Blood from Arm, Right Updated:  03/27/18 0610     Magnesium 1 8 mg/dL     Phosphorus [[id number]]  (Normal) Collected:  03/27/18 0526    Lab Status:  Final result Specimen:  Blood from Arm, Right Updated:  03/27/18 0610     Phosphorus 3 7 mg/dL     CBC (With Platelets) [[id number]]  (Abnormal) Collected:  03/27/18 0526    Lab Status:  Final result Specimen:  Blood from Arm, Right Updated:  03/27/18 0534     WBC 8 32 Thousand/uL      RBC 3 93 Million/uL      Hemoglobin 11 9 (L) g/dL      Hematocrit 37 5 %      MCV 95 fL      MCH 30 3 pg      MCHC 31 7 g/dL      RDW 15 9 (H) %      Platelets 790 Thousands/uL      MPV 9 6 fL     Fingerstick Glucose (POCT) [[id number]]  (Abnormal) Collected:  03/27/18 0203    Lab Status:  Final result Updated:  03/27/18 0447     POC Glucose 333 (H) mg/dl     Lactic acid, plasma [[id number]]  (Normal) Collected:  03/27/18 0241    Lab Status:  Final result Specimen:  Blood from Arm, Right Updated:  03/27/18 0303     LACTIC ACID 2 0 mmol/L     Narrative:         Result may be elevated if tourniquet was used during collection  Lactic acid, plasma [[id number]]  (Abnormal) Collected:  03/26/18 1907    Lab Status:  Final result Specimen:  Blood from Arm, Right Updated:  03/26/18 2002     LACTIC ACID 2 4 (HH) mmol/L     Narrative:         Result may be elevated if tourniquet was used during collection      Troponin I [[id number]]  (Abnormal) Collected:  03/26/18 1907    Lab Status:  Final result Specimen:  Blood from Arm, Right Updated:  03/26/18 2002     Troponin I 0 09 (HH) ng/mL     Narrative:         [employer] Chemistry analyzer 99% cutoff is > 0 04 ng/mL in network labs    o cTnI 99% cutoff is useful only when applied to patients in the clinical setting of myocardial ischemia  o cTnI 99% cutoff should be interpreted in the context of clinical history, ECG findings and possibly cardiac imaging to establish correct diagnosis  o cTnI 99% cutoff may be suggestive but clearly not indicative of a coronary event without the clinical setting of myocardial ischemia  Comprehensive metabolic panel [[id number]]  (Abnormal) Collected:  03/26/18 1907    Lab Status:  Final result Specimen:  Blood from Arm, Right Updated:  03/26/18 1947     Sodium 141 mmol/L      Potassium 4 1 mmol/L      Chloride 98 (L) mmol/L      CO2 34 (H) mmol/L      Anion Gap 9 mmol/L      BUN 39 (H) mg/dL      Creatinine 2 55 (H) mg/dL      Glucose 206 (H) mg/dL      Calcium 9 2 mg/dL      AST 19 U/L      ALT 27 U/L      Alkaline Phosphatase 38 (L) U/L      Total Protein 6 5 g/dL      Albumin 2 8 (L) g/dL      Total Bilirubin 0 30 mg/dL      eGFR 23 ml/min/1 73sq m     Narrative:         National Kidney Disease Education Program recommendations are as follows:  GFR calculation is accurate only with a steady state creatinine  Chronic Kidney disease less than 60 ml/min/1 73 sq  meters  Kidney failure less than 15 ml/min/1 73 sq  meters  TSH [[id number]]  (Normal) Collected:  03/26/18 1907    Lab Status:  Final result Specimen:  Blood from Arm, Right Updated:  03/26/18 1947     TSH 3RD GENERATON 1 747 uIU/mL     Narrative:         Patients undergoing fluorescein dye angiography may retain small amounts of fluorescein in the body for 48-72 hours post procedure  Samples containing fluorescein can produce falsely depressed TSH values  If the patient had this procedure,a specimen should be resubmitted post fluorescein clearance      B-type natriuretic peptide [[id number]]  (Abnormal) Collected:  03/26/18 1907    Lab Status:  Final result Specimen:  Blood from Arm, Right Updated:  03/26/18 1947     NT-proBNP 3,089 (H) pg/mL     UA w Reflex to Microscopic [[id number]]  (Normal) Collected: 03/26/18 1915    Lab Status:  Final result Specimen:  Urine from Urine, Clean Catch Updated:  03/26/18 1928     Color, UA Yellow     Clarity, UA Clear     Specific Gravity, UA 1 010     pH, UA 6 0     Leukocytes, UA Negative     Nitrite, UA Negative     Protein, UA Negative mg/dl      Glucose, UA Negative mg/dl      Ketones, UA Negative mg/dl      Urobilinogen, UA 0 2 E U /dl      Bilirubin, UA Negative     Blood, UA Negative    CBC and differential [[id number]]  (Abnormal) Collected:  03/26/18 1907    Lab Status:  Final result Specimen:  Blood from Arm, Right Updated:  03/26/18 1918     WBC 8 65 Thousand/uL      RBC 4 30 Million/uL      Hemoglobin 13 1 g/dL      Hematocrit 41 3 %      MCV 96 fL      MCH 30 5 pg      MCHC 31 7 g/dL      RDW 16 1 (H) %      MPV 9 6 fL      Platelets 906 Thousands/uL      Neutrophils Relative 80 (H) %      Lymphocytes Relative 11 (L) %      Monocytes Relative 7 %      Eosinophils Relative 1 %      Basophils Relative 1 %      Neutrophils Absolute 6 89 Thousands/µL      Lymphocytes Absolute 0 96 Thousands/µL      Monocytes Absolute 0 62 Thousand/µL      Eosinophils Absolute 0 11 Thousand/µL      Basophils Absolute 0 07 Thousands/µL                  XR foot 3+ views LEFT   ED Interpretation by Janet Ward MD (03/26 2127)   No fracture  No foreign body  Final Result by ZELDA Deal MD (03/27 5237)      No acute osseous abnormality  Dorsal soft tissue swelling  Workstation performed: [id number]         XR chest 2 views   ED Interpretation by Janet Ward MD (03/26 2127)   Cardiomegaly  Pulmonary edema  Limited by portable study and body habitus  Final Result by ZELDA Deal MD (03/27 7838)      Congestive heart failure type changes, status post CABG           ____ ____ ____ ____ ____ ____ ____ ____ ____ ____ ____ ____ ____       As per comments in the PACS workstation, findings are concordant with preliminary interpretation provided by the emergency room physician                 Workstation performed: [id number]         VAS lower limb venous duplex study, complete bilateral    (Results Pending)              Procedures  ECG 12 Lead Documentation  Date/Time: 3/26/2018 9:11 PM  Performed by: Melia Gómez  Authorized by: Melia Gómez     ECG reviewed by me, the ED Provider: yes    Patient location:  ED  Interpretation:     Interpretation: non-specific    Comments:      Normal sinus rhythm  Left axis deviation  Right bundle branch block  Inferior infarct  Unchanged from old EKG  Phone Contacts  ED Phone Contact    ED Course  ED Course                                MDM  Number of Diagnoses or Management Options  Diagnosis management comments: Chest x-ray and BNP as well as physical exam all suggest fluid overload  Lasix was ordered  In addition patient apparently has cellulitis to both his lower extremities  He has failed outpatient management  Consulted with hospitalist for admission  Lactate is slightly elevated  It is below for though  I did not administer large fluid boluses due to fluid overload  Patient was treated with empiric antibiotics  Troponin was slightly elevated  This is chronic  His creatinine is elevated but close to his baseline         Amount and/or Complexity of Data Reviewed  Clinical lab tests: ordered and reviewed  Tests in the radiology section of CPT®: ordered and reviewed  Discuss the patient with other providers: yes  Independent visualization of images, tracings, or specimens: yes      CritCare Time    Disposition  Final diagnoses:   Acute exacerbation of congestive heart failure ([address] )   Cellulitis of lower extremity, unspecified laterality     Time reflects when diagnosis was documented in both MDM as applicable and the Disposition within this note     Time User Action Codes Description Comment    3/26/2018  9:29 PM Daily Olivas Add [I50 9] Acute exacerbation of congestive heart failure ([facility] [address] ) 3/26/2018  9:29 PM Aurora Suha Add [H89 667] Cellulitis of lower extremity, unspecified laterality     3/27/2018 12:50 AM Gus Acosta Add [N18 4] CKD (chronic kidney disease) stage 4, GFR 15-29 ml/min ([address] )     3/27/2018 12:50 AM Gus Acosta Modify [N18 4] CKD (chronic kidney disease) stage 4, GFR 15-29 ml/min ([address] )     3/27/2018 12:50 AM Gus Acsota Modify [N18 4] CKD (chronic kidney disease) stage 4, GFR 15-29 ml/min ([address] )     3/27/2018 12:50 AM Gus Acosta Add [E87 70] Hypervolemia, unspecified hypervolemia type     3/27/2018 12:50 AM Gus Acosta Modify [E87 70] Hypervolemia, unspecified hypervolemia type     3/27/2018 12:54 AM uGs Acosta Add [E66 01] Morbid obesity [facility])       ED Disposition     ED Disposition Condition Comment    Admit          Follow-up Information    None       Current Discharge Medication List      CONTINUE these medications which have NOT CHANGED    Details   apixaban (ELIQUIS) 5 mg Take 1 tablet by mouth 2 (two) times a day  Qty: 60 tablet, Refills: 0      aspirin (ECOTRIN LOW STRENGTH) 81 mg EC tablet Take 81 mg by mouth 2 (two) times a day      Cholecalciferol 2000 units TABS Take 1 tablet by mouth daily  Qty: 30 tablet, Refills: 1      furosemide (LASIX) 80 mg tablet Take 1 tablet by mouth 2 (two) times a day  Qty: 30 tablet, Refills: 0      gabapentin (NEURONTIN) 300 mg capsule Take 300 mg by mouth daily at bedtime      glucagon (GLUCAGON EMERGENCY) 1 MG injection Inject 1 mg under the skin once as needed for low blood sugar (as needed for a blood sugar less than 70 if unconscious or unable to correct by oral means) for up to 1 dose  Qty: 1 each, Refills: 0      insulin detemir (LEVEMIR) 100 units/mL subcutaneous injection Inject 50 Units under the skin daily at bedtime  Refills: 0      insulin lispro (HumaLOG) 100 units/mL injection Inject 15 Units under the skin 3 (three) times a day with meals  Refills: 0      iron polysaccharides (NIFEREX) 150 mg capsule Take 150 mg by mouth daily      metoprolol tartrate (LOPRESSOR) 25 mg tablet Take 25 mg by mouth 2 (two) times a day      potassium chloride (MICRO-K) 10 MEQ CR capsule Take 1 capsule (10 mEq total) by mouth 2 (two) times a day for 30 days  Qty: 60 capsule, Refills: 5    Associated Diagnoses: Hypokalemia      predniSONE 1 mg tablet Take 0 25 mg by mouth daily      simvastatin (ZOCOR) 40 mg tablet Take 40 mg by mouth daily at bedtime      tamsulosin (FLOMAX) 0 4 mg Take 0 4 mg by mouth daily at bedtime             No discharge procedures on file      ED Provider  Electronically Signed by           Mic Galo MD  03/28/18 8072

## 2018-03-26 NOTE — ED NOTES
Pt returned from [address] and radiology at this time     eCdric Cullen, RN  03/26/18 1958

## 2018-03-26 NOTE — TELEPHONE ENCOUNTER
What strength pill is the prednisone it is not listed in his medication list we can probably prescribe a lower strength pill

## 2018-03-26 NOTE — TELEPHONE ENCOUNTER
Saundra Mcknight from [employer] called - into home today - weight 310lbs (pt usually hovers about 308-310)    CC: Edema, Abdomenel swelling, Pain Right Lower Leg, Wheezing, shortness of breath     Homecare recommended that Pt to go to ER -  They would like to hear that from Dr      Per Verbal orders from Dr - Recommended Pt go to ER - He may need IV Lasix

## 2018-03-26 NOTE — TELEPHONE ENCOUNTER
He is on prednisone 1/4 tablet and it is impossible to cut  What can she do? He is having a lot of foot pain at night and wants to know what he can do for it

## 2018-03-27 LAB
ALBUMIN SERPL BCP-MCNC: 2.4 G/DL (ref 3.5–5)
ALP SERPL-CCNC: 31 U/L (ref 46–116)
ALT SERPL W P-5'-P-CCNC: 23 U/L (ref 12–78)
ANION GAP SERPL CALCULATED.3IONS-SCNC: 7 MMOL/L (ref 4–13)
AST SERPL W P-5'-P-CCNC: 16 U/L (ref 5–45)
BILIRUB SERPL-MCNC: 0.3 MG/DL (ref 0.2–1)
BUN SERPL-MCNC: 36 MG/DL (ref 5–25)
CALCIUM SERPL-MCNC: 8.3 MG/DL (ref 8.3–10.1)
CHLORIDE SERPL-SCNC: 100 MMOL/L (ref 100–108)
CO2 SERPL-SCNC: 32 MMOL/L (ref 21–32)
CREAT SERPL-MCNC: 2.3 MG/DL (ref 0.6–1.3)
ERYTHROCYTE [DISTWIDTH] IN BLOOD BY AUTOMATED COUNT: 15.9 % (ref 11.6–15.1)
EST. AVERAGE GLUCOSE BLD GHB EST-MCNC: 192 MG/DL
GFR SERPL CREATININE-BSD FRML MDRD: 26 ML/MIN/1.73SQ M
GLUCOSE SERPL-MCNC: 181 MG/DL (ref 65–140)
GLUCOSE SERPL-MCNC: 277 MG/DL (ref 65–140)
GLUCOSE SERPL-MCNC: 333 MG/DL (ref 65–140)
GLUCOSE SERPL-MCNC: 335 MG/DL (ref 65–140)
GLUCOSE SERPL-MCNC: 346 MG/DL (ref 65–140)
GLUCOSE SERPL-MCNC: 362 MG/DL (ref 65–140)
HBA1C MFR BLD: 8.3 % (ref 4.2–6.3)
HCT VFR BLD AUTO: 37.5 % (ref 36.5–49.3)
HGB BLD-MCNC: 11.9 G/DL (ref 12–17)
LACTATE SERPL-SCNC: 2 MMOL/L (ref 0.5–2)
MAGNESIUM SERPL-MCNC: 1.8 MG/DL (ref 1.6–2.6)
MCH RBC QN AUTO: 30.3 PG (ref 26.8–34.3)
MCHC RBC AUTO-ENTMCNC: 31.7 G/DL (ref 31.4–37.4)
MCV RBC AUTO: 95 FL (ref 82–98)
PHOSPHATE SERPL-MCNC: 3.7 MG/DL (ref 2.3–4.1)
PLATELET # BLD AUTO: 297 THOUSANDS/UL (ref 149–390)
PMV BLD AUTO: 9.6 FL (ref 8.9–12.7)
POTASSIUM SERPL-SCNC: 3.4 MMOL/L (ref 3.5–5.3)
PROT SERPL-MCNC: 5.5 G/DL (ref 6.4–8.2)
RBC # BLD AUTO: 3.93 MILLION/UL (ref 3.88–5.62)
SODIUM SERPL-SCNC: 139 MMOL/L (ref 136–145)
TROPONIN I SERPL-MCNC: 0.09 NG/ML
TROPONIN I SERPL-MCNC: 0.1 NG/ML
TROPONIN I SERPL-MCNC: 0.11 NG/ML
TROPONIN I SERPL-MCNC: 0.11 NG/ML
WBC # BLD AUTO: 8.32 THOUSAND/UL (ref 4.31–10.16)

## 2018-03-27 PROCEDURE — 94760 N-INVAS EAR/PLS OXIMETRY 1: CPT

## 2018-03-27 PROCEDURE — 99223 1ST HOSP IP/OBS HIGH 75: CPT | Performed by: FAMILY MEDICINE

## 2018-03-27 PROCEDURE — 99285 EMERGENCY DEPT VISIT HI MDM: CPT

## 2018-03-27 PROCEDURE — 84484 ASSAY OF TROPONIN QUANT: CPT | Performed by: FAMILY MEDICINE

## 2018-03-27 PROCEDURE — 94664 DEMO&/EVAL PT USE INHALER: CPT

## 2018-03-27 PROCEDURE — 84100 ASSAY OF PHOSPHORUS: CPT | Performed by: PHYSICIAN ASSISTANT

## 2018-03-27 PROCEDURE — 85027 COMPLETE CBC AUTOMATED: CPT | Performed by: PHYSICIAN ASSISTANT

## 2018-03-27 PROCEDURE — 83735 ASSAY OF MAGNESIUM: CPT | Performed by: PHYSICIAN ASSISTANT

## 2018-03-27 PROCEDURE — 83605 ASSAY OF LACTIC ACID: CPT | Performed by: PHYSICIAN ASSISTANT

## 2018-03-27 PROCEDURE — 82948 REAGENT STRIP/BLOOD GLUCOSE: CPT

## 2018-03-27 PROCEDURE — 36415 COLL VENOUS BLD VENIPUNCTURE: CPT | Performed by: PHYSICIAN ASSISTANT

## 2018-03-27 PROCEDURE — 83036 HEMOGLOBIN GLYCOSYLATED A1C: CPT | Performed by: PHYSICIAN ASSISTANT

## 2018-03-27 PROCEDURE — 80053 COMPREHEN METABOLIC PANEL: CPT | Performed by: PHYSICIAN ASSISTANT

## 2018-03-27 RX ORDER — FUROSEMIDE 10 MG/ML
80 INJECTION INTRAMUSCULAR; INTRAVENOUS
Status: DISCONTINUED | OUTPATIENT
Start: 2018-03-27 | End: 2018-03-30 | Stop reason: HOSPADM

## 2018-03-27 RX ORDER — ALBUTEROL SULFATE 2.5 MG/3ML
2.5 SOLUTION RESPIRATORY (INHALATION) EVERY 6 HOURS PRN
Status: DISCONTINUED | OUTPATIENT
Start: 2018-03-27 | End: 2018-03-30 | Stop reason: HOSPADM

## 2018-03-27 RX ORDER — GABAPENTIN 300 MG/1
300 CAPSULE ORAL
Status: DISCONTINUED | OUTPATIENT
Start: 2018-03-27 | End: 2018-03-30 | Stop reason: HOSPADM

## 2018-03-27 RX ORDER — ASPIRIN 81 MG/1
81 TABLET ORAL 2 TIMES DAILY
Status: DISCONTINUED | OUTPATIENT
Start: 2018-03-27 | End: 2018-03-30 | Stop reason: HOSPADM

## 2018-03-27 RX ORDER — MELATONIN
2000 DAILY
Status: DISCONTINUED | OUTPATIENT
Start: 2018-03-27 | End: 2018-03-30 | Stop reason: HOSPADM

## 2018-03-27 RX ORDER — ONDANSETRON 2 MG/ML
4 INJECTION INTRAMUSCULAR; INTRAVENOUS EVERY 6 HOURS PRN
Status: DISCONTINUED | OUTPATIENT
Start: 2018-03-27 | End: 2018-03-30 | Stop reason: HOSPADM

## 2018-03-27 RX ORDER — ACETAMINOPHEN 325 MG/1
650 TABLET ORAL EVERY 6 HOURS PRN
Status: DISCONTINUED | OUTPATIENT
Start: 2018-03-27 | End: 2018-03-30 | Stop reason: HOSPADM

## 2018-03-27 RX ORDER — PREDNISONE 10 MG/1
10 TABLET ORAL DAILY
Status: DISCONTINUED | OUTPATIENT
Start: 2018-03-27 | End: 2018-03-30 | Stop reason: HOSPADM

## 2018-03-27 RX ORDER — FUROSEMIDE 80 MG
80 TABLET ORAL 2 TIMES DAILY
Status: DISCONTINUED | OUTPATIENT
Start: 2018-03-27 | End: 2018-03-27

## 2018-03-27 RX ORDER — PREDNISONE 1 MG/1
0.5 TABLET ORAL DAILY
Status: DISCONTINUED | OUTPATIENT
Start: 2018-03-27 | End: 2018-03-27 | Stop reason: DRUGHIGH

## 2018-03-27 RX ORDER — POTASSIUM CHLORIDE 20 MEQ/1
40 TABLET, EXTENDED RELEASE ORAL ONCE
Status: COMPLETED | OUTPATIENT
Start: 2018-03-27 | End: 2018-03-27

## 2018-03-27 RX ORDER — TAMSULOSIN HYDROCHLORIDE 0.4 MG/1
0.4 CAPSULE ORAL
Status: DISCONTINUED | OUTPATIENT
Start: 2018-03-27 | End: 2018-03-30 | Stop reason: HOSPADM

## 2018-03-27 RX ORDER — PRAVASTATIN SODIUM 40 MG
80 TABLET ORAL
Status: DISCONTINUED | OUTPATIENT
Start: 2018-03-27 | End: 2018-03-30 | Stop reason: HOSPADM

## 2018-03-27 RX ADMIN — INSULIN LISPRO 8 UNITS: 100 INJECTION, SOLUTION INTRAVENOUS; SUBCUTANEOUS at 12:50

## 2018-03-27 RX ADMIN — METOPROLOL TARTRATE 25 MG: 25 TABLET ORAL at 09:26

## 2018-03-27 RX ADMIN — APIXABAN 2.5 MG: 2.5 TABLET, FILM COATED ORAL at 17:17

## 2018-03-27 RX ADMIN — PREDNISONE 10 MG: 10 TABLET ORAL at 09:26

## 2018-03-27 RX ADMIN — POTASSIUM CHLORIDE 40 MEQ: 1500 TABLET, EXTENDED RELEASE ORAL at 07:29

## 2018-03-27 RX ADMIN — INSULIN DETEMIR 50 UNITS: 100 INJECTION, SOLUTION SUBCUTANEOUS at 21:13

## 2018-03-27 RX ADMIN — INSULIN LISPRO 10 UNITS: 100 INJECTION, SOLUTION INTRAVENOUS; SUBCUTANEOUS at 07:08

## 2018-03-27 RX ADMIN — INSULIN LISPRO 15 UNITS: 100 INJECTION, SOLUTION INTRAVENOUS; SUBCUTANEOUS at 12:50

## 2018-03-27 RX ADMIN — PRAVASTATIN SODIUM 80 MG: 40 TABLET ORAL at 16:34

## 2018-03-27 RX ADMIN — INSULIN LISPRO 6 UNITS: 100 INJECTION, SOLUTION INTRAVENOUS; SUBCUTANEOUS at 16:35

## 2018-03-27 RX ADMIN — TAMSULOSIN HYDROCHLORIDE 0.4 MG: 0.4 CAPSULE ORAL at 21:12

## 2018-03-27 RX ADMIN — FUROSEMIDE 80 MG: 10 INJECTION, SOLUTION INTRAMUSCULAR; INTRAVENOUS at 16:34

## 2018-03-27 RX ADMIN — INSULIN LISPRO 15 UNITS: 100 INJECTION, SOLUTION INTRAVENOUS; SUBCUTANEOUS at 07:11

## 2018-03-27 RX ADMIN — ASPIRIN 81 MG: 81 TABLET, COATED ORAL at 17:18

## 2018-03-27 RX ADMIN — METOPROLOL TARTRATE 25 MG: 25 TABLET ORAL at 17:17

## 2018-03-27 RX ADMIN — INSULIN LISPRO 2 UNITS: 100 INJECTION, SOLUTION INTRAVENOUS; SUBCUTANEOUS at 21:17

## 2018-03-27 RX ADMIN — INSULIN LISPRO 15 UNITS: 100 INJECTION, SOLUTION INTRAVENOUS; SUBCUTANEOUS at 16:35

## 2018-03-27 RX ADMIN — APIXABAN 2.5 MG: 2.5 TABLET, FILM COATED ORAL at 09:31

## 2018-03-27 RX ADMIN — FUROSEMIDE 80 MG: 40 TABLET ORAL at 09:25

## 2018-03-27 RX ADMIN — CHOLECALCIFEROL TAB 25 MCG (1000 UNIT) 2000 UNITS: 25 TAB at 09:24

## 2018-03-27 RX ADMIN — ASPIRIN 81 MG: 81 TABLET, COATED ORAL at 09:24

## 2018-03-27 RX ADMIN — GABAPENTIN 300 MG: 300 CAPSULE ORAL at 21:12

## 2018-03-27 RX ADMIN — ACETAMINOPHEN 650 MG: 325 TABLET, FILM COATED ORAL at 21:12

## 2018-03-27 NOTE — CONSULTS
Consultation - Nephrology   Guerda Garcia [de-identified] y o  male MRN: [id number]  Unit/Bed#: RM06 Encounter: [id number]    A/P:  1  CKD IV with baseline Cr ~2 6 - 2 8 mg/dL  2  Diabetic nephropathy  3  Edema  4  Chronic systolic/diastolic CHF -compensated    Patient's weight is significantly up over the past year, recheck when patient gets on the floor  I agree with continuation of daily diuretics at this time  5  Left LE cellulitis   Patient given a dose of pip/tazo, if continued, should be on reduced dosing  Thank you for allowing us to participate in the care of your patient  Please feel free to contact us regarding the care of this patient, or any other questions/concerns that may be applicable      Patient Active Problem List   Diagnosis    Morbid obesity ([address] )    History of prostate cancer    Type 2 diabetes mellitus with hyperglycemia ([address] )    S/P CABG x 3    CKD (chronic kidney disease) stage 4, GFR 15-29 ml/min ([facility])    Volume overload    Body mass index (BMI) of 40 0 to 49 9    Wound of right lower extremity    Wound of left lower extremity    Hyperphosphatemia    Vitamin D deficiency    Bilateral renal cysts    Atrial fibrillation (HCC)    Blind right eye    Chronic combined systolic and diastolic heart failure ([address] )    Coronary artery disease    DM (diabetes mellitus), type 2 with peripheral neuropathy    Obstructive sleep apnea syndrome, severe    Vision loss, 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    Hypokalemia    LESLIE (acute kidney injury) ([address] )    Anemia    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HCC)    Bilateral leg edema    Prostate cancer (HCC)    Severe obstructive sleep apnea-hypopnea syndrome    Trochanteric bursitis of left hip       History of Present Illness   Physician Requesting Consult: No att  providers found Dr Tommie Posey  Reason for Consult / Principal Problem: CKD 4  Hx and PE limited by:   HPI: Guerda Garcia is a [de-identified]y o  year old male who presented to the ED with complaints of worsening leg pain  Patient has known right LE wound which is currently being evaluated and treated by wound care  In addition to this, he has noted increased LE edema with the left LE having increased erythema and tenderness, though not as much pain as the right LE  Patient denies fevers/chills at this time  He denies use of NSAIDs for pain control  From the renal standpoint, patient has a history of CKD IV, he appears to be at about his baseline Cr this AM   He has been taking all medications as prescribed with no side-effects  History obtained from chart review and the patient    Review of Systems - Negative except as mentioned above in HPI, more specifics as mentioned below    Review of Systems - General ROS: positive for  - fatigue and malaise  Psychological ROS: negative for - behavioral disorder  Ophthalmic ROS: negative  ENT ROS: negative  Hematological and Lymphatic ROS: negative  Endocrine ROS: negative  Cardiovascular ROS: no chest pain or dyspnea on exertion  Gastrointestinal ROS: no abdominal pain, change in bowel habits, or black or bloody stools  Genito-Urinary ROS: no dysuria, trouble voiding, or hematuria  Musculoskeletal ROS: negative  Neurological ROS: no TIA or stroke symptoms  Dermatological ROS: positive for erythema and wounds    Historical Information   Past Medical History:   Diagnosis Date    Anemia     Arthritis     Atrial fibrillation (HCC)     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HCC)     B12 deficiency     Back pain     Blind right eye     Cardiac disorder     Chronic combined systolic and diastolic heart failure (HCC)     Chronic obstructive lung disease (HCC)     Chronic venous stasis dermatitis of both lower extremities     CKD (chronic kidney disease), stage IV (HCC)     Coronary artery disease     DDD (degenerative disc disease)     DM (diabetes mellitus), type 2 (HCC)     Type II, on insulin    BAER (dyspnea on exertion)     Easy bruising     Epistaxis     Gout     Gross hematuria     last assessed: 3/18/2015    Hepatitis     Herpes zoster     Hiatal hernia     History of cellulitis     BLE    History of prostate cancer     Radiation treatments   Hypercholesterolemia     Hyperlipidemia     Hypertension     Ischemic cardiomyopathy     Lung mass     last assessed: 9/21/2012    MI, old    [employer] Morbid obesity due to excess calories (HCC)     or severe obesity    Neuropathy     BLE    Obesity     Obstructive sleep apnea syndrome, severe     Pneumonia     last assessed: 7/24/2012    Shortness of breath     TIA (transient ischemic attack)     Urinary incontinence     Urinary retention     Use of cane as ambulatory aid     Independent with personal care       Past Surgical History:   Procedure Laterality Date    ABDOMINAL SURGERY      CARDIAC SURGERY      CATARACT EXTRACTION, BILATERAL      CORONARY ANGIOPLASTY WITH STENT PLACEMENT      2 stents    CORONARY ARTERY BYPASS GRAFT N/A 7/15/2016    Procedure: Intraop ADRIAN, CABG x 3 using LIMA to LAD, RSVG to OM1 and D1;  Surgeon: Chary Turcios MD;  [facility];  Service:    Ariel Valenzuela      has stents    EYE SURGERY      Bilateral cataract removal    HERNIA REPAIR      umbilical hernia repair    LUNG CANCER SURGERY      Partial upper right lobectomy    LUNG SURGERY Left 2012    OTHER SURGICAL HISTORY      previous stent placement-no anatomy given    PILONIDAL CYST EXCISION      IN CYSTOURETHROSCOPY,URETER CATHETER Left 3/9/2016    Procedure: CYSTOSCOPY WITH left RETROGRADE PYELOGRAM left double J stent removal;  Surgeon: Rosalino Jon MD;  [facility];  Service: Urology    [address] [facility] [address] OR BX Bilateral 10/31/2017    Procedure: BIOPSY ARTERY TEMPORAL;  Surgeon: Sd Dodge MD;  [facility];  Service: Vascular    RENAL ARTERY STENT  02/16/2015    transcath intravascular stent placement percutaneous renal    VASCULAR SURGERY       Social History   History Alcohol Use No     History   Drug Use No     History   Smoking Status    Former Smoker    Packs/day: 2 00    Years: 54 00    Quit date: 2004   Smokeless Tobacco    Never Used     Comment: Pt is a non smoker     Family History   Problem Relation Age of Onset    Diabetes Other     Hypertension Other     Cancer Other     Diabetes Mother     Heart disease Mother     Hypertension Mother     Diabetes type II Mother     Cancer Mother      unknown type    Diabetes Father     Diabetes Maternal Grandmother     Cancer Sister      unknown type    Cancer Other      unknown type       Meds/Allergies   all current active meds have been reviewed, current meds: Current Facility-Administered Medications   Medication Dose Route Frequency    acetaminophen (TYLENOL) tablet 650 mg  650 mg Oral Q6H PRN    albuterol inhalation solution 2 5 mg  2 5 mg Nebulization Q6H PRN    apixaban (ELIQUIS) tablet 2 5 mg  2 5 mg Oral BID    aspirin (ECOTRIN LOW STRENGTH) EC tablet 81 mg  81 mg Oral BID    cholecalciferol (VITAMIN D3) tablet 2,000 Units  2,000 Units Oral Daily    furosemide (LASIX) tablet 80 mg  80 mg Oral BID    gabapentin (NEURONTIN) capsule 300 mg  300 mg Oral HS    insulin detemir (LEVEMIR) subcutaneous injection 50 Units  50 Units Subcutaneous HS    insulin lispro (HumaLOG) 100 units/mL subcutaneous injection 15 Units  15 Units Subcutaneous TID With Meals    insulin lispro (HumaLOG) 100 units/mL subcutaneous injection 2-12 Units  2-12 Units Subcutaneous TID AC    insulin lispro (HumaLOG) 100 units/mL subcutaneous injection 2-12 Units  2-12 Units Subcutaneous HS    metoprolol tartrate (LOPRESSOR) tablet 25 mg  25 mg Oral BID    ondansetron (ZOFRAN) injection 4 mg  4 mg Intravenous Q6H PRN    pravastatin (PRAVACHOL) tablet 80 mg  80 mg Oral Daily With Dinner    predniSONE tablet 10 mg  10 mg Oral Daily    tamsulosin (FLOMAX) capsule 0 4 mg  0 4 mg Oral HS    and PTA meds:    (Not in a hospital admission)      Allergies   Allergen Reactions    Other Rash     Adhesive tape       Objective     Intake/Output Summary (Last 24 hours) at 03/27/18 0947  Last data filed at 03/27/18 0505   Gross per 24 hour   Intake                0 ml   Output              875 ml   Net             -875 ml       Invasive Devices:        Physical Exam      I/O last 3 completed shifts:  In: -   Out: 250 [Urine:875]    Vitals:    03/27/18 0926   BP: 144/78   Pulse: 100   Resp:    Temp:    SpO2:        Gen: in NAD, Alert/Awake  HEENT: no sclerous icterus, MMM, neck supple  CV: +S1/S2, RRR  Lungs: CTA bilaterally  Abd: +BS, soft NT/ND  Ext: all four extremities are warm, +3 edema biltaterally  Skin: erythematous changes with tenderness to the left LE  Neuro: CN II-XII intact    Current Weight: Weight - Scale: (!) 152 kg (334 lb)  First Weight: Weight - Scale: (!) 152 kg (334 lb)    Lab Results:  I have personally reviewed pertinent labs      CBC: Lab Results   Component Value Date    WBC 8 32 03/27/2018    HGB 11 9 (L) 03/27/2018    HCT 37 5 03/27/2018    MCV 95 03/27/2018     03/27/2018    MCH 30 3 03/27/2018    MCHC 31 7 03/27/2018    RDW 15 9 (H) 03/27/2018    MPV 9 6 03/27/2018     CMP: Lab Results   Component Value Date     03/27/2018    K 3 4 (L) 03/27/2018     03/27/2018    CO2 32 03/27/2018    ANIONGAP 7 03/27/2018    BUN 36 (H) 03/27/2018    CREATININE 2 30 (H) 03/27/2018    GLUCOSE 335 (H) 03/27/2018    CALCIUM 8 3 03/27/2018    AST 16 03/27/2018    ALT 23 03/27/2018    ALKPHOS 31 (L) 03/27/2018    PROT 5 5 (L) 03/27/2018    BILITOT 0 30 03/27/2018    EGFR 26 03/27/2018     Phosphorus:   Lab Results   Component Value Date    PHOS 3 7 03/27/2018     Magnesium:   Lab Results   Component Value Date    MG 1 8 03/27/2018     Urinalysis: Lab Results   Component Value Date    COLORU Yellow 03/26/2018    CLARITYU Clear 03/26/2018    SPECGRAV 1 010 03/26/2018    PHUR 6 0 03/26/2018    LEUKOCYTESUR Negative 03/26/2018 NITRITE Negative 03/26/2018    PROTEINUA Negative 03/26/2018    GLUCOSEU Negative 03/26/2018    KETONESU Negative 03/26/2018    BILIRUBINUR Negative 03/26/2018    BLOODU Negative 03/26/2018     Ionized Calcium: No results found for: CAION  Coagulation: No results found for: PT, INR, APTT  Troponin:   Lab Results   Component Value Date    TROPONINI 0 10 (HH) 03/27/2018     ABG: No results found for: PHART, TIW8CUJ, PO2ART, KGS2FPP, W6TCBJDB, BEART, SOURCE      Results from last 7 days  Lab Units 03/27/18  0526 03/26/18  1907   SODIUM mmol/L 139 141   POTASSIUM mmol/L 3 4* 4 1   CHLORIDE mmol/L 100 98*   CO2 mmol/L 32 34*   BUN mg/dL 36* 39*   CREATININE mg/dL 2 30* 2 55*   CALCIUM mg/dL 8 3 9 2   TOTAL PROTEIN g/dL 5 5* 6 5   BILIRUBIN TOTAL mg/dL 0 30 0 30   ALK PHOS U/L 31* 38*   ALT U/L 23 27   AST U/L 16 19   GLUCOSE RANDOM mg/dL 335* 206*       Radiology review:  Procedure: Xr Chest 2 Views    Result Date: 3/27/2018  Narrative: CHEST INDICATION: Shortness of breath COMPARISON: March 1, 2017 VIEWS:  AP semierect and lateral; IMAGES:  5 FINDINGS: Midline sternotomy wires are present from previous cardiac surgery  The cardiomediastinal silhouette is unchanged  The pulmonary vasculature is engorged  Interstitial edema is suggested at the lung bases  No pulmonary consolidation  No pleural effusion  Bony thorax is otherwise unremarkable  Impression: Congestive heart failure type changes, status post CABG  ____ ____ ____ ____ ____ ____ ____ ____ ____ ____ ____ ____ ____ As per comments in the PACS workstation, findings are concordant with preliminary interpretation provided by the emergency room physician      Workstation performed: [id number]     Procedure: Xr Foot 3+ Views Left    Result Date: 3/27/2018  Narrative: LEFT FOOT INDICATION:   pain  COMPARISON:  March 17, 2016 VIEWS:  XR FOOT 3+ VW LEFT (AP, lateral and oblique) Images: 3 FINDINGS: There is no acute fracture or dislocation   Mild degenerative changes, mid foot  Small plantar calcaneal spur  No lytic or blastic lesions seen  Diffuse soft tissue swelling overlies the dorsum of the foot  Vascular calcification is noted  Impression: No acute osseous abnormality  Dorsal soft tissue swelling   Workstation performed: [id number]             Jessee Decree, DO

## 2018-03-27 NOTE — H&P
H&P Exam - Wells Gavi [de-identified] y o  male MRN: [id number]    Unit/Bed#:  Encounter: [id number]    Assessment:    Acute on chronic diastolic congestive heart failure:  Patient takes 80 mg p o  b i d  at home, will place on 80 mg IV b i d , low-salt diet, strict intake and output    Chronic atrial fibrillation:  Continue Eliquis but will renally dosed at 2 5 mg p o  b i d , continue metoprolol 25 mg p o  b i d   Monitor on telemetry  Elevated Troponin : likely 2/2 CHF and renal function, trend x 2     Right lower extremity wound: follows at wound care, consult to wound care     Left lower extremity cellulitis: will place on PO keflex     Insulin-dependent diabetes mellitus with hyperglycemia: A1c is 8 9, continue Lantus, start insulin sliding scale with Accu-Cheks AC and HS    CKD IV: baseline 2 4-2 8, continue to monitor renal function in setting of diuresis         History of Present Illness      Patient is a pleasant 18-year-old male with past medical history of diastolic congestive heart failure, diabetes mellitus, chronic atrial fibrillation presents emergency room today with chief complaint of left lower extremity pain  The patient states that he has noticed his lower extremity become more swollen and painful  He does have a wound on his right lower extremity for which he sees wound care  Left lower extremity appears more erythematous and slightly tender to touch  Review of Systems   Respiratory: Positive for shortness of breath  Cardiovascular: Positive for leg swelling  Negative for chest pain  Musculoskeletal: Positive for joint swelling  All other systems reviewed and are negative        Historical Information   Past Medical History:   Diagnosis Date    Anemia     Arthritis     Atrial fibrillation ([facility] )     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[facility] )     B12 deficiency     Back pain     Blind right eye     Cardiac disorder     Chronic combined systolic and diastolic heart failure ([facility] )     Chronic obstructive lung disease (HCC)     Chronic venous stasis dermatitis of both lower extremities     CKD (chronic kidney disease), stage IV (HCC)     Coronary artery disease     DDD (degenerative disc disease)     DM (diabetes mellitus), type 2 (HCC)     Type II, on insulin    BAER (dyspnea on exertion)     Easy bruising     Epistaxis     Gout     Gross hematuria     last assessed: 3/18/2015    Hepatitis     Herpes zoster     Hiatal hernia     History of cellulitis     BLE    History of prostate cancer     Radiation treatments   Hypercholesterolemia     Hyperlipidemia     Hypertension     Ischemic cardiomyopathy     Lung mass     last assessed: 9/21/2012    MI, [facility] Morbid obesity due to excess calories (HCC)     or severe obesity    Neuropathy     BLE    Obesity     Obstructive sleep apnea syndrome, severe     Pneumonia     last assessed: 7/24/2012    Shortness of breath     TIA (transient ischemic attack)     Urinary incontinence     Urinary retention     Use of cane as ambulatory aid     Independent with personal care       Past Surgical History:   Procedure Laterality Date    ABDOMINAL SURGERY      CARDIAC SURGERY      CATARACT EXTRACTION, BILATERAL      CORONARY ANGIOPLASTY WITH STENT PLACEMENT      2 stents    CORONARY ARTERY BYPASS GRAFT N/A 7/15/2016    Procedure: Intraop ADRIAN, CABG x 3 using LIMA to LAD, RSVG to OM1 and D1;  Surgeon: Jaxon Osei MD;  [facility];  Service:    Randolph Michelle      has stents    EYE SURGERY      Bilateral cataract removal    HERNIA REPAIR      umbilical hernia repair    LUNG CANCER SURGERY      Partial upper right lobectomy    LUNG SURGERY Left 2012    OTHER SURGICAL HISTORY      previous stent placement-no anatomy given    PILONIDAL CYST EXCISION      MS CYSTOURETHROSCOPY,URETER CATHETER Left 3/9/2016    Procedure: CYSTOSCOPY WITH left RETROGRADE PYELOGRAM left double J stent removal;  Surgeon: Alfred Huber MD; [facility];  Service: Urology    IL TEMPORAL ARTERY LIGATN OR BX Bilateral 10/31/2017    Procedure: BIOPSY ARTERY TEMPORAL;  Surgeon: Alfreda Loyd MD;  [facility];  Service: Vascular    RENAL ARTERY STENT  02/16/2015    transcath intravascular stent placement percutaneous renal    VASCULAR SURGERY       Social History   History   Alcohol Use No     History   Drug Use No     History   Smoking Status    Former Smoker    Packs/day: 2 00    Years: 54 00    Quit date: 2004   Smokeless Tobacco    Never Used     Comment: Pt is a non smoker     Family History: non-contributory    Meds/Allergies   all medications and allergies reviewed  Allergies   Allergen Reactions    Other Rash     Adhesive tape       Objective   First Vitals:   Blood Pressure: 117/58 (03/26/18 1436)  Pulse: 84 (03/26/18 1436)  Temperature: (!) 97 1 °F (36 2 °C) (03/26/18 1436)  Temp Source: Temporal (03/26/18 1436)  Respirations: 18 (03/26/18 1436)  Weight - Scale: (!) 152 kg (334 lb) (03/26/18 1436)  SpO2: 96 % (03/26/18 1436)    Current Vitals:   Blood Pressure: 134/76 (03/27/18 1024)  Pulse: 94 (03/27/18 1024)  Temperature: (!) 97 1 °F (36 2 °C) (03/26/18 1436)  Temp Source: Temporal (03/26/18 1436)  Respirations: 20 (03/27/18 1024)  Weight - Scale: (!) 152 kg (334 lb) (03/26/18 1436)  SpO2: 98 % (03/27/18 1024)      Intake/Output Summary (Last 24 hours) at 03/27/18 1238  Last data filed at 03/27/18 1100   Gross per 24 hour   Intake                0 ml   Output             1025 ml   Net            -1025 ml       Invasive Devices     Peripheral Intravenous Line            Peripheral IV 03/26/18 Right Antecubital less than 1 day                Physical Exam   Constitutional: He is oriented to person, place, and time  He appears well-developed  HENT:   Head: Atraumatic  Mouth/Throat: No oropharyngeal exudate  Eyes: Pupils are equal, round, and reactive to light  No scleral icterus  Neck: Neck supple  No JVD present  Cardiovascular: Normal rate  No murmur heard  Pulmonary/Chest: Effort normal  No respiratory distress  He has no wheezes  He has rales  Abdominal: Soft  Bowel sounds are normal  He exhibits distension  There is no tenderness  There is no rebound and no guarding  Musculoskeletal: Normal range of motion  He exhibits edema  Neurological: He is alert and oriented to person, place, and time  No cranial nerve deficit  Coordination normal    Skin: Skin is warm and dry  No rash noted  He is not diaphoretic  There is erythema  Lab Results:   Lab Results   Component Value Date    WBC 8 32 03/27/2018    HGB 11 9 (L) 03/27/2018    HCT 37 5 03/27/2018    MCV 95 03/27/2018     03/27/2018     Lab Results   Component Value Date    GLUCOSE 335 (H) 03/27/2018    CALCIUM 8 3 03/27/2018     03/27/2018    K 3 4 (L) 03/27/2018    CO2 32 03/27/2018     03/27/2018    BUN 36 (H) 03/27/2018    CREATININE 2 30 (H) 03/27/2018       Imaging:   XR foot 3+ views LEFT   ED Interpretation   No fracture  No foreign body  Final Result      No acute osseous abnormality  Dorsal soft tissue swelling  Workstation performed: [id number]         XR chest 2 views   ED Interpretation   Cardiomegaly  Pulmonary edema  Limited by portable study and body habitus  Final Result      Congestive heart failure type changes, status post CABG           ____ ____ ____ ____ ____ ____ ____ ____ ____ ____ ____ ____ ____       As per comments in the PACS workstation, findings are concordant with preliminary interpretation provided by the emergency room physician                          Workstation performed: [id number]             EKG, Pathology, and Other Studies:   afib     Code Status: Level 1 - Full Code  Advance Directive and Living Will:      Power of :    POLST:      Counseling / Coordination of Care:   None

## 2018-03-27 NOTE — CASE MANAGEMENT
Initial Clinical Review    Admission: Date/Time/Statement: 3/26/18 @ 2130     Orders Placed This Encounter   Procedures    Inpatient Admission (expected length of stay for this patient is greater than two midnights)     Standing Status:   Standing     Number of Occurrences:   1     Order Specific Question:   Admitting Physician     Answer:   Efrain Arriola     Order Specific Question:   Level of Care     Answer:   Med Surg [16]     Order Specific Question:   Estimated length of stay     Answer:   More than 2 Midnights     Order Specific Question:   Certification     Answer:   I certify that inpatient services are medically necessary for this patient for a duration of greater than two midnights  See H&P and MD Progress Notes for additional information about the patient's course of treatment  ED: Date/Time/Mode of Arrival:   ED Arrival Information     Expected Arrival Acuity Means of Arrival Escorted By Service Admission Type    - 3/26/2018 14:07 Urgent Walk-In Family Member General Medicine Urgent    Arrival Complaint    Foot Pain       Chief Complaint:   Chief Complaint   Patient presents with    Foot Pain     Patient states that his left foot started to hurt him last night  Patient also states that he has been gaining a lot of weight because of fluid retention  History of Illness:   78-year-old male  He presents from his family doctor for evaluation of possible fluid overload  He is complaining of increased swelling to his legs and lower abdomen  He does have a history of congestive heart failure  He also has renal disease  He is on oral anticoagulants  He recently was on antibiotics for cellulitis to his legs  He is complaining of increased pain to his left foot  No fever or chills  No chest pain  He is complaining of wheezing and congestion in his chest   Symptoms are moderately severe  There been no relieving factors  Foot hurts worse to walk on it    The pain in the foot is worse since last night  ED Vital Signs:   ED Triage Vitals [03/26/18 1436]   Temperature Pulse Respirations Blood Pressure SpO2   (!) 97 1 °F (36 2 °C) 84 18 117/58 96 %      Temp Source Heart Rate Source Patient Position - Orthostatic VS BP Location FiO2 (%)   Temporal Monitor Sitting Right arm --      Pain Score       9        Wt Readings from Last 1 Encounters:   03/26/18 (!) 152 kg (334 lb)   Vital Signs (abnormal): Abnormal Labs/Diagnostic Test Results:   Co2 34 BUN 39 CR 2 55 GLUC 206 ALK PHOS 38 ALB 2 8 GFR 23 LACTIC ACID 2 4 BNP 3089   TROP 0 09  L FOOT XRAY= No acute osseous abnormality  Dorsal soft tissue swelling  CXR= Congestive heart failure type changes, status post CABG  EKG=Normal sinus rhythm  Left axis deviation  Right bundle branch block  Inferior infarct  Unchanged from old EKG    ED Treatment:   Medication Administration from 03/26/2018 1406 to 03/27/2018 1050       Date/Time Order Dose Route Action Action by Comments     03/26/2018 2100 piperacillin-tazobactam (ZOSYN) 3 375 g in sodium chloride 0 9 % 50 mL IVPB 0 g Intravenous Stopped Abril Melendez RN      03/26/2018 2018 piperacillin-tazobactam (ZOSYN) 3 375 g in sodium chloride 0 9 % 50 mL IVPB 3 375 g Intravenous New Bag Juanito Guillory RN unable to scan barcode due to mixed by Supervisor, Luciana Goins     03/26/2018 1959 ipratropium-albuterol (DUO-NEB) 0 5-2 5 mg/3 mL inhalation solution 3 mL 3 mL Nebulization Given Juanito Guillory RN      03/26/2018 2303 furosemide (LASIX) 120 mg in dextrose 5 % 50 mL IVPB 0 mg Intravenous Stopped Angel Mendoza RN      03/26/2018 2214 furosemide (LASIX) 120 mg in dextrose 5 % 50 mL IVPB 120 mg Intravenous Priscila 37 Blu Vasquez RN      03/27/2018 0227 aspirin (ECOTRIN LOW STRENGTH) EC tablet 81 mg 81 mg Oral Given Marbella Ahuja, MALIK      03/27/2018 5554 cholecalciferol (VITAMIN D3) tablet 2,000 Units 2,000 Units Oral Given Marbella Ahuja, MALIK      03/27/2018 2049 furosemide (LASIX) tablet 80 mg 80 mg Oral Given Ashlynjesus Wyatt, [address]      03/27/2018 7341 insulin lispro (HumaLOG) 100 units/mL subcutaneous injection 15 Units 15 Units Subcutaneous Given Seven Schneider RN      03/27/2018 4457 metoprolol tartrate (LOPRESSOR) tablet 25 mg 25 mg Oral Given Seven Schneider RN      03/27/2018 0708 insulin lispro (HumaLOG) 100 units/mL subcutaneous injection 2-12 Units 10 Units Subcutaneous Given Seven Schneider RN      03/27/2018 0729 potassium chloride (K-DUR,KLOR-CON) CR tablet 40 mEq 40 mEq Oral Given Seven Schneider RN      03/27/2018 0931 apixaban (ELIQUIS) tablet 2 5 mg 2 5 mg Oral Given Seven Schneider RN      03/27/2018 5238 predniSONE tablet 10 mg 10 mg Oral Given Seven Schneider RN       Past Medical/Surgical History:    Active Ambulatory Problems     Diagnosis Date Noted    Morbid obesity ([place] [address] )     History of prostate cancer     Type 2 diabetes mellitus with hyperglycemia ([facility] )     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    CKD (chronic kidney disease) stage 4, GFR 15-29 ml/min ([facility]) 07/16/2016    Volume overload 07/17/2016    Body mass index (BMI) of 40 0 to 49 9 03/04/2017    Wound of right lower extremity 03/05/2017    Wound of left lower extremity 03/05/2017    Hyperphosphatemia 03/05/2017    Vitamin D deficiency 03/05/2017    Bilateral renal cysts 03/05/2017    Atrial fibrillation (HCC)     Blind right eye     Chronic combined systolic and diastolic heart failure ([facility] )     Coronary artery disease     DM (diabetes mellitus), type 2 with peripheral neuropathy     Obstructive sleep apnea syndrome, severe     Vision loss, 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 10/21/2017    Hypokalemia 10/25/2017    LESLIE (acute kidney injury) ([facility] ) 10/26/2017    Anemia 10/04/2016    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    Bilateral leg edema 01/19/2016    Prostate cancer ([facility] ) 07/24/2012    Severe obstructive sleep apnea-hypopnea syndrome 04/13/2017    Trochanteric bursitis of left hip 06/30/2016     Resolved Ambulatory Problems     Diagnosis Date Noted    No Resolved Ambulatory Problems     Past Medical History:   Diagnosis Date    Anemia     Arthritis     Atrial fibrillation (HCC)     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[facility] )     B12 deficiency     Back pain     Blind right eye     Cardiac disorder     Chronic combined systolic and diastolic heart failure (HCC)     Chronic obstructive lung disease (HCC)     Chronic venous stasis dermatitis of both lower extremities     CKD (chronic kidney disease), stage IV ([facility])     Coronary artery disease     DDD (degenerative disc disease)     DM (diabetes mellitus), type 2 (HCC)     BAER (dyspnea on exertion)     Easy bruising     Epistaxis     Gout     Gross hematuria     Hepatitis     Herpes zoster     Hiatal hernia     History of cellulitis     History of prostate cancer     Hypercholesterolemia     Hyperlipidemia     Hypertension     Ischemic cardiomyopathy     Lung mass     MI, old     Morbid obesity due to excess calories ([facility])     Neuropathy     Obesity     Obstructive sleep apnea syndrome, severe     Pneumonia     Shortness of breath     TIA (transient ischemic attack)     Urinary incontinence     Urinary retention     Use of cane as ambulatory aid    Admitting Diagnosis: Morbid obesity ([facility] ) [E66 01]  Foot pain [M79 673]  CKD (chronic kidney disease) stage 4, GFR 15-29 ml/min ([facility]) [N18 4]  Acute exacerbation of congestive heart failure (HCC) [I50 9]  Cellulitis of lower extremity, unspecified laterality [L03 119]  Hypervolemia, unspecified hypervolemia type [E87 70]  Age/Sex: [de-identified] y o  male  Assessment/Plan:   Acute on chronic diastolic congestive heart failure:  Patient takes 80 mg p o  b i d  at home, will place on 80 mg IV b i d , low-salt diet, strict intake and output  Chronic atrial fibrillation:  Continue Eliquis but will renally dosed at 2 5 mg p o  b i d , continue metoprolol 25 mg p o  b i d   Monitor on telemetry    Elevated Troponin : likely 2/2 CHF and renal function, trend x 2   Right lower extremity wound: follows at wound care, consult to wound care   Left lower extremity cellulitis: will place on PO keflex   Insulin-dependent diabetes mellitus with hyperglycemia: A1c is 8 9, continue Lantus, start insulin sliding scale with Accu-Cheks AC and HS  CKD IV: baseline 2 4-2 8, continue to monitor renal function in setting of diuresis     Admission Orders:  TELEMETRY  ACCUCHECKS WITH COVERAGE SCALE  PT/OT EVAL & TX  CONSULT RENAL  CONSULT NUTRITION  Scheduled Meds:   Current Facility-Administered Medications:  acetaminophen 650 mg Oral Q6H PRN Gus Acosta PA-C   albuterol 2 5 mg Nebulization Q6H PRN Jamari Jolly MD   apixaban 2 5 mg Oral BID Jamari Jolly MD   aspirin 81 mg Oral BID Gus Acosta PA-C   cholecalciferol 2,000 Units Oral Daily Gus Acosta PA-C   furosemide 80 mg Intravenous BID (diuretic) Peggie Gosselin, MD   gabapentin 300 mg Oral HS Gus Acosta PA-C   insulin detemir 50 Units Subcutaneous HS Gus Acosta PA-C   insulin lispro 15 Units Subcutaneous TID With Meals Gus Acosta PA-C   insulin lispro 2-12 Units Subcutaneous TID AC Gus Acosta PA-C   insulin lispro 2-12 Units Subcutaneous HS Gus Acosta PA-C   metoprolol tartrate 25 mg Oral BID Gus Acosta PA-C   ondansetron 4 mg Intravenous Q6H PRN Gus Acosta PA-C   pravastatin 80 mg Oral Daily With Dinner Gus Acosta PA-C   predniSONE 10 mg Oral Daily Jamari Jolly MD   tamsulosin 0 4 mg Oral HS Gus Acosta PA-C     Continuous Infusions:    PRN Meds:   acetaminophen    albuterol    ondansetron

## 2018-03-27 NOTE — RESPIRATORY THERAPY NOTE
RT Protocol Note  Heri Li [de-identified] y o  male MRN: [id number]  Unit/Bed#: RM06 Encounter: [id number]    Assessment    Active Problems:    * No active hospital problems  *      Home Pulmonary Medications:  CPAP ([id number] with 3L O2)      Past Medical History:   Diagnosis Date    Anemia     Arthritis     Atrial fibrillation (HCC)     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HCC)     B12 deficiency     Back pain     Blind right eye     Cardiac disorder     Chronic combined systolic and diastolic heart failure (HCC)     Chronic obstructive lung disease (HCC)     Chronic venous stasis dermatitis of both lower extremities     CKD (chronic kidney disease), stage IV (HCC)     Coronary artery disease     DDD (degenerative disc disease)     DM (diabetes mellitus), type 2 (HCC)     Type II, on insulin    BAER (dyspnea on exertion)     Easy bruising     Epistaxis     Gout     Gross hematuria     last assessed: 3/18/2015    Hepatitis     Herpes zoster     Hiatal hernia     History of cellulitis     BLE    History of prostate cancer     Radiation treatments   Hypercholesterolemia     Hyperlipidemia     Hypertension     Ischemic cardiomyopathy     Lung mass     last assessed: 9/21/2012    MI, old    Levora Cazares Morbid obesity due to excess calories (HCC)     or severe obesity    Neuropathy     BLE    Obesity     Obstructive sleep apnea syndrome, severe     Pneumonia     last assessed: 7/24/2012    Shortness of breath     TIA (transient ischemic attack)     Urinary incontinence     Urinary retention     Use of cane as ambulatory aid     Independent with personal care       Social History     Social History    Marital status: /Civil Union     Spouse name: N/A    Number of children: N/A    Years of education: N/A     Social History Main Topics    Smoking status: Former Smoker     Packs/day: 2 00     Years: 54 00     Quit date: 2004    Smokeless tobacco: Never Used      Comment: Pt is a non smoker    Alcohol use No    Drug use: No    Sexual activity: No     Other Topics Concern    None     Social History Narrative    No advance directives    No living will    Patient has living will       Subjective         Objective    Physical Exam:   Assessment Type: Assess only  General Appearance: Awake, Alert  Respiratory Pattern: Dyspnea with exertion  Chest Assessment: Chest expansion symmetrical  Bilateral Breath Sounds: Diminished    Vitals:  Blood pressure 144/78, pulse 100, temperature (!) 97 1 °F (36 2 °C), temperature source Temporal, resp  rate 22, weight (!) 152 kg (334 lb), SpO2 98 %  Imaging and other studies: I have personally reviewed pertinent reports  Plan    Respiratory Plan: Mild Distress pathway (with exertion)  Airway Clearance Plan: Discontinue Protocol      Pt SOB with exertion, Q6PRN Albuterol for SOB/ Wheezing

## 2018-03-27 NOTE — PLAN OF CARE
Problem: Potential for Falls  Goal: Patient will remain free of falls  INTERVENTIONS:  - Assess patient frequently for physical needs  -  Identify cognitive and physical deficits and behaviors that affect risk of falls  -  [facility] fall precautions as indicated by assessment   - Educate patient/family on patient safety including physical limitations  - Instruct patient to call for assistance with activity based on assessment  - Modify environment to reduce risk of injury  - Consider OT/PT consult to assist with strengthening/mobility   Outcome: Progressing      Problem: Prexisting or High Potential for Compromised Skin Integrity  Goal: Skin integrity is maintained or improved  INTERVENTIONS:  - Identify patients at risk for skin breakdown  - Assess and monitor skin integrity  - Assess and monitor nutrition and hydration status  - Monitor labs (i e  albumin)  - Assess for incontinence   - Turn and reposition patient  - Assist with mobility/ambulation  - Relieve pressure over bony prominences  - Avoid friction and shearing  - Provide appropriate hygiene as needed including keeping skin clean and dry  - Evaluate need for skin moisturizer/barrier cream  - Collaborate with interdisciplinary team (i e  Nutrition, Rehabilitation, etc )   - Patient/family teaching   Outcome: Progressing      Problem: SAFETY ADULT  Goal: Patient will remain free of falls  INTERVENTIONS:  - Assess patient frequently for physical needs  -  Identify cognitive and physical deficits and behaviors that affect risk of falls    -  [facility] fall precautions as indicated by assessment   - Educate patient/family on patient safety including physical limitations  - Instruct patient to call for assistance with activity based on assessment  - Modify environment to reduce risk of injury  - Consider OT/PT consult to assist with strengthening/mobility   Outcome: Progressing

## 2018-03-28 ENCOUNTER — APPOINTMENT (INPATIENT)
Dept: ULTRASOUND IMAGING | Facility: HOSPITAL | Age: 78
DRG: 291 | End: 2018-03-28
Payer: MEDICARE

## 2018-03-28 LAB
ANION GAP SERPL CALCULATED.3IONS-SCNC: 9 MMOL/L (ref 4–13)
ATRIAL RATE: 85 BPM
BASOPHILS # BLD AUTO: 0.06 THOUSANDS/ΜL (ref 0–0.1)
BASOPHILS NFR BLD AUTO: 1 % (ref 0–1)
BUN SERPL-MCNC: 36 MG/DL (ref 5–25)
CALCIUM SERPL-MCNC: 9.1 MG/DL (ref 8.3–10.1)
CHLORIDE SERPL-SCNC: 101 MMOL/L (ref 100–108)
CO2 SERPL-SCNC: 33 MMOL/L (ref 21–32)
CREAT SERPL-MCNC: 2.31 MG/DL (ref 0.6–1.3)
EOSINOPHIL # BLD AUTO: 0.13 THOUSAND/ΜL (ref 0–0.61)
EOSINOPHIL NFR BLD AUTO: 1 % (ref 0–6)
ERYTHROCYTE [DISTWIDTH] IN BLOOD BY AUTOMATED COUNT: 15.7 % (ref 11.6–15.1)
GFR SERPL CREATININE-BSD FRML MDRD: 26 ML/MIN/1.73SQ M
GLUCOSE SERPL-MCNC: 103 MG/DL (ref 65–140)
GLUCOSE SERPL-MCNC: 127 MG/DL (ref 65–140)
GLUCOSE SERPL-MCNC: 197 MG/DL (ref 65–140)
GLUCOSE SERPL-MCNC: 233 MG/DL (ref 65–140)
GLUCOSE SERPL-MCNC: 233 MG/DL (ref 65–140)
HCT VFR BLD AUTO: 38.9 % (ref 36.5–49.3)
HGB BLD-MCNC: 12.4 G/DL (ref 12–17)
LYMPHOCYTES # BLD AUTO: 0.89 THOUSANDS/ΜL (ref 0.6–4.47)
LYMPHOCYTES NFR BLD AUTO: 10 % (ref 14–44)
MAGNESIUM SERPL-MCNC: 2.1 MG/DL (ref 1.6–2.6)
MCH RBC QN AUTO: 30.4 PG (ref 26.8–34.3)
MCHC RBC AUTO-ENTMCNC: 31.9 G/DL (ref 31.4–37.4)
MCV RBC AUTO: 95 FL (ref 82–98)
MONOCYTES # BLD AUTO: 0.78 THOUSAND/ΜL (ref 0.17–1.22)
MONOCYTES NFR BLD AUTO: 9 % (ref 4–12)
NEUTROPHILS # BLD AUTO: 7.18 THOUSANDS/ΜL (ref 1.85–7.62)
NEUTS SEG NFR BLD AUTO: 79 % (ref 43–75)
P AXIS: 57 DEGREES
PLATELET # BLD AUTO: 298 THOUSANDS/UL (ref 149–390)
PMV BLD AUTO: 9.6 FL (ref 8.9–12.7)
POTASSIUM SERPL-SCNC: 3.5 MMOL/L (ref 3.5–5.3)
PR INTERVAL: 158 MS
QRS AXIS: -62 DEGREES
QRSD INTERVAL: 166 MS
QT INTERVAL: 458 MS
QTC INTERVAL: 545 MS
RBC # BLD AUTO: 4.08 MILLION/UL (ref 3.88–5.62)
SODIUM SERPL-SCNC: 143 MMOL/L (ref 136–145)
T WAVE AXIS: 69 DEGREES
VENTRICULAR RATE: 85 BPM
WBC # BLD AUTO: 9.04 THOUSAND/UL (ref 4.31–10.16)

## 2018-03-28 PROCEDURE — G8978 MOBILITY CURRENT STATUS: HCPCS | Performed by: PHYSICAL THERAPIST

## 2018-03-28 PROCEDURE — G8988 SELF CARE GOAL STATUS: HCPCS

## 2018-03-28 PROCEDURE — 82948 REAGENT STRIP/BLOOD GLUCOSE: CPT

## 2018-03-28 PROCEDURE — 85025 COMPLETE CBC W/AUTO DIFF WBC: CPT | Performed by: FAMILY MEDICINE

## 2018-03-28 PROCEDURE — 97167 OT EVAL HIGH COMPLEX 60 MIN: CPT

## 2018-03-28 PROCEDURE — 99232 SBSQ HOSP IP/OBS MODERATE 35: CPT | Performed by: FAMILY MEDICINE

## 2018-03-28 PROCEDURE — 80048 BASIC METABOLIC PNL TOTAL CA: CPT | Performed by: FAMILY MEDICINE

## 2018-03-28 PROCEDURE — 93010 ELECTROCARDIOGRAM REPORT: CPT | Performed by: INTERNAL MEDICINE

## 2018-03-28 PROCEDURE — G8979 MOBILITY GOAL STATUS: HCPCS | Performed by: PHYSICAL THERAPIST

## 2018-03-28 PROCEDURE — 83735 ASSAY OF MAGNESIUM: CPT | Performed by: FAMILY MEDICINE

## 2018-03-28 PROCEDURE — G8987 SELF CARE CURRENT STATUS: HCPCS

## 2018-03-28 PROCEDURE — 97163 PT EVAL HIGH COMPLEX 45 MIN: CPT | Performed by: PHYSICAL THERAPIST

## 2018-03-28 PROCEDURE — 93970 EXTREMITY STUDY: CPT

## 2018-03-28 RX ORDER — POTASSIUM CHLORIDE 20 MEQ/1
40 TABLET, EXTENDED RELEASE ORAL ONCE
Status: COMPLETED | OUTPATIENT
Start: 2018-03-28 | End: 2018-03-28

## 2018-03-28 RX ORDER — POTASSIUM CHLORIDE 20 MEQ/1
20 TABLET, EXTENDED RELEASE ORAL DAILY
Status: DISCONTINUED | OUTPATIENT
Start: 2018-03-28 | End: 2018-03-30 | Stop reason: HOSPADM

## 2018-03-28 RX ADMIN — APIXABAN 2.5 MG: 2.5 TABLET, FILM COATED ORAL at 19:14

## 2018-03-28 RX ADMIN — POTASSIUM CHLORIDE 20 MEQ: 1500 TABLET, EXTENDED RELEASE ORAL at 09:05

## 2018-03-28 RX ADMIN — ASPIRIN 81 MG: 81 TABLET, COATED ORAL at 08:03

## 2018-03-28 RX ADMIN — TAMSULOSIN HYDROCHLORIDE 0.4 MG: 0.4 CAPSULE ORAL at 21:39

## 2018-03-28 RX ADMIN — POTASSIUM CHLORIDE 40 MEQ: 1500 TABLET, EXTENDED RELEASE ORAL at 08:09

## 2018-03-28 RX ADMIN — METOPROLOL TARTRATE 25 MG: 25 TABLET ORAL at 19:15

## 2018-03-28 RX ADMIN — CEFTRIAXONE 1000 MG: 1 INJECTION, SOLUTION INTRAVENOUS at 10:00

## 2018-03-28 RX ADMIN — METOPROLOL TARTRATE 25 MG: 25 TABLET ORAL at 08:04

## 2018-03-28 RX ADMIN — GABAPENTIN 300 MG: 300 CAPSULE ORAL at 21:39

## 2018-03-28 RX ADMIN — INSULIN LISPRO 15 UNITS: 100 INJECTION, SOLUTION INTRAVENOUS; SUBCUTANEOUS at 17:20

## 2018-03-28 RX ADMIN — FUROSEMIDE 80 MG: 10 INJECTION, SOLUTION INTRAMUSCULAR; INTRAVENOUS at 19:15

## 2018-03-28 RX ADMIN — APIXABAN 2.5 MG: 2.5 TABLET, FILM COATED ORAL at 08:03

## 2018-03-28 RX ADMIN — FUROSEMIDE 80 MG: 10 INJECTION, SOLUTION INTRAMUSCULAR; INTRAVENOUS at 07:52

## 2018-03-28 RX ADMIN — INSULIN LISPRO 2 UNITS: 100 INJECTION, SOLUTION INTRAVENOUS; SUBCUTANEOUS at 17:20

## 2018-03-28 RX ADMIN — ACETAMINOPHEN 650 MG: 325 TABLET, FILM COATED ORAL at 07:58

## 2018-03-28 RX ADMIN — INSULIN LISPRO 15 UNITS: 100 INJECTION, SOLUTION INTRAVENOUS; SUBCUTANEOUS at 12:40

## 2018-03-28 RX ADMIN — INSULIN LISPRO 15 UNITS: 100 INJECTION, SOLUTION INTRAVENOUS; SUBCUTANEOUS at 09:00

## 2018-03-28 RX ADMIN — INSULIN LISPRO 4 UNITS: 100 INJECTION, SOLUTION INTRAVENOUS; SUBCUTANEOUS at 11:30

## 2018-03-28 RX ADMIN — ASPIRIN 81 MG: 81 TABLET, COATED ORAL at 19:16

## 2018-03-28 RX ADMIN — INSULIN DETEMIR 50 UNITS: 100 INJECTION, SOLUTION SUBCUTANEOUS at 21:40

## 2018-03-28 RX ADMIN — PRAVASTATIN SODIUM 80 MG: 40 TABLET ORAL at 19:14

## 2018-03-28 RX ADMIN — PREDNISONE 10 MG: 10 TABLET ORAL at 08:03

## 2018-03-28 RX ADMIN — CHOLECALCIFEROL TAB 25 MCG (1000 UNIT) 2000 UNITS: 25 TAB at 08:04

## 2018-03-28 RX ADMIN — INSULIN LISPRO 4 UNITS: 100 INJECTION, SOLUTION INTRAVENOUS; SUBCUTANEOUS at 21:40

## 2018-03-28 NOTE — PHYSICIAN ADVISOR
Current patient class: Inpatient  The patient is currently on Hospital Day: 3      The patient was admitted to the hospital at 2130 on 3/26/18 for the following diagnosis:  Morbid obesity ([address] ) [E66 01]  Foot pain [M79 673]  CKD (chronic kidney disease) stage 4, GFR 15-29 ml/min ([facility]) [N18 4]  Acute exacerbation of congestive heart failure ([facility]) [I50 9]  Cellulitis of lower extremity, unspecified laterality [L03 119]  Hypervolemia, unspecified hypervolemia type [E87 70]       There is documentation in the medical record of an expected length of stay of at least 2 midnights  The patient is therefore expected to satisfy the 2 midnight benchmark and given the 2 midnight presumption is appropriate for INPATIENT ADMISSION  Given this expectation of a satisfying stay, CMS instructs us that the patient is most often appropriate for inpatient admission under part A provided medical necessity is documented in the chart  After review of the relevant documentation, labs, vital signs and test results, the patient is appropriate for INPATIENT ADMISSION  Admission to the hospital as an inpatient is a complex decision making process which requires the practitioner to consider the patients presenting complaint, history and physical examination and all relevant testing  With this in mind, in this case, the patient was deemed appropriate for INPATIENT ADMISSION  After review of the documentation and testing available at the time of the admission I concur with this clinical determination of medical necessity  Rationale is as follows: The patient is a [de-identified] yrs old Male who presented to the ED at 3/26/2018  5:29 PM with a chief complaint of Foot Pain (Patient states that his left foot started to hurt him last night  Patient also states that he has been gaining a lot of weight because of fluid retention ) the patient is currently admitted to the hospital for acute on chronic CHF, on Lasix IV b i d     The patient will continue to remain hospitalized further, with an elevated troponin, chronic atrial fibrillation, bilateral lower extremity wounds and cellulitis, diabetes  The patient will satisfy the 2 midnight benchmark as documented in the history and physical   Given this, the patient is appropriate for inpatient admission  The patients vitals on arrival were ED Triage Vitals [03/26/18 1436]   Temperature Pulse Respirations Blood Pressure SpO2   (!) 97 1 °F (36 2 °C) 84 18 117/58 96 %      Temp Source Heart Rate Source Patient Position - Orthostatic VS BP Location FiO2 (%)   Temporal Monitor Sitting Right arm --      Pain Score       9           Past Medical History:   Diagnosis Date    Anemia     Arthritis     Atrial fibrillation (HCC)     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HCC)     B12 deficiency     Back pain     Blind right eye     Cardiac disorder     Chronic combined systolic and diastolic heart failure (HCC)     Chronic obstructive lung disease (HCC)     Chronic venous stasis dermatitis of both lower extremities     CKD (chronic kidney disease), stage IV (HCC)     Coronary artery disease     DDD (degenerative disc disease)     DM (diabetes mellitus), type 2 (HCC)     Type II, on insulin    BAER (dyspnea on exertion)     Easy bruising     Epistaxis     Gout     Gross hematuria     last assessed: 3/18/2015    Hepatitis     Herpes zoster     Hiatal hernia     History of cellulitis     BLE    History of prostate cancer     Radiation treatments      Hypercholesterolemia     Hyperlipidemia     Hypertension     Ischemic cardiomyopathy     Lung mass     last assessed: 9/21/2012    MI, old    [employer] Morbid obesity due to excess calories (HCC)     or severe obesity    Neuropathy     BLE    Obesity     Obstructive sleep apnea syndrome, severe     Pneumonia     last assessed: 7/24/2012    Shortness of breath     TIA (transient ischemic attack)     Urinary incontinence     Urinary retention     Use of cane as ambulatory aid     Independent with personal care       Past Surgical History:   Procedure Laterality Date    ABDOMINAL SURGERY      CARDIAC SURGERY      CATARACT EXTRACTION, BILATERAL      CORONARY ANGIOPLASTY WITH STENT PLACEMENT      2 stents    CORONARY ARTERY BYPASS GRAFT N/A 7/15/2016    Procedure: Intraop ADRIAN, CABG x 3 using LIMA to LAD, RSVG to OM1 and D1;  Surgeon: Sagar Rondon MD;  Location: [facility];  Service:    Addie Spatz      has stents    EYE SURGERY      Bilateral cataract removal    HERNIA REPAIR      umbilical hernia repair    LUNG CANCER SURGERY      Partial upper right lobectomy    LUNG SURGERY Left 2012    OTHER SURGICAL HISTORY      previous stent placement-no anatomy given    PILONIDAL CYST EXCISION      TX CYSTOURETHROSCOPY,URETER CATHETER Left 3/9/2016    Procedure: CYSTOSCOPY WITH left RETROGRADE PYELOGRAM left double J stent removal;  Surgeon: Patrick Bains MD;  Location: [facility];  Service: Urology    TX TEMPORAL ARTERY LIGATN OR BX Bilateral 10/31/2017    Procedure: BIOPSY ARTERY TEMPORAL;  Surgeon: Yash Cummings MD;  Location: [facility];  Service: Vascular    RENAL ARTERY STENT  02/16/2015    transcath intravascular stent placement percutaneous renal    VASCULAR SURGERY             Consults have been placed to:   IP CONSULT TO NEPHROLOGY  IP CONSULT TO NUTRITION SERVICES  IP CONSULT TO WOUND CARE    Vitals:    03/27/18 0926 03/27/18 1024 03/27/18 1050 03/27/18 1500   BP: 144/78 134/76 126/64 144/73   BP Location:  Left arm Left arm Left arm   Pulse: 100 94 80 74   Resp:  20 20 20   Temp:   97 6 °F (36 4 °C) (!) 97 3 °F (36 3 °C)   TempSrc:   Temporal Temporal   SpO2:  98% 92% 95%   Weight:   (!) 158 kg (349 lb 3 3 oz)    Height:   5' 8" (1 727 m)        Most recent labs:    Recent Labs      03/26/18   1907  03/27/18   0526   03/27/18   1822   WBC  8 65  8 32   --    --    HGB  13 1  11 9*   --    --    HCT  41 3  37 5   --    --    PLT  317  297 --    --    K  4 1  3 4*   --    --    NA  141  139   --    --    CALCIUM  9 2  8 3   --    --    BUN  39*  36*   --    --    CREATININE  2 55*  2 30*   --    --    TROPONINI  0 09*   --    < >  0 09*   AST  19  16   --    --    ALT  27  23   --    --    ALKPHOS  38*  31*   --    --    BILITOT  0 30  0 30   --    --     < > = values in this interval not displayed         Scheduled Meds:  Current Facility-Administered Medications:  acetaminophen 650 mg Oral Q6H PRN Gus Acosta PA-C   albuterol 2 5 mg Nebulization Q6H PRN Jamari Jolly MD   apixaban 2 5 mg Oral BID Mike Sutherland MD   aspirin 81 mg Oral BID Gus Acosta PA-C   cholecalciferol 2,000 Units Oral Daily Gus Acosta PA-C   furosemide 80 mg Intravenous BID (diuretic) Jamari Jolly MD   gabapentin 300 mg Oral HS Gus Acosta PA-C   insulin detemir 50 Units Subcutaneous HS Gus Acosta PA-C   insulin lispro 15 Units Subcutaneous TID With Meals Gus Acosta PA-C   insulin lispro 2-12 Units Subcutaneous TID AC Gus Acosta PA-C   insulin lispro 2-12 Units Subcutaneous HS Gus Acosta PA-C   metoprolol tartrate 25 mg Oral BID Gus Acosta PA-C   ondansetron 4 mg Intravenous Q6H PRN Gus Acosta PA-C   pravastatin 80 mg Oral Daily With Dinner Gus Acosta PA-C   predniSONE 10 mg Oral Daily Jamari Jolly MD   tamsulosin 0 4 mg Oral HS Gus Acosta PA-C     Continuous Infusions:   PRN Meds:   acetaminophen    albuterol    ondansetron    Surgical procedures (if appropriate):

## 2018-03-28 NOTE — OCCUPATIONAL THERAPY NOTE
Occupational Therapy Evaluation     Patient Name: Loi Shea  JLIPR'A Date: 3/28/2018  Problem List  Patient Active Problem List   Diagnosis    Morbid obesity ([facility] [address]    History of prostate cancer    Type 2 diabetes mellitus with hyperglycemia ([address] )    S/P CABG x 3    CKD (chronic kidney disease) stage 4, GFR 15-29 ml/min ([facility])    Volume overload    Body mass index (BMI) of 40 0 to 49 9    Wound of right lower extremity    Wound of left lower extremity    Hyperphosphatemia    Vitamin D deficiency    Bilateral renal cysts    Atrial fibrillation ([address] )    Blind right eye    Chronic combined systolic and diastolic heart failure ([address] )    Coronary artery disease    DM (diabetes mellitus), type 2 with peripheral neuropathy    Obstructive sleep apnea syndrome, severe    Vision loss, 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    Hypokalemia    LESLIE (acute kidney injury) ([address] )    Anemia    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HCC)    Bilateral leg edema    Prostate cancer (HCC)    Severe obstructive sleep apnea-hypopnea syndrome    Trochanteric bursitis of left hip     Past Medical History  Past Medical History:   Diagnosis Date    Anemia     Arthritis     Atrial fibrillation (HCC)     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[facility] [address]     B12 deficiency     Back pain     Blind right eye     Cardiac disorder     Chronic combined systolic and diastolic heart failure (HCC)     Chronic obstructive lung disease (HCC)     Chronic venous stasis dermatitis of both lower extremities     CKD (chronic kidney disease), stage IV ([address] )     Coronary artery disease     DDD (degenerative disc disease)     DM (diabetes mellitus), type 2 (HCC)     Type II, on insulin    BAER (dyspnea on exertion)     Easy bruising     Epistaxis     Gout     Gross hematuria     last assessed: 3/18/2015    Hepatitis     Herpes zoster     Hiatal hernia     History of cellulitis     BLE    History of prostate cancer     Radiation treatments      Hypercholesterolemia     Hyperlipidemia     Hypertension     Ischemic cardiomyopathy     Lung mass     last assessed: 9/21/2012    MI, old    Rosalia Wood Morbid obesity due to excess calories (HCC)     or severe obesity    Neuropathy     BLE    Obesity     Obstructive sleep apnea syndrome, severe     Pneumonia     last assessed: 7/24/2012    Shortness of breath     TIA (transient ischemic attack)     Urinary incontinence     Urinary retention     Use of cane as ambulatory aid     Independent with personal care  Past Surgical History  Past Surgical History:   Procedure Laterality Date    ABDOMINAL SURGERY      CARDIAC SURGERY      CATARACT EXTRACTION, BILATERAL      CORONARY ANGIOPLASTY WITH STENT PLACEMENT      2 stents    CORONARY ARTERY BYPASS GRAFT N/A 7/15/2016    Procedure: Intraop ADRIAN, CABG x 3 using LIMA to LAD, RSVG to OM1 and D1;  Surgeon: Marylen Glad, MD;  [facility];  Service:    Karl Munoz      has stents    EYE SURGERY      Bilateral cataract removal    HERNIA REPAIR      umbilical hernia repair    LUNG CANCER SURGERY      Partial upper right lobectomy    LUNG SURGERY Left 2012    OTHER SURGICAL HISTORY      previous stent placement-no anatomy given    PILONIDAL CYST EXCISION      MS CYSTOURETHROSCOPY,URETER CATHETER Left 3/9/2016    Procedure: CYSTOSCOPY WITH left RETROGRADE PYELOGRAM left double J stent removal;  Surgeon: Lan Toussaint MD;  Location: [facility];  Service: Urology    MS TEMPORAL ARTERY LIGATN OR BX Bilateral 10/31/2017    Procedure: BIOPSY ARTERY TEMPORAL;  Surgeon: Prabhakar Meyer MD;  [facility]: [facility];  Service: Vascular    RENAL ARTERY STENT  02/16/2015    transcath intravascular stent placement percutaneous renal    VASCULAR SURGERY             03/28/18 1041   Note Type   Note type Eval/Treat   Restrictions/Precautions   Weight Bearing Precautions Per Order No   Other Precautions Bed Alarm;Multiple lines;Telemetry; Fall Risk;O2   Pain Assessment   Pain Assessment No/denies pain   Home Living   Type of Home Mobile home   Home Layout One level;Performs ADLs on one level; Able to live on main level with bedroom/bathroom; Ramped entrance   Bathroom Shower/Tub Tub/shower unit   [employer] Grab bars in shower; Shower chair;Commode   Bathroom Accessibility Accessible   Home Equipment Walker;Cane;Wheelchair-manual;Grab bars   Prior Function   Level of [place] Needs assistance with ADLs and functional mobility; Needs assistance with IADLs   Lives With Spouse; Son   Receives Help From Family   ADL Assistance Needs assistance   IADLs Needs assistance   Comments pt's family drives    Psychosocial   Psychosocial (WDL) X   Patient Behaviors/Mood Flat affect   Bed Mobility   Additional Comments seate EOB at start and end of session   Transfers   Sit to Stand 5  Supervision   Additional items (RW)   Stand to Sit 5  Supervision   Additional items (RW)   Functional Mobility   Functional Mobility 5  Supervision   Additional Comments pt performed FM with RW in room SpO2 ranged 89-95% throughout session; pt visably SOB during session    Additional items Rolling walker   Balance   Static Sitting Good   Dynamic Sitting Good   Static Standing Fair +   Dynamic Standing Fair   Ambulatory Fair   Activity Tolerance   Activity Tolerance Patient limited by fatigue   RUE Assessment   RUE Assessment WFL   LUE Assessment   LUE Assessment WFL   Hand Function   Gross Motor Coordination Functional   Fine Motor Coordination Functional   Sensation   Light Touch No apparent deficits   Sharp/Dull No apparent deficits   Cognition   Overall Cognitive Status WFL   Arousal/Participation Alert   Attention Within functional limits   Orientation Level Oriented X4   Memory Within functional limits   Following Commands Follows all commands and directions without difficulty   Assessment   Limitation Decreased ADL status; Decreased UE strength;Decreased endurance;Decreased self-care trans;Decreased high-level ADLs   Assessment recommend continued OT intervention and home with family support at d/c   Goals   Short Term Goal  Pt will perform UE strengthening exercises    Long Term Goal #1 pt will perform grooming tasks at (I) level    Long Term Goal #2 pt will perform FM c RW at mod (I) level    Long Term Goal pt will perform toilet transfers and tasks at min (A) level   Plan   Treatment Interventions ADL retraining;Functional transfer training;UE strengthening/ROM; Endurance training;Patient/family training; Activityengagement   Goal Expiration Date 04/11/18   OT Frequency 3-5x/wk   Recommendation   OT Discharge Recommendation Home with family support   OT - OK to Discharge No   Barthel Index   Feeding 10   Bathing 0   Grooming Score 0   Dressing Score 5   Bladder Score 10   Bowels Score 10   Toilet Use Score 5   Transfers (Bed/Chair) Score 10   Mobility (Level Surface) Score 10   Stairs Score 0   Barthel Index Score 60        Pt will benefit from continued OT services in order to maximize (I) c ADL performance, FM c RW, and improve overall endurance/strength required to complete functional tasks in preparation for d/c  Pt left seated EOB at end of session; all needs within reach; all lines intact  Ascencion Carroll

## 2018-03-28 NOTE — PROGRESS NOTES
Progress Note - Nephrology   Shayna Poole [de-identified] y o  male MRN: [id number]  Unit/Bed#:  Encounter: [id number]    A/P:  1  CKD IV with baseline Cr ~2 6 - 2 8 mg/dL  2  Diabetic nephropathy  3  Edema   Continue with diuresis and low sodium diet  4  Chronic systolic/diastolic CHF -compensated                Patient's weight is significantly up over the past year, recheck when patient gets on the floor  I agree with continuation of daily diuretics at this time  3/28: Patient has at least an excess of 20 pounds of fluid, continue with diuresis and low sodium diet  5  Left LE cellulitis              3/28: Patient was given a dose of pip/tazo on 3/27, will discuss with hospitalist regarding adjustment to or continuation of Abx  Follow up reason for today's visit: CKD/Edema    Morbid obesity [facility])    Patient Active Problem List   Diagnosis    Morbid obesity [facility] [address] )    History of prostate cancer    Type 2 [facility] with hyperglycemia [facility]    S/P CABG x 3    CKD (chronic kidney disease) stage 4, GFR 15-29 ml/min ([facility])    Volume overload    Body mass index (BMI) of 40 0 to 49 9    Wound of right lower extremity    Wound of left lower extremity    Hyperphosphatemia    Vitamin D deficiency    Bilateral renal cysts    Atrial fibrillation (HCC)    Blind right eye    Chronic combined systolic and diastolic heart failure ([facility] [address] )    Coronary artery disease    DM (diabetes mellitus), type 2 with peripheral neuropathy    Obstructive sleep apnea syndrome, severe    Vision loss, 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    Hypokalemia    LESLIE (acute kidney injury) ([facility] [address] )    Anemia    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HCC)    Bilateral leg edema    Prostate cancer (HCC)    Severe obstructive sleep apnea-hypopnea syndrome    Trochanteric bursitis of left hip         Subjective:    No acute events overnight      Objective:     Vitals: Blood pressure 118/67, pulse 97, temperature 98 1 °F (36 7 °C), temperature source Temporal, resp  rate 22, height 5' 8" (1 727 m), weight (!) 158 kg (348 lb 15 8 oz), SpO2 95 %  ,Body mass index is 53 06 kg/m²  Weight (last 2 days)     Date/Time   Weight    03/28/18 0547  (!)  158 (348 99)    03/28/18 0444  (!)  158 (348 99)    03/27/18 1050  (!)  158 (349 21)    03/26/18 1436  (!)  152 (334)                Intake/Output Summary (Last 24 hours) at 03/28/18 0734  Last data filed at 03/28/18 0645   Gross per 24 hour   Intake              300 ml   Output             3190 ml   Net            -2890 ml     I/O last 3 completed shifts: In: 300 [P O :300]  Out: 4065 [Urine:4065]         Physical Exam: /67 (BP Location: Left arm)   Pulse 97   Temp 98 1 °F (36 7 °C) (Temporal)   Resp 22   Ht 5' 8" (1 727 m)   Wt (!) 158 kg (348 lb 15 8 oz)   SpO2 95%   BMI 53 06 kg/m²     General Appearance:    Alert, cooperative, no distress, appears stated age   Head:    Normocephalic, without obvious abnormality, atraumatic   Eyes:    Conjunctiva/corneas clear   Ears:    Normal external ears   Nose:   Nares normal, septum midline, mucosa normal, no drainage    or sinus tenderness   Throat:   Lips, mucosa, and tongue normal; teeth and gums normal   Neck:   Supple   Back:     Symmetric, no curvature, ROM normal, no CVA tenderness   Lungs:     Clear to auscultation bilaterally, respirations unlabored   Chest wall:    No tenderness or deformity   Heart:    Regular rate and rhythm, S1 and S2 normal, no murmur, rub   or gallop   Abdomen:     Soft, non-tender, bowel sounds active   Extremities:   Extremities normal, atraumatic, no cyanosis, +3 edema bilaterally   Skin:   Skin color, texture, turgor normal, left LE erythema   Lymph nodes:   Cervical normal   Neurologic:   CNII-XII intact            Lab, Imaging and other studies: I have personally reviewed pertinent labs    CBC: Lab Results   Component Value Date    WBC 9 04 03/28/2018    HGB 12 4 03/28/2018    HCT 38 9 03/28/2018    MCV 95 03/28/2018     03/28/2018    MCH 30 4 03/28/2018    MCHC 31 9 03/28/2018    RDW 15 7 (H) 03/28/2018    MPV 9 6 03/28/2018     CMP: Lab Results   Component Value Date     03/28/2018    K 3 5 03/28/2018     03/28/2018    CO2 33 (H) 03/28/2018    ANIONGAP 9 03/28/2018    BUN 36 (H) 03/28/2018    CREATININE 2 31 (H) 03/28/2018    GLUCOSE 127 03/28/2018    CALCIUM 9 1 03/28/2018    EGFR 26 03/28/2018         Results from last 7 days  Lab Units 03/28/18  0442 03/27/18  0526 03/26/18  1907   SODIUM mmol/L 143 139 141   POTASSIUM mmol/L 3 5 3 4* 4 1   CHLORIDE mmol/L 101 100 98*   CO2 mmol/L 33* 32 34*   BUN mg/dL 36* 36* 39*   CREATININE mg/dL 2 31* 2 30* 2 55*   CALCIUM mg/dL 9 1 8 3 9 2   TOTAL PROTEIN g/dL  --  5 5* 6 5   BILIRUBIN TOTAL mg/dL  --  0 30 0 30   ALK PHOS U/L  --  31* 38*   ALT U/L  --  23 27   AST U/L  --  16 19   GLUCOSE RANDOM mg/dL 127 335* 206*         Phosphorus: No results found for: PHOS  Magnesium: No results found for: MG  Urinalysis: No results found for: COLORU, CLARITYU, SPECGRAV, PHUR, LEUKOCYTESUR, NITRITE, PROTEINUA, GLUCOSEU, KETONESU, BILIRUBINUR, BLOODU  Ionized Calcium: No results found for: CAION  Coagulation: No results found for: PT, INR, APTT  Troponin:   Lab Results   Component Value Date    TROPONINI 0 09 (HH) 03/27/2018     ABG: No results found for: PHART, ITM5BYE, PO2ART, XSZ3YHZ, G2UVNQYW, BEART, SOURCE  Radiology review:     IMAGING  Procedure: Xr Chest 2 Views    Result Date: 3/27/2018  Narrative: CHEST INDICATION: Shortness of breath COMPARISON: March 1, 2017 VIEWS:  AP semierect and lateral; IMAGES:  5 FINDINGS: Midline sternotomy wires are present from previous cardiac surgery  The cardiomediastinal silhouette is unchanged  The pulmonary vasculature is engorged  Interstitial edema is suggested at the lung bases  No pulmonary consolidation  No pleural effusion  Bony thorax is otherwise unremarkable  Impression: Congestive heart failure type changes, status post CABG  ____ ____ ____ ____ ____ ____ ____ ____ ____ ____ ____ ____ ____ As per comments in the PACS workstation, findings are concordant with preliminary interpretation provided by the emergency room physician      Workstation performed: [id number]     Procedure: Xr Foot 3+ Views Left    Result Date: 3/27/2018  Narrative: LEFT FOOT INDICATION:   pain  COMPARISON:  March 17, 2016 VIEWS:  XR FOOT 3+ VW LEFT (AP, lateral and oblique) Images: 3 FINDINGS: There is no acute fracture or dislocation  Mild degenerative changes, mid foot  Small plantar calcaneal spur  No lytic or blastic lesions seen  Diffuse soft tissue swelling overlies the dorsum of the foot  Vascular calcification is noted  Impression: No acute osseous abnormality  Dorsal soft tissue swelling   Workstation performed: [id number]       Current Facility-Administered Medications   Medication Dose Route Frequency    acetaminophen (TYLENOL) tablet 650 mg  650 mg Oral Q6H PRN    albuterol inhalation solution 2 5 mg  2 5 mg Nebulization Q6H PRN    apixaban (ELIQUIS) tablet 2 5 mg  2 5 mg Oral BID    aspirin (ECOTRIN LOW STRENGTH) EC tablet 81 mg  81 mg Oral BID    cholecalciferol (VITAMIN D3) tablet 2,000 Units  2,000 Units Oral Daily    furosemide (LASIX) injection 80 mg  80 mg Intravenous BID (diuretic)    gabapentin (NEURONTIN) capsule 300 mg  300 mg Oral HS    insulin detemir (LEVEMIR) subcutaneous injection 50 Units  50 Units Subcutaneous HS    insulin lispro (HumaLOG) 100 units/mL subcutaneous injection 15 Units  15 Units Subcutaneous TID With Meals    insulin lispro (HumaLOG) 100 units/mL subcutaneous injection 2-12 Units  2-12 Units Subcutaneous TID AC    insulin lispro (HumaLOG) 100 units/mL subcutaneous injection 2-12 Units  2-12 Units Subcutaneous HS    metoprolol tartrate (LOPRESSOR) tablet 25 mg  25 mg Oral BID    ondansetron (ZOFRAN) injection 4 mg  4 mg Intravenous Q6H PRN    pravastatin (PRAVACHOL) tablet 80 mg  80 mg Oral Daily With Dinner    predniSONE tablet 10 mg  10 mg Oral Daily    tamsulosin (FLOMAX) capsule 0 4 mg  0 4 mg Oral HS     Medications Discontinued During This Encounter   Medication Reason    apixaban (ELIQUIS) tablet 5 mg Dose adjustment    predniSONE tablet 0 5 mg Dose adjustment    furosemide (LASIX) tablet 80 mg        Jeannette Berrios DO

## 2018-03-28 NOTE — PHYSICAL THERAPY NOTE
PT Evaluation     03/28/18 1022   Note Type   Note type Eval/Treat   Pain Assessment   Pain Assessment No/denies pain   Home Living   Type of Home Mobile home   Home Layout One level; Able to live on main level with bedroom/bathroom; Performs ADLs on one level;Ramped entrance   Bathroom Shower/Tub Walk-in shower   Bathroom Toilet Raised   Bathroom Equipment Grab bars in shower; Shower chair;Commode   Bathroom Accessibility Accessible   Home Equipment Walker;Cane;Wheelchair-manual;Grab bars   Additional Comments pt is on 3L's O2 at home   Prior Function   Level of [place] Needs assistance with ADLs and functional mobility  (ambulates with RW )   Lives With Spouse; Son   [place] 66 Help From Family   ADL Assistance Needs assistance  (assist with bathing and dressing, spouse assists)   IADLs Needs assistance  (spouse performs IADL's)   Comments family drives   Restrictions/Precautions   Other Precautions Bed Alarm;Multiple lines;Telemetry; Fall Risk;O2   General   Family/Caregiver Present No   Cognition   Arousal/Participation Alert   Orientation Level Oriented X4   Following Commands Follows all commands and directions without difficulty   RLE Assessment   RLE Assessment WFL  (4 to 4+/5)   LLE Assessment   LLE Assessment WFL  (hip flex 4/5 knee 3+ limited by pain, ankle 4/5)   Coordination   Sensation X   Light Touch   RLE Light Touch Impaired   RLE Light Touch Comments decreased to light touch bilateral feet   LLE Light Touch Impaired   LLE Light Touch Comments decreased to light touch bilateral feet   Bed Mobility   Additional Comments seated at EOB when PT entered room  Returned to sitting at EOB with bell in reach  Transfers   Sit to Stand 5  Supervision   Additional items (with RW)   Stand to Sit 5  Supervision   Additional items (with RW)   Stand pivot 5  Supervision   Additional items (with RW)   Additional Comments no LOB with transfers or ambulation however pt limited by SOB and drop in spO2   SpO2 on 3L's at rest 93% HR 91 /57 and after ambulation 40ft SpO2 dropped to 87% requiring 30 seconds to 1 minute for recovery to 94% HR 90  Ambulation/Elevation   Gait pattern Forward Flexion   Gait Assistance 5  Supervision   Assistive Device Rolling walker   Distance 40ft with RW Supervision limited by fatigue and SOB with drop in SpO2 to 87%   Balance   Static Sitting Good   Dynamic Sitting Good   Static Standing Fair +  (with RW)   Dynamic Standing Fair  (with RW)   Ambulatory Fair  (with RW)   Endurance Deficit   Endurance Deficit Yes   Endurance Deficit Description limited ambulation and activity tolerance due to SOB fatigue and drop in SpO2   Activity Tolerance   Activity Tolerance Patient limited by fatigue   Assessment   Prognosis Good   Problem List Decreased strength;Decreased endurance; Impaired balance;Decreased mobility;Pain; Impaired sensation   Assessment Pt is an [de-identified]year old male presenting with decreased L LE strength with increased redness and pain decreased ambulation tolerance endurance and functional mobility  Pt is performing all transfers and ambulation at a (S) level however demonstrates drop in SpO2 to 87% with exertion on 3L's  Pt would benefit from continued activity in PT to improve strength balance endurance and functional mobility with return to (I) bed mobility transfers and ambulation  Anticipate pt will be safe to return home on discharge  Goals   Patient Goals To feel better and return home   LTG Expiration Date 04/11/18   Long Term Goal #1 (I) with all bed mobility and transfers with RW   Long Term Goal #2 Pt will ambulate 150ft with RW modified Independent no drop in SpO2 below 90% on 3L's O2  Plan   Treatment/Interventions Functional transfer training;LE strengthening/ROM; Therapeutic exercise; Endurance training;Patient/family training;Bed mobility;Gait training   PT Frequency (3-5x/wk)   Recommendation   Recommendation Home independently;Home PT   PT - OK to Discharge No  (when medically stable)   no SCD's  Pt seated at EOB with call bell in reach

## 2018-03-28 NOTE — PROGRESS NOTES
Progress Note - Guerda Garcia [de-identified] y o  male MRN: [id number]    Unit/Bed#: [facility] Encounter: [id number]      Assessment/Plan:    1  Acute on chronic diastolic congestive heart failure:  Continue with low-salt diet, daily weight, strict intake and output, 80 mg Lasix IV b i d  2  Lower Extremity edema: likely 2/2 #1 and #5, however will obtain Venous dopplers to r/o DVT   3  Chronic atrial fibrillation :  He has had intermittent episodes of rapid ventricular response that spontaneously resolve, will maintain potassium greater than 4 and magnesium greater than 2, continue Eliquis and metoprolol at current dosing  4  Right lower extremity wound: Follow-up wound care recommendations  5  Left lower extremity cellulitis:  Transition from p o  Keflex to IV Rocephin  6  Insulin-dependent diabetes mellitus with hyperglycemia:  Fasting blood glucose is 127, continue current dosage of Lantus with insulin sliding scale coverage AC and HS    Subjective:   Seen     Objective:     Vitals:   Vitals:    03/28/18 0700   BP: 139/60   Pulse: 88   Resp:    Temp: 97 6 °F (36 4 °C)   SpO2: 94%     Body mass index is 53 06 kg/m²      Intake/Output Summary (Last 24 hours) at 03/28/18 0818  Last data filed at 03/28/18 0645   Gross per 24 hour   Intake              300 ml   Output             3190 ml   Net            -2890 ml       Physical Exam:   /60 (BP Location: Left arm)   Pulse 88   Temp 97 6 °F (36 4 °C) (Temporal)   Resp 22   Ht 5' 8" (1 727 m)   Wt (!) 158 kg (348 lb 15 8 oz)   SpO2 94%   BMI 53 06 kg/m²      General appearance: alert and oriented, in no acute distress  Head: Normocephalic, without obvious abnormality, atraumatic  Lungs: diminished at bases, mild expiratory wheeze  Heart: regularly irregular rhythm  Abdomen: soft, non-tender; bowel sounds normal; no masses,  no organomegaly  Extremities: +2 lower extremity edema  Pulses: +1  Skin: erythema unchanged on RLE   Chronic wound on LLE   Neurologic: Grossly normal     Invasive Devices     Peripheral Intravenous Line            Peripheral IV 03/26/18 Right Antecubital 1 day                  Results from last 7 days  Lab Units 03/28/18  0442 03/27/18  0526 03/26/18  1907   WBC Thousand/uL 9 04 8 32 8 65   HEMOGLOBIN g/dL 12 4 11 9* 13 1   HEMATOCRIT % 38 9 37 5 41 3   PLATELETS Thousands/uL 298 297 317         Results from last 7 days  Lab Units 03/28/18  0442 03/27/18  0526 03/26/18  1907   SODIUM mmol/L 143 139 141   POTASSIUM mmol/L 3 5 3 4* 4 1   CHLORIDE mmol/L 101 100 98*   CO2 mmol/L 33* 32 34*   BUN mg/dL 36* 36* 39*   CREATININE mg/dL 2 31* 2 30* 2 55*   CALCIUM mg/dL 9 1 8 3 9 2   TOTAL PROTEIN g/dL  --  5 5* 6 5   BILIRUBIN TOTAL mg/dL  --  0 30 0 30   ALK PHOS U/L  --  31* 38*   ALT U/L  --  23 27   AST U/L  --  16 19   GLUCOSE RANDOM mg/dL 127 335* 206*       Medication Administration - last 24 hours from 03/27/2018 0818 to 03/28/2018 0818       Date/Time Order Dose Route Action Action by     03/27/2018 1000 apixaban (ELIQUIS) tablet 5 mg 5 mg Oral Not Given Valeria Kraus, RN     03/27/2018 0900 apixaban (ELIQUIS) tablet 5 mg 5 mg Oral Not Given Valeria Kraus, RN     03/28/2018 0803 aspirin (ECOTRIN LOW STRENGTH) EC tablet 81 mg 81 mg Oral Given Fariba Chamorro RN     03/27/2018 1718 aspirin (ECOTRIN LOW STRENGTH) EC tablet 81 mg 81 mg Oral Given Valeria Kraus, RN     03/27/2018 0924 aspirin (ECOTRIN LOW STRENGTH) EC tablet 81 mg 81 mg Oral Given Marcelina Washington, RN     03/28/2018 9821 cholecalciferol (VITAMIN D3) tablet 2,000 Units 2,000 Units Oral Given Fariba Chamorro RN     03/27/2018 8513 cholecalciferol (VITAMIN D3) tablet 2,000 Units 2,000 Units Oral Given Marcelina Washington, MALIK     03/27/2018 0925 furosemide (LASIX) tablet 80 mg 80 mg Oral Given Marcelina WashingtonMALIK     03/27/2018 2112 gabapentin (NEURONTIN) capsule 300 mg 300 mg Oral Given Samuel Fuentes RN     03/27/2018 2113 insulin detemir (LEVEMIR) subcutaneous injection 50 Units 50 Units Subcutaneous Given Velta Dach, [place]     03/27/2018 1635 insulin lispro (HumaLOG) 100 units/mL subcutaneous injection 15 Units 15 Units Subcutaneous Given Rachell Doll RN     03/27/2018 1250 insulin lispro (HumaLOG) 100 units/mL subcutaneous injection 15 Units 15 Units Subcutaneous Given Louise Cantu RN     03/28/2018 0804 metoprolol tartrate (LOPRESSOR) tablet 25 mg 25 mg Oral Given Fariba Chamorro RN     03/27/2018 1717 metoprolol tartrate (LOPRESSOR) tablet 25 mg 25 mg Oral Given Rachell Doll RN     03/27/2018 0926 metoprolol tartrate (LOPRESSOR) tablet 25 mg 25 mg Oral Given Tiffanie Najera RN     03/27/2018 1000 predniSONE tablet 0 5 mg 0 5 mg Oral Not Given Rachell Doll RN     03/27/2018 0900 predniSONE tablet 0 5 mg 0 5 mg Oral Not Given Rachell Doll, RN     03/27/2018 1634 pravastatin (PRAVACHOL) tablet 80 mg 80 mg Oral Given Rachell Doll RN     03/27/2018 2112 tamsulosin (FLOMAX) capsule 0 4 mg 0 4 mg [facility]     03/28/2018 3437 acetaminophen (TYLENOL) tablet 650 mg 650 mg Oral Given Fariba Chamorro RN     03/27/2018 2112 acetaminophen (TYLENOL) tablet 650 mg 650 mg Oral [facility] MALIK     03/28/2018 1613 insulin lispro (HumaLOG) 100 units/mL subcutaneous injection 2-12 Units 2 Units Subcutaneous Not Given Fariba Chamorro RN     03/27/2018 1635 insulin lispro (HumaLOG) 100 units/mL subcutaneous injection 2-12 Units 6 Units Subcutaneous Given Rachell Doll RN     03/27/2018 1250 insulin lispro (HumaLOG) 100 units/mL subcutaneous injection 2-12 Units 8 Units Subcutaneous Given Louise Cantu RN     03/27/2018 2117 insulin lispro (HumaLOG) 100 units/mL subcutaneous injection 2-12 Units 2 Units Subcutaneous Given Iraida Lopez RN     03/28/2018 0803 apixaban (ELIQUIS) tablet 2 5 mg 2 5 mg Oral Given Fariba Chamorro RN     03/27/2018 1717 apixaban (ELIQUIS) tablet 2 5 mg 2 5 mg Oral Given Rachell Doll RN 03/27/2018 0931 apixaban (ELIQUIS) tablet 2 5 mg 2 5 mg Oral Given Minnie Chambers RN     03/28/2018 2600 predniSONE tablet 10 mg 10 mg Oral Given Fariba Chamorro RN     03/27/2018 0926 predniSONE tablet 10 mg 10 mg Oral Given Minnie Chambers RN     03/28/2018 7651 furosemide (LASIX) injection 80 mg 80 mg Intravenous Given Fariba Chamorro RN     03/27/2018 1634 furosemide (LASIX) injection 80 mg 80 mg Intravenous Given Gonzalo Baptiste RN     03/28/2018 0809 potassium chloride (K-DUR,KLOR-CON) CR tablet 40 mEq 40 mEq Oral Given Fariba Chamorro RN            Lab, Imaging and other studies: I have personally reviewed pertinent reports      VTE Pharmacologic Prophylaxis: Eliquis   VTE Mechanical Prophylaxis: sequential compression device     Kael Canales MD  3/28/2018,8:18 AM

## 2018-03-28 NOTE — CONSULTS
Patient is known to the [facility] outpatient wound center where he is followed by Yaniv Carrasquillo for chronic lower extremity wounds  His last outpatient progress note provided to the inpatient team   Please see scan for details of visit from 3/19/2018  Bilateral lower extremity edema present with slight pink erythema, dry scaley skin  LLE with stable yellow dry fibrinous scabs, no drainage  RLE with superficial dermal open wound on anterior aspect  There is an intact serous filled blister on the medial aspect of the RLE  Sacrum and buttocks intact  Hitesh 16-Mild risk for pressure injury  Albumin 2 4  He is able to ambulate to the bathroom, continent of urine and bowel  Recommendations:  1  Continue outpatient recommendations:  Dressing change every other day  Cleanse with NSS  Apply Remedy Nourishing skin cream to dry skin of RLE with dressing change  Adaptic (oil emulsion) to open area on RLE  Cover with gauze and anita  2   Elevate legs to assist with lower extremity edema control  3   Nourishing skin cream to dry skin lower extremities  Apply q shift  4   Turn and reposition q 2 hours  5   Elevate heels off bed  6   Contact wound care for decline in skin/wound status  7   Weekly follow up with wound care    8   Follow up with outpatient wound care post discharge from hospital     Other Ulcers 03/03/17 Leg Right Red, weeping  (Active)   Wound Description Other (pink dermal) 3/28/2018  3:00 PM   Maricruz-wound Assessment Erythema;Swelling 3/28/2018  3:00 PM   Shape oval 3/28/2018  3:00 PM   Wound Length (cm) 3 cm 3/28/2018  3:00 PM   Wound Width (cm) 1 cm 3/28/2018  3:00 PM   Wound Depth (cm) 0 1 3/28/2018  3:00 PM   Calculated Wound Volume (cm^3) 0 3 cm^3 3/28/2018  3:00 PM   Change in Wound Size % -20 3/28/2018  3:00 PM   Drainage Amount Moderate 3/28/2018  3:00 PM   Drainage Description Serosanguineous 3/28/2018  3:00 PM   Treatment Cleansed 3/27/2018 10:59 AM   Dressings Other (non-adherent) 3/28/2018  3:00 PM   Wound packed? No 3/27/2018 10:59 AM   Dressing Changed Changed 3/27/2018 10:59 AM   Patient Tolerance Tolerated well 3/27/2018 10:59 AM   Dressing Status Clean;Dry; Intact 3/28/2018  8:00 AM   Number of days: Toby Maxwell 87 RN   CWOCN  3/28/2018 16:06

## 2018-03-29 LAB
ANION GAP SERPL CALCULATED.3IONS-SCNC: 8 MMOL/L (ref 4–13)
BASOPHILS # BLD AUTO: 0.07 THOUSANDS/ΜL (ref 0–0.1)
BASOPHILS NFR BLD AUTO: 1 % (ref 0–1)
BUN SERPL-MCNC: 38 MG/DL (ref 5–25)
CALCIUM SERPL-MCNC: 9.4 MG/DL (ref 8.3–10.1)
CHLORIDE SERPL-SCNC: 99 MMOL/L (ref 100–108)
CO2 SERPL-SCNC: 34 MMOL/L (ref 21–32)
CREAT SERPL-MCNC: 2.43 MG/DL (ref 0.6–1.3)
EOSINOPHIL # BLD AUTO: 0.24 THOUSAND/ΜL (ref 0–0.61)
EOSINOPHIL NFR BLD AUTO: 2 % (ref 0–6)
ERYTHROCYTE [DISTWIDTH] IN BLOOD BY AUTOMATED COUNT: 16.2 % (ref 11.6–15.1)
GFR SERPL CREATININE-BSD FRML MDRD: 24 ML/MIN/1.73SQ M
GLUCOSE SERPL-MCNC: 122 MG/DL (ref 65–140)
GLUCOSE SERPL-MCNC: 155 MG/DL (ref 65–140)
GLUCOSE SERPL-MCNC: 159 MG/DL (ref 65–140)
GLUCOSE SERPL-MCNC: 185 MG/DL (ref 65–140)
GLUCOSE SERPL-MCNC: 227 MG/DL (ref 65–140)
GLUCOSE SERPL-MCNC: 240 MG/DL (ref 65–140)
HCT VFR BLD AUTO: 42.6 % (ref 36.5–49.3)
HGB BLD-MCNC: 13.4 G/DL (ref 12–17)
LYMPHOCYTES # BLD AUTO: 1.33 THOUSANDS/ΜL (ref 0.6–4.47)
LYMPHOCYTES NFR BLD AUTO: 13 % (ref 14–44)
MCH RBC QN AUTO: 30.3 PG (ref 26.8–34.3)
MCHC RBC AUTO-ENTMCNC: 31.5 G/DL (ref 31.4–37.4)
MCV RBC AUTO: 96 FL (ref 82–98)
MONOCYTES # BLD AUTO: 0.91 THOUSAND/ΜL (ref 0.17–1.22)
MONOCYTES NFR BLD AUTO: 9 % (ref 4–12)
NEUTROPHILS # BLD AUTO: 7.41 THOUSANDS/ΜL (ref 1.85–7.62)
NEUTS SEG NFR BLD AUTO: 75 % (ref 43–75)
PLATELET # BLD AUTO: 335 THOUSANDS/UL (ref 149–390)
PMV BLD AUTO: 9.7 FL (ref 8.9–12.7)
POTASSIUM SERPL-SCNC: 3.5 MMOL/L (ref 3.5–5.3)
RBC # BLD AUTO: 4.42 MILLION/UL (ref 3.88–5.62)
SODIUM SERPL-SCNC: 141 MMOL/L (ref 136–145)
WBC # BLD AUTO: 9.96 THOUSAND/UL (ref 4.31–10.16)

## 2018-03-29 PROCEDURE — 85025 COMPLETE CBC W/AUTO DIFF WBC: CPT | Performed by: FAMILY MEDICINE

## 2018-03-29 PROCEDURE — 80048 BASIC METABOLIC PNL TOTAL CA: CPT | Performed by: FAMILY MEDICINE

## 2018-03-29 PROCEDURE — 97110 THERAPEUTIC EXERCISES: CPT

## 2018-03-29 PROCEDURE — 82948 REAGENT STRIP/BLOOD GLUCOSE: CPT

## 2018-03-29 PROCEDURE — 97116 GAIT TRAINING THERAPY: CPT

## 2018-03-29 PROCEDURE — 93970 EXTREMITY STUDY: CPT | Performed by: SURGERY

## 2018-03-29 PROCEDURE — 99232 SBSQ HOSP IP/OBS MODERATE 35: CPT | Performed by: FAMILY MEDICINE

## 2018-03-29 RX ORDER — POTASSIUM CHLORIDE 20 MEQ/1
40 TABLET, EXTENDED RELEASE ORAL ONCE
Status: COMPLETED | OUTPATIENT
Start: 2018-03-29 | End: 2018-03-29

## 2018-03-29 RX ADMIN — METOPROLOL TARTRATE 25 MG: 25 TABLET ORAL at 17:08

## 2018-03-29 RX ADMIN — POTASSIUM CHLORIDE 40 MEQ: 1500 TABLET, EXTENDED RELEASE ORAL at 08:22

## 2018-03-29 RX ADMIN — PREDNISONE 10 MG: 10 TABLET ORAL at 07:58

## 2018-03-29 RX ADMIN — APIXABAN 2.5 MG: 2.5 TABLET, FILM COATED ORAL at 17:08

## 2018-03-29 RX ADMIN — TAMSULOSIN HYDROCHLORIDE 0.4 MG: 0.4 CAPSULE ORAL at 21:23

## 2018-03-29 RX ADMIN — METOPROLOL TARTRATE 25 MG: 25 TABLET ORAL at 07:59

## 2018-03-29 RX ADMIN — INSULIN LISPRO 2 UNITS: 100 INJECTION, SOLUTION INTRAVENOUS; SUBCUTANEOUS at 12:44

## 2018-03-29 RX ADMIN — ASPIRIN 81 MG: 81 TABLET, COATED ORAL at 17:08

## 2018-03-29 RX ADMIN — APIXABAN 2.5 MG: 2.5 TABLET, FILM COATED ORAL at 07:58

## 2018-03-29 RX ADMIN — INSULIN LISPRO 4 UNITS: 100 INJECTION, SOLUTION INTRAVENOUS; SUBCUTANEOUS at 21:25

## 2018-03-29 RX ADMIN — FUROSEMIDE 80 MG: 10 INJECTION, SOLUTION INTRAMUSCULAR; INTRAVENOUS at 08:31

## 2018-03-29 RX ADMIN — INSULIN LISPRO 15 UNITS: 100 INJECTION, SOLUTION INTRAVENOUS; SUBCUTANEOUS at 12:44

## 2018-03-29 RX ADMIN — INSULIN LISPRO 2 UNITS: 100 INJECTION, SOLUTION INTRAVENOUS; SUBCUTANEOUS at 16:56

## 2018-03-29 RX ADMIN — POTASSIUM CHLORIDE 20 MEQ: 1500 TABLET, EXTENDED RELEASE ORAL at 07:57

## 2018-03-29 RX ADMIN — INSULIN DETEMIR 50 UNITS: 100 INJECTION, SOLUTION SUBCUTANEOUS at 21:24

## 2018-03-29 RX ADMIN — ASPIRIN 81 MG: 81 TABLET, COATED ORAL at 07:58

## 2018-03-29 RX ADMIN — CHOLECALCIFEROL TAB 25 MCG (1000 UNIT) 2000 UNITS: 25 TAB at 07:58

## 2018-03-29 RX ADMIN — INSULIN LISPRO 2 UNITS: 100 INJECTION, SOLUTION INTRAVENOUS; SUBCUTANEOUS at 08:32

## 2018-03-29 RX ADMIN — GABAPENTIN 300 MG: 300 CAPSULE ORAL at 21:23

## 2018-03-29 RX ADMIN — FUROSEMIDE 80 MG: 10 INJECTION, SOLUTION INTRAMUSCULAR; INTRAVENOUS at 17:04

## 2018-03-29 RX ADMIN — INSULIN LISPRO 15 UNITS: 100 INJECTION, SOLUTION INTRAVENOUS; SUBCUTANEOUS at 16:55

## 2018-03-29 RX ADMIN — CEFTRIAXONE 1000 MG: 1 INJECTION, SOLUTION INTRAVENOUS at 08:00

## 2018-03-29 RX ADMIN — INSULIN LISPRO 15 UNITS: 100 INJECTION, SOLUTION INTRAVENOUS; SUBCUTANEOUS at 08:32

## 2018-03-29 RX ADMIN — PRAVASTATIN SODIUM 80 MG: 40 TABLET ORAL at 17:08

## 2018-03-29 NOTE — PHYSICAL THERAPY NOTE
PT Treatment Note      03/29/18 0843   Pain Assessment   Pain Score No Pain   Restrictions/Precautions   Other Precautions (Bed Alarm;Multiple lines;Telemetry; Fall Risk;O2)   Bed Mobility   Additional Comments OOB in chair at start and end of PT session   Transfers   Sit to Stand 5  Supervision   Additional items (with RW)   Stand to Sit 5  Supervision   Additional items (with RW)   Stand pivot 5  Supervision   Ambulation/Elevation   Gait pattern Forward Flexion   Gait Assistance 5  Supervision   Assistive Device Rolling walker   Distance 50'  SpO2 95-94% with 3 L mild SOB   Balance   Static Sitting Good   Dynamic Sitting Good   Static Standing Fair +   Dynamic Standing Fair   Ambulatory Fair   Endurance Deficit   Endurance Deficit Yes   Activity Tolerance   Activity Tolerance Patient limited by fatigue   Exercises   Hip Flexion 15 reps   Hip Abduction 15 reps   Hip Adduction 15 reps   Knee AROM Long Arc Quad 15 reps   Ankle Pumps 15 reps   Assessment   Prognosis Good   Problem List Decreased strength;Decreased endurance; Impaired balance;Decreased mobility   Assessment Pt is performing all transfers and ambulation at a (S) level  SpO2  above 94% on 3 L mild SOB  Pt would benefit from continued activity in PT to improve strength balance endurance and functional mobility with return to (I) bed mobility transfers and ambulation  Plan   Treatment/Interventions Functional transfer training;LE strengthening/ROM; Therapeutic exercise; Endurance training;Bed mobility;Gait training   Progress Progressing toward goals   Recommendation   Recommendation Home independently;Home PT   Pt  OOB with call bell within reach and alarm on at end of PT session

## 2018-03-29 NOTE — PROGRESS NOTES
No pain, 2 5L oxygen via NC, ambulates with walked in room to bathroom, oob to chair, ambulated to victor with therapy and did well  Tentative discharge to home tomorrow 3/30

## 2018-03-29 NOTE — MALNUTRITION/BMI
This medical record reflects one or more clinical indicators  morbid obesity  Malnutrition Findings:              BMI Findings:  BMI Classifications: Morbid Obesity 50-59 9     Body mass index is 51 52 kg/m²  See Nutrition note dated 3/29/2018 for additional details  Completed nutrition assessment is viewable in the nutrition documentation

## 2018-03-29 NOTE — PLAN OF CARE
Problem: Potential for Falls  Goal: Patient will remain free of falls  INTERVENTIONS:  - Assess patient frequently for physical needs  -  Identify cognitive and physical deficits and behaviors that affect risk of falls    -  [place] fall precautions as indicated by assessment   - Educate patient/family on patient safety including physical limitations  - Instruct patient to call for assistance with activity based on assessment  - Modify environment to reduce risk of injury  - Consider OT/PT consult to assist with strengthening/mobility    Outcome: Progressing      Problem: Prexisting or High Potential for Compromised Skin Integrity  Goal: Skin integrity is maintained or improved  INTERVENTIONS:  - Identify patients at risk for skin breakdown  - Assess and monitor skin integrity  - Assess and monitor nutrition and hydration status  - Monitor labs (i e  albumin)  - Assess for incontinence   - Turn and reposition patient  - Assist with mobility/ambulation  - Relieve pressure over bony prominences  - Avoid friction and shearing  - Provide appropriate hygiene as needed including keeping skin clean and dry  - Evaluate need for skin moisturizer/barrier cream  - Collaborate with interdisciplinary team (i e  Nutrition, Rehabilitation, etc )   - Patient/family teaching   Outcome: Progressing      Problem: PAIN - ADULT  Goal: Verbalizes/displays adequate comfort level or baseline comfort level  Interventions:  - Encourage patient to monitor pain and request assistance  - Assess pain using appropriate pain scale  - Administer analgesics based on type and severity of pain and evaluate response  - Implement non-pharmacological measures as appropriate and evaluate response  - Consider cultural and social influences on pain and pain management  - Notify physician/advanced practitioner if interventions unsuccessful or patient reports new pain   Outcome: Progressing      Problem: INFECTION - ADULT  Goal: Absence or prevention of progression during hospitalization  INTERVENTIONS:  - Assess and monitor for signs and symptoms of infection  - Monitor lab/diagnostic results  - Monitor all insertion sites, i e  indwelling lines, tubes, and drains  - Monitor endotracheal (as able) and nasal secretions for changes in amount and color  - [place] appropriate cooling/warming therapies per order  - Administer medications as ordered  - Instruct and encourage patient and family to use good hand hygiene technique  - Identify and instruct in appropriate isolation precautions for identified infection/condition   Outcome: Progressing    Goal: Absence of fever/infection during neutropenic period  INTERVENTIONS:  - Monitor WBC  - Implement neutropenic guidelines   Outcome: Progressing      Problem: SAFETY ADULT  Goal: Maintain or return to baseline ADL function  INTERVENTIONS:  -  Assess patient's ability to carry out ADLs; assess patient's baseline for ADL function and identify physical deficits which impact ability to perform ADLs (bathing, care of mouth/teeth, toileting, grooming, dressing, etc )  - Assess/evaluate cause of self-care deficits   - Assess range of motion  - Assess patient's mobility; develop plan if impaired  - Assess patient's need for assistive devices and provide as appropriate  - Encourage maximum independence but intervene and supervise when necessary  ¯ Involve family in performance of ADLs  ¯ Assess for home care needs following discharge   ¯ Request OT consult to assist with ADL evaluation and planning for discharge  ¯ Provide patient education as appropriate   Outcome: Progressing    Goal: Maintain or return mobility status to optimal level  INTERVENTIONS:  - Assess patient's baseline mobility status (ambulation, transfers, stairs, etc )    - Identify cognitive and physical deficits and behaviors that affect mobility  - Identify mobility aids required to assist with transfers and/or ambulation (gait belt, sit-to-stand, lift, walker, cane, etc )  - [place] fall precautions as indicated by assessment  - Record patient progress and toleration of activity level on Mobility SBAR; progress patient to next Phase/Stage  - Instruct patient to call for assistance with activity based on assessment  - Request Rehabilitation consult to assist with strengthening/weightbearing, etc    Outcome: Progressing    Goal: Patient will remain free of falls  INTERVENTIONS:  - Assess patient frequently for physical needs  -  Identify cognitive and physical deficits and behaviors that affect risk of falls  -  [place] fall precautions as indicated by assessment   - Educate patient/family on patient safety including physical limitations  - Instruct patient to call for assistance with activity based on assessment  - Modify environment to reduce risk of injury  - Consider OT/PT consult to assist with strengthening/mobility    Outcome: Progressing      Problem: DISCHARGE PLANNING  Goal: Discharge to home or other facility with appropriate resources  INTERVENTIONS:  - Identify barriers to discharge w/patient and caregiver  - Arrange for needed discharge resources and transportation as appropriate  - Identify discharge learning needs (meds, wound care, etc )  - Arrange for interpretive services to assist at discharge as needed  - Refer to Case Management Department for coordinating discharge planning if the patient needs post-hospital services based on physician/advanced practitioner order or complex needs related to functional status, cognitive ability, or social support system   Outcome: Progressing      Problem: Knowledge Deficit  Goal: Patient/family/caregiver demonstrates understanding of disease process, treatment plan, medications, and discharge instructions  Complete learning assessment and assess knowledge base    Interventions:  - Provide teaching at level of understanding  - Provide teaching via preferred learning methods   Outcome: Progressing

## 2018-03-29 NOTE — PROGRESS NOTES
Progress Note - Cecelia Ornelas [de-identified] y o  male MRN: [id number]    Unit/Bed#: [facility] Encounter: [id number]      Assessment/Plan:    1  Acute on chronic diastolic congestive heart failure:  Weight significantly decreased today, patient feels better, edema improving, continue with low-salt diet, strict intake and output, b i d  Lasix, lower extremity venous Dopplers negative for DVT  2  Chronic atrial fibrillation:  Rate is appropriate, continue with metoprolol on Eliquis  3  Right lower extremity wound:  Wound Care recommendations noted  4  Left lower extremity cellulitis:  Improving after transitioned to Rocephin, blood cultures negative  5  Insulin-dependent diabetes mellitus with hyperglycemia:  Continue current dosage of Lantus and mealtime insulin  Tentative discharge tomorrow    Subjective:   Seen and examined at bedside, states he feels slightly better today, left lower extremity pain and edema is improved    Objective:     Vitals:   Vitals:    03/29/18 0755   BP: 104/74   Pulse:    Resp:    Temp:    SpO2:      Body mass index is 51 52 kg/m²      Intake/Output Summary (Last 24 hours) at 03/29/18 0840  Last data filed at 03/28/18 2232   Gross per 24 hour   Intake             1050 ml   Output             2700 ml   Net            -1650 ml       Physical Exam:   /74 (BP Location: Left arm)   Pulse (!) 152   Temp 98 1 °F (36 7 °C) (Temporal)   Resp 22   Ht 5' 8" (1 727 m)   Wt (!) 154 kg (338 lb 13 6 oz)   SpO2 93%   BMI 51 52 kg/m²   General appearance: alert and oriented, in no acute distress  Head: Normocephalic, without obvious abnormality, atraumatic  Lungs: Diminished at the bases with questionable rales  Heart: regularly irregular rhythm  Abdomen: soft, non-tender; bowel sounds normal; no masses,  no organomegaly  Extremities:  +2 lower extremity edema  Pulses:  Plus one  Skin:  Area of erythema left lower extremity shows slight improvement  Neurologic: Grossly normal     Invasive Devices Peripheral Intravenous Line            Peripheral IV 03/26/18 Right Antecubital 2 days                  Results from last 7 days  Lab Units 03/29/18  0559 03/28/18  0442 03/27/18  0526   WBC Thousand/uL 9 96 9 04 8 32   HEMOGLOBIN g/dL 13 4 12 4 11 9*   HEMATOCRIT % 42 6 38 9 37 5   PLATELETS Thousands/uL 335 298 297         Results from last 7 days  Lab Units 03/29/18  0559 03/28/18  0442 03/27/18  0526 03/26/18  1907   SODIUM mmol/L 141 143 139 141   POTASSIUM mmol/L 3 5 3 5 3 4* 4 1   CHLORIDE mmol/L 99* 101 100 98*   CO2 mmol/L 34* 33* 32 34*   BUN mg/dL 38* 36* 36* 39*   CREATININE mg/dL 2 43* 2 31* 2 30* 2 55*   CALCIUM mg/dL 9 4 9 1 8 3 9 2   TOTAL PROTEIN g/dL  --   --  5 5* 6 5   BILIRUBIN TOTAL mg/dL  --   --  0 30 0 30   ALK PHOS U/L  --   --  31* 38*   ALT U/L  --   --  23 27   AST U/L  --   --  16 19   GLUCOSE RANDOM mg/dL 122 127 335* 206*       Medication Administration - last 24 hours from 03/28/2018 0840 to 03/29/2018 0840       Date/Time Order Dose Route Action Action by     03/29/2018 0811 aspirin (ECOTRIN LOW STRENGTH) EC tablet 81 mg 81 mg Oral Not Given Maureen Perez RN     03/29/2018 0758 aspirin (ECOTRIN LOW STRENGTH) EC tablet 81 mg 81 mg Oral Given Maureen Perez RN     03/28/2018 1916 aspirin (ECOTRIN LOW STRENGTH) EC tablet 81 mg 81 mg Oral Given Laz Wong RN     03/29/2018 8974 cholecalciferol (VITAMIN D3) tablet 2,000 Units 2,000 Units Oral Not Given Maureen Perez RN     03/29/2018 7508 cholecalciferol (VITAMIN D3) tablet 2,000 Units 2,000 Units Oral Given Maureen Perez RN     03/28/2018 9909 gabapentin (NEURONTIN) capsule 300 mg 300 mg Oral Given Jarocho Marx RN     03/28/2018 2140 insulin detemir (LEVEMIR) subcutaneous injection 50 Units 50 Units Subcutaneous Given Jarocho Marx RN     03/29/2018 3917 insulin lispro (HumaLOG) 100 units/mL subcutaneous injection 15 Units 15 Units Subcutaneous Given Maureen Perez RN     03/28/2018 2698 insulin lispro (HumaLOG) 100 units/mL subcutaneous injection 15 Units 15 Units Subcutaneous Given [facility] Javi, RN     03/28/2018 1240 insulin lispro (HumaLOG) 100 units/mL subcutaneous injection 15 Units 15 Units Subcutaneous Given Fariba Chamorro, RN     03/28/2018 0900 insulin lispro (HumaLOG) 100 units/mL subcutaneous injection 15 Units 15 Units Subcutaneous Given Fariba Chamorro, RN     03/29/2018 4093 metoprolol tartrate (LOPRESSOR) tablet 25 mg 25 mg Oral Not Given Luba Boyer, RN     03/29/2018 0759 metoprolol tartrate (LOPRESSOR) tablet 25 mg 25 mg Oral Given Luba Boyer, RN     03/28/2018 1915 metoprolol tartrate (LOPRESSOR) tablet 25 mg 25 mg Oral Given [facility], RN     03/28/2018 1914 pravastatin (PRAVACHOL) tablet 80 mg 80 mg Oral Given [facility], RN     03/28/2018 2139 tamsulosin (FLOMAX) capsule 0 4 mg 0 4 mg Oral Given Chaz De Los Santos, RN     03/29/2018 5535 insulin lispro (HumaLOG) 100 units/mL subcutaneous injection 2-12 Units 2 Units Subcutaneous Given Luba Boyer, RN     03/28/2018 1720 insulin lispro (HumaLOG) 100 units/mL subcutaneous injection 2-12 Units 2 Units Subcutaneous Given [facility] Javi, RN     03/28/2018 1130 insulin lispro (HumaLOG) 100 units/mL subcutaneous injection 2-12 Units 4 Units Subcutaneous Given Fariba Chamorro, RN     03/28/2018 2140 insulin lispro (HumaLOG) 100 units/mL subcutaneous injection 2-12 Units 4 Units Subcutaneous Given Chaz De Los Santos, RN     03/29/2018 0786 apixaban (ELIQUIS) tablet 2 5 mg 2 5 mg Oral Not Given Luba Boyer, RN     03/29/2018 0758 apixaban (ELIQUIS) tablet 2 5 mg 2 5 mg Oral Given Luba Boyer, RN     03/28/2018 1914 apixaban (ELIQUIS) tablet 2 5 mg 2 5 mg Oral Given [facility] Javi, RN     03/29/2018 0813 predniSONE tablet 10 mg 10 mg Oral Not Given Luba Boyer, RN     03/29/2018 0758 predniSONE tablet 10 mg 10 mg Oral Given [facility] Merlin, RN     03/29/2018 0831 furosemide (LASIX) injection 80 mg 80 mg Intravenous Given Maureen Perez RN     03/28/2018 1915 furosemide (LASIX) injection 80 mg 80 mg Intravenous Given Laz Wong RN     03/29/2018 3335 potassium chloride (K-DUR,KLOR-CON) CR tablet 20 mEq 20 mEq Oral Not Given Maureen Perez RN     03/29/2018 0757 potassium chloride (K-DUR,KLOR-CON) CR tablet 20 mEq 20 mEq Oral Given Maureen Perez RN     03/28/2018 7450 potassium chloride (K-DUR,KLOR-CON) CR tablet 20 mEq 20 mEq Oral Given Fariba Chamorro RN     03/29/2018 0800 cefTRIAXone (ROCEPHIN) IVPB (premix) 1,000 mg 1,000 mg Intravenous New Bag Maureen Perez RN     03/28/2018 1100 cefTRIAXone (ROCEPHIN) IVPB (premix) 1,000 mg 0 mg Intravenous Stopped Maureen Perez RN     03/28/2018 1000 cefTRIAXone (ROCEPHIN) IVPB (premix) 1,000 mg 1,000 mg Intravenous New Bag Fariba Chamorro RN     03/29/2018 3077 potassium chloride (K-DUR,KLOR-CON) CR tablet 40 mEq 40 mEq Oral Given Maureen Perez RN            Lab, Imaging and other studies: I have personally reviewed pertinent reports      VTE Pharmacologic Prophylaxis:  Eliquis  VTE Mechanical Prophylaxis: sequential compression device     Tanvi Nielsen MD  3/29/2018,8:40 AM

## 2018-03-30 VITALS
HEART RATE: 85 BPM | HEIGHT: 68 IN | DIASTOLIC BLOOD PRESSURE: 70 MMHG | SYSTOLIC BLOOD PRESSURE: 110 MMHG | OXYGEN SATURATION: 95 % | BODY MASS INDEX: 47.74 KG/M2 | TEMPERATURE: 97.6 F | RESPIRATION RATE: 18 BRPM | WEIGHT: 315 LBS

## 2018-03-30 LAB
ANION GAP SERPL CALCULATED.3IONS-SCNC: 9 MMOL/L (ref 4–13)
BUN SERPL-MCNC: 45 MG/DL (ref 5–25)
CALCIUM SERPL-MCNC: 9 MG/DL (ref 8.3–10.1)
CHLORIDE SERPL-SCNC: 98 MMOL/L (ref 100–108)
CO2 SERPL-SCNC: 32 MMOL/L (ref 21–32)
CREAT SERPL-MCNC: 2.73 MG/DL (ref 0.6–1.3)
GFR SERPL CREATININE-BSD FRML MDRD: 21 ML/MIN/1.73SQ M
GLUCOSE SERPL-MCNC: 191 MG/DL (ref 65–140)
GLUCOSE SERPL-MCNC: 225 MG/DL (ref 65–140)
POTASSIUM SERPL-SCNC: 4 MMOL/L (ref 3.5–5.3)
SODIUM SERPL-SCNC: 139 MMOL/L (ref 136–145)

## 2018-03-30 PROCEDURE — 99239 HOSP IP/OBS DSCHRG MGMT >30: CPT | Performed by: FAMILY MEDICINE

## 2018-03-30 PROCEDURE — 80048 BASIC METABOLIC PNL TOTAL CA: CPT | Performed by: FAMILY MEDICINE

## 2018-03-30 PROCEDURE — 82948 REAGENT STRIP/BLOOD GLUCOSE: CPT

## 2018-03-30 RX ORDER — CEFDINIR 300 MG/1
300 CAPSULE ORAL EVERY 12 HOURS SCHEDULED
Qty: 10 CAPSULE | Refills: 0 | Status: SHIPPED | OUTPATIENT
Start: 2018-03-30 | End: 2018-04-04

## 2018-03-30 RX ADMIN — PREDNISONE 10 MG: 10 TABLET ORAL at 08:02

## 2018-03-30 RX ADMIN — CHOLECALCIFEROL TAB 25 MCG (1000 UNIT) 2000 UNITS: 25 TAB at 08:03

## 2018-03-30 RX ADMIN — ASPIRIN 81 MG: 81 TABLET, COATED ORAL at 08:03

## 2018-03-30 RX ADMIN — CEFTRIAXONE 1000 MG: 1 INJECTION, SOLUTION INTRAVENOUS at 08:03

## 2018-03-30 RX ADMIN — FUROSEMIDE 80 MG: 10 INJECTION, SOLUTION INTRAMUSCULAR; INTRAVENOUS at 08:03

## 2018-03-30 RX ADMIN — METOPROLOL TARTRATE 25 MG: 25 TABLET ORAL at 08:02

## 2018-03-30 RX ADMIN — INSULIN LISPRO 15 UNITS: 100 INJECTION, SOLUTION INTRAVENOUS; SUBCUTANEOUS at 08:12

## 2018-03-30 RX ADMIN — POTASSIUM CHLORIDE 20 MEQ: 1500 TABLET, EXTENDED RELEASE ORAL at 08:03

## 2018-03-30 RX ADMIN — INSULIN LISPRO 4 UNITS: 100 INJECTION, SOLUTION INTRAVENOUS; SUBCUTANEOUS at 08:13

## 2018-03-30 RX ADMIN — APIXABAN 2.5 MG: 2.5 TABLET, FILM COATED ORAL at 08:03

## 2018-03-30 NOTE — DISCHARGE SUMMARY
Discharge Summary - Margarette Juan [de-identified] y o  male MRN: [id number]    Unit/Bed#: MS Raman Encounter: [id number]    Admission Date:   Admission Orders     Ordered        03/26/18 2130  Inpatient Admission (expected length of stay for this patient is greater than two midnights)  Once               Admitting Diagnosis: [facility] [E66 01]  Foot pain [M79 673]  CKD (chronic kidney disease) stage 4, GFR 15-29 ml/min (HCC) [N18 4]  Acute exacerbation of congestive heart failure ([facility] ) [I50 9]  Cellulitis of lower extremity, unspecified laterality [Y27 599]  Hypervolemia, unspecified hypervolemia type [E87 70]    HPI: Patient is a pleasant 17-year-old male with past medical history of diastolic congestive heart failure, diabetes mellitus, chronic atrial fibrillation presents emergency room today with chief complaint of left lower extremity pain  The patient states that he has noticed his lower extremity become more swollen and painful  He does have a wound on his right lower extremity for which he sees wound care  Left lower extremity appears more erythematous and slightly tender to touch  Procedures Performed:   Orders Placed This Encounter   Procedures    ED ECG Documentation Only       Hospital Course:     Acute on chroinc diastolic chf / Chronic a fib / Elevated troponin  Started on lasix 80mg IV bid, low salt diet and strict I&o, lost approximately 8-10 lbs with improvement of edema  Troponin was flat likely 2/2 renal dysfunction and chf  eliquis was renally dosed given he has just turned 78  CKD IV : nephro consult, returned to baseline on discharge, he will follow up with nephrology as outpatient to discuss dialysis     Left lower extremity cellulitis: initially started on keflex, with no improvement he was switched to rocephin x 2 days and will be discharged on omnicef to complete a 7 day course  He has a chronic right wound which he follows with wound care       Significant Findings, Care, Treatment and Services Provided:       Complications: none      Discharge Diagnosis:     Acute on chronic combined chf  CKD IV  Left lower extremity cellulitis     Resolved Problems  Date Reviewed: 3/26/2018    None          Condition at Discharge: fair     Discharge instructions/Information to patient and family:   See after visit summary for information provided to patient and family  Provisions for Follow-Up Care:  See after visit summary for information related to follow-up care and any pertinent home health orders  Disposition: Home    Planned Readmission: No    Discharge Statement   I spent 45 minutes discharging the patient  This time was spent on the day of discharge  I had direct contact with the patient on the day of discharge  Additional documentation is required if more than 30 minutes were spent on discharge  Discharge Medications:  See after visit summary for reconciled discharge medications provided to patient and family

## 2018-03-30 NOTE — PROGRESS NOTES
oob to chair, no pain, discharge to home today before lunch, ambulating with walker and stand-by asst, will make follow up appointment for Nephrology prior to d/c

## 2018-03-30 NOTE — PLAN OF CARE
Problem: Potential for Falls  Goal: Patient will remain free of falls  INTERVENTIONS:  - Assess patient frequently for physical needs  -  Identify cognitive and physical deficits and behaviors that affect risk of falls    -  Sharon fall precautions as indicated by assessment   - Educate patient/family on patient safety including physical limitations  - Instruct patient to call for assistance with activity based on assessment  - Modify environment to reduce risk of injury  - Consider OT/PT consult to assist with strengthening/mobility    Outcome: Progressing      Problem: Prexisting or High Potential for Compromised Skin Integrity  Goal: Skin integrity is maintained or improved  INTERVENTIONS:  - Identify patients at risk for skin breakdown  - Assess and monitor skin integrity  - Assess and monitor nutrition and hydration status  - Monitor labs (i e  albumin)  - Assess for incontinence   - Turn and reposition patient  - Assist with mobility/ambulation  - Relieve pressure over bony prominences  - Avoid friction and shearing  - Provide appropriate hygiene as needed including keeping skin clean and dry  - Evaluate need for skin moisturizer/barrier cream  - Collaborate with interdisciplinary team (i e  Nutrition, Rehabilitation, etc )   - Patient/family teaching   Outcome: Progressing      Problem: PAIN - ADULT  Goal: Verbalizes/displays adequate comfort level or baseline comfort level  Interventions:  - Encourage patient to monitor pain and request assistance  - Assess pain using appropriate pain scale  - Administer analgesics based on type and severity of pain and evaluate response  - Implement non-pharmacological measures as appropriate and evaluate response  - Consider cultural and social influences on pain and pain management  - Notify physician/advanced practitioner if interventions unsuccessful or patient reports new pain   Outcome: Progressing      Problem: INFECTION - ADULT  Goal: Absence or prevention of progression during hospitalization  INTERVENTIONS:  - Assess and monitor for signs and symptoms of infection  - Monitor lab/diagnostic results  - Monitor all insertion sites, i e  indwelling lines, tubes, and drains  - Monitor endotracheal (as able) and nasal secretions for changes in amount and color  - Roslyn appropriate cooling/warming therapies per order  - Administer medications as ordered  - Instruct and encourage patient and family to use good hand hygiene technique  - Identify and instruct in appropriate isolation precautions for identified infection/condition   Outcome: Progressing    Goal: Absence of fever/infection during neutropenic period  INTERVENTIONS:  - Monitor WBC  - Implement neutropenic guidelines   Outcome: Progressing      Problem: SAFETY ADULT  Goal: Maintain or return to baseline ADL function  INTERVENTIONS:  -  Assess patient's ability to carry out ADLs; assess patient's baseline for ADL function and identify physical deficits which impact ability to perform ADLs (bathing, care of mouth/teeth, toileting, grooming, dressing, etc )  - Assess/evaluate cause of self-care deficits   - Assess range of motion  - Assess patient's mobility; develop plan if impaired  - Assess patient's need for assistive devices and provide as appropriate  - Encourage maximum independence but intervene and supervise when necessary  ¯ Involve family in performance of ADLs  ¯ Assess for home care needs following discharge   ¯ Request OT consult to assist with ADL evaluation and planning for discharge  ¯ Provide patient education as appropriate   Outcome: Progressing    Goal: Maintain or return mobility status to optimal level  INTERVENTIONS:  - Assess patient's baseline mobility status (ambulation, transfers, stairs, etc )    - Identify cognitive and physical deficits and behaviors that affect mobility  - Identify mobility aids required to assist with transfers and/or ambulation (gait belt, sit-to-stand, lift, walker, cane, etc )  - Harold fall precautions as indicated by assessment  - Record patient progress and toleration of activity level on Mobility SBAR; progress patient to next Phase/Stage  - Instruct patient to call for assistance with activity based on assessment  - Request Rehabilitation consult to assist with strengthening/weightbearing, etc    Outcome: Progressing    Goal: Patient will remain free of falls  INTERVENTIONS:  - Assess patient frequently for physical needs  -  Identify cognitive and physical deficits and behaviors that affect risk of falls  -  Harold fall precautions as indicated by assessment   - Educate patient/family on patient safety including physical limitations  - Instruct patient to call for assistance with activity based on assessment  - Modify environment to reduce risk of injury  - Consider OT/PT consult to assist with strengthening/mobility    Outcome: Progressing      Problem: DISCHARGE PLANNING  Goal: Discharge to home or other facility with appropriate resources  INTERVENTIONS:  - Identify barriers to discharge w/patient and caregiver  - Arrange for needed discharge resources and transportation as appropriate  - Identify discharge learning needs (meds, wound care, etc )  - Arrange for interpretive services to assist at discharge as needed  - Refer to Case Management Department for coordinating discharge planning if the patient needs post-hospital services based on physician/advanced practitioner order or complex needs related to functional status, cognitive ability, or social support system   Outcome: Progressing      Problem: Knowledge Deficit  Goal: Patient/family/caregiver demonstrates understanding of disease process, treatment plan, medications, and discharge instructions  Complete learning assessment and assess knowledge base    Interventions:  - Provide teaching at level of understanding  - Provide teaching via preferred learning methods   Outcome: Progressing      Problem: Nutrition/Hydration-ADULT  Goal: Nutrient/Hydration intake appropriate for improving, restoring or maintaining nutritional needs  Monitor and assess patient's nutrition/hydration status for malnutrition (ex- brittle hair, bruises, dry skin, pale skin and conjunctiva, muscle wasting, smooth red tongue, and disorientation)  Collaborate with interdisciplinary team and initiate plan and interventions as ordered  Monitor patient's weight and dietary intake as ordered or per policy  Utilize nutrition screening tool and intervene per policy  Determine patient's food preferences and provide high-protein, high-caloric foods as appropriate       INTERVENTIONS:  - Monitor oral intake, urinary output, labs, and treatment plans  - Assess nutrition and hydration status and recommend course of action  - Evaluate amount of meals eaten  - Assist patient with eating if necessary   - Allow adequate time for meals  - Recommend/ encourage appropriate diets, oral nutritional supplements, and vitamin/mineral supplements  - Order, calculate, and assess calorie counts as needed  - Recommend, monitor, and adjust tube feedings and TPN/PPN based on assessed needs  - Assess need for intravenous fluids  - Provide specific nutrition/hydration education as appropriate  - Include patient/family/caregiver in decisions related to nutrition   Outcome: Progressing

## 2018-03-30 NOTE — CASE MANAGEMENT
Notification of Discharge  This is a Notification of Discharge from our facility [address]  Please be advised that this patient has been discharge from our facility  Below you will find the admission and discharge date and time including the patients disposition  PRESENTATION DATE: 3/26/2018  5:29 PM  IP ADMISSION DATE: 3/26/18 2130  DISCHARGE DATE: 3/30/18  DISPOSITION: Home/Self Care    [address] [facility] in the [facility] by Jules Lewis for 2017  Network Utilization Review Department  Phone: [phone]; Fax [phone]  ATTENTION: The Network Utilization Review Department is now centralized for our 7 Facilities  Make a note that we have a new phone and fax numbers for our Department  Please call with any questions or concerns to [phone] and carefully follow the prompts so that you are directed to the right person  All voicemails are confidential  Fax any determinations, approvals, denials, and requests for initial or continue stay review clinical to [phone]  Due to HIGH CALL volume, it would be easier if you could please send faxed requests to expedite your requests and in part, help us provide discharge notifications faster

## 2018-03-30 NOTE — PROGRESS NOTES
Progress Note - Nephrology   Nadine Lopez [de-identified] y o  male MRN: [id number]  Unit/Bed#: [facility] Encounter: [id number]    A/P:  1  CKD IV with baseline Cr ~2 6 - 2 8 mg/dL  2  Diabetic nephropathy  3  Edema              Continue with diuresis and low sodium diet  4  Chronic systolic/diastolic CHF               Patient's weight is significantly up over the past year, recheck when patient gets on the floor   I agree with continuation of daily diuretics at this time  3/28: Patient has at least an excess of 20 pounds of fluid, continue with diuresis and low sodium diet  3/30: Renal function at baseline, may continue with diuresis  5  Left LE cellulitis                         3/28: Patient was given a dose of pip/tazo on 3/27, will discuss with hospitalist regarding adjustment to or continuation of Abx    3/30: Abx per hospitalist, currently on ceftriaxone        Follow up reason for today's visit: CKD    Acute on chronic combined systolic and diastolic ACC/AHA stage C congestive heart failure ([facility] )    Patient Active Problem List   Diagnosis    Morbid obesity ([facility] )    History of prostate cancer    Type 2 diabetes mellitus with hyperglycemia ([address] )    S/P CABG x 3    CKD (chronic kidney disease) stage 4, GFR 15-29 ml/min ([facility])    Body mass index (BMI) of 40 0 to 49 9    Wound of right lower extremity    Wound of left lower extremity    Hyperphosphatemia    Vitamin D deficiency    Bilateral renal cysts    Atrial fibrillation (HCC)    Blind right eye    Acute on chronic combined systolic and diastolic ACC/AHA stage C congestive heart failure ([address] )    Coronary artery disease    DM (diabetes mellitus), type 2 with peripheral neuropathy    Obstructive sleep apnea syndrome, severe    Vision loss, 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    Hypokalemia    LESLIE (acute kidney injury) ([address] )    Anemia    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[address] )    Bilateral leg edema    Prostate cancer ([address] )    Severe obstructive sleep apnea-hypopnea syndrome         Subjective:    No acute events overnight  Objective:     Vitals: Blood pressure 119/74, pulse 81, temperature (!) 97 2 °F (36 2 °C), temperature source Temporal, resp  rate 20, height 5' 8" (1 727 m), weight (!) 154 kg (338 lb 13 6 oz), SpO2 92 %  ,Body mass index is 51 52 kg/m²  Weight (last 2 days)     Date/Time   Weight    03/29/18 0559  (!)  154 (338 85)    03/28/18 0547  (!)  158 (348 99)    03/28/18 0444  (!)  158 (348 99)                Intake/Output Summary (Last 24 hours) at 03/30/18 0721  Last data filed at 03/29/18 2314   Gross per 24 hour   Intake             2415 ml   Output             1525 ml   Net              890 ml     I/O last 3 completed shifts:   In: 2535 [P O :2460; IV Piggyback:75]  Out: 2525 [Urine:2525]         Physical Exam: /74 (BP Location: Left arm)   Pulse 81   Temp (!) 97 2 °F (36 2 °C) (Temporal)   Resp 20   Ht 5' 8" (1 727 m)   Wt (!) 154 kg (338 lb 13 6 oz)   SpO2 92%   BMI 51 52 kg/m²     General Appearance:    Alert, cooperative, no distress, appears stated age   Head:    Normocephalic, without obvious abnormality, atraumatic   Eyes:    Conjunctiva/corneas clear   Ears:    Normal external ears   Nose:   Nares normal, septum midline, mucosa normal, no drainage    or sinus tenderness   Throat:   Lips, mucosa, and tongue normal; teeth and gums normal   Neck:   Supple   Back:     Symmetric, no curvature, ROM normal, no CVA tenderness   Lungs:     Clear to auscultation bilaterally, respirations unlabored   Chest wall:    No tenderness or deformity   Heart:    Regular rate and rhythm, S1 and S2 normal, no murmur, rub   or gallop   Abdomen:     Soft, non-tender, bowel sounds active   Extremities:   Extremities normal, atraumatic, no cyanosis or edema   Skin:   Skin color, texture, turgor normal, no rashes or lesions   Lymph nodes:   Cervical normal   Neurologic:   CNII-XII intact            Lab, Imaging and other studies: I have personally reviewed pertinent labs  CBC: No results found for: WBC, HGB, HCT, MCV, PLT, ADJUSTEDWBC, MCH, MCHC, RDW, MPV, NRBC  CMP: Lab Results   Component Value Date     03/30/2018    K 4 0 03/30/2018    CL 98 (L) 03/30/2018    CO2 32 03/30/2018    ANIONGAP 9 03/30/2018    BUN 45 (H) 03/30/2018    CREATININE 2 73 (H) 03/30/2018    GLUCOSE 191 (H) 03/30/2018    CALCIUM 9 0 03/30/2018    EGFR 21 03/30/2018         Results from last 7 days  Lab Units 03/30/18  0449 03/29/18  0559 03/28/18  0442 03/27/18  0526 03/26/18  1907   SODIUM mmol/L 139 141 143 139 141   POTASSIUM mmol/L 4 0 3 5 3 5 3 4* 4 1   CHLORIDE mmol/L 98* 99* 101 100 98*   CO2 mmol/L 32 34* 33* 32 34*   BUN mg/dL 45* 38* 36* 36* 39*   CREATININE mg/dL 2 73* 2 43* 2 31* 2 30* 2 55*   CALCIUM mg/dL 9 0 9 4 9 1 8 3 9 2   TOTAL PROTEIN g/dL  --   --   --  5 5* 6 5   BILIRUBIN TOTAL mg/dL  --   --   --  0 30 0 30   ALK PHOS U/L  --   --   --  31* 38*   ALT U/L  --   --   --  23 27   AST U/L  --   --   --  16 19   GLUCOSE RANDOM mg/dL 191* 122 127 335* 206*         Phosphorus: No results found for: PHOS  Magnesium: No results found for: MG  Urinalysis: No results found for: COLORU, CLARITYU, SPECGRAV, PHUR, LEUKOCYTESUR, NITRITE, PROTEINUA, GLUCOSEU, KETONESU, BILIRUBINUR, BLOODU  Ionized Calcium: No results found for: CAION  Coagulation: No results found for: PT, INR, APTT  Troponin: No results found for: TROPONINI  ABG: No results found for: PHART, OJD7SYW, PO2ART, CQG1XAJ, O0ITXBFV, BEART, SOURCE  Radiology review:     IMAGING  Procedure: Vas Lower Limb Venous Duplex Study, Complete Bilateral    Result Date: 3/29/2018  Narrative:  [facility] REPORT CLINICAL: Indications: Patient has bilateral leg swelling  Operative History: 2017-10-31 Temporal artery biopsy CABG Cardiac Cath Cardiac stents Risk Factors: The patient has no history of DVT  The patient current BMI is 52 91, Weight is 348 lb and Height is 68 in   Clinical: Left Lower Limb There is edema  Right Lower Limb There is edema  FINDINGS:  Segment  Right            Left              Impression       Impression       CFV      Normal (Patent)  Normal (Patent)     CONCLUSION:  Impression: RIGHT LOWER LIMB: No gross evidence of acute or chronic deep vein thrombosis  No gross evidence of superficial thrombophlebitis noted  Doppler evaluation shows a normal response to augmentation maneuvers  Popliteal, posterior tibial and anterior tibial arterial Doppler waveforms are triphasic  LEFT LOWER LIMB: No gross evidence of acute or chronic deep vein thrombosis  No gross evidence of superficial thrombophlebitis noted  Doppler evaluation shows a normal response to augmentation maneuvers   Popliteal, posterior tibial and anterior tibial arterial Doppler waveforms are triphasic  Technically difficult/limited study  SIGNATURE: Electronically Signed by: Tera Garduno on 2018-03-29 08:00:29 PM      Current Facility-Administered Medications   Medication Dose Route Frequency    acetaminophen (TYLENOL) tablet 650 mg  650 mg Oral Q6H PRN    albuterol inhalation solution 2 5 mg  2 5 mg Nebulization Q6H PRN    apixaban (ELIQUIS) tablet 2 5 mg  2 5 mg Oral BID    aspirin (ECOTRIN LOW STRENGTH) EC tablet 81 mg  81 mg Oral BID    cefTRIAXone (ROCEPHIN) IVPB (premix) 1,000 mg  1,000 mg Intravenous Q24H    cholecalciferol (VITAMIN D3) tablet 2,000 Units  2,000 Units Oral Daily    furosemide (LASIX) injection 80 mg  80 mg Intravenous BID (diuretic)    gabapentin (NEURONTIN) capsule 300 mg  300 mg Oral HS    insulin detemir (LEVEMIR) subcutaneous injection 50 Units  50 Units Subcutaneous HS    insulin lispro (HumaLOG) 100 units/mL subcutaneous injection 15 Units  15 Units Subcutaneous TID With Meals    insulin lispro (HumaLOG) 100 units/mL subcutaneous injection 2-12 Units  2-12 Units Subcutaneous TID AC    insulin lispro (HumaLOG) 100 units/mL subcutaneous injection 2-12 Units  2-12 Units Subcutaneous HS  metoprolol tartrate (LOPRESSOR) tablet 25 mg  25 mg Oral BID    ondansetron (ZOFRAN) injection 4 mg  4 mg Intravenous Q6H PRN    potassium chloride (K-DUR,KLOR-CON) CR tablet 20 mEq  20 mEq Oral Daily    pravastatin (PRAVACHOL) tablet 80 mg  80 mg Oral Daily With Dinner    predniSONE tablet 10 mg  10 mg Oral Daily    tamsulosin (FLOMAX) capsule 0 4 mg  0 4 mg Oral HS     Medications Discontinued During This Encounter   Medication Reason    apixaban (ELIQUIS) tablet 5 mg Dose adjustment    predniSONE tablet 0 5 mg Dose adjustment    furosemide (LASIX) tablet 80 mg        Ella Bay DO

## 2018-03-30 NOTE — SOCIAL WORK
Cm spoke with the patient and they are for d/c to home today  Cm is taking into account that the patient has preferences while planning the d/c needs  Cm plan was discussed with the patient and the patient is agreeable to the plan the patient does understand the importance of follow up care and taking the medications as prescribed  The patient is aware of any symptoms that he should look for when d/c  The patient has his appointments already set up and he is agreeable to the d/c plan  heis aware of his right to appeal the d/c  The RN made an appointment with Nephrology and she spoke with his wife  The patient told me that his wife will bring him the o2 tank to travel with

## 2018-03-30 NOTE — PLAN OF CARE

## 2018-04-01 LAB
BACTERIA BLD CULT: NORMAL
BACTERIA BLD CULT: NORMAL

## 2018-04-02 ENCOUNTER — TELEPHONE (OUTPATIENT)
Dept: FAMILY MEDICINE CLINIC | Facility: CLINIC | Age: 78
End: 2018-04-02

## 2018-04-02 ENCOUNTER — TRANSITIONAL CARE MANAGEMENT (OUTPATIENT)
Dept: FAMILY MEDICINE CLINIC | Facility: CLINIC | Age: 78
End: 2018-04-02

## 2018-04-06 ENCOUNTER — OFFICE VISIT (OUTPATIENT)
Dept: FAMILY MEDICINE CLINIC | Facility: CLINIC | Age: 78
End: 2018-04-06
Payer: MEDICARE

## 2018-04-06 VITALS
HEIGHT: 68 IN | WEIGHT: 315 LBS | BODY MASS INDEX: 47.74 KG/M2 | DIASTOLIC BLOOD PRESSURE: 74 MMHG | SYSTOLIC BLOOD PRESSURE: 96 MMHG | OXYGEN SATURATION: 96 %

## 2018-04-06 DIAGNOSIS — E11.65 TYPE 2 DIABETES MELLITUS WITH HYPERGLYCEMIA, WITH LONG-TERM CURRENT USE OF INSULIN (HCC): Primary | ICD-10-CM

## 2018-04-06 DIAGNOSIS — N18.4 CKD (CHRONIC KIDNEY DISEASE) STAGE 4, GFR 15-29 ML/MIN (HCC): Chronic | ICD-10-CM

## 2018-04-06 DIAGNOSIS — Z79.4 TYPE 2 DIABETES MELLITUS WITH HYPERGLYCEMIA, WITH LONG-TERM CURRENT USE OF INSULIN (HCC): Primary | ICD-10-CM

## 2018-04-06 PROCEDURE — 99496 TRANSJ CARE MGMT HIGH F2F 7D: CPT | Performed by: FAMILY MEDICINE

## 2018-04-06 NOTE — PROGRESS NOTES
Assessment/Plan:  Patient will continue his medicines as per discharge but I did instruct his wife that if he gains more than 3 lb overnight from his baseline morning by weight is she should give him an extra Lasix were also going to decrease his prednisone to 2 5 mg every other day instead of every day and with our goal being to have him off of prednisone by the middle of May this I think will help with his diabetes and also will help him to lose weight we discussed his diet and actually he does not eat all that much     Diagnoses and all orders for this visit:    Type 2 diabetes mellitus with hyperglycemia, with long-term current use of insulin ([facility])    CKD (chronic kidney disease) stage 4, GFR 15-29 ml/min ([facility])         Subjective:     Patient ID: Ivy Nixon is a [de-identified] y o  male  Patient here for transition of care visit I have reviewed his hospital record and his discharge summary was treated for cellulitis congestive heart failure he was diuresed and treated with cephalexin he just finished up his 5th day of Keflex leg is still edematous but redness is less angry than it was previously the like to decrease his prednisone even further seems to be tolerating the titrating dose blood sugars in the morning are good in the afternoon they are little higher        Review of Systems   Constitutional: Negative for activity change, appetite change, diaphoresis, fatigue and fever  HENT: Negative  Eyes: Negative  Respiratory: Positive for cough and shortness of breath  Negative for apnea, chest tightness and wheezing  Cardiovascular: Positive for leg swelling  Negative for chest pain and palpitations  Gastrointestinal: Negative for abdominal distention, abdominal pain, anal bleeding, constipation, diarrhea, nausea and vomiting  Endocrine: Negative for cold intolerance, heat intolerance, polydipsia, polyphagia and polyuria     Genitourinary: Negative for difficulty urinating, dysuria, flank pain, hematuria and urgency  Musculoskeletal: Negative for arthralgias, back pain, gait problem, joint swelling and myalgias  Skin: Negative for color change, rash and wound  Allergic/Immunologic: Negative for environmental allergies, food allergies and immunocompromised state  Neurological: Negative for dizziness, seizures, syncope, speech difficulty, numbness and headaches  Hematological: Negative for adenopathy  Does not bruise/bleed easily  Psychiatric/Behavioral: Negative for agitation, behavioral problems, hallucinations, sleep disturbance and suicidal ideas  Objective:     Physical Exam   Constitutional: He is oriented to person, place, and time  He appears well-developed and well-nourished  No distress  HENT:   Head: Normocephalic  Right Ear: External ear normal    Left Ear: External ear normal    Nose: Nose normal    Mouth/Throat: Oropharynx is clear and moist    Eyes: Conjunctivae and EOM are normal  Pupils are equal, round, and reactive to light  Right eye exhibits no discharge  Left eye exhibits no discharge  No scleral icterus  Neck: Normal range of motion  No tracheal deviation present  No thyromegaly present  Cardiovascular: Normal rate, regular rhythm and normal heart sounds  Exam reveals no gallop and no friction rub  No murmur heard  Pulmonary/Chest: Effort normal and breath sounds normal  No respiratory distress  He has no wheezes  Abdominal: Soft  Bowel sounds are normal  He exhibits no mass  There is no tenderness  There is no guarding  obese   Musculoskeletal: He exhibits edema ( both lower extremities edematous+3 pitting edema slight induration of the left lower extremity)  He exhibits no deformity  Lymphadenopathy:     He has no cervical adenopathy  Neurological: He is alert and oriented to person, place, and time  No cranial nerve deficit  Skin: Skin is warm and dry  No rash noted  He is not diaphoretic  No erythema  Psychiatric: He has a normal mood and affect  Thought content normal          Vitals:    04/06/18 0949   BP: 96/74   BP Location: Left arm   Patient Position: Sitting   Cuff Size: Standard   SpO2: 96%   Weight: (!) 150 kg (330 lb)   Height: 5' 8" (1 727 m)       The following portions of the patient's history were reviewed and updated as appropriate:   He  has a past medical history of Anemia; Arthritis; Atrial fibrillation ([facility] ); 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[facility] ); B12 deficiency; Back pain; Blind right eye; Cardiac disorder; Chronic combined systolic and diastolic heart failure ([facility] ); Chronic obstructive lung disease ([facility] ); Chronic venous stasis dermatitis of both lower extremities; CKD (chronic kidney disease), stage IV ([facility] ); Coronary artery disease; DDD (degenerative disc disease); DM (diabetes mellitus), type 2 ([facility] [address] ); BAER (dyspnea on exertion); Easy bruising; Epistaxis; Gout; Gross hematuria; Hepatitis; Herpes zoster; Hiatal hernia; History of cellulitis; History of prostate cancer; Hypercholesterolemia; Hyperlipidemia; Hypertension; Ischemic cardiomyopathy; Lung mass; MI, old; Morbid obesity due to excess calories ([facility] ); Neuropathy; Obesity; Obstructive sleep apnea syndrome, severe; Pneumonia; Shortness of breath; TIA (transient ischemic attack); Urinary incontinence; Urinary retention; and Use of cane as ambulatory aid    He   Patient Active Problem List    Diagnosis Date Noted    LESLIE (acute kidney injury) ([facility] ) 10/26/2017    Hypokalemia 10/25/2017    Vision loss, 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 10/21/2017    Atrial fibrillation ([facility] )     Blind right eye     Acute on chronic combined systolic and diastolic ACC/AHA stage C congestive heart failure ([facility] )     Coronary artery disease     DM (diabetes mellitus), type 2 with peripheral neuropathy     Obstructive sleep apnea syndrome, severe     Severe obstructive sleep apnea-hypopnea syndrome 04/13/2017    Wound of right lower extremity 03/05/2017    Wound of left lower extremity 03/05/2017    Hyperphosphatemia 03/05/2017    Vitamin D deficiency 03/05/2017    Bilateral renal cysts 03/05/2017    Body mass index (BMI) of 40 0 to 49 9 03/04/2017    Anemia 10/04/2016    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    CKD (chronic kidney disease) stage 4, GFR 15-29 ml/min ([facility]) 07/16/2016    Morbid obesity ([facility] )     History of prostate cancer     Type 2 diabetes mellitus with hyperglycemia ([facility])     Bilateral leg edema 01/19/2016    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[facility] ) 05/17/2013    Prostate cancer ([facility] ) 07/24/2012     He  has a past surgical history that includes Coronary angioplasty with stent; Cystoscopy; Lung cancer surgery; Cataract extraction, bilateral; Eye surgery; Hernia repair; Vascular surgery; Cardiac surgery; Abdominal surgery; Coronary artery bypass graft (N/A, 7/15/2016); pr cystourethroscopy,ureter catheter (Left, 3/9/2016); PILONIDAL CYST EXCISION; pr temporal artery ligatn or bx (Bilateral, 10/31/2017); Lung surgery (Left, 2012); Other surgical history; and Renal artery stent (02/16/2015)  His family history includes Cancer in his mother, other, other, and sister; Diabetes in his father, maternal grandmother, mother, and other; Diabetes type II in his mother; Heart disease in his mother; Hypertension in his mother and other  He  reports that he quit smoking about 14 years ago  He has a 108 00 pack-year smoking history  He has never used smokeless tobacco  He reports that he does not drink alcohol or use drugs    Current Outpatient Prescriptions   Medication Sig Dispense Refill    apixaban (ELIQUIS) 2 5 mg Take 1 tablet (2 5 mg total) by mouth 2 (two) times a day 60 tablet 0    aspirin (ECOTRIN LOW STRENGTH) 81 mg EC tablet Take 81 mg by mouth 2 (two) times a day      Cholecalciferol 2000 units TABS Take 1 tablet by mouth daily 30 tablet 1    furosemide (LASIX) 80 mg tablet Take 1 tablet by mouth 2 (two) times a day 30 tablet 0    gabapentin (NEURONTIN) 300 mg capsule Take 300 mg by mouth daily at bedtime      glucagon (GLUCAGON EMERGENCY) 1 MG injection Inject 1 mg under the skin once as needed for low blood sugar (as needed for a blood sugar less than 70 if unconscious or unable to correct by oral means) for up to 1 dose 1 each 0    insulin detemir (LEVEMIR) 100 units/mL subcutaneous injection Inject 50 Units under the skin daily at bedtime (Patient taking differently: Inject 55 Units under the skin daily at bedtime  )  0    insulin lispro (HumaLOG) 100 units/mL injection Inject 15 Units under the skin 3 (three) times a day with meals  0    iron polysaccharides (NIFEREX) 150 mg capsule Take 150 mg by mouth daily      metoprolol tartrate (LOPRESSOR) 25 mg tablet Take 25 mg by mouth 2 (two) times a day      predniSONE 1 mg tablet Take 0 25 mg by mouth daily      simvastatin (ZOCOR) 40 mg tablet Take 40 mg by mouth daily at bedtime      tamsulosin (FLOMAX) 0 4 mg Take 0 4 mg by mouth daily at bedtime        potassium chloride (MICRO-K) 10 MEQ CR capsule Take 1 capsule (10 mEq total) by mouth 2 (two) times a day for 30 days 60 capsule 5     No current facility-administered medications for this visit        Current Outpatient Prescriptions on File Prior to Visit   Medication Sig    apixaban (ELIQUIS) 2 5 mg Take 1 tablet (2 5 mg total) by mouth 2 (two) times a day    aspirin (ECOTRIN LOW STRENGTH) 81 mg EC tablet Take 81 mg by mouth 2 (two) times a day    Cholecalciferol 2000 units TABS Take 1 tablet by mouth daily    furosemide (LASIX) 80 mg tablet Take 1 tablet by mouth 2 (two) times a day    gabapentin (NEURONTIN) 300 mg capsule Take 300 mg by mouth daily at bedtime    glucagon (GLUCAGON EMERGENCY) 1 MG injection Inject 1 mg under the skin once as needed for low blood sugar (as needed for a blood sugar less than 70 if unconscious or unable to correct by oral means) for up to 1 dose    insulin detemir (LEVEMIR) 100 units/mL subcutaneous injection Inject 50 Units under the skin daily at bedtime (Patient taking differently: Inject 55 Units under the skin daily at bedtime  )    insulin lispro (HumaLOG) 100 units/mL injection Inject 15 Units under the skin 3 (three) times a day with meals    iron polysaccharides (NIFEREX) 150 mg capsule Take 150 mg by mouth daily    metoprolol tartrate (LOPRESSOR) 25 mg tablet Take 25 mg by mouth 2 (two) times a day    predniSONE 1 mg tablet Take 0 25 mg by mouth daily    simvastatin (ZOCOR) 40 mg tablet Take 40 mg by mouth daily at bedtime    tamsulosin (FLOMAX) 0 4 mg Take 0 4 mg by mouth daily at bedtime      potassium chloride (MICRO-K) 10 MEQ CR capsule Take 1 capsule (10 mEq total) by mouth 2 (two) times a day for 30 days     No current facility-administered medications on file prior to visit  He is allergic to other       Transitional Care Management Review:  Comfort Zuniga is a [de-identified] y o  male here for TCM follow up       During the TCM phone call patient stated:    Date and time hospital follow up call was made:  4/2/2018 10:15 AM  Patient was hopsitalized at:  [address]  Date of admission:  3/26/18  Date of discharge:  3/30/18  Diagnosis:  CHF  Were the patients medicaitons reviewed and updated:  No  Current symptoms:  None  Should patient be enrolled in anticoag monitoring?:  No  Scheduled for follow up?:  Yes  Patients specialists:  Nephrologist, Cardiologist  Cardiologist contact #:  DR Arlene Davis  Nephrologist's Name:  DR Trudie Dakin  Did you obtain your prescribed medications:  No  Do you need help managing your perscriptions or medications:  No  Is transportation to your appointments needed:  No  I have advised the patient to call PCP with any new or worsening symptoms (please type in name along with any credentials):  Berenice Escobar  Living Arrangements:  Spouse or Significiant other  Support System:  Spouse  The type of support provided:  Physical, Emotional  Do you have social support:  Yes, some  Are you recieving outpatient services:  No  Are you recieving home care services:  No  Are you using any community resources:  No  Current waiver service:  No  Have you fallen in the last 12 months:  No  Interperter language line required?:  No  Counseling:  Apolinar Shaikh,

## 2018-04-09 ENCOUNTER — TELEPHONE (OUTPATIENT)
Dept: FAMILY MEDICINE CLINIC | Facility: CLINIC | Age: 78
End: 2018-04-09

## 2018-04-09 NOTE — TELEPHONE ENCOUNTER
Lorrie from [facility]-    Patient is very swollen everywhere, has +3 Edema in Legs and feet, even arms are swollen  Very SOB, cant catch it for 10 min afterwards  Has a red swollen area in his left inner thigh   She said he is a mess, but his Vitals and weight are normal

## 2018-04-09 NOTE — TELEPHONE ENCOUNTER
The patient's edema is most likely secondary to chronic steroid administration and also his kidney failure his current GFR is 21 on he may need higher doses of Lasix because of his kidney disease but this has to be done by his kidney specialist as opposed to me so that we do not harm his kidneys

## 2018-04-10 ENCOUNTER — OFFICE VISIT (OUTPATIENT)
Dept: HEMATOLOGY ONCOLOGY | Facility: HOSPITAL | Age: 78
End: 2018-04-10
Payer: MEDICARE

## 2018-04-10 VITALS
BODY MASS INDEX: 47.74 KG/M2 | SYSTOLIC BLOOD PRESSURE: 136 MMHG | HEART RATE: 70 BPM | WEIGHT: 315 LBS | RESPIRATION RATE: 16 BRPM | HEIGHT: 68 IN | DIASTOLIC BLOOD PRESSURE: 74 MMHG | TEMPERATURE: 97.8 F

## 2018-04-10 DIAGNOSIS — C7A.090 ATYPICAL CARCINOID LUNG TUMOR (HCC): Primary | ICD-10-CM

## 2018-04-10 DIAGNOSIS — C34.11 MALIGNANT NEOPLASM OF UPPER LOBE OF RIGHT LUNG (HCC): ICD-10-CM

## 2018-04-10 PROCEDURE — 99214 OFFICE O/P EST MOD 30 MIN: CPT | Performed by: INTERNAL MEDICINE

## 2018-04-10 NOTE — LETTER
April 10, 2018     Magda Medeiros DO  [facility]    Patient: Diaz Singh   YOB: 1940   Date of Visit: 4/10/2018       Dear Dr Michelle Barber: Thank you for referring Charle Krabbe to me for evaluation  Below are my notes for this consultation  If you have questions, please do not hesitate to call me  I look forward to following your patient along with you  Sincerely,        Robbie Castillo DO        CC: MD Carol Chávez DO Crist Seats, DO  4/10/2018 10:35 AM  Sign at close encounter  Diaz Singh  1940  [address] [address]    Chief Complaint   Patient presents with    Follow-up              History of prostate cancer    4/10/2009 Initial Diagnosis     History of prostate cancer  Diagnosed with localized prostate cancer  4/10/2009 - 6/10/2009 Radiation     Radiation therapy, Dr Nicolás Mart  4/10/2014 Progression     Presented with gross hematuria  Found to have left hydronephrosis secondary to recurrent prostate cancer  He was treated with Rosanne Phenes with effectiveness  5/10/2014 -  Chemotherapy     Firmagon initiated  Malignant neoplasm of upper lobe of right lung ([facility] [address] )    8/29/2012 Initial Diagnosis     Malignant neoplasm of upper lobe of right lung [facility])  '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, involving multiple lymph nodes, status post resection of a right paratracheal mass 8/29/12          10/10/2012 - 12/10/2012 Chemotherapy      16/cisplatin x4 cycles  History of Present Illness:  -No ref   provider found:  -Previous Therapy (if not listed in Oncology History):  -Current Therapy (if not listed in Oncology History):  -Disease Status:  -Interval History:  -Tumor Markers:    Review of Systems:  Review of Systems   Constitutional: Negative for chills and fever    HENT: Negative for nosebleeds  Eyes: Negative for discharge  Respiratory: Negative for cough and shortness of breath  Cardiovascular: Negative for chest pain  Gastrointestinal: Negative for abdominal pain, constipation and diarrhea  Endocrine: Negative for polydipsia  Genitourinary: Negative for hematuria  Musculoskeletal: Negative for arthralgias  Skin: Negative for color change  Allergic/Immunologic: Negative for immunocompromised state  Neurological: Negative for dizziness and headaches  Hematological: Negative for adenopathy  Psychiatric/Behavioral: Negative for agitation         Patient Active Problem List   Diagnosis    Morbid obesity ([place] [address] )    History of prostate cancer    Type 2 diabetes mellitus with hyperglycemia ([place] [address] )    S/P CABG x 3    CKD (chronic kidney disease) stage 4, GFR 15-29 ml/min ([facility])    Body mass index (BMI) of 40 0 to 49 9    Wound of right lower extremity    Wound of left lower extremity    Hyperphosphatemia    Vitamin D deficiency    Bilateral renal cysts    Atrial fibrillation (HCC)    Blind right eye    Acute on chronic combined systolic and diastolic ACC/AHA stage C congestive heart failure ([place] [address] )    Coronary artery disease    DM (diabetes mellitus), type 2 with peripheral neuropathy    Obstructive sleep apnea syndrome, severe    Vision loss, 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    Hypokalemia    LESLIE (acute kidney injury) ([place] [address] )    Anemia    Bilateral leg edema    Prostate cancer (HCC)    Severe obstructive sleep apnea-hypopnea syndrome    Malignant neoplasm of upper lobe of right lung (HCC)     Past Medical History:   Diagnosis Date    Anemia     Arthritis     Atrial fibrillation (HCC)     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[place] [address] )     B12 deficiency     Back pain     Blind right eye     Cardiac disorder     Chronic combined systolic and diastolic heart failure (HCC)     Chronic obstructive lung disease (HCC)     Chronic venous stasis dermatitis of both lower extremities     CKD (chronic kidney disease), stage IV (HCC)     Coronary artery disease     DDD (degenerative disc disease)     DM (diabetes mellitus), type 2 (HCC)     Type II, on insulin    BAER (dyspnea on exertion)     Easy bruising     Epistaxis     Gout     Gross hematuria     last assessed: 3/18/2015    Hepatitis     Herpes zoster     Hiatal hernia     History of cellulitis     BLE    History of prostate cancer     Radiation treatments   Hypercholesterolemia     Hyperlipidemia     Hypertension     Ischemic cardiomyopathy     Lung mass     last assessed: 9/21/2012    [facility], [facility] Morbid obesity due to excess calories (HCC)     or severe obesity    Neuropathy     BLE    Obesity     Obstructive sleep apnea syndrome, severe     Pneumonia     last assessed: 7/24/2012    Shortness of breath     TIA (transient ischemic attack)     Urinary incontinence     Urinary retention     Use of cane as ambulatory aid     Independent with personal care       Past Surgical History:   Procedure Laterality Date    ABDOMINAL SURGERY      CARDIAC SURGERY      CATARACT EXTRACTION, BILATERAL      CORONARY ANGIOPLASTY WITH STENT PLACEMENT      2 stents    CORONARY ARTERY BYPASS GRAFT N/A 7/15/2016    Procedure: Intraop ADRIAN, CABG x 3 using LIMA to LAD, RSVG to OM1 and D1;  Surgeon: Larry Trevino MD;  [facility];  Service:    Francesca Win      has stents    EYE SURGERY      Bilateral cataract removal    HERNIA REPAIR      umbilical hernia repair    LUNG CANCER SURGERY      Partial upper right lobectomy    LUNG SURGERY Left 2012    OTHER SURGICAL HISTORY      previous stent placement-no anatomy given    PILONIDAL CYST EXCISION      WA CYSTOURETHROSCOPY,URETER CATHETER Left 3/9/2016    Procedure: CYSTOSCOPY WITH left RETROGRADE PYELOGRAM left double J stent removal;  Surgeon: Miriam Be MD;  Location: [facility];  Service: Urology    WA TEMPORAL ARTERY LIGATN OR BX Bilateral 10/31/2017    Procedure: BIOPSY ARTERY TEMPORAL;  Surgeon: Shannon Sidhu MD;  [facility];  Service: Vascular    RENAL ARTERY STENT  02/16/2015    transcath intravascular stent placement percutaneous renal    VASCULAR SURGERY       Family History   Problem Relation Age of Onset    Diabetes Other     Hypertension Other     Cancer Other     Diabetes Mother     Heart disease Mother     Hypertension Mother     Diabetes type II Mother     Cancer Mother      unknown type    Diabetes Father     Diabetes Maternal Grandmother     Cancer Sister      unknown type    Cancer Other      unknown type     Social History     Social History    Marital status: /Civil Union     Spouse name: N/A    Number of children: N/A    Years of education: N/A     Occupational History    Not on file       Social History Main Topics    Smoking status: Former Smoker     Packs/day: 2 00     Years: 54 00     Quit date: 2004    Smokeless tobacco: Never Used      Comment: Pt is a non smoker    Alcohol use No    Drug use: No    Sexual activity: No     Other Topics Concern    Not on file     Social History Narrative    No advance directives    No living will    Patient has living will       Current Outpatient Prescriptions:     apixaban (ELIQUIS) 2 5 mg, Take 1 tablet (2 5 mg total) by mouth 2 (two) times a day, Disp: 60 tablet, Rfl: 0    aspirin (ECOTRIN LOW STRENGTH) 81 mg EC tablet, Take 81 mg by mouth 2 (two) times a day, Disp: , Rfl:     Cholecalciferol 2000 units TABS, Take 1 tablet by mouth daily, Disp: 30 tablet, Rfl: 1    furosemide (LASIX) 80 mg tablet, Take 1 tablet by mouth 2 (two) times a day, Disp: 30 tablet, Rfl: 0    gabapentin (NEURONTIN) 300 mg capsule, Take 300 mg by mouth daily at bedtime, Disp: , Rfl:     glucagon (GLUCAGON EMERGENCY) 1 MG injection, Inject 1 mg under the skin once as needed for low blood sugar (as needed for a blood sugar less than 70 if unconscious or unable to correct by oral means) for up to 1 dose, Disp: 1 each, Rfl: 0    insulin detemir (LEVEMIR) 100 units/mL subcutaneous injection, Inject 50 Units under the skin daily at bedtime (Patient taking differently: Inject 55 Units under the skin daily at bedtime  ), Disp: , Rfl: 0    insulin lispro (HumaLOG) 100 units/mL injection, Inject 15 Units under the skin 3 (three) times a day with meals, Disp: , Rfl: 0    iron polysaccharides (NIFEREX) 150 mg capsule, Take 150 mg by mouth daily, Disp: , Rfl:     metoprolol tartrate (LOPRESSOR) 25 mg tablet, Take 25 mg by mouth 2 (two) times a day, Disp: , Rfl:     potassium chloride (MICRO-K) 10 MEQ CR capsule, Take 1 capsule (10 mEq total) by mouth 2 (two) times a day for 30 days, Disp: 60 capsule, Rfl: 5    predniSONE 1 mg tablet, Take 0 25 mg by mouth daily, Disp: , Rfl:     simvastatin (ZOCOR) 40 mg tablet, Take 40 mg by mouth daily at bedtime, Disp: , Rfl:     tamsulosin (FLOMAX) 0 4 mg, Take 0 4 mg by mouth daily at bedtime  , Disp: , Rfl:   Allergies   Allergen Reactions    Other Rash     Adhesive tape     Vitals:    04/10/18 0953   BP: 136/74   Pulse: 70   Resp: 16   Temp: 97 8 °F (36 6 °C)         Physical Exam   Constitutional: He is oriented to person, place, and time  He appears well-developed  HENT:   Head: Normocephalic  Eyes: Pupils are equal, round, and reactive to light  Neck: Neck supple  Cardiovascular: Normal rate and regular rhythm  No murmur heard  Pulmonary/Chest: Breath sounds normal  He has no wheezes  He has no rales  Abdominal: Soft  There is no tenderness  Musculoskeletal: Normal range of motion  He exhibits no edema or tenderness  Lymphadenopathy:     He has no cervical adenopathy  Neurological: He is alert and oriented to person, place, and time  He has normal reflexes  No cranial nerve deficit  Skin: No rash noted  No erythema  Psychiatric: He has a normal mood and affect   His behavior is normal  Performance Status: ECOG/Zubrod/WHO: 1 - Symptomatic but completely ambulatory    Labs:  CBC, Coags, BMP, Mg, Phos     Imaging  Xr Chest 2 Views    Result Date: 3/27/2018  Narrative: CHEST INDICATION: Shortness of breath COMPARISON: March 1, 2017 VIEWS:  AP semierect and lateral; IMAGES:  5 FINDINGS: Midline sternotomy wires are present from previous cardiac surgery  The cardiomediastinal silhouette is unchanged  The pulmonary vasculature is engorged  Interstitial edema is suggested at the lung bases  No pulmonary consolidation  No pleural effusion  Bony thorax is otherwise unremarkable  Impression: Congestive heart failure type changes, status post CABG  ____ ____ ____ ____ ____ ____ ____ ____ ____ ____ ____ ____ ____ As per comments in the PACS workstation, findings are concordant with preliminary interpretation provided by the emergency room physician      Workstation performed: [id number]     AD Foot 3+ Views Left    Result Date: 3/27/2018  Narrative: LEFT FOOT INDICATION:   pain  COMPARISON:  March 17, 2016 VIEWS:  XR FOOT 3+ VW LEFT (AP, lateral and oblique) Images: 3 FINDINGS: There is no acute fracture or dislocation  Mild degenerative changes, mid foot  Small plantar calcaneal spur  No lytic or blastic lesions seen  Diffuse soft tissue swelling overlies the dorsum of the foot  Vascular calcification is noted  Impression: No acute osseous abnormality  Dorsal soft tissue swelling  Workstation performed: [id number]     Vas Lower Limb Venous Duplex Study, Complete Bilateral    Result Date: 3/29/2018  Narrative:  [facility] REPORT CLINICAL: Indications: Patient has bilateral leg swelling  Operative History: 2017-10-31 Temporal artery biopsy CABG Cardiac Cath Cardiac stents Risk Factors: The patient has no history of DVT  The patient current BMI is 52 91, Weight is 348 lb and Height is 68 in  Clinical: Left Lower Limb There is edema  Right Lower Limb There is edema     FINDINGS: Segment  Right            Left              Impression       Impression       CFV      Normal (Patent)  Normal (Patent)     CONCLUSION:  Impression: RIGHT LOWER LIMB: No gross evidence of acute or chronic deep vein thrombosis  No gross evidence of superficial thrombophlebitis noted  Doppler evaluation shows a normal response to augmentation maneuvers  Popliteal, posterior tibial and anterior tibial arterial Doppler waveforms are triphasic  LEFT LOWER LIMB: No gross evidence of acute or chronic deep vein thrombosis  No gross evidence of superficial thrombophlebitis noted  Doppler evaluation shows a normal response to augmentation maneuvers  Popliteal, posterior tibial and anterior tibial arterial Doppler waveforms are triphasic  Technically difficult/limited study  SIGNATURE: Electronically Signed by: Angel Hudson on 2018-03-29 08:00:29 PM    I reviewed the above laboratory and imaging data  Discussion/Summary:  In summary, this is a 78-year-old male history of pulmonary neuroendocrine tumor resected more than 5 years ago  Additionally, he has a history of prostate cancer, recurrent  He is on hormonal therapy under Urology  Recent PSA, February 2018, 0 2  He was recently admitted for bilateral lower extremity edema  This was ultimately attributed to heart failure  Creatinine is over 2, creatinine clearance 20-25  Dialysis has been considered, presumably for fluid management  I reviewed with the patient and his family that neither of his cancer situations or treatment is contributing to his current fluid management problems  Certainly, previous cisplatin chemotherapy could have caused some kidney damage  This is not reversible  He certainly has other contributors to renal dysfunction as well  Additionally, he had a CBC during his hospitalization that was entirely normal   I reviewed the above at length with the patient and his family    For efficiency purposes, I suggested follow-up in our office is on a p r n  basis as he does not have any active oncology issues  Again, hormonal blockade continues under Urology  The patient and his family voiced understanding and agreement

## 2018-04-10 NOTE — PROGRESS NOTES
Ev Grubbs  1940  [address] [place] [address] [facility]  [facility] [id number]    Chief Complaint   Patient presents with    Follow-up              History of prostate cancer    4/10/2009 Initial Diagnosis     History of prostate cancer  Diagnosed with localized prostate cancer  4/10/2009 - 6/10/2009 Radiation     Radiation therapy, Dr Scott Gates  4/10/2014 Progression     Presented with gross hematuria  Found to have left hydronephrosis secondary to recurrent prostate cancer  He was treated with [address] with effectiveness  5/10/2014 -  Chemotherapy     Firmagon initiated  Malignant neoplasm of upper lobe of right lung ([address] )    8/29/2012 Initial Diagnosis     Malignant neoplasm of upper lobe of right lung 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, involving multiple lymph nodes, status post resection of a right paratracheal mass 8/29/12          10/10/2012 - 12/10/2012 Chemotherapy      16/cisplatin x4 cycles  History of Present Illness:  -No ref  provider found:  -Previous Therapy (if not listed in Oncology History):  -Current Therapy (if not listed in Oncology History):  -Disease Status:  -Interval History:  -Tumor Markers:    Review of Systems:  Review of Systems   Constitutional: Negative for chills and fever  HENT: Negative for nosebleeds  Eyes: Negative for discharge  Respiratory: Negative for cough and shortness of breath  Cardiovascular: Negative for chest pain  Gastrointestinal: Negative for abdominal pain, constipation and diarrhea  Endocrine: Negative for polydipsia  Genitourinary: Negative for hematuria  Musculoskeletal: Negative for arthralgias  Skin: Negative for color change  Allergic/Immunologic: Negative for immunocompromised state  Neurological: Negative for dizziness and headaches     Hematological: Negative for adenopathy  Psychiatric/Behavioral: Negative for agitation  Patient Active Problem List   Diagnosis    Morbid obesity ([facility] )    History of prostate cancer    Type 2 diabetes mellitus with hyperglycemia ([facility]    S/P CABG x 3    CKD (chronic kidney disease) stage 4, GFR 15-29 ml/min ([facility])    Body mass index (BMI) of 40 0 to 49 9    Wound of right lower extremity    Wound of left lower extremity    Hyperphosphatemia    Vitamin D deficiency    Bilateral renal cysts    Atrial fibrillation (HCC)    Blind right eye    Acute on chronic combined systolic and diastolic ACC/AHA stage C congestive heart failure ([facility] )    Coronary artery disease    DM (diabetes mellitus), type 2 with peripheral neuropathy    Obstructive sleep apnea syndrome, severe    Vision loss, 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    Hypokalemia    ELSLIE (acute kidney injury) ([facility] )    Anemia    Bilateral leg edema    Prostate cancer (HCC)    Severe obstructive sleep apnea-hypopnea syndrome    Malignant neoplasm of upper lobe of right lung (HCC)     Past Medical History:   Diagnosis Date    Anemia     Arthritis     Atrial fibrillation (HCC)     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HCC)     B12 deficiency     Back pain     Blind right eye     Cardiac disorder     Chronic combined systolic and diastolic heart failure (HCC)     Chronic obstructive lung disease (HCC)     Chronic venous stasis dermatitis of both lower extremities     CKD (chronic kidney disease), stage IV ([address] )     Coronary artery disease     DDD (degenerative disc disease)     DM (diabetes mellitus), type 2 (HCC)     Type II, on insulin    BAER (dyspnea on exertion)     Easy bruising     Epistaxis     Gout     Gross hematuria     last assessed: 3/18/2015    Hepatitis     Herpes zoster     Hiatal hernia     History of cellulitis     BLE    History of prostate cancer     Radiation treatments      Hypercholesterolemia     Hyperlipidemia     Hypertension     Ischemic cardiomyopathy     Lung mass     last assessed: 9/21/2012    MI, old    [address] Morbid obesity due to excess calories ([address] )     or severe obesity    Neuropathy     BLE    Obesity     Obstructive sleep apnea syndrome, severe     Pneumonia     last assessed: 7/24/2012    Shortness of breath     TIA (transient ischemic attack)     Urinary incontinence     Urinary retention     Use of cane as ambulatory aid     Independent with personal care       Past Surgical History:   Procedure Laterality Date    ABDOMINAL SURGERY      CARDIAC SURGERY      CATARACT EXTRACTION, BILATERAL      CORONARY ANGIOPLASTY WITH STENT PLACEMENT      2 stents    CORONARY ARTERY BYPASS GRAFT N/A 7/15/2016    Procedure: Intraop ADRIAN, CABG x 3 using LIMA to LAD, RSVG to OM1 and D1;  Surgeon: Madina Anthony MD;  [facility];  Service:    Meena Aldana      has stents    EYE SURGERY      Bilateral cataract removal    HERNIA REPAIR      umbilical hernia repair    LUNG CANCER SURGERY      Partial upper right lobectomy    LUNG SURGERY Left 2012    OTHER SURGICAL HISTORY      previous stent placement-no anatomy given    PILONIDAL CYST EXCISION      CT CYSTOURETHROSCOPY,URETER CATHETER Left 3/9/2016    Procedure: CYSTOSCOPY WITH left RETROGRADE PYELOGRAM left double J stent removal;  Surgeon: Talha Harrington MD;  Location: [facility];  Service: Urology    CT TEMPORAL ARTERY LIGATN OR BX Bilateral 10/31/2017    Procedure: BIOPSY ARTERY TEMPORAL;  Surgeon: Rebekah Ortega MD;  [facility]: [facility];  Service: Vascular    RENAL ARTERY STENT  02/16/2015    transcath intravascular stent placement percutaneous renal    VASCULAR SURGERY       Family History   Problem Relation Age of Onset    Diabetes Other     Hypertension Other     Cancer Other     Diabetes Mother     Heart disease Mother     Hypertension Mother     Diabetes type II Mother     Cancer Mother      unknown type    Diabetes Father     Diabetes Maternal Grandmother     Cancer Sister      unknown type    Cancer Other      unknown type     Social History     Social History    Marital status: /Civil Union     Spouse name: N/A    Number of children: N/A    Years of education: N/A     Occupational History    Not on file       Social History Main Topics    Smoking status: Former Smoker     Packs/day: 2 00     Years: 54 00     Quit date: 2004    Smokeless tobacco: Never Used      Comment: Pt is a non smoker    Alcohol use No    Drug use: No    Sexual activity: No     Other Topics Concern    Not on file     Social History Narrative    No advance directives    No living will    Patient has living will       Current Outpatient Prescriptions:     apixaban (ELIQUIS) 2 5 mg, Take 1 tablet (2 5 mg total) by mouth 2 (two) times a day, Disp: 60 tablet, Rfl: 0    aspirin (ECOTRIN LOW STRENGTH) 81 mg EC tablet, Take 81 mg by mouth 2 (two) times a day, Disp: , Rfl:     Cholecalciferol 2000 units TABS, Take 1 tablet by mouth daily, Disp: 30 tablet, Rfl: 1    furosemide (LASIX) 80 mg tablet, Take 1 tablet by mouth 2 (two) times a day, Disp: 30 tablet, Rfl: 0    gabapentin (NEURONTIN) 300 mg capsule, Take 300 mg by mouth daily at bedtime, Disp: , Rfl:     glucagon (GLUCAGON EMERGENCY) 1 MG injection, Inject 1 mg under the skin once as needed for low blood sugar (as needed for a blood sugar less than 70 if unconscious or unable to correct by oral means) for up to 1 dose, Disp: 1 each, Rfl: 0    insulin detemir (LEVEMIR) 100 units/mL subcutaneous injection, Inject 50 Units under the skin daily at bedtime (Patient taking differently: Inject 55 Units under the skin daily at bedtime  ), Disp: , Rfl: 0    insulin lispro (HumaLOG) 100 units/mL injection, Inject 15 Units under the skin 3 (three) times a day with meals, Disp: , Rfl: 0    iron polysaccharides (NIFEREX) 150 mg capsule, Take 150 mg by mouth daily, Disp: , Rfl:     metoprolol tartrate (LOPRESSOR) 25 mg tablet, Take 25 mg by mouth 2 (two) times a day, Disp: , Rfl:     potassium chloride (MICRO-K) 10 MEQ CR capsule, Take 1 capsule (10 mEq total) by mouth 2 (two) times a day for 30 days, Disp: 60 capsule, Rfl: 5    predniSONE 1 mg tablet, Take 0 25 mg by mouth daily, Disp: , Rfl:     simvastatin (ZOCOR) 40 mg tablet, Take 40 mg by mouth daily at bedtime, Disp: , Rfl:     tamsulosin (FLOMAX) 0 4 mg, Take 0 4 mg by mouth daily at bedtime  , Disp: , Rfl:   Allergies   Allergen Reactions    Other Rash     Adhesive tape     Vitals:    04/10/18 0953   BP: 136/74   Pulse: 70   Resp: 16   Temp: 97 8 °F (36 6 °C)         Physical Exam   Constitutional: He is oriented to person, place, and time  He appears well-developed  HENT:   Head: Normocephalic  Eyes: Pupils are equal, round, and reactive to light  Neck: Neck supple  Cardiovascular: Normal rate and regular rhythm  No murmur heard  Pulmonary/Chest: Breath sounds normal  He has no wheezes  He has no rales  Abdominal: Soft  There is no tenderness  Musculoskeletal: Normal range of motion  He exhibits no edema or tenderness  Lymphadenopathy:     He has no cervical adenopathy  Neurological: He is alert and oriented to person, place, and time  He has normal reflexes  No cranial nerve deficit  Skin: No rash noted  No erythema  Psychiatric: He has a normal mood and affect  His behavior is normal            Performance Status: ECOG/Zubrod/WHO: 1 - Symptomatic but completely ambulatory    Labs:  CBC, Coags, BMP, Mg, Phos     Imaging  Xr Chest 2 Views    Result Date: 3/27/2018  Narrative: CHEST INDICATION: Shortness of breath COMPARISON: March 1, 2017 VIEWS:  AP semierect and lateral; IMAGES:  5 FINDINGS: Midline sternotomy wires are present from previous cardiac surgery  The cardiomediastinal silhouette is unchanged  The pulmonary vasculature is engorged  Interstitial edema is suggested at the lung bases  No pulmonary consolidation    No pleural effusion  Bony thorax is otherwise unremarkable  Impression: Congestive heart failure type changes, status post CABG  ____ ____ ____ ____ ____ ____ ____ ____ ____ ____ ____ ____ ____ As per comments in the PACS workstation, findings are concordant with preliminary interpretation provided by the emergency room physician      Workstation performed: [id number]     GM Foot 3+ Views Left    Result Date: 3/27/2018  Narrative: LEFT FOOT INDICATION:   pain  COMPARISON:  March 17, 2016 VIEWS:  XR FOOT 3+ VW LEFT (AP, lateral and oblique) Images: 3 FINDINGS: There is no acute fracture or dislocation  Mild degenerative changes, mid foot  Small plantar calcaneal spur  No lytic or blastic lesions seen  Diffuse soft tissue swelling overlies the dorsum of the foot  Vascular calcification is noted  Impression: No acute osseous abnormality  Dorsal soft tissue swelling  Workstation performed: [id number]     Vas Lower Limb Venous Duplex Study, Complete Bilateral    Result Date: 3/29/2018  Narrative:  [facility] REPORT CLINICAL: Indications: Patient has bilateral leg swelling  Operative History: 2017-10-31 Temporal artery biopsy CABG Cardiac Cath Cardiac stents Risk Factors: The patient has no history of DVT  The patient current BMI is 52 91, Weight is 348 lb and Height is 68 in  Clinical: Left Lower Limb There is edema  Right Lower Limb There is edema  FINDINGS:  Segment  Right            Left              Impression       Impression       CFV      Normal (Patent)  Normal (Patent)     CONCLUSION:  Impression: RIGHT LOWER LIMB: No gross evidence of acute or chronic deep vein thrombosis  No gross evidence of superficial thrombophlebitis noted  Doppler evaluation shows a normal response to augmentation maneuvers  Popliteal, posterior tibial and anterior tibial arterial Doppler waveforms are triphasic  LEFT LOWER LIMB: No gross evidence of acute or chronic deep vein thrombosis   No gross evidence of superficial thrombophlebitis noted  Doppler evaluation shows a normal response to augmentation maneuvers  Popliteal, posterior tibial and anterior tibial arterial Doppler waveforms are triphasic  Technically difficult/limited study  SIGNATURE: Electronically Signed by: Angel Hudson on 2018-03-29 08:00:29 PM    I reviewed the above laboratory and imaging data  Discussion/Summary:  In summary, this is a 78-year-old male history of pulmonary neuroendocrine tumor resected more than 5 years ago  Additionally, he has a history of prostate cancer, recurrent  He is on hormonal therapy under Urology  Recent PSA, February 2018, 0 2  He was recently admitted for bilateral lower extremity edema  This was ultimately attributed to heart failure  Creatinine is over 2, creatinine clearance 20-25  Dialysis has been considered, presumably for fluid management  I reviewed with the patient and his family that neither of his cancer situations or treatment is contributing to his current fluid management problems  Certainly, previous cisplatin chemotherapy could have caused some kidney damage  This is not reversible  He certainly has other contributors to renal dysfunction as well  Additionally, he had a CBC during his hospitalization that was entirely normal   I reviewed the above at length with the patient and his family  For efficiency purposes, I suggested follow-up in our office is on a p r n  basis as he does not have any active oncology issues  Again, hormonal blockade continues under Urology  The patient and his family voiced understanding and agreement

## 2018-04-19 ENCOUNTER — APPOINTMENT (OUTPATIENT)
Dept: LAB | Facility: HOSPITAL | Age: 78
End: 2018-04-19
Attending: INTERNAL MEDICINE
Payer: MEDICARE

## 2018-04-19 ENCOUNTER — OFFICE VISIT (OUTPATIENT)
Dept: CARDIOLOGY CLINIC | Facility: HOSPITAL | Age: 78
End: 2018-04-19
Payer: MEDICARE

## 2018-04-19 VITALS
BODY MASS INDEX: 47.74 KG/M2 | HEIGHT: 68 IN | WEIGHT: 315 LBS | HEART RATE: 64 BPM | DIASTOLIC BLOOD PRESSURE: 80 MMHG | SYSTOLIC BLOOD PRESSURE: 124 MMHG

## 2018-04-19 DIAGNOSIS — Z95.1 S/P CABG X 3: ICD-10-CM

## 2018-04-19 DIAGNOSIS — I25.10 CORONARY ARTERY DISEASE INVOLVING NATIVE CORONARY ARTERY OF NATIVE HEART WITHOUT ANGINA PECTORIS: ICD-10-CM

## 2018-04-19 DIAGNOSIS — R60.0 BILATERAL LEG EDEMA: ICD-10-CM

## 2018-04-19 DIAGNOSIS — I50.43 ACUTE ON CHRONIC COMBINED SYSTOLIC AND DIASTOLIC ACC/AHA STAGE C CONGESTIVE HEART FAILURE (HCC): Primary | ICD-10-CM

## 2018-04-19 DIAGNOSIS — I48.21 PERMANENT ATRIAL FIBRILLATION (HCC): ICD-10-CM

## 2018-04-19 DIAGNOSIS — N18.4 CKD (CHRONIC KIDNEY DISEASE) STAGE 4, GFR 15-29 ML/MIN (HCC): Chronic | ICD-10-CM

## 2018-04-19 LAB
ANION GAP SERPL CALCULATED.3IONS-SCNC: 3 MMOL/L (ref 4–13)
BUN SERPL-MCNC: 50 MG/DL (ref 5–25)
CALCIUM SERPL-MCNC: 8.9 MG/DL (ref 8.3–10.1)
CHLORIDE SERPL-SCNC: 97 MMOL/L (ref 100–108)
CO2 SERPL-SCNC: 39 MMOL/L (ref 21–32)
CREAT SERPL-MCNC: 2.6 MG/DL (ref 0.6–1.3)
GFR SERPL CREATININE-BSD FRML MDRD: 22 ML/MIN/1.73SQ M
GLUCOSE SERPL-MCNC: 320 MG/DL (ref 65–140)
POTASSIUM SERPL-SCNC: 3.4 MMOL/L (ref 3.5–5.3)
SODIUM SERPL-SCNC: 139 MMOL/L (ref 136–145)

## 2018-04-19 PROCEDURE — 99214 OFFICE O/P EST MOD 30 MIN: CPT | Performed by: INTERNAL MEDICINE

## 2018-04-19 PROCEDURE — 80048 BASIC METABOLIC PNL TOTAL CA: CPT | Performed by: INTERNAL MEDICINE

## 2018-04-19 PROCEDURE — 36415 COLL VENOUS BLD VENIPUNCTURE: CPT | Performed by: INTERNAL MEDICINE

## 2018-04-19 NOTE — PROGRESS NOTES
Cardiology Follow Up    Mojgan Alva  1940  [id number]  [address] [facility] [id number]    1  Acute on chronic combined systolic and diastolic ACC/AHA stage C congestive heart failure (HCC)  Basic metabolic panel   2  Bilateral leg edema     3  CKD (chronic kidney disease) stage 4, GFR 15-29 ml/min ([facility])     4  Permanent atrial fibrillation ([address] )     5  Coronary artery disease involving native coronary artery of native heart without angina pectoris     6  S/P CABG x 3           Discussion/Summary: He is on high doses of Lasix and Zaroxolyn  He will likely need dialysis in the near future to control his heart failure  I will repeat a BMP today  I have reviewed his medications and made no changes  No cardiac testing is ordered  RTO 2-3 months  Interval History: He was recently admitted to [facility] with acute on chronic systolic and diastolic heart failure  He lost 10 LBS of fluid weight with IV diuretics  He is still severely short of breath and appears to be significantly fluid overloaded still  His weight has stayed down  Diuresis is restricted by the development of worsening renal function  He is now on Zaroxolyn 3x a week  He has had discussions with Dr Miguel Panda concerning dialysis  He has CAD with prior CABG  He has had recurrent heart failure  He has CAF and is on [facility]      Patient Active Problem List   Diagnosis    Morbid obesity ([address] )    History of prostate cancer    Type 2 diabetes mellitus with hyperglycemia ([address] )    S/P CABG x 3    CKD (chronic kidney disease) stage 4, GFR 15-29 ml/min ([facility])    Body mass index (BMI) of 40 0 to 49 9    Wound of right lower extremity    Wound of left lower extremity    Hyperphosphatemia    Vitamin D deficiency    Bilateral renal cysts    Atrial fibrillation ([address] )    Blind right eye    Acute on chronic combined systolic and diastolic ACC/AHA stage C congestive heart failure ([facility] )    Coronary artery disease    DM (diabetes mellitus), type 2 with peripheral neuropathy    Obstructive sleep apnea syndrome, severe    Vision loss, 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    Hypokalemia    LESLIE (acute kidney injury) ([facility] )    Anemia    Bilateral leg edema    Prostate cancer (HCC)    Severe obstructive sleep apnea-hypopnea syndrome    Malignant neoplasm of upper lobe of right lung (HCC)     Past Medical History:   Diagnosis Date    Anemia     Arthritis     Atrial fibrillation (HCC)     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HCC)     B12 deficiency     Back pain     Blind right eye     Cardiac disorder     Chronic combined systolic and diastolic heart failure (HCC)     Chronic obstructive lung disease (HCC)     Chronic venous stasis dermatitis of both lower extremities     CKD (chronic kidney disease), stage IV (HCC)     Coronary artery disease     DDD (degenerative disc disease)     DM (diabetes mellitus), type 2 (HCC)     Type II, on insulin    BAER (dyspnea on exertion)     Easy bruising     Epistaxis     Gout     Gross hematuria     last assessed: 3/18/2015    Hepatitis     Herpes zoster     Hiatal hernia     History of cellulitis     BLE    History of prostate cancer     Radiation treatments   Hypercholesterolemia     Hyperlipidemia     Hypertension     Ischemic cardiomyopathy     Lung mass     last assessed: 9/21/2012    MI, old    Ioana Silence Morbid obesity due to excess calories (HCC)     or severe obesity    Neuropathy     BLE    Obesity     Obstructive sleep apnea syndrome, severe     Pneumonia     last assessed: 7/24/2012    Shortness of breath     TIA (transient ischemic attack)     Urinary incontinence     Urinary retention     Use of cane as ambulatory aid     Independent with personal care       Social History     Social History    Marital status: /Civil Union     Spouse name: N/A    Number of children: N/A    Years of education: N/A     Occupational History    Not on file       Social History Main Topics    Smoking status: Former Smoker     Packs/day: 2 00     Years: 54 00     Quit date: 2004    Smokeless tobacco: Never Used      Comment: Pt is a non smoker    Alcohol use No    Drug use: No    Sexual activity: No     Other Topics Concern    Not on file     Social History Narrative    No advance directives    No living will    Patient has living will      Family History   Problem Relation Age of Onset    Diabetes Other     Hypertension Other     Cancer Other     Diabetes Mother     Heart disease Mother     Hypertension Mother     Diabetes type II Mother     Cancer Mother      unknown type    Diabetes Father     Diabetes Maternal Grandmother     Cancer Sister      unknown type    Cancer Other      unknown type     Past Surgical History:   Procedure Laterality Date    ABDOMINAL SURGERY      CARDIAC SURGERY      CATARACT EXTRACTION, BILATERAL      CORONARY ANGIOPLASTY WITH STENT PLACEMENT      2 stents    CORONARY ARTERY BYPASS GRAFT N/A 7/15/2016    Procedure: Intraop ADRIAN, CABG x 3 using LIMA to LAD, RSVG to OM1 and D1;  Surgeon: Flaco Amaya MD;  Location: [facility];  Service:    Kimmie Coma      has stents    EYE SURGERY      Bilateral cataract removal    HERNIA REPAIR      umbilical hernia repair    LUNG CANCER SURGERY      Partial upper right lobectomy    LUNG SURGERY Left 2012    OTHER SURGICAL HISTORY      previous stent placement-no anatomy given    PILONIDAL CYST EXCISION      MT CYSTOURETHROSCOPY,URETER CATHETER Left 3/9/2016    Procedure: CYSTOSCOPY WITH left RETROGRADE PYELOGRAM left double J stent removal;  Surgeon: Dave Peng MD;  Location: [facility];  Service: Urology    MT TEMPORAL ARTERY LIGATN OR BX Bilateral 10/31/2017    Procedure: BIOPSY ARTERY TEMPORAL;  Surgeon: Luna Gillespie MD;  Location: [facility];  Service: Vascular    RENAL ARTERY STENT 02/16/2015    transcath intravascular stent placement percutaneous renal    VASCULAR SURGERY         Current Outpatient Prescriptions:     apixaban (ELIQUIS) 2 5 mg, Take 1 tablet (2 5 mg total) by mouth 2 (two) times a day, Disp: 60 tablet, Rfl: 0    aspirin (ECOTRIN LOW STRENGTH) 81 mg EC tablet, Take 81 mg by mouth 2 (two) times a day, Disp: , Rfl:     Cholecalciferol 2000 units TABS, Take 1 tablet by mouth daily, Disp: 30 tablet, Rfl: 1    furosemide (LASIX) 80 mg tablet, Take 1 tablet by mouth 2 (two) times a day, Disp: 30 tablet, Rfl: 0    gabapentin (NEURONTIN) 300 mg capsule, Take 300 mg by mouth daily at bedtime, Disp: , Rfl:     glucagon (GLUCAGON EMERGENCY) 1 MG injection, Inject 1 mg under the skin once as needed for low blood sugar (as needed for a blood sugar less than 70 if unconscious or unable to correct by oral means) for up to 1 dose, Disp: 1 each, Rfl: 0    insulin detemir (LEVEMIR) 100 units/mL subcutaneous injection, Inject 50 Units under the skin daily at bedtime (Patient taking differently: Inject 55 Units under the skin daily at bedtime  ), Disp: , Rfl: 0    insulin lispro (HumaLOG) 100 units/mL injection, Inject 15 Units under the skin 3 (three) times a day with meals, Disp: , Rfl: 0    metoprolol tartrate (LOPRESSOR) 25 mg tablet, Take 25 mg by mouth 2 (two) times a day, Disp: , Rfl:     potassium chloride (MICRO-K) 10 MEQ CR capsule, Take 1 capsule (10 mEq total) by mouth 2 (two) times a day for 30 days, Disp: 60 capsule, Rfl: 5    predniSONE 1 mg tablet, Take 0 25 mg by mouth every other day  , Disp: , Rfl:     simvastatin (ZOCOR) 40 mg tablet, Take 40 mg by mouth daily at bedtime, Disp: , Rfl:     tamsulosin (FLOMAX) 0 4 mg, Take 0 4 mg by mouth daily at bedtime  , Disp: , Rfl:     iron polysaccharides (NIFEREX) 150 mg capsule, Take 150 mg by mouth daily, Disp: , Rfl:   Allergies   Allergen Reactions    Other Rash     Adhesive tape     Vitals:    04/19/18 1053   BP: 124/80   BP Location: Left arm   Patient Position: Sitting   Pulse: 64   Weight: (!) 150 kg (330 lb)   Height: 5' 8" (1 727 m)     Weight (last 2 days)     Date/Time   Weight    04/19/18 1053  (!)  150 (330)             Blood pressure 124/80, pulse 64, height 5' 8" (1 727 m), weight (!) 150 kg (330 lb)  , Body mass index is 50 18 kg/m²  Labs:  Admission on 03/26/2018, Discharged on 03/30/2018   Component Date Value    WBC 03/26/2018 8 65     RBC 03/26/2018 4 30     Hemoglobin 03/26/2018 13 1     Hematocrit 03/26/2018 41 3     MCV 03/26/2018 96     MCH 03/26/2018 30 5     MCHC 03/26/2018 31 7     RDW 03/26/2018 16 1*    MPV 03/26/2018 9 6     Platelets 61/78/8607 317     Neutrophils Relative 03/26/2018 80*    Lymphocytes Relative 03/26/2018 11*    Monocytes Relative 03/26/2018 7     Eosinophils Relative 03/26/2018 1     Basophils Relative 03/26/2018 1     Neutrophils Absolute 03/26/2018 6 89     Lymphocytes Absolute 03/26/2018 0 96     Monocytes Absolute 03/26/2018 0 62     Eosinophils Absolute 03/26/2018 0 11     Basophils Absolute 03/26/2018 0 07     Blood Culture 03/26/2018 No Growth After 5 Days   Blood Culture 03/26/2018 No Growth After 5 Days       Color, UA 03/26/2018 Yellow     Clarity, UA 03/26/2018 Clear     Specific Gravity, UA 03/26/2018 1 010     pH, UA 03/26/2018 6 0     Leukocytes, UA 03/26/2018 Negative     Nitrite, UA 03/26/2018 Negative     Protein, UA 03/26/2018 Negative     Glucose, UA 03/26/2018 Negative     Ketones, UA 03/26/2018 Negative     Urobilinogen, UA 03/26/2018 0 2     Bilirubin, UA 03/26/2018 Negative     Blood, UA 03/26/2018 Negative     Sodium 03/26/2018 141     Potassium 03/26/2018 4 1     Chloride 03/26/2018 98*    CO2 03/26/2018 34*    Anion Gap 03/26/2018 9     BUN 03/26/2018 39*    Creatinine 03/26/2018 2 55*    Glucose 03/26/2018 206*    Calcium 03/26/2018 9 2     AST 03/26/2018 19     ALT 03/26/2018 27     Alkaline Phosphatase 03/26/2018 38*    Total Protein 03/26/2018 6 5     Albumin 03/26/2018 2 8*    Total Bilirubin 03/26/2018 0 30     eGFR 03/26/2018 23     TSH 3RD GENERATON 03/26/2018 1 747     LACTIC ACID 03/26/2018 2 4*    Troponin I 03/26/2018 0 09*    NT-proBNP 03/26/2018 3089*    Ventricular Rate 03/26/2018 85     Atrial Rate 03/26/2018 85     DC Interval 03/26/2018 158     QRSD Interval 03/26/2018 166     QT Interval 03/26/2018 458     QTC Interval 03/26/2018 545     P Axis 03/26/2018 57     QRS Axis 03/26/2018 -62     T Wave Axis 03/26/2018 69     LACTIC ACID 03/27/2018 2 0     Hemoglobin A1C 03/27/2018 8 3*    EAG 03/27/2018 192     Sodium 03/27/2018 139     Potassium 03/27/2018 3 4*    Chloride 03/27/2018 100     CO2 03/27/2018 32     Anion Gap 03/27/2018 7     BUN 03/27/2018 36*    Creatinine 03/27/2018 2 30*    Glucose 03/27/2018 335*    Calcium 03/27/2018 8 3     AST 03/27/2018 16     ALT 03/27/2018 23     Alkaline Phosphatase 03/27/2018 31*    Total Protein 03/27/2018 5 5*    Albumin 03/27/2018 2 4*    Total Bilirubin 03/27/2018 0 30     eGFR 03/27/2018 26     Magnesium 03/27/2018 1 8     Phosphorus 03/27/2018 3 7     WBC 03/27/2018 8 32     RBC 03/27/2018 3 93     Hemoglobin 03/27/2018 11 9*    Hematocrit 03/27/2018 37 5     MCV 03/27/2018 95     MCH 03/27/2018 30 3     MCHC 03/27/2018 31 7     RDW 03/27/2018 15 9*    Platelets 77/36/0466 297     MPV 03/27/2018 9 6     POC Glucose 03/27/2018 333*    POC Glucose 03/27/2018 362*    Troponin I 03/27/2018 0 10*    Troponin I 03/27/2018 0 11*    Troponin I 03/27/2018 0 11*    Troponin I 03/27/2018 0 09*    POC Glucose 03/27/2018 346*    POC Glucose 03/27/2018 277*    POC Glucose 03/27/2018 181*    Sodium 03/28/2018 143     Potassium 03/28/2018 3 5     Chloride 03/28/2018 101     CO2 03/28/2018 33*    Anion Gap 03/28/2018 9     BUN 03/28/2018 36*    Creatinine 03/28/2018 2 31*    Glucose 03/28/2018 127     Calcium 03/28/2018 9 1     eGFR 03/28/2018 26     WBC 03/28/2018 9 04     RBC 03/28/2018 4 08     Hemoglobin 03/28/2018 12 4     Hematocrit 03/28/2018 38 9     MCV 03/28/2018 95     MCH 03/28/2018 30 4     MCHC 03/28/2018 31 9     RDW 03/28/2018 15 7*    MPV 03/28/2018 9 6     Platelets 62/65/5253 298     Neutrophils Relative 03/28/2018 79*    Lymphocytes Relative 03/28/2018 10*    Monocytes Relative 03/28/2018 9     Eosinophils Relative 03/28/2018 1     Basophils Relative 03/28/2018 1     Neutrophils Absolute 03/28/2018 7 18     Lymphocytes Absolute 03/28/2018 0 89     Monocytes Absolute 03/28/2018 0 78     Eosinophils Absolute 03/28/2018 0 13     Basophils Absolute 03/28/2018 0 06     Magnesium 03/28/2018 2 1     POC Glucose 03/28/2018 103     POC Glucose 03/28/2018 233*    POC Glucose 03/28/2018 197*    POC Glucose 03/28/2018 233*    WBC 03/29/2018 9 96     RBC 03/29/2018 4 42     Hemoglobin 03/29/2018 13 4     Hematocrit 03/29/2018 42 6     MCV 03/29/2018 96     MCH 03/29/2018 30 3     MCHC 03/29/2018 31 5     RDW 03/29/2018 16 2*    MPV 03/29/2018 9 7     Platelets 96/79/1204 335     Neutrophils Relative 03/29/2018 75     Lymphocytes Relative 03/29/2018 13*    Monocytes Relative 03/29/2018 9     Eosinophils Relative 03/29/2018 2     Basophils Relative 03/29/2018 1     Neutrophils Absolute 03/29/2018 7 41     Lymphocytes Absolute 03/29/2018 1 33     Monocytes Absolute 03/29/2018 0 91     Eosinophils Absolute 03/29/2018 0 24     Basophils Absolute 03/29/2018 0 07     Sodium 03/29/2018 141     Potassium 03/29/2018 3 5     Chloride 03/29/2018 99*    CO2 03/29/2018 34*    Anion Gap 03/29/2018 8     BUN 03/29/2018 38*    Creatinine 03/29/2018 2 43*    Glucose 03/29/2018 122     Calcium 03/29/2018 9 4     eGFR 03/29/2018 24     POC Glucose 03/29/2018 159*    POC Glucose 03/29/2018 155*    POC Glucose 03/29/2018 185*    POC Glucose 03/29/2018 240*    POC Glucose 03/29/2018 227*    Sodium 03/30/2018 139     Potassium 03/30/2018 4 0     Chloride 03/30/2018 98*    CO2 03/30/2018 32     Anion Gap 03/30/2018 9     BUN 03/30/2018 45*    Creatinine 03/30/2018 2 73*    Glucose 03/30/2018 191*    Calcium 03/30/2018 9 0     eGFR 03/30/2018 21     POC Glucose 03/30/2018 225*   Lab Requisition on 03/14/2018   Component Date Value    WBC 03/14/2018 7 84     RBC 03/14/2018 4 16     Hemoglobin 03/14/2018 12 8     Hematocrit 03/14/2018 40 9     MCV 03/14/2018 98     MCH 03/14/2018 30 8     MCHC 03/14/2018 31 3*    RDW 03/14/2018 16 2*    MPV 03/14/2018 10 8     Platelets 13/17/1987 290     Neutrophils Relative 03/14/2018 78*    Lymphocytes Relative 03/14/2018 10*    Monocytes Relative 03/14/2018 9     Eosinophils Relative 03/14/2018 2     Basophils Relative 03/14/2018 1     Neutrophils Absolute 03/14/2018 6 14     Lymphocytes Absolute 03/14/2018 0 77     Monocytes Absolute 03/14/2018 0 73     Eosinophils Absolute 03/14/2018 0 12     Basophils Absolute 03/14/2018 0 08     Sodium 03/14/2018 142     Potassium 03/14/2018 3 8     Chloride 03/14/2018 99*    CO2 03/14/2018 33*    Anion Gap 03/14/2018 10     BUN 03/14/2018 47*    Creatinine 03/14/2018 2 47*    Glucose 03/14/2018 246*    Calcium 03/14/2018 9 0     AST 03/14/2018 20     ALT 03/14/2018 29     Alkaline Phosphatase 03/14/2018 33*    Total Protein 03/14/2018 5 5*    Albumin 03/14/2018 2 8*    Total Bilirubin 03/14/2018 0 30     eGFR 03/14/2018 24     Ferritin 03/14/2018 58     Iron Saturation 03/14/2018 12     TIBC 03/14/2018 371     Iron 03/14/2018 43*   Appointment on 02/14/2018   Component Date Value    WBC 02/14/2018 9 54     RBC 02/14/2018 4 19     Hemoglobin 02/14/2018 13 3     Hematocrit 02/14/2018 40 5     MCV 02/14/2018 97     MCH 02/14/2018 31 7     MCHC 02/14/2018 32 8     RDW 02/14/2018 17 1*    MPV 02/14/2018 10 4     Platelets 07/92/4548 208     nRBC 02/14/2018 0     Sodium 02/14/2018 136     Potassium 02/14/2018 3 7     Chloride 02/14/2018 94*    CO2 02/14/2018 33*    Anion Gap 02/14/2018 9     BUN 02/14/2018 55*    Creatinine 02/14/2018 2 68*    Glucose 02/14/2018 259*    Calcium 02/14/2018 8 4     AST 02/14/2018 23     ALT 02/14/2018 43     Alkaline Phosphatase 02/14/2018 47     Total Protein 02/14/2018 5 6*    Albumin 02/14/2018 3 0*    Total Bilirubin 02/14/2018 0 41     eGFR 02/14/2018 21     PSA 02/14/2018 0 2     Vitamin B-12 02/14/2018 878     Iron Saturation 02/14/2018 17     TIBC 02/14/2018 348     Iron 02/14/2018 58*    Ferritin 02/14/2018 70     Segmented % 02/14/2018 87*    Bands % 02/14/2018 1     Lymphocytes % 02/14/2018 5*    Monocytes % 02/14/2018 5     Eosinophils % 02/14/2018 2     Basophils % 02/14/2018 0     Absolute Neutrophils 02/14/2018 8 40*    Lymphocytes Absolute 02/14/2018 0 48*    Monocytes Absolute 02/14/2018 0 48     Eosinophils Absolute 02/14/2018 0 19     Basophils Absolute 02/14/2018 0 00     RBC Morphology 02/14/2018 Present     Anisocytosis 02/14/2018 Present     Polychromasia 02/14/2018 Present     Platelet Estimate 31/96/7568 Adequate    Lab Requisition on 01/15/2018   Component Date Value    Vitamin B-12 01/15/2018 909*    WBC 01/15/2018 11 37*    RBC 01/15/2018 4 41     Hemoglobin 01/15/2018 13 7     Hematocrit 01/15/2018 42 7     MCV 01/15/2018 97     MCH 01/15/2018 31 1     MCHC 01/15/2018 32 1     RDW 01/15/2018 18 2*    MPV 01/15/2018 10 5     Platelets 09/13/5997 251     Sodium 01/15/2018 139     Potassium 01/15/2018 4 7     Chloride 01/15/2018 97*    CO2 01/15/2018 35*    Anion Gap 01/15/2018 7     BUN 01/15/2018 60*    Creatinine 01/15/2018 2 39*    Glucose 01/15/2018 309*    Calcium 01/15/2018 8 0*    AST 01/15/2018 35     ALT 01/15/2018 55     Alkaline Phosphatase 01/15/2018 39*    Total Protein 01/15/2018 5 6*    Albumin 01/15/2018 3 0*    Total Bilirubin 01/15/2018 0 30     eGFR 01/15/2018 25     PSA 01/15/2018 0 4     Iron Saturation 01/15/2018 20     TIBC 01/15/2018 358     Iron 01/15/2018 71     Ferritin 01/15/2018 109     Segmented % 01/15/2018 88*    Lymphocytes % 01/15/2018 7*    Monocytes % 01/15/2018 5     Eosinophils % 01/15/2018 0     Basophils % 01/15/2018 0     Absolute Neutrophils 01/15/2018 10 01*    Lymphocytes Absolute 01/15/2018 0 80     Monocytes Absolute 01/15/2018 0 57     Eosinophils Absolute 01/15/2018 0 00     Basophils Absolute 01/15/2018 0 00     Total Counted 01/15/2018 100     nRBC 01/15/2018 2     Anisocytosis 01/15/2018 Present     Polychromasia 01/15/2018 Present     Platelet Estimate 58/75/6671 Adequate     Large Platelet 59/98/3852 Present    Lab Requisition on 12/13/2017   Component Date Value    WBC 12/13/2017 9 85     RBC 12/13/2017 4 45     Hemoglobin 12/13/2017 13 3     Hematocrit 12/13/2017 42 2     MCV 12/13/2017 95     MCH 12/13/2017 29 9     MCHC 12/13/2017 31 5     RDW 12/13/2017 17 1*    MPV 12/13/2017 11 0     Platelets 37/55/5523 194     nRBC 12/13/2017 0     Sodium 12/13/2017 137     Potassium 12/13/2017 4 3     Chloride 12/13/2017 96*    CO2 12/13/2017 33*    Anion Gap 12/13/2017 8     BUN 12/13/2017 59*    Creatinine 12/13/2017 2 54*    Glucose, Fasting 12/13/2017 372*    Calcium 12/13/2017 8 4     AST 12/13/2017 25     ALT 12/13/2017 46     Alkaline Phosphatase 12/13/2017 36*    Total Protein 12/13/2017 5 4*    Albumin 12/13/2017 2 6*    Total Bilirubin 12/13/2017 0 32     eGFR 12/13/2017 23     Vitamin B-12 12/13/2017 648     Iron 12/13/2017 64*    PSA 12/13/2017 0 5     Segmented % 12/13/2017 90*    Bands % 12/13/2017 1     Lymphocytes % 12/13/2017 4*    Monocytes % 12/13/2017 4     Eosinophils % 12/13/2017 0     Basophils % 12/13/2017 0     Metamyelocytes% 12/13/2017 1     Absolute Neutrophils 12/13/2017 8 96*    Lymphocytes Absolute 12/13/2017 0 39*    Monocytes Absolute 12/13/2017 0 39     Eosinophils Absolute 12/13/2017 0 00     Basophils Absolute 12/13/2017 0 00     RBC Morphology 12/13/2017 Present     Anisocytosis 12/13/2017 Present     Platelet Estimate 42/40/2276 Adequate    Lab Requisition on 11/15/2017   Component Date Value    WBC 11/15/2017 13 93*    RBC 11/15/2017 4 84     Hemoglobin 11/15/2017 14 4     Hematocrit 11/15/2017 44 2     MCV 11/15/2017 91     MCH 11/15/2017 29 8     MCHC 11/15/2017 32 6     RDW 11/15/2017 16 6*    MPV 11/15/2017 10 7     Platelets 89/09/4323 228     nRBC 11/15/2017 0     Sodium 11/15/2017 140     Potassium 11/15/2017 4 4     Chloride 11/15/2017 100     CO2 11/15/2017 30     Anion Gap 11/15/2017 10     BUN 11/15/2017 100*    Creatinine 11/15/2017 2 83*    Glucose, Fasting 11/15/2017 164*    Calcium 11/15/2017 9 0     AST 11/15/2017 22     ALT 11/15/2017 43     Alkaline Phosphatase 11/15/2017 45*    Total Protein 11/15/2017 5 7*    Albumin 11/15/2017 3 3*    Total Bilirubin 11/15/2017 0 33     eGFR 11/15/2017 20     Vitamin B-12 11/15/2017 695     PSA 11/15/2017 0 6     Iron Saturation 11/15/2017 24     TIBC 11/15/2017 364     Iron 11/15/2017 86     Ferritin 11/15/2017 101     Segmented % 11/15/2017 97*    Lymphocytes % 11/15/2017 3*    Monocytes % 11/15/2017 0*    Eosinophils % 11/15/2017 0     Basophils % 11/15/2017 0     Absolute Neutrophils 11/15/2017 13 51*    Lymphocytes Absolute 11/15/2017 0 42*    Monocytes Absolute 11/15/2017 0 00     Eosinophils Absolute 11/15/2017 0 00     Basophils Absolute 11/15/2017 0 00     RBC Morphology 11/15/2017 Present     Poikilocytes 11/15/2017 Present     Platelet Estimate 27/68/4366 Adequate    Lab Requisition on 11/03/2017   Component Date Value    Sodium 11/03/2017 137     Potassium 11/03/2017 4 4     Chloride 11/03/2017 99*    CO2 11/03/2017 30     Anion Gap 11/03/2017 8     BUN 11/03/2017 84*    Creatinine 11/03/2017 2 74*    Glucose, Fasting 11/03/2017 282*    Calcium 11/03/2017 8 9     eGFR 11/03/2017 21    Admission on 10/31/2017, Discharged on 10/31/2017   Component Date Value    Case Report 10/31/2017                      Value:Surgical Pathology Report                         Case: [id number]                                   Authorizing Provider:  Austin Orona MD          Collected:           10/31/2017 1224              Ordering Location:     [address] [place] [address]      Received:            10/31/2017 [facility]                                                      Pathologist:           Karan Murrell MD                                                         Specimens:   A) - Artery, LEFT TEMPORAL ARTERY                                                                   B) - Artery, RIGHT TEMPORAL ARTERY                                                         Final Diagnosis 10/31/2017                      Value: This result contains rich text formatting which cannot be displayed here   Note 10/31/2017                      Value: This result contains rich text formatting which cannot be displayed here   Additional Information 10/31/2017                      Value: This result contains rich text formatting which cannot be displayed here  [employer] Description 10/31/2017                      Value: This result contains rich text formatting which cannot be displayed here     Admission on 10/21/2017, Discharged on 10/27/2017   Component Date Value    WBC 10/21/2017 6 83     RBC 10/21/2017 4 54     Hemoglobin 10/21/2017 13 4     Hematocrit 10/21/2017 41 9     MCV 10/21/2017 92     MCH 10/21/2017 29 5     MCHC 10/21/2017 32 0     RDW 10/21/2017 15 7*    MPV 10/21/2017 10 6     Platelets 70/46/7922 233     nRBC 10/21/2017 0     Neutrophils Relative 10/21/2017 73     Lymphocytes Relative 10/21/2017 16     Monocytes Relative 10/21/2017 7     Eosinophils Relative 10/21/2017 3     Basophils Relative 10/21/2017 1     Neutrophils Absolute 10/21/2017 4 95     Lymphocytes Absolute 10/21/2017 1 07     Monocytes Absolute 10/21/2017 0 50     Eosinophils Absolute 10/21/2017 0 21     Basophils Absolute 10/21/2017 0 06     Sodium 10/21/2017 138     Potassium 10/21/2017 4 4     Chloride 10/21/2017 101     CO2 10/21/2017 28     Anion Gap 10/21/2017 9     BUN 10/21/2017 56*    Creatinine 10/21/2017 2 91*    Glucose 10/21/2017 266*    Calcium 10/21/2017 8 9     eGFR 10/21/2017 20     Ventricular Rate 10/21/2017 73     Atrial Rate 10/21/2017 73     [place] Interval 10/21/2017 184     QRSD Interval 10/21/2017 168     QT Interval 10/21/2017 438     QTC Interval 10/21/2017 482     P Axis 10/21/2017 59     QRS Axis 10/21/2017 -69     T Wave Axis 10/21/2017 74     POC Troponin I 10/21/2017 0 01     Specimen Type 10/21/2017 VENOUS     POC Glucose 10/21/2017 346*    Sodium 10/22/2017 138     Potassium 10/22/2017 4 0     Chloride 10/22/2017 99*    CO2 10/22/2017 28     Anion Gap 10/22/2017 11     BUN 10/22/2017 62*    Creatinine 10/22/2017 2 93*    Glucose 10/22/2017 471*    Calcium 10/22/2017 8 9     eGFR 10/22/2017 19     Hemoglobin A1C 10/22/2017 7 8*    EAG 10/22/2017 177     Cholesterol 10/22/2017 160     Triglycerides 10/22/2017 105     HDL, Direct 10/22/2017 56     LDL Calculated 10/22/2017 83     LDL Direct 10/22/2017 96     POC Glucose 10/22/2017 414*    POC Glucose 10/22/2017 422*    POC Glucose 10/22/2017 449*    POC Glucose 10/22/2017 372*    POC Glucose 10/23/2017 298*    POC Glucose 10/23/2017 364*    POC Glucose 10/23/2017 283*    POC Glucose 10/23/2017 382*    Sodium 10/24/2017 137     Potassium 10/24/2017 3 0*    Chloride 10/24/2017 96*    CO2 10/24/2017 30     Anion Gap 10/24/2017 11     BUN 10/24/2017 73*    Creatinine 10/24/2017 2 73*    Glucose 10/24/2017 283*    Calcium 10/24/2017 9 1     eGFR 10/24/2017 21     WBC 10/24/2017 11 19*    RBC 10/24/2017 4 49     Hemoglobin 10/24/2017 13 5     Hematocrit 10/24/2017 39 3     MCV 10/24/2017 88     MCH 10/24/2017 30 1     MCHC 10/24/2017 34 4     RDW 10/24/2017 15 3*    Platelets 86/73/0891 237     MPV 10/24/2017 10 3     Sed Rate 10/24/2017 18*    CRP 10/24/2017 8 4*    POC Glucose 10/24/2017 270*    POC Glucose 10/24/2017 312*    POC Glucose 10/24/2017 210*    POC Glucose 10/24/2017 307*    Sodium 10/25/2017 135*    Potassium 10/25/2017 2 9*    Chloride 10/25/2017 92*    CO2 10/25/2017 32     Anion Gap 10/25/2017 11     BUN 10/25/2017 81*    Creatinine 10/25/2017 3 02*    Glucose 10/25/2017 202*    Calcium 10/25/2017 9 5     eGFR 10/25/2017 19     Magnesium 10/25/2017 2 8*    WBC 10/25/2017 11 72*    RBC 10/25/2017 4 82     Hemoglobin 10/25/2017 14 3     Hematocrit 10/25/2017 42 0     MCV 10/25/2017 87     MCH 10/25/2017 29 7     MCHC 10/25/2017 34 0     RDW 10/25/2017 14 9     Platelets 06/05/0259 247     MPV 10/25/2017 10 4     POC Glucose 10/25/2017 220*    POC Glucose 10/25/2017 292*    POC Glucose 10/25/2017 149*    POC Glucose 10/25/2017 156*    Sodium 10/26/2017 138     Potassium 10/26/2017 3 3*    Chloride 10/26/2017 93*    CO2 10/26/2017 38*    Anion Gap 10/26/2017 7     BUN 10/26/2017 97*    Creatinine 10/26/2017 3 32*    Glucose 10/26/2017 59*    Calcium 10/26/2017 8 6     eGFR 10/26/2017 17     POC Glucose 10/26/2017 56*    POC Glucose 10/26/2017 90     POC Glucose 10/26/2017 53*    POC Glucose 10/26/2017 65     Clarity, UA 10/26/2017 Clear     Color, UA 10/26/2017 Yellow     Specific Gravity, UA 10/26/2017 1 012     pH, UA 10/26/2017 6 5     Glucose, UA 10/26/2017 Negative     Ketones, UA 10/26/2017 Negative     Blood, UA 10/26/2017 Negative     Protein, UA 10/26/2017 Negative     Nitrite, UA 10/26/2017 Negative     Bilirubin, UA 10/26/2017 Negative     Urobilinogen, UA 10/26/2017 0  2     Leukocytes, UA 10/26/2017 Negative     WBC, UA 10/26/2017 None Seen     RBC, UA 10/26/2017 None Seen     Hyaline Casts, UA 10/26/2017 None Seen     Bacteria, UA 10/26/2017 Occasional     Epithelial Cells 10/26/2017 None Seen     POC Glucose 10/26/2017 106     POC Glucose 10/26/2017 146*    POC Glucose 10/26/2017 274*    Sodium 10/27/2017 137     Potassium 10/27/2017 3 4*    Chloride 10/27/2017 95*    CO2 10/27/2017 34*    Anion Gap 10/27/2017 8     BUN 10/27/2017 91*    Creatinine 10/27/2017 2 99*    Glucose 10/27/2017 185*    Calcium 10/27/2017 8 5     eGFR 10/27/2017 19     POC Glucose 10/27/2017 102     POC Glucose 10/27/2017 144*   Appointment on 10/20/2017   Component Date Value    Sed Rate 10/20/2017 37*    CRP 10/20/2017 16 4*   There may be more visits with results that are not included  Imaging: Xr Chest 2 Views    Result Date: 3/27/2018  Narrative: CHEST INDICATION: Shortness of breath COMPARISON: March 1, 2017 VIEWS:  AP semierect and lateral; IMAGES:  5 FINDINGS: Midline sternotomy wires are present from previous cardiac surgery  The cardiomediastinal silhouette is unchanged  The pulmonary vasculature is engorged  Interstitial edema is suggested at the lung bases  No pulmonary consolidation  No pleural effusion  Bony thorax is otherwise unremarkable  Impression: Congestive heart failure type changes, status post CABG  ____ ____ ____ ____ ____ ____ ____ ____ ____ ____ ____ ____ ____ As per comments in the PACS workstation, findings are concordant with preliminary interpretation provided by the emergency room physician      Workstation performed: [id number]     GD Foot 3+ Views Left    Result Date: 3/27/2018  Narrative: LEFT FOOT INDICATION:   pain  COMPARISON:  March 17, 2016 VIEWS:  XR FOOT 3+ VW LEFT (AP, lateral and oblique) Images: 3 FINDINGS: There is no acute fracture or dislocation  Mild degenerative changes, mid foot  Small plantar calcaneal spur   No lytic or blastic lesions seen  Diffuse soft tissue swelling overlies the dorsum of the foot  Vascular calcification is noted  Impression: No acute osseous abnormality  Dorsal soft tissue swelling  Workstation performed: [id number]     Vas Lower Limb Venous Duplex Study, Complete Bilateral    Result Date: 3/29/2018  Narrative:  [facility] REPORT [facility]INICAL: Indications: Patient has bilateral leg swelling  Operative History: 2017-10-31 Temporal artery biopsy CABG Cardiac Cath Cardiac stents Risk Factors: The patient has no history of DVT  The patient current BMI is 52 91, Weight is 348 lb and Height is 68 in  Clinical: Left Lower Limb There is edema  Right Lower Limb There is edema  FINDINGS:  Segment  Right            Left              Impression       Impression       CFV      Normal (Patent)  Normal (Patent)     CONCLUSION:  Impression: RIGHT LOWER LIMB: No gross evidence of acute or chronic deep vein thrombosis  No gross evidence of superficial thrombophlebitis noted  Doppler evaluation shows a normal response to augmentation maneuvers  Popliteal, posterior tibial and anterior tibial arterial Doppler waveforms are triphasic  LEFT LOWER LIMB: No gross evidence of acute or chronic deep vein thrombosis  No gross evidence of superficial thrombophlebitis noted  Doppler evaluation shows a normal response to augmentation maneuvers  Popliteal, posterior tibial and anterior tibial arterial Doppler waveforms are triphasic  Technically difficult/limited study  SIGNATURE: Electronically Signed by: Angel Hudson on 2018-03-29 08:00:29 PM      Review of Systems:  Review of Systems   Respiratory: Positive for shortness of breath  Cardiovascular: Positive for leg swelling  Physical Exam:  Physical Exam   Constitutional: He is oriented to person, place, and time  He appears well-developed and well-nourished  HENT:   Head: Normocephalic and atraumatic     Eyes: Pupils are equal, round, and reactive to light  Neck: Normal range of motion  Neck supple  No JVD present  Cardiovascular: S1 normal, S2 normal and normal pulses  An irregularly irregular rhythm present  Pulses:       Carotid pulses are 2+ on the right side, and 2+ on the left side  Pulmonary/Chest: Effort normal and breath sounds normal  He has no wheezes  He has no rales  Abdominal: Soft  Bowel sounds are normal  There is no tenderness  Musculoskeletal: Normal range of motion  He exhibits edema  He exhibits no tenderness  3+ b/l LE edema   Neurological: He is alert and oriented to person, place, and time  He has normal reflexes  No cranial nerve deficit  Skin: Skin is warm  Psychiatric: He has a normal mood and affect

## 2018-04-23 ENCOUNTER — APPOINTMENT (OUTPATIENT)
Dept: WOUND CARE | Facility: HOSPITAL | Age: 78
End: 2018-04-23
Payer: MEDICARE

## 2018-04-23 PROCEDURE — 99212 OFFICE O/P EST SF 10 MIN: CPT

## 2018-04-25 ENCOUNTER — LAB (OUTPATIENT)
Dept: LAB | Facility: CLINIC | Age: 78
End: 2018-04-25
Payer: MEDICARE

## 2018-04-25 ENCOUNTER — TRANSCRIBE ORDERS (OUTPATIENT)
Dept: LAB | Facility: CLINIC | Age: 78
End: 2018-04-25

## 2018-04-25 DIAGNOSIS — N18.4 CHRONIC KIDNEY DISEASE, STAGE IV (SEVERE) (HCC): ICD-10-CM

## 2018-04-25 DIAGNOSIS — N18.4 CHRONIC KIDNEY DISEASE, STAGE IV (SEVERE) (HCC): Primary | ICD-10-CM

## 2018-04-25 DIAGNOSIS — E11.29 TYPE 2 DIABETES MELLITUS WITH OTHER KIDNEY COMPLICATION, UNSPECIFIED WHETHER LONG TERM INSULIN USE (HCC): ICD-10-CM

## 2018-04-25 DIAGNOSIS — E11.9 TYPE 2 DIABETES MELLITUS WITHOUT COMPLICATION, WITHOUT LONG-TERM CURRENT USE OF INSULIN (HCC): Primary | ICD-10-CM

## 2018-04-25 LAB
ALBUMIN SERPL BCP-MCNC: 3.1 G/DL (ref 3.5–5)
ALP SERPL-CCNC: 43 U/L (ref 46–116)
ALT SERPL W P-5'-P-CCNC: 16 U/L (ref 12–78)
ANION GAP SERPL CALCULATED.3IONS-SCNC: 9 MMOL/L (ref 4–13)
AST SERPL W P-5'-P-CCNC: 21 U/L (ref 5–45)
BASOPHILS # BLD AUTO: 0.07 THOUSANDS/ΜL (ref 0–0.1)
BASOPHILS NFR BLD AUTO: 1 % (ref 0–1)
BILIRUB SERPL-MCNC: 0.37 MG/DL (ref 0.2–1)
BUN SERPL-MCNC: 55 MG/DL (ref 5–25)
CALCIUM SERPL-MCNC: 9.3 MG/DL (ref 8.3–10.1)
CHLORIDE SERPL-SCNC: 89 MMOL/L (ref 100–108)
CO2 SERPL-SCNC: 38 MMOL/L (ref 21–32)
CREAT SERPL-MCNC: 2.53 MG/DL (ref 0.6–1.3)
EOSINOPHIL # BLD AUTO: 0.19 THOUSAND/ΜL (ref 0–0.61)
EOSINOPHIL NFR BLD AUTO: 2 % (ref 0–6)
ERYTHROCYTE [DISTWIDTH] IN BLOOD BY AUTOMATED COUNT: 16.7 % (ref 11.6–15.1)
FERRITIN SERPL-MCNC: 48 NG/ML (ref 8–388)
GFR SERPL CREATININE-BSD FRML MDRD: 23 ML/MIN/1.73SQ M
GLUCOSE SERPL-MCNC: 230 MG/DL (ref 65–140)
HCT VFR BLD AUTO: 39.7 % (ref 36.5–49.3)
HGB BLD-MCNC: 11.9 G/DL (ref 12–17)
IRON SATN MFR SERPL: 6 %
IRON SERPL-MCNC: 25 UG/DL (ref 65–175)
LYMPHOCYTES # BLD AUTO: 1.15 THOUSANDS/ΜL (ref 0.6–4.47)
LYMPHOCYTES NFR BLD AUTO: 10 % (ref 14–44)
MAGNESIUM SERPL-MCNC: 2.5 MG/DL (ref 1.6–2.6)
MCH RBC QN AUTO: 28.1 PG (ref 26.8–34.3)
MCHC RBC AUTO-ENTMCNC: 30 G/DL (ref 31.4–37.4)
MCV RBC AUTO: 94 FL (ref 82–98)
MONOCYTES # BLD AUTO: 0.67 THOUSAND/ΜL (ref 0.17–1.22)
MONOCYTES NFR BLD AUTO: 6 % (ref 4–12)
NEUTROPHILS # BLD AUTO: 9.68 THOUSANDS/ΜL (ref 1.85–7.62)
NEUTS SEG NFR BLD AUTO: 81 % (ref 43–75)
NRBC BLD AUTO-RTO: 0 /100 WBCS
PLATELET # BLD AUTO: 369 THOUSANDS/UL (ref 149–390)
PMV BLD AUTO: 10 FL (ref 8.9–12.7)
POTASSIUM SERPL-SCNC: 3.5 MMOL/L (ref 3.5–5.3)
PROT SERPL-MCNC: 6.1 G/DL (ref 6.4–8.2)
RBC # BLD AUTO: 4.24 MILLION/UL (ref 3.88–5.62)
SODIUM SERPL-SCNC: 136 MMOL/L (ref 136–145)
TIBC SERPL-MCNC: 424 UG/DL (ref 250–450)
WBC # BLD AUTO: 11.82 THOUSAND/UL (ref 4.31–10.16)

## 2018-04-25 PROCEDURE — 83735 ASSAY OF MAGNESIUM: CPT

## 2018-04-25 PROCEDURE — 36415 COLL VENOUS BLD VENIPUNCTURE: CPT

## 2018-04-25 PROCEDURE — 83540 ASSAY OF IRON: CPT

## 2018-04-25 PROCEDURE — 82728 ASSAY OF FERRITIN: CPT

## 2018-04-25 PROCEDURE — 80053 COMPREHEN METABOLIC PANEL: CPT

## 2018-04-25 PROCEDURE — 83550 IRON BINDING TEST: CPT

## 2018-04-25 PROCEDURE — 85025 COMPLETE CBC W/AUTO DIFF WBC: CPT

## 2018-04-25 NOTE — PROGRESS NOTES
Please call the patient regarding his abnormal result   Kidney function is stable sugar elevated at 230 rest of labs look okay recommend increasing the long acting insulin by 5 units

## 2018-05-21 ENCOUNTER — LAB REQUISITION (OUTPATIENT)
Dept: LAB | Facility: HOSPITAL | Age: 78
End: 2018-05-21
Payer: MEDICARE

## 2018-05-21 DIAGNOSIS — I13.0 HYPERTENSIVE HEART AND CHRONIC KIDNEY DISEASE WITH HEART FAILURE AND STAGE 1 THROUGH STAGE 4 CHRONIC KIDNEY DISEASE, OR CHRONIC KIDNEY DISEASE (HCC): ICD-10-CM

## 2018-05-21 DIAGNOSIS — I87.2 VENOUS INSUFFICIENCY (CHRONIC) (PERIPHERAL): ICD-10-CM

## 2018-05-21 DIAGNOSIS — E11.622 TYPE 2 DIABETES MELLITUS WITH OTHER SKIN ULCER (CODE) (HCC): ICD-10-CM

## 2018-05-21 DIAGNOSIS — I25.10 ATHEROSCLEROTIC HEART DISEASE OF NATIVE CORONARY ARTERY WITHOUT ANGINA PECTORIS: ICD-10-CM

## 2018-05-21 LAB
ALBUMIN SERPL BCP-MCNC: 3.1 G/DL (ref 3.5–5)
ALP SERPL-CCNC: 43 U/L (ref 46–116)
ALT SERPL W P-5'-P-CCNC: 22 U/L (ref 12–78)
ANION GAP SERPL CALCULATED.3IONS-SCNC: 10 MMOL/L (ref 4–13)
AST SERPL W P-5'-P-CCNC: 35 U/L (ref 5–45)
BASOPHILS # BLD AUTO: 0.04 THOUSANDS/ΜL (ref 0–0.1)
BASOPHILS NFR BLD AUTO: 0 % (ref 0–1)
BILIRUB SERPL-MCNC: 0.3 MG/DL (ref 0.2–1)
BUN SERPL-MCNC: 74 MG/DL (ref 5–25)
CALCIUM SERPL-MCNC: 9.2 MG/DL (ref 8.3–10.1)
CHLORIDE SERPL-SCNC: 90 MMOL/L (ref 100–108)
CO2 SERPL-SCNC: 36 MMOL/L (ref 21–32)
CREAT SERPL-MCNC: 2.86 MG/DL (ref 0.6–1.3)
EOSINOPHIL # BLD AUTO: 0.18 THOUSAND/ΜL (ref 0–0.61)
EOSINOPHIL NFR BLD AUTO: 1 % (ref 0–6)
ERYTHROCYTE [DISTWIDTH] IN BLOOD BY AUTOMATED COUNT: 17.5 % (ref 11.6–15.1)
FERRITIN SERPL-MCNC: 49 NG/ML (ref 8–388)
GFR SERPL CREATININE-BSD FRML MDRD: 20 ML/MIN/1.73SQ M
GLUCOSE SERPL-MCNC: 304 MG/DL (ref 65–140)
HCT VFR BLD AUTO: 40.8 % (ref 36.5–49.3)
HGB BLD-MCNC: 12.7 G/DL (ref 12–17)
IRON SATN MFR SERPL: 8 %
IRON SERPL-MCNC: 34 UG/DL (ref 65–175)
LYMPHOCYTES # BLD AUTO: 0.81 THOUSANDS/ΜL (ref 0.6–4.47)
LYMPHOCYTES NFR BLD AUTO: 6 % (ref 14–44)
MCH RBC QN AUTO: 27 PG (ref 26.8–34.3)
MCHC RBC AUTO-ENTMCNC: 31.1 G/DL (ref 31.4–37.4)
MCV RBC AUTO: 87 FL (ref 82–98)
MONOCYTES # BLD AUTO: 0.72 THOUSAND/ΜL (ref 0.17–1.22)
MONOCYTES NFR BLD AUTO: 6 % (ref 4–12)
NEUTROPHILS # BLD AUTO: 11.34 THOUSANDS/ΜL (ref 1.85–7.62)
NEUTS SEG NFR BLD AUTO: 87 % (ref 43–75)
PLATELET # BLD AUTO: 383 THOUSANDS/UL (ref 149–390)
PMV BLD AUTO: 9.9 FL (ref 8.9–12.7)
POTASSIUM SERPL-SCNC: 3.1 MMOL/L (ref 3.5–5.3)
PROT SERPL-MCNC: 6 G/DL (ref 6.4–8.2)
RBC # BLD AUTO: 4.71 MILLION/UL (ref 3.88–5.62)
SODIUM SERPL-SCNC: 136 MMOL/L (ref 136–145)
TIBC SERPL-MCNC: 416 UG/DL (ref 250–450)
WBC # BLD AUTO: 13.09 THOUSAND/UL (ref 4.31–10.16)

## 2018-05-21 PROCEDURE — 82728 ASSAY OF FERRITIN: CPT | Performed by: INTERNAL MEDICINE

## 2018-05-21 PROCEDURE — 85025 COMPLETE CBC W/AUTO DIFF WBC: CPT | Performed by: INTERNAL MEDICINE

## 2018-05-21 PROCEDURE — 83540 ASSAY OF IRON: CPT | Performed by: INTERNAL MEDICINE

## 2018-05-21 PROCEDURE — 80053 COMPREHEN METABOLIC PANEL: CPT | Performed by: INTERNAL MEDICINE

## 2018-05-21 PROCEDURE — 83550 IRON BINDING TEST: CPT | Performed by: INTERNAL MEDICINE

## 2018-05-25 ENCOUNTER — TELEPHONE (OUTPATIENT)
Dept: FAMILY MEDICINE CLINIC | Facility: CLINIC | Age: 78
End: 2018-05-25

## 2018-05-25 DIAGNOSIS — L03.119 CELLULITIS OF LOWER EXTREMITY, UNSPECIFIED LATERALITY: Primary | ICD-10-CM

## 2018-05-25 RX ORDER — CEPHALEXIN 500 MG/1
500 CAPSULE ORAL EVERY 6 HOURS SCHEDULED
Qty: 40 CAPSULE | Refills: 0 | Status: SHIPPED | OUTPATIENT
Start: 2018-05-25 | End: 2018-06-04

## 2018-05-25 NOTE — TELEPHONE ENCOUNTER
Nurse said he has water blisters below knee and they look infected possibly starting with Cellulitis, and has increased pain in it, wants to know if you want to start him on an antibiotic?

## 2018-06-05 ENCOUNTER — OFFICE VISIT (OUTPATIENT)
Dept: UROLOGY | Facility: CLINIC | Age: 78
End: 2018-06-05
Payer: MEDICARE

## 2018-06-05 VITALS — DIASTOLIC BLOOD PRESSURE: 60 MMHG | SYSTOLIC BLOOD PRESSURE: 110 MMHG | HEART RATE: 76 BPM

## 2018-06-05 DIAGNOSIS — N17.9 AKI (ACUTE KIDNEY INJURY) (HCC): ICD-10-CM

## 2018-06-05 DIAGNOSIS — N28.1 RENAL CYST: ICD-10-CM

## 2018-06-05 DIAGNOSIS — C61 PROSTATE CANCER (HCC): Primary | ICD-10-CM

## 2018-06-05 PROCEDURE — 99213 OFFICE O/P EST LOW 20 MIN: CPT | Performed by: UROLOGY

## 2018-06-05 NOTE — PROGRESS NOTES
UROLOGY ROUTINE FOLLOW UP NOTE     CHIEF COMPLAINT   Tony Maldonado is a [de-identified] y o  male with a complaint of   Chief Complaint   Patient presents with    Prostate Cancer     History of Present Illness:     [de-identified] y o  male with history of advanced prostate cancer  He underwent initial external beam radiation therapy  He was followed by Dr Cierra Vazquez and has been receiving intermittent androgen deprivation therapy due to biochemical recurrence and castrate sensitive disease  ADT was restarted after periods of intermittent PSA rise  Last injection was 11/27/2017  Lab Results   Component Value Date    PSA 0 2 02/14/2018    PSA 0 4 01/15/2018    PSA 0 5 12/13/2017     Of note, at one point his locally advanced cancer led to a left-sided hydronephrosis  This required a stent  Stent has been since removed and follow-up ultrasounds have been negative  He does have a history of bilateral renal cysts, some hyperdense and somewhat small septations  These have been stable on past imaging  He does receive care for cardiac issues and underwent heart surgery  He then, unfortunately developed a embolic stroke in December 2016  This led to loss of vision in his right eye  He is now on systemic anticoagulation  His last Lupron shot was in November 2016  His PSA dropped from the mid 5 range to 2 3  He is not having any significant issues with urination except some occasional leakage  He has not had any recent hematuria  He denies any weight loss, bony pain, nighttime fevers       Past Medical History:     Past Medical History:   Diagnosis Date    Anemia     Arthritis     Atrial fibrillation ([address]     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[address] )     B12 deficiency     Back pain     Blind right eye     Cancer ([address] )     prostate    Cardiac disorder     Chronic combined systolic and diastolic heart failure (HCC)     Chronic obstructive lung disease (HCC)     Chronic venous stasis dermatitis of both lower extremities     CKD (chronic kidney disease), stage IV [facility] [address] [facility]     Coronary artery disease     DDD (degenerative disc disease)     DM (diabetes mellitus), type 2 (HCC)     Type II, on insulin    BAER (dyspnea on exertion)     Easy bruising     Epistaxis     Gout     Gross hematuria     last assessed: 3/18/2015    Hepatitis     Herpes zoster     Hiatal hernia     History of cellulitis     BLE    History of prostate cancer     Radiation treatments   Hypercholesterolemia     Hyperlipidemia     Hypertension     Ischemic cardiomyopathy     Lung mass     last assessed: 9/21/2012    MI, old    Margo Mcmillan Morbid obesity due to excess calories (HCC)     or severe obesity    Neuropathy     BLE    Obesity     Obstructive sleep apnea syndrome, severe     Pneumonia     last assessed: 7/24/2012    Shortness of breath     TIA (transient ischemic attack)     Urinary incontinence     Urinary retention     Use of cane as ambulatory aid     Independent with personal care         PAST SURGICAL HISTORY:     Past Surgical History:   Procedure Laterality Date    ABDOMINAL SURGERY      CARDIAC SURGERY      CATARACT EXTRACTION, BILATERAL      CORONARY ANGIOPLASTY WITH STENT PLACEMENT      2 stents    CORONARY ARTERY BYPASS GRAFT N/A 7/15/2016    Procedure: Intraop ADRIAN, CABG x 3 using LIMA to LAD, RSVG to OM1 and D1;  Surgeon: Gildardo Farmer MD;  Location:  MAIN OR;  Service:    Colleen Abraham      has stents    EYE SURGERY      Bilateral cataract removal    HERNIA REPAIR      umbilical hernia repair    LUNG CANCER SURGERY      Partial upper right lobectomy    LUNG SURGERY Left 2012    OTHER SURGICAL HISTORY      previous stent placement-no anatomy given    PILONIDAL CYST EXCISION      [place] CYSTOURETHROSCOPY,URETER CATHETER Left 3/9/2016    Procedure: CYSTOSCOPY WITH left RETROGRADE PYELOGRAM left double J stent removal;  Surgeon: Sandra Richey MD;  Location: [facility];  Service: Urology    [place] [address] Bilateral 10/31/2017    Procedure: BIOPSY ARTERY TEMPORAL;  Surgeon: Tyson Sparks MD;  Location: [facility];  Service: Vascular    RENAL ARTERY STENT  02/16/2015    transcath intravascular stent placement percutaneous renal    VASCULAR SURGERY         CURRENT MEDICATIONS:     Current Outpatient Prescriptions   Medication Sig Dispense Refill    apixaban (ELIQUIS) 2 5 mg Take 1 tablet (2 5 mg total) by mouth 2 (two) times a day 60 tablet 0    aspirin (ECOTRIN LOW STRENGTH) 81 mg EC tablet Take 81 mg by mouth 2 (two) times a day      Cholecalciferol 2000 units TABS Take 1 tablet by mouth daily 30 tablet 1    furosemide (LASIX) 80 mg tablet Take 1 tablet by mouth 2 (two) times a day 30 tablet 0    gabapentin (NEURONTIN) 300 mg capsule Take 300 mg by mouth daily at bedtime      glucagon (GLUCAGON EMERGENCY) 1 MG injection Inject 1 mg under the skin once as needed for low blood sugar (as needed for a blood sugar less than 70 if unconscious or unable to correct by oral means) for up to 1 dose 1 each 0    insulin detemir (LEVEMIR) 100 units/mL subcutaneous injection Inject 55 Units under the skin daily at bedtime      insulin lispro (HumaLOG) 100 units/mL injection Inject 15 Units under the skin 3 (three) times a day with meals  0    iron polysaccharides (NIFEREX) 150 mg capsule Take 150 mg by mouth daily      metoprolol tartrate (LOPRESSOR) 25 mg tablet Take 25 mg by mouth 2 (two) times a day      predniSONE 1 mg tablet Take 0 25 mg by mouth every other day        simvastatin (ZOCOR) 40 mg tablet Take 40 mg by mouth daily at bedtime      tamsulosin (FLOMAX) 0 4 mg Take 0 4 mg by mouth daily at bedtime        potassium chloride (MICRO-K) 10 MEQ CR capsule Take 1 capsule (10 mEq total) by mouth 2 (two) times a day for 30 days 60 capsule 5     No current facility-administered medications for this visit          ALLERGIES:     Allergies   Allergen Reactions    Other Rash     Adhesive tape       SOCIAL HISTORY:     Social History     Social History    Marital status: /Civil Union     Spouse name: N/A    Number of children: N/A    Years of education: N/A     Social History Main Topics    Smoking status: Former Smoker     Packs/day: 2 00     Years: 54 00     Quit date: 2004    Smokeless tobacco: Never Used      Comment: Pt is a non smoker    Alcohol use No    Drug use: No    Sexual activity: No     Other Topics Concern    None     Social History Narrative    No advance directives    No living will    Patient has living will       SOCIAL HISTORY:     Family History   Problem Relation Age of Onset    Diabetes Other     Hypertension Other     Cancer Other     Diabetes Mother     Heart disease Mother     Hypertension Mother     Diabetes type II Mother     Cancer Mother      unknown type    Diabetes Father     Diabetes Maternal Grandmother     Cancer Sister      unknown type    Cancer Other      unknown type       REVIEW OF SYSTEMS:     Review of Systems   Constitutional: Positive for activity change and fatigue  Eyes: Positive for visual disturbance  Respiratory: Positive for shortness of breath  Cardiovascular: Positive for chest pain and leg swelling  Gastrointestinal: Negative  Genitourinary: Negative  Musculoskeletal: Positive for back pain and gait problem  Skin: Negative  Psychiatric/Behavioral: Negative  PHYSICAL EXAM:     /60   Pulse 76     General:  Obese elderly male in no acute distress  HEENT:  Normocephalic, atraumatic  Neck is supple without any palpable lymphadenopathy  Cardiovascular:  Patient has normal palpable distal radial pulses  Significant peripheral edema  No JVD is noted  Respiratory:  Labored respirations with wheeze  Abdomen:  Obese Abdomen but soft and nontender  Musculoskeletal:  Wheelchair-bound  Dermatologic:  Patient has no skin abnormalities or rashes        LABS:     CBC:   Lab Results   Component Value Date    WBC 13 09 (H) 2018    HGB 12 7 2018    HCT 40 8 2018    MCV 87 2018     2018       BMP:   Lab Results   Component Value Date    GLUCOSE 304 (H) 2018    CALCIUM 9 2 2018     2018    K 3 1 (L) 2018    CO2 36 (H) 2018    CL 90 (L) 2018    BUN 74 (H) 2018    CREATININE 2 86 (H) 2018     Lab Results   Component Value Date    PSA 0 2 2018    PSA 0 4 01/15/2018    PSA 0 5 2017       IMAGIN/1/17  CT - ABDOMEN WITHOUT IV CONTRAST     INDICATION:  Follow-up renal cysts     COMPARISON: Ultrasound from 2017 and CT from 2015     TECHNIQUE: CT examination of the abdomen was performed without intravenous contrast   This examination, like all CT scans performed in the [facility], was performed utilizing techniques to minimize radiation dose exposure, including the   use of iterative reconstruction and automated exposure control  Axial, sagittal, and coronal reformatted images were submitted for interpretation  Intravenous contrast was not administered as part of this examination  Enteric contrast was   administered      FINDINGS:     ABDOMEN     LOWER CHEST:  There is dependent atelectasis      LIVER/BILIARY TREE:  Unremarkable      GALLBLADDER:  No calcified gallstones  No pericholecystic inflammatory change      SPLEEN:  Unremarkable      PANCREAS:  Unremarkable      ADRENAL GLANDS:  Unremarkable      KIDNEYS/URETERS:  Numerous bilateral renal cysts are again seen  Most of these measure fluid attenuation though there is a hyperdense cysts on the left measuring 2 2 x 2 2 cm  While not present on a CT from , the cyst has been present since    where it measured 1 7 x 1 9 cm  There is a hyperdense cyst anteriorly measuring 2 0 x 2 3 cm  This is stable from  where it measured 2 1 x 2 3 cm  There is a cyst on the right with thin calcified septation    It measures 3 8 x 4 4 cm, 3 6 x 4 0 cm in 2013  The largest cysts on the left measures 6 7 x 7 0 cm and 7 2 x 8 2 cm, previously 6 5 x 6 7 cm and 7 0 x 8 6 cm respectively  The largest cyst on the right measures 4 7 x 5 7 cm, previously 4 6 x 4 7 cm      VISUALIZED STOMACH AND BOWEL:  There is diverticulosis coli      VISUALIZED ABDOMINAL CAVITY:  No pathologically enlarged periportal, mesenteric or upper retroperitoneal lymph nodes  No ascites or free intraperitoneal air  No fluid collection      VISUALIZED BODY WALL:  Unremarkable      VISUALIZED ABDOMINAL VESSELS:  Unremarkable for patient's age      OSSEOUS STRUCTURES:  No acute fracture or destructive osseous lesion      IMPRESSION:     Numerous bilateral renal cysts, most of which measure fluid attenuation  Hyperdense cysts on the left and septated cyst on the right are stable to only minimally increased in size compared to a prior CT from 2013  ASSESSMENT:     [de-identified] y o  male with history of prostate cancer on intermittent ADT    PLAN:     At this point time, the patient's PSA is low  It has been more than 6 months since his last injection  Would continue the plan for intermittent androgen deprivation therapy especially given his cardiovascular issues  We will see him back in 6 months, if there is a PSA rise, we will consider Prolia and Lupron injections

## 2018-06-13 ENCOUNTER — TELEPHONE (OUTPATIENT)
Dept: FAMILY MEDICINE CLINIC | Facility: CLINIC | Age: 78
End: 2018-06-13

## 2018-06-15 ENCOUNTER — TELEPHONE (OUTPATIENT)
Dept: FAMILY MEDICINE CLINIC | Facility: CLINIC | Age: 78
End: 2018-06-15

## 2018-06-15 NOTE — TELEPHONE ENCOUNTER
ULISSES - Seen Pt in home for Eval today - Will be in home twice a week x 3 wks for muscle strengthening & balance     Discussed the use of oxygen continual so he doesn't lose balance with his time of O2

## 2018-06-18 ENCOUNTER — OFFICE VISIT (OUTPATIENT)
Dept: CARDIOLOGY CLINIC | Facility: HOSPITAL | Age: 78
End: 2018-06-18
Payer: MEDICARE

## 2018-06-18 VITALS
SYSTOLIC BLOOD PRESSURE: 116 MMHG | BODY MASS INDEX: 48.97 KG/M2 | WEIGHT: 312 LBS | HEIGHT: 67 IN | HEART RATE: 75 BPM | DIASTOLIC BLOOD PRESSURE: 68 MMHG

## 2018-06-18 DIAGNOSIS — I50.43 ACUTE ON CHRONIC COMBINED SYSTOLIC AND DIASTOLIC ACC/AHA STAGE C CONGESTIVE HEART FAILURE (HCC): Primary | ICD-10-CM

## 2018-06-18 DIAGNOSIS — G47.33 OBSTRUCTIVE SLEEP APNEA: Primary | ICD-10-CM

## 2018-06-18 DIAGNOSIS — E87.6 HYPOKALEMIA: Primary | ICD-10-CM

## 2018-06-18 DIAGNOSIS — I25.10 CORONARY ARTERY DISEASE INVOLVING NATIVE CORONARY ARTERY OF NATIVE HEART WITHOUT ANGINA PECTORIS: ICD-10-CM

## 2018-06-18 DIAGNOSIS — R60.0 BILATERAL LEG EDEMA: ICD-10-CM

## 2018-06-18 DIAGNOSIS — N18.4 CKD (CHRONIC KIDNEY DISEASE) STAGE 4, GFR 15-29 ML/MIN (HCC): Chronic | ICD-10-CM

## 2018-06-18 DIAGNOSIS — I48.21 PERMANENT ATRIAL FIBRILLATION (HCC): ICD-10-CM

## 2018-06-18 PROCEDURE — 99214 OFFICE O/P EST MOD 30 MIN: CPT | Performed by: INTERNAL MEDICINE

## 2018-06-23 NOTE — PROGRESS NOTES
Cardiology Follow Up    Daron Can  1940  [id number]  [address] [facility]  [address] [place] [address] [address]    1  Acute on chronic combined systolic and diastolic ACC/AHA stage C congestive heart failure ([facility] )     2  Coronary artery disease involving native coronary artery of native heart without angina pectoris     3  CKD (chronic kidney disease) stage 4, GFR 15-29 ml/min ([facility])     4  Bilateral leg edema     5  Permanent atrial fibrillation ([facility])           Discussion/Summary:  All of his assessed cardiac problems are stable  I have reviewed his medications and made no changes  No cardiac testing is ordered  RTO 6 months  Interval History: He has not had any cardiac problems since his last OV  His weight is down from 330 to 312 LBS with diuresis  His kidney function has remained stable  He has chronic 2+ b/l LE edema  He has CAD with prior CABG  Last ECHO showed an EF of 60%  He is weak  He will be starting physical therapy tomorrow      Patient Active Problem List   Diagnosis    Morbid obesity ([facility] )    History of prostate cancer    Type 2 diabetes mellitus with hyperglycemia ([facility] )    S/P CABG x 3    CKD (chronic kidney disease) stage 4, GFR 15-29 ml/min ([facility])    Body mass index (BMI) of 40 0 to 49 9    Wound of right lower extremity    Wound of left lower extremity    Hyperphosphatemia    Vitamin D deficiency    Renal cyst    Atrial fibrillation ([address] )    Blind right eye    Acute on chronic combined systolic and diastolic ACC/AHA stage C congestive heart failure ([address] )    Coronary artery disease    DM (diabetes mellitus), type 2 with peripheral neuropathy    Obstructive sleep apnea syndrome, severe    Vision loss, 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    Hypokalemia    LESLIE (acute kidney injury) ([address] )    Anemia    Bilateral leg edema    Prostate cancer ([facility] )    Severe obstructive sleep apnea-hypopnea syndrome    Malignant neoplasm of upper lobe of right lung (HCC)     Past Medical History:   Diagnosis Date    Anemia     Arthritis     Atrial fibrillation (HCC)     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HCC)     B12 deficiency     Back pain     Blind right eye     Cancer ([address] )     prostate    Cardiac disorder     Chronic combined systolic and diastolic heart failure (HCC)     Chronic obstructive lung disease (HCC)     Chronic venous stasis dermatitis of both lower extremities     CKD (chronic kidney disease), stage IV (HCC)     Coronary artery disease     DDD (degenerative disc disease)     DM (diabetes mellitus), type 2 (HCC)     Type II, on insulin    BAER (dyspnea on exertion)     Easy bruising     Epistaxis     Gout     Gross hematuria     last assessed: 3/18/2015    Hepatitis     Herpes zoster     Hiatal hernia     History of cellulitis     BLE    History of prostate cancer     Radiation treatments   Hypercholesterolemia     Hyperlipidemia     Hypertension     Ischemic cardiomyopathy     Lung mass     last assessed: 9/21/2012    MI, old    Anand Arellano Morbid obesity due to excess calories (HCC)     or severe obesity    Neuropathy     BLE    Obesity     Obstructive sleep apnea syndrome, severe     Pneumonia     last assessed: 7/24/2012    Shortness of breath     TIA (transient ischemic attack)     Urinary incontinence     Urinary retention     Use of cane as ambulatory aid     Independent with personal care  Social History     Social History    Marital status: /Civil Union     Spouse name: N/A    Number of children: N/A    Years of education: N/A     Occupational History    Not on file       Social History Main Topics    Smoking status: Former Smoker     Packs/day: 2 00     Years: 54 00     Quit date: 2004    Smokeless tobacco: Never Used      Comment: Pt is a non smoker    Alcohol use No    Drug use: No    Sexual activity: No     Other Topics Concern    Not on file Social History Narrative    No advance directives    No living will    Patient has living will      Family History   Problem Relation Age of Onset    Diabetes Other     Hypertension Other     Cancer Other     Diabetes Mother     Heart disease Mother     Hypertension Mother     Diabetes type II Mother     Cancer Mother         unknown type    Diabetes Father     Diabetes Maternal Grandmother     Cancer Sister         unknown type    Cancer Other         unknown type     Past Surgical History:   Procedure Laterality Date    ABDOMINAL SURGERY      CARDIAC SURGERY      CATARACT EXTRACTION, BILATERAL      CORONARY ANGIOPLASTY WITH STENT PLACEMENT      2 stents    CORONARY ARTERY BYPASS GRAFT N/A 7/15/2016    Procedure: Intraop ADRIAN, CABG x 3 using LIMA to LAD, RSVG to OM1 and D1;  Surgeon: Alonzo Galindo MD;  Location: [facility];  Service:    Madeleine Leigh      has stents    EYE SURGERY      Bilateral cataract removal    HERNIA REPAIR      umbilical hernia repair    LUNG CANCER SURGERY      Partial upper right lobectomy    LUNG SURGERY Left 2012    OTHER SURGICAL HISTORY      previous stent placement-no anatomy given    PILONIDAL CYST EXCISION      CA CYSTOURETHROSCOPY,URETER CATHETER Left 3/9/2016    Procedure: CYSTOSCOPY WITH left RETROGRADE PYELOGRAM left double J stent removal;  Surgeon: Kwadwo Garsia MD;  Location: [facility];  Service: Urology    CA TEMPORAL ARTERY LIGATN OR BX Bilateral 10/31/2017    Procedure: BIOPSY ARTERY TEMPORAL;  Surgeon: Hilton Harada, MD;  Location: [facility];  Service: Vascular    RENAL ARTERY STENT  02/16/2015    transcath intravascular stent placement percutaneous renal    VASCULAR SURGERY         Current Outpatient Prescriptions:     apixaban (ELIQUIS) 2 5 mg, Take 1 tablet (2 5 mg total) by mouth 2 (two) times a day, Disp: 60 tablet, Rfl: 0    aspirin (ECOTRIN LOW STRENGTH) 81 mg EC tablet, Take 81 mg by mouth 2 (two) times a day, Disp: , Rfl:    furosemide (LASIX) 80 mg tablet, Take 1 tablet by mouth 2 (two) times a day, Disp: 30 tablet, Rfl: 0    gabapentin (NEURONTIN) 300 mg capsule, Take 300 mg by mouth daily at bedtime, Disp: , Rfl:     glucagon (GLUCAGON EMERGENCY) 1 MG injection, Inject 1 mg under the skin once as needed for low blood sugar (as needed for a blood sugar less than 70 if unconscious or unable to correct by oral means) for up to 1 dose, Disp: 1 each, Rfl: 0    insulin detemir (LEVEMIR) 100 units/mL subcutaneous injection, Inject 55 Units under the skin daily at bedtime, Disp: , Rfl:     insulin lispro (HumaLOG) 100 units/mL injection, Inject 15 Units under the skin 3 (three) times a day with meals, Disp: , Rfl: 0    iron polysaccharides (NIFEREX) 150 mg capsule, Take 150 mg by mouth daily, Disp: , Rfl:     metoprolol tartrate (LOPRESSOR) 25 mg tablet, Take 25 mg by mouth 2 (two) times a day, Disp: , Rfl:     potassium chloride (MICRO-K) 10 MEQ CR capsule, Take 1 capsule (10 mEq total) by mouth 2 (two) times a day for 30 days, Disp: 60 capsule, Rfl: 5    predniSONE 1 mg tablet, Take 0 25 mg by mouth every other day  , Disp: , Rfl:     simvastatin (ZOCOR) 40 mg tablet, Take 40 mg by mouth daily at bedtime, Disp: , Rfl:     tamsulosin (FLOMAX) 0 4 mg, Take 0 4 mg by mouth daily at bedtime  , Disp: , Rfl:     Cholecalciferol 2000 units TABS, Take 1 tablet (2,000 Units total) by mouth daily, Disp: 30 tablet, Rfl: 2  Allergies   Allergen Reactions    Other Rash     Adhesive tape     Vitals:    06/18/18 1528   BP: 116/68   BP Location: Left arm   Patient Position: Sitting   Cuff Size: Standard   Pulse: 75   Weight: (!) 142 kg (312 lb)   Height: 5' 7" (1 702 m)     Weight (last 2 days)     None         Blood pressure 116/68, pulse 75, height 5' 7" (1 702 m), weight (!) 142 kg (312 lb)  , Body mass index is 48 87 kg/m²      Labs:  Lab Requisition on 05/21/2018   Component Date Value    WBC 05/21/2018 13 09*    RBC 05/21/2018 4 71     Hemoglobin 05/21/2018 12 7     Hematocrit 05/21/2018 40 8     MCV 05/21/2018 87     MCH 05/21/2018 27 0     MCHC 05/21/2018 31 1*    RDW 05/21/2018 17 5*    MPV 05/21/2018 9 9     Platelets 88/70/2711 383     Neutrophils Relative 05/21/2018 87*    Lymphocytes Relative 05/21/2018 6*    Monocytes Relative 05/21/2018 6     Eosinophils Relative 05/21/2018 1     Basophils Relative 05/21/2018 0     Neutrophils Absolute 05/21/2018 11 34*    Lymphocytes Absolute 05/21/2018 0 81     Monocytes Absolute 05/21/2018 0 72     Eosinophils Absolute 05/21/2018 0 18     Basophils Absolute 05/21/2018 0 04     Sodium 05/21/2018 136     Potassium 05/21/2018 3 1*    Chloride 05/21/2018 90*    CO2 05/21/2018 36*    Anion Gap 05/21/2018 10     BUN 05/21/2018 74*    Creatinine 05/21/2018 2 86*    Glucose 05/21/2018 304*    Calcium 05/21/2018 9 2     AST 05/21/2018 35     ALT 05/21/2018 22     Alkaline Phosphatase 05/21/2018 43*    Total Protein 05/21/2018 6 0*    Albumin 05/21/2018 3 1*    Total Bilirubin 05/21/2018 0 30     eGFR 05/21/2018 20     Ferritin 05/21/2018 49     Iron Saturation 05/21/2018 8     TIBC 05/21/2018 416     Iron 05/21/2018 34*   Lab on 04/25/2018   Component Date Value    WBC 04/25/2018 11 82*    RBC 04/25/2018 4 24     Hemoglobin 04/25/2018 11 9*    Hematocrit 04/25/2018 39 7     MCV 04/25/2018 94     MCH 04/25/2018 28 1     MCHC 04/25/2018 30 0*    RDW 04/25/2018 16 7*    MPV 04/25/2018 10 0     Platelets 82/68/7665 369     nRBC 04/25/2018 0     Neutrophils Relative 04/25/2018 81*    Lymphocytes Relative 04/25/2018 10*    Monocytes Relative 04/25/2018 6     Eosinophils Relative 04/25/2018 2     Basophils Relative 04/25/2018 1     Neutrophils Absolute 04/25/2018 9 68*    Lymphocytes Absolute 04/25/2018 1 15     Monocytes Absolute 04/25/2018 0 67     Eosinophils Absolute 04/25/2018 0 19     Basophils Absolute 04/25/2018 0 07     Sodium 04/25/2018 136     Potassium 04/25/2018 3 5     Chloride 04/25/2018 89*    CO2 04/25/2018 38*    Anion Gap 04/25/2018 9     BUN 04/25/2018 55*    Creatinine 04/25/2018 2 53*    Glucose 04/25/2018 230*    Calcium 04/25/2018 9 3     AST 04/25/2018 21     ALT 04/25/2018 16     Alkaline Phosphatase 04/25/2018 43*    Total Protein 04/25/2018 6 1*    Albumin 04/25/2018 3 1*    Total Bilirubin 04/25/2018 0 37     eGFR 04/25/2018 23     Ferritin 04/25/2018 48     Iron Saturation 04/25/2018 6     TIBC 04/25/2018 424     Iron 04/25/2018 25*    Magnesium 04/25/2018 2 5    Office Visit on 04/19/2018   Component Date Value    Sodium 04/19/2018 139     Potassium 04/19/2018 3 4*    Chloride 04/19/2018 97*    CO2 04/19/2018 39*    Anion Gap 04/19/2018 3*    BUN 04/19/2018 50*    Creatinine 04/19/2018 2 60*    Glucose 04/19/2018 320*    Calcium 04/19/2018 8 9     eGFR 04/19/2018 22    Admission on 03/26/2018, Discharged on 03/30/2018   Component Date Value    WBC 03/26/2018 8 65     RBC 03/26/2018 4 30     Hemoglobin 03/26/2018 13 1     Hematocrit 03/26/2018 41 3     MCV 03/26/2018 96     MCH 03/26/2018 30 5     MCHC 03/26/2018 31 7     RDW 03/26/2018 16 1*    MPV 03/26/2018 9 6     Platelets 53/90/1217 317     Neutrophils Relative 03/26/2018 80*    Lymphocytes Relative 03/26/2018 11*    Monocytes Relative 03/26/2018 7     Eosinophils Relative 03/26/2018 1     Basophils Relative 03/26/2018 1     Neutrophils Absolute 03/26/2018 6 89     Lymphocytes Absolute 03/26/2018 0 96     Monocytes Absolute 03/26/2018 0 62     Eosinophils Absolute 03/26/2018 0 11     Basophils Absolute 03/26/2018 0 07     Blood Culture 03/26/2018 No Growth After 5 Days   Blood Culture 03/26/2018 No Growth After 5 Days       Color, UA 03/26/2018 Yellow     Clarity, UA 03/26/2018 Clear     Specific Gravity, UA 03/26/2018 1 010     pH, UA 03/26/2018 6 0     Leukocytes, UA 03/26/2018 Negative     Nitrite, UA 03/26/2018 Negative     Protein, UA 03/26/2018 Negative     Glucose, UA 03/26/2018 Negative     Ketones, UA 03/26/2018 Negative     Urobilinogen, UA 03/26/2018 0 2     Bilirubin, UA 03/26/2018 Negative     Blood, UA 03/26/2018 Negative     Sodium 03/26/2018 141     Potassium 03/26/2018 4 1     Chloride 03/26/2018 98*    CO2 03/26/2018 34*    Anion Gap 03/26/2018 9     BUN 03/26/2018 39*    Creatinine 03/26/2018 2 55*    Glucose 03/26/2018 206*    Calcium 03/26/2018 9 2     AST 03/26/2018 19     ALT 03/26/2018 27     Alkaline Phosphatase 03/26/2018 38*    Total Protein 03/26/2018 6 5     Albumin 03/26/2018 2 8*    Total Bilirubin 03/26/2018 0 30     eGFR 03/26/2018 23     TSH 3RD GENERATON 03/26/2018 1 747     LACTIC ACID 03/26/2018 2 4*    Troponin I 03/26/2018 0 09*    NT-proBNP 03/26/2018 3089*    Ventricular Rate 03/26/2018 85     Atrial Rate 03/26/2018 85     AK Interval 03/26/2018 158     QRSD Interval 03/26/2018 166     QT Interval 03/26/2018 458     QTC Interval 03/26/2018 545     P Axis 03/26/2018 57     QRS Axis 03/26/2018 -62     T Wave Axis 03/26/2018 69     LACTIC ACID 03/27/2018 2 0     Hemoglobin A1C 03/27/2018 8 3*    EAG 03/27/2018 192     Sodium 03/27/2018 139     Potassium 03/27/2018 3 4*    Chloride 03/27/2018 100     CO2 03/27/2018 32     Anion Gap 03/27/2018 7     BUN 03/27/2018 36*    Creatinine 03/27/2018 2 30*    Glucose 03/27/2018 335*    Calcium 03/27/2018 8 3     AST 03/27/2018 16     ALT 03/27/2018 23     Alkaline Phosphatase 03/27/2018 31*    Total Protein 03/27/2018 5 5*    Albumin 03/27/2018 2 4*    Total Bilirubin 03/27/2018 0 30     eGFR 03/27/2018 26     Magnesium 03/27/2018 1 8     Phosphorus 03/27/2018 3 7     WBC 03/27/2018 8 32     RBC 03/27/2018 3 93     Hemoglobin 03/27/2018 11 9*    Hematocrit 03/27/2018 37 5     MCV 03/27/2018 95     MCH 03/27/2018 30 3     MCHC 03/27/2018 31 7     RDW 03/27/2018 15 9*    Platelets 03/27/2018 297     MPV 03/27/2018 9 6     POC Glucose 03/27/2018 333*    POC Glucose 03/27/2018 362*    Troponin I 03/27/2018 0 10*    Troponin I 03/27/2018 0 11*    Troponin I 03/27/2018 0 11*    Troponin I 03/27/2018 0 09*    POC Glucose 03/27/2018 346*    POC Glucose 03/27/2018 277*    POC Glucose 03/27/2018 181*    Sodium 03/28/2018 143     Potassium 03/28/2018 3 5     Chloride 03/28/2018 101     CO2 03/28/2018 33*    Anion Gap 03/28/2018 9     BUN 03/28/2018 36*    Creatinine 03/28/2018 2 31*    Glucose 03/28/2018 127     Calcium 03/28/2018 9 1     eGFR 03/28/2018 26     WBC 03/28/2018 9 04     RBC 03/28/2018 4 08     Hemoglobin 03/28/2018 12 4     Hematocrit 03/28/2018 38 9     MCV 03/28/2018 95     MCH 03/28/2018 30 4     MCHC 03/28/2018 31 9     RDW 03/28/2018 15 7*    MPV 03/28/2018 9 6     Platelets 83/59/1211 298     Neutrophils Relative 03/28/2018 79*    Lymphocytes Relative 03/28/2018 10*    Monocytes Relative 03/28/2018 9     Eosinophils Relative 03/28/2018 1     Basophils Relative 03/28/2018 1     Neutrophils Absolute 03/28/2018 7 18     Lymphocytes Absolute 03/28/2018 0 89     Monocytes Absolute 03/28/2018 0 78     Eosinophils Absolute 03/28/2018 0 13     Basophils Absolute 03/28/2018 0 06     Magnesium 03/28/2018 2 1     POC Glucose 03/28/2018 103     POC Glucose 03/28/2018 233*    POC Glucose 03/28/2018 197*    POC Glucose 03/28/2018 233*    WBC 03/29/2018 9 96     RBC 03/29/2018 4 42     Hemoglobin 03/29/2018 13 4     Hematocrit 03/29/2018 42 6     MCV 03/29/2018 96     MCH 03/29/2018 30 3     MCHC 03/29/2018 31 5     RDW 03/29/2018 16 2*    MPV 03/29/2018 9 7     Platelets 72/31/1522 335     Neutrophils Relative 03/29/2018 75     Lymphocytes Relative 03/29/2018 13*    Monocytes Relative 03/29/2018 9     Eosinophils Relative 03/29/2018 2     Basophils Relative 03/29/2018 1     Neutrophils Absolute 03/29/2018 7 41     Lymphocytes Absolute 03/29/2018 1 33     Monocytes Absolute 03/29/2018 0 91     Eosinophils Absolute 03/29/2018 0 24     Basophils Absolute 03/29/2018 0 07     Sodium 03/29/2018 141     Potassium 03/29/2018 3 5     Chloride 03/29/2018 99*    CO2 03/29/2018 34*    Anion Gap 03/29/2018 8     BUN 03/29/2018 38*    Creatinine 03/29/2018 2 43*    Glucose 03/29/2018 122     Calcium 03/29/2018 9 4     eGFR 03/29/2018 24     POC Glucose 03/29/2018 159*    POC Glucose 03/29/2018 155*    POC Glucose 03/29/2018 185*    POC Glucose 03/29/2018 240*    POC Glucose 03/29/2018 227*    Sodium 03/30/2018 139     Potassium 03/30/2018 4 0     Chloride 03/30/2018 98*    CO2 03/30/2018 32     Anion Gap 03/30/2018 9     BUN 03/30/2018 45*    Creatinine 03/30/2018 2 73*    Glucose 03/30/2018 191*    Calcium 03/30/2018 9 0     eGFR 03/30/2018 21     POC Glucose 03/30/2018 225*   Lab Requisition on 03/14/2018   Component Date Value    WBC 03/14/2018 7 84     RBC 03/14/2018 4 16     Hemoglobin 03/14/2018 12 8     Hematocrit 03/14/2018 40 9     MCV 03/14/2018 98     MCH 03/14/2018 30 8     MCHC 03/14/2018 31 3*    RDW 03/14/2018 16 2*    MPV 03/14/2018 10 8     Platelets 85/06/9943 290     Neutrophils Relative 03/14/2018 78*    Lymphocytes Relative 03/14/2018 10*    Monocytes Relative 03/14/2018 9     Eosinophils Relative 03/14/2018 2     Basophils Relative 03/14/2018 1     Neutrophils Absolute 03/14/2018 6 14     Lymphocytes Absolute 03/14/2018 0 77     Monocytes Absolute 03/14/2018 0 73     Eosinophils Absolute 03/14/2018 0 12     Basophils Absolute 03/14/2018 0 08     Sodium 03/14/2018 142     Potassium 03/14/2018 3 8     Chloride 03/14/2018 99*    CO2 03/14/2018 33*    Anion Gap 03/14/2018 10     BUN 03/14/2018 47*    Creatinine 03/14/2018 2 47*    Glucose 03/14/2018 246*    Calcium 03/14/2018 9 0     AST 03/14/2018 20     ALT 03/14/2018 29     Alkaline Phosphatase 03/14/2018 33*    Total Protein 03/14/2018 5 5*    Albumin 03/14/2018 2 8*    Total Bilirubin 03/14/2018 0 30     eGFR 03/14/2018 24     Ferritin 03/14/2018 58     Iron Saturation 03/14/2018 12     TIBC 03/14/2018 371     Iron 03/14/2018 43*   Appointment on 02/14/2018   Component Date Value    WBC 02/14/2018 9 54     RBC 02/14/2018 4 19     Hemoglobin 02/14/2018 13 3     Hematocrit 02/14/2018 40 5     MCV 02/14/2018 97     MCH 02/14/2018 31 7     MCHC 02/14/2018 32 8     RDW 02/14/2018 17 1*    MPV 02/14/2018 10 4     Platelets 70/82/2152 208     nRBC 02/14/2018 0     Sodium 02/14/2018 136     Potassium 02/14/2018 3 7     Chloride 02/14/2018 94*    CO2 02/14/2018 33*    Anion Gap 02/14/2018 9     BUN 02/14/2018 55*    Creatinine 02/14/2018 2 68*    Glucose 02/14/2018 259*    Calcium 02/14/2018 8 4     AST 02/14/2018 23     ALT 02/14/2018 43     Alkaline Phosphatase 02/14/2018 47     Total Protein 02/14/2018 5 6*    Albumin 02/14/2018 3 0*    Total Bilirubin 02/14/2018 0 41     eGFR 02/14/2018 21     PSA 02/14/2018 0 2     Vitamin B-12 02/14/2018 878     Iron Saturation 02/14/2018 17     TIBC 02/14/2018 348     Iron 02/14/2018 58*    Ferritin 02/14/2018 70     Segmented % 02/14/2018 87*    Bands % 02/14/2018 1     Lymphocytes % 02/14/2018 5*    Monocytes % 02/14/2018 5     Eosinophils % 02/14/2018 2     Basophils % 02/14/2018 0     Absolute Neutrophils 02/14/2018 8 40*    Lymphocytes Absolute 02/14/2018 0 48*    Monocytes Absolute 02/14/2018 0 48     Eosinophils Absolute 02/14/2018 0 19     Basophils Absolute 02/14/2018 0 00     RBC Morphology 02/14/2018 Present     Anisocytosis 02/14/2018 Present     Polychromasia 02/14/2018 Present     Platelet Estimate 44/57/2301 Adequate    Lab Requisition on 01/15/2018   Component Date Value    Vitamin B-12 01/15/2018 909*    WBC 01/15/2018 11 37*    RBC 01/15/2018 4 41     Hemoglobin 01/15/2018 13 7     Hematocrit 01/15/2018 42 7     MCV 01/15/2018 97     MCH 01/15/2018 31 1     MCHC 01/15/2018 32 1     RDW 01/15/2018 18 2*    MPV 01/15/2018 10 5     Platelets 55/96/6983 251     Sodium 01/15/2018 139     Potassium 01/15/2018 4 7     Chloride 01/15/2018 97*    CO2 01/15/2018 35*    Anion Gap 01/15/2018 7     BUN 01/15/2018 60*    Creatinine 01/15/2018 2 39*    Glucose 01/15/2018 309*    Calcium 01/15/2018 8 0*    AST 01/15/2018 35     ALT 01/15/2018 55     Alkaline Phosphatase 01/15/2018 39*    Total Protein 01/15/2018 5 6*    Albumin 01/15/2018 3 0*    Total Bilirubin 01/15/2018 0 30     eGFR 01/15/2018 25     PSA 01/15/2018 0 4     Iron Saturation 01/15/2018 20     TIBC 01/15/2018 358     Iron 01/15/2018 71     Ferritin 01/15/2018 109     Segmented % 01/15/2018 88*    Lymphocytes % 01/15/2018 7*    Monocytes % 01/15/2018 5     Eosinophils % 01/15/2018 0     Basophils % 01/15/2018 0     Absolute Neutrophils 01/15/2018 10 01*    Lymphocytes Absolute 01/15/2018 0 80     Monocytes Absolute 01/15/2018 0 57     Eosinophils Absolute 01/15/2018 0 00     Basophils Absolute 01/15/2018 0 00     Total Counted 01/15/2018 100     nRBC 01/15/2018 2     Anisocytosis 01/15/2018 Present     Polychromasia 01/15/2018 Present     Platelet Estimate 69/49/4544 Adequate     Large Platelet 86/53/2941 Present      Imaging: No results found  Review of Systems:  Review of Systems   Constitutional: Negative for diaphoresis, fatigue, fever and unexpected weight change  HENT: Negative  Respiratory: Positive for shortness of breath  Negative for cough and wheezing  Cardiovascular: Positive for leg swelling  Negative for chest pain and palpitations  Gastrointestinal: Negative for abdominal pain, diarrhea and nausea  Musculoskeletal: Negative for gait problem and myalgias  Skin: Negative for rash  Neurological: Positive for weakness  Negative for dizziness and numbness  Psychiatric/Behavioral: Negative          Physical Exam:  Physical Exam   Constitutional: He is oriented to person, place, and time  He appears well-developed and well-nourished  HENT:   Head: Normocephalic and atraumatic  Eyes: Pupils are equal, round, and reactive to light  Neck: Normal range of motion  Neck supple  No JVD present  Cardiovascular: S1 normal, S2 normal and normal pulses  An irregularly irregular rhythm present  Pulses:       Carotid pulses are 2+ on the right side, and 2+ on the left side  Pulmonary/Chest: Effort normal and breath sounds normal  He has no wheezes  He has no rales  Abdominal: Soft  Bowel sounds are normal  There is no tenderness  Musculoskeletal: Normal range of motion  He exhibits edema  He exhibits no tenderness  2+ b/l LE edema   Neurological: He is alert and oriented to person, place, and time  He has normal reflexes  No cranial nerve deficit  Skin: Skin is warm  Psychiatric: He has a normal mood and affect

## 2018-06-27 ENCOUNTER — HOSPITAL ENCOUNTER (OUTPATIENT)
Dept: INFUSION CENTER | Facility: HOSPITAL | Age: 78
Discharge: HOME | End: 2018-06-27
Payer: MEDICARE

## 2018-06-27 VITALS
OXYGEN SATURATION: 97 % | DIASTOLIC BLOOD PRESSURE: 51 MMHG | HEART RATE: 83 BPM | TEMPERATURE: 98.1 F | RESPIRATION RATE: 20 BRPM | SYSTOLIC BLOOD PRESSURE: 107 MMHG

## 2018-06-27 PROCEDURE — 96366 THER/PROPH/DIAG IV INF ADDON: CPT

## 2018-06-27 PROCEDURE — 96365 THER/PROPH/DIAG IV INF INIT: CPT

## 2018-06-27 RX ORDER — SODIUM CHLORIDE 9 MG/ML
20 INJECTION, SOLUTION INTRAVENOUS CONTINUOUS
Status: DISPENSED | OUTPATIENT
Start: 2018-06-27 | End: 2018-06-28

## 2018-06-27 RX ADMIN — IRON SUCROSE 300 MG: 20 INJECTION, SOLUTION INTRAVENOUS at 15:23

## 2018-06-27 RX ADMIN — SODIUM CHLORIDE 20 ML/HR: 0.9 INJECTION, SOLUTION INTRAVENOUS at 14:50

## 2018-06-29 ENCOUNTER — LAB REQUISITION (OUTPATIENT)
Dept: LAB | Facility: HOSPITAL | Age: 78
End: 2018-06-29
Payer: MEDICARE

## 2018-06-29 DIAGNOSIS — C61 MALIGNANT NEOPLASM OF PROSTATE (HCC): ICD-10-CM

## 2018-06-29 DIAGNOSIS — N18.4 CHRONIC KIDNEY DISEASE, STAGE IV (SEVERE) (HCC): ICD-10-CM

## 2018-06-29 DIAGNOSIS — I13.0 HYPERTENSIVE HEART AND CHRONIC KIDNEY DISEASE WITH HEART FAILURE AND STAGE 1 THROUGH STAGE 4 CHRONIC KIDNEY DISEASE, OR CHRONIC KIDNEY DISEASE (HCC): ICD-10-CM

## 2018-06-29 DIAGNOSIS — E11.22 TYPE 2 DIABETES MELLITUS WITH DIABETIC CHRONIC KIDNEY DISEASE (HCC): ICD-10-CM

## 2018-06-29 LAB
BASOPHILS # BLD AUTO: 0.06 THOUSANDS/ΜL (ref 0–0.1)
BASOPHILS NFR BLD AUTO: 1 % (ref 0–1)
EOSINOPHIL # BLD AUTO: 0.22 THOUSAND/ΜL (ref 0–0.61)
EOSINOPHIL NFR BLD AUTO: 3 % (ref 0–6)
ERYTHROCYTE [DISTWIDTH] IN BLOOD BY AUTOMATED COUNT: 19.6 % (ref 11.6–15.1)
FERRITIN SERPL-MCNC: 350 NG/ML (ref 8–388)
HCT VFR BLD AUTO: 39.1 % (ref 36.5–49.3)
HGB BLD-MCNC: 12 G/DL (ref 12–17)
IRON SATN MFR SERPL: 11 %
IRON SERPL-MCNC: 36 UG/DL (ref 65–175)
LYMPHOCYTES # BLD AUTO: 0.79 THOUSANDS/ΜL (ref 0.6–4.47)
LYMPHOCYTES NFR BLD AUTO: 9 % (ref 14–44)
MCH RBC QN AUTO: 26.6 PG (ref 26.8–34.3)
MCHC RBC AUTO-ENTMCNC: 30.7 G/DL (ref 31.4–37.4)
MCV RBC AUTO: 87 FL (ref 82–98)
MONOCYTES # BLD AUTO: 0.67 THOUSAND/ΜL (ref 0.17–1.22)
MONOCYTES NFR BLD AUTO: 8 % (ref 4–12)
NEUTROPHILS # BLD AUTO: 7.22 THOUSANDS/ΜL (ref 1.85–7.62)
NEUTS SEG NFR BLD AUTO: 81 % (ref 43–75)
PLATELET # BLD AUTO: 326 THOUSANDS/UL (ref 149–390)
PMV BLD AUTO: 10.2 FL (ref 8.9–12.7)
PTH-INTACT SERPL-MCNC: 91.1 PG/ML (ref 18.4–80.1)
RBC # BLD AUTO: 4.51 MILLION/UL (ref 3.88–5.62)
TIBC SERPL-MCNC: 329 UG/DL (ref 250–450)
WBC # BLD AUTO: 8.96 THOUSAND/UL (ref 4.31–10.16)

## 2018-06-29 PROCEDURE — 85025 COMPLETE CBC W/AUTO DIFF WBC: CPT | Performed by: FAMILY MEDICINE

## 2018-06-29 PROCEDURE — 83970 ASSAY OF PARATHORMONE: CPT | Performed by: FAMILY MEDICINE

## 2018-06-29 PROCEDURE — 82728 ASSAY OF FERRITIN: CPT | Performed by: FAMILY MEDICINE

## 2018-06-29 PROCEDURE — 83550 IRON BINDING TEST: CPT | Performed by: FAMILY MEDICINE

## 2018-06-29 PROCEDURE — 83540 ASSAY OF IRON: CPT | Performed by: FAMILY MEDICINE

## 2018-06-30 RX ORDER — SODIUM CHLORIDE 9 MG/ML
20 INJECTION, SOLUTION INTRAVENOUS CONTINUOUS
Status: DISPENSED | OUTPATIENT
Start: 2018-07-02 | End: 2018-07-02

## 2018-07-02 ENCOUNTER — HOSPITAL ENCOUNTER (OUTPATIENT)
Dept: INFUSION CENTER | Facility: HOSPITAL | Age: 78
Discharge: HOME | End: 2018-07-02
Payer: MEDICARE

## 2018-07-02 ENCOUNTER — TELEPHONE (OUTPATIENT)
Dept: FAMILY MEDICINE CLINIC | Facility: CLINIC | Age: 78
End: 2018-07-02

## 2018-07-02 VITALS
TEMPERATURE: 99.3 F | SYSTOLIC BLOOD PRESSURE: 115 MMHG | HEART RATE: 83 BPM | DIASTOLIC BLOOD PRESSURE: 60 MMHG | OXYGEN SATURATION: 96 % | RESPIRATION RATE: 20 BRPM

## 2018-07-02 PROCEDURE — 96366 THER/PROPH/DIAG IV INF ADDON: CPT

## 2018-07-02 PROCEDURE — 96365 THER/PROPH/DIAG IV INF INIT: CPT

## 2018-07-02 RX ADMIN — SODIUM CHLORIDE 20 ML/HR: 0.9 INJECTION, SOLUTION INTRAVENOUS at 12:08

## 2018-07-02 RX ADMIN — IRON SUCROSE 300 MG: 20 INJECTION, SOLUTION INTRAVENOUS at 12:27

## 2018-07-02 NOTE — PROGRESS NOTES
Please call the patient regarding his abnormal result   I do not believe I ordered these tests I think they were probably ordered by Dr Nadine Olmedo so on I do not think we need to respond to these

## 2018-07-02 NOTE — PLAN OF CARE

## 2018-07-02 NOTE — PLAN OF CARE
Problem: Potential for Falls  Goal: Patient will remain free of falls  INTERVENTIONS:  - Assess patient frequently for physical needs  -  Identify cognitive and physical deficits and behaviors that affect risk of falls    -  [place] fall precautions as indicated by assessment   - Educate patient/family on patient safety including physical limitations  - Instruct patient to call for assistance with activity based on assessment  - Modify environment to reduce risk of injury  - Consider OT/PT consult to assist with strengthening/mobility   Outcome: Progressing

## 2018-07-09 ENCOUNTER — OFFICE VISIT (OUTPATIENT)
Dept: FAMILY MEDICINE CLINIC | Facility: CLINIC | Age: 78
End: 2018-07-09
Payer: MEDICARE

## 2018-07-09 VITALS
HEIGHT: 67 IN | DIASTOLIC BLOOD PRESSURE: 54 MMHG | BODY MASS INDEX: 48.97 KG/M2 | SYSTOLIC BLOOD PRESSURE: 102 MMHG | WEIGHT: 312 LBS

## 2018-07-09 DIAGNOSIS — Z79.4 TYPE 2 DIABETES MELLITUS WITH DIABETIC POLYNEUROPATHY, WITH LONG-TERM CURRENT USE OF INSULIN (HCC): ICD-10-CM

## 2018-07-09 DIAGNOSIS — G61.82 MULTIFOCAL MOTOR NEUROPATHY (HCC): Primary | ICD-10-CM

## 2018-07-09 DIAGNOSIS — E11.42 TYPE 2 DIABETES MELLITUS WITH DIABETIC POLYNEUROPATHY, WITH LONG-TERM CURRENT USE OF INSULIN (HCC): ICD-10-CM

## 2018-07-09 PROCEDURE — 99213 OFFICE O/P EST LOW 20 MIN: CPT | Performed by: FAMILY MEDICINE

## 2018-07-09 RX ORDER — GABAPENTIN 300 MG/1
600 CAPSULE ORAL
Qty: 60 CAPSULE | Refills: 3 | Status: SHIPPED | OUTPATIENT
Start: 2018-07-09 | End: 2019-02-05 | Stop reason: SDUPTHER

## 2018-07-09 NOTE — PROGRESS NOTES
Assessment/Plan:    No problem-specific Assessment & Plan notes found for this encounter  Diagnoses and all orders for this visit:    Multifocal motor neuropathy (HCC)  -     gabapentin (NEURONTIN) 300 mg capsule; Take 2 capsules (600 mg total) by mouth daily at bedtime    Type 2 diabetes mellitus with diabetic polyneuropathy, with long-term current use of insulin (HCC)  -     HEMOGLOBIN A1C W/ EAG ESTIMATION; Standing  -     Basic metabolic panel; Standing  -     Microalbumin / creatinine urine ratio; Standing  -     insulin lispro (HumaLOG) 100 units/mL injection; Inject 20 Units under the skin 3 (three) times a day with meals  -     HEMOGLOBIN A1C W/ EAG ESTIMATION  -     Basic metabolic panel  -     Microalbumin / creatinine urine ratio          Subjective:      Patient ID: Shalini Brooks is a [de-identified] y o  male  Minnie Jean has here for a visit his blood sugars are up and down he has some good ones he has some that are not so good either were going to be stopping his prednisone and hopefully that is going to help with control of his blood sugars and also his edema also his peripheral neuropathy especially in his left foot is really bothersome going to double his gabapentin from 300 mg to 600 mg at bedtime to see if it will give him some relief        The following portions of the patient's history were reviewed and updated as appropriate:   He  has a past medical history of Anemia; Arthritis; Atrial fibrillation ([address] ); 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[address] ); B12 deficiency; Back pain; Blind right eye; Cancer ([address] ); Cardiac disorder; Chronic combined systolic and diastolic heart failure ([address] ); Chronic obstructive lung disease ([address] ); Chronic venous stasis dermatitis of both lower extremities; CKD (chronic kidney disease), stage IV ([address] ); Coronary artery disease; DDD (degenerative disc disease); DM (diabetes mellitus), type 2 ([address] ); BAER (dyspnea on exertion); Easy bruising; Epistaxis; Gout; Gross hematuria;  Hepatitis; Herpes zoster; Hiatal hernia; History of cellulitis; History of prostate cancer; Hypercholesterolemia; Hyperlipidemia; Hypertension; Ischemic cardiomyopathy; Lung mass; MI, old; Morbid obesity due to excess calories ([facility] ); Neuropathy; Obesity; Obstructive sleep apnea syndrome, severe; Pneumonia; Shortness of breath; TIA (transient ischemic attack); Urinary incontinence; Urinary retention; and Use of cane as ambulatory aid  He   Patient Active Problem List    Diagnosis Date Noted    Malignant neoplasm of upper lobe of right lung ([facility] ) 04/10/2018    LESLIE (acute kidney injury) ([place] [address] ) 10/26/2017    Hypokalemia 10/25/2017    Vision loss, 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 10/21/2017    Atrial fibrillation ([place] [address] )     Blind right eye     Acute on chronic combined systolic and diastolic ACC/AHA stage C congestive heart failure ([place] [address] )     Coronary artery disease     DM (diabetes mellitus), type 2 with peripheral neuropathy     Obstructive sleep apnea syndrome, severe     Severe obstructive sleep apnea-hypopnea syndrome 04/13/2017    Wound of right lower extremity 03/05/2017    Wound of left lower extremity 03/05/2017    Hyperphosphatemia 03/05/2017    Vitamin D deficiency 03/05/2017    Renal cyst 03/05/2017    Body mass index (BMI) of 40 0 to 49 9 03/04/2017    Anemia 10/04/2016    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    CKD (chronic kidney disease) stage 4, GFR 15-29 ml/min ([place] [address] ) 07/16/2016    Morbid obesity (HCC)     History of prostate cancer     Type 2 diabetes mellitus with hyperglycemia (HCC)     Bilateral leg edema 01/19/2016    Prostate cancer ([place] [address] ) 07/24/2012     He  has a past surgical history that includes Coronary angioplasty with stent; Cystoscopy; Lung cancer surgery; Cataract extraction, bilateral; Eye surgery; Hernia repair; Vascular surgery; Cardiac surgery;  Abdominal surgery; Coronary artery bypass graft (N/A, 7/15/2016); pr cystourethroscopy,ureter catheter (Left, 3/9/2016); PILONIDAL CYST EXCISION; pr temporal artery ligatn or bx (Bilateral, 10/31/2017); Lung surgery (Left, 2012); Other surgical history; and Renal artery stent (02/16/2015)  His family history includes Cancer in his mother, other, other, and sister; Diabetes in his father, maternal grandmother, mother, and other; Diabetes type II in his mother; Heart disease in his mother; Hypertension in his mother and other  He  reports that he quit smoking about 14 years ago  He has a 108 00 pack-year smoking history  He has never used smokeless tobacco  He reports that he does not drink alcohol or use drugs    Current Outpatient Prescriptions   Medication Sig Dispense Refill    apixaban (ELIQUIS) 2 5 mg Take 1 tablet (2 5 mg total) by mouth 2 (two) times a day 60 tablet 0    aspirin (ECOTRIN LOW STRENGTH) 81 mg EC tablet Take 81 mg by mouth 2 (two) times a day      Cholecalciferol 2000 units TABS Take 1 tablet (2,000 Units total) by mouth daily 30 tablet 2    furosemide (LASIX) 80 mg tablet Take 1 tablet by mouth 2 (two) times a day 30 tablet 0    gabapentin (NEURONTIN) 300 mg capsule Take 2 capsules (600 mg total) by mouth daily at bedtime 60 capsule 3    glucagon (GLUCAGON EMERGENCY) 1 MG injection Inject 1 mg under the skin once as needed for low blood sugar (as needed for a blood sugar less than 70 if unconscious or unable to correct by oral means) for up to 1 dose 1 each 0    insulin detemir (LEVEMIR) 100 units/mL subcutaneous injection Inject 55 Units under the skin daily at bedtime      insulin lispro (HumaLOG) 100 units/mL injection Inject 20 Units under the skin 3 (three) times a day with meals  0    iron polysaccharides (NIFEREX) 150 mg capsule Take 150 mg by mouth daily      metoprolol tartrate (LOPRESSOR) 25 mg tablet Take 25 mg by mouth 2 (two) times a day      potassium chloride (MICRO-K) 10 MEQ CR capsule Take 1 capsule (10 mEq total) by mouth 2 (two) times a day for 30 days 60 capsule 5    predniSONE 1 mg tablet Take 0 25 mg by mouth every other day        simvastatin (ZOCOR) 40 mg tablet Take 40 mg by mouth daily at bedtime      tamsulosin (FLOMAX) 0 4 mg Take 0 4 mg by mouth daily at bedtime         No current facility-administered medications for this visit  Current Outpatient Prescriptions on File Prior to Visit   Medication Sig    apixaban (ELIQUIS) 2 5 mg Take 1 tablet (2 5 mg total) by mouth 2 (two) times a day    aspirin (ECOTRIN LOW STRENGTH) 81 mg EC tablet Take 81 mg by mouth 2 (two) times a day    Cholecalciferol 2000 units TABS Take 1 tablet (2,000 Units total) by mouth daily    furosemide (LASIX) 80 mg tablet Take 1 tablet by mouth 2 (two) times a day    glucagon (GLUCAGON EMERGENCY) 1 MG injection Inject 1 mg under the skin once as needed for low blood sugar (as needed for a blood sugar less than 70 if unconscious or unable to correct by oral means) for up to 1 dose    insulin detemir (LEVEMIR) 100 units/mL subcutaneous injection Inject 55 Units under the skin daily at bedtime    iron polysaccharides (NIFEREX) 150 mg capsule Take 150 mg by mouth daily    metoprolol tartrate (LOPRESSOR) 25 mg tablet Take 25 mg by mouth 2 (two) times a day    potassium chloride (MICRO-K) 10 MEQ CR capsule Take 1 capsule (10 mEq total) by mouth 2 (two) times a day for 30 days    predniSONE 1 mg tablet Take 0 25 mg by mouth every other day      simvastatin (ZOCOR) 40 mg tablet Take 40 mg by mouth daily at bedtime    tamsulosin (FLOMAX) 0 4 mg Take 0 4 mg by mouth daily at bedtime      [DISCONTINUED] gabapentin (NEURONTIN) 300 mg capsule Take 300 mg by mouth daily at bedtime    [DISCONTINUED] insulin lispro (HumaLOG) 100 units/mL injection Inject 15 Units under the skin 3 (three) times a day with meals     No current facility-administered medications on file prior to visit  He is allergic to other       Review of Systems   Constitutional: Positive for activity change   Negative for appetite change, diaphoresis, fatigue and fever    HENT: Negative  Eyes: Negative  Respiratory: Positive for shortness of breath  Negative for apnea, cough, chest tightness and wheezing  Cardiovascular: Negative for chest pain, palpitations and leg swelling  Gastrointestinal: Negative for abdominal distention, abdominal pain, anal bleeding, constipation, diarrhea, nausea and vomiting  Endocrine: Negative for cold intolerance, heat intolerance, polydipsia, polyphagia and polyuria  Genitourinary: Negative for difficulty urinating, dysuria, flank pain, hematuria and urgency  Musculoskeletal: Negative for arthralgias, back pain, gait problem, joint swelling and myalgias  Skin: Negative for color change, rash and wound  Allergic/Immunologic: Negative for environmental allergies, food allergies and immunocompromised state  Neurological: Negative for dizziness, seizures, syncope, speech difficulty, numbness and headaches  Neuropathic pain left foot   Hematological: Negative for adenopathy  Does not bruise/bleed easily  Psychiatric/Behavioral: Negative for agitation, behavioral problems, hallucinations, sleep disturbance and suicidal ideas  Objective:      /54 (BP Location: Left arm, Patient Position: Sitting, Cuff Size: Large)   Ht 5' 7" (1 702 m)   Wt (!) 142 kg (312 lb)   BMI 48 87 kg/m²          Physical Exam   Constitutional: He is oriented to person, place, and time  He appears well-developed and well-nourished  No distress  HENT:   Head: Normocephalic  Right Ear: External ear normal    Left Ear: External ear normal    Nose: Nose normal    Mouth/Throat: Oropharynx is clear and moist    Eyes: Conjunctivae and EOM are normal  Pupils are equal, round, and reactive to light  Right eye exhibits no discharge  Left eye exhibits no discharge  No scleral icterus  Neck: Normal range of motion  No tracheal deviation present  No thyromegaly present     Cardiovascular: Normal rate, regular rhythm and normal heart sounds  Exam reveals no gallop and no friction rub  No murmur heard  Pulmonary/Chest: Effort normal and breath sounds normal  No respiratory distress  He has no wheezes  Abdominal: Soft  Bowel sounds are normal  He exhibits no mass  There is no tenderness  There is no guarding  Musculoskeletal: He exhibits edema  He exhibits no deformity  Lymphadenopathy:     He has no cervical adenopathy  Neurological: He is alert and oriented to person, place, and time  No cranial nerve deficit  Skin: Skin is warm and dry  No rash noted  He is not diaphoretic  No erythema  Psychiatric: He has a normal mood and affect   Thought content normal

## 2018-07-10 RX ORDER — SODIUM CHLORIDE 9 MG/ML
20 INJECTION, SOLUTION INTRAVENOUS CONTINUOUS
Status: DISPENSED | OUTPATIENT
Start: 2018-07-12 | End: 2018-07-12

## 2018-07-12 ENCOUNTER — HOSPITAL ENCOUNTER (OUTPATIENT)
Dept: INFUSION CENTER | Facility: HOSPITAL | Age: 78
Discharge: HOME | End: 2018-07-12
Payer: MEDICARE

## 2018-07-12 VITALS
DIASTOLIC BLOOD PRESSURE: 59 MMHG | TEMPERATURE: 97.9 F | SYSTOLIC BLOOD PRESSURE: 122 MMHG | RESPIRATION RATE: 20 BRPM | HEART RATE: 74 BPM | OXYGEN SATURATION: 96 %

## 2018-07-12 PROCEDURE — 96365 THER/PROPH/DIAG IV INF INIT: CPT

## 2018-07-12 PROCEDURE — 96366 THER/PROPH/DIAG IV INF ADDON: CPT

## 2018-07-12 RX ADMIN — SODIUM CHLORIDE 20 ML/HR: 0.9 INJECTION, SOLUTION INTRAVENOUS at 09:44

## 2018-07-12 RX ADMIN — IRON SUCROSE 300 MG: 20 INJECTION, SOLUTION INTRAVENOUS at 10:05

## 2018-07-12 NOTE — PLAN OF CARE

## 2018-07-15 RX ORDER — SODIUM CHLORIDE 9 MG/ML
20 INJECTION, SOLUTION INTRAVENOUS CONTINUOUS
Status: DISPENSED | OUTPATIENT
Start: 2018-07-17 | End: 2018-07-17

## 2018-07-17 ENCOUNTER — HOSPITAL ENCOUNTER (OUTPATIENT)
Dept: INFUSION CENTER | Facility: HOSPITAL | Age: 78
Discharge: HOME | End: 2018-07-17
Payer: MEDICARE

## 2018-07-17 VITALS
SYSTOLIC BLOOD PRESSURE: 119 MMHG | HEART RATE: 75 BPM | TEMPERATURE: 98.2 F | DIASTOLIC BLOOD PRESSURE: 62 MMHG | RESPIRATION RATE: 18 BRPM | OXYGEN SATURATION: 97 %

## 2018-07-17 PROCEDURE — 96365 THER/PROPH/DIAG IV INF INIT: CPT

## 2018-07-17 PROCEDURE — 96366 THER/PROPH/DIAG IV INF ADDON: CPT

## 2018-07-17 RX ADMIN — SODIUM CHLORIDE 20 ML/HR: 0.9 INJECTION, SOLUTION INTRAVENOUS at 12:54

## 2018-07-17 RX ADMIN — IRON SUCROSE 300 MG: 20 INJECTION, SOLUTION INTRAVENOUS at 13:10

## 2018-07-17 NOTE — PLAN OF CARE

## 2018-07-17 NOTE — PLAN OF CARE
Problem: Potential for Falls  Goal: Patient will remain free of falls  INTERVENTIONS:  - Assess patient frequently for physical needs  -  Identify cognitive and physical deficits and behaviors that affect risk of falls    -  [place] fall precautions as indicated by assessment   - Educate patient/family on patient safety including physical limitations  - Instruct patient to call for assistance with activity based on assessment  - Modify environment to reduce risk of injury  - Consider OT/PT consult to assist with strengthening/mobility   Outcome: Progressing

## 2018-07-30 ENCOUNTER — LAB REQUISITION (OUTPATIENT)
Dept: LAB | Facility: HOSPITAL | Age: 78
End: 2018-07-30
Payer: MEDICARE

## 2018-07-30 DIAGNOSIS — N18.4 CHRONIC KIDNEY DISEASE, STAGE IV (SEVERE) (HCC): ICD-10-CM

## 2018-07-30 DIAGNOSIS — D50.9 IRON DEFICIENCY ANEMIA: ICD-10-CM

## 2018-07-30 DIAGNOSIS — N25.81 SECONDARY HYPERPARATHYROIDISM OF RENAL ORIGIN (HCC): ICD-10-CM

## 2018-07-30 LAB
ALBUMIN SERPL BCP-MCNC: 3.1 G/DL (ref 3.5–5)
ALP SERPL-CCNC: 40 U/L (ref 46–116)
ALT SERPL W P-5'-P-CCNC: 19 U/L (ref 12–78)
ANION GAP SERPL CALCULATED.3IONS-SCNC: 9 MMOL/L (ref 4–13)
AST SERPL W P-5'-P-CCNC: 19 U/L (ref 5–45)
BASOPHILS # BLD AUTO: 0.08 THOUSANDS/ΜL (ref 0–0.1)
BASOPHILS NFR BLD AUTO: 1 % (ref 0–1)
BILIRUB SERPL-MCNC: 0.3 MG/DL (ref 0.2–1)
BUN SERPL-MCNC: 45 MG/DL (ref 5–25)
CALCIUM SERPL-MCNC: 8.9 MG/DL (ref 8.3–10.1)
CHLORIDE SERPL-SCNC: 97 MMOL/L (ref 100–108)
CO2 SERPL-SCNC: 33 MMOL/L (ref 21–32)
CREAT SERPL-MCNC: 2.59 MG/DL (ref 0.6–1.3)
EOSINOPHIL # BLD AUTO: 0.2 THOUSAND/ΜL (ref 0–0.61)
EOSINOPHIL NFR BLD AUTO: 3 % (ref 0–6)
ERYTHROCYTE [DISTWIDTH] IN BLOOD BY AUTOMATED COUNT: 21.6 % (ref 11.6–15.1)
FERRITIN SERPL-MCNC: 473 NG/ML (ref 8–388)
GFR SERPL CREATININE-BSD FRML MDRD: 22 ML/MIN/1.73SQ M
GLUCOSE SERPL-MCNC: 215 MG/DL (ref 65–140)
HCT VFR BLD AUTO: 39.3 % (ref 36.5–49.3)
HGB BLD-MCNC: 12.6 G/DL (ref 12–17)
IRON SATN MFR SERPL: 12 %
IRON SERPL-MCNC: 37 UG/DL (ref 65–175)
LYMPHOCYTES # BLD AUTO: 0.99 THOUSANDS/ΜL (ref 0.6–4.47)
LYMPHOCYTES NFR BLD AUTO: 13 % (ref 14–44)
MCH RBC QN AUTO: 28.5 PG (ref 26.8–34.3)
MCHC RBC AUTO-ENTMCNC: 32.1 G/DL (ref 31.4–37.4)
MCV RBC AUTO: 89 FL (ref 82–98)
MONOCYTES # BLD AUTO: 0.5 THOUSAND/ΜL (ref 0.17–1.22)
MONOCYTES NFR BLD AUTO: 7 % (ref 4–12)
NEUTROPHILS # BLD AUTO: 5.78 THOUSANDS/ΜL (ref 1.85–7.62)
NEUTS SEG NFR BLD AUTO: 77 % (ref 43–75)
PLATELET # BLD AUTO: 314 THOUSANDS/UL (ref 149–390)
PMV BLD AUTO: 10.1 FL (ref 8.9–12.7)
POTASSIUM SERPL-SCNC: 3.7 MMOL/L (ref 3.5–5.3)
PROT SERPL-MCNC: 5.9 G/DL (ref 6.4–8.2)
PTH-INTACT SERPL-MCNC: 84.3 PG/ML (ref 18.4–80.1)
RBC # BLD AUTO: 4.42 MILLION/UL (ref 3.88–5.62)
SODIUM SERPL-SCNC: 139 MMOL/L (ref 136–145)
TIBC SERPL-MCNC: 299 UG/DL (ref 250–450)
WBC # BLD AUTO: 7.55 THOUSAND/UL (ref 4.31–10.16)

## 2018-07-30 PROCEDURE — 85025 COMPLETE CBC W/AUTO DIFF WBC: CPT | Performed by: INTERNAL MEDICINE

## 2018-07-30 PROCEDURE — 83540 ASSAY OF IRON: CPT | Performed by: INTERNAL MEDICINE

## 2018-07-30 PROCEDURE — 83970 ASSAY OF PARATHORMONE: CPT | Performed by: INTERNAL MEDICINE

## 2018-07-30 PROCEDURE — 83550 IRON BINDING TEST: CPT | Performed by: INTERNAL MEDICINE

## 2018-07-30 PROCEDURE — 82728 ASSAY OF FERRITIN: CPT | Performed by: INTERNAL MEDICINE

## 2018-07-30 PROCEDURE — 80053 COMPREHEN METABOLIC PANEL: CPT | Performed by: INTERNAL MEDICINE

## 2018-08-27 ENCOUNTER — LAB REQUISITION (OUTPATIENT)
Dept: LAB | Facility: HOSPITAL | Age: 78
End: 2018-08-27
Payer: MEDICARE

## 2018-08-27 DIAGNOSIS — D50.9 IRON DEFICIENCY ANEMIA: ICD-10-CM

## 2018-08-27 DIAGNOSIS — N25.81 SECONDARY HYPERPARATHYROIDISM OF RENAL ORIGIN (HCC): ICD-10-CM

## 2018-08-27 DIAGNOSIS — N18.4 CHRONIC KIDNEY DISEASE, STAGE IV (SEVERE) (HCC): ICD-10-CM

## 2018-08-27 LAB
ALBUMIN SERPL BCP-MCNC: 3.1 G/DL (ref 3.5–5)
ALP SERPL-CCNC: 38 U/L (ref 46–116)
ALT SERPL W P-5'-P-CCNC: 19 U/L (ref 12–78)
ANION GAP SERPL CALCULATED.3IONS-SCNC: 12 MMOL/L (ref 4–13)
AST SERPL W P-5'-P-CCNC: 18 U/L (ref 5–45)
BASOPHILS # BLD AUTO: 0.06 THOUSANDS/ΜL (ref 0–0.1)
BASOPHILS NFR BLD AUTO: 1 % (ref 0–1)
BILIRUB SERPL-MCNC: 0.2 MG/DL (ref 0.2–1)
BUN SERPL-MCNC: 41 MG/DL (ref 5–25)
CALCIUM SERPL-MCNC: 8.8 MG/DL (ref 8.3–10.1)
CHLORIDE SERPL-SCNC: 97 MMOL/L (ref 100–108)
CO2 SERPL-SCNC: 30 MMOL/L (ref 21–32)
CREAT SERPL-MCNC: 2.65 MG/DL (ref 0.6–1.3)
EOSINOPHIL # BLD AUTO: 0.25 THOUSAND/ΜL (ref 0–0.61)
EOSINOPHIL NFR BLD AUTO: 3 % (ref 0–6)
ERYTHROCYTE [DISTWIDTH] IN BLOOD BY AUTOMATED COUNT: 18.6 % (ref 11.6–15.1)
FERRITIN SERPL-MCNC: 349 NG/ML (ref 8–388)
GFR SERPL CREATININE-BSD FRML MDRD: 22 ML/MIN/1.73SQ M
GLUCOSE SERPL-MCNC: 215 MG/DL (ref 65–140)
HCT VFR BLD AUTO: 37.9 % (ref 36.5–49.3)
HGB BLD-MCNC: 11.9 G/DL (ref 12–17)
IMM GRANULOCYTES # BLD AUTO: 0.06 THOUSAND/UL (ref 0–0.2)
IMM GRANULOCYTES NFR BLD AUTO: 1 % (ref 0–2)
IRON SATN MFR SERPL: 13 %
IRON SERPL-MCNC: 36 UG/DL (ref 65–175)
LYMPHOCYTES # BLD AUTO: 0.92 THOUSANDS/ΜL (ref 0.6–4.47)
LYMPHOCYTES NFR BLD AUTO: 10 % (ref 14–44)
MCH RBC QN AUTO: 29 PG (ref 26.8–34.3)
MCHC RBC AUTO-ENTMCNC: 31.4 G/DL (ref 31.4–37.4)
MCV RBC AUTO: 92 FL (ref 82–98)
MONOCYTES # BLD AUTO: 0.56 THOUSAND/ΜL (ref 0.17–1.22)
MONOCYTES NFR BLD AUTO: 6 % (ref 4–12)
NEUTROPHILS # BLD AUTO: 7.17 THOUSANDS/ΜL (ref 1.85–7.62)
NEUTS SEG NFR BLD AUTO: 79 % (ref 43–75)
NRBC BLD AUTO-RTO: 0 /100 WBCS
PLATELET # BLD AUTO: 302 THOUSANDS/UL (ref 149–390)
PMV BLD AUTO: 10 FL (ref 8.9–12.7)
POTASSIUM SERPL-SCNC: 4.3 MMOL/L (ref 3.5–5.3)
PROT SERPL-MCNC: 6.1 G/DL (ref 6.4–8.2)
PTH-INTACT SERPL-MCNC: 77.2 PG/ML (ref 18.4–80.1)
RBC # BLD AUTO: 4.1 MILLION/UL (ref 3.88–5.62)
SODIUM SERPL-SCNC: 139 MMOL/L (ref 136–145)
TIBC SERPL-MCNC: 287 UG/DL (ref 250–450)
WBC # BLD AUTO: 9.02 THOUSAND/UL (ref 4.31–10.16)

## 2018-08-27 PROCEDURE — 80053 COMPREHEN METABOLIC PANEL: CPT | Performed by: INTERNAL MEDICINE

## 2018-08-27 PROCEDURE — 85025 COMPLETE CBC W/AUTO DIFF WBC: CPT | Performed by: INTERNAL MEDICINE

## 2018-08-27 PROCEDURE — 82728 ASSAY OF FERRITIN: CPT | Performed by: INTERNAL MEDICINE

## 2018-08-27 PROCEDURE — 83970 ASSAY OF PARATHORMONE: CPT | Performed by: INTERNAL MEDICINE

## 2018-08-27 PROCEDURE — 83550 IRON BINDING TEST: CPT | Performed by: INTERNAL MEDICINE

## 2018-08-27 PROCEDURE — 83540 ASSAY OF IRON: CPT | Performed by: INTERNAL MEDICINE

## 2018-10-03 ENCOUNTER — TELEPHONE (OUTPATIENT)
Dept: FAMILY MEDICINE CLINIC | Facility: CLINIC | Age: 78
End: 2018-10-03

## 2018-10-03 NOTE — TELEPHONE ENCOUNTER
Let's talk to our drug rep and see if we can get them enough samples for 3 months until we work through this

## 2018-10-03 NOTE — TELEPHONE ENCOUNTER
Samia is a 1 to switch off of Eliquis on because if he goes to Coumadin he has to get blood work frequently

## 2018-10-03 NOTE — TELEPHONE ENCOUNTER
They only have medicare, they have no supplement or no prescription plan    She was paying over $200 a month for supplement and she only gets in $600

## 2018-10-05 ENCOUNTER — TELEPHONE (OUTPATIENT)
Dept: FAMILY MEDICINE CLINIC | Facility: CLINIC | Age: 78
End: 2018-10-05

## 2018-10-05 DIAGNOSIS — M79.604 PAIN IN BOTH LOWER EXTREMITIES: Primary | ICD-10-CM

## 2018-10-05 DIAGNOSIS — M79.605 PAIN IN BOTH LOWER EXTREMITIES: Primary | ICD-10-CM

## 2018-10-05 RX ORDER — TRAMADOL HYDROCHLORIDE 50 MG/1
TABLET ORAL
Qty: 15 TABLET | Refills: 0 | Status: SHIPPED | OUTPATIENT
Start: 2018-10-05 | End: 2018-10-12 | Stop reason: SDUPTHER

## 2018-10-05 NOTE — TELEPHONE ENCOUNTER
He has severe pain in his feet at night    He is on 600 mg gabapentin and wants to know if you could order tramadol or something for the pain

## 2018-10-05 NOTE — TELEPHONE ENCOUNTER
And does he go to pain management because if he does and I order a pain pill they will kick him out of the practice

## 2018-10-12 ENCOUNTER — OFFICE VISIT (OUTPATIENT)
Dept: FAMILY MEDICINE CLINIC | Facility: CLINIC | Age: 78
End: 2018-10-12
Payer: MEDICARE

## 2018-10-12 ENCOUNTER — APPOINTMENT (OUTPATIENT)
Dept: RADIOLOGY | Facility: MEDICAL CENTER | Age: 78
End: 2018-10-12
Payer: MEDICARE

## 2018-10-12 VITALS
SYSTOLIC BLOOD PRESSURE: 108 MMHG | HEIGHT: 67 IN | WEIGHT: 309.8 LBS | DIASTOLIC BLOOD PRESSURE: 62 MMHG | OXYGEN SATURATION: 95 % | BODY MASS INDEX: 48.62 KG/M2

## 2018-10-12 DIAGNOSIS — M25.512 ACUTE PAIN OF LEFT SHOULDER: ICD-10-CM

## 2018-10-12 DIAGNOSIS — M79.604 PAIN IN BOTH LOWER EXTREMITIES: ICD-10-CM

## 2018-10-12 DIAGNOSIS — R68.84 PAIN IN BONE OF JAW: Primary | ICD-10-CM

## 2018-10-12 DIAGNOSIS — M79.605 PAIN IN BOTH LOWER EXTREMITIES: ICD-10-CM

## 2018-10-12 DIAGNOSIS — R68.84 PAIN IN BONE OF JAW: ICD-10-CM

## 2018-10-12 PROCEDURE — 73030 X-RAY EXAM OF SHOULDER: CPT

## 2018-10-12 PROCEDURE — 70110 X-RAY EXAM OF JAW 4/> VIEWS: CPT

## 2018-10-12 PROCEDURE — 99214 OFFICE O/P EST MOD 30 MIN: CPT | Performed by: FAMILY MEDICINE

## 2018-10-12 RX ORDER — TRAMADOL HYDROCHLORIDE 50 MG/1
TABLET ORAL
Qty: 30 TABLET | Refills: 0 | Status: SHIPPED | OUTPATIENT
Start: 2018-10-12 | End: 2018-10-23 | Stop reason: SDUPTHER

## 2018-10-12 NOTE — PROGRESS NOTES
Assessment/Plan:    No problem-specific Assessment & Plan notes found for this encounter  Diagnoses and all orders for this visit:    Pain in bone of jaw  -     XR mandible 4+ vw; Future    Acute pain of left shoulder  -     XR shoulder 2+ vw left; Future    Pain in both lower extremities  -     traMADol (ULTRAM) 50 mg tablet; 1 tablet 3 times daily with 2 extra Strength Tylenol          Subjective:      Patient ID: Romie Smith is a [de-identified] y o  male  Patient has pain left jaw left arm reproducible with palpation he was concerned about the possibility of a stroke but actually I can reproduce his pain with movement of all of the affected areas I think it is more of a musculoskeletal issue and the other possibility with his jaw is that he is developing    Madelaine Cedeno        The following portions of the patient's history were reviewed and updated as appropriate:   He  has a past medical history of Anemia; Arthritis; Atrial fibrillation ([address] ); 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[address] ); B12 deficiency; Back pain; Blind right eye; Cancer ([address] ); Cardiac disorder; Chronic combined systolic and diastolic heart failure ([address] ); Chronic obstructive lung disease ([address] ); Chronic venous stasis dermatitis of both lower extremities; CKD (chronic kidney disease), stage IV ([address] ); Coronary artery disease; DDD (degenerative disc disease); DM (diabetes mellitus), type 2 ([address] ); BAER (dyspnea on exertion); Easy bruising; Epistaxis; Gout; Gross hematuria; Hepatitis; Herpes zoster; Hiatal hernia; History of cellulitis; History of prostate cancer; Hypercholesterolemia; Hyperlipidemia; Hypertension; Ischemic cardiomyopathy; Lung mass; MI, old; Morbid obesity due to excess calories ([address] ); Neuropathy; Obesity; Obstructive sleep apnea syndrome, severe; Pneumonia; Shortness of breath; TIA (transient ischemic attack); Urinary incontinence; Urinary retention; and Use of cane as ambulatory aid    He   Patient Active Problem List    Diagnosis Date Noted    Malignant neoplasm of upper lobe of right lung ([place] [address] ) 04/10/2018    LESLIE (acute kidney injury) ([place] [address] ) 10/26/2017    Hypokalemia 10/25/2017    Vision loss, 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 10/21/2017    Atrial fibrillation ([place] [address] )     Blind right eye     Acute on chronic combined systolic and diastolic ACC/AHA stage C congestive heart failure ([place] [address] )     Coronary artery disease     DM (diabetes mellitus), type 2 with peripheral neuropathy     Obstructive sleep apnea syndrome, severe     Severe obstructive sleep apnea-hypopnea syndrome 04/13/2017    Wound of right lower extremity 03/05/2017    Wound of left lower extremity 03/05/2017    Hyperphosphatemia 03/05/2017    Vitamin D deficiency 03/05/2017    Renal cyst 03/05/2017    Body mass index (BMI) of 40 0 to 49 9 03/04/2017    Anemia 10/04/2016    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    CKD (chronic kidney disease) stage 4, GFR 15-29 ml/min ([place] [address] ) 07/16/2016    Morbid obesity ([place] [address] )     History of prostate cancer     Type 2 diabetes mellitus with hyperglycemia (HCC)     Bilateral leg edema 01/19/2016    Prostate cancer ([place] [address] ) 07/24/2012     He  has a past surgical history that includes Coronary angioplasty with stent; Cystoscopy; Lung cancer surgery; Cataract extraction, bilateral; Eye surgery; Hernia repair; Vascular surgery; Cardiac surgery; Abdominal surgery; Coronary artery bypass graft (N/A, 7/15/2016); pr cystourethroscopy,ureter catheter (Left, 3/9/2016); PILONIDAL CYST EXCISION; pr temporal artery ligatn or bx (Bilateral, 10/31/2017); Lung surgery (Left, 2012); Other surgical history; and Renal artery stent (02/16/2015)  His family history includes Cancer in his mother, other, other, and sister; Diabetes in his father, maternal grandmother, mother, and other; Diabetes type II in his mother; Heart disease in his mother; Hypertension in his mother and other  He  reports that he quit smoking about 14 years ago  He has a 108 00 pack-year smoking history   He has never used smokeless tobacco  He reports that he does not drink alcohol or use drugs  Current Outpatient Prescriptions   Medication Sig Dispense Refill    apixaban (ELIQUIS) 2 5 mg Take 1 tablet (2 5 mg total) by mouth 2 (two) times a day 60 tablet 0    aspirin (ECOTRIN LOW STRENGTH) 81 mg EC tablet Take 81 mg by mouth 2 (two) times a day      Cholecalciferol 2000 units TABS Take 1 tablet (2,000 Units total) by mouth daily 30 tablet 2    furosemide (LASIX) 80 mg tablet Take 1 tablet by mouth 2 (two) times a day 30 tablet 0    gabapentin (NEURONTIN) 300 mg capsule Take 2 capsules (600 mg total) by mouth daily at bedtime 60 capsule 3    glucagon (GLUCAGON EMERGENCY) 1 MG injection Inject 1 mg under the skin once as needed for low blood sugar (as needed for a blood sugar less than 70 if unconscious or unable to correct by oral means) for up to 1 dose 1 each 0    insulin detemir (LEVEMIR) 100 units/mL subcutaneous injection Inject 55 Units under the skin daily at bedtime      insulin lispro (HumaLOG) 100 units/mL injection Inject 20 Units under the skin 3 (three) times a day with meals  0    iron polysaccharides (NIFEREX) 150 mg capsule Take 150 mg by mouth daily      metoprolol tartrate (LOPRESSOR) 25 mg tablet Take 25 mg by mouth 2 (two) times a day      potassium chloride (MICRO-K) 10 MEQ CR capsule Take 1 capsule (10 mEq total) by mouth 2 (two) times a day for 30 days 60 capsule 5    simvastatin (ZOCOR) 40 mg tablet Take 40 mg by mouth daily at bedtime      tamsulosin (FLOMAX) 0 4 mg Take 0 4 mg by mouth daily at bedtime        traMADol (ULTRAM) 50 mg tablet 1 tablet 3 times daily with 2 extra Strength Tylenol 30 tablet 0     No current facility-administered medications for this visit        Current Outpatient Prescriptions on File Prior to Visit   Medication Sig    apixaban (ELIQUIS) 2 5 mg Take 1 tablet (2 5 mg total) by mouth 2 (two) times a day    aspirin (ECOTRIN LOW STRENGTH) 81 mg EC tablet Take 81 mg by mouth 2 (two) times a day    Cholecalciferol 2000 units TABS Take 1 tablet (2,000 Units total) by mouth daily    furosemide (LASIX) 80 mg tablet Take 1 tablet by mouth 2 (two) times a day    gabapentin (NEURONTIN) 300 mg capsule Take 2 capsules (600 mg total) by mouth daily at bedtime    glucagon (GLUCAGON EMERGENCY) 1 MG injection Inject 1 mg under the skin once as needed for low blood sugar (as needed for a blood sugar less than 70 if unconscious or unable to correct by oral means) for up to 1 dose    insulin detemir (LEVEMIR) 100 units/mL subcutaneous injection Inject 55 Units under the skin daily at bedtime    insulin lispro (HumaLOG) 100 units/mL injection Inject 20 Units under the skin 3 (three) times a day with meals    iron polysaccharides (NIFEREX) 150 mg capsule Take 150 mg by mouth daily    metoprolol tartrate (LOPRESSOR) 25 mg tablet Take 25 mg by mouth 2 (two) times a day    potassium chloride (MICRO-K) 10 MEQ CR capsule Take 1 capsule (10 mEq total) by mouth 2 (two) times a day for 30 days    simvastatin (ZOCOR) 40 mg tablet Take 40 mg by mouth daily at bedtime    tamsulosin (FLOMAX) 0 4 mg Take 0 4 mg by mouth daily at bedtime      [DISCONTINUED] traMADol (ULTRAM) 50 mg tablet 1 tablet with 2 Tylenol 1 hour before bed     No current facility-administered medications on file prior to visit  He is allergic to other       Review of Systems   Constitutional: Negative for activity change, appetite change, diaphoresis, fatigue and fever  HENT: Negative  Slight swelling left angle of jaw in the area of the parotid gland with reproducible pain with palpation   Eyes: Negative  Respiratory: Negative for apnea, cough, chest tightness, shortness of breath and wheezing  Cardiovascular: Negative for chest pain, palpitations and leg swelling  Gastrointestinal: Negative for abdominal distention, abdominal pain, anal bleeding, constipation, diarrhea, nausea and vomiting  Endocrine: Negative for cold intolerance, heat intolerance, polydipsia, polyphagia and polyuria  Genitourinary: Negative for difficulty urinating, dysuria, flank pain, hematuria and urgency  Musculoskeletal: Negative for arthralgias, back pain, gait problem, joint swelling and myalgias  Pain left shoulder especially with active movement and passive movement unable to abduct the shoulder more than about 90°   Skin: Negative for color change, rash and wound  Allergic/Immunologic: Negative for environmental allergies, food allergies and immunocompromised state  Neurological: Negative for dizziness, seizures, syncope, speech difficulty, numbness and headaches  Hematological: Negative for adenopathy  Does not bruise/bleed easily  Psychiatric/Behavioral: Negative for agitation, behavioral problems, hallucinations, sleep disturbance and suicidal ideas  Objective:      /62 (BP Location: Left arm, Patient Position: Sitting, Cuff Size: Standard)   Ht 5' 7" (1 702 m)   Wt (!) 141 kg (309 lb 12 8 oz)   SpO2 95% Comment: 3 l/m nasal cannula  BMI 48 52 kg/m²          Physical Exam   Constitutional: He is oriented to person, place, and time  He appears well-developed and well-nourished  No distress  HENT:   Head: Normocephalic  Right Ear: External ear normal    Left Ear: External ear normal    Nose: Nose normal    Mouth/Throat: Oropharynx is clear and moist    pailn and swelling left parotid   Eyes: Pupils are equal, round, and reactive to light  Conjunctivae and EOM are normal  Right eye exhibits no discharge  Left eye exhibits no discharge  No scleral icterus  Neck: Normal range of motion  No tracheal deviation present  No thyromegaly present  Cardiovascular: Normal rate, regular rhythm and normal heart sounds  Exam reveals no gallop and no friction rub  No murmur heard  Pulmonary/Chest: Effort normal and breath sounds normal  No respiratory distress  He has no wheezes  Abdominal: Soft  Bowel sounds are normal  He exhibits no mass  There is no tenderness  There is no guarding  Musculoskeletal: He exhibits tenderness  He exhibits no edema or deformity  Pain left shoulder   Lymphadenopathy:     He has no cervical adenopathy  Neurological: He is alert and oriented to person, place, and time  No cranial nerve deficit  Skin: Skin is warm and dry  No rash noted  He is not diaphoretic  No erythema  Psychiatric: He has a normal mood and affect   Thought content normal

## 2018-10-22 ENCOUNTER — TELEPHONE (OUTPATIENT)
Dept: FAMILY MEDICINE CLINIC | Facility: CLINIC | Age: 78
End: 2018-10-22

## 2018-10-22 ENCOUNTER — TRANSCRIBE ORDERS (OUTPATIENT)
Dept: LAB | Facility: CLINIC | Age: 78
End: 2018-10-22

## 2018-10-22 ENCOUNTER — LAB (OUTPATIENT)
Dept: LAB | Facility: CLINIC | Age: 78
End: 2018-10-22
Payer: MEDICARE

## 2018-10-22 DIAGNOSIS — Z79.4 ENCOUNTER FOR LONG-TERM (CURRENT) USE OF INSULIN (HCC): ICD-10-CM

## 2018-10-22 DIAGNOSIS — E13.42 DIABETIC POLYNEUROPATHY ASSOCIATED WITH OTHER SPECIFIED DIABETES MELLITUS (HCC): ICD-10-CM

## 2018-10-22 DIAGNOSIS — E13.42 DIABETIC POLYNEUROPATHY ASSOCIATED WITH OTHER SPECIFIED DIABETES MELLITUS (HCC): Primary | ICD-10-CM

## 2018-10-22 LAB
ALBUMIN SERPL BCP-MCNC: 3.4 G/DL (ref 3.5–5)
ALP SERPL-CCNC: 39 U/L (ref 46–116)
ALT SERPL W P-5'-P-CCNC: 18 U/L (ref 12–78)
ANION GAP SERPL CALCULATED.3IONS-SCNC: 8 MMOL/L (ref 4–13)
AST SERPL W P-5'-P-CCNC: 18 U/L (ref 5–45)
BASOPHILS # BLD AUTO: 0.06 THOUSANDS/ΜL (ref 0–0.1)
BASOPHILS NFR BLD AUTO: 1 % (ref 0–1)
BILIRUB SERPL-MCNC: 0.39 MG/DL (ref 0.2–1)
BUN SERPL-MCNC: 65 MG/DL (ref 5–25)
CALCIUM SERPL-MCNC: 9.6 MG/DL (ref 8.3–10.1)
CHLORIDE SERPL-SCNC: 94 MMOL/L (ref 100–108)
CO2 SERPL-SCNC: 33 MMOL/L (ref 21–32)
CREAT SERPL-MCNC: 2.79 MG/DL (ref 0.6–1.3)
EOSINOPHIL # BLD AUTO: 0.35 THOUSAND/ΜL (ref 0–0.61)
EOSINOPHIL NFR BLD AUTO: 3 % (ref 0–6)
ERYTHROCYTE [DISTWIDTH] IN BLOOD BY AUTOMATED COUNT: 16.2 % (ref 11.6–15.1)
GFR SERPL CREATININE-BSD FRML MDRD: 20 ML/MIN/1.73SQ M
GLUCOSE SERPL-MCNC: 117 MG/DL (ref 65–140)
HCT VFR BLD AUTO: 44 % (ref 36.5–49.3)
HGB BLD-MCNC: 14 G/DL (ref 12–17)
IMM GRANULOCYTES # BLD AUTO: 0.06 THOUSAND/UL (ref 0–0.2)
IMM GRANULOCYTES NFR BLD AUTO: 1 % (ref 0–2)
LYMPHOCYTES # BLD AUTO: 0.82 THOUSANDS/ΜL (ref 0.6–4.47)
LYMPHOCYTES NFR BLD AUTO: 8 % (ref 14–44)
MCH RBC QN AUTO: 29.5 PG (ref 26.8–34.3)
MCHC RBC AUTO-ENTMCNC: 31.8 G/DL (ref 31.4–37.4)
MCV RBC AUTO: 93 FL (ref 82–98)
MONOCYTES # BLD AUTO: 0.72 THOUSAND/ΜL (ref 0.17–1.22)
MONOCYTES NFR BLD AUTO: 7 % (ref 4–12)
NEUTROPHILS # BLD AUTO: 8.85 THOUSANDS/ΜL (ref 1.85–7.62)
NEUTS SEG NFR BLD AUTO: 80 % (ref 43–75)
NRBC BLD AUTO-RTO: 0 /100 WBCS
PLATELET # BLD AUTO: 291 THOUSANDS/UL (ref 149–390)
PMV BLD AUTO: 9.9 FL (ref 8.9–12.7)
POTASSIUM SERPL-SCNC: 3.7 MMOL/L (ref 3.5–5.3)
PROT SERPL-MCNC: 7.3 G/DL (ref 6.4–8.2)
PTH-INTACT SERPL-MCNC: 100.7 PG/ML (ref 18.4–80.1)
RBC # BLD AUTO: 4.75 MILLION/UL (ref 3.88–5.62)
SODIUM SERPL-SCNC: 135 MMOL/L (ref 136–145)
WBC # BLD AUTO: 10.86 THOUSAND/UL (ref 4.31–10.16)

## 2018-10-22 PROCEDURE — 80053 COMPREHEN METABOLIC PANEL: CPT

## 2018-10-22 PROCEDURE — 85025 COMPLETE CBC W/AUTO DIFF WBC: CPT

## 2018-10-22 PROCEDURE — 83970 ASSAY OF PARATHORMONE: CPT

## 2018-10-22 NOTE — TELEPHONE ENCOUNTER
He can not afford the eliquis, requesting something different and only has 2 days of the tramadol, requesting a refill

## 2018-10-23 ENCOUNTER — TELEPHONE (OUTPATIENT)
Dept: FAMILY MEDICINE CLINIC | Facility: CLINIC | Age: 78
End: 2018-10-23

## 2018-10-23 DIAGNOSIS — M79.605 PAIN IN BOTH LOWER EXTREMITIES: ICD-10-CM

## 2018-10-23 DIAGNOSIS — M79.604 PAIN IN BOTH LOWER EXTREMITIES: ICD-10-CM

## 2018-10-23 RX ORDER — TRAMADOL HYDROCHLORIDE 50 MG/1
TABLET ORAL
Qty: 30 TABLET | Refills: 0 | Status: CANCELLED | OUTPATIENT
Start: 2018-10-23

## 2018-10-23 RX ORDER — TRAMADOL HYDROCHLORIDE 50 MG/1
TABLET ORAL
Qty: 30 TABLET | Refills: 0 | Status: SHIPPED | OUTPATIENT
Start: 2018-10-23 | End: 2018-11-02 | Stop reason: SDUPTHER

## 2018-10-23 NOTE — TELEPHONE ENCOUNTER
Make sure we he send in a prescription for warfarin 5 mg once daily patient will need protime weekly x4 and then monthly

## 2018-10-23 NOTE — TELEPHONE ENCOUNTER
Patient can be switched to generic warfarin but he will need weekly blood tests for clotting times I would start him out on 5 mg of warfarin once daily once he is off of the Eliquis it should be taken at suppertime it should not be taken in the morning he has to avoid green vegetables on vitamins a contain vitamin K and he needs a weekly blood test for PT INR it would probably be easier to ask the drug company to supply his medicines the Eliquis as a compassionate Care today even try to contact the drug company

## 2018-10-23 NOTE — PROGRESS NOTES
Please call the patient regarding his abnormal result   Patient has stage 4 chronic kidney disease sugar was 117 that is good and the liver function was good

## 2018-10-23 NOTE — TELEPHONE ENCOUNTER
Called Pt with results & recommendations - Wife will call back, with her decision on how she wants to hand the situation

## 2018-10-24 DIAGNOSIS — G45.9 TIA (TRANSIENT ISCHEMIC ATTACK): Primary | ICD-10-CM

## 2018-10-24 DIAGNOSIS — I48.20 CHRONIC ATRIAL FIBRILLATION (HCC): ICD-10-CM

## 2018-10-26 ENCOUNTER — TELEPHONE (OUTPATIENT)
Dept: FAMILY MEDICINE CLINIC | Facility: CLINIC | Age: 78
End: 2018-10-26

## 2018-10-26 NOTE — TELEPHONE ENCOUNTER
Patient is already on renal dose Eliquis of 2 5 mg twice daily not sure why they do not want to fill it because it is approved for patients with creatinines above 1 5 at a dose of 2 5 twice daily so is already being renally dosed

## 2018-10-30 ENCOUNTER — TELEPHONE (OUTPATIENT)
Dept: FAMILY MEDICINE CLINIC | Facility: CLINIC | Age: 78
End: 2018-10-30

## 2018-10-30 NOTE — TELEPHONE ENCOUNTER
1   Pt D/C'd today from Home care services - No longer needed in home - Legs are healed    2  Not sure if things have been resolved with Eliquis - If Pt needs weekly labs to monitor coumadin or anyother lab orders, he will need set up for home draws

## 2018-10-31 ENCOUNTER — TELEPHONE (OUTPATIENT)
Dept: FAMILY MEDICINE CLINIC | Facility: CLINIC | Age: 78
End: 2018-10-31

## 2018-10-31 DIAGNOSIS — N30.00 ACUTE CYSTITIS WITHOUT HEMATURIA: Primary | ICD-10-CM

## 2018-10-31 DIAGNOSIS — Z79.01 ANTICOAGULATED WITH WARFARIN: Primary | ICD-10-CM

## 2018-10-31 NOTE — TELEPHONE ENCOUNTER
Wife made aware and will go to [facility], [facility] lab

## 2018-11-01 ENCOUNTER — APPOINTMENT (OUTPATIENT)
Dept: LAB | Facility: CLINIC | Age: 78
End: 2018-11-01
Payer: MEDICARE

## 2018-11-01 DIAGNOSIS — N30.01 ACUTE CYSTITIS WITH HEMATURIA: Primary | ICD-10-CM

## 2018-11-01 LAB
BACTERIA UR QL AUTO: ABNORMAL /HPF
BILIRUB UR QL STRIP: NEGATIVE
CLARITY UR: ABNORMAL
COLOR UR: ABNORMAL
GLUCOSE UR STRIP-MCNC: NEGATIVE MG/DL
HGB UR QL STRIP.AUTO: ABNORMAL
HYALINE CASTS #/AREA URNS LPF: ABNORMAL /LPF
KETONES UR STRIP-MCNC: NEGATIVE MG/DL
LEUKOCYTE ESTERASE UR QL STRIP: ABNORMAL
NITRITE UR QL STRIP: NEGATIVE
NON-SQ EPI CELLS URNS QL MICRO: ABNORMAL /HPF
PH UR STRIP.AUTO: 7 [PH] (ref 4.5–8)
PROT UR STRIP-MCNC: ABNORMAL MG/DL
RBC #/AREA URNS AUTO: ABNORMAL /HPF
SP GR UR STRIP.AUTO: 1.01 (ref 1–1.03)
UROBILINOGEN UR QL STRIP.AUTO: 0.2 E.U./DL
WBC #/AREA URNS AUTO: ABNORMAL /HPF

## 2018-11-01 PROCEDURE — 81001 URINALYSIS AUTO W/SCOPE: CPT | Performed by: FAMILY MEDICINE

## 2018-11-01 RX ORDER — CEPHALEXIN 500 MG/1
500 CAPSULE ORAL EVERY 6 HOURS SCHEDULED
Qty: 40 CAPSULE | Refills: 0 | Status: SHIPPED | OUTPATIENT
Start: 2018-11-01 | End: 2018-11-11

## 2018-11-01 NOTE — PROGRESS NOTES
Please call the patient regarding his abnormal result  Needs a urine culture and we need to start him on antibiotics if he is not already on one if he is not then we should start him on Keflex 500 mg q i d   For 10 days all put the prescription in just in case

## 2018-11-02 ENCOUNTER — APPOINTMENT (OUTPATIENT)
Dept: LAB | Facility: CLINIC | Age: 78
End: 2018-11-02
Payer: MEDICARE

## 2018-11-02 DIAGNOSIS — M79.604 PAIN IN BOTH LOWER EXTREMITIES: ICD-10-CM

## 2018-11-02 DIAGNOSIS — N25.81 SECONDARY HYPERPARATHYROIDISM OF RENAL ORIGIN (HCC): ICD-10-CM

## 2018-11-02 DIAGNOSIS — M79.605 PAIN IN BOTH LOWER EXTREMITIES: ICD-10-CM

## 2018-11-02 DIAGNOSIS — D50.9 IRON DEFICIENCY ANEMIA, UNSPECIFIED IRON DEFICIENCY ANEMIA TYPE: ICD-10-CM

## 2018-11-02 DIAGNOSIS — N18.4 CHRONIC KIDNEY DISEASE, STAGE IV (SEVERE) (HCC): Primary | ICD-10-CM

## 2018-11-02 LAB
ALBUMIN SERPL BCP-MCNC: 3.1 G/DL (ref 3.5–5)
ALP SERPL-CCNC: 33 U/L (ref 46–116)
ALT SERPL W P-5'-P-CCNC: 16 U/L (ref 12–78)
ANION GAP SERPL CALCULATED.3IONS-SCNC: 5 MMOL/L (ref 4–13)
AST SERPL W P-5'-P-CCNC: 16 U/L (ref 5–45)
BASOPHILS # BLD AUTO: 0.07 THOUSANDS/ΜL (ref 0–0.1)
BASOPHILS NFR BLD AUTO: 1 % (ref 0–1)
BILIRUB SERPL-MCNC: 0.27 MG/DL (ref 0.2–1)
BUN SERPL-MCNC: 68 MG/DL (ref 5–25)
CALCIUM SERPL-MCNC: 9.7 MG/DL (ref 8.3–10.1)
CHLORIDE SERPL-SCNC: 99 MMOL/L (ref 100–108)
CO2 SERPL-SCNC: 33 MMOL/L (ref 21–32)
CREAT SERPL-MCNC: 3.03 MG/DL (ref 0.6–1.3)
EOSINOPHIL # BLD AUTO: 0.27 THOUSAND/ΜL (ref 0–0.61)
EOSINOPHIL NFR BLD AUTO: 4 % (ref 0–6)
ERYTHROCYTE [DISTWIDTH] IN BLOOD BY AUTOMATED COUNT: 16.3 % (ref 11.6–15.1)
FERRITIN SERPL-MCNC: 285 NG/ML (ref 8–388)
GFR SERPL CREATININE-BSD FRML MDRD: 19 ML/MIN/1.73SQ M
GLUCOSE P FAST SERPL-MCNC: 127 MG/DL (ref 65–99)
HCT VFR BLD AUTO: 41.6 % (ref 36.5–49.3)
HGB BLD-MCNC: 12.9 G/DL (ref 12–17)
IMM GRANULOCYTES # BLD AUTO: 0.02 THOUSAND/UL (ref 0–0.2)
IMM GRANULOCYTES NFR BLD AUTO: 0 % (ref 0–2)
IRON SATN MFR SERPL: 15 %
IRON SERPL-MCNC: 49 UG/DL (ref 65–175)
LYMPHOCYTES # BLD AUTO: 1.03 THOUSANDS/ΜL (ref 0.6–4.47)
LYMPHOCYTES NFR BLD AUTO: 15 % (ref 14–44)
MCH RBC QN AUTO: 28.9 PG (ref 26.8–34.3)
MCHC RBC AUTO-ENTMCNC: 31 G/DL (ref 31.4–37.4)
MCV RBC AUTO: 93 FL (ref 82–98)
MONOCYTES # BLD AUTO: 0.47 THOUSAND/ΜL (ref 0.17–1.22)
MONOCYTES NFR BLD AUTO: 7 % (ref 4–12)
NEUTROPHILS # BLD AUTO: 5.05 THOUSANDS/ΜL (ref 1.85–7.62)
NEUTS SEG NFR BLD AUTO: 73 % (ref 43–75)
NRBC BLD AUTO-RTO: 0 /100 WBCS
PLATELET # BLD AUTO: 373 THOUSANDS/UL (ref 149–390)
PMV BLD AUTO: 9.9 FL (ref 8.9–12.7)
POTASSIUM SERPL-SCNC: 4.3 MMOL/L (ref 3.5–5.3)
PROT SERPL-MCNC: 6.9 G/DL (ref 6.4–8.2)
PTH-INTACT SERPL-MCNC: 91.4 PG/ML (ref 18.4–80.1)
RBC # BLD AUTO: 4.47 MILLION/UL (ref 3.88–5.62)
SODIUM SERPL-SCNC: 137 MMOL/L (ref 136–145)
TIBC SERPL-MCNC: 334 UG/DL (ref 250–450)
TRANSFERRIN SERPL-MCNC: 252 MG/DL (ref 200–400)
WBC # BLD AUTO: 6.91 THOUSAND/UL (ref 4.31–10.16)

## 2018-11-02 PROCEDURE — 84466 ASSAY OF TRANSFERRIN: CPT

## 2018-11-02 PROCEDURE — 85025 COMPLETE CBC W/AUTO DIFF WBC: CPT

## 2018-11-02 PROCEDURE — 83970 ASSAY OF PARATHORMONE: CPT

## 2018-11-02 PROCEDURE — 83550 IRON BINDING TEST: CPT

## 2018-11-02 PROCEDURE — 83540 ASSAY OF IRON: CPT

## 2018-11-02 PROCEDURE — 82728 ASSAY OF FERRITIN: CPT

## 2018-11-02 PROCEDURE — 80053 COMPREHEN METABOLIC PANEL: CPT

## 2018-11-05 RX ORDER — TRAMADOL HYDROCHLORIDE 50 MG/1
TABLET ORAL
Qty: 60 TABLET | Refills: 0 | Status: SHIPPED | OUTPATIENT
Start: 2018-11-05 | End: 2018-11-26 | Stop reason: SDUPTHER

## 2018-11-06 ENCOUNTER — ANTICOAG VISIT (OUTPATIENT)
Dept: FAMILY MEDICINE CLINIC | Facility: CLINIC | Age: 78
End: 2018-11-06

## 2018-11-06 DIAGNOSIS — Z51.81 ENCOUNTER FOR MONITORING COUMADIN THERAPY: Primary | ICD-10-CM

## 2018-11-06 DIAGNOSIS — Z79.01 ENCOUNTER FOR MONITORING COUMADIN THERAPY: Primary | ICD-10-CM

## 2018-11-06 RX ORDER — WARFARIN SODIUM 5 MG/1
TABLET ORAL
Qty: 90 TABLET | Refills: 3 | Status: ON HOLD | OUTPATIENT
Start: 2018-11-06 | End: 2018-12-16

## 2018-11-14 ENCOUNTER — APPOINTMENT (OUTPATIENT)
Dept: LAB | Facility: MEDICAL CENTER | Age: 78
End: 2018-11-14
Payer: MEDICARE

## 2018-11-14 ENCOUNTER — OFFICE VISIT (OUTPATIENT)
Dept: FAMILY MEDICINE CLINIC | Facility: CLINIC | Age: 78
End: 2018-11-14
Payer: MEDICARE

## 2018-11-14 VITALS
BODY MASS INDEX: 48.97 KG/M2 | HEIGHT: 67 IN | OXYGEN SATURATION: 98 % | WEIGHT: 312 LBS | SYSTOLIC BLOOD PRESSURE: 122 MMHG | DIASTOLIC BLOOD PRESSURE: 56 MMHG

## 2018-11-14 DIAGNOSIS — R31.0 GROSS HEMATURIA: Primary | ICD-10-CM

## 2018-11-14 LAB
BACTERIA UR QL AUTO: ABNORMAL /HPF
BILIRUB UR QL STRIP: NEGATIVE
CLARITY UR: ABNORMAL
COLOR UR: ABNORMAL
GLUCOSE UR STRIP-MCNC: NEGATIVE MG/DL
HGB UR QL STRIP.AUTO: ABNORMAL
HYALINE CASTS #/AREA URNS LPF: ABNORMAL /LPF
INR PPP: 1.73 (ref 0.86–1.17)
KETONES UR STRIP-MCNC: NEGATIVE MG/DL
LEUKOCYTE ESTERASE UR QL STRIP: ABNORMAL
NITRITE UR QL STRIP: NEGATIVE
NON-SQ EPI CELLS URNS QL MICRO: ABNORMAL /HPF
PH UR STRIP.AUTO: 6.5 [PH] (ref 4.5–8)
PROT UR STRIP-MCNC: ABNORMAL MG/DL
PROTHROMBIN TIME: 20.3 SECONDS (ref 11.8–14.2)
RBC #/AREA URNS AUTO: ABNORMAL /HPF
SP GR UR STRIP.AUTO: 1.02 (ref 1–1.03)
UROBILINOGEN UR QL STRIP.AUTO: 0.2 E.U./DL
WBC #/AREA URNS AUTO: ABNORMAL /HPF

## 2018-11-14 PROCEDURE — 99213 OFFICE O/P EST LOW 20 MIN: CPT | Performed by: FAMILY MEDICINE

## 2018-11-14 PROCEDURE — 87086 URINE CULTURE/COLONY COUNT: CPT | Performed by: FAMILY MEDICINE

## 2018-11-14 PROCEDURE — 85610 PROTHROMBIN TIME: CPT

## 2018-11-14 PROCEDURE — 87077 CULTURE AEROBIC IDENTIFY: CPT | Performed by: FAMILY MEDICINE

## 2018-11-14 PROCEDURE — 87186 SC STD MICRODIL/AGAR DIL: CPT | Performed by: FAMILY MEDICINE

## 2018-11-14 PROCEDURE — 81001 URINALYSIS AUTO W/SCOPE: CPT | Performed by: FAMILY MEDICINE

## 2018-11-14 PROCEDURE — 36415 COLL VENOUS BLD VENIPUNCTURE: CPT

## 2018-11-14 RX ORDER — CEPHALEXIN 500 MG/1
500 CAPSULE ORAL EVERY 6 HOURS SCHEDULED
COMMUNITY
End: 2018-12-03 | Stop reason: HOSPADM

## 2018-11-14 NOTE — PROGRESS NOTES
Assessment/Plan: We will draw a urine culture urinalysis and a protime to investigate his hematuria I did call Dr Leticia Kenyon a trial [facility] office he is already scheduled for December 11th on but take on I have no available appointment sooner on will see if there is a infection and if there is will treat this accordingly and    No problem-specific Assessment & Plan notes found for this encounter  Diagnoses and all orders for this visit:    Gross hematuria  -     Urinalysis with microscopic  -     Urine culture  -     Protime-INR    Other orders  -     cephalexin (KEFLEX) 500 mg capsule; Take 500 mg by mouth every 6 (six) hours          Subjective:      Patient ID: Noel Allen is a [de-identified] y o  male  24 hr of gross hematuria        The following portions of the patient's history were reviewed and updated as appropriate:   He  has a past medical history of Anemia; Arthritis; Atrial fibrillation ([address] ); 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[address] ); B12 deficiency; Back pain; Blind right eye; Cancer ([address] ); Cardiac disorder; Chronic combined systolic and diastolic heart failure ([address] ); Chronic obstructive lung disease ([address] ); Chronic venous stasis dermatitis of both lower extremities; CKD (chronic kidney disease), stage IV ([address] ); Coronary artery disease; DDD (degenerative disc disease); DM (diabetes mellitus), type 2 ([address] ); BAER (dyspnea on exertion); Easy bruising; Epistaxis; Gout; Gross hematuria; Hepatitis; Herpes zoster; Hiatal hernia; History of cellulitis; History of prostate cancer; Hypercholesterolemia; Hyperlipidemia; Hypertension; Ischemic cardiomyopathy; Lung mass; MI, old; Morbid obesity due to excess calories ([address] ); Neuropathy; Obesity; Obstructive sleep apnea syndrome, severe; Pneumonia; Shortness of breath; TIA (transient ischemic attack); Urinary incontinence; Urinary retention; and Use of cane as ambulatory aid    He   Patient Active Problem List    Diagnosis Date Noted    Malignant neoplasm of upper lobe of right lung ([place] [address] ) 04/10/2018    LESLIE (acute kidney injury) ([place] [address] ) 10/26/2017    Hypokalemia 10/25/2017    Vision loss, 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 10/21/2017    Atrial fibrillation ([place] [address] )     Blind right eye     Acute on chronic combined systolic and diastolic ACC/AHA stage C congestive heart failure ([place] [address] )     Coronary artery disease     DM (diabetes mellitus), type 2 with peripheral neuropathy     Obstructive sleep apnea syndrome, severe     Severe obstructive sleep apnea-hypopnea syndrome 04/13/2017    Wound of right lower extremity 03/05/2017    Wound of left lower extremity 03/05/2017    Hyperphosphatemia 03/05/2017    Vitamin D deficiency 03/05/2017    Renal cyst 03/05/2017    Body mass index (BMI) of 40 0 to 49 9 03/04/2017    Anemia 10/04/2016    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    CKD (chronic kidney disease) stage 4, GFR 15-29 ml/min ([place] [address] ) 07/16/2016    Morbid obesity ([place] [address] )     History of prostate cancer     Type 2 diabetes mellitus with hyperglycemia (HCC)     Bilateral leg edema 01/19/2016    Prostate cancer ([place] [address] ) 07/24/2012     He  has a past surgical history that includes Coronary angioplasty with stent; Cystoscopy; Lung cancer surgery; Cataract extraction, bilateral; Eye surgery; Hernia repair; Vascular surgery; Cardiac surgery; Abdominal surgery; Coronary artery bypass graft (N/A, 7/15/2016); pr cystourethroscopy,ureter catheter (Left, 3/9/2016); PILONIDAL CYST EXCISION; pr temporal artery ligatn or bx (Bilateral, 10/31/2017); Lung surgery (Left, 2012); Other surgical history; and Renal artery stent (02/16/2015)  His family history includes Cancer in his mother, other, other, and sister; Diabetes in his father, maternal grandmother, mother, and other; Diabetes type II in his mother; Heart disease in his mother; Hypertension in his mother and other  He  reports that he quit smoking about 14 years ago  He has a 108 00 pack-year smoking history   He has never used smokeless tobacco  He reports that he does not drink alcohol or use drugs  Current Outpatient Prescriptions   Medication Sig Dispense Refill    aspirin (ECOTRIN LOW STRENGTH) 81 mg EC tablet Take 81 mg by mouth 2 (two) times a day      cephalexin (KEFLEX) 500 mg capsule Take 500 mg by mouth every 6 (six) hours      Cholecalciferol 2000 units TABS Take 1 tablet (2,000 Units total) by mouth daily 30 tablet 2    furosemide (LASIX) 80 mg tablet Take 1 tablet by mouth 2 (two) times a day 30 tablet 0    gabapentin (NEURONTIN) 300 mg capsule Take 2 capsules (600 mg total) by mouth daily at bedtime 60 capsule 3    glucagon (GLUCAGON EMERGENCY) 1 MG injection Inject 1 mg under the skin once as needed for low blood sugar (as needed for a blood sugar less than 70 if unconscious or unable to correct by oral means) for up to 1 dose 1 each 0    insulin detemir (LEVEMIR) 100 units/mL subcutaneous injection Inject 55 Units under the skin daily at bedtime      insulin lispro (HumaLOG) 100 units/mL injection Inject 20 Units under the skin 3 (three) times a day with meals  0    iron polysaccharides (NIFEREX) 150 mg capsule Take 150 mg by mouth daily      metoprolol tartrate (LOPRESSOR) 25 mg tablet Take 25 mg by mouth 2 (two) times a day      simvastatin (ZOCOR) 40 mg tablet Take 40 mg by mouth daily at bedtime      tamsulosin (FLOMAX) 0 4 mg Take 0 4 mg by mouth daily at bedtime        traMADol (ULTRAM) 50 mg tablet 1 tablet 3 times daily with 2 extra Strength Tylenol 60 tablet 0    warfarin (COUMADIN) 5 mg tablet One tab daily or as directed by provider 90 tablet 3    potassium chloride (MICRO-K) 10 MEQ CR capsule Take 1 capsule (10 mEq total) by mouth 2 (two) times a day for 30 days 60 capsule 5     No current facility-administered medications for this visit        Current Outpatient Prescriptions on File Prior to Visit   Medication Sig    aspirin (ECOTRIN LOW STRENGTH) 81 mg EC tablet Take 81 mg by mouth 2 (two) times a day    Cholecalciferol 2000 units TABS Take 1 tablet (2,000 Units total) by mouth daily    furosemide (LASIX) 80 mg tablet Take 1 tablet by mouth 2 (two) times a day    gabapentin (NEURONTIN) 300 mg capsule Take 2 capsules (600 mg total) by mouth daily at bedtime    glucagon (GLUCAGON EMERGENCY) 1 MG injection Inject 1 mg under the skin once as needed for low blood sugar (as needed for a blood sugar less than 70 if unconscious or unable to correct by oral means) for up to 1 dose    insulin detemir (LEVEMIR) 100 units/mL subcutaneous injection Inject 55 Units under the skin daily at bedtime    insulin lispro (HumaLOG) 100 units/mL injection Inject 20 Units under the skin 3 (three) times a day with meals    iron polysaccharides (NIFEREX) 150 mg capsule Take 150 mg by mouth daily    metoprolol tartrate (LOPRESSOR) 25 mg tablet Take 25 mg by mouth 2 (two) times a day    simvastatin (ZOCOR) 40 mg tablet Take 40 mg by mouth daily at bedtime    tamsulosin (FLOMAX) 0 4 mg Take 0 4 mg by mouth daily at bedtime      traMADol (ULTRAM) 50 mg tablet 1 tablet 3 times daily with 2 extra Strength Tylenol    warfarin (COUMADIN) 5 mg tablet One tab daily or as directed by provider    potassium chloride (MICRO-K) 10 MEQ CR capsule Take 1 capsule (10 mEq total) by mouth 2 (two) times a day for 30 days     No current facility-administered medications on file prior to visit  He is allergic to other       Review of Systems   Constitutional: Negative for activity change, appetite change, diaphoresis, fatigue and fever  HENT: Negative  Eyes: Negative  Respiratory: Negative for apnea, cough, chest tightness, shortness of breath and wheezing  Cardiovascular: Negative for chest pain, palpitations and leg swelling  Gastrointestinal: Negative for abdominal distention, abdominal pain, anal bleeding, constipation, diarrhea, nausea and vomiting  Endocrine: Negative for cold intolerance, heat intolerance, polydipsia, polyphagia and polyuria  Genitourinary: Negative for difficulty urinating, dysuria, flank pain, hematuria and urgency  Grtoss hematuria   Musculoskeletal: Negative for arthralgias, back pain, gait problem, joint swelling and myalgias  Skin: Negative for color change, rash and wound  Allergic/Immunologic: Negative for environmental allergies, food allergies and immunocompromised state  Neurological: Negative for dizziness, seizures, syncope, speech difficulty, numbness and headaches  Hematological: Negative for adenopathy  Does not bruise/bleed easily  Psychiatric/Behavioral: Negative for agitation, behavioral problems, hallucinations, sleep disturbance and suicidal ideas  Objective:      /56 (BP Location: Left arm, Patient Position: Sitting, Cuff Size: Standard)   Ht 5' 7" (1 702 m)   Wt (!) 142 kg (312 lb)   SpO2 98% Comment: 3 l/m nasal cannula  BMI 48 87 kg/m²          Physical Exam   Constitutional: He is oriented to person, place, and time  He appears well-developed and well-nourished  No distress  HENT:   Head: Normocephalic  Right Ear: External ear normal    Left Ear: External ear normal    Nose: Nose normal    Mouth/Throat: Oropharynx is clear and moist    Eyes: Pupils are equal, round, and reactive to light  Conjunctivae and EOM are normal  Right eye exhibits no discharge  Left eye exhibits no discharge  No scleral icterus  Neck: Normal range of motion  No tracheal deviation present  No thyromegaly present  Cardiovascular: Normal rate, regular rhythm and normal heart sounds  Exam reveals no gallop and no friction rub  No murmur heard  Pulmonary/Chest: Effort normal and breath sounds normal  No respiratory distress  He has no wheezes  Abdominal: Soft  Bowel sounds are normal  He exhibits no mass  There is no tenderness  There is no guarding  Musculoskeletal: He exhibits no edema or deformity  Lymphadenopathy:     He has no cervical adenopathy     Neurological: He is alert and oriented to person, place, and time  No cranial nerve deficit  Skin: Skin is warm and dry  No rash noted  He is not diaphoretic  No erythema  Psychiatric: He has a normal mood and affect   Thought content normal

## 2018-11-15 ENCOUNTER — ANTICOAG VISIT (OUTPATIENT)
Dept: FAMILY MEDICINE CLINIC | Facility: CLINIC | Age: 78
End: 2018-11-15

## 2018-11-15 DIAGNOSIS — N30.01 ACUTE CYSTITIS WITH HEMATURIA: Primary | ICD-10-CM

## 2018-11-15 RX ORDER — CIPROFLOXACIN 500 MG/1
500 TABLET, FILM COATED ORAL EVERY 12 HOURS SCHEDULED
Qty: 12 TABLET | Refills: 0 | Status: SHIPPED | OUTPATIENT
Start: 2018-11-15 | End: 2018-11-25

## 2018-11-15 NOTE — PROGRESS NOTES
Please call the patient regarding his abnormal result    INR is okay it is 1 73 so he is not overly anticoagulated repeat INR in 2 weeks

## 2018-11-15 NOTE — PROGRESS NOTES
Please call the patient regarding his abnormal result  Make sure patient has been started on antibiotic therapy if he has not we want him to be on cephalexin 500 mg q i d   For 10 days

## 2018-11-16 ENCOUNTER — TELEPHONE (OUTPATIENT)
Dept: FAMILY MEDICINE CLINIC | Facility: CLINIC | Age: 78
End: 2018-11-16

## 2018-11-16 NOTE — TELEPHONE ENCOUNTER
If patient finds it difficult to avoid again and have due to blockage from blood clot then he definitely needs to go to the emergency room to have a catheter put in and his bladder irrigated

## 2018-11-16 NOTE — TELEPHONE ENCOUNTER
Wife wanted Dr to be aware - Pt was unable to void this morning & then a little later passed a blood clot and able to void again  [facility] called for approval to dispense the Cipro due to the contraindication - Rx will be marked that Pt is to hold coumadin while on antibiotic  DOES DR HAVE ANY OTHER RECOMMENDATIONS?

## 2018-11-17 LAB
BACTERIA UR CULT: ABNORMAL
BACTERIA UR CULT: ABNORMAL

## 2018-11-19 NOTE — PROGRESS NOTES
Please call the patient regarding his abnormal result    Patient has bacteria in his bladder but not significant amounts that would explain his bleeding make sure that on he follows through with his on consult with Dr Megan Allen ski

## 2018-11-26 DIAGNOSIS — M79.605 PAIN IN BOTH LOWER EXTREMITIES: ICD-10-CM

## 2018-11-26 DIAGNOSIS — M79.604 PAIN IN BOTH LOWER EXTREMITIES: ICD-10-CM

## 2018-11-26 RX ORDER — TRAMADOL HYDROCHLORIDE 50 MG/1
TABLET ORAL
Qty: 60 TABLET | Refills: 0 | Status: SHIPPED | OUTPATIENT
Start: 2018-11-26 | End: 2018-12-24 | Stop reason: SDUPTHER

## 2018-11-30 ENCOUNTER — APPOINTMENT (EMERGENCY)
Dept: RADIOLOGY | Facility: HOSPITAL | Age: 78
DRG: 153 | End: 2018-11-30
Payer: MEDICARE

## 2018-11-30 ENCOUNTER — APPOINTMENT (EMERGENCY)
Dept: CT IMAGING | Facility: HOSPITAL | Age: 78
DRG: 153 | End: 2018-11-30
Payer: MEDICARE

## 2018-11-30 ENCOUNTER — HOSPITAL ENCOUNTER (INPATIENT)
Facility: HOSPITAL | Age: 78
LOS: 3 days | Discharge: HOME HEALTH | DRG: 153 | End: 2018-12-03
Attending: EMERGENCY MEDICINE | Admitting: INTERNAL MEDICINE
Payer: MEDICARE

## 2018-11-30 DIAGNOSIS — L03.119 RECURRENT CELLULITIS OF LOWER EXTREMITY: ICD-10-CM

## 2018-11-30 DIAGNOSIS — S81.801A WOUND OF RIGHT LOWER EXTREMITY, INITIAL ENCOUNTER: ICD-10-CM

## 2018-11-30 DIAGNOSIS — R53.1 WEAKNESS GENERALIZED: Primary | ICD-10-CM

## 2018-11-30 DIAGNOSIS — R55 NEAR SYNCOPE: ICD-10-CM

## 2018-11-30 DIAGNOSIS — I25.810 CAD (CORONARY ARTERY DISEASE) OF BYPASS GRAFT: ICD-10-CM

## 2018-11-30 DIAGNOSIS — N18.9 CKD (CHRONIC KIDNEY DISEASE): ICD-10-CM

## 2018-11-30 DIAGNOSIS — N39.0 UTI (URINARY TRACT INFECTION): ICD-10-CM

## 2018-11-30 DIAGNOSIS — I21.4 NSTEMI (NON-ST ELEVATED MYOCARDIAL INFARCTION) (HCC): ICD-10-CM

## 2018-11-30 DIAGNOSIS — J10.1 INFLUENZA A: ICD-10-CM

## 2018-11-30 PROBLEM — N17.9 AKI (ACUTE KIDNEY INJURY) (HCC): Status: RESOLVED | Noted: 2017-10-26 | Resolved: 2018-11-30

## 2018-11-30 PROBLEM — R31.9 URINARY TRACT INFECTION WITH HEMATURIA: Status: ACTIVE | Noted: 2018-11-30

## 2018-11-30 LAB
ALBUMIN SERPL BCP-MCNC: 3.1 G/DL (ref 3.5–5)
ALP SERPL-CCNC: 33 U/L (ref 46–116)
ALT SERPL W P-5'-P-CCNC: 23 U/L (ref 12–78)
ANION GAP SERPL CALCULATED.3IONS-SCNC: 8 MMOL/L (ref 4–13)
APTT PPP: 36 SECONDS (ref 26–38)
AST SERPL W P-5'-P-CCNC: 26 U/L (ref 5–45)
ATRIAL RATE: 88 BPM
BACTERIA UR QL AUTO: ABNORMAL /HPF
BASOPHILS # BLD AUTO: 0.07 THOUSANDS/ΜL (ref 0–0.1)
BASOPHILS NFR BLD AUTO: 1 % (ref 0–1)
BILIRUB SERPL-MCNC: 0.3 MG/DL (ref 0.2–1)
BILIRUB UR QL STRIP: NEGATIVE
BUN SERPL-MCNC: 61 MG/DL (ref 5–25)
CALCIUM SERPL-MCNC: 8.9 MG/DL (ref 8.3–10.1)
CHLORIDE SERPL-SCNC: 102 MMOL/L (ref 100–108)
CLARITY UR: ABNORMAL
CO2 SERPL-SCNC: 28 MMOL/L (ref 21–32)
COLOR UR: YELLOW
CREAT SERPL-MCNC: 2.63 MG/DL (ref 0.6–1.3)
EOSINOPHIL # BLD AUTO: 0.12 THOUSAND/ΜL (ref 0–0.61)
EOSINOPHIL NFR BLD AUTO: 2 % (ref 0–6)
ERYTHROCYTE [DISTWIDTH] IN BLOOD BY AUTOMATED COUNT: 16.3 % (ref 11.6–15.1)
GFR SERPL CREATININE-BSD FRML MDRD: 22 ML/MIN/1.73SQ M
GLUCOSE SERPL-MCNC: 145 MG/DL (ref 65–140)
GLUCOSE SERPL-MCNC: 162 MG/DL (ref 65–140)
GLUCOSE SERPL-MCNC: 171 MG/DL (ref 65–140)
GLUCOSE UR STRIP-MCNC: NEGATIVE MG/DL
HCT VFR BLD AUTO: 37.4 % (ref 36.5–49.3)
HGB BLD-MCNC: 12 G/DL (ref 12–17)
HGB UR QL STRIP.AUTO: ABNORMAL
IMM GRANULOCYTES # BLD AUTO: 0.04 THOUSAND/UL (ref 0–0.2)
IMM GRANULOCYTES NFR BLD AUTO: 1 % (ref 0–2)
INR PPP: 1.24 (ref 0.86–1.17)
KETONES UR STRIP-MCNC: NEGATIVE MG/DL
LACTATE SERPL-SCNC: 1.2 MMOL/L (ref 0.5–2)
LEUKOCYTE ESTERASE UR QL STRIP: ABNORMAL
LYMPHOCYTES # BLD AUTO: 0.65 THOUSANDS/ΜL (ref 0.6–4.47)
LYMPHOCYTES NFR BLD AUTO: 10 % (ref 14–44)
MAGNESIUM SERPL-MCNC: 2 MG/DL (ref 1.6–2.6)
MCH RBC QN AUTO: 29.5 PG (ref 26.8–34.3)
MCHC RBC AUTO-ENTMCNC: 32.1 G/DL (ref 31.4–37.4)
MCV RBC AUTO: 92 FL (ref 82–98)
MONOCYTES # BLD AUTO: 0.61 THOUSAND/ΜL (ref 0.17–1.22)
MONOCYTES NFR BLD AUTO: 10 % (ref 4–12)
MUCOUS THREADS UR QL AUTO: ABNORMAL
NEUTROPHILS # BLD AUTO: 4.9 THOUSANDS/ΜL (ref 1.85–7.62)
NEUTS SEG NFR BLD AUTO: 76 % (ref 43–75)
NITRITE UR QL STRIP: NEGATIVE
NON-SQ EPI CELLS URNS QL MICRO: ABNORMAL /HPF
NRBC BLD AUTO-RTO: 0 /100 WBCS
NT-PROBNP SERPL-MCNC: 5607 PG/ML
P AXIS: 63 DEGREES
PH UR STRIP.AUTO: 6 [PH] (ref 4.5–8)
PLATELET # BLD AUTO: 250 THOUSANDS/UL (ref 149–390)
PMV BLD AUTO: 9.3 FL (ref 8.9–12.7)
POTASSIUM SERPL-SCNC: 4.3 MMOL/L (ref 3.5–5.3)
PR INTERVAL: 166 MS
PROT SERPL-MCNC: 6.6 G/DL (ref 6.4–8.2)
PROT UR STRIP-MCNC: NEGATIVE MG/DL
PROTHROMBIN TIME: 15 SECONDS (ref 11.8–14.2)
QRS AXIS: -64 DEGREES
QRSD INTERVAL: 148 MS
QT INTERVAL: 420 MS
QTC INTERVAL: 508 MS
RBC # BLD AUTO: 4.07 MILLION/UL (ref 3.88–5.62)
RBC #/AREA URNS AUTO: ABNORMAL /HPF
SODIUM SERPL-SCNC: 138 MMOL/L (ref 136–145)
SP GR UR STRIP.AUTO: 1.01 (ref 1–1.03)
T WAVE AXIS: 64 DEGREES
TROPONIN I SERPL-MCNC: 0.11 NG/ML
UROBILINOGEN UR QL STRIP.AUTO: 0.2 E.U./DL
VENTRICULAR RATE: 88 BPM
WBC # BLD AUTO: 6.39 THOUSAND/UL (ref 4.31–10.16)
WBC #/AREA URNS AUTO: ABNORMAL /HPF

## 2018-11-30 PROCEDURE — 36415 COLL VENOUS BLD VENIPUNCTURE: CPT | Performed by: EMERGENCY MEDICINE

## 2018-11-30 PROCEDURE — 94664 DEMO&/EVAL PT USE INHALER: CPT

## 2018-11-30 PROCEDURE — 83605 ASSAY OF LACTIC ACID: CPT | Performed by: EMERGENCY MEDICINE

## 2018-11-30 PROCEDURE — 94760 N-INVAS EAR/PLS OXIMETRY 1: CPT

## 2018-11-30 PROCEDURE — 71045 X-RAY EXAM CHEST 1 VIEW: CPT

## 2018-11-30 PROCEDURE — 87086 URINE CULTURE/COLONY COUNT: CPT | Performed by: EMERGENCY MEDICINE

## 2018-11-30 PROCEDURE — 93005 ELECTROCARDIOGRAM TRACING: CPT

## 2018-11-30 PROCEDURE — 87493 C DIFF AMPLIFIED PROBE: CPT | Performed by: PHYSICIAN ASSISTANT

## 2018-11-30 PROCEDURE — 87040 BLOOD CULTURE FOR BACTERIA: CPT | Performed by: EMERGENCY MEDICINE

## 2018-11-30 PROCEDURE — 93010 ELECTROCARDIOGRAM REPORT: CPT | Performed by: INTERNAL MEDICINE

## 2018-11-30 PROCEDURE — 85610 PROTHROMBIN TIME: CPT | Performed by: EMERGENCY MEDICINE

## 2018-11-30 PROCEDURE — 70450 CT HEAD/BRAIN W/O DYE: CPT

## 2018-11-30 PROCEDURE — 94640 AIRWAY INHALATION TREATMENT: CPT

## 2018-11-30 PROCEDURE — 81001 URINALYSIS AUTO W/SCOPE: CPT | Performed by: EMERGENCY MEDICINE

## 2018-11-30 PROCEDURE — 82948 REAGENT STRIP/BLOOD GLUCOSE: CPT

## 2018-11-30 PROCEDURE — 99223 1ST HOSP IP/OBS HIGH 75: CPT | Performed by: INTERNAL MEDICINE

## 2018-11-30 PROCEDURE — 80053 COMPREHEN METABOLIC PANEL: CPT | Performed by: EMERGENCY MEDICINE

## 2018-11-30 PROCEDURE — 84484 ASSAY OF TROPONIN QUANT: CPT | Performed by: EMERGENCY MEDICINE

## 2018-11-30 PROCEDURE — 99285 EMERGENCY DEPT VISIT HI MDM: CPT

## 2018-11-30 PROCEDURE — 83880 ASSAY OF NATRIURETIC PEPTIDE: CPT | Performed by: EMERGENCY MEDICINE

## 2018-11-30 PROCEDURE — 85730 THROMBOPLASTIN TIME PARTIAL: CPT | Performed by: EMERGENCY MEDICINE

## 2018-11-30 PROCEDURE — 51798 US URINE CAPACITY MEASURE: CPT

## 2018-11-30 PROCEDURE — 83735 ASSAY OF MAGNESIUM: CPT | Performed by: EMERGENCY MEDICINE

## 2018-11-30 PROCEDURE — 85025 COMPLETE CBC W/AUTO DIFF WBC: CPT | Performed by: EMERGENCY MEDICINE

## 2018-11-30 RX ORDER — CEFTRIAXONE 1 G/50ML
1000 INJECTION, SOLUTION INTRAVENOUS EVERY 24 HOURS
Status: DISCONTINUED | OUTPATIENT
Start: 2018-12-02 | End: 2018-11-30

## 2018-11-30 RX ORDER — TRAMADOL HYDROCHLORIDE 50 MG/1
50 TABLET ORAL EVERY 6 HOURS PRN
Status: DISCONTINUED | OUTPATIENT
Start: 2018-11-30 | End: 2018-12-03 | Stop reason: HOSPADM

## 2018-11-30 RX ORDER — PRAVASTATIN SODIUM 40 MG
80 TABLET ORAL
Status: DISCONTINUED | OUTPATIENT
Start: 2018-11-30 | End: 2018-12-03 | Stop reason: HOSPADM

## 2018-11-30 RX ORDER — WARFARIN SODIUM 5 MG/1
5 TABLET ORAL
Status: DISCONTINUED | OUTPATIENT
Start: 2018-11-30 | End: 2018-12-02

## 2018-11-30 RX ORDER — CEFTRIAXONE 1 G/50ML
1000 INJECTION, SOLUTION INTRAVENOUS EVERY 24 HOURS
Status: DISCONTINUED | OUTPATIENT
Start: 2018-12-01 | End: 2018-12-01

## 2018-11-30 RX ORDER — TAMSULOSIN HYDROCHLORIDE 0.4 MG/1
0.4 CAPSULE ORAL
Status: DISCONTINUED | OUTPATIENT
Start: 2018-11-30 | End: 2018-12-03 | Stop reason: HOSPADM

## 2018-11-30 RX ORDER — CEFTRIAXONE 1 G/50ML
1000 INJECTION, SOLUTION INTRAVENOUS ONCE
Status: COMPLETED | OUTPATIENT
Start: 2018-11-30 | End: 2018-11-30

## 2018-11-30 RX ORDER — IRON POLYSACCHARIDE COMPLEX 150 MG
150 CAPSULE ORAL DAILY
Status: DISCONTINUED | OUTPATIENT
Start: 2018-12-01 | End: 2018-12-03 | Stop reason: HOSPADM

## 2018-11-30 RX ORDER — POTASSIUM CHLORIDE 20MEQ/15ML
40 LIQUID (ML) ORAL DAILY
Status: DISCONTINUED | OUTPATIENT
Start: 2018-12-01 | End: 2018-12-03 | Stop reason: HOSPADM

## 2018-11-30 RX ORDER — FUROSEMIDE 80 MG
80 TABLET ORAL 2 TIMES DAILY
Status: DISCONTINUED | OUTPATIENT
Start: 2018-11-30 | End: 2018-12-03 | Stop reason: HOSPADM

## 2018-11-30 RX ORDER — MELATONIN
2000 DAILY
Status: DISCONTINUED | OUTPATIENT
Start: 2018-12-01 | End: 2018-12-03 | Stop reason: HOSPADM

## 2018-11-30 RX ORDER — ASPIRIN 81 MG/1
81 TABLET ORAL 2 TIMES DAILY
Status: DISCONTINUED | OUTPATIENT
Start: 2018-11-30 | End: 2018-12-03 | Stop reason: HOSPADM

## 2018-11-30 RX ORDER — GABAPENTIN 300 MG/1
600 CAPSULE ORAL
Status: DISCONTINUED | OUTPATIENT
Start: 2018-11-30 | End: 2018-12-03 | Stop reason: HOSPADM

## 2018-11-30 RX ORDER — LEVALBUTEROL 1.25 MG/.5ML
1.25 SOLUTION, CONCENTRATE RESPIRATORY (INHALATION)
Status: DISCONTINUED | OUTPATIENT
Start: 2018-11-30 | End: 2018-12-03

## 2018-11-30 RX ADMIN — WARFARIN SODIUM 5 MG: 5 TABLET ORAL at 19:22

## 2018-11-30 RX ADMIN — ASPIRIN 81 MG: 81 TABLET, COATED ORAL at 19:22

## 2018-11-30 RX ADMIN — INSULIN LISPRO 20 UNITS: 100 INJECTION, SOLUTION INTRAVENOUS; SUBCUTANEOUS at 19:42

## 2018-11-30 RX ADMIN — INSULIN DETEMIR 50 UNITS: 100 INJECTION, SOLUTION SUBCUTANEOUS at 22:25

## 2018-11-30 RX ADMIN — PRAVASTATIN SODIUM 80 MG: 40 TABLET ORAL at 19:22

## 2018-11-30 RX ADMIN — GABAPENTIN 600 MG: 300 CAPSULE ORAL at 22:18

## 2018-11-30 RX ADMIN — FUROSEMIDE 80 MG: 80 TABLET ORAL at 19:22

## 2018-11-30 RX ADMIN — TAMSULOSIN HYDROCHLORIDE 0.4 MG: 0.4 CAPSULE ORAL at 22:18

## 2018-11-30 RX ADMIN — IPRATROPIUM BROMIDE 0.5 MG: 0.5 SOLUTION RESPIRATORY (INHALATION) at 19:44

## 2018-11-30 RX ADMIN — LEVALBUTEROL 1.25 MG: 1.25 SOLUTION, CONCENTRATE RESPIRATORY (INHALATION) at 19:44

## 2018-11-30 RX ADMIN — METOPROLOL TARTRATE 25 MG: 25 TABLET, FILM COATED ORAL at 19:22

## 2018-11-30 RX ADMIN — CEFTRIAXONE 1000 MG: 1 INJECTION, SOLUTION INTRAVENOUS at 12:32

## 2018-11-30 NOTE — H&P
H&P- Romie Smith 1940, [de-identified] y o  male MRN: [id number]    Unit/Bed#: 408-01 Encounter: [id number]    Primary Care Provider: Oz Sultana DO   Date and time admitted to hospital: 11/30/2018  8:08 AM        * Generalized weakness   Assessment & Plan    Patient with sudden onset of generalized weakness, no focal neurologic deficits but with acute ambulatory dysfunction, unable to ambulate at baseline  Possible UTI, possible lower extremity cellulitis, will give antibiotics and continue to monitor  Consult PT OT, patient will likely need short-term rehab placement     Urinary tract infection with hematuria   Assessment & Plan    IV ceftriaxone, follow up urine culture  Bilateral leg edema   Assessment & Plan    Patient with increased edema increased warmth as well as bilateral lower extremity wounds, likely venous stasis ulcers bilateral lower extremities, will consult wound care, give IV ceftriaxone, continue to monitor, keep legs elevated, possible lower extremity cellulitis with increased erythema of the right leg     CKD (chronic kidney disease) stage 4, GFR 15-29 ml/min [facility])   Assessment & Plan    Patient currently at baseline renal function, continue monitor daily, avoid further nephrotoxins  Atrial fibrillation ([facility] [address] )   Assessment & Plan    Continue home meds     DM (diabetes mellitus), type 2 with peripheral neuropathy   Assessment & Plan    Lab Results   Component Value Date    HGBA1C 6 9 (H) 10/22/2018       No results for input(s): POCGLU in the last 72 hours  Blood Sugar Average: Last 72 hrs:   monitor Accu-Cheks AC and HS  Morbid obesity ([address] )   Assessment & Plan    BMI 47 24, consult to Nutrition       Coronary artery disease   Assessment & Plan    Continue medical management     Obstructive sleep apnea syndrome, severe   Assessment & Plan    CPAP at night           VTE Prophylaxis: Heparin  / sequential compression device   Code Status:  Full code  POLST: There is no POLST form on file for this patient (pre-hospital)    Anticipated Length of Stay:  Patient will be admitted on an Inpatient basis with an anticipated length of stay of  greater than 2 midnights  Justification for Hospital Stay:  See plan above, IV antibiotics pending urine cultures, PT OT consult  Total Time for Visit, including Counseling / Coordination of Care: 80 minutes  Greater than 50% of this total time spent on direct patient counseling and coordination of care  Chief Complaint:   Sudden onset of severe weakness, unable to ambulate independently  History of Present Illness:    Modesta Collins is a [de-identified] y o  male who presents with severe weakness, patient normally is able to get himself out of bed, this morning when he awoke he was on able to get himself out of his bed, he needed the assistance of 2 of his sons  Patient had some hematuria last week, passed a kidney stone, he states that since resolved, patient claims that he went to bed last night feeling okay, normally can get himself out of bed, ambulate with a walker, notes today that he had severe weakness, unable to sit himself up in bed, was unable to ambulate with walker  Patient denies any focal deficits, he did note that he felt to be leaning to his left  Patient has no other acute complaints, he chronically has very poor vision  Review of Systems:    Review of Systems   Constitutional: Positive for activity change  Negative for appetite change, chills and fatigue  HENT: Negative  Eyes: Negative  Respiratory: Negative  Cardiovascular: Negative  Gastrointestinal: Negative  Endocrine: Negative  Genitourinary: Positive for hematuria  Patient recently had hematuria several days ago, passed a kidney stone   Musculoskeletal: Negative  Skin: Negative  Allergic/Immunologic: Negative  Neurological: Positive for weakness  Hematological: Negative  Psychiatric/Behavioral: Negative          Past Medical and Surgical History:     Past Medical History:   Diagnosis Date    Anemia     Arthritis     Atrial fibrillation (HCC)     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HCC)     B12 deficiency     Back pain     Blind right eye     Cancer ([address] )     prostate    Cardiac disorder     Chronic combined systolic and diastolic heart failure (HCC)     Chronic obstructive lung disease (HCC)     Chronic venous stasis dermatitis of both lower extremities     CKD (chronic kidney disease), stage IV (HCC)     Coronary artery disease     DDD (degenerative disc disease)     DM (diabetes mellitus), type 2 (HCC)     Type II, on insulin    BAER (dyspnea on exertion)     Easy bruising     Epistaxis     Gout     Gross hematuria     last assessed: 3/18/2015    Hepatitis     Herpes zoster     Hiatal hernia     History of cellulitis     BLE    History of prostate cancer     Radiation treatments   Hypercholesterolemia     Hyperlipidemia     Hypertension     Ischemic cardiomyopathy     Lung mass     last assessed: 9/21/2012    MI, old    Marialuisa Lamandrea Morbid obesity due to excess calories (HCC)     or severe obesity    Neuropathy     BLE    Obesity     Obstructive sleep apnea syndrome, severe     Pneumonia     last assessed: 7/24/2012    Shortness of breath     TIA (transient ischemic attack)     Urinary incontinence     Urinary retention     Use of cane as ambulatory aid     Independent with personal care         Past Surgical History:   Procedure Laterality Date    ABDOMINAL SURGERY      CARDIAC SURGERY      CATARACT EXTRACTION, BILATERAL      CORONARY ANGIOPLASTY WITH STENT PLACEMENT      2 stents    CORONARY ARTERY BYPASS GRAFT N/A 7/15/2016    Procedure: Intraop ADRIAN, CABG x 3 using LIMA to LAD, RSVG to OM1 and D1;  Surgeon: Stephany Greene MD;  [facility];  Service:    Gearldean Hatchet      has stents    EYE SURGERY      Bilateral cataract removal    HERNIA REPAIR      umbilical hernia repair    LUNG CANCER SURGERY Partial upper right lobectomy    LUNG SURGERY Left 2012    OTHER SURGICAL HISTORY      previous stent placement-no anatomy given    PILONIDAL CYST EXCISION      [place] CYSTOURETHROSCOPY,URETER CATHETER Left 3/9/2016    Procedure: CYSTOSCOPY WITH left RETROGRADE PYELOGRAM left double J stent removal;  Surgeon: Shashank Kevin MD;  Location: [facility];  Service: Urology    [place] TEMPORAL ARTERY LIGATN OR BX Bilateral 10/31/2017    Procedure: BIOPSY ARTERY TEMPORAL;  Surgeon: Real Traore MD;  Location: [facility];  Service: Vascular    RENAL ARTERY STENT  02/16/2015    transcath intravascular stent placement percutaneous renal    VASCULAR SURGERY         Meds/Allergies:    Prior to Admission medications    Medication Sig Start Date End Date Taking?  Authorizing Provider   aspirin (ECOTRIN LOW STRENGTH) 81 mg EC tablet Take 81 mg by mouth 2 (two) times a day   Yes Historical Provider, MD   furosemide (LASIX) 80 mg tablet Take 1 tablet by mouth 2 (two) times a day 10/27/17  Yes Sary Juan,    gabapentin (NEURONTIN) 300 mg capsule Take 2 capsules (600 mg total) by mouth daily at bedtime 7/9/18  Yes Gale Bailey DO   insulin detemir (LEVEMIR) 100 units/mL subcutaneous injection Inject 55 Units under the skin daily at bedtime  Patient taking differently: Inject 50 Units under the skin daily at bedtime   4/26/18  Yes Gale Bailey DO   insulin lispro (HumaLOG) 100 units/mL injection Inject 20 Units under the skin 3 (three) times a day with meals  Patient taking differently: Inject 15-25 Units under the skin 3 (three) times a day with meals   7/9/18  Yes Gale Bailey DO   iron polysaccharides (NIFEREX) 150 mg capsule Take 150 mg by mouth daily   Yes Historical Provider, MD   metoprolol tartrate (LOPRESSOR) 25 mg tablet Take 25 mg by mouth 2 (two) times a day   Yes Historical Provider, MD   simvastatin (ZOCOR) 40 mg tablet Take 40 mg by mouth daily at bedtime   Yes Historical Provider, MD   tamsulosin (FLOMAX) 0 4 mg Take 0 4 mg by mouth daily at bedtime     Yes Historical Provider, MD   traMADol (ULTRAM) 50 mg tablet 1 tablet 3 times daily with 2 extra Strength Tylenol 11/26/18  Yes Kat Lagos DO   warfarin (COUMADIN) 5 mg tablet One tab daily or as directed by provider 11/6/18  Yes Kat Lagos DO   cephalexin [facility]) 500 mg capsule Take 500 mg by mouth every 6 (six) hours    Historical Provider, MD   Cholecalciferol 2000 units TABS Take 1 tablet (2,000 Units total) by mouth daily  Patient not taking: Reported on 11/30/2018 6/19/18   Evelin Buenrostro PA-C   glucagon (GLUCAGON EMERGENCY) 1 MG injection Inject 1 mg under the skin once as needed for low blood sugar (as needed for a blood sugar less than 70 if unconscious or unable to correct by oral means) for up to 1 dose 3/6/17   [facility],    potassium chloride (MICRO-K) 10 MEQ CR capsule Take 1 capsule (10 mEq total) by mouth 2 (two) times a day for 30 days 2/21/18 10/12/18  Kat Lagos DO     I have reviewed home medications with a medical source (PCP, Pharmacy, other)  Allergies:    Allergies   Allergen Reactions    Other Rash     Adhesive tape       Social History:     Marital Status: /Civil Union     Patient Pre-hospital Living Situation:  Patient was at home with his wife and son  Patient Pre-hospital Level of Mobility:  Patient says he ambulates with a walker independently  Patient Pre-hospital Diet Restrictions:  Diabetic  Substance Use History:   History   Alcohol Use No     History   Smoking Status    Former Smoker    Packs/day: 2 00    Years: 54 00    Quit date: 2004   Smokeless Tobacco    Never Used     Comment: Pt is a non smoker     History   Drug Use No       Family History:    non-contributory    Physical Exam:     Vitals:   Blood Pressure: 169/81 (11/30/18 1703)  Pulse: 99 (11/30/18 1703)  Temperature: 98 4 °F (36 9 °C) (11/30/18 1703)  Temp Source: Temporal (11/30/18 1703)  Respirations: 20 (11/30/18 1703)  Height: 5' 7" (170 2 cm) (11/30/18 1703)  Weight - Scale: (!) 137 kg (301 lb 9 4 oz) (11/30/18 1703)  SpO2: 93 % (11/30/18 1703)    Physical Exam   Constitutional:   Morbidly obese male, no acute distress   HENT:   Head: Normocephalic and atraumatic  Mouth/Throat: Oropharynx is clear and moist  No oropharyngeal exudate  Eyes: Pupils are equal, round, and reactive to light  Cardiovascular: Normal rate, regular rhythm and intact distal pulses  No murmur heard  Pulmonary/Chest: Effort normal and breath sounds normal  No respiratory distress  He has no wheezes  Abdominal: Soft  Bowel sounds are normal  He exhibits no distension  There is no tenderness  Musculoskeletal: He exhibits edema  Neurological: He is alert  He displays normal reflexes  No cranial nerve deficit  He exhibits normal muscle tone  Coordination normal    Skin:   Bilateral lower extremities with increased erythema and increased warmth, increased erythema in the right lower extremity greater than left    Unstageable venous pressure ulcers bilateral shins, present on admission  Psychiatric: He has a normal mood and affect  His behavior is normal  Thought content normal    Nursing note and vitals reviewed  Additional Data:     Lab Results: I have personally reviewed pertinent reports  Results from last 7 days  Lab Units 11/30/18  1003   WBC Thousand/uL 6 39   HEMOGLOBIN g/dL 12 0   HEMATOCRIT % 37 4   PLATELETS Thousands/uL 250   NEUTROS PCT % 76*   LYMPHS PCT % 10*   MONOS PCT % 10   EOS PCT % 2       Results from last 7 days  Lab Units 11/30/18  1003   POTASSIUM mmol/L 4 3   CHLORIDE mmol/L 102   CO2 mmol/L 28   BUN mg/dL 61*   CREATININE mg/dL 2 63*   CALCIUM mg/dL 8 9   ALK PHOS U/L 33*   ALT U/L 23   AST U/L 26       Results from last 7 days  Lab Units 11/30/18  1003   INR  1 24*       Imaging: I have personally reviewed pertinent reports        Xr Chest 1 View Portable    Result Date: 11/30/2018  Narrative: CHEST INDICATION:   Reason for exam listed in Epic is weakness  Currently there is no chief complaint on file under the history section in the ED provider notes  COMPARISON:  3/26/2018, report CT chest 7/14/2016 EXAM PERFORMED/VIEWS:  XR CHEST PORTABLE Images: 2 FINDINGS:  Study is limited by patient body habitus  Monitoring wires and leads project over the chest  Cardiomediastinal silhouette appears stable  The lungs are grossly clear  Somewhat limited evaluation of the left lung base  No pneumothorax or large pleural effusion  Osseous structures appear within normal limits for patient age  Impression: LIMITED STUDY  No acute cardiopulmonary disease within limitations  Workstation performed: [id number]     Ct Head Without Contrast    Result Date: 11/30/2018  Narrative: CT BRAIN - WITHOUT CONTRAST INDICATION:   near syncope- pulled to left  COMPARISON:  CT brain dated October 21, 2017  TECHNIQUE:  CT examination of the brain was performed  In addition to axial images, coronal 2D reformatted images were created and submitted for interpretation  Radiation dose length product (DLP) for this visit:  1007 58 mGy-cm   This examination, like all CT scans performed in the [facility], was performed utilizing techniques to minimize radiation dose exposure, including the use of iterative reconstruction and automated exposure control  IMAGE QUALITY:  Diagnostic  FINDINGS: PARENCHYMA:  No intracranial mass, mass effect or midline shift  No CT signs of acute infarction  No acute parenchymal hemorrhage  There is mild periventricular white matter low attenuation which is nonspecific and most likely related to chronic small vessel ischemic changes  VENTRICLES AND EXTRA-AXIAL SPACES:  There is prominence of the ventricles and sulci related to mild volume loss  VISUALIZED ORBITS AND PARANASAL SINUSES:  Unremarkable  CALVARIUM AND EXTRACRANIAL SOFT TISSUES:  Normal      Impression: No acute intracranial abnormality   Mild chronic small vessel ischemic changes and volume loss  Workstation performed: [id number]       Allscripts / Epic Records Reviewed: Yes     ** Please Note: This note has been constructed using a voice recognition system   **

## 2018-11-30 NOTE — PLAN OF CARE
DISCHARGE PLANNING     Discharge to home or other facility with appropriate resources Progressing        GENITOURINARY - ADULT     Maintains or returns to baseline urinary function Progressing        INFECTION - ADULT     Absence or prevention of progression during hospitalization Progressing        Knowledge Deficit     Patient/family/caregiver demonstrates understanding of disease process, treatment plan, medications, and discharge instructions Progressing        METABOLIC, FLUID AND ELECTROLYTES - ADULT     Electrolytes maintained within normal limits Progressing     Glucose maintained within target range Progressing        PAIN - ADULT     Verbalizes/displays adequate comfort level or baseline comfort level Progressing        Potential for Falls     Patient will remain free of falls Progressing        Prexisting or High Potential for Compromised Skin Integrity     Skin integrity is maintained or improved Progressing        SAFETY ADULT     Maintain or return to baseline ADL function Progressing     Maintain or return mobility status to optimal level Progressing     Patient will remain free of falls Progressing        SKIN/TISSUE INTEGRITY - ADULT     Skin integrity remains intact Progressing     Incision(s), wounds(s) or drain site(s) healing without S/S of infection Progressing     Oral mucous membranes remain intact Progressing

## 2018-11-30 NOTE — ASSESSMENT & PLAN NOTE
Patient with increased edema increased warmth as well as bilateral lower extremity wounds, likely venous stasis ulcers bilateral lower extremities, will consult wound care, give IV ceftriaxone, continue to monitor, keep legs elevated, possible lower extremity cellulitis with increased erythema of the right leg

## 2018-11-30 NOTE — ASSESSMENT & PLAN NOTE
Patient with sudden onset of generalized weakness, no focal neurologic deficits but with acute ambulatory dysfunction, unable to ambulate at baseline  Possible UTI, possible lower extremity cellulitis, will give antibiotics and continue to monitor      Consult PT OT, patient will likely need short-term rehab placement

## 2018-11-30 NOTE — ASSESSMENT & PLAN NOTE
Lab Results   Component Value Date    HGBA1C 6 9 (H) 10/22/2018       No results for input(s): POCGLU in the last 72 hours  Blood Sugar Average: Last 72 hrs:   monitor Accu-Cheks AC and HS

## 2018-11-30 NOTE — ED PROVIDER NOTES
History  Chief Complaint   Patient presents with    Weakness - Generalized     Patient woke up this morning with an increase in weakness, was unable to transfer to wheelchair, altered mental status, hematuria, states last week he "feels he may have passed a stone" currently is being treated for UTI, BS this AM was 143     Pt c/o of general weakness this AM  Was going to bathroom this AM "and almost passed out" No LOC or no fall  Pt states he felt like being pulled to the left  Pt denies dizziness  No CP or SOB ( from baseline)   No abd pain  No N/V  No diaphoresis  Pt is being treated for UTI with Keflex  And "few days ago started a new anticoagulant ( pt unsure of name)  Pt has had hx of hematuria and due to his blindness unable to tell much about bleeding, but states few days ago hand was full of blood and then RX for Keflex was given and pt is to follow with Dr Cheryl Villareal  Pt thinks he may have passed a kidney stone   Pt denies fever/chills  Pt lives with wife who was admitted with pneumonia and UTI 4  Days ago  -his sons have been helping him  Pt also relating increase in his chronic leg sores  -stating no longer has visiting nursees to take care of legs  Pt has known occ  Shaking of left arm- states was shaking this AM   HAs left leg pain  Which is relieved with positioning  PMH  CKD;DM/neuropathy; Neoplasm lung, prostates CA; CAD/CABG; A ib; Blind; Chronic leg edema        History provided by:  Patient  Fatigue   Severity:  Severe  Progression:  Unchanged  Chronicity:  New  Context: change in medication, recent infection and urinary tract infection    Context: not alcohol use and not increased activity    Worsened by:   Activity  Associated symptoms: ataxia, difficulty walking, frequency and near-syncope    Associated symptoms: no abdominal pain, no aphasia, no chest pain, no cough, no diarrhea, no dizziness, no drooling, no dysphagia, no dysuria, no falls, no fever, no headaches, no loss of consciousness, no melena, no nausea, no seizures, no syncope and no vomiting  Visual change: pt is blind  Risk factors: congestive heart failure, coronary artery disease, diabetes, new medications and recent stressors        Prior to Admission Medications   Prescriptions Last Dose Informant Patient Reported? Taking?    Cholecalciferol 2000 units TABS   No No   Sig: Take 1 tablet (2,000 Units total) by mouth daily   aspirin (ECOTRIN LOW STRENGTH) 81 mg EC tablet  Self Yes Yes   Sig: Take 81 mg by mouth 2 (two) times a day   cephalexin (KEFLEX) 500 mg capsule  Spouse/Significant Other Yes No   Sig: Take 500 mg by mouth every 6 (six) hours   furosemide (LASIX) 80 mg tablet  Self No Yes   Sig: Take 1 tablet by mouth 2 (two) times a day   gabapentin (NEURONTIN) 300 mg capsule   No No   Sig: Take 2 capsules (600 mg total) by mouth daily at bedtime   glucagon (GLUCAGON EMERGENCY) 1 MG injection  Self No No   Sig: Inject 1 mg under the skin once as needed for low blood sugar (as needed for a blood sugar less than 70 if unconscious or unable to correct by oral means) for up to 1 dose   insulin detemir (LEVEMIR) 100 units/mL subcutaneous injection  Self No Yes   Sig: Inject 55 Units under the skin daily at bedtime   insulin lispro (HumaLOG) 100 units/mL injection   No Yes   Sig: Inject 20 Units under the skin 3 (three) times a day with meals   iron polysaccharides (NIFEREX) 150 mg capsule  Self Yes No   Sig: Take 150 mg by mouth daily   metoprolol tartrate (LOPRESSOR) 25 mg tablet  Self Yes Yes   Sig: Take 25 mg by mouth 2 (two) times a day   potassium chloride (MICRO-K) 10 MEQ CR capsule   No No   Sig: Take 1 capsule (10 mEq total) by mouth 2 (two) times a day for 30 days   simvastatin (ZOCOR) 40 mg tablet  Self Yes Yes   Sig: Take 40 mg by mouth daily at bedtime   tamsulosin (FLOMAX) 0 4 mg  Self Yes Yes   Sig: Take 0 4 mg by mouth daily at bedtime     traMADol (ULTRAM) 50 mg tablet   No Yes   Si tablet 3 times daily with 2 extra Strength Tylenol   warfarin (COUMADIN) 5 mg tablet   No Yes   Sig: One tab daily or as directed by provider      Facility-Administered Medications: None       Past Medical History:   Diagnosis Date    Anemia     Arthritis     [facility]     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[facility] )     B12 deficiency     Back pain     Blind right eye     Cancer ([facility] )     prostate    Cardiac disorder     Chronic combined systolic and diastolic heart failure (HCC)     Chronic obstructive lung disease ([facility] )     Chronic venous stasis dermatitis of both lower extremities     CKD (chronic kidney disease), stage IV (HCC)     Coronary artery disease     DDD (degenerative disc disease)     DM (diabetes mellitus), type 2 (HCC)     Type II, on insulin    BAER (dyspnea on exertion)     Easy bruising     Epistaxis     Gout     Gross hematuria     last assessed: 3/18/2015    Hepatitis     Herpes zoster     Hiatal hernia     History of cellulitis     BLE    History of prostate cancer     Radiation treatments   Hypercholesterolemia     Hyperlipidemia     Hypertension     Ischemic cardiomyopathy     Lung mass     last assessed: 9/21/2012    MI, old    Ardeth Needs Morbid obesity due to excess calories (HCC)     or severe obesity    Neuropathy     BLE    Obesity     Obstructive sleep apnea syndrome, severe     Pneumonia     last assessed: 7/24/2012    Shortness of breath     TIA (transient ischemic attack)     Urinary incontinence     Urinary retention     Use of cane as ambulatory aid     Independent with personal care         Past Surgical History:   Procedure Laterality Date    ABDOMINAL SURGERY      CARDIAC SURGERY      CATARACT EXTRACTION, BILATERAL      CORONARY ANGIOPLASTY WITH STENT PLACEMENT      2 stents    CORONARY ARTERY BYPASS GRAFT N/A 7/15/2016    Procedure: Intraop ADRIAN, CABG x 3 using LIMA to LAD, RSVG to OM1 and D1;  Surgeon: Maria C Darling MD;  [facility];  Service:  CYSTOSCOPY      has stents    EYE SURGERY      Bilateral cataract removal    HERNIA REPAIR      umbilical hernia repair    LUNG CANCER SURGERY      Partial upper right lobectomy    LUNG SURGERY Left 2012    OTHER SURGICAL HISTORY      previous stent placement-no anatomy given    PILONIDAL CYST EXCISION      ID CYSTOURETHROSCOPY,URETER CATHETER Left 3/9/2016    Procedure: CYSTOSCOPY WITH left RETROGRADE PYELOGRAM left double J stent removal;  Surgeon: Sussy Gates MD;  Location: [facility];  Service: Urology    ID TEMPORAL ARTERY LIGATN OR BX Bilateral 10/31/2017    Procedure: BIOPSY ARTERY TEMPORAL;  Surgeon: Lyssa Thakkar MD;  [facility]: [facility];  Service: Vascular    RENAL ARTERY STENT  02/16/2015    transcath intravascular stent placement percutaneous renal    VASCULAR SURGERY         Family History   Problem Relation Age of Onset    Diabetes Other     Hypertension Other     Cancer Other     Diabetes Mother     Heart disease Mother     Hypertension Mother     Diabetes type II Mother     Cancer Mother         unknown type    Diabetes Father     Diabetes Maternal Grandmother     Cancer Sister         unknown type    Cancer Other         unknown type     I have reviewed and agree with the history as documented  Social History   Substance Use Topics    Smoking status: Former Smoker     Packs/day: 2 00     Years: 54 00     Quit date: 2004    Smokeless tobacco: Never Used      Comment: Pt is a non smoker    Alcohol use No        Review of Systems   Constitutional: Positive for activity change and fatigue  Negative for chills, diaphoresis and fever  HENT: Negative  Negative for congestion, drooling, facial swelling, sore throat, trouble swallowing and voice change  Respiratory: Negative for cough  Cardiovascular: Positive for near-syncope  Negative for chest pain and syncope  Gastrointestinal: Negative for abdominal pain, diarrhea, dysphagia, melena, nausea and vomiting  Genitourinary: Positive for frequency and hematuria  Negative for dysuria, flank pain and testicular pain  Musculoskeletal: Negative for falls, neck pain and neck stiffness  Skin: Positive for wound (bilateral lower legs)  Neurological: Positive for tremors and weakness  Negative for dizziness, seizures, loss of consciousness, facial asymmetry, speech difficulty, light-headedness and headaches  Psychiatric/Behavioral: Negative  Negative for confusion, hallucinations and self-injury  All other systems reviewed and are negative  Physical Exam  Physical Exam   Constitutional: He is oriented to person, place, and time  He appears well-developed  He appears ill  No distress  Obese  A/O; appears dry  Pt legally blind No gross focal deficit   HENT:   Head: Normocephalic and atraumatic  Mouth/Throat: Mucous membranes are dry  No uvula swelling  No posterior oropharyngeal edema or posterior oropharyngeal erythema  Neck: Full passive range of motion without pain and phonation normal  Neck supple  No JVD present  Cardiovascular: Normal rate and regular rhythm  No extrasystoles are present  Pulmonary/Chest: No stridor  No respiratory distress  He has decreased breath sounds (all fields)  Rhonchi: course BS right grearter then left  Abdominal: Soft  Bowel sounds are normal  There is no tenderness  There is no rigidity, no guarding and no CVA tenderness  Musculoskeletal:        Right lower leg: He exhibits tenderness and edema  Left lower leg: He exhibits tenderness and edema  Bilateral lower ext with edema and skin changes as documented  Pt had tight wraps on presentation and edema increased above wraps   Neurological: He is alert and oriented to person, place, and time  No cranial nerve deficit  He displays no seizure activity  He displays no Babinski's sign on the right side  He displays no Babinski's sign on the left side  Skin: Skin is warm and dry  No petechiae noted   He is not diaphoretic  No cyanosis  Bilateral lower ext with redness,warmth, blistering , oozing   Psychiatric: He has a normal mood and affect  His speech is normal and behavior is normal  Thought content normal  Cognition and memory are normal    Vitals reviewed  Vital Signs  ED Triage Vitals   Temperature Pulse Respirations Blood Pressure SpO2   11/30/18 0817 11/30/18 0817 11/30/18 0817 11/30/18 0817 11/30/18 0817   98 4 °F (36 9 °C) 87 22 141/61 91 %      Temp Source Heart Rate Source Patient Position - Orthostatic VS BP Location FiO2 (%)   11/30/18 0817 11/30/18 0817 11/30/18 0817 11/30/18 0817 --   Temporal Monitor Lying Left arm       Pain Score       11/30/18 0846       5           Vitals:    11/30/18 0817 11/30/18 1031   BP: 141/61 152/67   Pulse: 87 95   Patient Position - Orthostatic VS: Lying Lying       Visual Acuity      ED Medications  Medications   cefTRIAXone (ROCEPHIN) IVPB (premix) 1,000 mg (not administered)       Diagnostic Studies  Results Reviewed     Procedure Component Value Units Date/Time    Troponin I [[id number]]  (Abnormal) Collected:  11/30/18 1003    Lab Status:  Final result Specimen:  Blood from Arm, Right Updated:  11/30/18 1049     Troponin I 0 11 (HH) ng/mL     Lactic acid, plasma [[id number]]  (Normal) Collected:  11/30/18 1003    Lab Status:  Final result Specimen:  Blood from Arm, Right Updated:  11/30/18 1040     LACTIC ACID 1 2 mmol/L     Narrative:         Result may be elevated if tourniquet was used during collection      Comprehensive metabolic panel [[id number]]  (Abnormal) Collected:  11/30/18 1003    Lab Status:  Final result Specimen:  Blood from Arm, Right Updated:  11/30/18 1035     Sodium 138 mmol/L      Potassium 4 3 mmol/L      Chloride 102 mmol/L      CO2 28 mmol/L      ANION GAP 8 mmol/L      BUN 61 (H) mg/dL      Creatinine 2 63 (H) mg/dL      Glucose 145 (H) mg/dL      Calcium 8 9 mg/dL      AST 26 U/L      ALT 23 U/L      Alkaline Phosphatase 33 (L) U/L Total Protein 6 6 g/dL      Albumin 3 1 (L) g/dL      Total Bilirubin 0 30 mg/dL      eGFR 22 ml/min/1 73sq m     Narrative:         National Kidney Disease Education Program recommendations are as follows:  GFR calculation is accurate only with a steady state creatinine  Chronic Kidney disease less than 60 ml/min/1 73 sq  meters  Kidney failure less than 15 ml/min/1 73 sq  meters  Magnesium [[id number]]  (Normal) Collected:  11/30/18 1003    Lab Status:  Final result Specimen:  Blood from Arm, Right Updated:  11/30/18 1035     Magnesium 2 0 mg/dL     B-type natriuretic peptide [[id number]]  (Abnormal) Collected:  11/30/18 1003    Lab Status:  Final result Specimen:  Blood from Arm, Right Updated:  11/30/18 1035     NT-proBNP 5,607 (H) pg/mL     Protime-INR [[id number]]  (Abnormal) Collected:  11/30/18 1003    Lab Status:  Final result Specimen:  Blood from Arm, Right Updated:  11/30/18 1027     Protime 15 0 (H) seconds      INR 1 24 (H)    APTT [[id number]]  (Normal) Collected:  11/30/18 1003    Lab Status:  Final result Specimen:  Blood from Arm, Right Updated:  11/30/18 1027     PTT 36 seconds     Urine Microscopic [[id number]]  (Abnormal) Collected:  11/30/18 0951    Lab Status:  Final result Specimen:  Urine from Urine, Clean Catch Updated:  11/30/18 1015     RBC, UA 2-4 (A) /hpf      WBC, UA 10-20 (A) /hpf      Epithelial Cells Occasional /hpf      Bacteria, UA Occasional /hpf      MUCUS THREADS Occasional (A)    Urine culture [[id number]] Collected:  11/30/18 0951    Lab Status:   In process Specimen:  Urine from Urine, Clean Catch Updated:  11/30/18 1015    CBC and differential [[id number]]  (Abnormal) Collected:  11/30/18 1003    Lab Status:  Final result Specimen:  Blood from Arm, Right Updated:  11/30/18 1011     WBC 6 39 Thousand/uL      RBC 4 07 Million/uL      Hemoglobin 12 0 g/dL      Hematocrit 37 4 %      MCV 92 fL      MCH 29 5 pg      MCHC 32 1 g/dL      RDW 16 3 (H) %      MPV 9 3 fL      Platelets 250 Thousands/uL      nRBC 0 /100 WBCs      Neutrophils Relative 76 (H) %      Immat GRANS % 1 %      Lymphocytes Relative 10 (L) %      Monocytes Relative 10 %      Eosinophils Relative 2 %      Basophils Relative 1 %      Neutrophils Absolute 4 90 Thousands/µL      Immature Grans Absolute 0 04 Thousand/uL      Lymphocytes Absolute 0 65 Thousands/µL      Monocytes Absolute 0 61 Thousand/µL      Eosinophils Absolute 0 12 Thousand/µL      Basophils Absolute 0 07 Thousands/µL     Blood culture #1 [[id number]] Collected:  11/30/18 1003    Lab Status: In process Specimen:  Blood from Arm, Right Updated:  11/30/18 1009    Blood culture #2 [[id number]] Collected:  11/30/18 1003    Lab Status: In process Specimen:  Blood from Arm, Right Updated:  11/30/18 1009    UA w Reflex to Microscopic w Reflex to Culture [[id number]]  (Abnormal) Collected:  11/30/18 0951    Lab Status:  Final result Specimen:  Urine from Urine, Clean Catch Updated:  11/30/18 1001     Color, UA Yellow     Clarity, UA Slightly Cloudy     Specific Hancock, UA 1 010     pH, UA 6 0     Leukocytes, UA Small (A)     Nitrite, UA Negative     Protein, UA Negative mg/dl      Glucose, UA Negative mg/dl      Ketones, UA Negative mg/dl      Urobilinogen, UA 0 2 E U /dl      Bilirubin, UA Negative     Blood, UA Small (A)                 CT head without contrast   Final Result by Kortney Kirby MD (11/30 1188)      No acute intracranial abnormality  Mild chronic small vessel ischemic changes and volume loss  Workstation performed: [id number]         XR chest 1 view portable   Final Result by Gonzalez Miller DO (11/30 2354)      LIMITED STUDY  No acute cardiopulmonary disease within limitations              Workstation performed: [id number]                    Procedures  ECG 12 Lead Documentation  Date/Time: 11/30/2018 8:30 AM  Performed by: Ronen Malave  Authorized by: Ronen Malave     ECG reviewed by me, the ED Provider: yes    Patient location:  ED  Interpretation:     Interpretation: non-specific    Rate:     ECG rate assessment: normal    Rhythm:     Rhythm: sinus rhythm    Comments:      RBBB           Phone Contacts  ED Phone Contact    ED Course  ED Course as of Nov 30 1158   Fri Nov 30, 2018   1025 WBC: 6 39   1025 Hemoglobin: 12 0   1025 HCT: 37 4   1025 STUDY  No acute cardiopulmonary disease within limitations  1025 No acute intracranial abnormality  Mild chronic small vessel ischemic changes and volume loss      1026 WBC, UA: (!) 10-20   1026 RBC, UA: (!) 2-4   1026 Leukocytes, UA: (!) Small   1037 NT-proBNP: (!) 5,607   1037 INR: (!) 1 24   1037 BUN: (!) 61   1037 Creatinine: (!) 2 63   1037 Glucose, Random: (!) 145   1046 LACTIC ACID: 1 2   1046 Magnesium: 2 0   1052 Troponin I: (!!) 0 11                               MDM  CritCare Time    Disposition  Final diagnoses:   Weakness generalized   Near syncope   UTI (urinary tract infection)   Recurrent cellulitis of lower extremity   CKD (chronic kidney disease)   CAD (coronary artery disease) of bypass graft   NSTEMI (non-ST elevated myocardial infarction) ([facility] )     Time reflects when diagnosis was documented in both MDM as applicable and the Disposition within this note     Time User Action Codes Description Comment    11/30/2018 10:44 AM Ranjit Body Add [R53 1] Weakness generalized     11/30/2018 10:44 AM Ranjit Body Add [R55] Near syncope     11/30/2018 10:44 AM Ranjit Body Add [N39 0] UTI (urinary tract infection)     11/30/2018 10:45 AM Ranjit Body Add [L03 119] Recurrent cellulitis of lower extremity     11/30/2018 10:45 AM Ranjit Body Add [N18 9] CKD (chronic kidney disease)     11/30/2018 10:46 AM Ranjit Body Add [I25 810] CAD (coronary artery disease) of bypass graft     11/30/2018 10:53 AM Yamila Tineo Add [I21 4] NSTEMI (non-ST elevated myocardial infarction) [facility])       ED Disposition     ED Disposition Condition Comment Admit  Case was discussed with DR Servando Romero and the patient's admission status was agreed to be Admission Status: inpatient status to the service of Dr Radha Godinez   Follow-up Information    None         Patient's Medications   Discharge Prescriptions    No medications on file     No discharge procedures on file      ED Provider  Electronically Signed by           Lo Denis DO  11/30/18 6914

## 2018-12-01 LAB
ALBUMIN SERPL BCP-MCNC: 2.9 G/DL (ref 3.5–5)
ALP SERPL-CCNC: 31 U/L (ref 46–116)
ALT SERPL W P-5'-P-CCNC: 22 U/L (ref 12–78)
ANION GAP SERPL CALCULATED.3IONS-SCNC: 8 MMOL/L (ref 4–13)
AST SERPL W P-5'-P-CCNC: 27 U/L (ref 5–45)
BASOPHILS # BLD AUTO: 0.03 THOUSANDS/ΜL (ref 0–0.1)
BASOPHILS NFR BLD AUTO: 1 % (ref 0–1)
BILIRUB SERPL-MCNC: 0.2 MG/DL (ref 0.2–1)
BUN SERPL-MCNC: 60 MG/DL (ref 5–25)
CALCIUM SERPL-MCNC: 8.5 MG/DL (ref 8.3–10.1)
CHLORIDE SERPL-SCNC: 103 MMOL/L (ref 100–108)
CO2 SERPL-SCNC: 29 MMOL/L (ref 21–32)
CREAT SERPL-MCNC: 2.66 MG/DL (ref 0.6–1.3)
EOSINOPHIL # BLD AUTO: 0.01 THOUSAND/ΜL (ref 0–0.61)
EOSINOPHIL NFR BLD AUTO: 0 % (ref 0–6)
ERYTHROCYTE [DISTWIDTH] IN BLOOD BY AUTOMATED COUNT: 16.5 % (ref 11.6–15.1)
GFR SERPL CREATININE-BSD FRML MDRD: 22 ML/MIN/1.73SQ M
GLUCOSE SERPL-MCNC: 103 MG/DL (ref 65–140)
GLUCOSE SERPL-MCNC: 128 MG/DL (ref 65–140)
GLUCOSE SERPL-MCNC: 147 MG/DL (ref 65–140)
GLUCOSE SERPL-MCNC: 70 MG/DL (ref 65–140)
GLUCOSE SERPL-MCNC: 98 MG/DL (ref 65–140)
HCT VFR BLD AUTO: 38 % (ref 36.5–49.3)
HGB BLD-MCNC: 12.1 G/DL (ref 12–17)
IMM GRANULOCYTES # BLD AUTO: 0.02 THOUSAND/UL (ref 0–0.2)
IMM GRANULOCYTES NFR BLD AUTO: 0 % (ref 0–2)
INR PPP: 1.3 (ref 0.86–1.17)
LYMPHOCYTES # BLD AUTO: 0.59 THOUSANDS/ΜL (ref 0.6–4.47)
LYMPHOCYTES NFR BLD AUTO: 13 % (ref 14–44)
MAGNESIUM SERPL-MCNC: 2.1 MG/DL (ref 1.6–2.6)
MCH RBC QN AUTO: 29 PG (ref 26.8–34.3)
MCHC RBC AUTO-ENTMCNC: 31.8 G/DL (ref 31.4–37.4)
MCV RBC AUTO: 91 FL (ref 82–98)
MONOCYTES # BLD AUTO: 0.61 THOUSAND/ΜL (ref 0.17–1.22)
MONOCYTES NFR BLD AUTO: 13 % (ref 4–12)
NEUTROPHILS # BLD AUTO: 3.45 THOUSANDS/ΜL (ref 1.85–7.62)
NEUTS SEG NFR BLD AUTO: 73 % (ref 43–75)
NRBC BLD AUTO-RTO: 0 /100 WBCS
PHOSPHATE SERPL-MCNC: 4.5 MG/DL (ref 2.3–4.1)
PLATELET # BLD AUTO: 222 THOUSANDS/UL (ref 149–390)
PMV BLD AUTO: 9.8 FL (ref 8.9–12.7)
POTASSIUM SERPL-SCNC: 3.8 MMOL/L (ref 3.5–5.3)
PROT SERPL-MCNC: 6.2 G/DL (ref 6.4–8.2)
PROTHROMBIN TIME: 15.6 SECONDS (ref 11.8–14.2)
RBC # BLD AUTO: 4.17 MILLION/UL (ref 3.88–5.62)
SODIUM SERPL-SCNC: 140 MMOL/L (ref 136–145)
TSH SERPL DL<=0.05 MIU/L-ACNC: 0.43 UIU/ML (ref 0.36–3.74)
WBC # BLD AUTO: 4.71 THOUSAND/UL (ref 4.31–10.16)

## 2018-12-01 PROCEDURE — 82948 REAGENT STRIP/BLOOD GLUCOSE: CPT

## 2018-12-01 PROCEDURE — 85610 PROTHROMBIN TIME: CPT | Performed by: INTERNAL MEDICINE

## 2018-12-01 PROCEDURE — 83735 ASSAY OF MAGNESIUM: CPT | Performed by: INTERNAL MEDICINE

## 2018-12-01 PROCEDURE — 85025 COMPLETE CBC W/AUTO DIFF WBC: CPT | Performed by: INTERNAL MEDICINE

## 2018-12-01 PROCEDURE — 84443 ASSAY THYROID STIM HORMONE: CPT | Performed by: INTERNAL MEDICINE

## 2018-12-01 PROCEDURE — G8988 SELF CARE GOAL STATUS: HCPCS

## 2018-12-01 PROCEDURE — 97163 PT EVAL HIGH COMPLEX 45 MIN: CPT | Performed by: PHYSICAL THERAPIST

## 2018-12-01 PROCEDURE — 97530 THERAPEUTIC ACTIVITIES: CPT | Performed by: PHYSICAL THERAPIST

## 2018-12-01 PROCEDURE — G8987 SELF CARE CURRENT STATUS: HCPCS

## 2018-12-01 PROCEDURE — 99232 SBSQ HOSP IP/OBS MODERATE 35: CPT | Performed by: INTERNAL MEDICINE

## 2018-12-01 PROCEDURE — 80053 COMPREHEN METABOLIC PANEL: CPT | Performed by: INTERNAL MEDICINE

## 2018-12-01 PROCEDURE — 94760 N-INVAS EAR/PLS OXIMETRY 1: CPT

## 2018-12-01 PROCEDURE — 97535 SELF CARE MNGMENT TRAINING: CPT

## 2018-12-01 PROCEDURE — 94640 AIRWAY INHALATION TREATMENT: CPT

## 2018-12-01 PROCEDURE — 84100 ASSAY OF PHOSPHORUS: CPT | Performed by: INTERNAL MEDICINE

## 2018-12-01 PROCEDURE — G8979 MOBILITY GOAL STATUS: HCPCS | Performed by: PHYSICAL THERAPIST

## 2018-12-01 PROCEDURE — 97167 OT EVAL HIGH COMPLEX 60 MIN: CPT

## 2018-12-01 PROCEDURE — 94664 DEMO&/EVAL PT USE INHALER: CPT

## 2018-12-01 PROCEDURE — 87631 RESP VIRUS 3-5 TARGETS: CPT | Performed by: INTERNAL MEDICINE

## 2018-12-01 PROCEDURE — G8978 MOBILITY CURRENT STATUS: HCPCS | Performed by: PHYSICAL THERAPIST

## 2018-12-01 RX ORDER — OSELTAMIVIR PHOSPHATE 30 MG/1
30 CAPSULE ORAL EVERY 24 HOURS
Status: DISCONTINUED | OUTPATIENT
Start: 2018-12-01 | End: 2018-12-03 | Stop reason: HOSPADM

## 2018-12-01 RX ADMIN — VITAMIN D, TAB 1000IU (100/BT) 2000 UNITS: 25 TAB at 09:46

## 2018-12-01 RX ADMIN — FUROSEMIDE 80 MG: 80 TABLET ORAL at 09:46

## 2018-12-01 RX ADMIN — METOPROLOL TARTRATE 25 MG: 25 TABLET, FILM COATED ORAL at 17:42

## 2018-12-01 RX ADMIN — POTASSIUM CHLORIDE 40 MEQ: 20 SOLUTION ORAL at 09:47

## 2018-12-01 RX ADMIN — INSULIN DETEMIR 50 UNITS: 100 INJECTION, SOLUTION SUBCUTANEOUS at 21:19

## 2018-12-01 RX ADMIN — INSULIN LISPRO 20 UNITS: 100 INJECTION, SOLUTION INTRAVENOUS; SUBCUTANEOUS at 12:07

## 2018-12-01 RX ADMIN — ANORECTAL OINTMENT 1 APPLICATION: 15.7; .44; 24; 20.6 OINTMENT TOPICAL at 17:46

## 2018-12-01 RX ADMIN — LEVALBUTEROL 1.25 MG: 1.25 SOLUTION, CONCENTRATE RESPIRATORY (INHALATION) at 08:10

## 2018-12-01 RX ADMIN — Medication 150 MG: at 09:46

## 2018-12-01 RX ADMIN — ASPIRIN 81 MG: 81 TABLET, COATED ORAL at 17:41

## 2018-12-01 RX ADMIN — LEVALBUTEROL 1.25 MG: 1.25 SOLUTION, CONCENTRATE RESPIRATORY (INHALATION) at 20:00

## 2018-12-01 RX ADMIN — WARFARIN SODIUM 5 MG: 5 TABLET ORAL at 17:42

## 2018-12-01 RX ADMIN — PRAVASTATIN SODIUM 80 MG: 40 TABLET ORAL at 17:42

## 2018-12-01 RX ADMIN — LEVALBUTEROL 1.25 MG: 1.25 SOLUTION, CONCENTRATE RESPIRATORY (INHALATION) at 13:50

## 2018-12-01 RX ADMIN — OSELTAMIVIR PHOSPHATE 30 MG: 30 CAPSULE ORAL at 14:22

## 2018-12-01 RX ADMIN — ASPIRIN 81 MG: 81 TABLET, COATED ORAL at 09:46

## 2018-12-01 RX ADMIN — CEFTRIAXONE 1000 MG: 1 INJECTION, SOLUTION INTRAVENOUS at 12:07

## 2018-12-01 RX ADMIN — IPRATROPIUM BROMIDE 0.5 MG: 0.5 SOLUTION RESPIRATORY (INHALATION) at 20:00

## 2018-12-01 RX ADMIN — METOPROLOL TARTRATE 25 MG: 25 TABLET, FILM COATED ORAL at 09:46

## 2018-12-01 RX ADMIN — TAMSULOSIN HYDROCHLORIDE 0.4 MG: 0.4 CAPSULE ORAL at 21:08

## 2018-12-01 RX ADMIN — FUROSEMIDE 80 MG: 80 TABLET ORAL at 17:41

## 2018-12-01 RX ADMIN — IPRATROPIUM BROMIDE 0.5 MG: 0.5 SOLUTION RESPIRATORY (INHALATION) at 08:10

## 2018-12-01 RX ADMIN — IPRATROPIUM BROMIDE 0.5 MG: 0.5 SOLUTION RESPIRATORY (INHALATION) at 13:50

## 2018-12-01 RX ADMIN — GABAPENTIN 600 MG: 300 CAPSULE ORAL at 21:08

## 2018-12-01 NOTE — RESPIRATORY THERAPY NOTE
RT Protocol Note  Arron Cockayne [de-identified] y o  male MRN: [id number]  Unit/Bed#: 408-01 Encounter: [id number]    Assessment    Principal Problem:    Generalized weakness  Active Problems: Morbid obesity (HCC)    CKD (chronic kidney disease) stage 4, GFR 15-29 ml/min (HCC)    Atrial fibrillation (HCC)    Coronary artery disease    DM (diabetes mellitus), type 2 with peripheral neuropathy    Obstructive sleep apnea syndrome, severe    Bilateral leg edema    Urinary tract infection with hematuria      Home Pulmonary Medications:  Combivent MDI  Home Devices/Therapy: Other (Comment), BiPAP/CPAP (Refuses BiPAP)    Past Medical History:   Diagnosis Date    Anemia     Arthritis     Atrial fibrillation (HCC)     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HCC)     B12 deficiency     Back pain     Blind right eye     Cancer [facility] )     prostate    Cardiac disorder     Chronic combined systolic and diastolic heart failure (HCC)     Chronic obstructive lung disease (HCC)     Chronic venous stasis dermatitis of both lower extremities     CKD (chronic kidney disease), stage IV (HCC)     Coronary artery disease     DDD (degenerative disc disease)     DM (diabetes mellitus), type 2 (HCC)     Type II, on insulin    BAER (dyspnea on exertion)     Easy bruising     Epistaxis     Gout     Gross hematuria     last assessed: 3/18/2015    Hepatitis     Herpes zoster     Hiatal hernia     History of cellulitis     BLE    History of prostate cancer     Radiation treatments      Hypercholesterolemia     Hyperlipidemia     Hypertension     Ischemic cardiomyopathy     Lung mass     last assessed: 9/21/2012    MI, old    Clifm Dada Morbid obesity due to excess calories (HCC)     or severe obesity    Neuropathy     BLE    Obesity     Obstructive sleep apnea syndrome, severe     Pneumonia     last assessed: 7/24/2012    Shortness of breath     TIA (transient ischemic attack)     Urinary incontinence     Urinary retention     Use of cane as ambulatory aid     Independent with personal care  Social History     Social History    Marital status: /Civil Union     Spouse name: N/A    Number of children: N/A    Years of education: N/A     Social History Main Topics    Smoking status: Former Smoker     Packs/day: 2 00     Years: 54 00     Quit date: 2004    Smokeless tobacco: Never Used      Comment: Pt is a non smoker    Alcohol use No    Drug use: No    Sexual activity: No     Other Topics Concern    None     Social History Narrative    No advance directives    No living will    Patient has living will       Subjective         Objective    Physical Exam:   Assessment Type: Assess only  General Appearance: Alert, Awake  Respiratory Pattern: Normal  Chest Assessment: Chest expansion symmetrical  Bilateral Breath Sounds: Diminished, Expiratory wheezes  Cough: None  O2 Device: 20:20 [email or web address]    Vitals:  Blood pressure 169/81, pulse 99, temperature 98 4 °F (36 9 °C), temperature source Temporal, resp  rate 20, height 5' 7" (1 702 m), weight (!) 137 kg (301 lb 9 4 oz), SpO2 94 %  Imaging and other studies: I have personally reviewed pertinent reports        O2 Device: 20:20 [email or web address]     Plan    Respiratory Plan: No distress/Pulmonary history   SVN 1 25/0 5 Atrovent TID

## 2018-12-01 NOTE — PLAN OF CARE
Problem: OCCUPATIONAL THERAPY ADULT  Goal: Performs self-care activities at highest level of function for planned discharge setting  See evaluation for individualized goals  Limitation: Decreased ADL status, Decreased UE ROM, Decreased UE strength, Decreased Safe judgement during ADL, Decreased endurance, Decreased self-care trans  Prognosis: Fair  Assessment: Patient is [de-identified]year old male admitted with acute onset of generalized weakness with comorbities that include UTI, bilateral LE edema, chronic kidney disease, and DM with SOB on 4LO2, mainting O2 SATs above 90%  PTA, patient was receiving assistance from wife for self care tasks  Wife and son complete cleaning, cooking, laundry, and shopping  Reports that wife is currently in hospital  Patient currently reqs mod assist of 2 for transfers, max assist for rolling  Patient is incontinent of BM and reqs max assist of 2-3  for cleaning and management while supine in bed  Max assist 1-2 for urinal use while in stance  Patient able to stand EOB with RW with Min assist of 1-2  Currently with deficits with strength, endurance, balance, and ROM which is impacting independence with mobility, transfers, and self care tasks    Patient will benefit from OT to address these defits     OT Discharge Recommendation: Short Term Rehab (will depend on patient progress)  OT - OK to Discharge: Yes (when medically stable)

## 2018-12-01 NOTE — PHYSICAL THERAPY NOTE
Pt Evaluation        12/01/18 1345   Note Type   Note type Eval/Treat   Home Living   Additional Comments See OT Evaluation for details    Prior Function   Comments See OT Evaluation for details    Restrictions/Precautions   Other Precautions Contact/isolation;Droplet precautions;Multiple lines; Fall Risk;Pain; Chair Alarm; Bed Alarm   Cognition   Overall Cognitive Status WFL   Arousal/Participation Alert   Orientation Level Oriented X4   Memory Within functional limits   Following Commands Follows all commands and directions without difficulty   RLE Assessment   RLE Assessment WFL   LLE Assessment   LLE Assessment WFL   Bed Mobility   Supine to Sit 3  Moderate assistance   Additional items Increased time required;Verbal cues;LE management;Assist x 2;HOB elevated; Bedrails   Sit to Supine 2  Maximal assistance   Additional items Assist x 3;HOB elevated;Trapeze bar;Bedrails; Increased time required;LE management   Transfers   Sit to Stand 3  Moderate assistance   Additional items Assist x 2; Increased time required;Verbal cues; Bedrails   Stand to Sit 4  Minimal assistance   Additional items Assist x 2; Increased time required;Verbal cues;Armrests   Additional Comments Did not ambulate this session    Balance   Static Sitting Poor +   Dynamic Sitting Poor +   Static Standing Poor   Dynamic Standing Poor   Ambulatory Poor +   Assessment   Prognosis Fair   Problem List Decreased strength;Decreased endurance;Decreased range of motion; Impaired balance;Decreased mobility; Decreased coordination   Assessment Pt is an [de-identified]year old male admitted with a dx of generalized weakness  Pt did not ambulate during the PT session  he requires Max assistance for bed mobility, and Mod A x 2 for transfers  He is unable to scoot and reposition himself in bed  SpO2 was above 92 during the enitre session  He requires constant cuing for proper posture during standing at EOB   He became incontinent during the session, and requires Assist x 3 for periare  He is in noticable distress with all activity during the session  He would Benefit from PT to help improve strength, ROM, balance, and functional activity tolerance  Goals   Patient Goals to get better    Short Term Goal #1 Pt will be (I) with all transfers and bed mobility to facilitate (I) in his home    Short Term Goal #2 Pt will ambulate 25 feet at the (S) level with reduced risk for falls    Plan   Treatment/Interventions Functional transfer training;LE strengthening/ROM; Elevations; Therapeutic exercise; Endurance training;Bed mobility;Gait training   PT Frequency Once a day; Twice a day   Recommendation   Recommendation Defer at this time   Equipment Recommended Walker   PT - OK to Discharge No   Pt in bed when PT entered, Pt in bed when PT left all lines intact, all needs within reach

## 2018-12-01 NOTE — ASSESSMENT & PLAN NOTE
Patient with increased edema increased warmth as well as bilateral lower extremity wounds, likely venous stasis ulcers bilateral lower extremities, will consult wound care, give IV ceftriaxone, continue to monitor, keep legs elevated, possible lower extremity cellulitis, more likely stasis dermatitis

## 2018-12-01 NOTE — SOCIAL WORK
Met with pt to discuss role as  in helping pt to develop discharge plan and to help pt carry out their plan  Pt lives in a trailer with his wife and son  Pt has a ramp on the outside  Pt has a shower chair , walker and cane at home   Pt uses the walker to ambulate  Pt is independent with ADL's but wife assists as needed  Pt's wife does the cooking  Pt's son drives the patient to appts  Pt has had Ul[facility] MELVINA in the past  Patient has no services in the home  Pt's PCP is Dr Valle Section   Pt uses the [address] [place] [address] In [place]   Pt's LACE score is 79   Patient is a HRR

## 2018-12-01 NOTE — ASSESSMENT & PLAN NOTE
Lab Results   Component Value Date    HGBA1C 6 9 (H) 10/22/2018       Recent Labs      11/30/18   2223  12/01/18   0759  12/01/18   0949  12/01/18   1201   POCGLU  162*  98  128  147*       Blood Sugar Average: Last 72 hrs:  (P) 141 2 monitor Accu-Cheks AC and HS

## 2018-12-01 NOTE — PLAN OF CARE
Problem: PHYSICAL THERAPY ADULT  Goal: Performs mobility at highest level of function for planned discharge setting  See evaluation for individualized goals  Treatment/Interventions: Functional transfer training, LE strengthening/ROM, Elevations, Therapeutic exercise, Endurance training, Bed mobility, Gait training  Equipment Recommended: Walker       See flowsheet documentation for full assessment, interventions and recommendations  Prognosis: Fair  Problem List: Decreased strength, Decreased endurance, Decreased range of motion, Impaired balance, Decreased mobility, Decreased coordination  Assessment: Pt is an [de-identified]year old male admitted with a dx of generalized weakness  Pt did not ambulate during the PT session  he requires Max assistance for bed mobility, and Mod A x 2 for transfers  He is unable to scoot and reposition himself in bed  SpO2 was above 92 during the enitre session  He requires constant cuing for proper posture during standing at EOB  He became incontinent during the session, and requires Assist x 3 for periare  He is in noticable distress with all activity during the session  He would Benefit from PT to help improve strength, ROM, balance, and functional activity tolerance  Recommendation: Defer at this time     PT - OK to Discharge: No    See flowsheet documentation for full assessment

## 2018-12-01 NOTE — PROGRESS NOTES
Progress Note - Js Wong 1940, [de-identified] y o  male MRN: [id number]    Unit/Bed#: 408-01 Encounter: [id number]    Primary Care Provider: Theodore Hughes DO   Date and time admitted to hospital: 11/30/2018  8:08 AM        * Generalized weakness   Assessment & Plan    Patient with sudden onset of generalized weakness, no focal neurologic deficits but with acute ambulatory dysfunction, unable to ambulate at baseline  Possible UTI, possible lower extremity cellulitis, will give antibiotics and continue to monitor  Consult PT OT, patient will likely need short-term rehab placement     Urinary tract infection with hematuria   Assessment & Plan    IV ceftriaxone, follow up urine culture  Bilateral leg edema   Assessment & Plan    Patient with increased edema increased warmth as well as bilateral lower extremity wounds, likely venous stasis ulcers bilateral lower extremities, will consult wound care, give IV ceftriaxone, continue to monitor, keep legs elevated, possible lower extremity cellulitis, more likely stasis dermatitis  CKD (chronic kidney disease) stage 4, GFR 15-29 ml/min [facility])   Assessment & Plan    Patient currently at baseline renal function, continue monitor daily, avoid further nephrotoxins  DM (diabetes mellitus), type 2 with peripheral neuropathy   Assessment & Plan    Lab Results   Component Value Date    HGBA1C 6 9 (H) 10/22/2018       Recent Labs      11/30/18   2223  12/01/18   0759  12/01/18   0949  12/01/18   1201   POCGLU  162*  98  128  147*       Blood Sugar Average: Last 72 hrs:  (P) 141 2 monitor Accu-Cheks AC and HS  Obstructive sleep apnea syndrome, severe   Assessment & Plan    CPAP at night       VTE Pharmacologic Prophylaxis:   Pharmacologic: Warfarin (Coumadin)  Mechanical VTE Prophylaxis in Place: Yes    Patient Centered Rounds: I have performed bedside rounds with nursing staff today      Discussions with Specialists or Other Care Team Provider:  Plan of care discussed with nursing staff    Education and Discussions with Family / Patient:  Patient's wife updated, she is actually currently hospitalized in another room  Current Length of Stay: 1 day(s)    Current Patient Status: Inpatient   Certification Statement: The patient will continue to require additional inpatient hospital stay due to Generalized weakness, patient likely need rehab stay    Discharge Plan / Estimated Discharge Date:  12/3/2018      Code Status: Level 1 - Full Code      Subjective:   No pain, still generalized weakness  Objective:     Vitals:   Temp (24hrs), Av 2 °F (36 8 °C), Min:97 7 °F (36 5 °C), Max:98 6 °F (37 °C)    Temp:  [97 7 °F (36 5 °C)-98 6 °F (37 °C)] 97 7 °F (36 5 °C)  HR:  [] 86  Resp:  [18-20] 18  BP: (132-169)/(58-81) 137/63  SpO2:  [93 %-99 %] 99 %  Body mass index is 47 58 kg/m²  Input and Output Summary (last 24 hours): Intake/Output Summary (Last 24 hours) at 18 1259  Last data filed at 18 0900   Gross per 24 hour   Intake               50 ml   Output             1275 ml   Net            -1225 ml       Physical Exam:     Physical Exam   Constitutional: He is oriented to person, place, and time  Morbidly obese elderly male   HENT:   Head: Normocephalic and atraumatic  Mouth/Throat: Oropharynx is clear and moist    Cardiovascular: Normal rate and regular rhythm  Exam reveals no friction rub  No murmur heard  Pulmonary/Chest: Effort normal and breath sounds normal  No respiratory distress  He has no wheezes  Diffuse rhonchi with minimal expiratory wheezing, good respiratory effort and good air exchange   Abdominal: Soft  Bowel sounds are normal  He exhibits no distension  There is no tenderness  Neurological: He is alert and oriented to person, place, and time  No cranial nerve deficit  Coordination normal    Skin: Skin is warm and dry  No rash noted  No erythema  Psychiatric: He has a normal mood and affect   His behavior is normal  Judgment and thought content normal          Additional Data:     Labs:      Results from last 7 days  Lab Units 12/01/18  0636   WBC Thousand/uL 4 71   HEMOGLOBIN g/dL 12 1   HEMATOCRIT % 38 0   PLATELETS Thousands/uL 222   NEUTROS PCT % 73   LYMPHS PCT % 13*   MONOS PCT % 13*   EOS PCT % 0       Results from last 7 days  Lab Units 12/01/18  0636   POTASSIUM mmol/L 3 8   CHLORIDE mmol/L 103   CO2 mmol/L 29   BUN mg/dL 60*   CREATININE mg/dL 2 66*   CALCIUM mg/dL 8 5   ALK PHOS U/L 31*   ALT U/L 22   AST U/L 27       Results from last 7 days  Lab Units 12/01/18  0637   INR  1 30*       * I Have Reviewed All Lab Data Listed Above  * Additional Pertinent Lab Tests Reviewed:  Angie 64 Admission Reviewed      Recent Cultures (last 7 days):       Results from last 7 days  Lab Units 11/30/18  0951   URINE CULTURE  Culture too young- will reincubate       Last 24 Hours Medication List:     Current Facility-Administered Medications:  aspirin 81 mg Oral BID Maria C Darling DO    cefTRIAXone 1,000 mg Intravenous Q24H Maria C Darling DO Last Rate: 1,000 mg (12/01/18 1207)   cholecalciferol 2,000 Units Oral Daily Maria C Darling DO    furosemide 80 mg Oral BID Maria C Darling, DO    gabapentin 600 mg Oral HS Maria C Darling DO    insulin detemir 50 Units Subcutaneous HS Salinas Moreau, DO    insulin lispro 20 Units Subcutaneous TID With Meals Maria C Darling DO    ipratropium 0 5 mg Nebulization TID Maria C Darling,     iron polysaccharides 150 mg Oral Daily Maria C Darling,     levalbuterol 1 25 mg Nebulization TID Maria C Darling,     metoprolol tartrate 25 mg Oral BID Maria C Darling,     potassium chloride 40 mEq Oral Daily Maria C Darling,     pravastatin 80 mg Oral Daily With [employer], DO    tamsulosin 0 4 mg Oral HS Maria C Darling,     traMADol 50 mg Oral Q6H PRN Maria C Darling,     warfarin 5 mg Oral Daily (warfarin) Maria C Darling DO         Today, Patient Was Seen By: Maria C Darling DO

## 2018-12-01 NOTE — OCCUPATIONAL THERAPY NOTE
OccupationalTherapy Evaluation     Patient Name: Nancy MUNGUIAX'Q Date: 12/1/2018  Problem List  Patient Active Problem List   Diagnosis    Morbid obesity ([address] )    History of prostate cancer    Type 2 diabetes mellitus with hyperglycemia ([address] )    S/P CABG x 3    CKD (chronic kidney disease) stage 4, GFR 15-29 ml/min [facility])    Body mass index (BMI) of 40 0 to 49 9    Wound of right lower extremity    Wound of left lower extremity    Hyperphosphatemia    Vitamin D deficiency    Renal cyst    Atrial fibrillation ([address] )    Blind right eye    Coronary artery disease    DM (diabetes mellitus), type 2 with peripheral neuropathy    Obstructive sleep apnea syndrome, severe    Vision loss, 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    Hypokalemia    Anemia    Bilateral leg edema    Prostate cancer (HCC)    Severe obstructive sleep apnea-hypopnea syndrome    Malignant neoplasm of upper lobe of right lung (HCC)    Generalized weakness    Urinary tract infection with hematuria     Past Medical History  Past Medical History:   Diagnosis Date    Anemia     Arthritis     Atrial fibrillation (HCC)     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HCC)     B12 deficiency     Back pain     Blind right eye     Cancer ([address] )     prostate    Cardiac disorder     Chronic combined systolic and diastolic heart failure (HCC)     Chronic obstructive lung disease (HCC)     Chronic venous stasis dermatitis of both lower extremities     CKD (chronic kidney disease), stage IV ([address] )     Coronary artery disease     DDD (degenerative disc disease)     DM (diabetes mellitus), type 2 (HCC)     Type II, on insulin    BAER (dyspnea on exertion)     Easy bruising     Epistaxis     Gout     Gross hematuria     last assessed: 3/18/2015    Hepatitis     Herpes zoster     Hiatal hernia     History of cellulitis     BLE    History of prostate cancer     Radiation treatments      Hypercholesterolemia     Hyperlipidemia     Hypertension     Ischemic cardiomyopathy     Lung mass     last assessed: 9/21/2012    MI, old    Heike Valdez Morbid obesity due to excess calories ([facility] )     or severe obesity    Neuropathy     BLE    Obesity     Obstructive sleep apnea syndrome, severe     Pneumonia     last assessed: 7/24/2012    Shortness of breath     TIA (transient ischemic attack)     Urinary incontinence     Urinary retention     Use of cane as ambulatory aid     Independent with personal care  Past Surgical History  Past Surgical History:   Procedure Laterality Date    ABDOMINAL SURGERY      CARDIAC SURGERY      CATARACT EXTRACTION, BILATERAL      CORONARY ANGIOPLASTY WITH STENT PLACEMENT      2 stents    CORONARY ARTERY BYPASS GRAFT N/A 7/15/2016    Procedure: Intraop ADRIAN, CABG x 3 using LIMA to LAD, RSVG to OM1 and D1;  Surgeon: Angeli Mckenna MD;  [facility];  Service:    Lamine Casillas      has stents    EYE SURGERY      Bilateral cataract removal    HERNIA REPAIR      umbilical hernia repair    LUNG CANCER SURGERY      Partial upper right lobectomy    LUNG SURGERY Left 2012    OTHER SURGICAL HISTORY      previous stent placement-no anatomy given    PILONIDAL CYST EXCISION      [place] CYSTOURETHROSCOPY,URETER CATHETER Left 3/9/2016    Procedure: CYSTOSCOPY WITH left RETROGRADE PYELOGRAM left double J stent removal;  Surgeon: Michael Lincoln MD;  Location[facility];  Service: Urology    [address] [place] [address] 83 OR BX Bilateral 10/31/2017    Procedure: BIOPSY ARTERY TEMPORAL;  Surgeon: Lizbeth Moralez MD;  [facility];  Service: Vascular    RENAL ARTERY STENT  02/16/2015    transcath intravascular stent placement percutaneous renal    VASCULAR SURGERY           12/01/18 1539   Note Type   Note type Eval/Treat   Restrictions/Precautions   Other Precautions Contact/isolation;Droplet precautions; Chair Alarm; Bed Alarm;Multiple lines;O2;Fall Risk   Pain Assessment   Pain Assessment No/denies pain   Home Living   Type of [address] [place] [address] One level  (ramp to enter)   Bathroom Toilet Standard   Bathroom Accessibility Accessible   Prior Function   Level of [place] Needs assistance with ADLs and functional mobility   Lives With Spouse; Son   Stiven Help From Family   ADL Assistance Needs assistance   IADLs Needs assistance   Comments patient reports that wife assists with bathing, dressing  , and toileting  Wife and son completes cleaning, cooking, laundry   Psychosocial   Psychosocial (WDL) WDL   ADL   Where Assessed Supine, bed   LB Bathing Assistance 2  Maximal Assistance   UB Dressing Assistance 3  Moderate Assistance   LB Dressing Assistance 1  Total Assistance   Toileting Assistance  1  Total Assistance   Bed Mobility   Rolling R 2  Maximal assistance   Rolling L 2  Maximal assistance   Supine to Sit 3  Moderate assistance   Additional items Assist x 2; Increased time required;LE management   Sit to Supine 2  Maximal assistance   Additional items Assist x 3;HOB elevated; Increased time required;LE management   Transfers   Sit to Stand 3  Moderate assistance   Additional items Assist x 2; Increased time required   Stand to Sit 4  Minimal assistance   Additional items Assist x 2; Increased time required   Functional Mobility   Additional Comments did not attempt functional mobility   Balance   Static Sitting Poor +   Dynamic Sitting Poor +   Static Standing Poor   Dynamic Standing Poor   Ambulatory Poor +   Activity Tolerance   Activity Tolerance Patient limited by fatigue   RUE Assessment   RUE Assessment X  (approx 90 AROM)   LUE Assessment   LUE Assessment X  (AROM to approximately 60 degrees)   Hand Function   Gross Motor Coordination Impaired   Fine Motor Coordination Functional   Sensation   Light Touch No apparent deficits   Cognition   Overall Cognitive Status WFL   Orientation Level Oriented X4   Memory Within functional limits   Following Commands Follows all commands and directions without difficulty   Assessment   Limitation Decreased ADL status; Decreased UE ROM; Decreased UE strength;Decreased Safe judgement during ADL;Decreased endurance;Decreased self-care trans   Prognosis Fair   Assessment Patient is [de-identified]year old male admitted with acute onset of generalized weakness with comorbities that include UTI, bilateral LE edema, chronic kidney disease, and DM with SOB on 4LO2, mainting O2 SATs above 90%  PTA, patient was receiving assistance from wife for self care tasks  Wife and son complete cleaning, cooking, laundry, and shopping  Reports that wife is currently in hospital  Patient currently reqs mod assist of 2 for transfers, max assist for rolling  Patient is incontinent of BM and reqs max assist of 2-3  for cleaning and management while supine in bed  Max assist 1-2 for urinal use while in stance  Patient able to stand EOB with RW with Min assist of 1-2  Currently with deficits with strength, endurance, balance, and ROM which is impacting independence with mobility, transfers, and self care tasks  Patient will benefit from OT to address these defits   Goals   Patient Goals to get stronger   Short Term Goal #1 patient with improve UE strength to 4/5 for functional activities   Short Term Goal #2 Patient will complete UB self care with set-up   Long Term Goal #1 Patient will complete functional transfers and mobility for short distance with RW with CG/S   Long Term Goal #2 Patient will complete toileting with min assist    Plan   Treatment Interventions ADL retraining;Functional transfer training;UE strengthening/ROM; Endurance training;Equipment evaluation/education   Goal Expiration Date 12/15/18   OT Frequency 3-5x/wk   Recommendation   OT Discharge Recommendation Short Term Rehab  (will depend on patient progress)   OT - OK to Discharge Yes  (when medically stable)   Barthel Index   Feeding 5   Bathing 0   Grooming Score 0   Dressing Score 0   Bladder Score 5   Bowels Score 0   Toilet Use Score 0   Transfers (Bed/Chair) Score 5   Mobility (Level Surface) Score 0   Stairs Score 0   Barthel Index Score 15     Pt will benefit from continued OT services in order to maximize (I) c ADL performance, FM c RW, and improve overall endurance/strength required to complete functional tasks in preparation for d/c

## 2018-12-01 NOTE — ASSESSMENT & PLAN NOTE
Patient with sudden onset of generalized weakness, no focal neurologic deficits but with acute ambulatory dysfunction, unable to ambulate at baseline  Patient's wife is currently hospitalized with influenza a, patient likely has influenza a  Possible UTI, possible lower extremity cellulitis, will give antibiotics and continue to monitor      Consult PT OT, patient will likely need short-term rehab placement

## 2018-12-01 NOTE — PLAN OF CARE
Problem: DISCHARGE PLANNING - CARE MANAGEMENT  Goal: Discharge to post-acute care or home with appropriate resources  INTERVENTIONS:  - Conduct assessment to determine patient/family and health care team treatment goals, and need for post-acute services based on payer coverage, community resources, and patient preferences, and barriers to discharge  - Address psychosocial, clinical, and financial barriers to discharge as identified in assessment in conjunction with the patient/family and health care team  - Arrange appropriate level of post-acute services according to patient's   needs and preference and payer coverage in collaboration with the physician and health care team  - Communicate with and update the patient/family, physician, and health care team regarding progress on the discharge plan  - Arrange appropriate transportation to post-acute venues  Pt's son will give the patient a ride home  Pt is a HRR    Outcome: Progressing

## 2018-12-02 PROBLEM — R82.71 BACTERIURIA: Status: ACTIVE | Noted: 2018-11-30

## 2018-12-02 LAB
ALBUMIN SERPL BCP-MCNC: 3 G/DL (ref 3.5–5)
ALP SERPL-CCNC: 27 U/L (ref 46–116)
ALT SERPL W P-5'-P-CCNC: 26 U/L (ref 12–78)
ANION GAP SERPL CALCULATED.3IONS-SCNC: 12 MMOL/L (ref 4–13)
ARTERIAL PATENCY WRIST A: YES
AST SERPL W P-5'-P-CCNC: 35 U/L (ref 5–45)
BACTERIA UR CULT: NORMAL
BASE EXCESS BLDA CALC-SCNC: -0.7 MMOL/L
BASOPHILS # BLD AUTO: 0.02 THOUSANDS/ΜL (ref 0–0.1)
BASOPHILS NFR BLD AUTO: 0 % (ref 0–1)
BILIRUB SERPL-MCNC: 0.2 MG/DL (ref 0.2–1)
BUN SERPL-MCNC: 54 MG/DL (ref 5–25)
C DIFF TOX GENS STL QL NAA+PROBE: NORMAL
CALCIUM SERPL-MCNC: 8.6 MG/DL (ref 8.3–10.1)
CHLORIDE SERPL-SCNC: 102 MMOL/L (ref 100–108)
CO2 SERPL-SCNC: 27 MMOL/L (ref 21–32)
CREAT SERPL-MCNC: 2.66 MG/DL (ref 0.6–1.3)
EOSINOPHIL # BLD AUTO: 0.02 THOUSAND/ΜL (ref 0–0.61)
EOSINOPHIL NFR BLD AUTO: 0 % (ref 0–6)
ERYTHROCYTE [DISTWIDTH] IN BLOOD BY AUTOMATED COUNT: 16.6 % (ref 11.6–15.1)
FLUAV AG SPEC QL: DETECTED
FLUBV AG SPEC QL: ABNORMAL
GFR SERPL CREATININE-BSD FRML MDRD: 22 ML/MIN/1.73SQ M
GLUCOSE SERPL-MCNC: 110 MG/DL (ref 65–140)
GLUCOSE SERPL-MCNC: 128 MG/DL (ref 65–140)
GLUCOSE SERPL-MCNC: 128 MG/DL (ref 65–140)
GLUCOSE SERPL-MCNC: 131 MG/DL (ref 65–140)
GLUCOSE SERPL-MCNC: 160 MG/DL (ref 65–140)
GLUCOSE SERPL-MCNC: 210 MG/DL (ref 65–140)
HCO3 BLDA-SCNC: 24.1 MMOL/L (ref 22–28)
HCT VFR BLD AUTO: 39.7 % (ref 36.5–49.3)
HGB BLD-MCNC: 12.4 G/DL (ref 12–17)
IMM GRANULOCYTES # BLD AUTO: 0.02 THOUSAND/UL (ref 0–0.2)
IMM GRANULOCYTES NFR BLD AUTO: 0 % (ref 0–2)
INR PPP: 1.28 (ref 0.86–1.17)
LYMPHOCYTES # BLD AUTO: 0.64 THOUSANDS/ΜL (ref 0.6–4.47)
LYMPHOCYTES NFR BLD AUTO: 13 % (ref 14–44)
MAGNESIUM SERPL-MCNC: 2.2 MG/DL (ref 1.6–2.6)
MCH RBC QN AUTO: 28.6 PG (ref 26.8–34.3)
MCHC RBC AUTO-ENTMCNC: 31.2 G/DL (ref 31.4–37.4)
MCV RBC AUTO: 92 FL (ref 82–98)
MONOCYTES # BLD AUTO: 0.6 THOUSAND/ΜL (ref 0.17–1.22)
MONOCYTES NFR BLD AUTO: 12 % (ref 4–12)
NASAL CANNULA: ABNORMAL
NEUTROPHILS # BLD AUTO: 3.71 THOUSANDS/ΜL (ref 1.85–7.62)
NEUTS SEG NFR BLD AUTO: 75 % (ref 43–75)
NRBC BLD AUTO-RTO: 0 /100 WBCS
O2 CT BLDA-SCNC: 15.9 ML/DL (ref 16–23)
OXYHGB MFR BLDA: 90.3 % (ref 94–97)
PCO2 BLDA: 40.5 MM HG (ref 36–44)
PH BLDA: 7.39 [PH] (ref 7.35–7.45)
PHOSPHATE SERPL-MCNC: 5.2 MG/DL (ref 2.3–4.1)
PLATELET # BLD AUTO: 208 THOUSANDS/UL (ref 149–390)
PMV BLD AUTO: 10.1 FL (ref 8.9–12.7)
PO2 BLDA: 61.8 MM HG (ref 75–129)
POTASSIUM SERPL-SCNC: 3.5 MMOL/L (ref 3.5–5.3)
PROCALCITONIN SERPL-MCNC: 0.46 NG/ML
PROT SERPL-MCNC: 6.5 G/DL (ref 6.4–8.2)
PROTHROMBIN TIME: 15.4 SECONDS (ref 11.8–14.2)
RBC # BLD AUTO: 4.33 MILLION/UL (ref 3.88–5.62)
RSV B RNA SPEC QL NAA+PROBE: ABNORMAL
SODIUM SERPL-SCNC: 141 MMOL/L (ref 136–145)
SPECIMEN SOURCE: ABNORMAL
WBC # BLD AUTO: 5.01 THOUSAND/UL (ref 4.31–10.16)

## 2018-12-02 PROCEDURE — 36600 WITHDRAWAL OF ARTERIAL BLOOD: CPT

## 2018-12-02 PROCEDURE — 80053 COMPREHEN METABOLIC PANEL: CPT | Performed by: INTERNAL MEDICINE

## 2018-12-02 PROCEDURE — 82948 REAGENT STRIP/BLOOD GLUCOSE: CPT

## 2018-12-02 PROCEDURE — 82805 BLOOD GASES W/O2 SATURATION: CPT | Performed by: INTERNAL MEDICINE

## 2018-12-02 PROCEDURE — 94664 DEMO&/EVAL PT USE INHALER: CPT

## 2018-12-02 PROCEDURE — 83735 ASSAY OF MAGNESIUM: CPT | Performed by: INTERNAL MEDICINE

## 2018-12-02 PROCEDURE — 94660 CPAP INITIATION&MGMT: CPT

## 2018-12-02 PROCEDURE — 85610 PROTHROMBIN TIME: CPT | Performed by: INTERNAL MEDICINE

## 2018-12-02 PROCEDURE — 94760 N-INVAS EAR/PLS OXIMETRY 1: CPT

## 2018-12-02 PROCEDURE — 84100 ASSAY OF PHOSPHORUS: CPT | Performed by: INTERNAL MEDICINE

## 2018-12-02 PROCEDURE — 99232 SBSQ HOSP IP/OBS MODERATE 35: CPT | Performed by: PHYSICIAN ASSISTANT

## 2018-12-02 PROCEDURE — 84145 PROCALCITONIN (PCT): CPT | Performed by: INTERNAL MEDICINE

## 2018-12-02 PROCEDURE — 85025 COMPLETE CBC W/AUTO DIFF WBC: CPT | Performed by: INTERNAL MEDICINE

## 2018-12-02 PROCEDURE — 94640 AIRWAY INHALATION TREATMENT: CPT

## 2018-12-02 RX ORDER — WARFARIN SODIUM 5 MG/1
5 TABLET ORAL ONCE
Status: COMPLETED | OUTPATIENT
Start: 2018-12-02 | End: 2018-12-02

## 2018-12-02 RX ORDER — METHYLPREDNISOLONE SODIUM SUCCINATE 40 MG/ML
40 INJECTION, POWDER, LYOPHILIZED, FOR SOLUTION INTRAMUSCULAR; INTRAVENOUS EVERY 12 HOURS SCHEDULED
Status: DISCONTINUED | OUTPATIENT
Start: 2018-12-02 | End: 2018-12-03 | Stop reason: HOSPADM

## 2018-12-02 RX ORDER — WARFARIN SODIUM 6 MG/1
6 TABLET ORAL
Status: DISCONTINUED | OUTPATIENT
Start: 2018-12-02 | End: 2018-12-03 | Stop reason: HOSPADM

## 2018-12-02 RX ADMIN — WARFARIN SODIUM 6 MG: 6 TABLET ORAL at 17:12

## 2018-12-02 RX ADMIN — ANORECTAL OINTMENT 1 APPLICATION: 15.7; .44; 24; 20.6 OINTMENT TOPICAL at 17:15

## 2018-12-02 RX ADMIN — ANORECTAL OINTMENT 1 APPLICATION: 15.7; .44; 24; 20.6 OINTMENT TOPICAL at 08:28

## 2018-12-02 RX ADMIN — INSULIN LISPRO 20 UNITS: 100 INJECTION, SOLUTION INTRAVENOUS; SUBCUTANEOUS at 17:12

## 2018-12-02 RX ADMIN — IPRATROPIUM BROMIDE 0.5 MG: 0.5 SOLUTION RESPIRATORY (INHALATION) at 07:35

## 2018-12-02 RX ADMIN — LEVALBUTEROL 1.25 MG: 1.25 SOLUTION, CONCENTRATE RESPIRATORY (INHALATION) at 14:17

## 2018-12-02 RX ADMIN — FUROSEMIDE 80 MG: 80 TABLET ORAL at 17:11

## 2018-12-02 RX ADMIN — METHYLPREDNISOLONE SODIUM SUCCINATE 40 MG: 40 INJECTION, POWDER, FOR SOLUTION INTRAMUSCULAR; INTRAVENOUS at 15:00

## 2018-12-02 RX ADMIN — INSULIN LISPRO 20 UNITS: 100 INJECTION, SOLUTION INTRAVENOUS; SUBCUTANEOUS at 12:11

## 2018-12-02 RX ADMIN — OSELTAMIVIR PHOSPHATE 30 MG: 30 CAPSULE ORAL at 15:00

## 2018-12-02 RX ADMIN — METHYLPREDNISOLONE SODIUM SUCCINATE 40 MG: 40 INJECTION, POWDER, FOR SOLUTION INTRAMUSCULAR; INTRAVENOUS at 21:01

## 2018-12-02 RX ADMIN — Medication 150 MG: at 08:24

## 2018-12-02 RX ADMIN — POTASSIUM CHLORIDE 40 MEQ: 20 SOLUTION ORAL at 08:24

## 2018-12-02 RX ADMIN — VITAMIN D, TAB 1000IU (100/BT) 2000 UNITS: 25 TAB at 08:24

## 2018-12-02 RX ADMIN — INSULIN DETEMIR 50 UNITS: 100 INJECTION, SOLUTION SUBCUTANEOUS at 21:38

## 2018-12-02 RX ADMIN — TAMSULOSIN HYDROCHLORIDE 0.4 MG: 0.4 CAPSULE ORAL at 21:35

## 2018-12-02 RX ADMIN — WARFARIN SODIUM 5 MG: 5 TABLET ORAL at 15:00

## 2018-12-02 RX ADMIN — IPRATROPIUM BROMIDE 0.5 MG: 0.5 SOLUTION RESPIRATORY (INHALATION) at 20:13

## 2018-12-02 RX ADMIN — LEVALBUTEROL 1.25 MG: 1.25 SOLUTION, CONCENTRATE RESPIRATORY (INHALATION) at 07:36

## 2018-12-02 RX ADMIN — LEVALBUTEROL 1.25 MG: 1.25 SOLUTION, CONCENTRATE RESPIRATORY (INHALATION) at 20:13

## 2018-12-02 RX ADMIN — ASPIRIN 81 MG: 81 TABLET, COATED ORAL at 08:24

## 2018-12-02 RX ADMIN — IPRATROPIUM BROMIDE 0.5 MG: 0.5 SOLUTION RESPIRATORY (INHALATION) at 14:17

## 2018-12-02 RX ADMIN — FUROSEMIDE 80 MG: 80 TABLET ORAL at 08:24

## 2018-12-02 RX ADMIN — PRAVASTATIN SODIUM 80 MG: 40 TABLET ORAL at 17:12

## 2018-12-02 RX ADMIN — ASPIRIN 81 MG: 81 TABLET, COATED ORAL at 17:11

## 2018-12-02 RX ADMIN — METOPROLOL TARTRATE 25 MG: 25 TABLET, FILM COATED ORAL at 17:12

## 2018-12-02 RX ADMIN — METOPROLOL TARTRATE 25 MG: 25 TABLET, FILM COATED ORAL at 08:24

## 2018-12-02 RX ADMIN — GABAPENTIN 600 MG: 300 CAPSULE ORAL at 21:34

## 2018-12-02 NOTE — UTILIZATION REVIEW
Initial Clinical Review    Admission: Date/Time/Statement: 11/30/18 @ 1120     Orders Placed This Encounter   Procedures    Inpatient Admission (expected length of stay for this patient is greater than two midnights)     Standing Status:   Standing     Number of Occurrences:   1     Order Specific Question:   Admitting Physician     Answer:   Ana Paula Guhtrie     Order Specific Question:   Level of Care     Answer:   Med Surg [16]     Order Specific Question:   Estimated length of stay     Answer:   More than 2 Midnights     Order Specific Question:   Certification     Answer:   I certify that inpatient services are medically necessary for this patient for a duration of greater than two midnights  See H&P and MD Progress Notes for additional information about the patient's course of treatment  ED: Date/Time/Mode of Arrival:   ED Arrival Information     Expected Arrival Acuity Means of Arrival Escorted By Service Admission Type    - 11/30/2018 08:07 Urgent Ambulance [facility] General Medicine Urgent    Arrival Complaint    weakness        Chief Complaint:   Chief Complaint   Patient presents with    Weakness - Generalized     Patient woke up this morning with an increase in weakness, was unable to transfer to wheelchair, altered mental status, hematuria, states last week he "feels he may have passed a stone" currently is being treated for UTI, BS this AM was 143     History of Illness: Jack Orozco is a [de-identified] y o  male who presents with severe weakness, patient normally is able to get himself out of bed, this morning when he awoke he was on able to get himself out of his bed, he needed the assistance of 2 of his sons    Patient had some hematuria last week, passed a kidney stone, he states that since resolved, patient claims that he went to bed last night feeling okay, normally can get himself out of bed, ambulate with a walker, notes today that he had severe weakness, unable to sit himself up in bed, was unable to ambulate with walker  Patient denies any focal deficits, he did note that he felt to be leaning to his left  Patient has no other acute complaints, he chronically has very poor vision  ED Vital Signs:   ED Triage Vitals   Temperature Pulse Respirations Blood Pressure SpO2   11/30/18 0817 11/30/18 0817 11/30/18 0817 11/30/18 0817 11/30/18 0817   98 4 °F (36 9 °C) 87 22 141/61 91 %      Temp Source Heart Rate Source Patient Position - Orthostatic VS BP Location FiO2 (%)   11/30/18 0817 11/30/18 0817 11/30/18 0817 11/30/18 0817 --   Temporal Monitor Lying Left arm       Pain Score       11/30/18 0846       5        Wt Readings from Last 1 Encounters:   12/02/18 133 kg (293 lb 10 4 oz)     Vital Signs (abnormal):     11/30/18 1230  --  89   24  149/67  --  95 %  Nasal cannula  Lying     Abnormal Labs:    3 5 L NC 12/02/18 1149   pH, Arterial 7 393    pCO2, Arterial 40 5    pO2, Arterial 61 8     HCO3, Arterial 24 1    Base Excess, Arterial -0 7    O2 Content, Arterial 15 9     O2 HGB,Arterial  90 3       BUN/Cr 61/2 63, 54/2 66   GLUCOSE 145, 266  ALK PHOS 33, 31  PHOS 4 5  TROP 0 11  BNP 5607  POC GLUCOSE 171, 162, 147  INFLUENZA A DETECTED   Urine small blood, small leukocytes,RBC 2-4, WBC 10-20, occas mucous threads    Diagnostic Test Results:     11/30 CT head - No acute intracranial abnormality  Mild chronic small vessel ischemic changes and volume loss  11/30 CXR WNL     11/30 ECG - Left axis deviation  Right bundle branch block  Inferior infarct (cited on or before 02-DEC-2016)  Abnormal ECG  When compared with ECG of 26-MAR-2018 19:18,  No significant change was found    ED Treatment:   Medication Administration from 11/30/2018 0807 to 11/30/2018 1657       Date/Time Order Dose Route Action     11/30/2018 1232 cefTRIAXone (ROCEPHIN) IVPB (premix) 1,000 mg 1,000 mg Intravenous New Bag        Past Medical/Surgical History:    Active Ambulatory Problems     Diagnosis Date Noted    Morbid obesity ([facility] )     History of prostate cancer     Type 2 diabetes mellitus with hyperglycemia ([place] [address] )     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    CKD (chronic kidney disease) stage 4, GFR 15-29 ml/min ([facility]) 07/16/2016    Body mass index (BMI) of 40 0 to 49 9 03/04/2017    Wound of right lower extremity 03/05/2017    Wound of left lower extremity 03/05/2017    Hyperphosphatemia 03/05/2017    Vitamin D deficiency 03/05/2017    Renal cyst 03/05/2017    Atrial fibrillation (HCC)     Blind right eye     Coronary artery disease     DM (diabetes mellitus), type 2 with peripheral neuropathy     Obstructive sleep apnea syndrome, severe     Vision loss, 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 10/21/2017    Hypokalemia 10/25/2017    Anemia 10/04/2016    Bilateral leg edema 01/19/2016    Prostate cancer ([facility] ) 07/24/2012    Severe obstructive sleep apnea-hypopnea syndrome 04/13/2017    Malignant neoplasm of upper lobe of right lung ([place] [address] ) 04/10/2018     Resolved Ambulatory Problems     Diagnosis Date Noted    Volume overload 07/17/2016    Acute on chronic combined systolic and diastolic ACC/AHA stage C congestive heart failure ([facility] )     LESLIE (acute kidney injury) ([place] [address] ) 10/26/2017    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    Trochanteric bursitis of left hip 06/30/2016     Past Medical History:   Diagnosis Date    Anemia     Arthritis     Atrial fibrillation (HCC)     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[facility] )     B12 deficiency     Back pain     Blind right eye     Cancer ([facility] )     Cardiac disorder     Chronic combined systolic and diastolic heart failure (HCC)     Chronic obstructive lung disease (HCC)     Chronic venous stasis dermatitis of both lower extremities     CKD (chronic kidney disease), stage IV (HCC)     Coronary artery disease     DDD (degenerative disc disease)     DM (diabetes mellitus), type 2 (HCC)     BAER (dyspnea on exertion)     Easy bruising     Epistaxis     Gout     Gross hematuria  Hepatitis     Herpes zoster     Hiatal hernia     History of cellulitis     History of prostate cancer     Hypercholesterolemia     Hyperlipidemia     Hypertension     Ischemic cardiomyopathy     Lung mass     MI, old     Morbid obesity due to excess calories ([facility])     Neuropathy     Obesity     Obstructive sleep apnea syndrome, severe     Pneumonia     Shortness of breath     TIA (transient ischemic attack)     Urinary incontinence     Urinary retention     Use of cane as ambulatory aid        Admitting Diagnosis: UTI (urinary tract infection) [N39 0]  Weakness generalized [R53 1]  Weakness [R53 1]  CAD (coronary artery disease) of bypass graft [I25 810]  CKD (chronic kidney disease) [N18 9]  NSTEMI (non-ST elevated myocardial infarction) ([facility]) [I21 4]  Near syncope [R55]  Recurrent cellulitis of lower extremity [L03 119]    Age/Sex: [de-identified] y o  male    Assessment/Plan:   * Generalized weakness   Assessment & Plan     Patient with sudden onset of generalized weakness, no focal neurologic deficits but with acute ambulatory dysfunction, unable to ambulate at baseline      Possible UTI, possible lower extremity cellulitis, will give antibiotics and continue to monitor      Consult PT OT, patient will likely need short-term rehab placement      Urinary tract infection with hematuria   Assessment & Plan     IV ceftriaxone, follow up urine culture       Bilateral leg edema   Assessment & Plan     Patient with increased edema increased warmth as well as bilateral lower extremity wounds, likely venous stasis ulcers bilateral lower extremities, will consult wound care, give IV ceftriaxone, continue to monitor, keep legs elevated, possible lower extremity cellulitis with increased erythema of the right leg      CKD (chronic kidney disease) stage 4, GFR 15-29 ml/min ([facility])   Assessment & Plan     Patient currently at baseline renal function, continue monitor daily, avoid further nephrotoxins       Atrial fibrillation ([facility] )   Assessment & Plan     Continue home meds      DM (diabetes mellitus), type 2 with peripheral neuropathy   Assessment & Plan             Lab Results   Component Value Date     HGBA1C 6 9 (H) 10/22/2018         No results for input(s): POCGLU in the last 72 hours      Blood Sugar Average: Last 72 hrs:   monitor Accu-Cheks AC and HS       Morbid obesity ([facility] )   Assessment & Plan     BMI 47 24, consult to Nutrition       Coronary artery disease   Assessment & Plan     Continue medical management      Obstructive sleep apnea syndrome, severe   Assessment & Plan     CPAP at night         VTE Prophylaxis: Heparin  / sequential compression device   Code Status:  Full code  Anticipated Length of Stay:  Patient will be admitted on an Inpatient basis with an anticipated length of stay of  greater than 2 midnights     Justification for Hospital Stay:  See plan above, IV antibiotics pending urine cultures, PT OT consult      Admission Orders:  Scheduled Meds:   Current Facility-Administered Medications:  aspirin 81 mg Oral BID   cholecalciferol 2,000 Units Oral Daily   furosemide 80 mg Oral BID   gabapentin 600 mg Oral HS   insulin detemir 50 Units Subcutaneous HS   insulin lispro 20 Units Subcutaneous TID With Meals   ipratropium 0 5 mg Nebulization TID   iron polysaccharides 150 mg Oral Daily   levalbuterol 1 25 mg Nebulization TID   menthol-zinc oxide  Topical BID   metoprolol tartrate 25 mg Oral BID   oseltamivir 30 mg Oral Q24H   potassium chloride 40 mEq Oral Daily   pravastatin 80 mg Oral Daily With Dinner   tamsulosin 0 4 mg Oral HS   traMADol 50 mg Oral Q6H PRN   warfarin 5 mg Oral Daily (warfarin)     PRN Meds: traMADol    SCDs  Daily wt  NC oxygen   OOB as scott   POC glucose ac/hs   Diabetic diet w/ fluid restriction 1800 ml  Cons Wound Care   CPAP at HS   PT eval/tx

## 2018-12-02 NOTE — PLAN OF CARE
Problem: Potential for Falls  Goal: Patient will remain free of falls  INTERVENTIONS:  - Assess patient frequently for physical needs  -  Identify cognitive and physical deficits and behaviors that affect risk of falls  -  [place] fall precautions as indicated by assessment   High fall risk    - Educate patient/family on patient safety including physical limitations  - Instruct patient to call for assistance with activity based on assessment  - Modify environment to reduce risk of injury  - Consider OT/PT consult to assist with strengthening/mobility     Outcome: Progressing      Problem: Prexisting or High Potential for Compromised Skin Integrity  Goal: Skin integrity is maintained or improved  INTERVENTIONS:  - Identify patients at risk for skin breakdown  - Assess and monitor skin integrity  - Assess and monitor nutrition and hydration status  - Monitor labs (i e  albumin)  - Assess for incontinence   - Turn and reposition patient  - Assist with mobility/ambulation  - Relieve pressure over bony prominences  - Avoid friction and shearing  - Provide appropriate hygiene as needed including keeping skin clean and dry  - Evaluate need for skin moisturizer/barrier cream  - Collaborate with interdisciplinary team (i e  Nutrition, Rehabilitation, etc )   - Patient/family teaching   Outcome: Progressing      Problem: PAIN - ADULT  Goal: Verbalizes/displays adequate comfort level or baseline comfort level  Interventions:  - Encourage patient to monitor pain and request assistance  - Assess pain using appropriate pain scale  - Administer analgesics based on type and severity of pain and evaluate response  - Implement non-pharmacological measures as appropriate and evaluate response  - Consider cultural and social influences on pain and pain management  - Notify physician/advanced practitioner if interventions unsuccessful or patient reports new pain   Outcome: Progressing      Problem: INFECTION - ADULT  Goal: Absence or prevention of progression during hospitalization  INTERVENTIONS:  - Assess and monitor for signs and symptoms of infection  - Monitor lab/diagnostic results  - Monitor all insertion sites, i e  indwelling lines, tubes, and drains  - Administer medications as ordered  - Instruct and encourage patient and family to use good hand hygiene technique  - Identify and instruct in appropriate isolation precautions for identified infection/condition   Outcome: Progressing      Problem: SAFETY ADULT  Goal: Maintain or return to baseline ADL function  INTERVENTIONS:  -  Assess patient's ability to carry out ADLs; assess patient's baseline for ADL function and identify physical deficits which impact ability to perform ADLs (bathing, care of mouth/teeth, toileting, grooming, dressing, etc )  - Assess/evaluate cause of self-care deficits   - Assess range of motion  - Assess patient's mobility; develop plan if impaired  - Assess patient's need for assistive devices and provide as appropriate  - Encourage maximum independence but intervene and supervise when necessary  ¯ Involve family in performance of ADLs  ¯ Assess for home care needs following discharge   ¯ Request OT consult to assist with ADL evaluation and planning for discharge  ¯ Provide patient education as appropriate   Outcome: Progressing    Goal: Maintain or return mobility status to optimal level  INTERVENTIONS:  - Assess patient's baseline mobility status (ambulation, transfers, stairs, etc )    - Identify cognitive and physical deficits and behaviors that affect mobility  - Identify mobility aids required to assist with transfers and/or ambulation (gait belt, sit-to-stand, lift, walker, cane, etc )  - [place] fall precautions as indicated by assessment  - Record patient progress and toleration of activity level on Mobility SBAR; progress patient to next Phase/Stage  - Instruct patient to call for assistance with activity based on assessment  - Request Rehabilitation consult to assist with strengthening/weightbearing, etc    Outcome: Progressing    Goal: Patient will remain free of falls  INTERVENTIONS:  - Assess patient frequently for physical needs  -  Identify cognitive and physical deficits and behaviors that affect risk of falls  -  [place] fall precautions as indicated by assessment  High fall risk    - Educate patient/family on patient safety including physical limitations  - Instruct patient to call for assistance with activity based on assessment  - Modify environment to reduce risk of injury  - Consider OT/PT consult to assist with strengthening/mobility     Outcome: Progressing      Problem: DISCHARGE PLANNING  Goal: Discharge to home or other facility with appropriate resources  INTERVENTIONS:  - Identify barriers to discharge w/patient and caregiver  - Arrange for needed discharge resources and transportation as appropriate  - Identify discharge learning needs (meds, wound care, etc )  - Arrange for interpretive services to assist at discharge as needed  - Refer to Case Management Department for coordinating discharge planning if the patient needs post-hospital services based on physician/advanced practitioner order or complex needs related to functional status, cognitive ability, or social support system   Outcome: Progressing      Problem: Knowledge Deficit  Goal: Patient/family/caregiver demonstrates understanding of disease process, treatment plan, medications, and discharge instructions  Complete learning assessment and assess knowledge base    Interventions:  - Provide teaching at level of understanding  - Provide teaching via preferred learning methods   Outcome: Progressing      Problem: GENITOURINARY - ADULT  Goal: Maintains or returns to baseline urinary function  INTERVENTIONS:  - Assess urinary function  - Encourage oral fluids to ensure adequate hydration  - Administer IV fluids as ordered to ensure adequate hydration  - Administer ordered medications as needed  - Offer frequent toileting  - Follow urinary retention protocol if ordered   Outcome: Progressing      Problem: METABOLIC, FLUID AND ELECTROLYTES - ADULT  Goal: Electrolytes maintained within normal limits  INTERVENTIONS:  - Monitor labs and assess patient for signs and symptoms of electrolyte imbalances  - Administer electrolyte replacement as ordered  - Monitor response to electrolyte replacements, including repeat lab results as appropriate  - Instruct patient on fluid and nutrition as appropriate   Outcome: Progressing    Goal: Glucose maintained within target range  INTERVENTIONS:  - Monitor Blood Glucose as ordered  - Assess for signs and symptoms of hyperglycemia and hypoglycemia  - Administer ordered medications to maintain glucose within target range  - Assess nutritional intake and initiate nutrition service referral as needed   Outcome: Progressing      Problem: SKIN/TISSUE INTEGRITY - ADULT  Goal: Skin integrity remains intact  INTERVENTIONS  - Identify patients at risk for skin breakdown  - Assess and monitor skin integrity  - Assess and monitor nutrition and hydration status  - Monitor labs (i e  albumin)  - Assess for incontinence   - Turn and reposition patient  - Assist with mobility/ambulation  - Relieve pressure over bony prominences  - Avoid friction and shearing  - Provide appropriate hygiene as needed including keeping skin clean and dry  - Evaluate need for skin moisturizer/barrier cream  - Collaborate with interdisciplinary team (i e  Nutrition, Rehabilitation, etc )   - Patient/family teaching   Outcome: Progressing    Goal: Incision(s), wounds(s) or drain site(s) healing without S/S of infection  INTERVENTIONS  - Assess and document risk factors for skin impairment   - Assess and document dressing, incision, wound bed, drain sites and surrounding tissue  - Initiate Nutrition services consult and/or wound management as needed   Outcome: Progressing    Goal: Oral mucous membranes remain intact  INTERVENTIONS  - Assess oral mucosa and hygiene practices  - Implement preventative oral hygiene regimen  - Implement oral medicated treatments as ordered  - Initiate Nutrition services referral as needed   Outcome: Progressing      Problem: DISCHARGE PLANNING - CARE MANAGEMENT  Goal: Discharge to post-acute care or home with appropriate resources  INTERVENTIONS:  - Conduct assessment to determine patient/family and health care team treatment goals, and need for post-acute services based on payer coverage, community resources, and patient preferences, and barriers to discharge  - Address psychosocial, clinical, and financial barriers to discharge as identified in assessment in conjunction with the patient/family and health care team  - Arrange appropriate level of post-acute services according to patient's   needs and preference and payer coverage in collaboration with the physician and health care team  - Communicate with and update the patient/family, physician, and health care team regarding progress on the discharge plan  - Arrange appropriate transportation to post-acute venues  Pt's son will give the patient a ride home  Pt is a HRR     Outcome: Progressing

## 2018-12-02 NOTE — PLAN OF CARE
Problem: RESPIRATORY - ADULT  Goal: Achieves optimal ventilation and oxygenation  INTERVENTIONS:  - Assess for changes in respiratory status  - Assess for changes in mentation and behavior  - Position to facilitate oxygenation and minimize respiratory effort  - Oxygen administration by appropriate delivery method based on oxygen saturation (per order) or ABGs  - Initiate smoking cessation education as indicated  - Encourage broncho-pulmonary hygiene including cough, deep breathe, Incentive Spirometry  - Assess the need for suctioning and aspirate as needed  - Assess and instruct to report SOB or any respiratory difficulty  - Respiratory Therapy support as indicated  Outcome: Progressing      Problem: GASTROINTESTINAL - ADULT  Goal: Maintains or returns to baseline bowel function  INTERVENTIONS:  - Assess bowel function  - Encourage oral fluids to ensure adequate hydration  - Administer IV fluids as ordered to ensure adequate hydration  - Administer ordered medications as needed  - Encourage mobilization and activity  - Nutrition services referral to assist patient with appropriate food choices  Outcome: Progressing    Goal: Maintains adequate nutritional intake  INTERVENTIONS:  - Monitor percentage of each meal consumed  - Identify factors contributing to decreased intake, treat as appropriate  - Assist with meals as needed  - Monitor I&O, WT and lab values  - Obtain nutrition services referral as needed  Outcome: Progressing

## 2018-12-02 NOTE — ASSESSMENT & PLAN NOTE
Lab Results   Component Value Date    HGBA1C 6 9 (H) 10/22/2018       Recent Labs      12/01/18   1201  12/01/18   1648  12/01/18   2107  12/02/18   0803   POCGLU  147*  70  131  128       Blood Sugar Average: Last 72 hrs:  (P) 129 375 monitor Accu-Cheks AC and HS  Continue accuchecks with ISS coverage as needed

## 2018-12-02 NOTE — ASSESSMENT & PLAN NOTE
Continue metoprolol  Will increase dose of coumadin  Will give an additional 5mg daily once now, then increase to 6mg daily  New inpatient medications likely contributing to suptherapeutic INR

## 2018-12-02 NOTE — PHYSICIAN ADVISOR
Current patient class: Inpatient  The patient is currently on Hospital Day: 3 at [address]      The patient was admitted to the hospital at 1120 on 11/30/18 for the following diagnosis:  UTI (urinary tract infection) [N39 0]  Weakness generalized [R53 1]  Weakness [R53 1]  CAD (coronary artery disease) of bypass graft [I25 810]  CKD (chronic kidney disease) [N18 9]  NSTEMI (non-ST elevated myocardial infarction) ([address] ) [I21 4]  Near syncope [R55]  Recurrent cellulitis of lower extremity [L03 119]       There is documentation in the medical record of an expected length of stay of at least 2 midnights  The patient is therefore expected to satisfy the 2 midnight benchmark and given the 2 midnight presumption is appropriate for INPATIENT ADMISSION  Given this expectation of a satisfying stay, CMS instructs us that the patient is most often appropriate for inpatient admission under part A provided medical necessity is documented in the chart  After review of the relevant documentation, labs, vital signs and test results, the patient is appropriate for INPATIENT ADMISSION  Admission to the hospital as an inpatient is a complex decision making process which requires the practitioner to consider the patients presenting complaint, history and physical examination and all relevant testing  With this in mind, in this case, the patient was deemed appropriate for INPATIENT ADMISSION  After review of the documentation and testing available at the time of the admission I concur with this clinical determination of medical necessity  Rationale is as follows: The patient is an 76-year-old male presented to the emergency room on 11/30/2018 with a chief complaint of generalized weakness with inability due to transfer himself out of bed which he is normally able to  There is also mention of altered mental status    Patient was admitted for initial workup of possible urinary tract infection with abnormal urinalysis and possible lower extremity cellulitis due to edema and erythema  Patient was afebrile but was tachypneic and tachycardic at times  He was ultimately found to be positive for influenza type A  As of today patient still notes generalized weakness and fatigue  Patient also noted to have wheezing on examination  He is treated with intravenous steroids  Inpatient hospitalization is warranted for this patient  He has already met the 2 midnight benchmark  The patients vitals on arrival were ED Triage Vitals   Temperature Pulse Respirations Blood Pressure SpO2   11/30/18 0817 11/30/18 0817 11/30/18 0817 11/30/18 0817 11/30/18 0817   98 4 °F (36 9 °C) 87 22 141/61 91 %      Temp Source Heart Rate Source Patient Position - Orthostatic VS BP Location FiO2 (%)   11/30/18 0817 11/30/18 0817 11/30/18 0817 11/30/18 0817 --   Temporal Monitor Lying Left arm       Pain Score       11/30/18 0846       5           Past Medical History:   Diagnosis Date    Anemia     Arthritis     Atrial fibrillation (HCC)     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HCC)     B12 deficiency     Back pain     Blind right eye     Cancer (HCC)     prostate    Cardiac disorder     Chronic combined systolic and diastolic heart failure (HCC)     Chronic obstructive lung disease (HCC)     Chronic venous stasis dermatitis of both lower extremities     CKD (chronic kidney disease), stage IV (HCC)     Coronary artery disease     DDD (degenerative disc disease)     DM (diabetes mellitus), type 2 (HCC)     Type II, on insulin    BAER (dyspnea on exertion)     Easy bruising     Epistaxis     Gout     Gross hematuria     last assessed: 3/18/2015    Hepatitis     Herpes zoster     Hiatal hernia     History of cellulitis     BLE    History of prostate cancer     Radiation treatments      Hypercholesterolemia     Hyperlipidemia     Hypertension     Ischemic cardiomyopathy     Lung mass     last assessed: 9/21/2012    MI, old    Alan Weems Morbid obesity due to excess calories ([address] )     or severe obesity    Neuropathy     BLE    Obesity     Obstructive sleep apnea syndrome, severe     Pneumonia     last assessed: 7/24/2012    Shortness of breath     TIA (transient ischemic attack)     Urinary incontinence     Urinary retention     Use of cane as ambulatory aid     Independent with personal care       Past Surgical History:   Procedure Laterality Date    ABDOMINAL SURGERY      CARDIAC SURGERY      CATARACT EXTRACTION, BILATERAL      CORONARY ANGIOPLASTY WITH STENT PLACEMENT      2 stents    CORONARY ARTERY BYPASS GRAFT N/A 7/15/2016    Procedure: Intraop ADRIAN, CABG x 3 using LIMA to LAD, RSVG to OM1 and D1;  Surgeon: Debria Hatchet, MD;  [facility]: [facility];  Service:    Nerissa Presume      has stents    EYE SURGERY      Bilateral cataract removal    HERNIA REPAIR      umbilical hernia repair    LUNG CANCER SURGERY      Partial upper right lobectomy    LUNG SURGERY Left 2012    OTHER SURGICAL HISTORY      previous stent placement-no anatomy given    PILONIDAL CYST EXCISION      OK CYSTOURETHROSCOPY,URETER CATHETER Left 3/9/2016    Procedure: CYSTOSCOPY WITH left RETROGRADE PYELOGRAM left double J stent removal;  Surgeon: Brianne Hardy MD;  Location: [facility];  Service: Urology    [address] [place] [address] 83 OR BX Bilateral 10/31/2017    Procedure: BIOPSY ARTERY TEMPORAL;  Surgeon: Bard Nicolette MD;  Location: [facility];  Service: Vascular    RENAL ARTERY STENT  02/16/2015    transcath intravascular stent placement percutaneous renal    VASCULAR SURGERY             Consults have been placed to:   IP CONSULT TO CASE MANAGEMENT  IP CONSULT TO WOUND CARE    Vitals:    12/02/18 0823 12/02/18 1417 12/02/18 1600 12/02/18 1709   BP: 118/58  102/57 139/60   BP Location: Left arm  Left arm Right arm   Pulse: 104  89 75   Resp:   22    Temp:   98 1 °F (36 7 °C)    TempSrc:   Temporal    SpO2:  95% 92%    Weight:       Height: Most recent labs:    Recent Labs      11/30/18   1003   12/02/18   0442  12/02/18   0556   WBC  6 39   < >  5 01   --    HGB  12 0   < >  12 4   --    HCT  37 4   < >  39 7   --    PLT  250   < >  208   --    K  4 3   < >  3 5   --    CALCIUM  8 9   < >  8 6   --    BUN  61*   < >  54*   --    CREATININE  2 63*   < >  2 66*   --    INR  1 24*   < >   --   1 28*   TROPONINI  0 11*   --    --    --    AST  26   < >  35   --    ALT  23   < >  26   --    ALKPHOS  33*   < >  27*   --     < > = values in this interval not displayed         Scheduled Meds:  Current Facility-Administered Medications:  aspirin 81 mg Oral BID Sundra Manzanilla, DO   cholecalciferol 2,000 Units Oral Daily Sundra Manzanilla, DO   furosemide 80 mg Oral BID Sundra Manzanilla, DO   gabapentin 600 mg Oral HS Sundra Manzanilla, DO   insulin detemir 50 Units Subcutaneous HS Sundra Manzanilla, DO   insulin lispro 20 Units Subcutaneous TID With Meals Sundra Manzanilla, DO   ipratropium 0 5 mg Nebulization TID Sundra Manzanilla, DO   iron polysaccharides 150 mg Oral Daily Sundra Manzanilla, DO   levalbuterol 1 25 mg Nebulization TID Sundra Manzanilla, DO   menthol-zinc oxide  Topical BID Sundra Manzanilla, DO   methylPREDNISolone sodium succinate 40 mg Intravenous Q12H [address] Marianne Byrd PA-C   metoprolol tartrate 25 mg Oral BID Sundra Manzanilla, DO   oseltamivir 30 mg Oral Q24H Sundra Manzanilla, DO   potassium chloride 40 mEq Oral Daily Sundra Manzanilla, DO   pravastatin 80 mg Oral Daily With MailWriter, DO   tamsulosin 0 4 mg Oral HS Sundra Manzanilla, DO   traMADol 50 mg Oral Q6H PRN Sundra Manzanilla, DO   warfarin 6 mg Oral Daily (warfarin) Moncho Acevedo PA-C     Continuous Infusions:   PRN Meds: traMADol    Surgical procedures (if appropriate):

## 2018-12-02 NOTE — PROGRESS NOTES
Progress Note - Js Wong 1940, [de-identified] y o  male MRN: [id number]    Unit/Bed#: 408-01 Encounter: [id number]    Primary Care Provider: Theodore Hughes DO   Date and time admitted to hospital: 11/30/2018  8:08 AM        * Generalized weakness   Assessment & Plan    Patient with sudden onset of generalized weakness, no focal neurologic deficits but with acute ambulatory dysfunction, unable to ambulate at baseline  Patient's wife is currently hospitalized with influenza A, and we suspected this diagnosis as well  This was confirmed today  Some wheezing noted on exam  Patient is a former smoker with probable underlying COPD  Will add IV steroids at 40mg IV BID  Consult PT OT, patient will likely need short-term rehab placement     Bacteriuria   Assessment & Plan    Patient has asymptomatic bacteriuria likely based on low colony counts and lack of symptoms  Bilateral leg edema   Assessment & Plan    Patient with increased edema increased warmth as well as bilateral lower extremity wounds, likely venous stasis ulcers bilateral lower extremities, however cellulitis initially was a possible concern  Patient received 2 days of IV rocephin  will consult wound care, continue to monitor off IV antibiotics  Obstructive sleep apnea syndrome, severe   Assessment & Plan    CPAP at night  DM (diabetes mellitus), type 2 with peripheral neuropathy   Assessment & Plan    Lab Results   Component Value Date    HGBA1C 6 9 (H) 10/22/2018       Recent Labs      12/01/18   1201  12/01/18   1648  12/01/18   2107  12/02/18   0803   POCGLU  147*  70  131  128       Blood Sugar Average: Last 72 hrs:  (P) 129 375 monitor Accu-Cheks AC and HS  Continue accuchecks with ISS coverage as needed  Coronary artery disease   Assessment & Plan    Continue medical management     Atrial fibrillation [facility])   Assessment & Plan    Continue metoprolol  Will increase dose of coumadin     Will give an additional 5mg daily once now, then increase to 6mg daily  New inpatient medications likely contributing to suptherapeutic INR  CKD (chronic kidney disease) stage 4, GFR 15-29 ml/min [facility])   Assessment & Plan    Patient currently at baseline renal function, continue monitor daily, avoid further nephrotoxins  Morbid obesity ([address] )   Assessment & Plan    BMI 47 24, consult to Nutrition  VTE Pharmacologic Prophylaxis:   Pharmacologic: Warfarin (Coumadin)  Mechanical VTE Prophylaxis in Place: No        Time Spent for Care: 30 minutes  More than 50% of total time spent on counseling and coordination of care as described above  Current Length of Stay: 2 day(s)    Current Patient Status: Inpatient   Certification Statement: The patient will continue to require additional inpatient hospital stay due to need for close monitoring and discharge planning - likely STR candidate  Discharge Plan / Estimated Discharge Date: TBD      Code Status: Level 1 - Full Code      Subjective:   Patient still notes generalized weakness and fatigue  He denies any fevers or chills overnight  He is having decreased appetite  Objective:     Vitals:   Temp (24hrs), Av 2 °F (36 8 °C), Min:97 9 °F (36 6 °C), Max:98 8 °F (37 1 °C)    Temp:  [97 9 °F (36 6 °C)-98 8 °F (37 1 °C)] 97 9 °F (36 6 °C)  HR:  [] 104  Resp:  [20-22] 22  BP: (105-118)/(53-68) 118/58  SpO2:  [91 %-98 %] 93 %  Body mass index is 45 99 kg/m²  Input and Output Summary (last 24 hours): Intake/Output Summary (Last 24 hours) at 18 1219  Last data filed at 18 0848   Gross per 24 hour   Intake              610 ml   Output              525 ml   Net               85 ml       Physical Exam:     Physical Exam   Constitutional: He is oriented to person, place, and time  He appears well-developed and well-nourished  HENT:   Head: Normocephalic  Neck: No JVD present  Cardiovascular: Normal rate and normal heart sounds  No murmur heard    Pulmonary/Chest: Effort normal  No respiratory distress  He has wheezes (Scattered expiratory wheezing throughout )  He has no rales  Abdominal:   Abdominal obesity noted  Neurological: He is alert and oriented to person, place, and time  Skin: Skin is warm and dry  He is not diaphoretic  Signs of chronic venous stasis changes in the bilateral lower extremities  Skin tear noted in the right anterior lower extremity  Nursing note and vitals reviewed  Additional Data:   Labs:      Results from last 7 days  Lab Units 12/02/18  0442   WBC Thousand/uL 5 01   HEMOGLOBIN g/dL 12 4   HEMATOCRIT % 39 7   PLATELETS Thousands/uL 208   NEUTROS PCT % 75   LYMPHS PCT % 13*   MONOS PCT % 12   EOS PCT % 0       Results from last 7 days  Lab Units 12/02/18  0442   POTASSIUM mmol/L 3 5   CHLORIDE mmol/L 102   CO2 mmol/L 27   BUN mg/dL 54*   CREATININE mg/dL 2 66*   CALCIUM mg/dL 8 6   ALK PHOS U/L 27*   ALT U/L 26   AST U/L 35       Results from last 7 days  Lab Units 12/02/18  0556   INR  1 28*       * I Have Reviewed All Lab Data Listed Above  * Additional Pertinent Lab Tests Reviewed: All Labs Within Last 24 Hours Reviewed    Imaging:    Imaging Reports Reviewed Today Include:  No new imaging available to review  Recent Cultures (last 7 days):       Results from last 7 days  Lab Units 12/01/18  1349 11/30/18  2131 11/30/18  1003 11/30/18  0951   BLOOD CULTURE   --   --  No Growth at 24 hrs    No Growth at 24 hrs   --    URINE CULTURE   --   --   --  Culture too young- will reincubate   INFLUENZA B PCR  None Detected  --   --   --    RSV PCR  None Detected  --   --   --    C DIFF TOXIN B   --  NEGATIVE for C difficle toxin by PCR    --   --        Last 24 Hours Medication List:     Current Facility-Administered Medications:  aspirin 81 mg Oral BID Cathlean Duet, DO   cholecalciferol 2,000 Units Oral Daily Cathlean Duet, DO   furosemide 80 mg Oral BID Cathlean Duet, DO   gabapentin 600 mg Oral HS Cathlean Duet, DO   insulin detemir 50 Units Subcutaneous HS Maikel Chery, DO   insulin lispro 20 Units Subcutaneous TID With Meals Maikel Chery, DO   ipratropium 0 5 mg Nebulization TID Maikel Chery, DO   iron polysaccharides 150 mg Oral Daily Maikel Chery, DO   levalbuterol 1 25 mg Nebulization TID Maikel Chery, DO   menthol-zinc oxide  Topical BID Maikel Chery, DO   metoprolol tartrate 25 mg Oral BID Maikel Chery, DO   oseltamivir 30 mg Oral Q24H Maikel Chery, DO   potassium chloride 40 mEq Oral Daily Maikel Chery, DO   pravastatin 80 mg Oral Daily With [employer] Lizzeth, DO   tamsulosin 0 4 mg Oral HS Maikel Chery, DO   traMADol 50 mg Oral Q6H PRN Maikel Chery, DO   warfarin 5 mg Oral Daily (warfarin) Maikel Chery,         Today, Patient Was Seen By: Kelley Islas PA-C    ** Please Note: Dragon 360 Dictation voice to text software may have been used in the creation of this document   **

## 2018-12-02 NOTE — ASSESSMENT & PLAN NOTE
Patient with sudden onset of generalized weakness, no focal neurologic deficits but with acute ambulatory dysfunction, unable to ambulate at baseline  Patient's wife is currently hospitalized with influenza A, and we suspected this diagnosis as well  This was confirmed today  Some wheezing noted on exam  Patient is a former smoker with probable underlying COPD  Will add IV steroids at 40mg IV BID      Consult PT OT, patient will likely need short-term rehab placement

## 2018-12-03 VITALS
HEIGHT: 67 IN | BODY MASS INDEX: 46.54 KG/M2 | OXYGEN SATURATION: 95 % | TEMPERATURE: 97.1 F | WEIGHT: 296.52 LBS | HEART RATE: 62 BPM | SYSTOLIC BLOOD PRESSURE: 142 MMHG | RESPIRATION RATE: 20 BRPM | DIASTOLIC BLOOD PRESSURE: 63 MMHG

## 2018-12-03 PROBLEM — J10.1 INFLUENZA A: Status: ACTIVE | Noted: 2018-11-30

## 2018-12-03 LAB
ALBUMIN SERPL BCP-MCNC: 2.9 G/DL (ref 3.5–5)
ALP SERPL-CCNC: 27 U/L (ref 46–116)
ALT SERPL W P-5'-P-CCNC: 26 U/L (ref 12–78)
ANION GAP SERPL CALCULATED.3IONS-SCNC: 14 MMOL/L (ref 4–13)
AST SERPL W P-5'-P-CCNC: 34 U/L (ref 5–45)
BASOPHILS # BLD AUTO: 0.01 THOUSANDS/ΜL (ref 0–0.1)
BASOPHILS NFR BLD AUTO: 0 % (ref 0–1)
BILIRUB SERPL-MCNC: 0.2 MG/DL (ref 0.2–1)
BUN SERPL-MCNC: 64 MG/DL (ref 5–25)
CALCIUM SERPL-MCNC: 8.8 MG/DL (ref 8.3–10.1)
CHLORIDE SERPL-SCNC: 101 MMOL/L (ref 100–108)
CO2 SERPL-SCNC: 24 MMOL/L (ref 21–32)
CREAT SERPL-MCNC: 2.74 MG/DL (ref 0.6–1.3)
EOSINOPHIL # BLD AUTO: 0 THOUSAND/ΜL (ref 0–0.61)
EOSINOPHIL NFR BLD AUTO: 0 % (ref 0–6)
ERYTHROCYTE [DISTWIDTH] IN BLOOD BY AUTOMATED COUNT: 16.6 % (ref 11.6–15.1)
GFR SERPL CREATININE-BSD FRML MDRD: 21 ML/MIN/1.73SQ M
GLUCOSE SERPL-MCNC: 218 MG/DL (ref 65–140)
GLUCOSE SERPL-MCNC: 228 MG/DL (ref 65–140)
GLUCOSE SERPL-MCNC: 267 MG/DL (ref 65–140)
HCT VFR BLD AUTO: 41.5 % (ref 36.5–49.3)
HGB BLD-MCNC: 13.2 G/DL (ref 12–17)
IMM GRANULOCYTES # BLD AUTO: 0.01 THOUSAND/UL (ref 0–0.2)
IMM GRANULOCYTES NFR BLD AUTO: 0 % (ref 0–2)
INR PPP: 1.46 (ref 0.86–1.17)
LYMPHOCYTES # BLD AUTO: 0.41 THOUSANDS/ΜL (ref 0.6–4.47)
LYMPHOCYTES NFR BLD AUTO: 16 % (ref 14–44)
MAGNESIUM SERPL-MCNC: 2.3 MG/DL (ref 1.6–2.6)
MCH RBC QN AUTO: 29.1 PG (ref 26.8–34.3)
MCHC RBC AUTO-ENTMCNC: 31.8 G/DL (ref 31.4–37.4)
MCV RBC AUTO: 92 FL (ref 82–98)
MONOCYTES # BLD AUTO: 0.09 THOUSAND/ΜL (ref 0.17–1.22)
MONOCYTES NFR BLD AUTO: 3 % (ref 4–12)
NEUTROPHILS # BLD AUTO: 2.12 THOUSANDS/ΜL (ref 1.85–7.62)
NEUTS SEG NFR BLD AUTO: 81 % (ref 43–75)
NRBC BLD AUTO-RTO: 0 /100 WBCS
PHOSPHATE SERPL-MCNC: 6.2 MG/DL (ref 2.3–4.1)
PLATELET # BLD AUTO: 193 THOUSANDS/UL (ref 149–390)
PMV BLD AUTO: 9.6 FL (ref 8.9–12.7)
POTASSIUM SERPL-SCNC: 4.4 MMOL/L (ref 3.5–5.3)
PROCALCITONIN SERPL-MCNC: 0.5 NG/ML
PROT SERPL-MCNC: 7 G/DL (ref 6.4–8.2)
PROTHROMBIN TIME: 17 SECONDS (ref 11.8–14.2)
RBC # BLD AUTO: 4.53 MILLION/UL (ref 3.88–5.62)
SODIUM SERPL-SCNC: 139 MMOL/L (ref 136–145)
WBC # BLD AUTO: 2.64 THOUSAND/UL (ref 4.31–10.16)

## 2018-12-03 PROCEDURE — 84145 PROCALCITONIN (PCT): CPT | Performed by: INTERNAL MEDICINE

## 2018-12-03 PROCEDURE — 97530 THERAPEUTIC ACTIVITIES: CPT

## 2018-12-03 PROCEDURE — 85025 COMPLETE CBC W/AUTO DIFF WBC: CPT | Performed by: INTERNAL MEDICINE

## 2018-12-03 PROCEDURE — 97116 GAIT TRAINING THERAPY: CPT

## 2018-12-03 PROCEDURE — 84100 ASSAY OF PHOSPHORUS: CPT | Performed by: INTERNAL MEDICINE

## 2018-12-03 PROCEDURE — 94640 AIRWAY INHALATION TREATMENT: CPT

## 2018-12-03 PROCEDURE — 83735 ASSAY OF MAGNESIUM: CPT | Performed by: INTERNAL MEDICINE

## 2018-12-03 PROCEDURE — 82948 REAGENT STRIP/BLOOD GLUCOSE: CPT

## 2018-12-03 PROCEDURE — 80053 COMPREHEN METABOLIC PANEL: CPT | Performed by: INTERNAL MEDICINE

## 2018-12-03 PROCEDURE — 94760 N-INVAS EAR/PLS OXIMETRY 1: CPT

## 2018-12-03 PROCEDURE — 97535 SELF CARE MNGMENT TRAINING: CPT

## 2018-12-03 PROCEDURE — 85610 PROTHROMBIN TIME: CPT | Performed by: INTERNAL MEDICINE

## 2018-12-03 PROCEDURE — 99238 HOSP IP/OBS DSCHRG MGMT 30/<: CPT | Performed by: PHYSICIAN ASSISTANT

## 2018-12-03 RX ORDER — LEVALBUTEROL 1.25 MG/.5ML
1.25 SOLUTION, CONCENTRATE RESPIRATORY (INHALATION)
Status: DISCONTINUED | OUTPATIENT
Start: 2018-12-03 | End: 2018-12-03 | Stop reason: HOSPADM

## 2018-12-03 RX ORDER — PREDNISONE 10 MG/1
TABLET ORAL
Qty: 20 TABLET | Refills: 0 | Status: SHIPPED | OUTPATIENT
Start: 2018-12-03 | End: 2018-12-16 | Stop reason: HOSPADM

## 2018-12-03 RX ORDER — OSELTAMIVIR PHOSPHATE 30 MG/1
30 CAPSULE ORAL EVERY 24 HOURS
Qty: 3 CAPSULE | Refills: 0 | Status: SHIPPED | OUTPATIENT
Start: 2018-12-03 | End: 2018-12-06

## 2018-12-03 RX ADMIN — ANORECTAL OINTMENT: 15.7; .44; 24; 20.6 OINTMENT TOPICAL at 09:00

## 2018-12-03 RX ADMIN — METOPROLOL TARTRATE 25 MG: 25 TABLET, FILM COATED ORAL at 09:21

## 2018-12-03 RX ADMIN — ASPIRIN 81 MG: 81 TABLET, COATED ORAL at 09:21

## 2018-12-03 RX ADMIN — FUROSEMIDE 80 MG: 80 TABLET ORAL at 09:21

## 2018-12-03 RX ADMIN — Medication 150 MG: at 09:20

## 2018-12-03 RX ADMIN — INSULIN LISPRO 20 UNITS: 100 INJECTION, SOLUTION INTRAVENOUS; SUBCUTANEOUS at 09:12

## 2018-12-03 RX ADMIN — OSELTAMIVIR PHOSPHATE 30 MG: 30 CAPSULE ORAL at 14:56

## 2018-12-03 RX ADMIN — INSULIN LISPRO 20 UNITS: 100 INJECTION, SOLUTION INTRAVENOUS; SUBCUTANEOUS at 11:56

## 2018-12-03 RX ADMIN — VITAMIN D, TAB 1000IU (100/BT) 2000 UNITS: 25 TAB at 09:20

## 2018-12-03 RX ADMIN — IPRATROPIUM BROMIDE 0.5 MG: 0.5 SOLUTION RESPIRATORY (INHALATION) at 09:21

## 2018-12-03 RX ADMIN — METHYLPREDNISOLONE SODIUM SUCCINATE 40 MG: 40 INJECTION, POWDER, FOR SOLUTION INTRAMUSCULAR; INTRAVENOUS at 09:33

## 2018-12-03 RX ADMIN — LEVALBUTEROL 1.25 MG: 1.25 SOLUTION, CONCENTRATE RESPIRATORY (INHALATION) at 09:21

## 2018-12-03 RX ADMIN — POTASSIUM CHLORIDE 40 MEQ: 20 SOLUTION ORAL at 09:25

## 2018-12-03 NOTE — SOCIAL WORK
Discussed with patient preferences on discharge;understanding how to manage health at home; purpose of taking medications; importance of follow up care/appointments; and symptoms to watch out for once discharged home  Homehealth care arranged on dc ([facility] homecare)-waiting to see if [facility] can accept pt  Cm also working on setting pt up with a follow up appointment with her PCP:Dr Laya Moreno  Waiting a call back

## 2018-12-03 NOTE — PLAN OF CARE
Problem: OCCUPATIONAL THERAPY ADULT  Goal: Performs self-care activities at highest level of function for planned discharge setting  See evaluation for individualized goals    Outcome: Progressing  Limitation: Decreased ADL status, Decreased UE ROM, Decreased UE strength, Decreased Safe judgement during ADL, Decreased endurance, Decreased self-care trans  Prognosis: Fair  Assessment: pt presents supine in bed and requires encouragement to perform OOB activities this date; pt ended session in chair and required min (A) x1-2 with use of RW; pt is not safe to return home at this time and will require short term rehab prior to return home     OT Discharge Recommendation: Short Term Rehab  OT - OK to Discharge: Yes (when medically stable)

## 2018-12-03 NOTE — ASSESSMENT & PLAN NOTE
Influenza A/B and RSV by PCR detected Influenza A  Patient started on Tamiflu on 12/1/18, this was continued on discharge to complete a 5 day care  Some wheezing noted on exam  Patient is a former smoker with probable underlying COPD  He was started on IV steroids at 40mg IV BID  On discharge he was continued on a prednisone taper dose  PT recommended the patient be discharged to short-term rehab however patient refused  He was discharged home with home PT  Outpatient follow up with PCP

## 2018-12-03 NOTE — CONSULTS
Progress Note - Wound   Earnestine Tolentino [de-identified] y o  male MRN: [id number]  Unit/Bed#: 408-01 Encounter: [id number]      Assessment:   Venous ulcerations right lower extremity  Stasis dermatitis bilateral lower extremities  Chronic conditions for this patient  He has previously been seen at the outpatient wound center  He finds it difficult to leave his home due to SOB and decreased mobility  He chose to discontinue follow up  Plan:   1  RLE venous ulcers  Dressing change every other day  Cleanse NSS  Apply Dermagran gauze to open ulcers  Cover with plain gauze  Secure with anita  2   Apply moisturizing lotion to bilateral lower extremities daily  3   Follow up in outpatient wound care for decline in wound status  Vitals: Blood pressure 142/63, pulse 62, temperature (!) 97 1 °F (36 2 °C), temperature source Tympanic, resp  rate 20, height 5' 7" (1 702 m), weight 135 kg (296 lb 8 3 oz), SpO2 95 %  ,Body mass index is 46 44 kg/m²  Other Ulcers 03/03/17 Leg Right Red, weeping  (Active)   Wound Description Dry;Beefy red 12/3/2018  8:15 AM   Maricruz-wound Assessment Fragile;Pink 12/3/2018  8:15 AM   Treatment Offload 12/1/2018  9:12 PM   Patient Tolerance Tolerated well 12/1/2018  9:46 AM   Dressing Status Open to air 12/2/2018  8:24 AM       Other Ulcers 03/03/17 Leg Left Red, weeping  (Active)   Wound Description Dry;Beefy red;Dark edges;Fragile 12/2/2018  8:24 AM   Maricruz-wound Assessment Dry;Erythema;Pink;Fragile 12/2/2018  8:24 AM   Treatment Offload 12/1/2018 12:53 AM   Patient Tolerance Tolerated well 12/1/2018  9:46 AM   Dressing Status Open to air 12/2/2018  8:24 AM     Sven Cortez RN   CWOCN  12/3/2018 15:25

## 2018-12-03 NOTE — SOCIAL WORK
Pt's follow up appointment with Dr Brii Concepcion will be Wednesday Dec  5th at 4pm (same day as his wife follow up appointment)

## 2018-12-03 NOTE — DISCHARGE SUMMARY
Discharge- Saira Rolling 1940, [de-identified] y o  male MRN: [id number]    Unit/Bed#: 408-01 Encounter: [id number]    Primary Care Provider: Marcia Sellers DO   Date and time admitted to hospital: 11/30/2018  8:08 AM        * Influenza A   Assessment & Plan    Influenza A/B and RSV by PCR detected Influenza A  Patient started on Tamiflu on 12/1/18, this was continued on discharge to complete a 5 day care  Some wheezing noted on exam  Patient is a former smoker with probable underlying COPD  He was started on IV steroids at 40mg IV BID  On discharge he was continued on a prednisone taper dose  PT recommended the patient be discharged to short-term rehab however patient refused  He was discharged home with home PT  Outpatient follow up with PCP  Bilateral leg edema   Assessment & Plan    Patient with increased edema increased warmth as well as bilateral lower extremity wounds, likely venous stasis ulcers bilateral lower extremities, however cellulitis initially was a possible concern  Patient received 2 days of IV rocephin  No additional antibiotics needed at discharge  Outpatient follow up with PCP  Bacteriuria   Assessment & Plan    Patient has asymptomatic bacteriuria likely based on low colony counts and lack of symptoms  CKD (chronic kidney disease) stage 4, GFR 15-29 ml/min [facility])   Assessment & Plan    Patient currently at baseline renal function  Morbid obesity ([facility] [address] )   Assessment & Plan    BMI 47 24, consult to Nutrition during his hospitalization  Atrial fibrillation (HCC)   Assessment & Plan    Continue metoprolol  Continue coumadin  New inpatient medications likely contributing to suptherapeutic INR    Outpatient follow up with PCP     Coronary artery disease   Assessment & Plan    Continue medical management     DM (diabetes mellitus), type 2 with peripheral neuropathy   Assessment & Plan    Lab Results   Component Value Date    HGBA1C 6 9 (H) 10/22/2018       Recent Labs 12/02/18   1654  12/02/18   2134  12/03/18   0529  12/03/18   0709   POCGLU  128  210*  218*  228*       Blood Sugar Average: Last 72 hrs:  (P) 688 [id number]     Resume home regimen  Outpatient follow up with PCP     Obstructive sleep apnea syndrome, severe   Assessment & Plan    CPAP at night  Discharging Physician / Practitioner: Camila Marcos PA-C  PCP: Quiana Cisneros DO  Admission Date:   Admission Orders     Ordered        11/30/18 1120  Inpatient Admission (expected length of stay for this patient is greater than two midnights)  Once             Discharge Date: 12/03/18    Resolved Problems  Date Reviewed: 12/3/2018    None          Consultations During Hospital Stay:  · none    Procedures Performed:     Xr Chest 1 View Portable    Result Date: 11/30/2018  Impression: LIMITED STUDY  No acute cardiopulmonary disease within limitations  Workstation performed: [id number]     Ct Head Without Contrast    Result Date: 11/30/2018  · Impression: No acute intracranial abnormality  Mild chronic small vessel ischemic changes and volume loss  Workstation performed: [id number]     Significant Findings / Test Results:     · Influenza A/B and RSV by PCR: Influenza A detected      Incidental Findings:   · none     Test Results Pending at Discharge (will require follow up):   · none     Outpatient Tests Requested:  · none    Complications:  none    Reason for Admission: generalized weakness    Hospital Course:     Gil Lamb is a [de-identified] y o  male patient who originally presented to the hospital on 11/30/2018 due to severe weakness, patient normally is able to get himself out of bed, this morning when he awoke he was on able to get himself out of his bed, he needed the assistance of 2 of his sons      Patient had some hematuria last week, passed a kidney stone, he states that since resolved, patient claims that he went to bed last night feeling okay, normally can get himself out of bed, ambulate with a walker, notes today that he had severe weakness, unable to sit himself up in bed, was unable to ambulate with walker      Patient denies any focal deficits, he did note that he felt to be leaning to his left      Patient has no other acute complaints, he chronically has very poor vision  Please see above list of diagnoses and related plan for additional information  Condition at Discharge: stable     Discharge Day Visit / Exam:     Subjective:    Vitals: Blood Pressure: 142/63 (12/03/18 0815)  Pulse: 62 (12/03/18 0815)  Temperature: (!) 97 1 °F (36 2 °C) (12/03/18 0815)  Temp Source: Tympanic (12/03/18 0815)  Respirations: 20 (12/03/18 0815)  Height: 5' 7" (170 2 cm) (11/30/18 1703)  Weight - Scale: 135 kg (296 lb 8 3 oz) (12/03/18 0538)  SpO2: 95 % (12/03/18 2480)  Exam:   Physical Exam   Constitutional: He is oriented to person, place, and time  He appears well-developed and well-nourished  HENT:   Head: Normocephalic and atraumatic  Cardiovascular: Normal rate, regular rhythm and normal heart sounds  Pulmonary/Chest: Effort normal and breath sounds normal  No respiratory distress  He has no wheezes  He has no rales  Abdominal: Soft  Bowel sounds are normal  He exhibits no distension  There is no tenderness  Neurological: He is alert and oriented to person, place, and time  No cranial nerve deficit  Skin: Skin is warm and dry  Nursing note and vitals reviewed  Discussion with Family: wife and son updated    Discharge instructions/Information to patient and family:   See after visit summary for information provided to patient and family  Provisions for Follow-Up Care:  See after visit summary for information related to follow-up care and any pertinent home health orders        Disposition:     Home with VNA Services (Reminder: Complete face to face encounter)    For Discharges to [facility] SNF:   · Not Applicable to this Patient - Not Applicable to this Patient    Planned Readmission: no     Discharge Statement:  I spent 25 minutes discharging the patient  This time was spent on the day of discharge  I had direct contact with the patient on the day of discharge  Greater than 50% of the total time was spent examining patient, answering all patient questions, arranging and discussing plan of care with patient as well as directly providing post-discharge instructions  Additional time then spent on discharge activities  Discharge Medications:  See after visit summary for reconciled discharge medications provided to patient and family        ** Please Note: This note has been constructed using a voice recognition system **

## 2018-12-03 NOTE — PLAN OF CARE
Problem: DISCHARGE PLANNING - CARE MANAGEMENT  Goal: Discharge to post-acute care or home with appropriate resources  INTERVENTIONS:  - Conduct assessment to determine patient/family and health care team treatment goals, and need for post-acute services based on payer coverage, community resources, and patient preferences, and barriers to discharge  - Address psychosocial, clinical, and financial barriers to discharge as identified in assessment in conjunction with the patient/family and health care team  - Arrange appropriate level of post-acute services according to patient's   needs and preference and payer coverage in collaboration with the physician and health care team  - Communicate with and update the patient/family, physician, and health care team regarding progress on the discharge plan  - Arrange appropriate transportation to post-acute venues  Pt's son will give the patient a ride home  Pt is a HRR     Outcome: Completed Date Met: 12/03/18  -outpatient follow up  -[facility]

## 2018-12-03 NOTE — ASSESSMENT & PLAN NOTE
Continue metoprolol  Continue coumadin  New inpatient medications likely contributing to suptherapeutic INR    Outpatient follow up with PCP

## 2018-12-03 NOTE — ASSESSMENT & PLAN NOTE
Lab Results   Component Value Date    HGBA1C 6 9 (H) 10/22/2018       Recent Labs      12/02/18   1654  12/02/18   2134  12/03/18   0529  12/03/18   0709   POCGLU  128  210*  218*  228*       Blood Sugar Average: Last 72 hrs:  (P) 657 [id number]     Resume home regimen  Outpatient follow up with PCP

## 2018-12-03 NOTE — ASSESSMENT & PLAN NOTE
Patient with increased edema increased warmth as well as bilateral lower extremity wounds, likely venous stasis ulcers bilateral lower extremities, however cellulitis initially was a possible concern  Patient received 2 days of IV rocephin  No additional antibiotics needed at discharge  Outpatient follow up with PCP

## 2018-12-03 NOTE — OCCUPATIONAL THERAPY NOTE
Occupational Therapy Progress Note     Patient Name: Young Pierce  [id number] Date: 12/3/2018  Problem List  Patient Active Problem List   Diagnosis    Morbid obesity ([facility] )    History of prostate cancer    Type 2 diabetes mellitus with hyperglycemia ([facility] )    S/P CABG x 3    CKD (chronic kidney disease) stage 4, GFR 15-29 ml/min ([facility])    Body mass index (BMI) of 40 0 to 49 9    Wound of right lower extremity    Wound of left lower extremity    Hyperphosphatemia    Vitamin D deficiency    Renal cyst    Atrial fibrillation ([facility] )    Blind right eye    Coronary artery disease    DM (diabetes mellitus), type 2 with peripheral neuropathy    Obstructive sleep apnea syndrome, severe    Vision loss, 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    Hypokalemia    Anemia    Bilateral leg edema    Prostate cancer ([facility])    Severe obstructive sleep apnea-hypopnea syndrome    Malignant neoplasm of upper lobe of right lung ([facility])    Generalized weakness    Bacteriuria           12/03/18 0841   Restrictions/Precautions   Weight Bearing Precautions Per Order No   Other Precautions Droplet precautions; Chair Alarm; Bed Alarm;Multiple lines;Telemetry;O2;Fall Risk   Pain Assessment   Pain Assessment No/denies pain   ADL   Where Assessed Other (Comment)  (standing in front of bed with RW use)   Toileting Assistance  3  Moderate Assistance   Toileting Deficit Perineal hygiene;Use of bedpan/urinal setup; Increased time to complete;Supervison/safety   Toileting Comments pt stood with RW and required urinal setup and to be held during urination; pt also incontinent of urine while supine in bed requiring new gown   Bed Mobility   Supine to Sit 3  Moderate assistance   Additional items Assist x 2; Increased time required;Verbal cues   Sit to Supine (seated in chair at end of session)   Additional Comments pt on 2L O2 throughout session and appeared SOB minimally during functional tasks   Transfers   Sit to Stand 4  Minimal assistance Additional items Assist x 2; Increased time required;Verbal cues  (RW)   Stand to Sit 4  Minimal assistance   Additional items Assist x 2; Increased time required;Verbal cues  (RW)   Stand pivot 4  Minimal assistance   Additional items Assist x 2; Increased time required;Verbal cues  (RW)   Additional Comments pt requires cues for safety and steadying during session with use of RW; pt performed functional mobility to chair this session   Functional Mobility   Functional Mobility 4  Minimal assistance   Additional Comments x1-2 with use of RW pt performed ~2ft to chair with use of RW   Additional items Rolling walker   Cognition   Overall Cognitive Status WFL   Arousal/Participation Alert   Attention Within functional limits   Orientation Level Oriented X4   Memory Within functional limits   Following Commands Follows all commands and directions without difficulty   Vision   Vision Comments pt reports he is "blind" at baseline and wife (A) with all tasks   Activity Tolerance   Activity Tolerance Patient limited by fatigue   Assessment   Assessment pt presents supine in bed and requires encouragement to perform OOB activities this date; pt ended session in chair and required min (A) x1-2 with use of RW; pt is not safe to return home at this time and will require short term rehab prior to return home   Recommendation   OT Discharge Recommendation Short Term Rehab     Pt will benefit from continued OT services in order to maximize (I) c ADL performance, FM c RW, and improve overall endurance/strength required to complete functional tasks in preparation for d/c  Pt left seated in chair at end of session; all needs within reach; all lines intact; scds connected and turned on

## 2018-12-03 NOTE — DISCHARGE INSTR - OTHER ORDERS
1   RLE venous ulcers  Dressing change every other day  Cleanse NSS  Apply Dermagran gauze to open ulcers  Cover with plain gauze  Secure with anita  2   Apply moisturizing lotion to bilateral lower extremities daily  3   Follow up in outpatient wound care for decline in wound status

## 2018-12-03 NOTE — PHYSICAL THERAPY NOTE
PT treatment note      12/03/18 0847   Pain Assessment   Pain Score No Pain   Restrictions/Precautions   Other Precautions (Droplet precautions; Chair Alarm; Bed Alarm;Multiple lines; Tel)   Subjective   Subjective Agreeable to therapy  Bed Mobility   Supine to Sit 4  Minimal assistance   Additional items Assist x 2;HOB elevated; Bedrails; Increased time required;Verbal cues   Transfers   Sit to Stand 4  Minimal assistance   Additional items Assist x 2; Increased time required;Verbal cues   Stand to Sit 4  Minimal assistance   Additional items Assist x 2;Armrests; Verbal cues   Stand pivot 4  Minimal assistance   Additional items Assist x 2;Verbal cues; Increased time required   Additional Comments utilized urinal standing at EOB  Requires assist to hole urinal and clothing management  Min/CGA for standing balance   Ambulation/Elevation   Gait pattern Decreased foot clearance; Foward flexed; Shuffling   Gait Assistance 4  Minimal assist   Additional items Assist x 2;Verbal cues   Assistive Device Rolling walker   Distance 3' to chair   Balance   Static Sitting Fair +   Dynamic Sitting Fair +   Static Standing Fair   Dynamic Standing Fair   Ambulatory Fair   Activity Tolerance   Activity Tolerance Patient limited by fatigue   Assessment   Prognosis Good   Problem List Decreased strength;Decreased endurance; Impaired balance;Decreased mobility   Assessment Demonstrates improving bed mobility, tx/ambulation  Pt  weak  and requiring min x 2 for functional mobility with increased time to complete tasks  He would Benefit from PT to help improve strength, ROM, balance, and functional activity tolerance  Plan   Treatment/Interventions Functional transfer training;LE strengthening/ROM; Therapeutic exercise; Endurance training;Bed mobility;Gait training   Progress Slow progress, decreased activity tolerance   Recommendation   Recommendation Short-term skilled PT   Pt   OOB in chair  with call bell within reach and alarm on at end of PT session  Discussed with Leydi Lewis, PT today's treatment and patient's current level of function for care coordination

## 2018-12-04 ENCOUNTER — TRANSITIONAL CARE MANAGEMENT (OUTPATIENT)
Dept: FAMILY MEDICINE CLINIC | Facility: CLINIC | Age: 78
End: 2018-12-04

## 2018-12-05 ENCOUNTER — OFFICE VISIT (OUTPATIENT)
Dept: FAMILY MEDICINE CLINIC | Facility: CLINIC | Age: 78
End: 2018-12-05
Payer: MEDICARE

## 2018-12-05 VITALS
HEIGHT: 67 IN | HEART RATE: 50 BPM | SYSTOLIC BLOOD PRESSURE: 128 MMHG | WEIGHT: 296 LBS | BODY MASS INDEX: 46.46 KG/M2 | OXYGEN SATURATION: 93 % | DIASTOLIC BLOOD PRESSURE: 60 MMHG | TEMPERATURE: 95 F

## 2018-12-05 DIAGNOSIS — J45.40 MODERATE PERSISTENT ASTHMA WITHOUT COMPLICATION: ICD-10-CM

## 2018-12-05 DIAGNOSIS — J20.9 ACUTE INFECTIVE TRACHEOBRONCHITIS: ICD-10-CM

## 2018-12-05 DIAGNOSIS — J10.1 INFLUENZA A: Primary | ICD-10-CM

## 2018-12-05 LAB
BACTERIA BLD CULT: NORMAL
BACTERIA BLD CULT: NORMAL

## 2018-12-05 PROCEDURE — 99496 TRANSJ CARE MGMT HIGH F2F 7D: CPT | Performed by: FAMILY MEDICINE

## 2018-12-05 RX ORDER — ALBUTEROL SULFATE 2.5 MG/3ML
2.5 SOLUTION RESPIRATORY (INHALATION) EVERY 6 HOURS PRN
Qty: 100 VIAL | Refills: 0 | Status: SHIPPED | OUTPATIENT
Start: 2018-12-05

## 2018-12-05 RX ORDER — ALBUTEROL SULFATE 2.5 MG/3ML
2.5 SOLUTION RESPIRATORY (INHALATION) EVERY 6 HOURS PRN
Qty: 100 VIAL | Refills: 2 | Status: SHIPPED | OUTPATIENT
Start: 2018-12-05 | End: 2018-12-05 | Stop reason: SDUPTHER

## 2018-12-05 NOTE — PROGRESS NOTES
Assessment/Plan:      Diagnoses and all orders for this visit:    Influenza A  -     albuterol (2 5 mg/3 mL) 0 083 % nebulizer solution; Take 1 vial (2 5 mg total) by nebulization every 6 (six) hours as needed for wheezing or shortness of breath    Acute infective tracheobronchitis  -     albuterol (2 5 mg/3 mL) 0 083 % nebulizer solution; Take 1 vial (2 5 mg total) by nebulization every 6 (six) hours as needed for wheezing or shortness of breath      asthma with acute bronchospasm    Subjective:     Patient ID: Thuy Arnold is a [de-identified] y o  male  Patient here with the chief complaint of transition of care visit patient was hospitalized for type a influenza and tracheobronchitis he still has a lot of congestion not able to ambulate son had to physically carry him from the bed to his wheelchair patient refused short-term rehab from the hospital I have reviewed his entire hospital chart pulse ox 92 pulse 52 most recent thyroid the 1st of December was normal patient is in adrenally suppressed he is currently on tapering doses he is on 40 mg daily of prednisone warn the son not to taper below 10 mg and taper very slowly needs mini neb treatments and these will be ordered        Review of Systems   Constitutional: Positive for activity change, appetite change and fatigue  Negative for diaphoresis and fever  HENT: Negative  Eyes: Negative  Respiratory: Positive for cough, shortness of breath and wheezing  Negative for apnea and chest tightness  Cardiovascular: Positive for leg swelling  Negative for chest pain and palpitations  Gastrointestinal: Negative for abdominal distention, abdominal pain, anal bleeding, constipation, diarrhea, nausea and vomiting  Endocrine: Positive for cold intolerance  Negative for heat intolerance, polydipsia, polyphagia and polyuria  Genitourinary: Negative for difficulty urinating, dysuria, flank pain, hematuria and urgency     Musculoskeletal: Negative for arthralgias, back pain, gait problem, joint swelling and myalgias  Skin: Negative for color change, rash and wound  Allergic/Immunologic: Negative for environmental allergies, food allergies and immunocompromised state  Neurological: Negative for dizziness, seizures, syncope, speech difficulty, numbness and headaches  Hematological: Negative for adenopathy  Does not bruise/bleed easily  Psychiatric/Behavioral: Positive for agitation  Negative for behavioral problems, hallucinations, sleep disturbance and suicidal ideas  Objective:     Physical Exam   Constitutional: He is oriented to person, place, and time  No distress  Morbidly obese with Cushingoid features   HENT:   Head: Normocephalic  Right Ear: External ear normal    Left Ear: External ear normal    Nose: Nose normal    Mouth/Throat: Oropharynx is clear and moist    Eyes: Pupils are equal, round, and reactive to light  Conjunctivae and EOM are normal  Right eye exhibits no discharge  Left eye exhibits no discharge  No scleral icterus  Neck: Normal range of motion  No tracheal deviation present  No thyromegaly present  Cardiovascular: Normal rate, regular rhythm and normal heart sounds  Exam reveals no gallop and no friction rub  No murmur heard  Pulmonary/Chest: Effort normal  No respiratory distress  He has wheezes  He has rales  Abdominal: Soft  Bowel sounds are normal  He exhibits no mass  There is no tenderness  There is no guarding  Musculoskeletal: He exhibits edema  He exhibits no deformity  Lymphadenopathy:     He has no cervical adenopathy  Neurological: He is alert and oriented to person, place, and time  No cranial nerve deficit  Skin: Skin is warm and dry  No rash noted  He is not diaphoretic  No erythema  Psychiatric: He has a normal mood and affect   Thought content normal          Vitals:    12/05/18 1536   BP: 128/60   BP Location: Left arm   Patient Position: Sitting   Cuff Size: Standard   Pulse: (!) 50   Temp: (!) 95 °F (35 °C)   TempSrc: Tympanic   SpO2: 93%   Weight: 134 kg (296 lb)   Height: 5' 7" (1 702 m)       The following portions of the patient's history were reviewed and updated as appropriate:   He  has a past medical history of Anemia; Arthritis; Atrial fibrillation ([address] ); 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[address] ); B12 deficiency; Back pain; Blind right eye; Cancer ([address] ); Cardiac disorder; Chronic combined systolic and diastolic heart failure ([address] ); Chronic obstructive lung disease ([address] ); Chronic venous stasis dermatitis of both lower extremities; CKD (chronic kidney disease), stage IV ([address] ); Coronary artery disease; DDD (degenerative disc disease); DM (diabetes mellitus), type 2 ([address] ); BAER (dyspnea on exertion); Easy bruising; Epistaxis; Gout; Gross hematuria; Hepatitis; Herpes zoster; Hiatal hernia; History of cellulitis; History of prostate cancer; Hypercholesterolemia; Hyperlipidemia; Hypertension; Ischemic cardiomyopathy; Lung mass; MI, old; Morbid obesity due to excess calories ([address] ); Neuropathy; Obesity; Obstructive sleep apnea syndrome, severe; Pneumonia; Shortness of breath; TIA (transient ischemic attack); Urinary incontinence; Urinary retention; and Use of cane as ambulatory aid    He   Patient Active Problem List    Diagnosis Date Noted    Influenza A 11/30/2018    Bacteriuria 11/30/2018    Malignant neoplasm of upper lobe of right lung ([address] ) 04/10/2018    Hypokalemia 10/25/2017    Vision loss, 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 10/21/2017    Atrial fibrillation ([address] )     Blind right eye     Coronary artery disease     DM (diabetes mellitus), type 2 with peripheral neuropathy     Obstructive sleep apnea syndrome, severe     Severe obstructive sleep apnea-hypopnea syndrome 04/13/2017    Wound of right lower extremity 03/05/2017    Wound of left lower extremity 03/05/2017    Hyperphosphatemia 03/05/2017    Vitamin D deficiency 03/05/2017    Renal cyst 03/05/2017    Body mass index (BMI) of 40 0 to 49 9 03/04/2017    Anemia 10/04/2016    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    CKD (chronic kidney disease) stage 4, GFR 15-29 ml/min ([facility]) 07/16/2016    Morbid obesity ([facility] )     History of prostate cancer     Type 2 diabetes mellitus with hyperglycemia ([place] [address] )     Bilateral leg edema 01/19/2016    Prostate cancer ([place] [address] ) 07/24/2012     He  has a past surgical history that includes Coronary angioplasty with stent; Cystoscopy; Lung cancer surgery; Cataract extraction, bilateral; Eye surgery; Hernia repair; Vascular surgery; Cardiac surgery; Abdominal surgery; Coronary artery bypass graft (N/A, 7/15/2016); pr cystourethroscopy,ureter catheter (Left, 3/9/2016); PILONIDAL CYST EXCISION; pr temporal artery ligatn or bx (Bilateral, 10/31/2017); Lung surgery (Left, 2012); Other surgical history; and Renal artery stent (02/16/2015)  His family history includes Cancer in his mother, other, other, and sister; Diabetes in his father, maternal grandmother, mother, and other; Diabetes type II in his mother; Heart disease in his mother; Hypertension in his mother and other  He  reports that he quit smoking about 14 years ago  He has a 108 00 pack-year smoking history  He has never used smokeless tobacco  He reports that he does not drink alcohol or use drugs    Current Outpatient Prescriptions   Medication Sig Dispense Refill    aspirin (ECOTRIN LOW STRENGTH) 81 mg EC tablet Take 81 mg by mouth 2 (two) times a day      Cholecalciferol 2000 units TABS Take 1 tablet (2,000 Units total) by mouth daily 30 tablet 2    furosemide (LASIX) 80 mg tablet Take 1 tablet by mouth 2 (two) times a day 30 tablet 0    gabapentin (NEURONTIN) 300 mg capsule Take 2 capsules (600 mg total) by mouth daily at bedtime 60 capsule 3    glucagon (GLUCAGON EMERGENCY) 1 MG injection Inject 1 mg under the skin once as needed for low blood sugar (as needed for a blood sugar less than 70 if unconscious or unable to correct by oral means) for up to 1 dose 1 each 0    insulin detemir (LEVEMIR) 100 units/mL subcutaneous injection Inject 55 Units under the skin daily at bedtime (Patient taking differently: Inject 50 Units under the skin daily at bedtime  )      insulin lispro (HumaLOG) 100 units/mL injection Inject 20 Units under the skin 3 (three) times a day with meals (Patient taking differently: Inject 15-25 Units under the skin 3 (three) times a day with meals  )  0    iron polysaccharides (NIFEREX) 150 mg capsule Take 150 mg by mouth daily      metoprolol tartrate (LOPRESSOR) 25 mg tablet Take 25 mg by mouth 2 (two) times a day      oseltamivir (TAMIFLU) 30 MG capsule Take 1 capsule (30 mg total) by mouth every 24 hours for 3 days 3 capsule 0    potassium chloride (MICRO-K) 10 MEQ CR capsule Take 1 capsule (10 mEq total) by mouth 2 (two) times a day for 30 days 60 capsule 5    predniSONE 10 mg tablet 4 tabs PO daily x 2 days, then 3 tabs PO daily x 2 days, then 2 tabs PO daily x 2 days, then 1 tab PO daily x 2 days  20 tablet 0    simvastatin (ZOCOR) 40 mg tablet Take 40 mg by mouth daily at bedtime      tamsulosin (FLOMAX) 0 4 mg Take 0 4 mg by mouth daily at bedtime        traMADol (ULTRAM) 50 mg tablet 1 tablet 3 times daily with 2 extra Strength Tylenol 60 tablet 0    warfarin (COUMADIN) 5 mg tablet One tab daily or as directed by provider 90 tablet 3    albuterol (2 5 mg/3 mL) 0 083 % nebulizer solution Take 1 vial (2 5 mg total) by nebulization every 6 (six) hours as needed for wheezing or shortness of breath 100 vial 2     No current facility-administered medications for this visit        Current Outpatient Prescriptions on File Prior to Visit   Medication Sig    aspirin (ECOTRIN LOW STRENGTH) 81 mg EC tablet Take 81 mg by mouth 2 (two) times a day    Cholecalciferol 2000 units TABS Take 1 tablet (2,000 Units total) by mouth daily    furosemide (LASIX) 80 mg tablet Take 1 tablet by mouth 2 (two) times a day    gabapentin (NEURONTIN) 300 mg capsule Take 2 capsules (600 mg total) by mouth daily at bedtime    glucagon (GLUCAGON EMERGENCY) 1 MG injection Inject 1 mg under the skin once as needed for low blood sugar (as needed for a blood sugar less than 70 if unconscious or unable to correct by oral means) for up to 1 dose    insulin detemir (LEVEMIR) 100 units/mL subcutaneous injection Inject 55 Units under the skin daily at bedtime (Patient taking differently: Inject 50 Units under the skin daily at bedtime  )    insulin lispro (HumaLOG) 100 units/mL injection Inject 20 Units under the skin 3 (three) times a day with meals (Patient taking differently: Inject 15-25 Units under the skin 3 (three) times a day with meals  )    iron polysaccharides (NIFEREX) 150 mg capsule Take 150 mg by mouth daily    metoprolol tartrate (LOPRESSOR) 25 mg tablet Take 25 mg by mouth 2 (two) times a day    oseltamivir (TAMIFLU) 30 MG capsule Take 1 capsule (30 mg total) by mouth every 24 hours for 3 days    potassium chloride (MICRO-K) 10 MEQ CR capsule Take 1 capsule (10 mEq total) by mouth 2 (two) times a day for 30 days    predniSONE 10 mg tablet 4 tabs PO daily x 2 days, then 3 tabs PO daily x 2 days, then 2 tabs PO daily x 2 days, then 1 tab PO daily x 2 days   simvastatin (ZOCOR) 40 mg tablet Take 40 mg by mouth daily at bedtime    tamsulosin (FLOMAX) 0 4 mg Take 0 4 mg by mouth daily at bedtime      traMADol (ULTRAM) 50 mg tablet 1 tablet 3 times daily with 2 extra Strength Tylenol    warfarin (COUMADIN) 5 mg tablet One tab daily or as directed by provider     No current facility-administered medications on file prior to visit  He is allergic to other       Transitional Care Management Review:  Ashlee Auguste is a [de-identified] y o  male here for TCM follow up       During the TCM phone call patient stated:    TCM Call (since 11/4/2018)     Date and time call was made  12/4/2018 11:10 AM    Patient was hospitialized at  [address] [place] [address]    Date of Admission  11/30/18    Date of discharge  12/03/18    Diagnosis  INFLUENZA A    Disposition  Home    Were the patients medications reviewed and updated  No    Current Symptoms  None      TCM Call (since 11/4/2018)     Post hospital issues  None    Should patient be enrolled in anticoag monitoring? No    Scheduled for follow up?   Yes (MAURICE 12/5/18)    Did you obtain your prescribed medications  Yes    Do you need help managing your prescriptions or medications  No    Is transportation to your appointment needed  No    I have advised the patient to call PCP with any new or worsening symptoms  [address] or [address] other    Support System  Spouse    The type of support provided  Physical    Do you have social support  Yes, some    Are you recieving any outpatient services  No    Are you recieving home care services  Yes ([facility])    Types of home care services  Nurse visit    Are you using any community resources  No    Current waiver services  No    Have you fallen in the last 12 months  No    Interperter language line needed  No    Counseling  Patient              Sunita Reyes, DO

## 2018-12-06 ENCOUNTER — TELEPHONE (OUTPATIENT)
Dept: FAMILY MEDICINE CLINIC | Facility: CLINIC | Age: 78
End: 2018-12-06

## 2018-12-06 NOTE — TELEPHONE ENCOUNTER
Pt does not have a qualifying Dx for the Nebulizer medication - Dr would need to be either COPD or Chronic Bronchitis    Note will need to be amended to qualify Pt for Rx    Fax amended note to [employer]

## 2018-12-06 NOTE — TELEPHONE ENCOUNTER
Received call from [facility] - Pt was admitted to their services today     Feel free to call with any new orders or problems

## 2018-12-10 ENCOUNTER — APPOINTMENT (OUTPATIENT)
Dept: LAB | Facility: CLINIC | Age: 78
End: 2018-12-10
Payer: MEDICARE

## 2018-12-10 ENCOUNTER — TELEPHONE (OUTPATIENT)
Dept: FAMILY MEDICINE CLINIC | Facility: CLINIC | Age: 78
End: 2018-12-10

## 2018-12-10 DIAGNOSIS — C7A.090 MALIGNANT CARCINOID TUMOR OF BRONCHUS AND LUNG (HCC): ICD-10-CM

## 2018-12-10 DIAGNOSIS — E11.42 TYPE 2 DIABETES MELLITUS WITH DIABETIC POLYNEUROPATHY, WITH LONG-TERM CURRENT USE OF INSULIN (HCC): ICD-10-CM

## 2018-12-10 DIAGNOSIS — Z79.4 TYPE 2 DIABETES MELLITUS WITH DIABETIC POLYNEUROPATHY, WITH LONG-TERM CURRENT USE OF INSULIN (HCC): ICD-10-CM

## 2018-12-10 LAB
ALBUMIN SERPL BCP-MCNC: 3.3 G/DL (ref 3.5–5)
ALP SERPL-CCNC: 30 U/L (ref 46–116)
ALT SERPL W P-5'-P-CCNC: 34 U/L (ref 12–78)
ANION GAP SERPL CALCULATED.3IONS-SCNC: 7 MMOL/L (ref 4–13)
AST SERPL W P-5'-P-CCNC: 23 U/L (ref 5–45)
BASOPHILS # BLD AUTO: 0.04 THOUSANDS/ΜL (ref 0–0.1)
BASOPHILS NFR BLD AUTO: 0 % (ref 0–1)
BILIRUB SERPL-MCNC: 0.23 MG/DL (ref 0.2–1)
BUN SERPL-MCNC: 71 MG/DL (ref 5–25)
CALCIUM SERPL-MCNC: 8.7 MG/DL (ref 8.3–10.1)
CHLORIDE SERPL-SCNC: 100 MMOL/L (ref 100–108)
CO2 SERPL-SCNC: 31 MMOL/L (ref 21–32)
CREAT SERPL-MCNC: 2.54 MG/DL (ref 0.6–1.3)
EOSINOPHIL # BLD AUTO: 0.05 THOUSAND/ΜL (ref 0–0.61)
EOSINOPHIL NFR BLD AUTO: 1 % (ref 0–6)
ERYTHROCYTE [DISTWIDTH] IN BLOOD BY AUTOMATED COUNT: 16.7 % (ref 11.6–15.1)
EST. AVERAGE GLUCOSE BLD GHB EST-MCNC: 166 MG/DL
FERRITIN SERPL-MCNC: 364 NG/ML (ref 8–388)
GFR SERPL CREATININE-BSD FRML MDRD: 23 ML/MIN/1.73SQ M
GLUCOSE SERPL-MCNC: 195 MG/DL (ref 65–140)
HBA1C MFR BLD: 7.4 % (ref 4.2–6.3)
HCT VFR BLD AUTO: 44.9 % (ref 36.5–49.3)
HGB BLD-MCNC: 13.7 G/DL (ref 12–17)
IMM GRANULOCYTES # BLD AUTO: 0.12 THOUSAND/UL (ref 0–0.2)
IMM GRANULOCYTES NFR BLD AUTO: 1 % (ref 0–2)
IRON SATN MFR SERPL: 18 %
IRON SERPL-MCNC: 52 UG/DL (ref 65–175)
LYMPHOCYTES # BLD AUTO: 1.76 THOUSANDS/ΜL (ref 0.6–4.47)
LYMPHOCYTES NFR BLD AUTO: 18 % (ref 14–44)
MCH RBC QN AUTO: 28.4 PG (ref 26.8–34.3)
MCHC RBC AUTO-ENTMCNC: 30.5 G/DL (ref 31.4–37.4)
MCV RBC AUTO: 93 FL (ref 82–98)
MONOCYTES # BLD AUTO: 0.86 THOUSAND/ΜL (ref 0.17–1.22)
MONOCYTES NFR BLD AUTO: 9 % (ref 4–12)
NEUTROPHILS # BLD AUTO: 6.95 THOUSANDS/ΜL (ref 1.85–7.62)
NEUTS SEG NFR BLD AUTO: 71 % (ref 43–75)
NRBC BLD AUTO-RTO: 0 /100 WBCS
PLATELET # BLD AUTO: 393 THOUSANDS/UL (ref 149–390)
PMV BLD AUTO: 10 FL (ref 8.9–12.7)
POTASSIUM SERPL-SCNC: 4.4 MMOL/L (ref 3.5–5.3)
PROT SERPL-MCNC: 6.6 G/DL (ref 6.4–8.2)
RBC # BLD AUTO: 4.83 MILLION/UL (ref 3.88–5.62)
SODIUM SERPL-SCNC: 138 MMOL/L (ref 136–145)
TIBC SERPL-MCNC: 295 UG/DL (ref 250–450)
WBC # BLD AUTO: 9.78 THOUSAND/UL (ref 4.31–10.16)

## 2018-12-10 PROCEDURE — 83550 IRON BINDING TEST: CPT

## 2018-12-10 PROCEDURE — 82728 ASSAY OF FERRITIN: CPT

## 2018-12-10 PROCEDURE — 83540 ASSAY OF IRON: CPT

## 2018-12-10 PROCEDURE — 83036 HEMOGLOBIN GLYCOSYLATED A1C: CPT | Performed by: FAMILY MEDICINE

## 2018-12-10 PROCEDURE — 85025 COMPLETE CBC W/AUTO DIFF WBC: CPT

## 2018-12-10 PROCEDURE — 80053 COMPREHEN METABOLIC PANEL: CPT

## 2018-12-11 ENCOUNTER — ANTICOAG VISIT (OUTPATIENT)
Dept: FAMILY MEDICINE CLINIC | Facility: CLINIC | Age: 78
End: 2018-12-11

## 2018-12-11 NOTE — TELEPHONE ENCOUNTER
Critical labs on [place], INR >7 0  I tried calling patient but no one answers phone at his house  Please try to get in touch with him ASAP  Needs Coumadin held and addressed by Dr Brii Concepcion

## 2018-12-13 ENCOUNTER — APPOINTMENT (OUTPATIENT)
Dept: LAB | Facility: CLINIC | Age: 78
DRG: 637 | End: 2018-12-13
Payer: MEDICARE

## 2018-12-13 DIAGNOSIS — C61 PROSTATE CANCER (HCC): ICD-10-CM

## 2018-12-13 DIAGNOSIS — Z79.4 TYPE 2 DIABETES MELLITUS WITH DIABETIC POLYNEUROPATHY, WITH LONG-TERM CURRENT USE OF INSULIN (HCC): ICD-10-CM

## 2018-12-13 DIAGNOSIS — E11.42 TYPE 2 DIABETES MELLITUS WITH DIABETIC POLYNEUROPATHY, WITH LONG-TERM CURRENT USE OF INSULIN (HCC): ICD-10-CM

## 2018-12-13 LAB
CREAT UR-MCNC: 41.9 MG/DL
MICROALBUMIN UR-MCNC: 16.3 MG/L (ref 0–20)
MICROALBUMIN/CREAT 24H UR: 39 MG/G CREATININE (ref 0–30)
PSA SERPL-MCNC: 0.5 NG/ML (ref 0–4)

## 2018-12-13 PROCEDURE — 36415 COLL VENOUS BLD VENIPUNCTURE: CPT

## 2018-12-13 PROCEDURE — 84153 ASSAY OF PSA TOTAL: CPT

## 2018-12-13 PROCEDURE — 82570 ASSAY OF URINE CREATININE: CPT

## 2018-12-13 PROCEDURE — 82043 UR ALBUMIN QUANTITATIVE: CPT

## 2018-12-14 ENCOUNTER — HOSPITAL ENCOUNTER (INPATIENT)
Facility: HOSPITAL | Age: 78
LOS: 2 days | Discharge: HOME HEALTH | DRG: 637 | End: 2018-12-16
Attending: EMERGENCY MEDICINE | Admitting: FAMILY MEDICINE
Payer: MEDICARE

## 2018-12-14 ENCOUNTER — APPOINTMENT (EMERGENCY)
Dept: RADIOLOGY | Facility: HOSPITAL | Age: 78
DRG: 637 | End: 2018-12-14
Payer: MEDICARE

## 2018-12-14 DIAGNOSIS — I48.21 PERMANENT ATRIAL FIBRILLATION (HCC): ICD-10-CM

## 2018-12-14 DIAGNOSIS — E11.9 TYPE 2 DIABETES MELLITUS WITHOUT COMPLICATION, WITHOUT LONG-TERM CURRENT USE OF INSULIN (HCC): ICD-10-CM

## 2018-12-14 DIAGNOSIS — Z79.4 TYPE 2 DIABETES MELLITUS WITH DIABETIC POLYNEUROPATHY, WITH LONG-TERM CURRENT USE OF INSULIN (HCC): ICD-10-CM

## 2018-12-14 DIAGNOSIS — E16.2 HYPOGLYCEMIA: Primary | ICD-10-CM

## 2018-12-14 DIAGNOSIS — J18.9 PNEUMONIA DUE TO INFECTIOUS ORGANISM: ICD-10-CM

## 2018-12-14 DIAGNOSIS — Z51.81 ENCOUNTER FOR MONITORING COUMADIN THERAPY: ICD-10-CM

## 2018-12-14 DIAGNOSIS — N18.9 CHRONIC KIDNEY DISEASE: ICD-10-CM

## 2018-12-14 DIAGNOSIS — Z79.01 ENCOUNTER FOR MONITORING COUMADIN THERAPY: ICD-10-CM

## 2018-12-14 DIAGNOSIS — R77.8 ELEVATED TROPONIN: ICD-10-CM

## 2018-12-14 DIAGNOSIS — E11.42 TYPE 2 DIABETES MELLITUS WITH DIABETIC POLYNEUROPATHY, WITH LONG-TERM CURRENT USE OF INSULIN (HCC): ICD-10-CM

## 2018-12-14 DIAGNOSIS — J18.9 PNEUMONIA OF RIGHT LOWER LOBE DUE TO INFECTIOUS ORGANISM: ICD-10-CM

## 2018-12-14 PROBLEM — E11.649 HYPOGLYCEMIA ASSOCIATED WITH DIABETES (HCC): Status: ACTIVE | Noted: 2018-12-14

## 2018-12-14 LAB
ALBUMIN SERPL BCP-MCNC: 3.2 G/DL (ref 3.5–5)
ALP SERPL-CCNC: 31 U/L (ref 46–116)
ALT SERPL W P-5'-P-CCNC: 36 U/L (ref 12–78)
ANION GAP SERPL CALCULATED.3IONS-SCNC: 8 MMOL/L (ref 4–13)
AST SERPL W P-5'-P-CCNC: 26 U/L (ref 5–45)
BASOPHILS # BLD AUTO: 0.04 THOUSANDS/ΜL (ref 0–0.1)
BASOPHILS NFR BLD AUTO: 0 % (ref 0–1)
BILIRUB SERPL-MCNC: 0.3 MG/DL (ref 0.2–1)
BUN SERPL-MCNC: 71 MG/DL (ref 5–25)
CALCIUM SERPL-MCNC: 8.8 MG/DL (ref 8.3–10.1)
CHLORIDE SERPL-SCNC: 100 MMOL/L (ref 100–108)
CO2 SERPL-SCNC: 32 MMOL/L (ref 21–32)
CREAT SERPL-MCNC: 2.58 MG/DL (ref 0.6–1.3)
EOSINOPHIL # BLD AUTO: 0.25 THOUSAND/ΜL (ref 0–0.61)
EOSINOPHIL NFR BLD AUTO: 2 % (ref 0–6)
ERYTHROCYTE [DISTWIDTH] IN BLOOD BY AUTOMATED COUNT: 17.1 % (ref 11.6–15.1)
GFR SERPL CREATININE-BSD FRML MDRD: 23 ML/MIN/1.73SQ M
GLUCOSE SERPL-MCNC: 129 MG/DL (ref 65–140)
GLUCOSE SERPL-MCNC: 356 MG/DL (ref 65–140)
GLUCOSE SERPL-MCNC: 49 MG/DL (ref 65–140)
GLUCOSE SERPL-MCNC: 50 MG/DL (ref 65–140)
HCT VFR BLD AUTO: 40.9 % (ref 36.5–49.3)
HGB BLD-MCNC: 13 G/DL (ref 12–17)
IMM GRANULOCYTES # BLD AUTO: 0.08 THOUSAND/UL (ref 0–0.2)
IMM GRANULOCYTES NFR BLD AUTO: 1 % (ref 0–2)
INR PPP: 5.74 (ref 0.86–1.17)
LYMPHOCYTES # BLD AUTO: 1.15 THOUSANDS/ΜL (ref 0.6–4.47)
LYMPHOCYTES NFR BLD AUTO: 10 % (ref 14–44)
MAGNESIUM SERPL-MCNC: 2.3 MG/DL (ref 1.6–2.6)
MCH RBC QN AUTO: 28.9 PG (ref 26.8–34.3)
MCHC RBC AUTO-ENTMCNC: 31.8 G/DL (ref 31.4–37.4)
MCV RBC AUTO: 91 FL (ref 82–98)
MONOCYTES # BLD AUTO: 0.86 THOUSAND/ΜL (ref 0.17–1.22)
MONOCYTES NFR BLD AUTO: 8 % (ref 4–12)
NEUTROPHILS # BLD AUTO: 8.86 THOUSANDS/ΜL (ref 1.85–7.62)
NEUTS SEG NFR BLD AUTO: 79 % (ref 43–75)
NRBC BLD AUTO-RTO: 0 /100 WBCS
PLATELET # BLD AUTO: 363 THOUSANDS/UL (ref 149–390)
PMV BLD AUTO: 9.1 FL (ref 8.9–12.7)
POTASSIUM SERPL-SCNC: 4.2 MMOL/L (ref 3.5–5.3)
PROT SERPL-MCNC: 6.5 G/DL (ref 6.4–8.2)
PROTHROMBIN TIME: 49.2 SECONDS (ref 11.8–14.2)
RBC # BLD AUTO: 4.5 MILLION/UL (ref 3.88–5.62)
SODIUM SERPL-SCNC: 140 MMOL/L (ref 136–145)
TROPONIN I SERPL-MCNC: 0.06 NG/ML
TSH SERPL DL<=0.05 MIU/L-ACNC: 0.91 UIU/ML (ref 0.36–3.74)
WBC # BLD AUTO: 11.24 THOUSAND/UL (ref 4.31–10.16)

## 2018-12-14 PROCEDURE — 85610 PROTHROMBIN TIME: CPT | Performed by: EMERGENCY MEDICINE

## 2018-12-14 PROCEDURE — 87040 BLOOD CULTURE FOR BACTERIA: CPT | Performed by: EMERGENCY MEDICINE

## 2018-12-14 PROCEDURE — 84439 ASSAY OF FREE THYROXINE: CPT | Performed by: EMERGENCY MEDICINE

## 2018-12-14 PROCEDURE — 99285 EMERGENCY DEPT VISIT HI MDM: CPT

## 2018-12-14 PROCEDURE — 93005 ELECTROCARDIOGRAM TRACING: CPT

## 2018-12-14 PROCEDURE — 84443 ASSAY THYROID STIM HORMONE: CPT | Performed by: EMERGENCY MEDICINE

## 2018-12-14 PROCEDURE — 36415 COLL VENOUS BLD VENIPUNCTURE: CPT | Performed by: EMERGENCY MEDICINE

## 2018-12-14 PROCEDURE — 82948 REAGENT STRIP/BLOOD GLUCOSE: CPT

## 2018-12-14 PROCEDURE — 80053 COMPREHEN METABOLIC PANEL: CPT | Performed by: EMERGENCY MEDICINE

## 2018-12-14 PROCEDURE — 1124F ACP DISCUSS-NO DSCNMKR DOCD: CPT | Performed by: INTERNAL MEDICINE

## 2018-12-14 PROCEDURE — 83735 ASSAY OF MAGNESIUM: CPT | Performed by: EMERGENCY MEDICINE

## 2018-12-14 PROCEDURE — 85025 COMPLETE CBC W/AUTO DIFF WBC: CPT | Performed by: EMERGENCY MEDICINE

## 2018-12-14 PROCEDURE — 99222 1ST HOSP IP/OBS MODERATE 55: CPT | Performed by: PHYSICIAN ASSISTANT

## 2018-12-14 PROCEDURE — 96374 THER/PROPH/DIAG INJ IV PUSH: CPT

## 2018-12-14 PROCEDURE — 71046 X-RAY EXAM CHEST 2 VIEWS: CPT

## 2018-12-14 PROCEDURE — 84484 ASSAY OF TROPONIN QUANT: CPT | Performed by: EMERGENCY MEDICINE

## 2018-12-14 RX ORDER — FUROSEMIDE 40 MG/1
40 TABLET ORAL 2 TIMES DAILY
Status: DISCONTINUED | OUTPATIENT
Start: 2018-12-14 | End: 2018-12-16 | Stop reason: HOSPADM

## 2018-12-14 RX ORDER — IRON POLYSACCHARIDE COMPLEX 150 MG
150 CAPSULE ORAL DAILY
Status: DISCONTINUED | OUTPATIENT
Start: 2018-12-15 | End: 2018-12-16 | Stop reason: HOSPADM

## 2018-12-14 RX ORDER — CEFEPIME HYDROCHLORIDE 2 G/50ML
2000 INJECTION, SOLUTION INTRAVENOUS ONCE
Status: COMPLETED | OUTPATIENT
Start: 2018-12-14 | End: 2018-12-14

## 2018-12-14 RX ORDER — ALBUTEROL SULFATE 2.5 MG/3ML
2.5 SOLUTION RESPIRATORY (INHALATION) EVERY 6 HOURS PRN
Status: DISCONTINUED | OUTPATIENT
Start: 2018-12-14 | End: 2018-12-14

## 2018-12-14 RX ORDER — ALBUTEROL SULFATE 2.5 MG/3ML
2.5 SOLUTION RESPIRATORY (INHALATION) EVERY 4 HOURS PRN
Status: DISCONTINUED | OUTPATIENT
Start: 2018-12-14 | End: 2018-12-15 | Stop reason: SDUPTHER

## 2018-12-14 RX ORDER — GABAPENTIN 300 MG/1
600 CAPSULE ORAL
Status: DISCONTINUED | OUTPATIENT
Start: 2018-12-14 | End: 2018-12-16 | Stop reason: HOSPADM

## 2018-12-14 RX ORDER — ACETAMINOPHEN 325 MG/1
650 TABLET ORAL EVERY 6 HOURS PRN
Status: DISCONTINUED | OUTPATIENT
Start: 2018-12-14 | End: 2018-12-16 | Stop reason: HOSPADM

## 2018-12-14 RX ORDER — TRAMADOL HYDROCHLORIDE 50 MG/1
50 TABLET ORAL EVERY 6 HOURS PRN
Status: DISCONTINUED | OUTPATIENT
Start: 2018-12-14 | End: 2018-12-16 | Stop reason: HOSPADM

## 2018-12-14 RX ORDER — TAMSULOSIN HYDROCHLORIDE 0.4 MG/1
0.4 CAPSULE ORAL
Status: DISCONTINUED | OUTPATIENT
Start: 2018-12-14 | End: 2018-12-16 | Stop reason: HOSPADM

## 2018-12-14 RX ORDER — FUROSEMIDE 80 MG
80 TABLET ORAL 2 TIMES DAILY
Status: DISCONTINUED | OUTPATIENT
Start: 2018-12-14 | End: 2018-12-14

## 2018-12-14 RX ORDER — CEFEPIME HYDROCHLORIDE 1 G/50ML
1000 INJECTION, SOLUTION INTRAVENOUS EVERY 24 HOURS
Status: DISCONTINUED | OUTPATIENT
Start: 2018-12-15 | End: 2018-12-16

## 2018-12-14 RX ORDER — DEXTROSE MONOHYDRATE 25 G/50ML
50 INJECTION, SOLUTION INTRAVENOUS ONCE
Status: COMPLETED | OUTPATIENT
Start: 2018-12-14 | End: 2018-12-14

## 2018-12-14 RX ORDER — ASPIRIN 81 MG/1
81 TABLET ORAL DAILY
Status: DISCONTINUED | OUTPATIENT
Start: 2018-12-15 | End: 2018-12-16 | Stop reason: HOSPADM

## 2018-12-14 RX ORDER — LEVALBUTEROL 1.25 MG/.5ML
1.25 SOLUTION, CONCENTRATE RESPIRATORY (INHALATION) EVERY 4 HOURS PRN
Status: DISCONTINUED | OUTPATIENT
Start: 2018-12-14 | End: 2018-12-15

## 2018-12-14 RX ORDER — MELATONIN
2000 DAILY
Status: DISCONTINUED | OUTPATIENT
Start: 2018-12-15 | End: 2018-12-16 | Stop reason: HOSPADM

## 2018-12-14 RX ORDER — PHYTONADIONE 5 MG/1
2.5 TABLET ORAL ONCE
Status: COMPLETED | OUTPATIENT
Start: 2018-12-14 | End: 2018-12-14

## 2018-12-14 RX ADMIN — VANCOMYCIN HYDROCHLORIDE 2000 MG: 1 INJECTION, POWDER, LYOPHILIZED, FOR SOLUTION INTRAVENOUS at 19:07

## 2018-12-14 RX ADMIN — DEXTROSE MONOHYDRATE 50 ML: 25 INJECTION, SOLUTION INTRAVENOUS at 17:40

## 2018-12-14 RX ADMIN — INSULIN DETEMIR 50 UNITS: 100 INJECTION, SOLUTION SUBCUTANEOUS at 22:25

## 2018-12-14 RX ADMIN — GABAPENTIN 600 MG: 300 CAPSULE ORAL at 22:25

## 2018-12-14 RX ADMIN — METOPROLOL TARTRATE 25 MG: 25 TABLET, FILM COATED ORAL at 21:07

## 2018-12-14 RX ADMIN — TAMSULOSIN HYDROCHLORIDE 0.4 MG: 0.4 CAPSULE ORAL at 22:25

## 2018-12-14 RX ADMIN — SODIUM CHLORIDE, SODIUM LACTATE, POTASSIUM CHLORIDE, AND CALCIUM CHLORIDE 1000 ML: .6; .31; .03; .02 INJECTION, SOLUTION INTRAVENOUS at 18:44

## 2018-12-14 RX ADMIN — CEFEPIME HYDROCHLORIDE 2000 MG: 2 INJECTION, SOLUTION INTRAVENOUS at 18:44

## 2018-12-14 RX ADMIN — PHYTONADIONE 2.5 MG: 5 TABLET ORAL at 21:07

## 2018-12-14 RX ADMIN — FUROSEMIDE 40 MG: 40 TABLET ORAL at 21:07

## 2018-12-14 NOTE — ED PROVIDER NOTES
History  Chief Complaint   Patient presents with    Hypoglycemia - Symptomatic     Patient BS was 206 at lunch, gave himself insulin had lunch, then was found by family with an altered mental status not answering appropriately, EMS was called BS was then 71 and patient was alert oriented but very diaphoretic     This is an 76-year-old male with the noted past medical history who presents to the emergency department by EMS for evaluation of hypoglycemia  Patient was reportedly found sitting in his chair this afternoon at approx 1545; he was awake but profoundly altered and confused  They checked a glucose and noted it to be 29; they were able to get patient to drink some orange juice and by time of EMS arrival, repeat fingerstick was 69 and patient was reportedly more awake and responsive but was diaphoretic  He states he ate a normal breakfast and lunch today  States his most recent glucose was 206 at lunch and he took 20 units of Humalog which is his usual sliding scale dose  He takes Humalog 15-25 units on a sliding scale t i d  As well as 55 units of Levemir at bedtime  No changes in insulin regimen within the past month  Was hospitalized in this facility on 30 November-3 Dec 2018 for dyspnea found to be due to influenza A; he was discharged home with Tamiflu to complete a five-day course of treatment in total and with a prednisone taper for exacerbation of what is suspected to be undiagnosed COPD  Reports that he has some baseline degree of dyspnea and cough but does not appear to be any different at this point  On home O2 at 3 lpm chronically  Denies associated fever/headache/chest pain/palpitations/nausea/vomiting/syncope  A/p:  Symptomatic hypoglycemia in patient who uses insulin without clear antecedent although poor p o  Intake versus possible underlying metabolic/infectious disturbance considered most likely    Patient has hypoglycemia on evaluation in the ED; will be given IV dextrose 25 g and given food to eat that contains carbohydrate/protein/fat mixture to sustain elevation in glucose  EKG/labs; chest x-ray given recent treatment for influenza  Disposition pending  History provided by:  Patient, EMS personnel and medical records  Hypoglycemia - Symptomatic   Associated symptoms: shortness of breath and sweats    Associated symptoms: no anxiety, no dizziness, no vomiting and no weakness        Prior to Admission Medications   Prescriptions Last Dose Informant Patient Reported? Taking?    Cholecalciferol 2000 units TABS   No Yes   Sig: Take 1 tablet (2,000 Units total) by mouth daily   albuterol (2 5 mg/3 mL) 0 083 % nebulizer solution   No Yes   Sig: Take 1 vial (2 5 mg total) by nebulization every 6 (six) hours as needed for wheezing or shortness of breath   aspirin (ECOTRIN LOW STRENGTH) 81 mg EC tablet  Self Yes Yes   Sig: Take 81 mg by mouth 2 (two) times a day   furosemide (LASIX) 80 mg tablet  Self No Yes   Sig: Take 1 tablet by mouth 2 (two) times a day   gabapentin (NEURONTIN) 300 mg capsule   No Yes   Sig: Take 2 capsules (600 mg total) by mouth daily at bedtime   glucagon (GLUCAGON EMERGENCY) 1 MG injection  Self No Yes   Sig: Inject 1 mg under the skin once as needed for low blood sugar (as needed for a blood sugar less than 70 if unconscious or unable to correct by oral means) for up to 1 dose   insulin detemir (LEVEMIR) 100 units/mL subcutaneous injection  Self No Yes   Sig: Inject 55 Units under the skin daily at bedtime   Patient taking differently: Inject 50 Units under the skin daily at bedtime     insulin lispro (HumaLOG) 100 units/mL injection   No Yes   Sig: Inject 20 Units under the skin 3 (three) times a day with meals   Patient taking differently: Inject 15-25 Units under the skin 3 (three) times a day with meals     iron polysaccharides (NIFEREX) 150 mg capsule  Self Yes Yes   Sig: Take 150 mg by mouth daily   metoprolol tartrate (LOPRESSOR) 25 mg tablet  Self Yes Yes Sig: Take 25 mg by mouth 2 (two) times a day   potassium chloride (MICRO-K) 10 MEQ CR capsule   No Yes   Sig: Take 1 capsule (10 mEq total) by mouth 2 (two) times a day for 30 days   predniSONE 10 mg tablet   No Yes   Si tabs PO daily x 2 days, then 3 tabs PO daily x 2 days, then 2 tabs PO daily x 2 days, then 1 tab PO daily x 2 days  simvastatin (ZOCOR) 40 mg tablet  Self Yes Yes   Sig: Take 40 mg by mouth daily at bedtime   tamsulosin (FLOMAX) 0 4 mg  Self Yes Yes   Sig: Take 0 4 mg by mouth daily at bedtime     traMADol (ULTRAM) 50 mg tablet   No Yes   Si tablet 3 times daily with 2 extra Strength Tylenol   warfarin (COUMADIN) 5 mg tablet   No Yes   Sig: One tab daily or as directed by provider      Facility-Administered Medications: None       Past Medical History:   Diagnosis Date    Anemia     Arthritis     Atrial fibrillation (HCC)     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 [address] [facility]     B12 deficiency     Back pain     Blind right eye     Cancer [facility]     prostate    Cardiac disorder     Chronic combined systolic and diastolic heart failure (HCC)     Chronic obstructive lung disease [facility] [address] [facility]     Chronic venous stasis dermatitis of both lower extremities     CKD (chronic kidney disease), stage IV ([facility] [facility]     Coronary artery disease     DDD (degenerative disc disease)     DM (diabetes mellitus), type 2 (HCC)     Type II, on insulin    BAER (dyspnea on exertion)     Easy bruising     Epistaxis     Gout     Gross hematuria     last assessed: 3/18/2015    Hepatitis     Herpes zoster     Hiatal hernia     History of cellulitis     BLE    History of prostate cancer     Radiation treatments      Hypercholesterolemia     Hyperlipidemia     Hypertension     Ischemic cardiomyopathy     Lung mass     last assessed: 2012    MI, old    Yuniarun Garcia Morbid obesity due to excess calories (HCC)     or severe obesity    Neuropathy     BLE    Obesity     Obstructive sleep apnea syndrome, severe     Pneumonia     last assessed: 7/24/2012    Shortness of breath     TIA (transient ischemic attack)     Urinary incontinence     Urinary retention     Use of cane as ambulatory aid     Independent with personal care  Past Surgical History:   Procedure Laterality Date    ABDOMINAL SURGERY      CARDIAC SURGERY      CATARACT EXTRACTION, BILATERAL      CORONARY ANGIOPLASTY WITH STENT PLACEMENT      2 stents    CORONARY ARTERY BYPASS GRAFT N/A 7/15/2016    Procedure: Intraop ADRIAN, CABG x 3 using LIMA to LAD, RSVG to OM1 and D1;  Surgeon: Yana Guardado MD;  [facility];  Service:    Edwena Ni      has stents    EYE SURGERY      Bilateral cataract removal    HERNIA REPAIR      umbilical hernia repair    LUNG CANCER SURGERY      Partial upper right lobectomy    LUNG SURGERY Left 2012    OTHER SURGICAL HISTORY      previous stent placement-no anatomy given    PILONIDAL CYST EXCISION      MA CYSTOURETHROSCOPY,URETER CATHETER Left 3/9/2016    Procedure: CYSTOSCOPY WITH left RETROGRADE PYELOGRAM left double J stent removal;  Surgeon: Huong Bobby MD;  Location: [facility];  Service: Urology    MA TEMPORAL ARTERY LIGATN OR BX Bilateral 10/31/2017    Procedure: BIOPSY ARTERY TEMPORAL;  Surgeon: Osmel Pichardo MD;  [facility]: [facility];  Service: Vascular    RENAL ARTERY STENT  02/16/2015    transcath intravascular stent placement percutaneous renal    VASCULAR SURGERY         Family History   Problem Relation Age of Onset    Diabetes Other     Hypertension Other     Cancer Other     Diabetes Mother     Heart disease Mother     Hypertension Mother     Diabetes type II Mother     Cancer Mother         unknown type    Diabetes Father     Diabetes Maternal Grandmother     Cancer Sister         unknown type    Cancer Other         unknown type     I have reviewed and agree with the history as documented      Social History   Substance Use Topics    Smoking status: Former Smoker Packs/day: 2 00     Years: 54 00     Quit date: 2004    Smokeless tobacco: Never Used      Comment: Pt is a non smoker    Alcohol use No        Review of Systems   Constitutional: Positive for diaphoresis and fatigue  Negative for chills and fever  Respiratory: Positive for cough and shortness of breath  Cardiovascular: Negative for chest pain and palpitations  Gastrointestinal: Negative for abdominal pain, nausea and vomiting  Skin: Negative for color change, pallor, rash and wound  Neurological: Negative for dizziness, syncope, weakness, light-headedness, numbness and headaches  Hematological: Negative for adenopathy  Does not bruise/bleed easily  Psychiatric/Behavioral: Positive for confusion  Negative for decreased concentration  The patient is not nervous/anxious  All other systems reviewed and are negative  Physical Exam  Physical Exam   Constitutional: He is oriented to person, place, and time  He appears well-developed and well-nourished  He is cooperative  No distress  HENT:   Head: Normocephalic and atraumatic  Right Ear: Hearing and external ear normal    Left Ear: Hearing and external ear normal    Nose: Nose normal    Mouth/Throat: Uvula is midline, oropharynx is clear and moist and mucous membranes are normal  No oropharyngeal exudate  Eyes: Pupils are equal, round, and reactive to light  Conjunctivae, EOM and lids are normal    Significant bilateral visual impairment   Neck: Trachea normal, normal range of motion and phonation normal  Neck supple  No JVD present  No tracheal tenderness, no spinous process tenderness and no muscular tenderness present  No tracheal deviation present  No thyroid mass and no thyromegaly present  Cardiovascular: Normal rate, regular rhythm, S1 normal, S2 normal, normal heart sounds and intact distal pulses  Exam reveals no gallop and no friction rub  No murmur heard    Pulses:       Radial pulses are 2+ on the right side, and 2+ on the left side  Dorsalis pedis pulses are 2+ on the right side, and 2+ on the left side  Posterior tibial pulses are 2+ on the right side, and 2+ on the left side  Pulmonary/Chest: Effort normal  No stridor  No respiratory distress  He has no decreased breath sounds  He has wheezes (diffuse rhonchi and wheeze throughout; greater in lower lung fields bilaterally)  He has rhonchi  He has no rales  He exhibits no tenderness  Abdominal: Soft  He exhibits no distension (Obese; nondistended) and no mass  There is no tenderness  There is no rigidity, no rebound, no guarding and no CVA tenderness  Musculoskeletal: Normal range of motion  He exhibits no edema, tenderness or deformity  Lymphadenopathy:     He has no cervical adenopathy  Neurological: He is alert and oriented to person, place, and time  He has normal strength  No cranial nerve deficit or sensory deficit  He exhibits normal muscle tone  GCS eye subscore is 4  GCS verbal subscore is 5  GCS motor subscore is 6  PERRLA; EOMI  Sensation intact to light touch over face in V1-V3 distribution bilaterally  Facial expressions symmetric  Tongue/uvula midline  Shoulder shrug equal bilaterally  Strength 5/5 in UE/LE bilaterally  Sensation intact to light touch in UE/LE bilaterally  Skin: Skin is warm and intact  No rash noted  He is diaphoretic (mild diaphoresis)  No erythema  Psychiatric: He has a normal mood and affect  His speech is normal and behavior is normal    Nursing note and vitals reviewed        Vital Signs  ED Triage Vitals   Temperature Pulse Respirations Blood Pressure SpO2   12/14/18 1758 12/14/18 1657 12/14/18 1657 12/14/18 1706 12/14/18 1657   97 6 °F (36 4 °C) 57 14 139/67 96 %      Temp Source Heart Rate Source Patient Position - Orthostatic VS BP Location FiO2 (%)   12/14/18 1758 12/14/18 1657 12/14/18 1657 12/14/18 1657 --   Oral Monitor Sitting Left arm       Pain Score       12/14/18 1657       No Pain           Vitals: 12/14/18 1657 12/14/18 1706 12/14/18 1816   BP:  139/67 143/64   Pulse: 57  58   Patient Position - Orthostatic VS: Sitting  Sitting       Visual Acuity      ED Medications  Medications   cefepime (MAXIPIME) IVPB (premix) 2,000 mg (not administered)   vancomycin (VANCOCIN) 2,000 mg in sodium chloride 0 9 % 500 mL IVPB (not administered)   lactated ringers bolus 1,000 mL (not administered)   dextrose 50 % IV solution 50 mL (50 mL Intravenous Given 12/14/18 1740)       Diagnostic Studies  Results Reviewed     Procedure Component Value Units Date/Time    Blood culture #2 [[id number]] Collected:  12/14/18 1832    Lab Status:  No result Specimen:  Blood from Arm, Left     Blood culture #1 [[id number]] Collected:  12/14/18 1815    Lab Status:  No result Specimen:  Blood from Arm, Right     Fingerstick Glucose (POCT) [[id number]]  (Normal) Collected:  12/14/18 1807    Lab Status:  Final result Updated:  12/14/18 1808     POC Glucose 129 mg/dl     Blood gas, venous [[id number]]     Lab Status:  No result Specimen:  Blood     Troponin I [[id number]]  (Abnormal) Collected:  12/14/18 1709    Lab Status:  Final result Specimen:  Blood from Arm, Right Updated:  12/14/18 1802     Troponin I 0 06 (HH) ng/mL     Comprehensive metabolic panel [[id number]]  (Abnormal) Collected:  12/14/18 1709    Lab Status:  Final result Specimen:  Blood from Arm, Right Updated:  12/14/18 1751     Sodium 140 mmol/L      Potassium 4 2 mmol/L      Chloride 100 mmol/L      CO2 32 mmol/L      ANION GAP 8 mmol/L      BUN 71 (H) mg/dL      Creatinine 2 58 (H) mg/dL      Glucose 50 (L) mg/dL      Calcium 8 8 mg/dL      AST 26 U/L      ALT 36 U/L      Alkaline Phosphatase 31 (L) U/L      Total Protein 6 5 g/dL      Albumin 3 2 (L) g/dL      Total Bilirubin 0 30 mg/dL      eGFR 23 ml/min/1 73sq m     Narrative:         National Kidney Disease Education Program recommendations are as follows:  GFR calculation is accurate only with a steady state creatinine  Chronic Kidney disease less than 60 ml/min/1 73 sq  meters  Kidney failure less than 15 ml/min/1 73 sq  meters  Magnesium [[id number]]  (Normal) Collected:  12/14/18 1709    Lab Status:  Final result Specimen:  Blood from Arm, Right Updated:  12/14/18 1751     Magnesium 2 3 mg/dL     TSH [[id number]]  (Normal) Collected:  12/14/18 1709    Lab Status:  Final result Specimen:  Blood from Arm, Right Updated:  12/14/18 1751     TSH 3RD GENERATON 0 909 uIU/mL     Narrative:         Patients undergoing fluorescein dye angiography may retain small amounts of fluorescein in the body for 48-72 hours post procedure  Samples containing fluorescein can produce falsely depressed TSH values  If the patient had this procedure,a specimen should be resubmitted post fluorescein clearance  Protime-INR [[id number]]  (Abnormal) Collected:  12/14/18 1709    Lab Status:  Final result Specimen:  Blood from Arm, Right Updated:  12/14/18 1730     Protime 49 2 (H) seconds      INR 5 74 (HH)    CBC and differential [[id number]]  (Abnormal) Collected:  12/14/18 1709    Lab Status:  Final result Specimen:  Blood from Arm, Right Updated:  12/14/18 1718     WBC 11 24 (H) Thousand/uL      RBC 4 50 Million/uL      Hemoglobin 13 0 g/dL      Hematocrit 40 9 %      MCV 91 fL      MCH 28 9 pg      MCHC 31 8 g/dL      RDW 17 1 (H) %      MPV 9 1 fL      Platelets 651 Thousands/uL      nRBC 0 /100 WBCs      Neutrophils Relative 79 (H) %      Immat GRANS % 1 %      Lymphocytes Relative 10 (L) %      Monocytes Relative 8 %      Eosinophils Relative 2 %      Basophils Relative 0 %      Neutrophils Absolute 8 86 (H) Thousands/µL      Immature Grans Absolute 0 08 Thousand/uL      Lymphocytes Absolute 1 15 Thousands/µL      Monocytes Absolute 0 86 Thousand/µL      Eosinophils Absolute 0 25 Thousand/µL      Basophils Absolute 0 04 Thousands/µL     T4, free [[id number]] Collected:  12/14/18 1709    Lab Status:   In process Specimen:  Blood from Arm, Right Updated: 12/14/18 1714    Fingerstick Glucose (POCT) [[id number]]  (Abnormal) Collected:  12/14/18 1658    Lab Status:  Final result Updated:  12/14/18 1659     POC Glucose 49 (L) mg/dl                  XR chest 2 views   ED Interpretation by Ni Duran DO (12/14 1819)   Airway midline  R basilar consolidation  No pleural effusion/ptx  Cardiac silhouette wnl; sternotomy  Degenerative disease of T/L spine  Procedures  Procedures       Phone Contacts  ED Phone Contact    ED Course  ED Course as of Dec 14 1833   Fri Dec 14, 2018   1749 EKG 1748 Sinus bradycardia 53 bpm  Left axis  RBBB        No acute ST/T changes  No Q waves  No changes from 30 Nov 2018     1753 1  WBC mildly elevated c/w ongoing prednisone use  2  Hg/Hct wnl   3  Plt wnl   4  Electrolytes wnl  BUN/Cr at baseline c/w CKD  Transaminases wnl   5  TSH wnl  T4 pending  6  INR supratherapeutic; no evidence of bleeding associated with it and would not emergently reverse  Will direct patient to withhold next dose however  7  Troponin mildly elevated (0 06)  This is in fact improved from most recent previous values (0 08-0 11)  1800 Chest x-ray demonstrates right basilar consolidation which likely represents the infectious etiology for patient's hypoglycemic episode today  As patient was recently treated for influenza, post-influenza pneumonia with MRSA is a particular concern and I would admit patient for further antibiotic treatment  Patient was apprised of the need for admission he was agreeable  Cefepime/vancomycin IV ordered  Blood cultures ordered  Patient has been given a small meal which he is eating currently  D/w Dr Fidencio Hatfield who accepts in full admission            MDM  Number of Diagnoses or Management Options  Chronic kidney disease: established and improving  Elevated troponin: established and improving  Hypoglycemia: new and requires workup  Post-influenza pneumonia of RLL: new and requires workup     Amount and/or Complexity of Data Reviewed  Clinical lab tests: ordered and reviewed  Tests in the radiology section of CPT®: reviewed and ordered  Decide to obtain previous medical records or to obtain history from someone other than the patient: yes  Obtain history from someone other than the patient: yes  Review and summarize past medical records: yes  Discuss the patient with other providers: yes  Independent visualization of images, tracings, or specimens: yes    Risk of Complications, Morbidity, and/or Mortality  Presenting problems: high  Diagnostic procedures: high  Management options: high    Patient Progress  Patient progress: improved    The patient presented with a condition in which there was a high probability of imminent or life-threatening deterioration, and critical care services (excluding separately billable procedures) totalled 30-74 minutes          Disposition  Final diagnoses:   Hypoglycemia   Post-influenza pneumonia of RLL   Chronic kidney disease   Elevated troponin     Time reflects when diagnosis was documented in both MDM as applicable and the Disposition within this note     Time User Action Codes Description Comment    12/14/2018  6:21 PM Elise Shefali Add [E11 649] Hypoglycemia associated with type 2 diabetes mellitus ([facility] [address] )     12/14/2018  6:21 PM Elise Shefali Remove [E11 649] Hypoglycemia associated with type 2 diabetes mellitus ([facility] [address] )     12/14/2018  6:21 PM Elise Shefali Add [E16 2] Hypoglycemia     12/14/2018  6:21 PM Elise Shefali Add [J18 9] Pneumonia due to infectious organism     12/14/2018  6:21 PM Elise Shefali Modify [J18 9] Post-influenza pneumonia of RLL     12/14/2018  6:21 PM Elise Shefali Add [N18 9] Chronic kidney disease     12/14/2018  6:22 PM Elise Shefali Add [R74 8] Elevated troponin       ED Disposition     ED Disposition Condition Comment    Admit  Case was discussed with Dr Bridgette Jeronimo and the patient's admission status was agreed to be Admission Status: inpatient status to the service of Dr Nadine Olmedo   Follow-up Information    None         Patient's Medications   Discharge Prescriptions    No medications on file     No discharge procedures on file      ED Provider  Electronically Signed by           Aleksandr Mendoza DO  12/17/18 0745

## 2018-12-15 ENCOUNTER — APPOINTMENT (INPATIENT)
Dept: RADIOLOGY | Facility: HOSPITAL | Age: 78
DRG: 637 | End: 2018-12-15
Payer: MEDICARE

## 2018-12-15 PROBLEM — R79.1 ELEVATED INR: Status: ACTIVE | Noted: 2018-12-15

## 2018-12-15 LAB
ALBUMIN SERPL BCP-MCNC: 2.6 G/DL (ref 3.5–5)
ALP SERPL-CCNC: 28 U/L (ref 46–116)
ALT SERPL W P-5'-P-CCNC: 28 U/L (ref 12–78)
ANION GAP SERPL CALCULATED.3IONS-SCNC: 7 MMOL/L (ref 4–13)
APTT PPP: 57 SECONDS (ref 26–38)
AST SERPL W P-5'-P-CCNC: 21 U/L (ref 5–45)
BASOPHILS # BLD AUTO: 0.04 THOUSANDS/ΜL (ref 0–0.1)
BASOPHILS NFR BLD AUTO: 1 % (ref 0–1)
BILIRUB SERPL-MCNC: 0.4 MG/DL (ref 0.2–1)
BUN SERPL-MCNC: 57 MG/DL (ref 5–25)
CALCIUM SERPL-MCNC: 8.4 MG/DL (ref 8.3–10.1)
CHLORIDE SERPL-SCNC: 101 MMOL/L (ref 100–108)
CO2 SERPL-SCNC: 30 MMOL/L (ref 21–32)
CREAT SERPL-MCNC: 2.36 MG/DL (ref 0.6–1.3)
EOSINOPHIL # BLD AUTO: 0.15 THOUSAND/ΜL (ref 0–0.61)
EOSINOPHIL NFR BLD AUTO: 2 % (ref 0–6)
ERYTHROCYTE [DISTWIDTH] IN BLOOD BY AUTOMATED COUNT: 17.1 % (ref 11.6–15.1)
GFR SERPL CREATININE-BSD FRML MDRD: 25 ML/MIN/1.73SQ M
GLUCOSE SERPL-MCNC: 107 MG/DL (ref 65–140)
GLUCOSE SERPL-MCNC: 140 MG/DL (ref 65–140)
GLUCOSE SERPL-MCNC: 154 MG/DL (ref 65–140)
GLUCOSE SERPL-MCNC: 201 MG/DL (ref 65–140)
GLUCOSE SERPL-MCNC: 230 MG/DL (ref 65–140)
HCT VFR BLD AUTO: 42.6 % (ref 36.5–49.3)
HGB BLD-MCNC: 13.2 G/DL (ref 12–17)
IMM GRANULOCYTES # BLD AUTO: 0.08 THOUSAND/UL (ref 0–0.2)
IMM GRANULOCYTES NFR BLD AUTO: 1 % (ref 0–2)
INR PPP: 4.21 (ref 0.86–1.17)
LYMPHOCYTES # BLD AUTO: 1.05 THOUSANDS/ΜL (ref 0.6–4.47)
LYMPHOCYTES NFR BLD AUTO: 12 % (ref 14–44)
MAGNESIUM SERPL-MCNC: 1.9 MG/DL (ref 1.6–2.6)
MCH RBC QN AUTO: 28.6 PG (ref 26.8–34.3)
MCHC RBC AUTO-ENTMCNC: 31 G/DL (ref 31.4–37.4)
MCV RBC AUTO: 92 FL (ref 82–98)
MONOCYTES # BLD AUTO: 0.6 THOUSAND/ΜL (ref 0.17–1.22)
MONOCYTES NFR BLD AUTO: 7 % (ref 4–12)
NEUTROPHILS # BLD AUTO: 6.74 THOUSANDS/ΜL (ref 1.85–7.62)
NEUTS SEG NFR BLD AUTO: 77 % (ref 43–75)
NRBC BLD AUTO-RTO: 0 /100 WBCS
PHOSPHATE SERPL-MCNC: 3.2 MG/DL (ref 2.3–4.1)
PLATELET # BLD AUTO: 309 THOUSANDS/UL (ref 149–390)
PMV BLD AUTO: 9.6 FL (ref 8.9–12.7)
POTASSIUM SERPL-SCNC: 4.3 MMOL/L (ref 3.5–5.3)
PROCALCITONIN SERPL-MCNC: 0.7 NG/ML
PROT SERPL-MCNC: 5.9 G/DL (ref 6.4–8.2)
PROTHROMBIN TIME: 38.7 SECONDS (ref 11.8–14.2)
RBC # BLD AUTO: 4.62 MILLION/UL (ref 3.88–5.62)
SODIUM SERPL-SCNC: 138 MMOL/L (ref 136–145)
T4 FREE SERPL-MCNC: 1.12 NG/DL (ref 0.76–1.46)
TROPONIN I SERPL-MCNC: 0.08 NG/ML
TROPONIN I SERPL-MCNC: 0.08 NG/ML
WBC # BLD AUTO: 8.66 THOUSAND/UL (ref 4.31–10.16)

## 2018-12-15 PROCEDURE — 84100 ASSAY OF PHOSPHORUS: CPT | Performed by: PHYSICIAN ASSISTANT

## 2018-12-15 PROCEDURE — 85730 THROMBOPLASTIN TIME PARTIAL: CPT | Performed by: PHYSICIAN ASSISTANT

## 2018-12-15 PROCEDURE — 85610 PROTHROMBIN TIME: CPT | Performed by: PHYSICIAN ASSISTANT

## 2018-12-15 PROCEDURE — 85025 COMPLETE CBC W/AUTO DIFF WBC: CPT | Performed by: PHYSICIAN ASSISTANT

## 2018-12-15 PROCEDURE — 80053 COMPREHEN METABOLIC PANEL: CPT | Performed by: PHYSICIAN ASSISTANT

## 2018-12-15 PROCEDURE — 71045 X-RAY EXAM CHEST 1 VIEW: CPT

## 2018-12-15 PROCEDURE — 87081 CULTURE SCREEN ONLY: CPT | Performed by: PHYSICIAN ASSISTANT

## 2018-12-15 PROCEDURE — 83735 ASSAY OF MAGNESIUM: CPT | Performed by: PHYSICIAN ASSISTANT

## 2018-12-15 PROCEDURE — 82948 REAGENT STRIP/BLOOD GLUCOSE: CPT

## 2018-12-15 PROCEDURE — 94660 CPAP INITIATION&MGMT: CPT

## 2018-12-15 PROCEDURE — 94760 N-INVAS EAR/PLS OXIMETRY 1: CPT

## 2018-12-15 PROCEDURE — 84484 ASSAY OF TROPONIN QUANT: CPT | Performed by: PHYSICIAN ASSISTANT

## 2018-12-15 PROCEDURE — 84145 PROCALCITONIN (PCT): CPT | Performed by: PHYSICIAN ASSISTANT

## 2018-12-15 PROCEDURE — 99232 SBSQ HOSP IP/OBS MODERATE 35: CPT | Performed by: PHYSICIAN ASSISTANT

## 2018-12-15 RX ORDER — ALBUTEROL SULFATE 2.5 MG/3ML
2.5 SOLUTION RESPIRATORY (INHALATION) EVERY 4 HOURS PRN
Status: DISCONTINUED | OUTPATIENT
Start: 2018-12-15 | End: 2018-12-16 | Stop reason: HOSPADM

## 2018-12-15 RX ADMIN — TAMSULOSIN HYDROCHLORIDE 0.4 MG: 0.4 CAPSULE ORAL at 22:04

## 2018-12-15 RX ADMIN — FUROSEMIDE 40 MG: 40 TABLET ORAL at 17:05

## 2018-12-15 RX ADMIN — VITAMIN D, TAB 1000IU (100/BT) 2000 UNITS: 25 TAB at 10:24

## 2018-12-15 RX ADMIN — METOPROLOL TARTRATE 25 MG: 25 TABLET, FILM COATED ORAL at 10:23

## 2018-12-15 RX ADMIN — TRAMADOL HYDROCHLORIDE 50 MG: 50 TABLET, COATED ORAL at 00:40

## 2018-12-15 RX ADMIN — INSULIN LISPRO 4 UNITS: 100 INJECTION, SOLUTION INTRAVENOUS; SUBCUTANEOUS at 13:01

## 2018-12-15 RX ADMIN — METOPROLOL TARTRATE 25 MG: 25 TABLET, FILM COATED ORAL at 17:05

## 2018-12-15 RX ADMIN — GABAPENTIN 600 MG: 300 CAPSULE ORAL at 22:04

## 2018-12-15 RX ADMIN — Medication 150 MG: at 10:24

## 2018-12-15 RX ADMIN — FUROSEMIDE 40 MG: 40 TABLET ORAL at 10:23

## 2018-12-15 RX ADMIN — VANCOMYCIN HYDROCHLORIDE 1750 MG: 750 INJECTION, POWDER, LYOPHILIZED, FOR SOLUTION INTRAVENOUS at 18:00

## 2018-12-15 RX ADMIN — ASPIRIN 81 MG: 81 TABLET, COATED ORAL at 10:24

## 2018-12-15 RX ADMIN — INSULIN DETEMIR 45 UNITS: 100 INJECTION, SOLUTION SUBCUTANEOUS at 22:04

## 2018-12-15 RX ADMIN — TRAMADOL HYDROCHLORIDE 50 MG: 50 TABLET, COATED ORAL at 18:03

## 2018-12-15 RX ADMIN — INSULIN LISPRO 1 UNITS: 100 INJECTION, SOLUTION INTRAVENOUS; SUBCUTANEOUS at 22:05

## 2018-12-15 RX ADMIN — INSULIN LISPRO 10 UNITS: 100 INJECTION, SOLUTION INTRAVENOUS; SUBCUTANEOUS at 17:05

## 2018-12-15 RX ADMIN — CEFEPIME HYDROCHLORIDE 1000 MG: 1 INJECTION, SOLUTION INTRAVENOUS at 17:05

## 2018-12-15 RX ADMIN — INSULIN LISPRO 4 UNITS: 100 INJECTION, SOLUTION INTRAVENOUS; SUBCUTANEOUS at 16:20

## 2018-12-15 NOTE — UTILIZATION REVIEW
Initial Clinical Review    Admission: Date/Time/Statement: 12/14/18 @ 1832     Orders Placed This Encounter   Procedures    Inpatient Admission     Standing Status:   Standing     Number of Occurrences:   1     Order Specific Question:   Admitting Physician     Answer:   Sheridan العراقي [[id number]]     Order Specific Question:   Level of Care     Answer:   Med Surg [16]     Order Specific Question:   Estimated length of stay     Answer:   More than 2 Midnights     Order Specific Question:   Certification     Answer:   I certify that inpatient services are medically necessary for this patient for a duration of greater than two midnights  See H&P and MD Progress Notes for additional information about the patient's course of treatment  ED: Date/Time/Mode of Arrival:   ED Arrival Information     Expected Arrival Acuity Means of Arrival Escorted By Service Admission Type    - 12/14/2018 16:53 Urgent Ambulance [facility] General Medicine Urgent    Arrival Complaint    high sugar          Chief Complaint:   Chief Complaint   Patient presents with    Hypoglycemia - Symptomatic     Patient BS was 206 at lunch, gave himself insulin had lunch, then was found by family with an altered mental status not answering appropriately, EMS was called BS was then 69 and patient was alert oriented but very diaphoretic       History of Illness: Sara Lizama is a [de-identified] y o  male with a past medical history of atrial fibrillation on Coumadin, type 2 diabetes mellitus on insulin, stage 4 chronic kidney disease, S/P CABG x3, his impairment, and others as listed below who presented to the emergency department via EMS at the 0 called to his residence due to patient being altered mental status, patient's blood sugar noted to be 29 mg/dL, was given orange juice at that time, repeat fingerstick was 69 mg/dL and became more awake and responsive but continued to be diaphoretic    Patient's blood glucose at lunch time was 26 mg/dL for which he took 20 units of Humalog as part of his sliding scale  Upon arrival in the emergency department he received 1 amp of D50 and was given food to eat  Evaluation in emergency department included laboratory and radiologic studies which identified the presence of a right lower lobe pneumonia  He was given cefepime and vancomycin at that time, and was admitted to the medical-surgical floor by the emergency department attending  Will continue antibiotic therapy, will continue monitor blood glucose        ED Vital Signs:   ED Triage Vitals   Temperature Pulse Respirations Blood Pressure SpO2   12/14/18 1758 12/14/18 1657 12/14/18 1657 12/14/18 1706 12/14/18 1657   97 6 °F (36 4 °C) 57 14 139/67 96 %      Temp Source Heart Rate Source Patient Position - Orthostatic VS BP Location FiO2 (%)   12/14/18 1758 12/14/18 1657 12/14/18 1657 12/14/18 1657 --   Oral Monitor Sitting Left arm       Pain Score       12/14/18 1657       No Pain        Wt Readings from Last 1 Encounters:   12/15/18 131 kg (288 lb 12 8 oz)       Vital Signs (abnormal):   above    Abnormal Labs/Diagnostic Test Results:   troponin   0 06  BUN/Creat   71/2 58  BS  50  Albumin   3 2  PT     49 2      INR    5 74  WBC   11 24  Abs neutro   8 86  CXR:      Diminished inspiration   Mild vascular congestion  2   Bibasilar infiltrates   Although the findings likely represent atelectasis, cardiac etiology or pneumonia not excluded   Follow-up recommended      ED Treatment:   Medication Administration from 12/14/2018 1653 to 12/14/2018 1933       Date/Time Order Dose Route Action Action by Comments     12/14/2018 1740 dextrose 50 % IV solution 50 mL 50 mL Intravenous Given Gladies Sinks, RN      12/14/2018 1907 cefepime (MAXIPIME) IVPB (premix) 2,000 mg 0 mg Intravenous Stopped Gladies Sinks, RN      12/14/2018 1844 cefepime (MAXIPIME) IVPB (premix) 2,000 mg 2,000 mg Intravenous New [address] Gladies Sinks, RN      12/14/2018 1907 vancomycin (VANCOCIN) 2,000 mg in sodium chloride 0 9 % 500 mL IVPB 2,000 mg Intravenous [address] Abhay Cheatham RN      12/14/2018 1844 lactated ringers bolus 1,000 mL 1,000 mL Intravenous New Bag Abhay Cheatham RN           Past Medical/Surgical History:    Active Ambulatory Problems     Diagnosis Date Noted    Morbid obesity ([facility] )     History of prostate cancer     Type 2 diabetes mellitus with hyperglycemia ([place] [address] )     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    CKD (chronic kidney disease) stage 4, GFR 15-29 ml/min ([facility]) 07/16/2016    Body mass index (BMI) of 40 0 to 49 9 03/04/2017    Wound of right lower extremity 03/05/2017    Wound of left lower extremity 03/05/2017    Hyperphosphatemia 03/05/2017    Vitamin D deficiency 03/05/2017    Renal cyst 03/05/2017    Atrial fibrillation (HCC)     Blind right eye     Coronary artery disease     DM (diabetes mellitus), type 2 with peripheral neuropathy     Obstructive sleep apnea syndrome, severe     Vision loss, 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 10/21/2017    Hypokalemia 10/25/2017    Anemia 10/04/2016    Bilateral leg edema 01/19/2016    Prostate cancer ([facility] ) 07/24/2012    Severe obstructive sleep apnea-hypopnea syndrome 04/13/2017    Malignant neoplasm of upper lobe of right lung [facility] ) 04/10/2018    Influenza A 11/30/2018    Bacteriuria 11/30/2018     Resolved Ambulatory Problems     Diagnosis Date Noted    Volume overload 07/17/2016    Acute on chronic combined systolic and diastolic ACC/AHA stage C congestive heart failure ([facility] )     LESLIE (acute kidney injury) ([facility] ) 10/26/2017    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 )     Chronic venous stasis dermatitis of both lower extremities     CKD (chronic kidney disease), stage IV ([facility])     Coronary artery disease     DDD (degenerative disc disease)     DM (diabetes mellitus), type 2 (HCC)     BAER (dyspnea on exertion)     Easy bruising     Epistaxis     Gout     Gross hematuria     Hepatitis     Herpes zoster     Hiatal hernia     History of cellulitis     History of prostate cancer     Hypercholesterolemia     Hyperlipidemia     Hypertension     Ischemic cardiomyopathy     Lung mass     MI, old     Morbid obesity due to excess calories ([facility])     Neuropathy     Obesity     Obstructive sleep apnea syndrome, severe     Pneumonia     Shortness of breath     TIA (transient ischemic attack)     Urinary incontinence     Urinary retention     Use of cane as ambulatory aid        Admitting Diagnosis: Hypoglycemia [E16 2]  High blood sugar [R73 9]  Chronic kidney disease [N18 9]  Elevated troponin [R74 8]  Pneumonia due to infectious organism [J18 9]    Age/Sex: [de-identified] y o  male    Assessment/Plan:   Pneumonia of right lower lobe due to infectious organism [facility])   Assessment & Plan     - right lower lobe pneumonia as found on chest x-ray  - continue cefepime and vancomycin  - Xopenex and treatment ordered as p r n   - encourage deep breathing coughing  - obtain sputum culture   - encourage patient to be out of bed      * Hypoglycemia associated with diabetes ([facility] )          Atrial fibrillation ([place] [address] )   Assessment & Plan     - holding patient's home Coumadin due to INR being supratherapeutic  - monitor INR daily, resume Coumadin when INR is between 2 5 and 3 0  - monitor patient with telemetry      CKD (chronic kidney disease) stage 4, GFR 15-29 ml/min ([facility])   Assessment & Plan     - monitor I&O closely  - monitor and trend renal indices      Type 2 diabetes mellitus with hyperglycemia ([place] [address] )     Patient will be admitted on an Inpatient basis with an anticipated length of stay of  > 2 midnights     Justification for Hospital Stay:  Right lower lobe pneumonia, supratherapeutic INR, hypoglycemia      Admission Orders:   IP   12/14  @    1832  Scheduled Meds:   Current Facility-Administered Medications:  acetaminophen 650 mg Oral Q6H PRN Marcade Guo PA-C   albuterol 2 5 mg Nebulization Q4H PRN Fina Ragland MD   [facility] [place]   cefepime 1,000 mg Intravenous Q24H [facility], [place]   cholecalciferol 2,000 Units Oral Daily [facility], [place]   furosemide 40 mg Oral BID [facility], [place]   gabapentin 600 mg Oral HS [facility], [place]   insulin detemir 50 Units Subcutaneous HS [facility], PA-EDUAR   insulin lispro 15-25 Units Subcutaneous TID With Meals Liyah Guo PA-C   iron polysaccharides 150 mg Oral Daily [facility] Sari, LANDON   metoprolol tartrate 25 mg Oral BID [facility], PA-EDUAR   tamsulosin 0 4 mg Oral HS [facility], [place]   traMADol 50 mg Oral Q6H PRN Marcade Guo PA-C   vancomycin 20 mg/kg (Adjusted) Intravenous Q24H Marcade Guo PA-C     Continuous Infusions:    PRN Meds:   acetaminophen    albuterol    traMADol     Serial troponin  CCD diet

## 2018-12-15 NOTE — ASSESSMENT & PLAN NOTE
-INR elevated at 5 74 on admission  -INR today: 4 21  -Continue to hold coumadin     -repeat INR in am

## 2018-12-15 NOTE — PROGRESS NOTES
Vancomycin Assessment    Doug Johnson is a [de-identified] y o  male who is currently receiving vancomycin 1750mg  q24h for Pneumonia     Relevant clinical data and objective history reviewed:  Creatinine   Date Value Ref Range Status   12/15/2018 2 36 (H) 0 60 - 1 30 mg/dL Final     Comment:     Standardized to IDMS reference method   12/14/2018 2 58 (H) 0 60 - 1 30 mg/dL Final     Comment:     Standardized to IDMS reference method   12/10/2018 2 54 (H) 0 60 - 1 30 mg/dL Final     Comment:     Standardized to IDMS reference method   09/16/2015 1 62 (H) 0 60 - 1 30 mg/dL Final     Comment:     Standardized to IDMS reference method   07/17/2015 1 74 (H) 0 60 - 1 30 mg/dL Final     Comment:     Standardized to IDMS reference method   06/23/2015 1 79 (H) 0 60 - 1 30 mg/dL Final     Comment:     Standardized to IDMS reference method     Vancomycin Rm   Date Value Ref Range Status   07/28/2016 19 8 ug/mL Final     /55 (BP Location: Right arm)   Pulse 64   Temp (!) 96 6 °F (35 9 °C) (Temporal)   Resp 20   Ht 5' 7" (1 702 m)   Wt 131 kg (288 lb 12 8 oz)   SpO2 97%   BMI 45 23 kg/m²   I/O last 3 completed shifts: In: 48 [IV Piggyback:50]  Out: 1950 [Urine:1950]  Lab Results   Component Value Date/Time    BUN 57 (H) 12/15/2018 05:32 AM    BUN 30 (H) 09/16/2015 11:37 AM    WBC 8 66 12/15/2018 05:32 AM    WBC 6 14 09/16/2015 11:37 AM    HGB 13 2 12/15/2018 05:32 AM    HGB 13 0 09/16/2015 11:37 AM    HCT 42 6 12/15/2018 05:32 AM    HCT 39 5 09/16/2015 11:37 AM    MCV 92 12/15/2018 05:32 AM    MCV 84 09/16/2015 11:37 AM     12/15/2018 05:32 AM     09/16/2015 11:37 AM     Temp Readings from Last 3 Encounters:   12/15/18 (!) 96 6 °F (35 9 °C) (Temporal)   12/05/18 (!) 95 °F (35 °C) (Tympanic)   12/03/18 (!) 97 1 °F (36 2 °C) (Tympanic)     Vancomycin Days of Therapy: 1    Assessment/Plan  The patient is currently on vancomycin utilizing scheduled dosing based on adjusted body weight (due to obesity)    Baseline risks associated with therapy include: pre-existing renal impairment and advanced age  The patient is currently receiving 1750mg  q24h and after clinical evaluation will be changed to same  Pharmacy will also follow closely for s/sx of nephrotoxicity, infusion reactions and appropriateness of therapy  BMP and CBC will be ordered per protocol  Plan for trough as patient approaches steady state, prior to the 4th  dose at approximately 31 75 62 on 12/17/18  Due to infection severity, will target a trough of 15-20 (appropriate for most indications)   Pharmacy will continue to follow the patients culture results and clinical progress daily      Mamadou Phillip, Pharmacist

## 2018-12-15 NOTE — ASSESSMENT & PLAN NOTE
- holding patient's home Coumadin due to INR being supratherapeutic  - monitor INR daily, resume Coumadin when INR is between 2 5 and 3 0  - monitor patient with telemetry

## 2018-12-15 NOTE — ASSESSMENT & PLAN NOTE
Lab Results   Component Value Date    HGBA1C 7 4 (H) 12/10/2018       Recent Labs      12/14/18   1807  12/14/18   2105  12/15/18   0740  12/15/18   1149   POCGLU  129  356*  107  201*       Blood Sugar Average: Last 72 hrs:  (P) 168 4     - monitor blood glucose frequently  - will decrease Levemir from 50 units to 45 units qhs  - Humalog 10 units TID for meal coverage    - continue hypoglycemic protocol   - insulin sliding scale

## 2018-12-15 NOTE — SOCIAL WORK
Assessment completed at the bedside with the pt and son present,pt lives with his son and wife, pt is blind and his family helps with his care and medications, pt had [facility] upon d/c pt is [facility] with [facility], [facility], I was unable to reach the [facility] today to see if this information is correct, pt ambulates with a walker, pcp appointment was sent up for the pt upon d/c and he stated he followed up with his doctor, the family helps the pt with dressing, medications, driving, pt has home oxygen 3 liters from [employer],  Dme: walker, oxygen, cpap, w/c, pt uses the [facility] pass and [facility] the [facility], cm will continue to follow and assess for any additional d/c needs  CM reviewed d/c planning process including the following: identifying help at home, patient preference for d/c planning needs, availability of treatment team to discuss questions or concerns patient and/or family may have regarding understanding medications and recognizing signs and symptoms once discharged  CM also encouraged patient to follow up with all recommended appointments after discharge  Patient advised of importance for patient and family to participate in managing patients medical well being

## 2018-12-15 NOTE — PLAN OF CARE
Problem: DISCHARGE PLANNING - CARE MANAGEMENT  Goal: Discharge to post-acute care or home with appropriate resources  INTERVENTIONS:  - Conduct assessment to determine patient/family and health care team treatment goals, and need for post-acute services based on payer coverage, community resources, and patient preferences, and barriers to discharge  - Address psychosocial, clinical, and financial barriers to discharge as identified in assessment in conjunction with the patient/family and health care team  - Arrange appropriate level of post-acute services according to patient's   needs and preference and payer coverage in collaboration with the physician and health care team  - Communicate with and update the patient/family, physician, and health care team regarding progress on the discharge plan  - Arrange appropriate transportation to post-acute venues    D/c home with spouse and home care  Outcome: Progressing

## 2018-12-15 NOTE — ASSESSMENT & PLAN NOTE
Lab Results   Component Value Date    HGBA1C 7 4 (H) 12/10/2018       Recent Labs      12/14/18   1658  12/14/18   1807  12/14/18   2105   POCGLU  49*  129  356*       Blood Sugar Average: Last 72 hrs:  (P) 178     - begin home insulin regimen  - monitor bedside blood glucose frequently  - consider Endocrinology consult  - patient requires diabetic teaching

## 2018-12-15 NOTE — RESPIRATORY THERAPY NOTE
RT Protocol Note  Eddie Lincoln [de-identified] y o  male MRN: [id number]  Unit/Bed#: 628-93 Encounter: [id number]    Assessment    Principal Problem:    Hypoglycemia associated with diabetes [facility])  Active Problems:    Type 2 diabetes mellitus with hyperglycemia (HCC)    CKD (chronic kidney disease) stage 4, GFR 15-29 ml/min (HCC)    Atrial fibrillation (HCC)    Pneumonia of right lower lobe due to infectious organism [facility])      Home Pulmonary Medications:  Patient just started using home nebulizer therapy prn after his last admission  He states he uses his PAP machine nightly      Home Devices/Therapy: BiPAP/CPAP    Past Medical History:   Diagnosis Date    Anemia     Arthritis     Atrial fibrillation (HCC)     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HCC)     B12 deficiency     Back pain     Blind right eye     Cancer ([facility] )     prostate    Cardiac disorder     Chronic combined systolic and diastolic heart failure (HCC)     Chronic obstructive lung disease (HCC)     Chronic venous stasis dermatitis of both lower extremities     CKD (chronic kidney disease), stage IV (HCC)     Coronary artery disease     DDD (degenerative disc disease)     DM (diabetes mellitus), type 2 (HCC)     Type II, on insulin    BAER (dyspnea on exertion)     Easy bruising     Epistaxis     Gout     Gross hematuria     last assessed: 3/18/2015    Hepatitis     Herpes zoster     Hiatal hernia     History of cellulitis     BLE    History of prostate cancer     Radiation treatments      Hypercholesterolemia     Hyperlipidemia     Hypertension     Ischemic cardiomyopathy     Lung mass     last assessed: 9/21/2012    MI, old    Dodie Bernal Morbid obesity due to excess calories (HCC)     or severe obesity    Neuropathy     BLE    Obesity     Obstructive sleep apnea syndrome, severe     Pneumonia     last assessed: 7/24/2012    Shortness of breath     TIA (transient ischemic attack)     Urinary incontinence     Urinary retention     Use of cane as ambulatory aid     Independent with personal care  Social History     Social History    Marital status: /Civil Union     Spouse name: N/A    Number of children: N/A    Years of education: N/A     Social History Main Topics    Smoking status: Former Smoker     Packs/day: 2 00     Years: 54 00     Quit date: 2004    Smokeless tobacco: Never Used      Comment: Pt is a non smoker    Alcohol use No    Drug use: No    Sexual activity: No     Other Topics Concern    None     Social History Narrative    No advance directives    No living will    Patient has living will       Subjective    Subjective Data: Patient says his breathing was okay at this time    Objective  Order bronchodialator therapy prn, PAP therapy HS  Physical Exam:   Assessment Type: Assess only  General Appearance: Awake, Alert  Respiratory Pattern: Normal, Dyspnea with exertion  Chest Assessment: Chest expansion symmetrical  Bilateral Breath Sounds: Diminished  Cough: Dry, Non-productive  O2 Device: Patient was on room air when I saw him,    Vitals:  Blood pressure 132/60, pulse 66, temperature 98 5 °F (36 9 °C), temperature source Temporal, resp  rate 18, height 5' 7" (1 702 m), weight 129 kg (284 lb 13 4 oz), SpO2 94 %  Imaging and other studies: I have personally reviewed pertinent reports        O2 Device: Patient was on room air when I saw him,     Plan    Respiratory Plan: Vent/NIV/HFNC, Mild Distress pathway        Resp Comments: Patient uses home PAP therapy at home, was using home nebulizer therapy just lately at home for shortness of breathe

## 2018-12-15 NOTE — ASSESSMENT & PLAN NOTE
- right lower lobe pneumonia as found on chest x-ray  - continue cefepime and vancomycin for now given this is healthcare-associated pneumonia/possible gram negative pneumonia  - Check MRSA nasal culture     - check procalcitonin now and in am   - could be viral given patient treated for Influenza A at this [place] from 11/30/18 to 12/3/18   - Respiratory protocol   - encourage deep breathing coughing  - obtain sputum culture   - encourage patient to be out of bed

## 2018-12-15 NOTE — ASSESSMENT & PLAN NOTE
- right lower lobe pneumonia as found on chest x-ray  - continue cefepime and vancomycin  - Xopenex and treatment ordered as p r n   - encourage deep breathing coughing  - obtain sputum culture   - encourage patient to be out of bed

## 2018-12-15 NOTE — H&P
H&P- David Singh 1940, [de-identified] y o  male MRN: [id number]    Unit/Bed#: 408-01 Encounter: [id number]    Primary Care Provider: Kamran Paiz DO   Date and time admitted to hospital: 12/14/2018  4:53 PM        Pneumonia of right lower lobe due to infectious organism [facility])   Assessment & Plan    - right lower lobe pneumonia as found on chest x-ray  - continue cefepime and vancomycin  - Xopenex and treatment ordered as p r n   - encourage deep breathing coughing  - obtain sputum culture   - encourage patient to be out of bed     * Hypoglycemia associated with diabetes [facility])   Assessment & Plan    Lab Results   Component Value Date    HGBA1C 7 4 (H) 12/10/2018       Recent Labs      12/14/18   1658  12/14/18   1807  12/14/18   2105   POCGLU  49*  129  356*       Blood Sugar Average: Last 72 hrs:  (P) 178     - monitor blood glucose frequently  - begin home insulin regimen  - treat hypoglycemia with D50  - consider Endocrinology consult  - patient requires diabetic teaching     Atrial fibrillation ([facility] [address] )   Assessment & Plan    - holding patient's home Coumadin due to INR being supratherapeutic  - monitor INR daily, resume Coumadin when INR is between 2 5 and 3 0  - monitor patient with telemetry     CKD (chronic kidney disease) stage 4, GFR 15-29 ml/min ([facility])   Assessment & Plan    - monitor I&O closely  - monitor and trend renal indices     Type 2 diabetes mellitus with hyperglycemia [facility])   Assessment & Plan    Lab Results   Component Value Date    HGBA1C 7 4 (H) 12/10/2018       Recent Labs      12/14/18   1658  12/14/18   1807  12/14/18   2105   POCGLU  49*  129  356*       Blood Sugar Average: Last 72 hrs:  (P) 178     - begin home insulin regimen  - monitor bedside blood glucose frequently  - consider Endocrinology consult  - patient requires diabetic teaching       History and Physical - [address]  Internal Medicine    Patient Information: David Singh [de-identified] y o  male MRN: [id number]  Unit/Bed#: 408-01 Encounter: [id number]  Admitting Physician: Kali Villa PA-C  PCP: Oz Sultana DO  Date of Admission:  12/14/18    Assessment/Plan:    Hospital Problem List:     Principal Problem:    Hypoglycemia associated with diabetes [facility])  Active Problems:    Pneumonia of right lower lobe due to infectious organism [facility])    Type 2 diabetes mellitus with hyperglycemia ([facility] )    CKD (chronic kidney disease) stage 4, GFR 15-29 ml/min ([facility])    Atrial fibrillation ([facility] )          VTE Prophylaxis: Pharmacologic VTE Prophylaxis contraindicated due to Patient is INR supratherapeutic  / sequential compression device   Code Status:  Full  POLST: There is no POLST form on file for this patient (pre-hospital)    Anticipated Length of Stay:  Patient will be admitted on an Inpatient basis with an anticipated length of stay of  > 2 midnights  Justification for Hospital Stay:  Right lower lobe pneumonia, supratherapeutic INR, hypoglycemia    Total Time for Visit, including Counseling / Coordination of Care: 30 minutes  Greater than 50% of this total time spent on direct patient counseling and coordination of care  Chief Complaint:   Hypoglycemia, right lower lobe pneumonia, supratherapeutic INR    History of Present Illness:    Romie Smith is a [de-identified] y o  male with a past medical history of atrial fibrillation on Coumadin, type 2 diabetes mellitus on insulin, stage 4 chronic kidney disease, S/P CABG x3, his impairment, and others as listed below who presented to the emergency department via EMS at the 0 called to his residence due to patient being altered mental status, patient's blood sugar noted to be 29 mg/dL, was given orange juice at that time, repeat fingerstick was 69 mg/dL and became more awake and responsive but continued to be diaphoretic  Patient's blood glucose at lunch time was 26 mg/dL for which he took 20 units of Humalog as part of his sliding scale    Upon arrival in the emergency department he received 1 amp of D50 and was given food to eat  Evaluation in emergency department included laboratory and radiologic studies which identified the presence of a right lower lobe pneumonia  He was given cefepime and vancomycin at that time, and was admitted to the medical-surgical floor by the emergency department attending  Will continue antibiotic therapy, will continue monitor blood glucose  Review of Systems:    Review of Systems   Constitutional: Positive for fatigue  Respiratory: Positive for cough  Neurological: Positive for weakness  Psychiatric/Behavioral: Positive for confusion  All other systems reviewed and are negative  Past Medical and Surgical History:     Past Medical History:   Diagnosis Date    Anemia     Arthritis     Atrial fibrillation ([facility] [address] )     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[facility] [address] )     B12 deficiency     Back pain     Blind right eye     Cancer ([facility] )     prostate    Cardiac disorder     Chronic combined systolic and diastolic heart failure (HCC)     Chronic obstructive lung disease (HCC)     Chronic venous stasis dermatitis of both lower extremities     CKD (chronic kidney disease), stage IV (HCC)     Coronary artery disease     DDD (degenerative disc disease)     DM (diabetes mellitus), type 2 (HCC)     Type II, on insulin    BAER (dyspnea on exertion)     Easy bruising     Epistaxis     Gout     Gross hematuria     last assessed: 3/18/2015    Hepatitis     Herpes zoster     Hiatal hernia     History of cellulitis     BLE    History of prostate cancer     Radiation treatments      Hypercholesterolemia     Hyperlipidemia     Hypertension     Ischemic cardiomyopathy     Lung mass     last assessed: 9/21/2012    MI, old    Hesham Enriquez Morbid obesity due to excess calories (HCC)     or severe obesity    Neuropathy     BLE    Obesity     Obstructive sleep apnea syndrome, severe     Pneumonia     last assessed: 7/24/2012    Shortness of breath     TIA (transient ischemic attack)     Urinary incontinence     Urinary retention     Use of cane as ambulatory aid     Independent with personal care  Past Surgical History:   Procedure Laterality Date    ABDOMINAL SURGERY      CARDIAC SURGERY      CATARACT EXTRACTION, BILATERAL      CORONARY ANGIOPLASTY WITH STENT PLACEMENT      2 stents    CORONARY ARTERY BYPASS GRAFT N/A 7/15/2016    Procedure: Intraop ADRIAN, CABG x 3 using LIMA to LAD, RSVG to OM1 and D1;  Surgeon: Alyssa Parra MD;  [facility]: [facility];  Service:    Srinivas Ahuja      has stents    EYE SURGERY      Bilateral cataract removal    HERNIA REPAIR      umbilical hernia repair    LUNG CANCER SURGERY      Partial upper right lobectomy    LUNG SURGERY Left 2012    OTHER SURGICAL HISTORY      previous stent placement-no anatomy given    PILONIDAL CYST EXCISION      KS CYSTOURETHROSCOPY,URETER CATHETER Left 3/9/2016    Procedure: CYSTOSCOPY WITH left RETROGRADE PYELOGRAM left double J stent removal;  Surgeon: Torin Carrera MD;  Location: [facility];  Service: Urology    KS TEMPORAL ARTERY LIGATN OR BX Bilateral 10/31/2017    Procedure: BIOPSY ARTERY TEMPORAL;  Surgeon: Barbara Roman MD;  Location: [facility];  Service: Vascular    RENAL ARTERY STENT  02/16/2015    transcath intravascular stent placement percutaneous renal    VASCULAR SURGERY         Meds/Allergies:    Prior to Admission medications    Medication Sig Start Date End Date Taking?  Authorizing Provider   albuterol (2 5 mg/3 mL) 0 083 % nebulizer solution Take 1 vial (2 5 mg total) by nebulization every 6 (six) hours as needed for wheezing or shortness of breath 12/5/18  Yes Ayo Hutchison,    aspirin (ECOTRIN LOW STRENGTH) 81 mg EC tablet Take 81 mg by mouth 2 (two) times a day   Yes Historical Provider, MD   Cholecalciferol 2000 units TABS Take 1 tablet (2,000 Units total) by mouth daily 6/19/18  Yes Arley Ochoa PA-C   furosemide (LASIX) 80 mg tablet Take 1 tablet by mouth 2 (two) times a day 10/27/17 Yes Sary Juan, DO   gabapentin (NEURONTIN) 300 mg capsule Take 2 capsules (600 mg total) by mouth daily at bedtime 7/9/18  Yes Valentin Wiggins DO   glucagon (GLUCAGON EMERGENCY) 1 MG injection Inject 1 mg under the skin once as needed for low blood sugar (as needed for a blood sugar less than 70 if unconscious or unable to correct by oral means) for up to 1 dose 3/6/17  Yes Qiana De La Torre,    insulin detemir (LEVEMIR) 100 units/mL subcutaneous injection Inject 55 Units under the skin daily at bedtime  Patient taking differently: Inject 50 Units under the skin daily at bedtime   4/26/18  Yes Valentin Wiggins DO   insulin lispro (HumaLOG) 100 units/mL injection Inject 20 Units under the skin 3 (three) times a day with meals  Patient taking differently: Inject 15-25 Units under the skin 3 (three) times a day with meals   7/9/18  Yes Valentin Wiggins DO   iron polysaccharides (NIFEREX) 150 mg capsule Take 150 mg by mouth daily   Yes Historical Provider, MD   metoprolol tartrate (LOPRESSOR) 25 mg tablet Take 25 mg by mouth 2 (two) times a day   Yes Historical Provider, MD   predniSONE 10 mg tablet 4 tabs PO daily x 2 days, then 3 tabs PO daily x 2 days, then 2 tabs PO daily x 2 days, then 1 tab PO daily x 2 days   12/3/18  Yes Stanley Ennis PA-C   simvastatin (ZOCOR) 40 mg tablet Take 40 mg by mouth daily at bedtime   Yes Historical Provider, MD   tamsulosin (FLOMAX) 0 4 mg Take 0 4 mg by mouth daily at bedtime     Yes Historical Provider, MD   traMADol (ULTRAM) 50 mg tablet 1 tablet 3 times daily with 2 extra Strength Tylenol 11/26/18  Yes Valentin Wiggins DO   potassium chloride (MICRO-K) 10 MEQ CR capsule Take 1 capsule (10 mEq total) by mouth 2 (two) times a day for 30 days  Patient not taking: Reported on 12/14/2018 2/21/18 12/14/18  Valentin Wiggins DO   warfarin (COUMADIN) 5 mg tablet One tab daily or as directed by provider  Patient not taking: Reported on 12/14/2018 11/6/18   Valentin Wiggins DO     I have reviewed home medications with patient personally  Allergies: Allergies   Allergen Reactions    Other Rash     Adhesive tape       Social History:     Marital Status: /Civil Union   Occupation:  Retired  Patient Pre-hospital Living Situation:  Home  Patient Pre-hospital Level of Mobility:  Full  Patient Pre-hospital Diet Restrictions:  Diabetic  Substance Use History:   History   Alcohol Use No     History   Smoking Status    Former Smoker    Packs/day: 2 00    Years: 54 00    Quit date: 2004   Smokeless Tobacco    Never Used     Comment: Pt is a non smoker     History   Drug Use No       Family History:    non-contributory    Physical Exam:     Vitals:   Blood Pressure: 169/74 (12/14/18 1933)  Pulse: 61 (12/14/18 1933)  Temperature: (!) 96 8 °F (36 °C) (12/14/18 1933)  Temp Source: Temporal (12/14/18 1933)  Respirations: 20 (12/14/18 1933)  Height: 5' 7" (170 2 cm) (12/14/18 1933)  Weight - Scale: 129 kg (284 lb 13 4 oz) (12/14/18 1933)  SpO2: 99 % (12/14/18 1933)    Physical Exam   Constitutional: He is oriented to person, place, and time  He appears well-developed and well-nourished  HENT:   Head: Normocephalic and atraumatic  Mouth/Throat: No oropharyngeal exudate  Eyes: Pupils are equal, round, and reactive to light  Conjunctivae and EOM are normal  No scleral icterus  Neck: Normal range of motion  Neck supple  No JVD present  No tracheal deviation present  No thyromegaly present  Cardiovascular: Normal rate and normal heart sounds  Pulmonary/Chest: Effort normal and breath sounds normal  No stridor  No respiratory distress  Abdominal: Soft  Bowel sounds are normal  He exhibits no distension  There is no tenderness  There is no rebound and no guarding  Musculoskeletal: Normal range of motion  He exhibits no edema, tenderness or deformity  Neurological: He is alert and oriented to person, place, and time  No cranial nerve deficit  Skin: Skin is warm and dry     Psychiatric: He has a normal mood and affect  His behavior is normal  Judgment and thought content normal            Additional Data:     Lab Results: I have personally reviewed pertinent reports  Results from last 7 days  Lab Units 12/14/18  1709   WBC Thousand/uL 11 24*   HEMOGLOBIN g/dL 13 0   HEMATOCRIT % 40 9   PLATELETS Thousands/uL 363   NEUTROS PCT % 79*   LYMPHS PCT % 10*   MONOS PCT % 8   EOS PCT % 2       Results from last 7 days  Lab Units 12/14/18  1709   POTASSIUM mmol/L 4 2   CHLORIDE mmol/L 100   CO2 mmol/L 32   BUN mg/dL 71*   CREATININE mg/dL 2 58*   CALCIUM mg/dL 8 8   ALK PHOS U/L 31*   ALT U/L 36   AST U/L 26       Results from last 7 days  Lab Units 12/14/18  1709   INR  5 74*       Imaging: I have personally reviewed pertinent reports  Xr Chest 1 View Portable    Result Date: 11/30/2018  Narrative: CHEST INDICATION:   Reason for exam listed in Epic is weakness  Currently there is no chief complaint on file under the history section in the ED provider notes  COMPARISON:  3/26/2018, report CT chest 7/14/2016 EXAM PERFORMED/VIEWS:  XR CHEST PORTABLE Images: 2 FINDINGS:  Study is limited by patient body habitus  Monitoring wires and leads project over the chest  Cardiomediastinal silhouette appears stable  The lungs are grossly clear  Somewhat limited evaluation of the left lung base  No pneumothorax or large pleural effusion  Osseous structures appear within normal limits for patient age  Impression: LIMITED STUDY  No acute cardiopulmonary disease within limitations  Workstation performed: [id number]     Ct Head Without Contrast    Result Date: 11/30/2018  Narrative: CT BRAIN - WITHOUT CONTRAST INDICATION:   near syncope- pulled to left  COMPARISON:  CT brain dated October 21, 2017  TECHNIQUE:  CT examination of the brain was performed  In addition to axial images, coronal 2D reformatted images were created and submitted for interpretation    Radiation dose length product (DLP) for this visit: 1007 58 mGy-cm   This examination, like all CT scans performed in the [facility], was performed utilizing techniques to minimize radiation dose exposure, including the use of iterative reconstruction and automated exposure control  IMAGE QUALITY:  Diagnostic  FINDINGS: PARENCHYMA:  No intracranial mass, mass effect or midline shift  No CT signs of acute infarction  No acute parenchymal hemorrhage  There is mild periventricular white matter low attenuation which is nonspecific and most likely related to chronic small vessel ischemic changes  VENTRICLES AND EXTRA-AXIAL SPACES:  There is prominence of the ventricles and sulci related to mild volume loss  VISUALIZED ORBITS AND PARANASAL SINUSES:  Unremarkable  CALVARIUM AND EXTRACRANIAL SOFT TISSUES:  Normal      Impression: No acute intracranial abnormality  Mild chronic small vessel ischemic changes and volume loss  Workstation performed: [id number]       EKG, Pathology, and Other Studies Reviewed on Admission:   · EKG:  Atrial fibrillation    Allscripts / Epic Records Reviewed: Yes     ** Please Note: This note has been constructed using a voice recognition system   **

## 2018-12-15 NOTE — PROGRESS NOTES
Progress Note - Zaid Bull 1940, [de-identified] y o  male MRN: [id number]    Unit/Bed#: 408-01 Encounter: [id number]    Primary Care Provider: Miguel Duncan DO   Date and time admitted to hospital: 12/14/2018  4:53 PM        Pneumonia of right lower lobe due to infectious organism [facility])   Assessment & Plan    - right lower lobe pneumonia as found on chest x-ray  - continue cefepime and vancomycin for now given this is healthcare-associated pneumonia/possible gram negative pneumonia  - Check MRSA nasal culture  - check procalcitonin now and in am   - could be viral given patient treated for Influenza A at [facility] from 11/30/18 to 12/3/18   - Respiratory protocol   - encourage deep breathing coughing  - obtain sputum culture   - encourage patient to be out of bed     * Hypoglycemia associated with diabetes [facility])   Assessment & Plan    Lab Results   Component Value Date    HGBA1C 7 4 (H) 12/10/2018       Recent Labs      12/14/18   1807  12/14/18   2105  12/15/18   0740  12/15/18   1149   POCGLU  129  356*  107  201*       Blood Sugar Average: Last 72 hrs:  (P) 168 4     - monitor blood glucose frequently  - will decrease Levemir from 50 units to 45 units qhs  - Humalog 10 units TID for meal coverage    - continue hypoglycemic protocol   - insulin sliding scale       CKD (chronic kidney disease) stage 4, GFR 15-29 ml/min ([facility])   Assessment & Plan    - monitor and trend renal indices     Atrial fibrillation ([facility])   Assessment & Plan    - holding patient's home Coumadin due to INR being supratherapeutic  - monitor INR daily, resume Coumadin when INR is between 2 5 and 3 0  - monitor patient with telemetry     Type 2 diabetes mellitus with hyperglycemia [facility])   Assessment & Plan    Lab Results   Component Value Date    HGBA1C 7 4 (H) 12/10/2018       Recent Labs      12/14/18   1807  12/14/18   2105  12/15/18   0740  12/15/18   1149   POCGLU  129  356*  107  201*       Blood Sugar Average: Last 72 hrs:  (P) 168 4 - monitor blood glucose frequently  - will decrease Levemir from 50 units to 45 units qhs  - Humalog 10 units TID for meal coverage  - continue hypoglycemic protocol   - insulin sliding scale     Elevated INR   Assessment & Plan    -INR elevated at 5 74 on admission  -INR today: 4 21  -Continue to hold coumadin  -repeat INR in am         VTE Pharmacologic Prophylaxis:   Pharmacologic: Warfarin (Coumadin)  Mechanical VTE Prophylaxis in Place: Yes    Patient Centered Rounds: I have performed bedside rounds with nursing staff today  Discussions with Specialists or Other Care Team Provider: nursing and attending    Education and Discussions with Family / Patient: son updated at bedside    Time Spent for Care: 30 minutes  More than 50% of total time spent on counseling and coordination of care as described above  Current Length of Stay: 1 day(s)    Current Patient Status: Inpatient   Certification Statement: The patient will continue to require additional inpatient hospital stay due to continued need for IV antibiotics and monitoring of blood glucose  Discharge Plan: TBD    Code Status: Level 1 - Full Code      Subjective: The patient was seen and examined  Objective:     Vitals:   Temp (24hrs), Av 3 °F (36 3 °C), Min:96 6 °F (35 9 °C), Max:98 5 °F (36 9 °C)    Temp:  [96 6 °F (35 9 °C)-98 5 °F (36 9 °C)] 96 8 °F (36 °C)  HR:  [57-66] 63  Resp:  [14-21] 19  BP: (124-173)/(55-82) 133/58  SpO2:  [94 %-100 %] 97 %  Body mass index is 45 23 kg/m²  Input and Output Summary (last 24 hours): Intake/Output Summary (Last 24 hours) at 12/15/18 1633  Last data filed at 12/15/18 1300   Gross per 24 hour   Intake              770 ml   Output             2600 ml   Net            -1830 ml       Physical Exam:     Physical Exam   Constitutional: He is oriented to person, place, and time  Vital signs are normal  He appears well-developed and well-nourished  He is active and cooperative  Cardiovascular: Normal rate, regular rhythm and normal heart sounds  Pulmonary/Chest: Effort normal and breath sounds normal  He has no wheezes  He has no rhonchi  He has no rales  Abdominal: Soft  Normal appearance and bowel sounds are normal  There is no tenderness  Neurological: He is alert and oriented to person, place, and time  No cranial nerve deficit  Skin: Skin is warm, dry and intact  Nursing note and vitals reviewed  Additional Data:     Labs:      Results from last 7 days  Lab Units 12/15/18  0532   WBC Thousand/uL 8 66   HEMOGLOBIN g/dL 13 2   HEMATOCRIT % 42 6   PLATELETS Thousands/uL 309   NEUTROS PCT % 77*   LYMPHS PCT % 12*   MONOS PCT % 7   EOS PCT % 2       Results from last 7 days  Lab Units 12/15/18  0532   SODIUM mmol/L 138   POTASSIUM mmol/L 4 3   CHLORIDE mmol/L 101   CO2 mmol/L 30   BUN mg/dL 57*   CREATININE mg/dL 2 36*   ANION GAP mmol/L 7   CALCIUM mg/dL 8 4   ALBUMIN g/dL 2 6*   TOTAL BILIRUBIN mg/dL 0 40   ALK PHOS U/L 28*   ALT U/L 28   AST U/L 21   GLUCOSE RANDOM mg/dL 140       Results from last 7 days  Lab Units 12/15/18  0532   INR  4 21*       Results from last 7 days  Lab Units 12/15/18  1149 12/15/18  0740 12/14/18  2105 12/14/18  1807 12/14/18  1658   POC GLUCOSE mg/dl 201* 107 356* 129 49*       Results from last 7 days  Lab Units 12/10/18  0902   HEMOGLOBIN A1C % 7 4*               * I Have Reviewed All Lab Data Listed Above  * Additional Pertinent Lab Tests Reviewed:  All Labs Within Last 24 Hours Reviewed    Imaging:    Imaging Reports Reviewed Today Include: chest x-ray  Imaging Personally Reviewed by Myself Includes:  none    Recent Cultures (last 7 days):           Last 24 Hours Medication List:     Current Facility-Administered Medications:  acetaminophen 650 mg Oral Q6H PRN Hope Back PA-C   albuterol 2 5 mg Nebulization Q4H PRN Brannon Herrera MD   aspirin 81 mg Oral Daily Hope Back PA-C   cefepime 1,000 mg Intravenous Q24H Hope Back PA-C   cholecalciferol 2,000 Units Oral Daily Taffy Hoop, PA-C   furosemide 40 mg Oral BID Taffy Hosoumya, PA-C   gabapentin 600 mg Oral HS Taffy Nazanin, PA-C   insulin detemir 45 Units Subcutaneous HS Arnold Gonzalez, PA-C   insulin lispro 1-5 Units Subcutaneous HS Arnold Gonzalez, PA-C   insulin lispro 10 Units Subcutaneous TID With Meals Dillan Casey, PA-EDUAR   insulin lispro 2-12 Units Subcutaneous TID AC Arnold Gonzalez PA-C   iron polysaccharides 150 mg Oral Daily Taffy Hosoumya, PA-C   metoprolol tartrate 25 mg Oral BID Taffy Hosoumya, PA-C   tamsulosin 0 4 mg Oral HS Taffy Hoop, PA-C   traMADol 50 mg Oral Q6H PRN Taffy Nazanin, PA-EDUAR   vancomycin 20 mg/kg (Adjusted) Intravenous Q24H Hope Back, LANDON        Today, Patient Was Seen By: Dillan Casey PA-C    ** Please Note: Dictation voice to text software may have been used in the creation of this document   **

## 2018-12-15 NOTE — ASSESSMENT & PLAN NOTE
Lab Results   Component Value Date    HGBA1C 7 4 (H) 12/10/2018       Recent Labs      12/14/18   1658  12/14/18   1807  12/14/18   2105   POCGLU  49*  129  356*       Blood Sugar Average: Last 72 hrs:  (P) 178     - monitor blood glucose frequently  - begin home insulin regimen  - treat hypoglycemia with D50  - consider Endocrinology consult  - patient requires diabetic teaching

## 2018-12-16 VITALS
BODY MASS INDEX: 45.67 KG/M2 | SYSTOLIC BLOOD PRESSURE: 100 MMHG | TEMPERATURE: 97.6 F | HEART RATE: 91 BPM | OXYGEN SATURATION: 95 % | RESPIRATION RATE: 18 BRPM | HEIGHT: 67 IN | WEIGHT: 291.01 LBS | DIASTOLIC BLOOD PRESSURE: 54 MMHG

## 2018-12-16 LAB
ALBUMIN SERPL BCP-MCNC: 2.6 G/DL (ref 3.5–5)
ALP SERPL-CCNC: 27 U/L (ref 46–116)
ALT SERPL W P-5'-P-CCNC: 28 U/L (ref 12–78)
ANION GAP SERPL CALCULATED.3IONS-SCNC: 9 MMOL/L (ref 4–13)
AST SERPL W P-5'-P-CCNC: 20 U/L (ref 5–45)
BASOPHILS # BLD AUTO: 0.02 THOUSANDS/ΜL (ref 0–0.1)
BASOPHILS NFR BLD AUTO: 0 % (ref 0–1)
BILIRUB SERPL-MCNC: 0.6 MG/DL (ref 0.2–1)
BUN SERPL-MCNC: 59 MG/DL (ref 5–25)
CALCIUM SERPL-MCNC: 8.7 MG/DL (ref 8.3–10.1)
CHLORIDE SERPL-SCNC: 101 MMOL/L (ref 100–108)
CO2 SERPL-SCNC: 28 MMOL/L (ref 21–32)
CREAT SERPL-MCNC: 2.13 MG/DL (ref 0.6–1.3)
EOSINOPHIL # BLD AUTO: 0.17 THOUSAND/ΜL (ref 0–0.61)
EOSINOPHIL NFR BLD AUTO: 2 % (ref 0–6)
ERYTHROCYTE [DISTWIDTH] IN BLOOD BY AUTOMATED COUNT: 16.9 % (ref 11.6–15.1)
GFR SERPL CREATININE-BSD FRML MDRD: 28 ML/MIN/1.73SQ M
GLUCOSE SERPL-MCNC: 103 MG/DL (ref 65–140)
GLUCOSE SERPL-MCNC: 122 MG/DL (ref 65–140)
GLUCOSE SERPL-MCNC: 156 MG/DL (ref 65–140)
HCT VFR BLD AUTO: 37.8 % (ref 36.5–49.3)
HGB BLD-MCNC: 11.9 G/DL (ref 12–17)
IMM GRANULOCYTES # BLD AUTO: 0.04 THOUSAND/UL (ref 0–0.2)
IMM GRANULOCYTES NFR BLD AUTO: 1 % (ref 0–2)
INR PPP: 1.44 (ref 0.86–1.17)
LYMPHOCYTES # BLD AUTO: 1.17 THOUSANDS/ΜL (ref 0.6–4.47)
LYMPHOCYTES NFR BLD AUTO: 14 % (ref 14–44)
MCH RBC QN AUTO: 28.9 PG (ref 26.8–34.3)
MCHC RBC AUTO-ENTMCNC: 31.5 G/DL (ref 31.4–37.4)
MCV RBC AUTO: 92 FL (ref 82–98)
MONOCYTES # BLD AUTO: 0.6 THOUSAND/ΜL (ref 0.17–1.22)
MONOCYTES NFR BLD AUTO: 7 % (ref 4–12)
NEUTROPHILS # BLD AUTO: 6.35 THOUSANDS/ΜL (ref 1.85–7.62)
NEUTS SEG NFR BLD AUTO: 76 % (ref 43–75)
NRBC BLD AUTO-RTO: 0 /100 WBCS
PLATELET # BLD AUTO: 233 THOUSANDS/UL (ref 149–390)
PMV BLD AUTO: 9.6 FL (ref 8.9–12.7)
POTASSIUM SERPL-SCNC: 4.2 MMOL/L (ref 3.5–5.3)
PROCALCITONIN SERPL-MCNC: 0.71 NG/ML
PROT SERPL-MCNC: 5.9 G/DL (ref 6.4–8.2)
PROTHROMBIN TIME: 16.8 SECONDS (ref 11.8–14.2)
RBC # BLD AUTO: 4.12 MILLION/UL (ref 3.88–5.62)
SODIUM SERPL-SCNC: 138 MMOL/L (ref 136–145)
WBC # BLD AUTO: 8.35 THOUSAND/UL (ref 4.31–10.16)

## 2018-12-16 PROCEDURE — 94760 N-INVAS EAR/PLS OXIMETRY 1: CPT

## 2018-12-16 PROCEDURE — 97162 PT EVAL MOD COMPLEX 30 MIN: CPT | Performed by: PHYSICAL THERAPIST

## 2018-12-16 PROCEDURE — 85025 COMPLETE CBC W/AUTO DIFF WBC: CPT | Performed by: PHYSICIAN ASSISTANT

## 2018-12-16 PROCEDURE — G8979 MOBILITY GOAL STATUS: HCPCS | Performed by: PHYSICAL THERAPIST

## 2018-12-16 PROCEDURE — 80053 COMPREHEN METABOLIC PANEL: CPT | Performed by: PHYSICIAN ASSISTANT

## 2018-12-16 PROCEDURE — 94761 N-INVAS EAR/PLS OXIMETRY MLT: CPT

## 2018-12-16 PROCEDURE — 99239 HOSP IP/OBS DSCHRG MGMT >30: CPT | Performed by: PHYSICIAN ASSISTANT

## 2018-12-16 PROCEDURE — 85610 PROTHROMBIN TIME: CPT | Performed by: PHYSICIAN ASSISTANT

## 2018-12-16 PROCEDURE — 84145 PROCALCITONIN (PCT): CPT | Performed by: PHYSICIAN ASSISTANT

## 2018-12-16 PROCEDURE — 97116 GAIT TRAINING THERAPY: CPT | Performed by: PHYSICAL THERAPIST

## 2018-12-16 PROCEDURE — 82948 REAGENT STRIP/BLOOD GLUCOSE: CPT

## 2018-12-16 PROCEDURE — 94660 CPAP INITIATION&MGMT: CPT

## 2018-12-16 PROCEDURE — G8978 MOBILITY CURRENT STATUS: HCPCS | Performed by: PHYSICAL THERAPIST

## 2018-12-16 RX ORDER — LEVOFLOXACIN 750 MG/1
750 TABLET ORAL ONCE
Status: COMPLETED | OUTPATIENT
Start: 2018-12-16 | End: 2018-12-16

## 2018-12-16 RX ORDER — LEVOFLOXACIN 750 MG/1
750 TABLET ORAL EVERY OTHER DAY
Qty: 2 TABLET | Refills: 0 | Status: SHIPPED | OUTPATIENT
Start: 2018-12-18 | End: 2018-12-21

## 2018-12-16 RX ORDER — WARFARIN SODIUM 5 MG/1
TABLET ORAL
Qty: 90 TABLET | Refills: 0 | Status: SHIPPED | OUTPATIENT
Start: 2018-12-16 | End: 2020-01-15

## 2018-12-16 RX ADMIN — VITAMIN D, TAB 1000IU (100/BT) 2000 UNITS: 25 TAB at 09:06

## 2018-12-16 RX ADMIN — ASPIRIN 81 MG: 81 TABLET, COATED ORAL at 09:01

## 2018-12-16 RX ADMIN — INSULIN LISPRO 10 UNITS: 100 INJECTION, SOLUTION INTRAVENOUS; SUBCUTANEOUS at 11:45

## 2018-12-16 RX ADMIN — INSULIN LISPRO 2 UNITS: 100 INJECTION, SOLUTION INTRAVENOUS; SUBCUTANEOUS at 11:45

## 2018-12-16 RX ADMIN — Medication 150 MG: at 09:06

## 2018-12-16 RX ADMIN — LEVOFLOXACIN 750 MG: 750 TABLET, FILM COATED ORAL at 15:47

## 2018-12-16 RX ADMIN — INSULIN LISPRO 10 UNITS: 100 INJECTION, SOLUTION INTRAVENOUS; SUBCUTANEOUS at 09:07

## 2018-12-16 NOTE — DISCHARGE INSTRUCTIONS
Recommend oxygen rate of 1 5L continuously instead of your previous rate of 3L/min based on ambulatory pulse oximetry testing

## 2018-12-16 NOTE — DISCHARGE SUMMARY
Discharge- Paola Weinberg 1940, [de-identified] y o  male MRN: [id number]    Unit/Bed#: 408-01 Encounter: [id number]    Primary Care Provider: Biagio Closs, DO   Date and time admitted to hospital: 12/14/2018  4:53 PM        Pneumonia of right lower lobe due to infectious organism [facility])   Assessment & Plan    - right lower lobe pneumonia as found on chest x-ray  - patient was treated with cefepime and vancomycin initially, however this will be transitioned to PO Levaquin on discharge  - Procalcitonin is elevated at 0 71 which helps to justify treatment and rule out viral etiology  - Will plan to continue Levaquin for a total of 6 days, renally dosed  - Will need to follow up with PCP within 1 week of discharge  * Hypoglycemia associated with diabetes [facility])   Assessment & Plan    Lab Results   Component Value Date    HGBA1C 7 4 (H) 12/10/2018       Recent Labs      12/15/18   1619  12/15/18   2203  12/16/18   0753  12/16/18   1144   POCGLU  230*  154*  122  156*       Blood Sugar Average: Last 72 hrs:  (P) 167 [id number]     - monitor blood glucose frequently  - will decrease Levemir from 50 units to 45 units qhs  - ok to continue Humalog at decreased dose of 10 units TID for meal coverage  - continue hypoglycemic protocol   - insulin sliding scale       Elevated INR   Assessment & Plan    - INR elevated at 5 74 on admission   - INR today is subtherapeutic at 1 44   - Ok to restart at slightly lower dosing   - repeat INR in am       Atrial fibrillation ([facility] [address] )   Assessment & Plan    - held patient's home Coumadin initially due to INR being supratherapeutic  Ok to resume coumadin but at lower dosing protocol -   Check INR in 3 days due to antibiotic use and new dosing  CKD (chronic kidney disease) stage 4, GFR 15-29 ml/min ([facility])   Assessment & Plan    - monitor on an outpatient basis       Type 2 diabetes mellitus with hyperglycemia [facility])   Assessment & Plan    Lab Results   Component Value Date    HGBA1C 7 4 (H) 12/10/2018       Recent Labs      12/15/18   1619  12/15/18   2203  12/16/18   0753  12/16/18   1144   POCGLU  230*  154*  122  156*       Blood Sugar Average: Last 72 hrs:  (P) 167 [id number]     - monitor blood glucose frequently  - will decrease Levemir from 50 units to 45 units qhs  - Humalog 10 units TID for meal coverage  - continue hypoglycemic protocol   - ISS coverage  Discharging Physician / Practitioner: Jasmin Masterson PA-C  PCP: Sd Huitron DO  Admission Date:   Admission Orders     Ordered        12/14/18 1832  Inpatient Admission  Once             Discharge Date: 12/16/18    Resolved Problems  Date Reviewed: 12/16/2018    None          Consultations During Hospital Stay:  · none    Procedures Performed:   · none    Significant Findings / Test Results:   Xr Chest 2 Views  Result Date: 12/15/2018  Impression: 1  Diminished inspiration  Mild vascular congestion  2   Bibasilar infiltrates  Although the findings likely represent atelectasis, cardiac etiology or pneumonia not excluded  Follow-up recommended  Workstation performed: [id number]     Incidental Findings:   · none    Test Results Pending at Discharge (will require follow up):   · none     Outpatient Tests Requested:  · none    Complications:  none    Reason for Admission: hypoglycemia, pneumonia    Hospital Course:   Nancy Hernandez is a [de-identified] y o  male patient who originally presented to the hospital on 12/14/2018 due to Hypoglycemia - Symptomatic (Patient BS was 206 at lunch, gave himself insulin had lunch, then was found by family with an altered mental status not answering appropriately, EMS was called BS was then 69 and patient was alert oriented but very diaphoretic)    The patient's insulin dosing was decreased as noted above  He was also found to have a right lower lobe pneumonia and was treated with IV cefepime and vancomycin while in the hospital, and started on PO Levaquin which was renally dosed   Coumadin dosing was adjusted as well due to supratherapeutic INR on admission  He should follow up with his PCP within 1 week of discharge as a general follow up from his hospital stay  Please see above list of diagnoses and related plan for additional information  Condition at Discharge: good     Discharge Day Visit / Exam:   Subjective:  Patient is feeling well today, feels back to normal  No CP, SOB, cough, fever reported  Vitals: Blood Pressure: 100/54 (12/16/18 0901)  Pulse: 91 (12/16/18 0901)  Temperature: 97 6 °F (36 4 °C) (12/16/18 0901)  Temp Source: Temporal (12/16/18 0901)  Respirations: 18 (12/16/18 0901)  Height: 5' 7" (170 2 cm) (12/14/18 1933)  Weight - Scale: 132 kg (291 lb 0 1 oz) (12/16/18 0533)  SpO2: 95 % (12/16/18 1320)  Exam:   Physical Exam   Constitutional: He is oriented to person, place, and time  He appears well-developed  HENT:   Head: Normocephalic  Mouth/Throat: Oropharynx is clear and moist    Neck: No JVD present  Cardiovascular: Normal rate and normal heart sounds  No murmur heard  Pulmonary/Chest: Effort normal and breath sounds normal  No respiratory distress  He has no wheezes  Abdominal: Soft  Bowel sounds are normal  He exhibits no distension  There is no tenderness  Musculoskeletal: He exhibits no edema  Neurological: He is alert and oriented to person, place, and time  Skin: Skin is warm  He is not diaphoretic  No erythema  Psychiatric: He has a normal mood and affect  Nursing note and vitals reviewed  Discharge instructions/Information to patient and family:   See after visit summary for information provided to patient and family  Provisions for Follow-Up Care:  See after visit summary for information related to follow-up care and any pertinent home health orders  Disposition:   Home    Planned Readmission: no     Discharge Statement:  I spent 35 minutes discharging the patient  This time was spent on the day of discharge   I had direct contact with the patient on the day of discharge  Greater than 50% of the total time was spent examining patient, answering all patient questions, arranging and discussing plan of care with patient as well as directly providing post-discharge instructions  Additional time then spent on discharge activities  Discharge Medications:  See after visit summary for reconciled discharge medications provided to patient and family        ** Please Note: This note has been constructed using a voice recognition system **

## 2018-12-16 NOTE — ASSESSMENT & PLAN NOTE
- right lower lobe pneumonia as found on chest x-ray  - patient was treated with cefepime and vancomycin initially, however this will be transitioned to PO Levaquin on discharge  - Procalcitonin is elevated at 0 71 which helps to justify treatment and rule out viral etiology  - Will plan to continue Levaquin for a total of 6 days, renally dosed  - Will need to follow up with PCP within 1 week of discharge

## 2018-12-16 NOTE — SOCIAL WORK
Pt is a HRR and was referred to OP care coordination department  Cm will call pt's PCP on Monday:Dr Rahul Wiggins and schedule a follow up appointment for pt

## 2018-12-16 NOTE — ASSESSMENT & PLAN NOTE
Lab Results   Component Value Date    HGBA1C 7 4 (H) 12/10/2018       Recent Labs      12/15/18   1619  12/15/18   2203  12/16/18   0753  12/16/18   1144   POCGLU  230*  154*  122  156*       Blood Sugar Average: Last 72 hrs:  (P) 167 [id number]     - monitor blood glucose frequently  - will decrease Levemir from 50 units to 45 units qhs  - ok to continue Humalog at decreased dose of 10 units TID for meal coverage    - continue hypoglycemic protocol   - insulin sliding scale

## 2018-12-16 NOTE — SOCIAL WORK
Pt is active with [facility]  Cm notified [facility] that pt is being dc'd today  AVS to be sent over

## 2018-12-16 NOTE — PHYSICAL THERAPY NOTE
12/16/18 1320   Note Type   Note type Eval/Treat   Pain Assessment   Pain Assessment 0-10   Home Living   Type of Home Mobile home   Home Layout One level;Ramped entrance   [address]   Prior Function   Level of [place] Independent with ADLs and functional mobility   Lives With Son;Family   Receives Help From [facility] in the last 6 months 0   Vocational Retired   Restrictions/Precautions   Wells Madisyn Bearing Precautions Per Order No   Other Precautions O2  (3L on admission)   General   Family/Caregiver Present Yes  (Son)   Cognition   Overall Cognitive Status WFL   Arousal/Participation Alert   Orientation Level Oriented X4   Following Commands Follows all commands and directions without difficulty   RLE Assessment   RLE Assessment X   Strength RLE   RLE Overall Strength 4-/5   LLE Assessment   LLE Assessment X   Strength LLE   LLE Overall Strength 4-/5   Bed Mobility   Supine to Sit Unable to assess  (OOB at time of evaluation)   Transfers   Sit to Stand 6  Modified independent   Stand to Sit 6  Modified independent   Ambulation/Elevation   Gait pattern Short stride; Forward Flexion   Gait Assistance 6  Modified independent   Assistive Device Rolling walker   Distance 150', 50 with seated break   Stair Management Assistance Not tested   Balance   Static Sitting Good   Static Standing Good   Ambulatory Fair +   Endurance Deficit   Endurance Deficit Yes   Endurance Deficit Description (SOB when ambulating beyond household distance)   Activity Tolerance   Activity Tolerance Patient limited by fatigue   Medical Staff Made Aware PA and RT aware   Assessment   Prognosis Good   Problem List Decreased endurance;Decreased strength   Assessment Sasha Carcamo is a [de-identified]year old man presenting to PT upon hospitalization for hypoglycemia  At this time he is independent with all mobility and transfers, as well as Lissa with ambulating household distance with RW    No further skilled PT indicated at this time   Barriers to Discharge None   Plan   PT Frequency One time visit  (DC PT)   Recommendation   Recommendation Home with family support   PT - OK to Discharge Yes

## 2018-12-16 NOTE — PLAN OF CARE
Problem: DISCHARGE PLANNING - CARE MANAGEMENT  Goal: Discharge to post-acute care or home with appropriate resources  INTERVENTIONS:  - Conduct assessment to determine patient/family and health care team treatment goals, and need for post-acute services based on payer coverage, community resources, and patient preferences, and barriers to discharge  - Address psychosocial, clinical, and financial barriers to discharge as identified in assessment in conjunction with the patient/family and health care team  - Arrange appropriate level of post-acute services according to patient's   needs and preference and payer coverage in collaboration with the physician and health care team  - Communicate with and update the patient/family, physician, and health care team regarding progress on the discharge plan  - Arrange appropriate transportation to post-acute venues    D/c home with spouse and home care   Outcome: Completed Date Met: 12/16/18  -dc home with outpatient follow up  -resumption of homecare services: [facility]

## 2018-12-16 NOTE — PHYSICAL THERAPY NOTE
12/16/18 1320   Note Type   Note type Eval/Treat   Pain Assessment   Pain Assessment 0-10   Home Living   Type of Home Mobile home   Home Layout One level;Ramped entrance   [address]   Prior Function   Level of [place] Independent with ADLs and functional mobility   Lives With Son;Family   Receives Help From [facility] in the last 6 months 0   Vocational Retired   Restrictions/Precautions   Wells Madisyn Bearing Precautions Per Order No   Other Precautions O2  (3L on admission)   General   Family/Caregiver Present Yes  (Son)   Cognition   Overall Cognitive Status WFL   Arousal/Participation Alert   Orientation Level Oriented X4   Following Commands Follows all commands and directions without difficulty   RLE Assessment   RLE Assessment X   Strength RLE   RLE Overall Strength 4-/5   LLE Assessment   LLE Assessment X   Strength LLE   LLE Overall Strength 4-/5   Bed Mobility   Supine to Sit Unable to assess  (OOB at time of evaluation)   Transfers   Sit to Stand 6  Modified independent   Stand to Sit 6  Modified independent   Ambulation/Elevation   Gait pattern Short stride; Forward Flexion   Gait Assistance 6  Modified independent   Assistive Device Rolling walker   Distance 150', 50 with seated break   Stair Management Assistance Not tested   Balance   Static Sitting Good   Static Standing Good   Ambulatory Fair +   Endurance Deficit   Endurance Deficit Yes   Endurance Deficit Description (SOB when ambulating beyond household distance)   Activity Tolerance   Activity Tolerance Patient limited by fatigue   Medical Staff Made Aware PA and RT aware   Assessment   Prognosis Good   Problem List Decreased endurance;Decreased strength   Assessment Radha Neal is a [de-identified]year old man presenting to PT upon hospitalization for hypoglycemia  At this time he is independent with all mobility and transfers, as well as Lissa with ambulating household distance with RW    No further skilled PT indicated at this time   Barriers to Discharge None   Goals   STG Expiration Date 12/30/18   Short Term Goal #1 Increase BLE strength 1/2 grade; ambulate >100 ft Lissa with RW for support; Demonstrate Lissa with all bed mobility and transfers  Plan   Treatment/Interventions LE strengthening/ROM; Therapeutic exercise; Endurance training;Gait training   PT Frequency 5x/wk   Recommendation   Recommendation Home with family support   PT - OK to Discharge Yes

## 2018-12-16 NOTE — NURSING NOTE
Pt wheeled off unit by PCA, accompanied by son  Discharge instructions given to pt  Verbal understanding of same  Pt in no acute distress

## 2018-12-16 NOTE — ASSESSMENT & PLAN NOTE
- held patient's home Coumadin initially due to INR being supratherapeutic  Ok to resume coumadin but at lower dosing protocol -   Check INR in 3 days due to antibiotic use and new dosing

## 2018-12-16 NOTE — RESPIRATORY THERAPY NOTE
Home Oxygen Qualifying Test       Patient name: Elia Lozada        : 1940   Date of Test:  2018  Diagnosis:Hypoglycemia assoc  With Diabetes      Home Oxygen Test:    **Medicare Guidelines require item(s) 1-5 on all ambulatory patients or 1 and 2 on non-ambulatory patients  1   Baseline SPO2 on Room Air at rest n/a %  2   SPO2 during exercise on Room Air n/a %  During exercise monitor SpO2  If SPO2 increases >=89% with ambulation do not add supplemental             oxygen  If <= 88% on room air add O2 via NC and titrate patient  Patient must be ambulated with O2 and titrated to > 88% with exertion  3   SPO2 on Oxygen at rest 96 % 1 5 lpm     4   SPO2 during exercise on Oxygen  95% a liter flow of 1 5 lpm     5   Exercise performed:          walking, duration 10 (min), distance 100 (feet)          []  Supplemental Home Oxygen is indicated  Pt  Has oxygen at home    []  Client does not qualify for home oxygen  Respiratory Additional Notes- Patient uses oxygen at home at 3 l/m  Pt  Did not require 3 l/m for   Ambulation        Blaire Mata, RT

## 2018-12-16 NOTE — ASSESSMENT & PLAN NOTE
Lab Results   Component Value Date    HGBA1C 7 4 (H) 12/10/2018       Recent Labs      12/15/18   1619  12/15/18   2203  12/16/18   0753  12/16/18   1144   POCGLU  230*  154*  122  156*       Blood Sugar Average: Last 72 hrs:  (P) 167 [id number]     - monitor blood glucose frequently  - will decrease Levemir from 50 units to 45 units qhs  - Humalog 10 units TID for meal coverage  - continue hypoglycemic protocol   - ISS coverage

## 2018-12-16 NOTE — ASSESSMENT & PLAN NOTE
- INR elevated at 5 74 on admission   - INR today is subtherapeutic at 1 44   - Ok to restart at slightly lower dosing   - repeat INR in am

## 2018-12-17 ENCOUNTER — TRANSITIONAL CARE MANAGEMENT (OUTPATIENT)
Dept: FAMILY MEDICINE CLINIC | Facility: CLINIC | Age: 78
End: 2018-12-17

## 2018-12-17 DIAGNOSIS — Z71.89 COMPLEX CARE COORDINATION: Primary | ICD-10-CM

## 2018-12-17 LAB
ATRIAL RATE: 53 BPM
MRSA NOSE QL CULT: NORMAL
P AXIS: 57 DEGREES
PR INTERVAL: 174 MS
QRS AXIS: -63 DEGREES
QRSD INTERVAL: 162 MS
QT INTERVAL: 492 MS
QTC INTERVAL: 461 MS
T WAVE AXIS: 29 DEGREES
VENTRICULAR RATE: 53 BPM

## 2018-12-17 PROCEDURE — 93010 ELECTROCARDIOGRAM REPORT: CPT | Performed by: INTERNAL MEDICINE

## 2018-12-18 ENCOUNTER — TELEPHONE (OUTPATIENT)
Dept: FAMILY MEDICINE CLINIC | Facility: CLINIC | Age: 78
End: 2018-12-18

## 2018-12-18 LAB — INR PPP: 1.4 (ref 0.86–1.17)

## 2018-12-18 NOTE — TELEPHONE ENCOUNTER
What do you want to do about INR results - any changes?   12/18/18 INR 1 4  Coumadin 5mg M/W/F 2 5mg daily rest of wk

## 2018-12-18 NOTE — TELEPHONE ENCOUNTER
Hospitalized 12/14-16 Dx Hypoglycemia & Pneumonia  Re-admitted to Homecare - Insuline change Orlene Pillar down to 45u hs , Humalog 10u TID,     12/18/18 --  INR  1 4   (Rx'd Levaquin 750 qod x 2 doses)    Coumadin 5mg M/W/F 2 5mg rest of wk    ANY NEW ORDERS     Called PT with changes

## 2018-12-19 ENCOUNTER — ANTICOAG VISIT (OUTPATIENT)
Dept: FAMILY MEDICINE CLINIC | Facility: CLINIC | Age: 78
End: 2018-12-19

## 2018-12-19 ENCOUNTER — OFFICE VISIT (OUTPATIENT)
Dept: FAMILY MEDICINE CLINIC | Facility: CLINIC | Age: 78
End: 2018-12-19
Payer: MEDICARE

## 2018-12-19 VITALS
TEMPERATURE: 98.8 F | WEIGHT: 291 LBS | DIASTOLIC BLOOD PRESSURE: 68 MMHG | SYSTOLIC BLOOD PRESSURE: 110 MMHG | HEIGHT: 67 IN | BODY MASS INDEX: 45.67 KG/M2

## 2018-12-19 DIAGNOSIS — Z51.81 ENCOUNTER FOR MEDICATION MONITORING: ICD-10-CM

## 2018-12-19 DIAGNOSIS — N18.4 CHRONIC KIDNEY DISEASE, STAGE 4 (SEVERE) (HCC): Primary | ICD-10-CM

## 2018-12-19 LAB — BACTERIA BLD CULT: NORMAL

## 2018-12-19 PROCEDURE — 99495 TRANSJ CARE MGMT MOD F2F 14D: CPT | Performed by: FAMILY MEDICINE

## 2018-12-19 NOTE — PROGRESS NOTES
Assessment/Plan:      Diagnoses and all orders for this visit:    Chronic kidney disease, stage 4 (severe) ([address] )  -     Basic metabolic panel; Standing  -     Basic metabolic panel    Encounter for medication monitoring  -     Protime-INR; Standing  -     Protime-INR         Subjective:     Patient ID: Maxi Medina is a [de-identified] y o  male  Wes Limb is here today after recent hospitalization at the hospital for hypoglycemia elevated PT INR community-acquired pneumonia he was treated initially with vancomycin and cefepime was transitioned to Levaquin his insulin dosage was decreased by 5 units and he was also decreased on his mealtime insulin to 10 units seems to be doing better since the adjustment in the insulin dosage is having sugars in the high 100 to low 200s but no appetite episodes of hypoglycemia any looks much brighter on blood pressure is well controlled he has his usual peripheral edema lung sounds are clear        Review of Systems   Constitutional: Negative for activity change, appetite change, diaphoresis, fatigue and fever  HENT: Negative  Eyes: Negative  Respiratory: Positive for shortness of breath and wheezing  Negative for apnea, cough and chest tightness  Cardiovascular: Negative for chest pain, palpitations and leg swelling  Gastrointestinal: Negative for abdominal distention, abdominal pain, anal bleeding, constipation, diarrhea, nausea and vomiting  Endocrine: Negative for cold intolerance, heat intolerance, polydipsia, polyphagia and polyuria  Genitourinary: Negative for difficulty urinating, dysuria, flank pain, hematuria and urgency  Musculoskeletal: Negative for arthralgias, back pain, gait problem, joint swelling and myalgias  Skin: Negative for color change, rash and wound  Allergic/Immunologic: Negative for environmental allergies, food allergies and immunocompromised state     Neurological: Negative for dizziness, seizures, syncope, speech difficulty, numbness and headaches  Hematological: Negative for adenopathy  Does not bruise/bleed easily  Psychiatric/Behavioral: Negative for agitation, behavioral problems, hallucinations, sleep disturbance and suicidal ideas  Objective:     Physical Exam   Constitutional: He is oriented to person, place, and time  He appears well-developed and well-nourished  No distress  HENT:   Head: Normocephalic  Right Ear: External ear normal    Left Ear: External ear normal    Nose: Nose normal    Mouth/Throat: Oropharynx is clear and moist    Eyes: Pupils are equal, round, and reactive to light  Conjunctivae and EOM are normal  Right eye exhibits no discharge  Left eye exhibits no discharge  No scleral icterus  Neck: Normal range of motion  No tracheal deviation present  No thyromegaly present  Cardiovascular: Normal rate, regular rhythm and normal heart sounds  Exam reveals no gallop and no friction rub  No murmur heard  Pulmonary/Chest: Effort normal  No respiratory distress  He has wheezes  Abdominal: Soft  Bowel sounds are normal  He exhibits no mass  There is no tenderness  There is no guarding  Musculoskeletal: He exhibits no edema or deformity  Lymphadenopathy:     He has no cervical adenopathy  Neurological: He is alert and oriented to person, place, and time  No cranial nerve deficit  Skin: Skin is warm and dry  No rash noted  He is not diaphoretic  No erythema  Psychiatric: He has a normal mood and affect  Thought content normal          Vitals:    12/19/18 1421   BP: 110/68   BP Location: Left arm   Patient Position: Sitting   Cuff Size: Standard   Temp: 98 8 °F (37 1 °C)   TempSrc: Tympanic   Weight: 132 kg (291 lb)   Height: 5' 7" (1 702 m)       The following portions of the patient's history were reviewed and updated as appropriate:   He  has a past medical history of Anemia; Arthritis; Atrial fibrillation ([address] );  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[address] ); B12 deficiency; Back pain; Blind right eye; Cancer ([facility] ); Cardiac disorder; Chronic combined systolic and diastolic heart failure ([facility] ); Chronic obstructive lung disease ([facility] ); Chronic venous stasis dermatitis of both lower extremities; CKD (chronic kidney disease), stage IV ([facility] ); Coronary artery disease; DDD (degenerative disc disease); DM (diabetes mellitus), type 2 ([facility] ); BAER (dyspnea on exertion); Easy bruising; Epistaxis; Gout; Gross hematuria; Hepatitis; Herpes zoster; Hiatal hernia; History of cellulitis; History of prostate cancer; Hypercholesterolemia; Hyperlipidemia; Hypertension; Ischemic cardiomyopathy; Lung mass; MI, old; Morbid obesity due to excess calories ([facility] ); Neuropathy; Obesity; Obstructive sleep apnea syndrome, severe; Pneumonia; Shortness of breath; TIA (transient ischemic attack); Urinary incontinence; Urinary retention; and Use of cane as ambulatory aid    He   Patient Active Problem List    Diagnosis Date Noted    Elevated INR 12/15/2018    Pneumonia of right lower lobe due to infectious organism ([facility] ) 12/14/2018    Hypoglycemia associated with diabetes ([facility] ) 12/14/2018    Influenza A 11/30/2018    Bacteriuria 11/30/2018    Malignant neoplasm of upper lobe of right lung ([facility] ) 04/10/2018    Hypokalemia 10/25/2017    Vision loss, 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 10/21/2017    Atrial fibrillation ([facility] )     Blind right eye     Coronary artery disease     DM (diabetes mellitus), type 2 with peripheral neuropathy     Obstructive sleep apnea syndrome, severe     Severe obstructive sleep apnea-hypopnea syndrome 04/13/2017    Wound of right lower extremity 03/05/2017    Wound of left lower extremity 03/05/2017    Hyperphosphatemia 03/05/2017    Vitamin D deficiency 03/05/2017    Renal cyst 03/05/2017    Body mass index (BMI) of 40 0 to 49 9 03/04/2017    Anemia 10/04/2016    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    CKD (chronic kidney disease) stage 4, GFR 15-29 ml/min ([facility] ) 07/16/2016    Morbid obesity ([facility] )     History of prostate cancer     Type 2 diabetes mellitus with hyperglycemia (HCC)     Bilateral leg edema 01/19/2016    Prostate cancer ([facility] ) 07/24/2012     He  has a past surgical history that includes Coronary angioplasty with stent; Cystoscopy; Lung cancer surgery; Cataract extraction, bilateral; Eye surgery; Hernia repair; Vascular surgery; Cardiac surgery; Abdominal surgery; Coronary artery bypass graft (N/A, 7/15/2016); pr cystourethroscopy,ureter catheter (Left, 3/9/2016); PILONIDAL CYST EXCISION; pr temporal artery ligatn or bx (Bilateral, 10/31/2017); Lung surgery (Left, 2012); Other surgical history; and Renal artery stent (02/16/2015)  His family history includes Cancer in his mother, other, other, and sister; Diabetes in his father, maternal grandmother, mother, and other; Diabetes type II in his mother; Heart disease in his mother; Hypertension in his mother and other  He  reports that he quit smoking about 14 years ago  He has a 108 00 pack-year smoking history  He has never used smokeless tobacco  He reports that he does not drink alcohol or use drugs    Current Outpatient Prescriptions   Medication Sig Dispense Refill    albuterol (2 5 mg/3 mL) 0 083 % nebulizer solution Take 1 vial (2 5 mg total) by nebulization every 6 (six) hours as needed for wheezing or shortness of breath 100 vial 0    aspirin (ECOTRIN LOW STRENGTH) 81 mg EC tablet Take 81 mg by mouth 2 (two) times a day      Cholecalciferol 2000 units TABS Take 1 tablet (2,000 Units total) by mouth daily 30 tablet 2    furosemide (LASIX) 80 mg tablet Take 1 tablet by mouth 2 (two) times a day 30 tablet 0    gabapentin (NEURONTIN) 300 mg capsule Take 2 capsules (600 mg total) by mouth daily at bedtime 60 capsule 3    insulin detemir (LEVEMIR) 100 units/mL subcutaneous injection Inject 45 Units under the skin daily at bedtime  0    insulin lispro (HumaLOG) 100 units/mL injection Inject 10 Units under the skin 3 (three) times a day with meals  0    iron polysaccharides (NIFEREX) 150 mg capsule Take 150 mg by mouth daily      levofloxacin (LEVAQUIN) 750 mg tablet Take 1 tablet (750 mg total) by mouth every other day for 2 doses 2 tablet 0    metoprolol tartrate (LOPRESSOR) 25 mg tablet Take 25 mg by mouth 2 (two) times a day      simvastatin (ZOCOR) 40 mg tablet Take 40 mg by mouth daily at bedtime      tamsulosin (FLOMAX) 0 4 mg Take 0 4 mg by mouth daily at bedtime        traMADol (ULTRAM) 50 mg tablet 1 tablet 3 times daily with 2 extra Strength Tylenol 60 tablet 0    warfarin (COUMADIN) 5 mg tablet Take 5mg (1 tablet) daily on Monday, Wednesday, and Friday, and 2 5mg (1/2 tablet) on Tuesday, Thursday, Saturday, Sunday  90 tablet 0    potassium chloride (MICRO-K) 10 MEQ CR capsule Take 1 capsule (10 mEq total) by mouth 2 (two) times a day for 30 days (Patient not taking: Reported on 12/14/2018 ) 60 capsule 5     No current facility-administered medications for this visit        Current Outpatient Prescriptions on File Prior to Visit   Medication Sig    albuterol (2 5 mg/3 mL) 0 083 % nebulizer solution Take 1 vial (2 5 mg total) by nebulization every 6 (six) hours as needed for wheezing or shortness of breath    aspirin (ECOTRIN LOW STRENGTH) 81 mg EC tablet Take 81 mg by mouth 2 (two) times a day    Cholecalciferol 2000 units TABS Take 1 tablet (2,000 Units total) by mouth daily    furosemide (LASIX) 80 mg tablet Take 1 tablet by mouth 2 (two) times a day    gabapentin (NEURONTIN) 300 mg capsule Take 2 capsules (600 mg total) by mouth daily at bedtime    insulin detemir (LEVEMIR) 100 units/mL subcutaneous injection Inject 45 Units under the skin daily at bedtime    insulin lispro (HumaLOG) 100 units/mL injection Inject 10 Units under the skin 3 (three) times a day with meals    iron polysaccharides (NIFEREX) 150 mg capsule Take 150 mg by mouth daily    levofloxacin (LEVAQUIN) 750 mg tablet Take 1 tablet (750 mg total) by mouth every other day for 2 doses    metoprolol tartrate (LOPRESSOR) 25 mg tablet Take 25 mg by mouth 2 (two) times a day    simvastatin (ZOCOR) 40 mg tablet Take 40 mg by mouth daily at bedtime    tamsulosin (FLOMAX) 0 4 mg Take 0 4 mg by mouth daily at bedtime      traMADol (ULTRAM) 50 mg tablet 1 tablet 3 times daily with 2 extra Strength Tylenol    warfarin (COUMADIN) 5 mg tablet Take 5mg (1 tablet) daily on Monday, Wednesday, and Friday, and 2 5mg (1/2 tablet) on Tuesday, Thursday, Saturday, Sunday   potassium chloride (MICRO-K) 10 MEQ CR capsule Take 1 capsule (10 mEq total) by mouth 2 (two) times a day for 30 days (Patient not taking: Reported on 12/14/2018 )    [DISCONTINUED] glucagon (GLUCAGON EMERGENCY) 1 MG injection Inject 1 mg under the skin once as needed for low blood sugar (as needed for a blood sugar less than 70 if unconscious or unable to correct by oral means) for up to 1 dose     No current facility-administered medications on file prior to visit  He is allergic to other       Transitional Care Management Review:  Masood Pierce is a [de-identified] y o  male here for TCM follow up  During the TCM phone call patient stated:    TCM Call (since 11/18/2018)     Date and time call was made  12/17/2018 10:21 AM    Patient was hospitialized at  [address] [place] [address]    Date of Admission  12/14/18    Date of discharge  12/16/18    Diagnosis  HYPOGLYCEMIA    Disposition  Home    Were the patients medications reviewed and updated  No    Current Symptoms  None      TCM Call (since 11/18/2018)     Post hospital issues  None    Should patient be enrolled in anticoag monitoring?   No    Scheduled for follow up?  -- (PT HAD RECENT MAURICE REGULAR APPT MADE)    Did you obtain your prescribed medications  No    Do you need help managing your prescriptions or medications  No    Is transportation to your appointment needed  No    I have advised the patient to call PCP with any new or worsening symptoms  HEIDY RODAS    Living Arrangements  Spouse or Significiant other    Support System  Spouse    The type of support provided  Emotional; Physical    Do you have social support  Yes, some    Are you recieving any outpatient services  No    Are you recieving home care services  No    Types of home care services  Nurse visit    Are you using any community resources  No    Current waiver services  No    Have you fallen in the last 12 months  No    Interperter language line needed  No    Counseling  Patient              Job Hence, DO

## 2018-12-21 ENCOUNTER — PATIENT OUTREACH (OUTPATIENT)
Dept: CASE MANAGEMENT | Facility: OTHER | Age: 78
End: 2018-12-21

## 2018-12-21 ENCOUNTER — ANTICOAG VISIT (OUTPATIENT)
Dept: FAMILY MEDICINE CLINIC | Facility: CLINIC | Age: 78
End: 2018-12-21

## 2018-12-21 LAB — INR PPP: 1.62 (ref 0.86–1.17)

## 2018-12-21 NOTE — PROGRESS NOTES
Please call the patient regarding his abnormal result    INR is slightly low but on I still think we should keep him on the same dose Coumadin and just let his protime come up slowly

## 2018-12-21 NOTE — PROGRESS NOTES
Spoke with patient regarding interest in outreach calls from Outpatient Care Management  Patient is agreeable to this

## 2018-12-24 ENCOUNTER — PATIENT OUTREACH (OUTPATIENT)
Dept: FAMILY MEDICINE CLINIC | Facility: CLINIC | Age: 78
End: 2018-12-24

## 2018-12-24 DIAGNOSIS — M79.604 PAIN IN BOTH LOWER EXTREMITIES: ICD-10-CM

## 2018-12-24 DIAGNOSIS — L03.90 CELLULITIS, UNSPECIFIED CELLULITIS SITE: Primary | ICD-10-CM

## 2018-12-24 DIAGNOSIS — M79.605 PAIN IN BOTH LOWER EXTREMITIES: ICD-10-CM

## 2018-12-24 RX ORDER — CEPHALEXIN 500 MG/1
500 CAPSULE ORAL EVERY 6 HOURS SCHEDULED
Qty: 40 CAPSULE | Refills: 0 | Status: SHIPPED | OUTPATIENT
Start: 2018-12-24 | End: 2019-01-03

## 2018-12-24 RX ORDER — TRAMADOL HYDROCHLORIDE 50 MG/1
TABLET ORAL
Qty: 60 TABLET | Refills: 0 | Status: SHIPPED | OUTPATIENT
Start: 2018-12-24 | End: 2019-01-15 | Stop reason: SDUPTHER

## 2018-12-24 NOTE — TELEPHONE ENCOUNTER
[address] - [phone]    Right Shin wound - order change to Xerofoam dressing change daily      CC: Area on Left Upper shin - Red & warm to touch - REQUESTING - Antibiotic    Pharm: [facility]

## 2018-12-24 NOTE — PROGRESS NOTES
CM called patient to introduce myself and the  Care Management Program    Spoke with patients wife,Jaqueline who says he is doing better  Skilled nursing from [address] home care currently at the house doing wound care   She reports they have everything  they need and declines further calls  She did not record CM contact info but she is aware can reach CM thru pcp office if needs change

## 2018-12-28 ENCOUNTER — TELEPHONE (OUTPATIENT)
Dept: FAMILY MEDICINE CLINIC | Facility: CLINIC | Age: 78
End: 2018-12-28

## 2019-01-04 ENCOUNTER — TELEPHONE (OUTPATIENT)
Dept: FAMILY MEDICINE CLINIC | Facility: CLINIC | Age: 79
End: 2019-01-04

## 2019-01-04 ENCOUNTER — ANTICOAG VISIT (OUTPATIENT)
Dept: FAMILY MEDICINE CLINIC | Facility: CLINIC | Age: 79
End: 2019-01-04

## 2019-01-04 LAB — INR PPP: 4.9 (ref 0.86–1.17)

## 2019-01-07 ENCOUNTER — TELEPHONE (OUTPATIENT)
Dept: FAMILY MEDICINE CLINIC | Facility: CLINIC | Age: 79
End: 2019-01-07

## 2019-01-07 ENCOUNTER — LAB REQUISITION (OUTPATIENT)
Dept: LAB | Facility: HOSPITAL | Age: 79
End: 2019-01-07
Payer: MEDICARE

## 2019-01-07 DIAGNOSIS — N28.9 DISORDER OF KIDNEY AND URETER: ICD-10-CM

## 2019-01-07 LAB
ALBUMIN SERPL BCP-MCNC: 2.9 G/DL (ref 3.5–5)
ALP SERPL-CCNC: 36 U/L (ref 46–116)
ALT SERPL W P-5'-P-CCNC: 22 U/L (ref 12–78)
ANION GAP SERPL CALCULATED.3IONS-SCNC: 6 MMOL/L (ref 4–13)
AST SERPL W P-5'-P-CCNC: 17 U/L (ref 5–45)
BILIRUB SERPL-MCNC: 0.2 MG/DL (ref 0.2–1)
BUN SERPL-MCNC: 44 MG/DL (ref 5–25)
CALCIUM SERPL-MCNC: 8.8 MG/DL (ref 8.3–10.1)
CHLORIDE SERPL-SCNC: 103 MMOL/L (ref 100–108)
CO2 SERPL-SCNC: 33 MMOL/L (ref 21–32)
CREAT SERPL-MCNC: 2.89 MG/DL (ref 0.6–1.3)
GFR SERPL CREATININE-BSD FRML MDRD: 20 ML/MIN/1.73SQ M
GLUCOSE SERPL-MCNC: 172 MG/DL (ref 65–140)
POTASSIUM SERPL-SCNC: 4.3 MMOL/L (ref 3.5–5.3)
PROT SERPL-MCNC: 6.6 G/DL (ref 6.4–8.2)
SODIUM SERPL-SCNC: 142 MMOL/L (ref 136–145)

## 2019-01-07 PROCEDURE — 80053 COMPREHEN METABOLIC PANEL: CPT | Performed by: FAMILY MEDICINE

## 2019-01-07 NOTE — TELEPHONE ENCOUNTER
1/7/19 - INR  3 7  --  Coumadin 2 5mg daily    [facility] nurse will be in home Thursday for F/U if you want repeat INR     1/4/19 INR 4 9 reduced to 2 5mg daily

## 2019-01-07 NOTE — PROGRESS NOTES
Please call the patient regarding his abnormal result    Blood sugar was 172 that is up slightly of the patient's kidney function is holding stable

## 2019-01-10 ENCOUNTER — ANTICOAG VISIT (OUTPATIENT)
Dept: FAMILY MEDICINE CLINIC | Facility: CLINIC | Age: 79
End: 2019-01-10

## 2019-01-10 ENCOUNTER — TELEPHONE (OUTPATIENT)
Dept: FAMILY MEDICINE CLINIC | Facility: CLINIC | Age: 79
End: 2019-01-10

## 2019-01-10 LAB — INR PPP: 3.3 (ref 0.86–1.17)

## 2019-01-14 ENCOUNTER — TELEPHONE (OUTPATIENT)
Dept: FAMILY MEDICINE CLINIC | Facility: CLINIC | Age: 79
End: 2019-01-14

## 2019-01-14 ENCOUNTER — ANTICOAG VISIT (OUTPATIENT)
Dept: FAMILY MEDICINE CLINIC | Facility: CLINIC | Age: 79
End: 2019-01-14

## 2019-01-14 LAB — INR PPP: 2.8 (ref 0.86–1.17)

## 2019-01-14 NOTE — TELEPHONE ENCOUNTER
Called Glenny with [facility] regarding results of INR & orders - [facility] will drawn INR again next week when they are in the home to discharge the Patient      Recorded results in EMR

## 2019-01-15 DIAGNOSIS — M79.604 PAIN IN BOTH LOWER EXTREMITIES: ICD-10-CM

## 2019-01-15 DIAGNOSIS — M79.605 PAIN IN BOTH LOWER EXTREMITIES: ICD-10-CM

## 2019-01-15 RX ORDER — TRAMADOL HYDROCHLORIDE 50 MG/1
TABLET ORAL
Qty: 60 TABLET | Refills: 0 | Status: SHIPPED | OUTPATIENT
Start: 2019-01-15 | End: 2019-02-08 | Stop reason: SDUPTHER

## 2019-01-22 ENCOUNTER — TELEPHONE (OUTPATIENT)
Dept: FAMILY MEDICINE CLINIC | Facility: CLINIC | Age: 79
End: 2019-01-22

## 2019-01-22 NOTE — TELEPHONE ENCOUNTER
Possibly of leg pain is due to compression of lumbar spine have patient try due to get back into bed of mid day for an hour to take some of the pressure off the lumbar spine if that is not effective then he needs to go to the ER for x-rays of the back

## 2019-01-22 NOTE — TELEPHONE ENCOUNTER
CC: Increased pain, numbness & tingling down legs - Took Gabapentin, Tramadol, & Tylenol No relief  - Pain more constant - - HH feels it is Nerve pain by description    Asking for recommendation or orders

## 2019-01-28 DIAGNOSIS — R60.0 LOCALIZED EDEMA: Primary | ICD-10-CM

## 2019-01-28 RX ORDER — FUROSEMIDE 80 MG
80 TABLET ORAL 2 TIMES DAILY
Qty: 100 TABLET | Refills: 6 | Status: SHIPPED | OUTPATIENT
Start: 2019-01-28 | End: 2019-09-11 | Stop reason: SDUPTHER

## 2019-01-31 ENCOUNTER — TRANSCRIBE ORDERS (OUTPATIENT)
Dept: LAB | Facility: CLINIC | Age: 79
End: 2019-01-31

## 2019-01-31 ENCOUNTER — LAB (OUTPATIENT)
Dept: LAB | Facility: CLINIC | Age: 79
End: 2019-01-31
Payer: MEDICARE

## 2019-01-31 ENCOUNTER — TELEPHONE (OUTPATIENT)
Dept: FAMILY MEDICINE CLINIC | Facility: CLINIC | Age: 79
End: 2019-01-31

## 2019-01-31 DIAGNOSIS — E11.8 TYPE 2 DIABETES MELLITUS WITH COMPLICATION, UNSPECIFIED WHETHER LONG TERM INSULIN USE: ICD-10-CM

## 2019-01-31 DIAGNOSIS — I49.9 CARDIAC ARRHYTHMIA, UNSPECIFIED CARDIAC ARRHYTHMIA TYPE: ICD-10-CM

## 2019-01-31 DIAGNOSIS — E11.8 TYPE 2 DIABETES MELLITUS WITH COMPLICATION, UNSPECIFIED WHETHER LONG TERM INSULIN USE: Primary | ICD-10-CM

## 2019-01-31 DIAGNOSIS — E16.2 HYPOGLYCEMIA: ICD-10-CM

## 2019-01-31 DIAGNOSIS — Z51.81 ENCOUNTER FOR MEDICATION MONITORING: ICD-10-CM

## 2019-01-31 LAB
ANION GAP SERPL CALCULATED.3IONS-SCNC: 8 MMOL/L (ref 4–13)
BUN SERPL-MCNC: 56 MG/DL (ref 5–25)
CALCIUM SERPL-MCNC: 8.5 MG/DL (ref 8.3–10.1)
CHLORIDE SERPL-SCNC: 103 MMOL/L (ref 100–108)
CO2 SERPL-SCNC: 28 MMOL/L (ref 21–32)
CREAT SERPL-MCNC: 2.93 MG/DL (ref 0.6–1.3)
GFR SERPL CREATININE-BSD FRML MDRD: 19 ML/MIN/1.73SQ M
GLUCOSE P FAST SERPL-MCNC: 286 MG/DL (ref 65–99)
INR PPP: 1.84 (ref 0.86–1.17)
POTASSIUM SERPL-SCNC: 4.5 MMOL/L (ref 3.5–5.3)
PROTHROMBIN TIME: 21.3 SECONDS (ref 11.8–14.2)
SODIUM SERPL-SCNC: 139 MMOL/L (ref 136–145)

## 2019-01-31 PROCEDURE — 80048 BASIC METABOLIC PNL TOTAL CA: CPT | Performed by: FAMILY MEDICINE

## 2019-01-31 PROCEDURE — 36415 COLL VENOUS BLD VENIPUNCTURE: CPT | Performed by: FAMILY MEDICINE

## 2019-01-31 PROCEDURE — 85610 PROTHROMBIN TIME: CPT

## 2019-01-31 NOTE — TELEPHONE ENCOUNTER
C called stating patient has been experiencing dizzy spells and hallucinations  She mary a BMP/PT-INR today so we should be getting the results soon  They are NOT d/c him from there services

## 2019-02-01 ENCOUNTER — ANTICOAG VISIT (OUTPATIENT)
Dept: FAMILY MEDICINE CLINIC | Facility: CLINIC | Age: 79
End: 2019-02-01

## 2019-02-01 NOTE — PROGRESS NOTES
Please call the patient regarding his abnormal result    INR is just a little bit low at 1 84 on the Coumadin schedule looks like he is taking 2 5 mg daily but 2 days a week he is taking 5 if that is correct then I think on we should just continue that schedule

## 2019-02-01 NOTE — PROGRESS NOTES
Please call the patient regarding his abnormal result    Kidney function is stable blood sugar was elevated at 286 if this was fasting and then that is not a good number however if he had eaten then I am not too concerned about it

## 2019-02-04 ENCOUNTER — ANTICOAG VISIT (OUTPATIENT)
Dept: FAMILY MEDICINE CLINIC | Facility: CLINIC | Age: 79
End: 2019-02-04

## 2019-02-04 ENCOUNTER — TELEPHONE (OUTPATIENT)
Dept: FAMILY MEDICINE CLINIC | Facility: CLINIC | Age: 79
End: 2019-02-04

## 2019-02-04 LAB — INR PPP: 2.4 (ref 0.86–1.17)

## 2019-02-04 NOTE — TELEPHONE ENCOUNTER
Joyce Carrera from [address] today 28 3 INR 2 4   Patient is on 5mg on Wed/Sat, 2 5mg rest of the days  Call wife with orders

## 2019-02-05 DIAGNOSIS — G61.82 MULTIFOCAL MOTOR NEUROPATHY (HCC): ICD-10-CM

## 2019-02-05 RX ORDER — GABAPENTIN 300 MG/1
600 CAPSULE ORAL
Qty: 60 CAPSULE | Refills: 3 | Status: SHIPPED | OUTPATIENT
Start: 2019-02-05 | End: 2019-06-27 | Stop reason: SDUPTHER

## 2019-02-08 DIAGNOSIS — M79.604 PAIN IN BOTH LOWER EXTREMITIES: ICD-10-CM

## 2019-02-08 DIAGNOSIS — M79.605 PAIN IN BOTH LOWER EXTREMITIES: ICD-10-CM

## 2019-02-08 RX ORDER — TRAMADOL HYDROCHLORIDE 50 MG/1
TABLET ORAL
Qty: 60 TABLET | Refills: 0 | Status: SHIPPED | OUTPATIENT
Start: 2019-02-08 | End: 2019-02-28 | Stop reason: SDUPTHER

## 2019-02-14 PROBLEM — N18.4 HYPERTENSIVE HEART AND KIDNEY DISEASE WITH ACUTE ON CHRONIC COMBINED SYSTOLIC AND DIASTOLIC CONGESTIVE HEART FAILURE AND STAGE 4 CHRONIC KIDNEY DISEASE (HCC): Status: ACTIVE | Noted: 2019-02-14

## 2019-02-14 PROBLEM — I13.0 HYPERTENSIVE HEART AND KIDNEY DISEASE WITH ACUTE ON CHRONIC COMBINED SYSTOLIC AND DIASTOLIC CONGESTIVE HEART FAILURE AND STAGE 4 CHRONIC KIDNEY DISEASE (HCC): Status: ACTIVE | Noted: 2019-02-14

## 2019-02-14 PROBLEM — J44.9 OBSTRUCTIVE CHRONIC BRONCHITIS WITHOUT EXACERBATION (HCC): Status: ACTIVE | Noted: 2019-02-14

## 2019-02-14 PROBLEM — I50.43 HYPERTENSIVE HEART AND KIDNEY DISEASE WITH ACUTE ON CHRONIC COMBINED SYSTOLIC AND DIASTOLIC CONGESTIVE HEART FAILURE AND STAGE 4 CHRONIC KIDNEY DISEASE (HCC): Status: ACTIVE | Noted: 2019-02-14

## 2019-02-19 ENCOUNTER — TELEPHONE (OUTPATIENT)
Dept: UROLOGY | Facility: MEDICAL CENTER | Age: 79
End: 2019-02-19

## 2019-02-19 DIAGNOSIS — C61 PROSTATE CANCER (HCC): Primary | ICD-10-CM

## 2019-02-19 NOTE — TELEPHONE ENCOUNTER
Patient managed by Dr Maine Chavez at the [facility] office  Patient with history of advanced prostate cancer and has been receiving intermittent androgen deprivation  Patient had scheduled appointment for December but was in the hospital at that time  Patient's PSA 0 5 12/13/2019  Discussed with wife if patient needs appointment now or if he could be seen in 3-6 months  Will ask [facility] for her recommendation

## 2019-02-19 NOTE — TELEPHONE ENCOUNTER
Patient was in hospital for December 2018 and he missed his Lupron shot  Wife is calling to make another appointment  Please advise

## 2019-02-20 ENCOUNTER — OFFICE VISIT (OUTPATIENT)
Dept: FAMILY MEDICINE CLINIC | Facility: CLINIC | Age: 79
End: 2019-02-20
Payer: MEDICARE

## 2019-02-20 VITALS
BODY MASS INDEX: 47.24 KG/M2 | SYSTOLIC BLOOD PRESSURE: 116 MMHG | WEIGHT: 301 LBS | HEIGHT: 67 IN | DIASTOLIC BLOOD PRESSURE: 60 MMHG

## 2019-02-20 DIAGNOSIS — G47.33 OBSTRUCTIVE SLEEP APNEA SYNDROME, SEVERE: ICD-10-CM

## 2019-02-20 DIAGNOSIS — N25.81 SECONDARY HYPERPARATHYROIDISM OF RENAL ORIGIN (HCC): ICD-10-CM

## 2019-02-20 DIAGNOSIS — E03.9 ACQUIRED HYPOTHYROIDISM: ICD-10-CM

## 2019-02-20 DIAGNOSIS — E11.65 TYPE 2 DIABETES MELLITUS WITH HYPERGLYCEMIA, WITH LONG-TERM CURRENT USE OF INSULIN (HCC): Primary | ICD-10-CM

## 2019-02-20 DIAGNOSIS — C34.11 MALIGNANT NEOPLASM OF UPPER LOBE OF RIGHT LUNG (HCC): ICD-10-CM

## 2019-02-20 DIAGNOSIS — E66.01 MORBID (SEVERE) OBESITY DUE TO EXCESS CALORIES (HCC): ICD-10-CM

## 2019-02-20 DIAGNOSIS — I13.0 HYPERTENSIVE HEART AND CHRONIC KIDNEY DISEASE WITH HEART FAILURE AND STAGE 1 THROUGH STAGE 4 CHRONIC KIDNEY DISEASE, OR CHRONIC KIDNEY DISEASE (HCC): ICD-10-CM

## 2019-02-20 DIAGNOSIS — Z79.4 TYPE 2 DIABETES MELLITUS WITH HYPERGLYCEMIA, WITH LONG-TERM CURRENT USE OF INSULIN (HCC): Primary | ICD-10-CM

## 2019-02-20 DIAGNOSIS — I48.20 CHRONIC ATRIAL FIBRILLATION (HCC): ICD-10-CM

## 2019-02-20 DIAGNOSIS — E11.22 TYPE 2 DIABETES MELLITUS WITH ESRD (END-STAGE RENAL DISEASE) (HCC): ICD-10-CM

## 2019-02-20 DIAGNOSIS — N18.6 TYPE 2 DIABETES MELLITUS WITH ESRD (END-STAGE RENAL DISEASE) (HCC): ICD-10-CM

## 2019-02-20 DIAGNOSIS — E11.622 TYPE 2 DIABETES MELLITUS WITH OTHER SKIN ULCER (CODE) (HCC): ICD-10-CM

## 2019-02-20 PROCEDURE — 99214 OFFICE O/P EST MOD 30 MIN: CPT | Performed by: FAMILY MEDICINE

## 2019-02-20 NOTE — PROGRESS NOTES
Assessment/Plan:    No problem-specific Assessment & Plan notes found for this encounter  Diagnoses and all orders for this visit:    Type 2 diabetes mellitus with hyperglycemia, with long-term current use of insulin ([address] )  -     Basic metabolic panel; Standing  -     Microalbumin / creatinine urine ratio; Standing  -     HEMOGLOBIN A1C W/ EAG ESTIMATION; Standing  -     Basic metabolic panel  -     Microalbumin / creatinine urine ratio  -     HEMOGLOBIN A1C W/ EAG ESTIMATION    Obstructive sleep apnea syndrome, severe    Malignant neoplasm of upper lobe of right lung (HCC)    Type 2 diabetes mellitus with other skin ulcer (CODE) (HCC)  -     Basic metabolic panel; Standing  -     Microalbumin / creatinine urine ratio; Standing  -     HEMOGLOBIN A1C W/ EAG ESTIMATION; Standing  -     Comprehensive metabolic panel  -     Hemoglobin A1C  -     Lipid panel  -     TSH, 3rd generation; Future  -     Magnesium; Future  -     Basic metabolic panel  -     Microalbumin / creatinine urine ratio  -     HEMOGLOBIN A1C W/ EAG ESTIMATION    Hypertensive heart and chronic kidney disease with heart failure and stage 1 through stage 4 chronic kidney disease, or chronic kidney disease (HCC)  -     Basic metabolic panel; Standing  -     Microalbumin / creatinine urine ratio; Standing  -     HEMOGLOBIN A1C W/ EAG ESTIMATION; Standing  -     Comprehensive metabolic panel  -     Hemoglobin A1C  -     Lipid panel  -     TSH, 3rd generation; Future  -     Magnesium; Future  -     Basic metabolic panel  -     Microalbumin / creatinine urine ratio  -     HEMOGLOBIN A1C W/ EAG ESTIMATION    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) (HCC)  -     Basic metabolic panel; Standing  -     Microalbumin / creatinine urine ratio; Standing  -     HEMOGLOBIN A1C W/ EAG ESTIMATION; Standing  -     Comprehensive metabolic panel  -     Hemoglobin A1C  -     Lipid panel  -     TSH, 3rd generation; Future  -     Magnesium;  Future  - Basic metabolic panel  -     Microalbumin / creatinine urine ratio  -     HEMOGLOBIN A1C W/ EAG ESTIMATION    Chronic atrial fibrillation (HCC)    Morbid (severe) obesity due to excess calories (HCC)    Secondary hyperparathyroidism of renal origin ([address] )    Acquired hypothyroidism          Subjective:      Patient ID: Doug Johnson is a 78 y o  male  HPI    The following portions of the patient's history were reviewed and updated as appropriate: He  has a past medical history of Anemia, Arthritis, Atrial fibrillation ([address] ), 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[address] ), B12 deficiency, Back pain, Blind right eye, Cancer ([address] ), Cardiac disorder, Chronic combined systolic and diastolic heart failure (HCC), Chronic obstructive lung disease ([address] ), Chronic venous stasis dermatitis of both lower extremities, CKD (chronic kidney disease), stage IV ([address] ), Coronary artery disease, DDD (degenerative disc disease), DM (diabetes mellitus), type 2 ([address] ), BAER (dyspnea on exertion), Easy bruising, Epistaxis, Gout, Gross hematuria, Hepatitis, Herpes zoster, Hiatal hernia, History of cellulitis, History of prostate cancer, Hypercholesterolemia, Hyperlipidemia, Hypertension, Ischemic cardiomyopathy, Lung mass, MI, old, Morbid obesity due to excess calories ([address] ), Neuropathy, Obesity, Obstructive sleep apnea syndrome, severe, Pneumonia, Shortness of breath, TIA (transient ischemic attack), Urinary incontinence, Urinary retention, and Use of cane as ambulatory aid    He   Patient Active Problem List    Diagnosis Date Noted    Secondary hyperparathyroidism of renal origin ([facility] [address] ) 02/20/2019    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 stage 4 chronic kidney disease ([address] ) 02/14/2019    Obstructive chronic bronchitis without exacerbation ([address] ) 02/14/2019    Elevated INR 12/15/2018    Pneumonia of right lower lobe due to infectious organism ([address] ) 12/14/2018    Hypoglycemia associated with diabetes ([facility] ) 12/14/2018    Influenza A 11/30/2018    Bacteriuria 11/30/2018    Malignant neoplasm of upper lobe of right lung ([facility] ) 04/10/2018    Hypokalemia 10/25/2017    Vision loss, 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 10/21/2017    Atrial fibrillation ([place] [address] )     Blind right eye     Coronary artery disease     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) ([place] [address] )     Obstructive sleep apnea syndrome, severe     Severe obstructive sleep apnea-hypopnea syndrome 04/13/2017    Wound of right lower extremity 03/05/2017    Wound of left lower extremity 03/05/2017    Hyperphosphatemia 03/05/2017    Vitamin D deficiency 03/05/2017    Renal cyst 03/05/2017    Body mass index (BMI) of 40 0 to 49 9 03/04/2017    Anemia 10/04/2016    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    CKD (chronic kidney disease) stage 4, GFR 15-29 ml/min ([place] [address] ) 07/16/2016    Morbid obesity ([place] [address] )     History of prostate cancer     Type 2 diabetes mellitus with hyperglycemia (HCC)     Bilateral leg edema 01/19/2016    Prostate cancer ([place] [address] ) 07/24/2012     He  has a past surgical history that includes Coronary angioplasty with stent; Cystoscopy; Lung cancer surgery; Cataract extraction, bilateral; Eye surgery; Hernia repair; Vascular surgery; Cardiac surgery; Abdominal surgery; Coronary artery bypass graft (N/A, 7/15/2016); pr cystourethroscopy,ureter catheter (Left, 3/9/2016); PILONIDAL CYST EXCISION; pr temporal artery ligatn or bx (Bilateral, 10/31/2017); Lung surgery (Left, 2012); Other surgical history; and Renal artery stent (02/16/2015)  His family history includes Cancer in his mother, other, other, and sister; Diabetes in his father, maternal grandmother, mother, and other; Diabetes type II in his mother; Heart disease in his mother; Hypertension in his mother and other  He  reports that he quit smoking about 15 years ago  He has a 108 00 pack-year smoking history   He has never used smokeless tobacco  He reports that he does not drink alcohol or use drugs  Current Outpatient Medications   Medication Sig Dispense Refill    albuterol (2 5 mg/3 mL) 0 083 % nebulizer solution Take 1 vial (2 5 mg total) by nebulization every 6 (six) hours as needed for wheezing or shortness of breath 100 vial 0    aspirin (ECOTRIN LOW STRENGTH) 81 mg EC tablet Take 81 mg by mouth 2 (two) times a day      Cholecalciferol 2000 units TABS Take 1 tablet (2,000 Units total) by mouth daily 30 tablet 2    furosemide (LASIX) 80 mg tablet Take 1 tablet (80 mg total) by mouth 2 (two) times a day 100 tablet 6    gabapentin (NEURONTIN) 300 mg capsule Take 2 capsules (600 mg total) by mouth daily at bedtime 60 capsule 3    insulin detemir (LEVEMIR) 100 units/mL subcutaneous injection Inject 45 Units under the skin daily at bedtime  0    insulin lispro (HumaLOG) 100 units/mL injection Inject 10 Units under the skin 3 (three) times a day with meals  0    iron polysaccharides (NIFEREX) 150 mg capsule Take 150 mg by mouth daily      metoprolol tartrate (LOPRESSOR) 25 mg tablet Take 25 mg by mouth 2 (two) times a day      potassium chloride (MICRO-K) 10 MEQ CR capsule Take 1 capsule (10 mEq total) by mouth 2 (two) times a day for 30 days 60 capsule 5    simvastatin (ZOCOR) 40 mg tablet Take 20 mg by mouth daily at bedtime       tamsulosin (FLOMAX) 0 4 mg Take 0 4 mg by mouth daily at bedtime        traMADol (ULTRAM) 50 mg tablet 1 tablet 3 times daily with 2 extra Strength Tylenol 60 tablet 0    warfarin (COUMADIN) 5 mg tablet Take 5mg (1 tablet) daily on Monday, Wednesday, and Friday, and 2 5mg (1/2 tablet) on Tuesday, Thursday, Saturday, Sunday  90 tablet 0     No current facility-administered medications for this visit        Current Outpatient Medications on File Prior to Visit   Medication Sig    albuterol (2 5 mg/3 mL) 0 083 % nebulizer solution Take 1 vial (2 5 mg total) by nebulization every 6 (six) hours as needed for wheezing or shortness of breath    aspirin (ECOTRIN LOW STRENGTH) 81 mg EC tablet Take 81 mg by mouth 2 (two) times a day    Cholecalciferol 2000 units TABS Take 1 tablet (2,000 Units total) by mouth daily    furosemide (LASIX) 80 mg tablet Take 1 tablet (80 mg total) by mouth 2 (two) times a day    gabapentin (NEURONTIN) 300 mg capsule Take 2 capsules (600 mg total) by mouth daily at bedtime    insulin detemir (LEVEMIR) 100 units/mL subcutaneous injection Inject 45 Units under the skin daily at bedtime    insulin lispro (HumaLOG) 100 units/mL injection Inject 10 Units under the skin 3 (three) times a day with meals    iron polysaccharides (NIFEREX) 150 mg capsule Take 150 mg by mouth daily    metoprolol tartrate (LOPRESSOR) 25 mg tablet Take 25 mg by mouth 2 (two) times a day    potassium chloride (MICRO-K) 10 MEQ CR capsule Take 1 capsule (10 mEq total) by mouth 2 (two) times a day for 30 days    simvastatin (ZOCOR) 40 mg tablet Take 20 mg by mouth daily at bedtime     tamsulosin (FLOMAX) 0 4 mg Take 0 4 mg by mouth daily at bedtime      traMADol (ULTRAM) 50 mg tablet 1 tablet 3 times daily with 2 extra Strength Tylenol    warfarin (COUMADIN) 5 mg tablet Take 5mg (1 tablet) daily on Monday, Wednesday, and Friday, and 2 5mg (1/2 tablet) on Tuesday, Thursday, Saturday, Sunday  No current facility-administered medications on file prior to visit  He is allergic to other       Review of Systems      Objective:      /60 (BP Location: Left arm, Patient Position: Sitting, Cuff Size: Standard)   Ht 5' 7" (1 702 m)   Wt (!) 137 kg (301 lb)   BMI 47 14 kg/m²          Physical Exam

## 2019-02-21 NOTE — TELEPHONE ENCOUNTER
Contacted patient's wife to inform her patient can be seen in 3 months with another PSA prior to the visit  Patient scheduled 05/28/2019 with [place]ualberto [place] [facility] [place]  [facility] in [facility] for [facility]  Wife voiced understanding of instructions

## 2019-02-22 ENCOUNTER — LAB (OUTPATIENT)
Dept: LAB | Facility: CLINIC | Age: 79
End: 2019-02-22
Payer: MEDICARE

## 2019-02-22 ENCOUNTER — TRANSCRIBE ORDERS (OUTPATIENT)
Dept: LAB | Facility: CLINIC | Age: 79
End: 2019-02-22

## 2019-02-22 ENCOUNTER — APPOINTMENT (OUTPATIENT)
Dept: LAB | Facility: CLINIC | Age: 79
End: 2019-02-22
Payer: MEDICARE

## 2019-02-22 DIAGNOSIS — Z79.4 TYPE 2 DIABETES MELLITUS WITH HYPERGLYCEMIA, WITH LONG-TERM CURRENT USE OF INSULIN (HCC): ICD-10-CM

## 2019-02-22 DIAGNOSIS — N18.6 TYPE 2 DIABETES MELLITUS WITH ESRD (END-STAGE RENAL DISEASE) (HCC): ICD-10-CM

## 2019-02-22 DIAGNOSIS — E11.22 TYPE 2 DIABETES MELLITUS WITH ESRD (END-STAGE RENAL DISEASE) (HCC): ICD-10-CM

## 2019-02-22 DIAGNOSIS — E11.622 TYPE 2 DIABETES MELLITUS WITH OTHER SKIN ULCER (CODE) (HCC): ICD-10-CM

## 2019-02-22 DIAGNOSIS — E11.65 TYPE 2 DIABETES MELLITUS WITH HYPERGLYCEMIA, WITH LONG-TERM CURRENT USE OF INSULIN (HCC): ICD-10-CM

## 2019-02-22 DIAGNOSIS — I13.0 HYPERTENSIVE HEART AND CHRONIC KIDNEY DISEASE WITH HEART FAILURE AND STAGE 1 THROUGH STAGE 4 CHRONIC KIDNEY DISEASE, OR CHRONIC KIDNEY DISEASE (HCC): ICD-10-CM

## 2019-02-22 LAB
ALBUMIN SERPL BCP-MCNC: 3.3 G/DL (ref 3.5–5)
ALP SERPL-CCNC: 39 U/L (ref 46–116)
ALT SERPL W P-5'-P-CCNC: 20 U/L (ref 12–78)
ANION GAP SERPL CALCULATED.3IONS-SCNC: 9 MMOL/L (ref 4–13)
AST SERPL W P-5'-P-CCNC: 18 U/L (ref 5–45)
BILIRUB SERPL-MCNC: 0.3 MG/DL (ref 0.2–1)
BUN SERPL-MCNC: 63 MG/DL (ref 5–25)
CALCIUM SERPL-MCNC: 8.9 MG/DL (ref 8.3–10.1)
CHLORIDE SERPL-SCNC: 102 MMOL/L (ref 100–108)
CHOLEST SERPL-MCNC: 162 MG/DL (ref 50–200)
CO2 SERPL-SCNC: 27 MMOL/L (ref 21–32)
CREAT SERPL-MCNC: 2.72 MG/DL (ref 0.6–1.3)
EST. AVERAGE GLUCOSE BLD GHB EST-MCNC: 166 MG/DL
GFR SERPL CREATININE-BSD FRML MDRD: 21 ML/MIN/1.73SQ M
GLUCOSE P FAST SERPL-MCNC: 170 MG/DL (ref 65–99)
HBA1C MFR BLD: 7.4 % (ref 4.2–6.3)
HDLC SERPL-MCNC: 42 MG/DL (ref 40–60)
LDLC SERPL CALC-MCNC: 73 MG/DL (ref 0–100)
MAGNESIUM SERPL-MCNC: 2.5 MG/DL (ref 1.6–2.6)
NONHDLC SERPL-MCNC: 120 MG/DL
POTASSIUM SERPL-SCNC: 4.5 MMOL/L (ref 3.5–5.3)
PROT SERPL-MCNC: 6.8 G/DL (ref 6.4–8.2)
SODIUM SERPL-SCNC: 138 MMOL/L (ref 136–145)
TRIGL SERPL-MCNC: 236 MG/DL
TSH SERPL DL<=0.05 MIU/L-ACNC: 1.4 UIU/ML (ref 0.36–3.74)

## 2019-02-22 PROCEDURE — 83735 ASSAY OF MAGNESIUM: CPT

## 2019-02-22 PROCEDURE — 80053 COMPREHEN METABOLIC PANEL: CPT | Performed by: FAMILY MEDICINE

## 2019-02-22 PROCEDURE — 36415 COLL VENOUS BLD VENIPUNCTURE: CPT

## 2019-02-22 PROCEDURE — 83036 HEMOGLOBIN GLYCOSYLATED A1C: CPT | Performed by: FAMILY MEDICINE

## 2019-02-22 PROCEDURE — 80061 LIPID PANEL: CPT | Performed by: FAMILY MEDICINE

## 2019-02-22 PROCEDURE — 84443 ASSAY THYROID STIM HORMONE: CPT

## 2019-02-25 ENCOUNTER — APPOINTMENT (OUTPATIENT)
Dept: LAB | Facility: MEDICAL CENTER | Age: 79
End: 2019-02-25
Payer: MEDICARE

## 2019-02-25 ENCOUNTER — TELEPHONE (OUTPATIENT)
Dept: FAMILY MEDICINE CLINIC | Facility: CLINIC | Age: 79
End: 2019-02-25

## 2019-02-25 DIAGNOSIS — Z79.4 TYPE 2 DIABETES MELLITUS WITH HYPERGLYCEMIA, WITH LONG-TERM CURRENT USE OF INSULIN (HCC): ICD-10-CM

## 2019-02-25 DIAGNOSIS — I13.0 HYPERTENSIVE HEART AND CHRONIC KIDNEY DISEASE WITH HEART FAILURE AND STAGE 1 THROUGH STAGE 4 CHRONIC KIDNEY DISEASE, OR CHRONIC KIDNEY DISEASE (HCC): ICD-10-CM

## 2019-02-25 DIAGNOSIS — I25.10 CORONARY ARTERY DISEASE INVOLVING NATIVE CORONARY ARTERY OF NATIVE HEART WITHOUT ANGINA PECTORIS: ICD-10-CM

## 2019-02-25 DIAGNOSIS — E11.622 TYPE 2 DIABETES MELLITUS WITH OTHER SKIN ULCER (CODE) (HCC): ICD-10-CM

## 2019-02-25 DIAGNOSIS — E11.65 TYPE 2 DIABETES MELLITUS WITH HYPERGLYCEMIA, WITHOUT LONG-TERM CURRENT USE OF INSULIN (HCC): Primary | ICD-10-CM

## 2019-02-25 DIAGNOSIS — E11.22 TYPE 2 DIABETES MELLITUS WITH ESRD (END-STAGE RENAL DISEASE) (HCC): ICD-10-CM

## 2019-02-25 DIAGNOSIS — E11.65 TYPE 2 DIABETES MELLITUS WITH HYPERGLYCEMIA, WITH LONG-TERM CURRENT USE OF INSULIN (HCC): ICD-10-CM

## 2019-02-25 DIAGNOSIS — N18.6 TYPE 2 DIABETES MELLITUS WITH ESRD (END-STAGE RENAL DISEASE) (HCC): ICD-10-CM

## 2019-02-25 LAB
CREAT UR-MCNC: 49 MG/DL
MICROALBUMIN UR-MCNC: 19.6 MG/L (ref 0–20)
MICROALBUMIN/CREAT 24H UR: 40 MG/G CREATININE (ref 0–30)

## 2019-02-25 PROCEDURE — 82043 UR ALBUMIN QUANTITATIVE: CPT

## 2019-02-25 PROCEDURE — 82570 ASSAY OF URINE CREATININE: CPT

## 2019-02-25 NOTE — TELEPHONE ENCOUNTER
He had recent labs [facility] to know if he needs to increase his simvastatin  He is on 20 mg    He also has an opening on his leg (homecare was ordered)

## 2019-02-25 NOTE — RESULT ENCOUNTER NOTE
Please call the patient regarding his abnormal result    Cannot chemistry panel shows the kidney function is stable it is no better but it is no worse the sugar is better at 173 weeks ago was 286 so he is going in the right direction

## 2019-02-28 ENCOUNTER — ANTICOAG VISIT (OUTPATIENT)
Dept: FAMILY MEDICINE CLINIC | Facility: CLINIC | Age: 79
End: 2019-02-28

## 2019-02-28 DIAGNOSIS — M79.605 PAIN IN BOTH LOWER EXTREMITIES: ICD-10-CM

## 2019-02-28 DIAGNOSIS — M79.604 PAIN IN BOTH LOWER EXTREMITIES: ICD-10-CM

## 2019-02-28 LAB — INR PPP: 3.3 (ref 0.86–1.17)

## 2019-02-28 RX ORDER — TRAMADOL HYDROCHLORIDE 50 MG/1
TABLET ORAL
Qty: 60 TABLET | Refills: 0 | Status: SHIPPED | OUTPATIENT
Start: 2019-02-28 | End: 2019-03-22 | Stop reason: SDUPTHER

## 2019-02-28 NOTE — TELEPHONE ENCOUNTER
[facility] [phone]    INR 3 3  -  2/28/19    Coumadin: 5mg Wed & Sat, 2 5mg daily rest of wk  Peggi Few was told to redraw Pt 03/04/19  -  ANY NEW ORDERS?

## 2019-02-28 NOTE — TELEPHONE ENCOUNTER
Home nurse back in home for wound care - Called asking when Dr wants next INR - Pt was in range at last INR (2/4/19) so they will do next INR 3/4/18

## 2019-03-04 ENCOUNTER — ANTICOAG VISIT (OUTPATIENT)
Dept: FAMILY MEDICINE CLINIC | Facility: CLINIC | Age: 79
End: 2019-03-04

## 2019-03-04 ENCOUNTER — TELEPHONE (OUTPATIENT)
Dept: FAMILY MEDICINE CLINIC | Facility: CLINIC | Age: 79
End: 2019-03-04

## 2019-03-04 LAB — INR PPP: 2.6 (ref 0.86–1.17)

## 2019-03-04 NOTE — TELEPHONE ENCOUNTER
Pt is doing well today - In home INR Results 2 6 - Coumadin 5mg Wed & Sat - 2 5mg daily rest of wk      Any changes - Next INR?  (HC is in home [id number] [place])

## 2019-03-22 DIAGNOSIS — M79.605 PAIN IN BOTH LOWER EXTREMITIES: ICD-10-CM

## 2019-03-22 DIAGNOSIS — M79.604 PAIN IN BOTH LOWER EXTREMITIES: ICD-10-CM

## 2019-03-22 RX ORDER — TRAMADOL HYDROCHLORIDE 50 MG/1
TABLET ORAL
Qty: 60 TABLET | Refills: 0 | Status: SHIPPED | OUTPATIENT
Start: 2019-03-22 | End: 2019-04-03 | Stop reason: SDUPTHER

## 2019-03-28 ENCOUNTER — TELEPHONE (OUTPATIENT)
Dept: FAMILY MEDICINE CLINIC | Facility: CLINIC | Age: 79
End: 2019-03-28

## 2019-03-28 DIAGNOSIS — S81.801D WOUND OF RIGHT LOWER EXTREMITY, SUBSEQUENT ENCOUNTER: ICD-10-CM

## 2019-03-28 DIAGNOSIS — S81.802D WOUND OF LEFT LOWER EXTREMITY, SUBSEQUENT ENCOUNTER: Primary | ICD-10-CM

## 2019-03-28 NOTE — TELEPHONE ENCOUNTER
CC: Open areas on legs x 2     Requesting referral for [facility]     Using dry dressing requesting homecare

## 2019-04-03 ENCOUNTER — ANTICOAG VISIT (OUTPATIENT)
Dept: FAMILY MEDICINE CLINIC | Facility: CLINIC | Age: 79
End: 2019-04-03

## 2019-04-03 DIAGNOSIS — M79.605 PAIN IN BOTH LOWER EXTREMITIES: ICD-10-CM

## 2019-04-03 DIAGNOSIS — M79.604 PAIN IN BOTH LOWER EXTREMITIES: ICD-10-CM

## 2019-04-03 LAB — INR PPP: 2.5 (ref 0.86–1.17)

## 2019-04-04 RX ORDER — TRAMADOL HYDROCHLORIDE 50 MG/1
TABLET ORAL
Qty: 90 TABLET | Refills: 0
Start: 2019-04-04 | End: 2019-04-11 | Stop reason: SDUPTHER

## 2019-04-11 DIAGNOSIS — M79.604 PAIN IN BOTH LOWER EXTREMITIES: ICD-10-CM

## 2019-04-11 DIAGNOSIS — M79.605 PAIN IN BOTH LOWER EXTREMITIES: ICD-10-CM

## 2019-04-11 RX ORDER — TRAMADOL HYDROCHLORIDE 50 MG/1
TABLET ORAL
Qty: 180 TABLET | Refills: 0 | Status: SHIPPED | OUTPATIENT
Start: 2019-04-11 | End: 2019-05-30 | Stop reason: SDUPTHER

## 2019-04-18 ENCOUNTER — ANTICOAG VISIT (OUTPATIENT)
Dept: FAMILY MEDICINE CLINIC | Facility: CLINIC | Age: 79
End: 2019-04-18

## 2019-04-18 ENCOUNTER — TELEPHONE (OUTPATIENT)
Dept: FAMILY MEDICINE CLINIC | Facility: CLINIC | Age: 79
End: 2019-04-18

## 2019-04-18 LAB — INR PPP: 2.5 (ref 0.86–1.17)

## 2019-04-26 LAB
LEFT EYE DIABETIC RETINOPATHY: NORMAL
RIGHT EYE DIABETIC RETINOPATHY: NORMAL

## 2019-05-13 DIAGNOSIS — N30.01 ACUTE CYSTITIS WITH HEMATURIA: Primary | ICD-10-CM

## 2019-05-14 ENCOUNTER — APPOINTMENT (OUTPATIENT)
Dept: LAB | Facility: CLINIC | Age: 79
End: 2019-05-14
Payer: MEDICARE

## 2019-05-14 ENCOUNTER — TELEPHONE (OUTPATIENT)
Dept: FAMILY MEDICINE CLINIC | Facility: CLINIC | Age: 79
End: 2019-05-14

## 2019-05-14 LAB
BACTERIA UR QL AUTO: ABNORMAL /HPF
BILIRUB UR QL STRIP: NEGATIVE
CLARITY UR: ABNORMAL
COLOR UR: YELLOW
GLUCOSE UR STRIP-MCNC: NEGATIVE MG/DL
HGB UR QL STRIP.AUTO: ABNORMAL
HYALINE CASTS #/AREA URNS LPF: ABNORMAL /LPF
KETONES UR STRIP-MCNC: NEGATIVE MG/DL
LEUKOCYTE ESTERASE UR QL STRIP: NEGATIVE
NITRITE UR QL STRIP: POSITIVE
NON-SQ EPI CELLS URNS QL MICRO: ABNORMAL /HPF
PH UR STRIP.AUTO: 7 [PH]
PROT UR STRIP-MCNC: NEGATIVE MG/DL
RBC #/AREA URNS AUTO: ABNORMAL /HPF
SP GR UR STRIP.AUTO: 1.01 (ref 1–1.03)
UROBILINOGEN UR QL STRIP.AUTO: 0.2 E.U./DL
WBC #/AREA URNS AUTO: ABNORMAL /HPF

## 2019-05-14 PROCEDURE — 87147 CULTURE TYPE IMMUNOLOGIC: CPT | Performed by: FAMILY MEDICINE

## 2019-05-14 PROCEDURE — 81001 URINALYSIS AUTO W/SCOPE: CPT | Performed by: FAMILY MEDICINE

## 2019-05-14 PROCEDURE — 87186 SC STD MICRODIL/AGAR DIL: CPT | Performed by: FAMILY MEDICINE

## 2019-05-14 PROCEDURE — 87086 URINE CULTURE/COLONY COUNT: CPT | Performed by: FAMILY MEDICINE

## 2019-05-15 DIAGNOSIS — N30.01 ACUTE CYSTITIS WITH HEMATURIA: Primary | ICD-10-CM

## 2019-05-15 RX ORDER — LEVOFLOXACIN 750 MG/1
750 TABLET ORAL EVERY 24 HOURS
Qty: 5 TABLET | Refills: 0 | Status: SHIPPED | OUTPATIENT
Start: 2019-05-15 | End: 2019-05-20

## 2019-05-16 LAB — BACTERIA UR CULT: ABNORMAL

## 2019-05-17 ENCOUNTER — ANTICOAG VISIT (OUTPATIENT)
Dept: FAMILY MEDICINE CLINIC | Facility: CLINIC | Age: 79
End: 2019-05-17

## 2019-05-17 ENCOUNTER — TELEPHONE (OUTPATIENT)
Dept: UROLOGY | Facility: MEDICAL CENTER | Age: 79
End: 2019-05-17

## 2019-05-17 ENCOUNTER — TELEPHONE (OUTPATIENT)
Dept: FAMILY MEDICINE CLINIC | Facility: CLINIC | Age: 79
End: 2019-05-17

## 2019-05-17 DIAGNOSIS — N30.01 ACUTE CYSTITIS WITH HEMATURIA: Primary | ICD-10-CM

## 2019-05-17 LAB — INR PPP: 2.3 (ref 0.86–1.17)

## 2019-05-17 RX ORDER — SULFAMETHOXAZOLE AND TRIMETHOPRIM 800; 160 MG/1; MG/1
1 TABLET ORAL EVERY 12 HOURS SCHEDULED
Qty: 20 TABLET | Refills: 0 | Status: SHIPPED | OUTPATIENT
Start: 2019-05-17 | End: 2019-05-27

## 2019-05-21 ENCOUNTER — APPOINTMENT (OUTPATIENT)
Dept: LAB | Facility: CLINIC | Age: 79
End: 2019-05-21
Payer: MEDICARE

## 2019-05-21 ENCOUNTER — TRANSCRIBE ORDERS (OUTPATIENT)
Dept: LAB | Facility: CLINIC | Age: 79
End: 2019-05-21

## 2019-05-21 DIAGNOSIS — Z85.46 PERSONAL HISTORY OF MALIGNANT NEOPLASM OF PROSTATE: ICD-10-CM

## 2019-05-21 DIAGNOSIS — Z85.46 PERSONAL HISTORY OF MALIGNANT NEOPLASM OF PROSTATE: Primary | ICD-10-CM

## 2019-05-21 LAB — PSA SERPL-MCNC: 1.8 NG/ML (ref 0–4)

## 2019-05-21 PROCEDURE — G0103 PSA SCREENING: HCPCS

## 2019-05-21 PROCEDURE — 36415 COLL VENOUS BLD VENIPUNCTURE: CPT

## 2019-05-23 ENCOUNTER — TELEPHONE (OUTPATIENT)
Dept: UROLOGY | Facility: CLINIC | Age: 79
End: 2019-05-23

## 2019-05-30 ENCOUNTER — OFFICE VISIT (OUTPATIENT)
Dept: UROLOGY | Facility: CLINIC | Age: 79
End: 2019-05-30
Payer: MEDICARE

## 2019-05-30 VITALS
HEART RATE: 56 BPM | SYSTOLIC BLOOD PRESSURE: 142 MMHG | BODY MASS INDEX: 47.14 KG/M2 | DIASTOLIC BLOOD PRESSURE: 80 MMHG | HEIGHT: 67 IN

## 2019-05-30 DIAGNOSIS — C61 PROSTATE CANCER (HCC): Primary | ICD-10-CM

## 2019-05-30 DIAGNOSIS — M79.604 PAIN IN BOTH LOWER EXTREMITIES: ICD-10-CM

## 2019-05-30 DIAGNOSIS — M79.605 PAIN IN BOTH LOWER EXTREMITIES: ICD-10-CM

## 2019-05-30 PROCEDURE — 99214 OFFICE O/P EST MOD 30 MIN: CPT

## 2019-05-30 PROCEDURE — 96401 CHEMO ANTI-NEOPL SQ/IM: CPT

## 2019-05-30 PROCEDURE — 96402 CHEMO HORMON ANTINEOPL SQ/IM: CPT

## 2019-05-31 ENCOUNTER — TELEPHONE (OUTPATIENT)
Dept: FAMILY MEDICINE CLINIC | Facility: CLINIC | Age: 79
End: 2019-05-31

## 2019-05-31 LAB — INR PPP: 2 (ref 0.86–1.17)

## 2019-05-31 RX ORDER — TRAMADOL HYDROCHLORIDE 50 MG/1
TABLET ORAL
Qty: 90 TABLET | Refills: 0 | Status: SHIPPED | OUTPATIENT
Start: 2019-05-31 | End: 2019-06-27 | Stop reason: SDUPTHER

## 2019-06-03 ENCOUNTER — ANTICOAG VISIT (OUTPATIENT)
Dept: FAMILY MEDICINE CLINIC | Facility: CLINIC | Age: 79
End: 2019-06-03

## 2019-06-04 ENCOUNTER — TELEPHONE (OUTPATIENT)
Dept: FAMILY MEDICINE CLINIC | Facility: CLINIC | Age: 79
End: 2019-06-04

## 2019-06-04 ENCOUNTER — TELEPHONE (OUTPATIENT)
Dept: UROLOGY | Facility: AMBULATORY SURGERY CENTER | Age: 79
End: 2019-06-04

## 2019-06-04 ENCOUNTER — HOSPITAL ENCOUNTER (EMERGENCY)
Facility: HOSPITAL | Age: 79
Discharge: HOME | End: 2019-06-04
Attending: EMERGENCY MEDICINE | Admitting: EMERGENCY MEDICINE
Payer: MEDICARE

## 2019-06-04 ENCOUNTER — APPOINTMENT (EMERGENCY)
Dept: CT IMAGING | Facility: HOSPITAL | Age: 79
End: 2019-06-04
Payer: MEDICARE

## 2019-06-04 ENCOUNTER — APPOINTMENT (OUTPATIENT)
Dept: LAB | Facility: MEDICAL CENTER | Age: 79
End: 2019-06-04
Payer: MEDICARE

## 2019-06-04 VITALS
DIASTOLIC BLOOD PRESSURE: 66 MMHG | HEART RATE: 72 BPM | WEIGHT: 306.44 LBS | RESPIRATION RATE: 21 BRPM | SYSTOLIC BLOOD PRESSURE: 153 MMHG | OXYGEN SATURATION: 99 % | BODY MASS INDEX: 48 KG/M2 | TEMPERATURE: 98.5 F

## 2019-06-04 DIAGNOSIS — R31.0 GROSS HEMATURIA: ICD-10-CM

## 2019-06-04 DIAGNOSIS — R31.9 HEMATURIA: Primary | ICD-10-CM

## 2019-06-04 DIAGNOSIS — R31.0 GROSS HEMATURIA: Primary | ICD-10-CM

## 2019-06-04 LAB
ABO GROUP BLD: NORMAL
ALBUMIN SERPL BCP-MCNC: 3.2 G/DL (ref 3.5–5)
ALP SERPL-CCNC: 41 U/L (ref 46–116)
ALT SERPL W P-5'-P-CCNC: 21 U/L (ref 12–78)
ANION GAP SERPL CALCULATED.3IONS-SCNC: 8 MMOL/L (ref 4–13)
APTT PPP: 44 SECONDS (ref 26–38)
AST SERPL W P-5'-P-CCNC: 19 U/L (ref 5–45)
BACTERIA UR QL AUTO: ABNORMAL /HPF
BACTERIA UR QL AUTO: ABNORMAL /HPF
BASOPHILS # BLD AUTO: 0.09 THOUSANDS/ΜL (ref 0–0.1)
BASOPHILS NFR BLD AUTO: 1 % (ref 0–1)
BILIRUB SERPL-MCNC: 0.2 MG/DL (ref 0.2–1)
BILIRUB UR QL STRIP: NEGATIVE
BILIRUB UR QL STRIP: NEGATIVE
BLD GP AB SCN SERPL QL: NEGATIVE
BUN SERPL-MCNC: 57 MG/DL (ref 5–25)
CALCIUM SERPL-MCNC: 8.1 MG/DL (ref 8.3–10.1)
CHLORIDE SERPL-SCNC: 103 MMOL/L (ref 100–108)
CLARITY UR: ABNORMAL
CLARITY UR: CLEAR
CO2 SERPL-SCNC: 29 MMOL/L (ref 21–32)
COLOR UR: YELLOW
COLOR UR: YELLOW
CREAT SERPL-MCNC: 2.49 MG/DL (ref 0.6–1.3)
EOSINOPHIL # BLD AUTO: 0.2 THOUSAND/ΜL (ref 0–0.61)
EOSINOPHIL NFR BLD AUTO: 3 % (ref 0–6)
ERYTHROCYTE [DISTWIDTH] IN BLOOD BY AUTOMATED COUNT: 15.4 % (ref 11.6–15.1)
GFR SERPL CREATININE-BSD FRML MDRD: 23 ML/MIN/1.73SQ M
GLUCOSE SERPL-MCNC: 172 MG/DL (ref 65–140)
GLUCOSE UR STRIP-MCNC: NEGATIVE MG/DL
GLUCOSE UR STRIP-MCNC: NEGATIVE MG/DL
HCT VFR BLD AUTO: 40.5 % (ref 36.5–49.3)
HGB BLD-MCNC: 12.4 G/DL (ref 12–17)
HGB UR QL STRIP.AUTO: ABNORMAL
HGB UR QL STRIP.AUTO: ABNORMAL
HYALINE CASTS #/AREA URNS LPF: ABNORMAL /LPF
IMM GRANULOCYTES # BLD AUTO: 0.04 THOUSAND/UL (ref 0–0.2)
IMM GRANULOCYTES NFR BLD AUTO: 1 % (ref 0–2)
INR PPP: 1.95 (ref 0.86–1.17)
KETONES UR STRIP-MCNC: NEGATIVE MG/DL
KETONES UR STRIP-MCNC: NEGATIVE MG/DL
LACTATE SERPL-SCNC: 1.3 MMOL/L (ref 0.5–2)
LEUKOCYTE ESTERASE UR QL STRIP: NEGATIVE
LEUKOCYTE ESTERASE UR QL STRIP: NEGATIVE
LIPASE SERPL-CCNC: 276 U/L (ref 73–393)
LYMPHOCYTES # BLD AUTO: 0.97 THOUSANDS/ΜL (ref 0.6–4.47)
LYMPHOCYTES NFR BLD AUTO: 13 % (ref 14–44)
MAGNESIUM SERPL-MCNC: 2 MG/DL (ref 1.6–2.6)
MCH RBC QN AUTO: 29.2 PG (ref 26.8–34.3)
MCHC RBC AUTO-ENTMCNC: 30.6 G/DL (ref 31.4–37.4)
MCV RBC AUTO: 95 FL (ref 82–98)
MONOCYTES # BLD AUTO: 0.51 THOUSAND/ΜL (ref 0.17–1.22)
MONOCYTES NFR BLD AUTO: 7 % (ref 4–12)
NEUTROPHILS # BLD AUTO: 5.7 THOUSANDS/ΜL (ref 1.85–7.62)
NEUTS SEG NFR BLD AUTO: 75 % (ref 43–75)
NITRITE UR QL STRIP: NEGATIVE
NITRITE UR QL STRIP: NEGATIVE
NON-SQ EPI CELLS URNS QL MICRO: ABNORMAL /HPF
NON-SQ EPI CELLS URNS QL MICRO: ABNORMAL /HPF
NRBC BLD AUTO-RTO: 0 /100 WBCS
NT-PROBNP SERPL-MCNC: 2714 PG/ML
PH UR STRIP.AUTO: 6 [PH]
PH UR STRIP.AUTO: 6.5 [PH]
PLATELET # BLD AUTO: 254 THOUSANDS/UL (ref 149–390)
PMV BLD AUTO: 9.4 FL (ref 8.9–12.7)
POTASSIUM SERPL-SCNC: 4.9 MMOL/L (ref 3.5–5.3)
PROT SERPL-MCNC: 6.9 G/DL (ref 6.4–8.2)
PROT UR STRIP-MCNC: ABNORMAL MG/DL
PROT UR STRIP-MCNC: NEGATIVE MG/DL
PROTHROMBIN TIME: 21.3 SECONDS (ref 11.8–14.2)
RBC # BLD AUTO: 4.25 MILLION/UL (ref 3.88–5.62)
RBC #/AREA URNS AUTO: ABNORMAL /HPF
RBC #/AREA URNS AUTO: ABNORMAL /HPF
RH BLD: NEGATIVE
SODIUM SERPL-SCNC: 140 MMOL/L (ref 136–145)
SP GR UR STRIP.AUTO: 1.01 (ref 1–1.03)
SP GR UR STRIP.AUTO: 1.01 (ref 1–1.03)
SPECIMEN EXPIRATION DATE: NORMAL
TROPONIN I SERPL-MCNC: 0.07 NG/ML
UROBILINOGEN UR QL STRIP.AUTO: 0.2 E.U./DL
UROBILINOGEN UR QL STRIP.AUTO: 0.2 E.U./DL
WBC # BLD AUTO: 7.51 THOUSAND/UL (ref 4.31–10.16)
WBC #/AREA URNS AUTO: ABNORMAL /HPF
WBC #/AREA URNS AUTO: ABNORMAL /HPF

## 2019-06-04 PROCEDURE — 86900 BLOOD TYPING SEROLOGIC ABO: CPT | Performed by: EMERGENCY MEDICINE

## 2019-06-04 PROCEDURE — 86901 BLOOD TYPING SEROLOGIC RH(D): CPT | Performed by: EMERGENCY MEDICINE

## 2019-06-04 PROCEDURE — 36415 COLL VENOUS BLD VENIPUNCTURE: CPT | Performed by: EMERGENCY MEDICINE

## 2019-06-04 PROCEDURE — 85025 COMPLETE CBC W/AUTO DIFF WBC: CPT | Performed by: EMERGENCY MEDICINE

## 2019-06-04 PROCEDURE — 83605 ASSAY OF LACTIC ACID: CPT | Performed by: EMERGENCY MEDICINE

## 2019-06-04 PROCEDURE — 84484 ASSAY OF TROPONIN QUANT: CPT | Performed by: EMERGENCY MEDICINE

## 2019-06-04 PROCEDURE — 87086 URINE CULTURE/COLONY COUNT: CPT

## 2019-06-04 PROCEDURE — 83735 ASSAY OF MAGNESIUM: CPT | Performed by: EMERGENCY MEDICINE

## 2019-06-04 PROCEDURE — 99284 EMERGENCY DEPT VISIT MOD MDM: CPT | Performed by: EMERGENCY MEDICINE

## 2019-06-04 PROCEDURE — 85730 THROMBOPLASTIN TIME PARTIAL: CPT | Performed by: EMERGENCY MEDICINE

## 2019-06-04 PROCEDURE — 99284 EMERGENCY DEPT VISIT MOD MDM: CPT

## 2019-06-04 PROCEDURE — 83880 ASSAY OF NATRIURETIC PEPTIDE: CPT | Performed by: EMERGENCY MEDICINE

## 2019-06-04 PROCEDURE — 85610 PROTHROMBIN TIME: CPT | Performed by: EMERGENCY MEDICINE

## 2019-06-04 PROCEDURE — 83690 ASSAY OF LIPASE: CPT | Performed by: EMERGENCY MEDICINE

## 2019-06-04 PROCEDURE — 86850 RBC ANTIBODY SCREEN: CPT | Performed by: EMERGENCY MEDICINE

## 2019-06-04 PROCEDURE — 80053 COMPREHEN METABOLIC PANEL: CPT | Performed by: EMERGENCY MEDICINE

## 2019-06-04 PROCEDURE — 81001 URINALYSIS AUTO W/SCOPE: CPT | Performed by: EMERGENCY MEDICINE

## 2019-06-04 PROCEDURE — 74176 CT ABD & PELVIS W/O CONTRAST: CPT

## 2019-06-04 PROCEDURE — 81001 URINALYSIS AUTO W/SCOPE: CPT

## 2019-06-05 LAB — BACTERIA UR CULT: NORMAL

## 2019-06-12 ENCOUNTER — TELEPHONE (OUTPATIENT)
Dept: FAMILY MEDICINE CLINIC | Facility: CLINIC | Age: 79
End: 2019-06-12

## 2019-06-27 ENCOUNTER — APPOINTMENT (OUTPATIENT)
Dept: LAB | Facility: MEDICAL CENTER | Age: 79
End: 2019-06-27
Payer: MEDICARE

## 2019-06-27 ENCOUNTER — OFFICE VISIT (OUTPATIENT)
Dept: FAMILY MEDICINE CLINIC | Facility: CLINIC | Age: 79
End: 2019-06-27
Payer: MEDICARE

## 2019-06-27 ENCOUNTER — ANTICOAG VISIT (OUTPATIENT)
Dept: FAMILY MEDICINE CLINIC | Facility: CLINIC | Age: 79
End: 2019-06-27

## 2019-06-27 VITALS
BODY MASS INDEX: 48.34 KG/M2 | DIASTOLIC BLOOD PRESSURE: 62 MMHG | WEIGHT: 308 LBS | HEIGHT: 67 IN | SYSTOLIC BLOOD PRESSURE: 132 MMHG

## 2019-06-27 DIAGNOSIS — M79.605 PAIN IN BOTH LOWER EXTREMITIES: ICD-10-CM

## 2019-06-27 DIAGNOSIS — G61.82 MULTIFOCAL MOTOR NEUROPATHY (HCC): ICD-10-CM

## 2019-06-27 DIAGNOSIS — M79.604 PAIN IN BOTH LOWER EXTREMITIES: ICD-10-CM

## 2019-06-27 DIAGNOSIS — S81.809D MULTIPLE OPEN WOUNDS OF LOWER LEG, UNSPECIFIED LATERALITY, SUBSEQUENT ENCOUNTER: ICD-10-CM

## 2019-06-27 DIAGNOSIS — Z00.00 MEDICARE ANNUAL WELLNESS VISIT, SUBSEQUENT: Primary | ICD-10-CM

## 2019-06-27 DIAGNOSIS — J44.9 OBSTRUCTIVE CHRONIC BRONCHITIS WITHOUT EXACERBATION (HCC): ICD-10-CM

## 2019-06-27 PROCEDURE — G0439 PPPS, SUBSEQ VISIT: HCPCS | Performed by: FAMILY MEDICINE

## 2019-06-27 RX ORDER — IRON HEME POLYPEPTIDE/FOLIC AC 12-1MG
TABLET ORAL
Refills: 3 | COMMUNITY
Start: 2019-05-28

## 2019-06-27 RX ORDER — GABAPENTIN 300 MG/1
CAPSULE ORAL
Qty: 120 CAPSULE | Refills: 3 | Status: SHIPPED | OUTPATIENT
Start: 2019-06-27 | End: 2019-10-21 | Stop reason: SDUPTHER

## 2019-06-27 RX ORDER — TRAMADOL HYDROCHLORIDE 50 MG/1
TABLET ORAL
Qty: 90 TABLET | Refills: 0 | Status: SHIPPED | OUTPATIENT
Start: 2019-06-27 | End: 2019-08-28 | Stop reason: SDUPTHER

## 2019-06-27 RX ORDER — CEPHALEXIN 500 MG/1
500 CAPSULE ORAL EVERY 8 HOURS SCHEDULED
Qty: 30 CAPSULE | Refills: 0 | Status: SHIPPED | OUTPATIENT
Start: 2019-06-27 | End: 2019-07-07

## 2019-06-28 ENCOUNTER — TELEPHONE (OUTPATIENT)
Dept: FAMILY MEDICINE CLINIC | Facility: CLINIC | Age: 79
End: 2019-06-28

## 2019-07-02 ENCOUNTER — TELEPHONE (OUTPATIENT)
Dept: FAMILY MEDICINE CLINIC | Facility: CLINIC | Age: 79
End: 2019-07-02

## 2019-07-02 NOTE — TELEPHONE ENCOUNTER
Called to let doctor know that he will be seeing Pt weekly & asking if you have any labs you would like him to draw beside INR? - BMP order in chart to be collected - Do you want anything else?     Next INR due 7/27/19

## 2019-07-03 ENCOUNTER — TELEPHONE (OUTPATIENT)
Dept: FAMILY MEDICINE CLINIC | Facility: CLINIC | Age: 79
End: 2019-07-03

## 2019-07-12 ENCOUNTER — TELEPHONE (OUTPATIENT)
Dept: FAMILY MEDICINE CLINIC | Facility: CLINIC | Age: 79
End: 2019-07-12

## 2019-07-12 NOTE — TELEPHONE ENCOUNTER
1  Gabapentin helping pain in toes - Do you want pt to continue Rx  2  Pt is sleeping a lot - Not sure what's causing this  3   Urinary incontinence - OV with Dr Narendra Pratt 7/19/19

## 2019-07-12 NOTE — TELEPHONE ENCOUNTER
Continue gabapentin because it is helping the pain in the toes the sleeping is most likely due to his age and his illness and the urinary incontinence is more of a problem for Dr Alejo Fried ski

## 2019-07-15 ENCOUNTER — HOSPITAL ENCOUNTER (INPATIENT)
Facility: HOSPITAL | Age: 79
LOS: 3 days | Discharge: NURSING, REHAB OR LONG-TERM CARE | DRG: 563 | End: 2019-07-18
Attending: EMERGENCY MEDICINE | Admitting: FAMILY MEDICINE
Payer: MEDICARE

## 2019-07-15 ENCOUNTER — APPOINTMENT (EMERGENCY)
Dept: CT IMAGING | Facility: HOSPITAL | Age: 79
DRG: 563 | End: 2019-07-15
Payer: MEDICARE

## 2019-07-15 ENCOUNTER — APPOINTMENT (INPATIENT)
Dept: NON INVASIVE DIAGNOSTICS | Facility: HOSPITAL | Age: 79
DRG: 563 | End: 2019-07-15
Payer: MEDICARE

## 2019-07-15 ENCOUNTER — APPOINTMENT (EMERGENCY)
Dept: RADIOLOGY | Facility: HOSPITAL | Age: 79
DRG: 563 | End: 2019-07-15
Payer: MEDICARE

## 2019-07-15 DIAGNOSIS — N28.9 RENAL INSUFFICIENCY: ICD-10-CM

## 2019-07-15 DIAGNOSIS — S80.12XA CONTUSION OF KNEE AND LOWER LEG, LEFT, INITIAL ENCOUNTER: ICD-10-CM

## 2019-07-15 DIAGNOSIS — E66.01 MORBID OBESITY (HCC): ICD-10-CM

## 2019-07-15 DIAGNOSIS — S82.002D CLOSED NONDISPLACED FRACTURE OF LEFT PATELLA WITH ROUTINE HEALING, UNSPECIFIED FRACTURE MORPHOLOGY, SUBSEQUENT ENCOUNTER: ICD-10-CM

## 2019-07-15 DIAGNOSIS — S82.002A CLOSED NONDISPLACED FRACTURE OF LEFT PATELLA, UNSPECIFIED FRACTURE MORPHOLOGY, INITIAL ENCOUNTER: ICD-10-CM

## 2019-07-15 DIAGNOSIS — R55 SYNCOPE: Primary | ICD-10-CM

## 2019-07-15 DIAGNOSIS — S80.01XA CONTUSION OF KNEE AND LOWER LEG, RIGHT, INITIAL ENCOUNTER: ICD-10-CM

## 2019-07-15 DIAGNOSIS — S82.092A CLOSED SLEEVE FRACTURE OF LEFT PATELLA, INITIAL ENCOUNTER: ICD-10-CM

## 2019-07-15 DIAGNOSIS — S80.02XA CONTUSION OF KNEE AND LOWER LEG, LEFT, INITIAL ENCOUNTER: ICD-10-CM

## 2019-07-15 DIAGNOSIS — S09.90XA INJURY OF HEAD, INITIAL ENCOUNTER: ICD-10-CM

## 2019-07-15 DIAGNOSIS — S80.11XA CONTUSION OF KNEE AND LOWER LEG, RIGHT, INITIAL ENCOUNTER: ICD-10-CM

## 2019-07-15 DIAGNOSIS — R77.8 ELEVATED TROPONIN I LEVEL: ICD-10-CM

## 2019-07-15 PROBLEM — E03.9 HYPOTHYROIDISM: Status: ACTIVE | Noted: 2019-07-15
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
## 2019-07-15 LAB
ALBUMIN SERPL BCP-MCNC: 2.9 G/DL (ref 3.5–5)
ALP SERPL-CCNC: 35 U/L (ref 46–116)
ALT SERPL W P-5'-P-CCNC: 19 U/L (ref 12–78)
ANION GAP SERPL CALCULATED.3IONS-SCNC: 7 MMOL/L (ref 4–13)
APTT PPP: 39 SECONDS (ref 23–37)
AST SERPL W P-5'-P-CCNC: 18 U/L (ref 5–45)
BACTERIA UR QL AUTO: ABNORMAL /HPF
BASOPHILS # BLD AUTO: 0.05 THOUSANDS/ΜL (ref 0–0.1)
BASOPHILS NFR BLD AUTO: 1 % (ref 0–1)
BILIRUB SERPL-MCNC: 0.4 MG/DL (ref 0.2–1)
BILIRUB UR QL STRIP: NEGATIVE
BUN SERPL-MCNC: 37 MG/DL (ref 5–25)
CALCIUM SERPL-MCNC: 8.4 MG/DL (ref 8.3–10.1)
CHLORIDE SERPL-SCNC: 103 MMOL/L (ref 100–108)
CLARITY UR: CLEAR
CO2 SERPL-SCNC: 28 MMOL/L (ref 21–32)
COLOR UR: YELLOW
CREAT SERPL-MCNC: 2.39 MG/DL (ref 0.6–1.3)
EOSINOPHIL # BLD AUTO: 0.18 THOUSAND/ΜL (ref 0–0.61)
EOSINOPHIL NFR BLD AUTO: 2 % (ref 0–6)
ERYTHROCYTE [DISTWIDTH] IN BLOOD BY AUTOMATED COUNT: 15.7 % (ref 11.6–15.1)
GFR SERPL CREATININE-BSD FRML MDRD: 25 ML/MIN/1.73SQ M
GLUCOSE SERPL-MCNC: 208 MG/DL (ref 65–140)
GLUCOSE SERPL-MCNC: 213 MG/DL (ref 65–140)
GLUCOSE SERPL-MCNC: 282 MG/DL (ref 65–140)
GLUCOSE UR STRIP-MCNC: NEGATIVE MG/DL
HCT VFR BLD AUTO: 37.9 % (ref 36.5–49.3)
HGB BLD-MCNC: 11.6 G/DL (ref 12–17)
HGB UR QL STRIP.AUTO: ABNORMAL
IMM GRANULOCYTES # BLD AUTO: 0.04 THOUSAND/UL (ref 0–0.2)
IMM GRANULOCYTES NFR BLD AUTO: 1 % (ref 0–2)
INR PPP: 1.55 (ref 0.84–1.19)
KETONES UR STRIP-MCNC: NEGATIVE MG/DL
LEUKOCYTE ESTERASE UR QL STRIP: NEGATIVE
LYMPHOCYTES # BLD AUTO: 0.79 THOUSANDS/ΜL (ref 0.6–4.47)
LYMPHOCYTES NFR BLD AUTO: 10 % (ref 14–44)
MCH RBC QN AUTO: 28.9 PG (ref 26.8–34.3)
MCHC RBC AUTO-ENTMCNC: 30.6 G/DL (ref 31.4–37.4)
MCV RBC AUTO: 95 FL (ref 82–98)
MONOCYTES # BLD AUTO: 0.74 THOUSAND/ΜL (ref 0.17–1.22)
MONOCYTES NFR BLD AUTO: 10 % (ref 4–12)
NEUTROPHILS # BLD AUTO: 5.85 THOUSANDS/ΜL (ref 1.85–7.62)
NEUTS SEG NFR BLD AUTO: 76 % (ref 43–75)
NITRITE UR QL STRIP: NEGATIVE
NON-SQ EPI CELLS URNS QL MICRO: ABNORMAL /HPF
NRBC BLD AUTO-RTO: 0 /100 WBCS
PH UR STRIP.AUTO: 6.5 [PH]
PLATELET # BLD AUTO: 250 THOUSANDS/UL (ref 149–390)
PMV BLD AUTO: 10.1 FL (ref 8.9–12.7)
POTASSIUM SERPL-SCNC: 4.9 MMOL/L (ref 3.5–5.3)
PROT SERPL-MCNC: 6.2 G/DL (ref 6.4–8.2)
PROT UR STRIP-MCNC: ABNORMAL MG/DL
PROTHROMBIN TIME: 18.7 SECONDS (ref 11.6–14.5)
RBC # BLD AUTO: 4.01 MILLION/UL (ref 3.88–5.62)
RBC #/AREA URNS AUTO: ABNORMAL /HPF
SODIUM SERPL-SCNC: 138 MMOL/L (ref 136–145)
SP GR UR STRIP.AUTO: 1.01 (ref 1–1.03)
TROPONIN I SERPL-MCNC: 0.06 NG/ML
TROPONIN I SERPL-MCNC: 0.07 NG/ML
UROBILINOGEN UR QL STRIP.AUTO: 0.2 E.U./DL
WBC # BLD AUTO: 7.65 THOUSAND/UL (ref 4.31–10.16)
WBC #/AREA URNS AUTO: ABNORMAL /HPF

## 2019-07-15 PROCEDURE — 99285 EMERGENCY DEPT VISIT HI MDM: CPT | Performed by: EMERGENCY MEDICINE

## 2019-07-15 PROCEDURE — 72125 CT NECK SPINE W/O DYE: CPT

## 2019-07-15 PROCEDURE — 85730 THROMBOPLASTIN TIME PARTIAL: CPT | Performed by: EMERGENCY MEDICINE

## 2019-07-15 PROCEDURE — 80053 COMPREHEN METABOLIC PANEL: CPT | Performed by: EMERGENCY MEDICINE

## 2019-07-15 PROCEDURE — 84484 ASSAY OF TROPONIN QUANT: CPT | Performed by: STUDENT IN AN ORGANIZED HEALTH CARE EDUCATION/TRAINING PROGRAM

## 2019-07-15 PROCEDURE — 70486 CT MAXILLOFACIAL W/O DYE: CPT

## 2019-07-15 PROCEDURE — 36415 COLL VENOUS BLD VENIPUNCTURE: CPT | Performed by: EMERGENCY MEDICINE

## 2019-07-15 PROCEDURE — 85610 PROTHROMBIN TIME: CPT | Performed by: EMERGENCY MEDICINE

## 2019-07-15 PROCEDURE — 84484 ASSAY OF TROPONIN QUANT: CPT | Performed by: EMERGENCY MEDICINE

## 2019-07-15 PROCEDURE — 73564 X-RAY EXAM KNEE 4 OR MORE: CPT

## 2019-07-15 PROCEDURE — 81001 URINALYSIS AUTO W/SCOPE: CPT | Performed by: STUDENT IN AN ORGANIZED HEALTH CARE EDUCATION/TRAINING PROGRAM

## 2019-07-15 PROCEDURE — 70450 CT HEAD/BRAIN W/O DYE: CPT

## 2019-07-15 PROCEDURE — 99285 EMERGENCY DEPT VISIT HI MDM: CPT

## 2019-07-15 PROCEDURE — 85025 COMPLETE CBC W/AUTO DIFF WBC: CPT | Performed by: EMERGENCY MEDICINE

## 2019-07-15 PROCEDURE — 99222 1ST HOSP IP/OBS MODERATE 55: CPT | Performed by: FAMILY MEDICINE

## 2019-07-15 PROCEDURE — 71250 CT THORAX DX C-: CPT

## 2019-07-15 PROCEDURE — 82948 REAGENT STRIP/BLOOD GLUCOSE: CPT

## 2019-07-15 PROCEDURE — 93005 ELECTROCARDIOGRAM TRACING: CPT

## 2019-07-15 PROCEDURE — 74176 CT ABD & PELVIS W/O CONTRAST: CPT

## 2019-07-15 PROCEDURE — 93880 EXTRACRANIAL BILAT STUDY: CPT

## 2019-07-15 RX ORDER — FUROSEMIDE 80 MG
80 TABLET ORAL 2 TIMES DAILY
Status: DISCONTINUED | OUTPATIENT
Start: 2019-07-15 | End: 2019-07-18 | Stop reason: HOSPADM

## 2019-07-15 RX ORDER — TAMSULOSIN HYDROCHLORIDE 0.4 MG/1
0.4 CAPSULE ORAL
Status: DISCONTINUED | OUTPATIENT
Start: 2019-07-15 | End: 2019-07-18 | Stop reason: HOSPADM

## 2019-07-15 RX ORDER — SENNOSIDES 8.6 MG
1 TABLET ORAL DAILY
Status: DISCONTINUED | OUTPATIENT
Start: 2019-07-16 | End: 2019-07-18 | Stop reason: HOSPADM

## 2019-07-15 RX ORDER — ALBUTEROL SULFATE 2.5 MG/3ML
2.5 SOLUTION RESPIRATORY (INHALATION) EVERY 6 HOURS PRN
Status: DISCONTINUED | OUTPATIENT
Start: 2019-07-15 | End: 2019-07-18 | Stop reason: HOSPADM

## 2019-07-15 RX ORDER — TRAMADOL HYDROCHLORIDE 50 MG/1
100 TABLET ORAL 2 TIMES DAILY
Status: DISCONTINUED | OUTPATIENT
Start: 2019-07-15 | End: 2019-07-18 | Stop reason: HOSPADM

## 2019-07-15 RX ORDER — GABAPENTIN 300 MG/1
600 CAPSULE ORAL
Status: DISCONTINUED | OUTPATIENT
Start: 2019-07-15 | End: 2019-07-18 | Stop reason: HOSPADM

## 2019-07-15 RX ORDER — ONDANSETRON 2 MG/ML
4 INJECTION INTRAMUSCULAR; INTRAVENOUS EVERY 6 HOURS PRN
Status: DISCONTINUED | OUTPATIENT
Start: 2019-07-15 | End: 2019-07-18 | Stop reason: HOSPADM

## 2019-07-15 RX ORDER — GABAPENTIN 300 MG/1
300 CAPSULE ORAL EVERY MORNING
Status: DISCONTINUED | OUTPATIENT
Start: 2019-07-16 | End: 2019-07-15

## 2019-07-15 RX ORDER — CALCIUM CARBONATE 200(500)MG
1000 TABLET,CHEWABLE ORAL DAILY PRN
Status: DISCONTINUED | OUTPATIENT
Start: 2019-07-15 | End: 2019-07-18 | Stop reason: HOSPADM

## 2019-07-15 RX ORDER — ACETAMINOPHEN 325 MG/1
650 TABLET ORAL EVERY 6 HOURS PRN
Status: DISCONTINUED | OUTPATIENT
Start: 2019-07-15 | End: 2019-07-18 | Stop reason: HOSPADM

## 2019-07-15 RX ORDER — PRAVASTATIN SODIUM 40 MG
80 TABLET ORAL
Status: DISCONTINUED | OUTPATIENT
Start: 2019-07-15 | End: 2019-07-18 | Stop reason: HOSPADM

## 2019-07-15 RX ORDER — GABAPENTIN 300 MG/1
300 CAPSULE ORAL EVERY MORNING
Status: DISCONTINUED | OUTPATIENT
Start: 2019-07-16 | End: 2019-07-18 | Stop reason: HOSPADM

## 2019-07-15 RX ORDER — SODIUM CHLORIDE 9 MG/ML
100 INJECTION, SOLUTION INTRAVENOUS CONTINUOUS
Status: DISCONTINUED | OUTPATIENT
Start: 2019-07-15 | End: 2019-07-18 | Stop reason: HOSPADM

## 2019-07-15 RX ADMIN — INSULIN LISPRO 4 UNITS: 100 INJECTION, SOLUTION INTRAVENOUS; SUBCUTANEOUS at 21:48

## 2019-07-15 RX ADMIN — GABAPENTIN 600 MG: 300 CAPSULE ORAL at 21:48

## 2019-07-15 RX ADMIN — FUROSEMIDE 80 MG: 80 TABLET ORAL at 19:47

## 2019-07-15 RX ADMIN — INSULIN DETEMIR 45 UNITS: 100 INJECTION, SOLUTION SUBCUTANEOUS at 21:48

## 2019-07-15 RX ADMIN — SODIUM CHLORIDE 100 ML/HR: 0.9 INJECTION, SOLUTION INTRAVENOUS at 19:51

## 2019-07-15 RX ADMIN — TRAMADOL HYDROCHLORIDE 100 MG: 50 TABLET, COATED ORAL at 19:46

## 2019-07-15 RX ADMIN — METOPROLOL TARTRATE 25 MG: 25 TABLET, FILM COATED ORAL at 19:47

## 2019-07-15 RX ADMIN — PRAVASTATIN SODIUM 80 MG: 40 TABLET ORAL at 19:47

## 2019-07-15 RX ADMIN — TAMSULOSIN HYDROCHLORIDE 0.4 MG: 0.4 CAPSULE ORAL at 21:48
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
## 2019-07-15 NOTE — ED PROCEDURE NOTE
PROCEDURE  ECG 12 Lead Documentation Only  Date/Time: 7/15/2019 3:08 PM  Performed by: Zakiya Balbuena DO  Authorized by: Zakiya Balbuena DO     Indications / Diagnosis:  Syncope   ECG reviewed by me, the ED Provider: yes    Patient location:  ED  Previous ECG:     Previous ECG:  Unavailable  Interpretation:     Interpretation: non-specific    Rate:     ECG rate:  72    ECG rate assessment: normal    Rhythm:     Rhythm: sinus rhythm    ST segments:     ST segments:  Non-specific  Other findings:     Other findings comment:  Right bundle-branch block         Zakiya Balbuena DO  07/15/19 1509
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
## 2019-07-15 NOTE — H&P
H&P Exam - Donovan Baugh 78 y o  male MRN: [id number]    Unit/Bed#: 928-98 Encounter: [id number]    Assessment:  Karishma Driver is an 77 y/o male with a PMH significant for DM, REYNA, HTN, HF, CKD, AFIB, and morbid obesity is being admitted to the general med/surg floor for greater than 2 midnights d/t a fall with the DDx of syncope, cardiac, neurologic, and ambulatory difficulty  He is currently afebrile, in no acute distress and hemodynamically stable  Case discussed with Dr Shilpa Douglas:  * Dottie Small patient is a poor historian, cannot say how he fell, why he fell, what happened before or after falling  -the fall may be due to ambulatory difficulty, syncope, cardiac  -ED course: Vitals WNL   -Labs: UA negative, PT/INR/PTT elevated; Troponin 0 07, BUN/Cr and Gluc elevated, Alk phos and total ptn and albumin decreased CBC:   Anemia   -Imaging:     -Ct Chest Abdomen Pelvis Wo Contrast: Impression: No evidence for acute intrathoracic injury  No acute intra-abdominal abnormality    identified  Remainder the findings, as described above       -Xr Knee 4+ Views Left Injury: Impression: Nondisplaced patellar fracture  Findings concur with the preliminary ER interpretation     -Xr Knee 4+ Views Right Injury: Impression: No acute osseous abnormality       -Ct Head Without Contrast: Impression: No acute intracranial hemorrhage or depressed calvarial fracture identified  Right frontal scalp    soft tissue swelling is noted  Age-related involutional and scattered chronic microvascular ischemic change, stable      -Ct Facial Bones Without Contrast: Impression: Right frontal scalp soft tissue swelling    No acute fracture identified of the maxillofacial    bones      -Ct Cervical Spine Without Contrast: Impression: No definite evidence for acute fracture or traumatic malalignment within the cervical    Spine   -lipid panel and A1c previously done on 02/22/2019 will repeat levels  -f/u am labs, trend troponin  -orthostatics ordered  -Last Echo done on 10/24/17: will repeat ECHO  -carotid Doppler  -48 hour telemetry  -currently on pravastatin 80 mg p o  Q h s and on an aspirin; will hold ASA d/t fall  -PT OT ordered  -case management consulted for possible rehab or SNF placement  -ortho consulted and recommendations appreciated        Hypothyroidism  Assessment & Plan  -Not on any medication  -TSH pending    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 stage 4 chronic kidney disease (HCC)  Assessment & Plan  Wt Readings from Last 3 Encounters:   07/15/19 (!) 153 kg (336 lb 6 8 oz)   06/27/19 (!) 140 kg (308 lb)   06/04/19 (!) 139 kg (306 lb 7 oz)      -continue home medications of Lasix and Lopressor        Atrial fibrillation ([facility] )  Assessment & Plan  -POA  -patient usually takes warfarin  -holding warfarin due to history of fall and bruising and right frontal SubQ swelling and edema    CKD (chronic kidney disease) stage 4, GFR 15-29 ml/min ([facility])  Assessment & Plan  -POA BUN/Cr 37/2 39  -baseline creatinine 2 36-2 93  -patient is currently at baseline  -avoid nephrotoxin agents    Type 2 diabetes mellitus with hyperglycemia ([facility])  Assessment & Plan  Lab Results   Component Value Date    HGBA1C 7 4 (H) 02/22/2019       Recent Labs     07/15/19  1955   POCGLU 282*       Blood Sugar Average: Last 72 hrs:  (P) 282     -Patient takes subcutaneous insulin continued home medications of Levemir and lispro  -ISS with accu checks  -Hypoglycemia protocol    Morbid obesity ([facility] )  Assessment & Plan  -patient has a history of being morbidly obese  -nutrition consult        History of Present Illness   Missy Will is an 79 y/o male with a PMH significant for DM, REYNA, HTN, HF, CKD, AFIB, and morbid obesity who presents to the ED because of a fall that occurred the night before    Patient is a poor historian and his sons were not available to give a full history  He states that he has no recollection of how he fell or why he fell  He does not recall any symptoms preceding the fall  He just remembers that he fell and waited for his 2nd son to arrive so that they can help him up  He does states that he has pain in his left knee  He does not know how he hurt his head or his knee when he fell  He currently denies any headaches, vision changes, dizziness, lightheadedness, chest pain, shortness of breath, nausea, vomiting, diarrhea, constipation, any urinary symptoms  ED course: Vitals WNL  -Labs: UA negative, PT/INR/PTT elevated; Troponin 0 07, BUN/Cr and Gluc elevated, Alk phos and total ptn and albumin decreased CBC: Anemia  -Imaging:   *Ct Chest Abdomen Pelvis Wo Contrast: Impression: No evidence for acute intrathoracic injury  No acute intra-abdominal abnormality identified  Remainder the findings, as described above  *Xr Knee 4+ Views Left Injury: Impression: Nondisplaced patellar fracture  Findings concur with the preliminary ER interpretation  *Xr Knee 4+ Views Right Injury: Impression: No acute osseous abnormality  *Ct Head Without Contrast: Impression: No acute intracranial hemorrhage or depressed calvarial fracture identified  Right frontal scalp soft tissue swelling is noted  Age-related involutional and scattered chronic microvascular ischemic change, stable  *Ct Facial Bones Without Contrast: Impression: Right frontal scalp soft tissue swelling  No acute fracture identified of the maxillofacial bones  *Ct Cervical Spine Without Contrast: Impression: No definite evidence for acute fracture or traumatic malalignment within the cervical spine      Review of Systems   Unable to perform ROS: Acuity of condition   and possible dementia    Historical Information   Past Medical History:   Diagnosis Date    Anemia     Arthritis     Atrial fibrillation ([facility] )     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[facility] )     B12 deficiency     Back pain     Blind right eye     Cancer [facility])     prostate    Cardiac disorder     Chronic combined systolic and diastolic heart failure (HCC)     Chronic obstructive lung disease (HCC)     Chronic venous stasis dermatitis of both lower extremities     CKD (chronic kidney disease), stage IV (HCC)     Coronary artery disease     DDD (degenerative disc disease)     DM (diabetes mellitus), type 2 (HCC)     Type II, on insulin    BAER (dyspnea on exertion)     Easy bruising     Epistaxis     Gout     Gross hematuria     last assessed: 3/18/2015    Hepatitis     Herpes zoster     Hiatal hernia     History of cellulitis     BLE    History of prostate cancer     Radiation treatments   Hypercholesterolemia     Hyperlipidemia     Hypertension     Ischemic cardiomyopathy     Lung mass     last assessed: 9/21/2012    MI, old    Deadra Damian Morbid obesity due to excess calories (HCC)     or severe obesity    Neuropathy     BLE    Obesity     Obstructive sleep apnea syndrome, severe     Pneumonia     last assessed: 7/24/2012    Shortness of breath     TIA (transient ischemic attack)     Urinary incontinence     Urinary retention     Use of cane as ambulatory aid     Independent with personal care       Past Surgical History:   Procedure Laterality Date    ABDOMINAL SURGERY      CARDIAC SURGERY      CATARACT EXTRACTION, BILATERAL      CORONARY ANGIOPLASTY WITH STENT PLACEMENT      2 stents    CORONARY ARTERY BYPASS GRAFT N/A 7/15/2016    Procedure: Intraop ADRIAN, CABG x 3 using LIMA to LAD, RSVG to OM1 and D1;  Surgeon: Wanda Guerrero MD;  [facility];  Service:    Rachell Harman      has stents    EYE SURGERY      Bilateral cataract removal    HERNIA REPAIR      umbilical hernia repair    LUNG CANCER SURGERY      Partial upper right lobectomy    LUNG SURGERY Left 2012    OTHER SURGICAL HISTORY      previous stent placement-no anatomy given    PILONIDAL CYST EXCISION      MILEY Carrillo CATHETER Left 3/9/2016    Procedure: CYSTOSCOPY WITH left RETROGRADE PYELOGRAM left double J stent removal;  Surgeon: Radha Pena MD;  [facility];  Service: Urology    MO TEMPORAL ARTERY LIGATN OR BX Bilateral 10/31/2017    Procedure: BIOPSY ARTERY TEMPORAL;  Surgeon: Walt Cadet MD;  [facility];  Service: Vascular    RENAL ARTERY STENT  02/16/2015    transcath intravascular stent placement percutaneous renal    VASCULAR SURGERY       Social History   Social History     Substance and Sexual Activity   Alcohol Use Never    Alcohol/week: 0 0 standard drinks    Frequency: Patient refused    Drinks per session: Patient refused    Binge frequency: Never     Social History     Substance and Sexual Activity   Drug Use Never     Social History     Tobacco Use   Smoking Status Former Smoker    Packs/day: 2 00    Years: 54 00    Pack years: 108 00    Last attempt to quit: 2004    Years since quitting: 15 5   Smokeless Tobacco Never Used   Tobacco Comment    Pt is a non smoker     Meds/Allergies     Allergies   Allergen Reactions    Other Rash     Adhesive tape       Objective   First Vitals:   Blood Pressure: 125/58 (07/15/19 1446)  Pulse: 74 (07/15/19 1446)  Temperature: 97 7 °F (36 5 °C) (07/15/19 1446)  Temp Source: Temporal (07/15/19 1446)  Respirations: 20 (07/15/19 1446)  Height: 5' 7" (170 2 cm) (07/15/19 1828)  Weight - Scale: (!) 146 kg (321 lb 10 4 oz) (07/15/19 1446)  SpO2: 91 %(O2 3L) (07/15/19 1446)    Current Vitals:   Blood Pressure: 153/85 (07/15/19 1937)  Pulse: 75 (07/15/19 1828)  Temperature: 98 4 °F (36 9 °C) (07/15/19 1828)  Temp Source: Temporal (07/15/19 1828)  Respirations: 20 (07/15/19 1828)  Height: 5' 7" (170 2 cm) (07/15/19 1828)  Weight - Scale: (!) 153 kg (336 lb 6 8 oz) (07/15/19 1828)  SpO2: 99 % (07/15/19 1828)      Intake/Output Summary (Last 24 hours) at 7/15/2019 2055  Last data filed at 7/15/2019 2000  Gross per 24 hour   Intake    Output 150 ml Net -150 ml       Invasive Devices     Peripheral Intravenous Line            Peripheral IV 07/15/19 Right Antecubital less than 1 day                Physical Exam   Constitutional: He appears well-developed and well-nourished  No distress  Morbidly obese   Cardiovascular: Intact distal pulses  Irregular regular rhythm   Pulmonary/Chest: Breath sounds normal    Tachypneic   Abdominal: Soft  Bowel sounds are normal    Musculoskeletal:   Left knee in brace   Neurological: He is alert  A sensory deficit (Bilateral lower extremity has no sensation until knee and above) is present  Skin:   Bilateral lower extremity chronic venous stasis   Nursing note and vitals reviewed        Lab Results:   Results for orders placed or performed during the hospital encounter of 07/15/19   CBC and differential   Result Value Ref Range    WBC 7 65 4 31 - 10 16 Thousand/uL    RBC 4 01 3 88 - 5 62 Million/uL    Hemoglobin 11 6 (L) 12 0 - 17 0 g/dL    Hematocrit 37 9 36 5 - 49 3 %    MCV 95 82 - 98 fL    MCH 28 9 26 8 - 34 3 pg    MCHC 30 6 (L) 31 4 - 37 4 g/dL    RDW 15 7 (H) 11 6 - 15 1 %    MPV 10 1 8 9 - 12 7 fL    Platelets 403 923 - 908 Thousands/uL    nRBC 0 /100 WBCs    Neutrophils Relative 76 (H) 43 - 75 %    Immat GRANS % 1 0 - 2 %    Lymphocytes Relative 10 (L) 14 - 44 %    Monocytes Relative 10 4 - 12 %    Eosinophils Relative 2 0 - 6 %    Basophils Relative 1 0 - 1 %    Neutrophils Absolute 5 85 1 85 - 7 62 Thousands/µL    Immature Grans Absolute 0 04 0 00 - 0 20 Thousand/uL    Lymphocytes Absolute 0 79 0 60 - 4 47 Thousands/µL    Monocytes Absolute 0 74 0 17 - 1 22 Thousand/µL    Eosinophils Absolute 0 18 0 00 - 0 61 Thousand/µL    Basophils Absolute 0 05 0 00 - 0 10 Thousands/µL   Protime-INR   Result Value Ref Range    Protime 18 7 (H) 11 6 - 14 5 seconds    INR 1 55 (H) 0 84 - 1 19   APTT   Result Value Ref Range    PTT 39 (H) 23 - 37 seconds   Comprehensive metabolic panel   Result Value Ref Range    Sodium 138 136 - 145 mmol/L    Potassium 4 9 3 5 - 5 3 mmol/L    Chloride 103 100 - 108 mmol/L    CO2 28 21 - 32 mmol/L    ANION GAP 7 4 - 13 mmol/L    BUN 37 (H) 5 - 25 mg/dL    Creatinine 2 39 (H) 0 60 - 1 30 mg/dL    Glucose 208 (H) 65 - 140 mg/dL    Calcium 8 4 8 3 - 10 1 mg/dL    AST 18 5 - 45 U/L    ALT 19 12 - 78 U/L    Alkaline Phosphatase 35 (L) 46 - 116 U/L    Total Protein 6 2 (L) 6 4 - 8 2 g/dL    Albumin 2 9 (L) 3 5 - 5 0 g/dL    Total Bilirubin 0 40 0 20 - 1 00 mg/dL    eGFR 25 ml/min/1 73sq m   Troponin I   Result Value Ref Range    Troponin I 0 07 (H) <=0 04 ng/mL   Fingerstick Glucose (POCT)   Result Value Ref Range    POC Glucose 282 (H) 65 - 140 mg/dl       Imaging:   XR knee 4+ views Right injury   ED Interpretation   No fx       Final Result      No acute osseous abnormality  Workstation performed: [id number]         XR knee 4+ views left injury   ED Interpretation   Patellar fracture       Final Result      Nondisplaced patellar fracture  Findings concur with the preliminary ER interpretation  Workstation performed: [id number]         CT chest abdomen pelvis wo contrast   Final Result      No evidence for acute intrathoracic injury  No acute intra-abdominal abnormality identified  Remainder the findings, as described above  Workstation performed: [id number]         CT cervical spine without contrast   Final Result      No definite evidence for acute fracture or traumatic malalignment within the cervical spine  Workstation performed: [id number]         CT facial bones without contrast   Final Result      Right frontal scalp soft tissue swelling  No acute fracture identified of the maxillofacial bones  Workstation performed: [id number]         CT head without contrast   Final Result      No acute intracranial hemorrhage or depressed calvarial fracture identified  Right frontal scalp soft tissue swelling is noted        Age-related involutional and scattered chronic microvascular ischemic change, stable  Workstation performed: [id number]         VAS carotid complete study    (Results Pending)       EKG, Pathology, and Other Studies:   EKG:  Indications / Diagnosis:  Syncope   ECG reviewed by me, the ED Provider: yes    Patient location:  ED  Previous ECG:     Previous ECG:  Unavailable  Interpretation:     Interpretation: non-specific    Rate:     ECG rate:  72    ECG rate assessment: normal    Rhythm:     Rhythm: sinus rhythm    ST segments:     ST segments:  Non-specific  Other findings:     Other findings comment:  Right bundle-branch block    Code Status: Level 3 - DNAR and DNI  Advance Directive and Living Will:      Power of :    POLST:      Counseling / Coordination of Care: Total floor / unit time spent today 30 minutes

## 2019-07-16 ENCOUNTER — APPOINTMENT (INPATIENT)
Dept: NON INVASIVE DIAGNOSTICS | Facility: HOSPITAL | Age: 79
DRG: 563 | End: 2019-07-16
Payer: MEDICARE

## 2019-07-16 PROBLEM — S82.002D CLOSED NONDISPLACED FRACTURE OF LEFT PATELLA WITH ROUTINE HEALING: Status: ACTIVE | Noted: 2019-07-15

## 2019-07-16 LAB
ALBUMIN SERPL BCP-MCNC: 2.7 G/DL (ref 3.5–5)
ALP SERPL-CCNC: 30 U/L (ref 46–116)
ALT SERPL W P-5'-P-CCNC: 17 U/L (ref 12–78)
ANION GAP SERPL CALCULATED.3IONS-SCNC: 7 MMOL/L (ref 4–13)
AST SERPL W P-5'-P-CCNC: 17 U/L (ref 5–45)
ATRIAL RATE: 72 BPM
BASOPHILS # BLD AUTO: 0.05 THOUSANDS/ΜL (ref 0–0.1)
BASOPHILS NFR BLD AUTO: 1 % (ref 0–1)
BILIRUB SERPL-MCNC: 0.4 MG/DL (ref 0.2–1)
BUN SERPL-MCNC: 35 MG/DL (ref 5–25)
CALCIUM SERPL-MCNC: 8.5 MG/DL (ref 8.3–10.1)
CHLORIDE SERPL-SCNC: 105 MMOL/L (ref 100–108)
CHOLEST SERPL-MCNC: 130 MG/DL (ref 50–200)
CO2 SERPL-SCNC: 30 MMOL/L (ref 21–32)
CREAT SERPL-MCNC: 2.06 MG/DL (ref 0.6–1.3)
EOSINOPHIL # BLD AUTO: 0.22 THOUSAND/ΜL (ref 0–0.61)
EOSINOPHIL NFR BLD AUTO: 4 % (ref 0–6)
ERYTHROCYTE [DISTWIDTH] IN BLOOD BY AUTOMATED COUNT: 15.8 % (ref 11.6–15.1)
EST. AVERAGE GLUCOSE BLD GHB EST-MCNC: 134 MG/DL
GFR SERPL CREATININE-BSD FRML MDRD: 29 ML/MIN/1.73SQ M
GLUCOSE SERPL-MCNC: 166 MG/DL (ref 65–140)
GLUCOSE SERPL-MCNC: 194 MG/DL (ref 65–140)
GLUCOSE SERPL-MCNC: 98 MG/DL (ref 65–140)
HBA1C MFR BLD: 6.3 % (ref 4.2–6.3)
HCT VFR BLD AUTO: 37.4 % (ref 36.5–49.3)
HDLC SERPL-MCNC: 47 MG/DL (ref 40–60)
HGB BLD-MCNC: 11.3 G/DL (ref 12–17)
IMM GRANULOCYTES # BLD AUTO: 0.02 THOUSAND/UL (ref 0–0.2)
IMM GRANULOCYTES NFR BLD AUTO: 0 % (ref 0–2)
LDLC SERPL CALC-MCNC: 55 MG/DL (ref 0–100)
LYMPHOCYTES # BLD AUTO: 0.6 THOUSANDS/ΜL (ref 0.6–4.47)
LYMPHOCYTES NFR BLD AUTO: 10 % (ref 14–44)
MAGNESIUM SERPL-MCNC: 2.1 MG/DL (ref 1.6–2.6)
MCH RBC QN AUTO: 28.9 PG (ref 26.8–34.3)
MCHC RBC AUTO-ENTMCNC: 30.2 G/DL (ref 31.4–37.4)
MCV RBC AUTO: 96 FL (ref 82–98)
MONOCYTES # BLD AUTO: 0.61 THOUSAND/ΜL (ref 0.17–1.22)
MONOCYTES NFR BLD AUTO: 11 % (ref 4–12)
NEUTROPHILS # BLD AUTO: 4.28 THOUSANDS/ΜL (ref 1.85–7.62)
NEUTS SEG NFR BLD AUTO: 74 % (ref 43–75)
NRBC BLD AUTO-RTO: 0 /100 WBCS
P AXIS: -5 DEGREES
PHOSPHATE SERPL-MCNC: 3.3 MG/DL (ref 2.3–4.1)
PLATELET # BLD AUTO: 233 THOUSANDS/UL (ref 149–390)
PMV BLD AUTO: 9.9 FL (ref 8.9–12.7)
POTASSIUM SERPL-SCNC: 3.9 MMOL/L (ref 3.5–5.3)
PR INTERVAL: 170 MS
PROT SERPL-MCNC: 6.2 G/DL (ref 6.4–8.2)
QRS AXIS: 129 DEGREES
QRSD INTERVAL: 162 MS
QT INTERVAL: 452 MS
QTC INTERVAL: 494 MS
RBC # BLD AUTO: 3.91 MILLION/UL (ref 3.88–5.62)
SODIUM SERPL-SCNC: 142 MMOL/L (ref 136–145)
T WAVE AXIS: -13 DEGREES
TRIGL SERPL-MCNC: 140 MG/DL
TROPONIN I SERPL-MCNC: 0.08 NG/ML
TSH SERPL DL<=0.05 MIU/L-ACNC: 0.89 UIU/ML (ref 0.36–3.74)
VENTRICULAR RATE: 72 BPM
WBC # BLD AUTO: 5.78 THOUSAND/UL (ref 4.31–10.16)

## 2019-07-16 PROCEDURE — 93010 ELECTROCARDIOGRAM REPORT: CPT | Performed by: INTERNAL MEDICINE

## 2019-07-16 PROCEDURE — G8988 SELF CARE GOAL STATUS: HCPCS

## 2019-07-16 PROCEDURE — G8987 SELF CARE CURRENT STATUS: HCPCS

## 2019-07-16 PROCEDURE — 84443 ASSAY THYROID STIM HORMONE: CPT | Performed by: STUDENT IN AN ORGANIZED HEALTH CARE EDUCATION/TRAINING PROGRAM

## 2019-07-16 PROCEDURE — 84484 ASSAY OF TROPONIN QUANT: CPT | Performed by: STUDENT IN AN ORGANIZED HEALTH CARE EDUCATION/TRAINING PROGRAM

## 2019-07-16 PROCEDURE — 83036 HEMOGLOBIN GLYCOSYLATED A1C: CPT | Performed by: STUDENT IN AN ORGANIZED HEALTH CARE EDUCATION/TRAINING PROGRAM

## 2019-07-16 PROCEDURE — 83735 ASSAY OF MAGNESIUM: CPT | Performed by: STUDENT IN AN ORGANIZED HEALTH CARE EDUCATION/TRAINING PROGRAM

## 2019-07-16 PROCEDURE — 93880 EXTRACRANIAL BILAT STUDY: CPT | Performed by: SURGERY

## 2019-07-16 PROCEDURE — 27520 TREAT KNEECAP FRACTURE: CPT | Performed by: PHYSICIAN ASSISTANT

## 2019-07-16 PROCEDURE — G8979 MOBILITY GOAL STATUS: HCPCS | Performed by: PHYSICAL THERAPIST

## 2019-07-16 PROCEDURE — 80053 COMPREHEN METABOLIC PANEL: CPT | Performed by: STUDENT IN AN ORGANIZED HEALTH CARE EDUCATION/TRAINING PROGRAM

## 2019-07-16 PROCEDURE — 85025 COMPLETE CBC W/AUTO DIFF WBC: CPT | Performed by: STUDENT IN AN ORGANIZED HEALTH CARE EDUCATION/TRAINING PROGRAM

## 2019-07-16 PROCEDURE — 99232 SBSQ HOSP IP/OBS MODERATE 35: CPT | Performed by: FAMILY MEDICINE

## 2019-07-16 PROCEDURE — 97530 THERAPEUTIC ACTIVITIES: CPT

## 2019-07-16 PROCEDURE — 80061 LIPID PANEL: CPT | Performed by: STUDENT IN AN ORGANIZED HEALTH CARE EDUCATION/TRAINING PROGRAM

## 2019-07-16 PROCEDURE — G8978 MOBILITY CURRENT STATUS: HCPCS | Performed by: PHYSICAL THERAPIST

## 2019-07-16 PROCEDURE — 97163 PT EVAL HIGH COMPLEX 45 MIN: CPT | Performed by: PHYSICAL THERAPIST

## 2019-07-16 PROCEDURE — 99222 1ST HOSP IP/OBS MODERATE 55: CPT | Performed by: PHYSICIAN ASSISTANT

## 2019-07-16 PROCEDURE — 82948 REAGENT STRIP/BLOOD GLUCOSE: CPT

## 2019-07-16 PROCEDURE — 97167 OT EVAL HIGH COMPLEX 60 MIN: CPT

## 2019-07-16 PROCEDURE — 93306 TTE W/DOPPLER COMPLETE: CPT | Performed by: INTERNAL MEDICINE

## 2019-07-16 PROCEDURE — 84100 ASSAY OF PHOSPHORUS: CPT | Performed by: STUDENT IN AN ORGANIZED HEALTH CARE EDUCATION/TRAINING PROGRAM

## 2019-07-16 PROCEDURE — 93306 TTE W/DOPPLER COMPLETE: CPT

## 2019-07-16 RX ORDER — WARFARIN SODIUM 5 MG/1
5 TABLET ORAL
Status: DISCONTINUED | OUTPATIENT
Start: 2019-07-16 | End: 2019-07-18 | Stop reason: HOSPADM

## 2019-07-16 RX ADMIN — TRAMADOL HYDROCHLORIDE 100 MG: 50 TABLET, COATED ORAL at 09:26

## 2019-07-16 RX ADMIN — INSULIN DETEMIR 45 UNITS: 100 INJECTION, SOLUTION SUBCUTANEOUS at 22:51

## 2019-07-16 RX ADMIN — METOPROLOL TARTRATE 25 MG: 25 TABLET, FILM COATED ORAL at 18:37

## 2019-07-16 RX ADMIN — SODIUM CHLORIDE 100 ML/HR: 0.9 INJECTION, SOLUTION INTRAVENOUS at 05:31

## 2019-07-16 RX ADMIN — TRAMADOL HYDROCHLORIDE 100 MG: 50 TABLET, COATED ORAL at 18:38

## 2019-07-16 RX ADMIN — PRAVASTATIN SODIUM 80 MG: 40 TABLET ORAL at 16:33

## 2019-07-16 RX ADMIN — FUROSEMIDE 80 MG: 80 TABLET ORAL at 09:26

## 2019-07-16 RX ADMIN — SENNOSIDES 8.6 MG: 8.6 TABLET, FILM COATED ORAL at 09:26

## 2019-07-16 RX ADMIN — GABAPENTIN 600 MG: 300 CAPSULE ORAL at 22:50

## 2019-07-16 RX ADMIN — WARFARIN SODIUM 5 MG: 5 TABLET ORAL at 18:37

## 2019-07-16 RX ADMIN — METOPROLOL TARTRATE 25 MG: 25 TABLET, FILM COATED ORAL at 09:26

## 2019-07-16 RX ADMIN — INSULIN LISPRO 2 UNITS: 100 INJECTION, SOLUTION INTRAVENOUS; SUBCUTANEOUS at 16:37

## 2019-07-16 RX ADMIN — TAMSULOSIN HYDROCHLORIDE 0.4 MG: 0.4 CAPSULE ORAL at 22:49

## 2019-07-16 RX ADMIN — INSULIN LISPRO 2 UNITS: 100 INJECTION, SOLUTION INTRAVENOUS; SUBCUTANEOUS at 22:54

## 2019-07-16 RX ADMIN — GABAPENTIN 300 MG: 300 CAPSULE ORAL at 09:26

## 2019-07-16 RX ADMIN — FUROSEMIDE 80 MG: 80 TABLET ORAL at 18:37

## 2019-07-16 RX ADMIN — SODIUM CHLORIDE 100 ML/HR: 0.9 INJECTION, SOLUTION INTRAVENOUS at 16:34

## 2019-07-16 NOTE — PHYSICIAN ADVISOR
Current patient class: Inpatient  The patient is currently on Hospital Day: 2 at [address]      The patient was admitted to the hospital at 1701 on 7/15/19 for the following diagnosis:  Syncope [R55]  Head injury [S09 90XA]  Renal insufficiency [N28 9]  Elevated troponin I level [R74 8]  Injury of head, initial encounter [S09 90XA]  Contusion of knee and lower leg, right, initial encounter [S80 01XA, S80 11XA]  Contusion of knee and lower leg, left, initial encounter [S80 02XA, S80 12XA]  Closed sleeve fracture of left patella, initial encounter [S82 092A]       There is documentation in the medical record of an expected length of stay of at least 2 midnights  The patient is therefore expected to satisfy the 2 midnight benchmark and given the 2 midnight presumption is appropriate for INPATIENT ADMISSION  Given this expectation of a satisfying stay, CMS instructs us that the patient is most often appropriate for inpatient admission under part A provided medical necessity is documented in the chart  After review of the relevant documentation, labs, vital signs and test results, the patient is appropriate for INPATIENT ADMISSION  Admission to the hospital as an inpatient is a complex decision making process which requires the practitioner to consider the patients presenting complaint, history and physical examination and all relevant testing  With this in mind, in this case, the patient was deemed appropriate for INPATIENT ADMISSION  After review of the documentation and testing available at the time of the admission I concur with this clinical determination of medical necessity  Rationale is as follows: The patient is a 78 yrs old Male who presented to the ED at 7/15/2019  2:39 PM with a chief complaint of Fall (pt has fallen several times over last several days at home)     Patient admitted with a report of a syncopal episode and fall with resulting pain to his left knee  He is having difficulty ambulating and it is uncertain as to the etiology of the syncope  He has a history of DM, TN, CKD and morbid obesity  Imaging studies revealed a non-displaced left patellar fracture  He was evaluated by Orthopedics who felt that conservative treatment was best due to the patient's type of fracture and comorbid conditions  The patient will be evaluated by OT and PT and it appears likely that STR will be necessary  The etiology of the syncopal event is still unclear and ongoing care is noted  A two night admission class to the hospital would be considered appropriate for this patient          The patients vitals on arrival were ED Triage Vitals   Temperature Pulse Respirations Blood Pressure SpO2   07/15/19 1446 07/15/19 1446 07/15/19 1446 07/15/19 1446 07/15/19 1446   97 7 °F (36 5 °C) 74 20 125/58 91 %      Temp Source Heart Rate Source Patient Position - Orthostatic VS BP Location FiO2 (%)   07/15/19 1446 07/15/19 1446 07/15/19 1446 07/15/19 1828 --   Temporal Monitor Lying Left arm       Pain Score       07/15/19 1821       Worst Possible Pain           Past Medical History:   Diagnosis Date    Anemia     Arthritis     Atrial fibrillation (HCC)     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HCC)     B12 deficiency     Back pain     Blind right eye     Cancer ([address] )     prostate    Cardiac disorder     Chronic combined systolic and diastolic heart failure (HCC)     Chronic obstructive lung disease (HCC)     Chronic venous stasis dermatitis of both lower extremities     CKD (chronic kidney disease), stage IV (HCC)     Coronary artery disease     DDD (degenerative disc disease)     DM (diabetes mellitus), type 2 (HCC)     Type II, on insulin    BAER (dyspnea on exertion)     Easy bruising     Epistaxis     Gout     Gross hematuria     last assessed: 3/18/2015    Hepatitis     Herpes zoster     Hiatal hernia     History of cellulitis     BLE    History of prostate cancer Radiation treatments   Hypercholesterolemia     Hyperlipidemia     Hypertension     Ischemic cardiomyopathy     Lung mass     last assessed: 9/21/2012    MI, old    Jing Salcido Morbid obesity due to excess calories (HCC)     or severe obesity    Neuropathy     BLE    Obesity     Obstructive sleep apnea syndrome, severe     Pneumonia     last assessed: 7/24/2012    Shortness of breath     TIA (transient ischemic attack)     Urinary incontinence     Urinary retention     Use of cane as ambulatory aid     Independent with personal care       Past Surgical History:   Procedure Laterality Date    ABDOMINAL SURGERY      CARDIAC SURGERY      CATARACT EXTRACTION, BILATERAL      CORONARY ANGIOPLASTY WITH STENT PLACEMENT      2 stents    CORONARY ARTERY BYPASS GRAFT N/A 7/15/2016    Procedure: Intraop ADRIAN, CABG x 3 using LIMA to LAD, RSVG to OM1 and D1;  Surgeon: Dudley Small MD;  [facility];  Service:    Emerita Doll      has stents    EYE SURGERY      Bilateral cataract removal    HERNIA REPAIR      umbilical hernia repair    LUNG CANCER SURGERY      Partial upper right lobectomy    LUNG SURGERY Left 2012    OTHER SURGICAL HISTORY      previous stent placement-no anatomy given    PILONIDAL CYST EXCISION      NE CYSTOURETHROSCOPY,URETER CATHETER Left 3/9/2016    Procedure: CYSTOSCOPY WITH left RETROGRADE PYELOGRAM left double J stent removal;  Surgeon: Katlyn Redman MD;  [facility];  Service: Urology    NE TEMPORAL ARTERY LIGATN OR BX Bilateral 10/31/2017    Procedure: BIOPSY ARTERY TEMPORAL;  Surgeon: Jose Antonio Ewing MD;  [facility];  Service: Vascular    RENAL ARTERY STENT  02/16/2015    transcath intravascular stent placement percutaneous renal    VASCULAR SURGERY             Consults have been placed to:   IP CONSULT TO ORTHOPEDIC SURGERY  IP CONSULT TO CASE MANAGEMENT  IP CONSULT TO NUTRITION SERVICES    Vitals:    07/15/19 1937 07/16/19 0023 07/16/19 0804 07/16/19 1116   BP: 153/85 133/62 146/64 158/70   BP Location: Left arm Left arm Left arm    Pulse:  69 81 77   Resp:  20 18 16   Temp:  (!) 97 °F (36 1 °C) (!) 97 3 °F (36 3 °C) 98 4 °F (36 9 °C)   TempSrc:  Temporal Temporal    SpO2:  97% 97% 98%   Weight:       Height:           Most recent labs:    Recent Labs     07/15/19  1455  07/16/19  0040 07/16/19  0555   WBC 7 65  --   --  5 78   HGB 11 6*  --   --  11 3*   HCT 37 9  --   --  37 4     --   --  233   K 4 9  --   --  3 9   CALCIUM 8 4  --   --  8 5   BUN 37*  --   --  35*   CREATININE 2 39*  --   --  2 06*   INR 1 55*  --   --   --    TROPONINI 0 07*   < > 0 08*  --    AST 18  --   --  17   ALT 19  --   --  17   ALKPHOS 35*  --   --  30*    < > = values in this interval not displayed         Scheduled Meds:  Current Facility-Administered Medications:  acetaminophen 650 mg Oral Q6H PRN Bj Fitzpatrick MD    albuterol 2 5 mg Nebulization Q6H PRN Bj Fitzpatrick MD    calcium carbonate 1,000 mg Oral Daily PRN Bj Fitzpatrick MD    furosemide 80 mg Oral BID Bj Fitzpatrick MD    gabapentin 300 mg Oral QAM Bj Fitzpatrick MD    And        gabapentin 600 mg Oral HS Bj Fitzpatrick MD    insulin detemir 45 Units Subcutaneous HS Bj Fitzpatrick MD    insulin lispro 2-12 Units Subcutaneous HS Bj Fitzpatrick MD    metoprolol tartrate 25 mg Oral BID Bj Fitzpatrick MD    ondansetron 4 mg Intravenous Q6H PRN Bj Fitzpatrick MD    pravastatin 80 mg Oral Daily With Tammie Villafana MD    senna 1 tablet Oral Daily Bj Fitzpatrick MD    sodium chloride 100 mL/hr Intravenous Continuous Bj Fitzpatrick MD Last Rate: 100 mL/hr (07/16/19 0531)   tamsulosin 0 4 mg Oral HS Bj Fitzpatrick MD    traMADol 100 mg Oral BID Bj Fitzpatrick MD    warfarin 5 mg Oral Daily (warfarin) Marbella Kirkpatrick MD      Continuous Infusions:  sodium chloride 100 mL/hr Last Rate: 100 mL/hr (07/16/19 0531)     PRN Meds:   acetaminophen    albuterol    calcium carbonate    ondansetron    Surgical procedures (if appropriate):

## 2019-07-16 NOTE — SOCIAL WORK
Pt lives in a trailer with his wife and son  Pt has a ramp on the outside   Pt has a shower chair, walker, cane and wheel chair at home  Pt uses the walker to ambulate inside and  The wheel chair outside   Pt is independent with ADL's and his wife assists him when needed  Pt has 02 and a c pap  Pt uses the 02 at 3 liters   Pt gets the O2 from [address]  Pt's wife does the cooking  Pt's son drives him to appts  Pt is active with [facility]  Pt's PCP is Dr Ashkan Hauser  Pt was given a discharge check list and Meds to [facility]  Both reviewed with a good understanding  Pt is going to need short term rehab  A post acute care recommendation was made by your care team for STR  Discussed Freedom of Choice with patient  List of facilities given to patient via in person  patient aware the list is custom filtered for them by preference  and that [facility] post acute providers are designated   Pt would like me to make a referral to [facility]   Referral made as requested

## 2019-07-16 NOTE — ASSESSMENT & PLAN NOTE
Wt Readings from Last 3 Encounters:   07/15/19 (!) 153 kg (336 lb 6 8 oz)   06/27/19 (!) 140 kg (308 lb)   06/04/19 (!) 139 kg (306 lb 7 oz)      -continue home medications of Lasix and Lopressor

## 2019-07-16 NOTE — CONSULTS
Orthopedics   Cecelia Ornelas 78 y o  male MRN: [id number]  Unit/Bed#: [facility] NON INV      Chief Complaint:   left knee pain    HPI:   79 y o male status post fall at home complaining of left knee pain and inability to bear weight  Pain is made worse with motion or contact to the area  Denies motor or sensory deficit  Per the H&P patient has had multiple falls although when questioned he states he does not fall frequently  H&P also notes patient is a poor historian  Patient reports his pain about his left knee and mild soreness about his left hip  He also notes he has chronic shoulder injuries from working years ago that inhibit ability to lift overhead  Denies specific shoulder pain  Patient is relatively comfortable in the knee immobilizer resting in bed  Denies headache or loss of consciousness  Review Of Systems:   · Skin: Normal  · Neuro: See HPI  · Musculoskeletal: See HPI  · 14 point review of systems negative except as stated above     Past Medical History:   Past Medical History:   Diagnosis Date    Anemia     Arthritis     Atrial fibrillation [facility] [address] [facility]     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[facility] [address] [facility]     B12 deficiency     Back pain     Blind right eye     Cancer ([facility] [address] [facility]     prostate    Cardiac disorder     Chronic combined systolic and diastolic heart failure (HCC)     Chronic obstructive lung disease ([address] )     Chronic venous stasis dermatitis of both lower extremities     CKD (chronic kidney disease), stage IV (HCC)     Coronary artery disease     DDD (degenerative disc disease)     DM (diabetes mellitus), type 2 (HCC)     Type II, on insulin    BAER (dyspnea on exertion)     Easy bruising     Epistaxis     Gout     Gross hematuria     last assessed: 3/18/2015    Hepatitis     Herpes zoster     Hiatal hernia     History of cellulitis     BLE    History of prostate cancer     Radiation treatments      Hypercholesterolemia     Hyperlipidemia     Hypertension     Ischemic cardiomyopathy     Lung mass     last assessed: 9/21/2012    MI, old     Morbid obesity due to excess calories ([address] )     or severe obesity    Neuropathy     BLE    Obesity     Obstructive sleep apnea syndrome, severe     Pneumonia     last assessed: 7/24/2012    Shortness of breath     TIA (transient ischemic attack)     Urinary incontinence     Urinary retention     Use of cane as ambulatory aid     Independent with personal care       Past Surgical History:   Past Surgical History:   Procedure Laterality Date    ABDOMINAL SURGERY      CARDIAC SURGERY      CATARACT EXTRACTION, BILATERAL      CORONARY ANGIOPLASTY WITH STENT PLACEMENT      2 stents    CORONARY ARTERY BYPASS GRAFT N/A 7/15/2016    Procedure: Intraop ADRIAN, CABG x 3 using LIMA to LAD, RSVG to OM1 and D1;  Surgeon: Cherylene Maiers, MD;  [facility];  Service:    Noreen Johnson      has stents    EYE SURGERY      Bilateral cataract removal    HERNIA REPAIR      umbilical hernia repair    LUNG CANCER SURGERY      Partial upper right lobectomy    LUNG SURGERY Left 2012    OTHER SURGICAL HISTORY      previous stent placement-no anatomy given    PILONIDAL CYST EXCISION      NH CYSTOURETHROSCOPY,URETER CATHETER Left 3/9/2016    Procedure: CYSTOSCOPY WITH left RETROGRADE PYELOGRAM left double J stent removal;  Surgeon: Mona Wilkinson MD;  Location: [facility];  Service: Urology    NH TEMPORAL ARTERY LIGATN OR BX Bilateral 10/31/2017    Procedure: BIOPSY ARTERY TEMPORAL;  Surgeon: Grace Ganser, MD;  [facility];  Service: Vascular    RENAL ARTERY STENT  02/16/2015    transcath intravascular stent placement percutaneous renal    VASCULAR SURGERY       Family History:  Family history reviewed and non-contributory  Family History   Problem Relation Age of Onset    Diabetes Other     Hypertension Other     Cancer Other     Diabetes Mother     Heart disease Mother     Hypertension Mother     Diabetes type II Mother  Cancer Mother         unknown type    Diabetes Father     Diabetes Maternal Grandmother     Cancer Sister         unknown type    Cancer Other         unknown type     Social History:  Social History     Socioeconomic History    Marital status: /Civil Union     Spouse name: None    Number of children: None    Years of education: None    Highest education level: None   Occupational History    None   Social Needs    Financial resource strain: None    Food insecurity:     Worry: None     Inability: None    Transportation needs:     Medical: None     Non-medical: None   Tobacco Use    Smoking status: Former Smoker     Packs/day: 2 00     Years: 54 00     Pack years: 108 00     Last attempt to quit: 2004     Years since quitting: 15 5    Smokeless tobacco: Never Used    Tobacco comment: Pt is a non smoker   Substance and Sexual Activity    Alcohol use: Never     Alcohol/week: 0 0 standard drinks     Frequency: Patient refused     Drinks per session: Patient refused     Binge frequency: Never    Drug use: Never    Sexual activity: Never     Partners: Female     Birth control/protection: Abstinence   Lifestyle    Physical activity:     Days per week: None     Minutes per session: None    Stress: None   Relationships    Social connections:     Talks on phone: None     Gets together: None     Attends L[employer] day service: None     Active member of club or organization: None     Attends meetings of clubs or organizations: None     Relationship status: None    Intimate partner violence:     Fear of current or ex partner: None     Emotionally abused: None     Physically abused: None     Forced sexual activity: None   Other Topics Concern    None   Social History Narrative    No advance directives    No living will    Patient has living will     Allergies:    Allergies   Allergen Reactions    Other Rash     Adhesive tape     Labs:  0   Lab Value Date/Time    HCT 37 4 07/16/2019 0555    HCT 37 9 07/15/2019 1455    HCT 40 5 06/04/2019 2054    HCT 39 5 09/16/2015 1137    HCT 36 8 06/02/2015 1158    HCT 35 5 (L) 04/27/2015 0620    HGB 11 3 (L) 07/16/2019 0555    HGB 11 6 (L) 07/15/2019 1455    HGB 12 4 06/04/2019 2054    HGB 13 0 09/16/2015 1137    HGB 11 9 (L) 06/02/2015 1158    HGB 11 5 (L) 04/27/2015 0620    INR 1 55 (H) 07/15/2019 1455    INR 1 00 04/21/2015 1408    WBC 5 78 07/16/2019 0555    WBC 7 65 07/15/2019 1455    WBC 7 51 06/04/2019 2054    WBC 6 14 09/16/2015 1137    WBC 11 29 (H) 06/02/2015 1158    WBC 5 84 04/27/2015 0620    ESR 18 (H) 10/24/2017 0650    CRP 8 4 (H) 10/24/2017 0650     Meds:    Current Facility-Administered Medications:     acetaminophen (TYLENOL) tablet 650 mg, 650 mg, Oral, Q6H PRN, Benji Phelps MD    albuterol inhalation solution 2 5 mg, 2 5 mg, Nebulization, Q6H PRN, Benji Phelps MD    calcium carbonate (TUMS) chewable tablet 1,000 mg, 1,000 mg, Oral, Daily PRN, Benji Phelps MD    furosemide (LASIX) tablet 80 mg, 80 mg, Oral, BID, Bneji Phelps MD, 80 mg at 07/15/19 1947    gabapentin (NEURONTIN) capsule 300 mg, 300 mg, Oral, QAM **AND** gabapentin (NEURONTIN) capsule 600 mg, 600 mg, Oral, HS, Benji Phelps MD, 600 mg at 07/15/19 2148    insulin detemir (LEVEMIR) subcutaneous injection 45 Units, 45 Units, Subcutaneous, HSBenji MD, 45 Units at 07/15/19 2148    insulin lispro (HumaLOG) 100 units/mL subcutaneous injection 2-12 Units, 2-12 Units, Subcutaneous, Benji LINTON MD, 4 Units at 07/15/19 2148    metoprolol tartrate (LOPRESSOR) tablet 25 mg, 25 mg, Oral, BID, Benji Phelps MD, 25 mg at 07/15/19 1947    ondansetron (ZOFRAN) injection 4 mg, 4 mg, Intravenous, Q6H PRN, Benji Phelps MD    pravastatin (PRAVACHOL) tablet 80 mg, 80 mg, Oral, Daily With Rosanna Pena MD, 80 mg at 07/15/19 1947    senna (SENOKOT) tablet 8 6 mg, 1 tablet, Oral, Daily, Benji Phelps MD    sodium chloride 0 9 % infusion, 100 mL/hr, Intravenous, Continuous, Abhishek Buitrago MD, Last Rate: 100 mL/hr at 07/16/19 0531, 100 mL/hr at 07/16/19 0531    tamsulosin (FLOMAX) capsule 0 4 mg, 0 4 mg, Oral, HS, Abhishek Buitrago MD, 0 4 mg at 07/15/19 2148    traMADol (ULTRAM) tablet 100 mg, 100 mg, Oral, BID, Abhishek Buitrago MD, 100 mg at 07/15/19 1946    Blood Culture:   Lab Results   Component Value Date    BLOODCX No Growth After 5 Days  12/14/2018     Wound Culture:   No results found for: WOUNDCULT    Ins and Outs:  I/O last 24 hours: In: 965 [I V :965]  Out: 350 [Urine:350]    Physical Exam:   /62 (BP Location: Left arm)   Pulse 69   Temp (!) 97 °F (36 1 °C) (Temporal)   Resp 20   Ht 5' 7" (1 702 m)   Wt (!) 153 kg (336 lb 6 8 oz)   SpO2 97%   BMI 52 69 kg/m²   Gen: Alert and oriented to person, place, time  HEENT: EOMI, eyes clear, moist mucus membranes, hearing intact  Respiratory: Bilateral chest rise  No audible wheezing found  Cardiovascular: Regular Rate and Rhythm  Abdomen: soft nontender/nondistended  Musculoskeletal: bilateral lower extremity  · Skin intact to left lower extremity without wounds  The right knee has a 2 cm superficial abrasion anteriorly  Bilateral lower extremities had pitting edema and discoloration of the mid shin to ankle consistent with stasis dermatitis  There is no gross ecchymosis  · Tender to palpation over lateral left patella  No pain with palpation over the right knee except around the abrasion  There is no pain with either hip with log rolling  There is no pain with palpation laterally over the left hip or anteriorly  · Trace left knee effusion with edema outcome by knee immobilizer that is in place  · Sensation intact down to ankle region but does not include the foot bilaterally  · Positive ankle dorsi/plantar flexion, EHL/FHL  · Decreased pulses 1+ dorsalis pedis posterior tibial     · There is no long bone deformity bilateral upper or lower extremities    There is good elbow wrist and hand range of motion bilaterally  Patient with chronic inability to lift arms overhead  Radiology:   I personally reviewed the films  X-rays bilateral knee shows nondisplaced left vertical patella fracture laterally  X-rays right knee note no obvious fracture  CT of the abdomen and pelvis negative for any hip fracture  Assessment:  79 y o male status post fall at home with left lateral nondisplaced patella fracture  Patient is morbidly obese with bilateral lower extremity pitting edema skin changes consistent with stasis dermatitis  Plan:   · 25% partial weight bearing left lower extremity in knee immobilizer with walker in physical therapy  · Analgesics for pain  · Ice to left knee 20 minutes 4 times a day with elevation  · Body mass index is 52 69 kg/m²  morbidly obese  Recommend nutrition  · Dispo: Ortho will follow  · Attending physician to see patient later today, attempt conservative treatment initially due to multiple medical problems, body habitus, and nondisplaced fracture as patient is not a good surgical candidate    · Patient will likely need short-term rehab or SNF    Rolfe Blizzard, PA-C

## 2019-07-16 NOTE — SOCIAL WORK
Spoke with patient's wife and son about patient going to [facility]  A post acute care recommendation was made by your care team for Acute Rehab  Discussed [facility] with both patient and POA  List of facilities given to both patient and POA via in person  both patient and POA aware the list is custom filtered for them by preferred and that [facility] post acute providers are designated  Pt would like us to make referral to 1) 5th floor and 2) [address]  Referrals made as requested

## 2019-07-16 NOTE — ASSESSMENT & PLAN NOTE
Ortho input appreciated  PT/OT  Patient will likely require short-term rehab, case management consult

## 2019-07-16 NOTE — ASSESSMENT & PLAN NOTE
Baseline renal function is 2 4-2 7  Renal function improved past baseline  Continue diuretic regimen the form of Lasix 80 mg p o  B i d    Avoidance of nephro toxic agents

## 2019-07-16 NOTE — SOCIAL WORK
[facility] unable to accept the patient   I will have to talk to patient and see where else he would like me to send referrals

## 2019-07-16 NOTE — ASSESSMENT & PLAN NOTE
Lab Results   Component Value Date    HGBA1C 7 4 (H) 02/22/2019       Recent Labs     07/15/19  1955   POCGLU 282*       Blood Sugar Average: Last 72 hrs:  (P) 282     Continue basal/bolus insulin regimen  Continue with insulin sliding scale algorithm 3 sliding scale coverage

## 2019-07-16 NOTE — ASSESSMENT & PLAN NOTE
Ortho consulted and recommended:  Knee immobilizer, and the 6 for pain, ice, nutrition, follow up with Ortho, SNF placement as he is not a good surgical candidate  PT/OT was consulted to evaluate and treat with recommendations of straight leg quad and hamstrings sets and ambulation  case management consulted for short-term rehab placement  F/u with ortho in 2 weeks for repeat XR of left patella  Discharge to rehab facility

## 2019-07-16 NOTE — PLAN OF CARE
Problem: PHYSICAL THERAPY ADULT  Goal: Performs mobility at highest level of function for planned discharge setting  See evaluation for individualized goals  Outcome: Progressing  Note:   Prognosis: Good  Problem List: Decreased strength, Decreased range of motion, Decreased endurance, Impaired balance, Decreased mobility, Decreased safety awareness, Pain, Orthopedic restrictions  Assessment: Pt is an 78year old male with complex PMH as listed above presenting to [facility] after a fall at home with L nondisplaced patella fx PWB in L knee immobilizer  Pt seen for high complexity PT evaluation presenting with decreased ROM and inability to perform SLR in immobilizer, increased pain with weightbearing and poor tolerance for ambulation with inability to maintain PWB L LE  Pt with difficulty advancing L LE for gait activity and to slide L LE forward during stand to sit transfers in immobilizer  Pt also requiring increased assistance x2 with all transfers and bed mobility  Pt unsafe to return home and is in need of continued activity in PT to improve all impairments and functional deficits to maximize current LOF  Recommendation: Short-term skilled PT     PT - OK to Discharge: Yes(when medically stable to next level of care)    See flowsheet documentation for full assessment

## 2019-07-16 NOTE — UTILIZATION REVIEW
Initial Clinical Review    Admission: Date/Time/Statement: 7/15/19 @ 1701 Inpatient Written     Orders Placed This Encounter   Procedures    Inpatient Admission (expected length of stay for this patient Order details is greater than two midnights)     Standing Status:   Standing     Number of Occurrences:   1     Order Specific Question:   Admitting Physician     Answer:   Franklin Irving [[id number]]     Order Specific Question:   Level of Care     Answer:   Med Surg [[id number]]     Order Specific Question:   Estimated length of stay     Answer:   More than 2 Midnights     Order Specific Question:   Certification     Answer:   I certify that inpatient services are medically necessary for this patient for a duration of greater than two midnights  See H&P and MD Progress Notes for additional information about the patient's course of treatment  ED Arrival Information     Expected Arrival Acuity Means of Arrival Escorted By Service Admission Type    - 7/15/2019 14:36 Emergent Wheelchair Family Member Hospitalist Emergency    Arrival Complaint    -        Chief Complaint   Patient presents with   Lonni Fling     pt has fallen several times over last several days at home     Assessment/Plan: 77 y/o male with a PMH significant for DM, REYNA, HTN, HF, CKD, AFIB, and morbid obesity who presents to the ED because of a fall that occurred the night before  Patient is a poor historian and his sons were not available to give a full history  He states that he has no recollection of how he fell or why he fell  He does not recall any symptoms preceding the fall  He just remembers that he fell and waited for his 2nd son to arrive so that they can help him up  He does states that he has pain in his left knee  He does not know how he hurt his head or his knee when he fell    Fall  Assessment & Plan  -POA patient is a poor historian, cannot say how he fell, why he fell, what happened before or after falling  -the fall may be due to ambulatory difficulty, syncope, cardiac  -ED course: Vitals WNL          -Labs: UA negative, PT/INR/PTT elevated; Troponin 0 07, BUN/Cr and Gluc elevated, Alk phos and total ptn and albumin decreased CBC:               Anemia          -Imaging:                       -Ct Chest Abdomen Pelvis Wo Contrast: Impression: No evidence for acute intrathoracic injury  No acute intra-abdominal abnormality                                    identified  Remainder the findings, as described above                         -Xr Knee 4+ Views Left Injury: Impression: Nondisplaced patellar fracture  Findings concur with the preliminary ER interpretation                       -Xr Knee 4+ Views Right Injury: Impression: No acute osseous abnormality                         -Ct Head Without Contrast: Impression: No acute intracranial hemorrhage or depressed calvarial fracture identified  Right frontal scalp                             soft tissue swelling is noted  Age-related involutional and scattered chronic microvascular ischemic change, stable                        -Ct Facial Bones Without Contrast: Impression: Right frontal scalp soft tissue swelling    No acute fracture identified of the maxillofacial                                     bones                        -Ct Cervical Spine Without Contrast:  Impression: No definite evidence for acute fracture or traumatic malalignment within the cervical                               Spine   -lipid panel and A1c previously done on 02/22/2019 will repeat levels  -f/u am labs, trend troponin  -orthostatics ordered  -Last Echo done on 10/24/17: will repeat ECHO  -carotid Doppler  -48 hour telemetry  -currently on pravastatin 80 mg p o  Q h s and on an aspirin; will hold ASA d/t fall  -PT OT ordered  -case management consulted for possible rehab or SNF placement  -ortho consulted and recommendations appreciated     Hypothyroidism  Assessment & Plan  -Not on any medication  -TSH pending     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 stage 4 chronic kidney disease ([facility])  Assessment & Plan      Wt Readings from Last 3 Encounters:   07/15/19 (!) 153 kg (336 lb 6 8 oz)   06/27/19 (!) 140 kg (308 lb)   06/04/19 (!) 139 kg (306 lb 7 oz)       -continue home medications of Lasix and Lopressor     Atrial fibrillation ([facility] )  Assessment & Plan  -POA  -patient usually takes warfarin  -holding warfarin due to history of fall and bruising and right frontal SubQ swelling and edema     CKD (chronic kidney disease) stage 4, GFR 15-29 ml/min ([facility])  Assessment & Plan  -POA BUN/Cr 37/2 39  -baseline creatinine 2 36-2 93  -patient is currently at baseline  -avoid nephrotoxin agents     Type 2 diabetes mellitus with hyperglycemia ([facility])  Assessment & Plan        Lab Results   Component Value Date     HGBA1C 7 4 (H) 02/22/2019             Recent Labs     07/15/19  1955   POCGLU 282*         Blood Sugar Average: Last 72 hrs:  (P) 282      -Patient takes subcutaneous insulin continued home medications of Levemir and lispro  -ISS with accu checks  -Hypoglycemia protocol     Morbid obesity ([facility] )  Assessment & Plan  -patient has a history of being morbidly obese  -nutrition consult    ORTHO CONSULT:  Assessment:  79 y o male status post fall at home with left lateral nondisplaced patella fracture  Patient is morbidly obese with bilateral lower extremity pitting edema skin changes consistent with stasis dermatitis      Plan:   · 25% partial weight bearing left lower extremity in knee immobilizer with walker in physical therapy  · Analgesics for pain  · Ice to left knee 20 minutes 4 times a day with elevation  · Body mass index is 52 69 kg/m²  morbidly obese  Recommend nutrition    · Dispo: Ortho will follow  · Attending physician to see patient later today, attempt conservative treatment initially due to multiple medical problems, body habitus, and nondisplaced fracture as patient is not a good surgical candidate    · Patient will likely need short-term rehab or SNF  ED Triage Vitals   Temperature Pulse Respirations Blood Pressure SpO2   07/15/19 1446 07/15/19 1446 07/15/19 1446 07/15/19 1446 07/15/19 1446   97 7 °F (36 5 °C) 74 20 125/58 91 %      Temp Source Heart Rate Source Patient Position - Orthostatic VS BP Location FiO2 (%)   07/15/19 1446 07/15/19 1446 07/15/19 1446 07/15/19 1828 --   Temporal Monitor Lying Left arm       Pain Score       07/15/19 1821       Worst Possible Pain        Wt Readings from Last 1 Encounters:   07/15/19 (!) 153 kg (336 lb 6 8 oz)     Additional Vital Signs:   Date/Time  Temp  Pulse  Resp  BP  MAP (mmHg)  SpO2  O2 Device  Patient Position - Orthostatic VS   07/16/19 0804  97 3 °F (36 3 °C)Abnormal   81  18  146/64    97 %  Nasal cannula  Lying   07/16/19 0023  97 °F (36 1 °C)Abnormal   69  20  133/62  87  97 %  Nasal cannula  Lying   07/15/19 1937        153/85        Standing - Orthostatic VS   07/15/19 1931        177/72Abnormal         Sitting - Orthostatic VS   07/15/19 1926        184/81Abnormal         Lying - Orthostatic VS   07/15/19 1828  98 4 °F (36 9 °C)  75  20  163/77    99 %  Nasal cannula  Lying   07/15/19 1746    78  18  154/68    94 %    Lying   07/15/19 1716    69  20  155/67    95 %    Lying   07/15/19 1646    69  20  155/67    95 %    Lying   07/15/19 1616    75  20  139/63    98 %    Lying   07/15/19 1546    74  20  130/60    92 %    Lying   07/15/19 1531    72  20  113/77    93 %    Lying   07/15/19 1516    78  20  126/63    96 %    Lying   07/15/19 1501    75  20  124/58    93 %    Lying   07/15/19 14:46:02  97 7 °F (36 5 °C)  74  20  125/58    91 %     Lying        Pertinent Labs/Diagnostic Test Results:   Results from last 7 days   Lab Units 07/16/19  0555 07/15/19  1455   WBC Thousand/uL 5 78 7 65   HEMOGLOBIN g/dL 11 3* 11 6*   HEMATOCRIT % 37 4 37 9   PLATELETS Thousands/uL 233 250   NEUTROS ABS Thousands/µL 4 28 5 85         Results from last 7 days   Lab Units 07/16/19  0555 07/15/19  1455   SODIUM mmol/L 142 138   POTASSIUM mmol/L 3 9 4 9   CHLORIDE mmol/L 105 103   CO2 mmol/L 30 28   ANION GAP mmol/L 7 7   BUN mg/dL 35* 37*   CREATININE mg/dL 2 06* 2 39*   EGFR ml/min/1 73sq m 29 25   CALCIUM mg/dL 8 5 8 4   MAGNESIUM mg/dL 2 1  --    PHOSPHORUS mg/dL 3 3  --      Results from last 7 days   Lab Units 07/16/19  0555 07/15/19  1455   AST U/L 17 18   ALT U/L 17 19   ALK PHOS U/L 30* 35*   TOTAL PROTEIN g/dL 6 2* 6 2*   ALBUMIN g/dL 2 7* 2 9*   TOTAL BILIRUBIN mg/dL 0 40 0 40     Results from last 7 days   Lab Units 07/15/19  2148 07/15/19  1955   POC GLUCOSE mg/dl 213* 282*     Results from last 7 days   Lab Units 07/16/19  0555 07/15/19  1455   GLUCOSE RANDOM mg/dL 98 208*     Results from last 7 days   Lab Units 07/16/19  0040 07/15/19  2051 07/15/19  1455   TROPONIN I ng/mL 0 08* 0 06* 0 07*     Results from last 7 days   Lab Units 07/15/19  1455   PROTIME seconds 18 7*   INR  1 55*   PTT seconds 39*     Results from last 7 days   Lab Units 07/15/19  1948   CLARITY UA  Clear   COLOR UA  Yellow   SPEC GRAV UA  1 010   PH UA  6 5   GLUCOSE UA mg/dl Negative   KETONES UA mg/dl Negative   BLOOD UA  Trace-Intact*   PROTEIN UA mg/dl Trace*   NITRITE UA  Negative   BILIRUBIN UA  Negative   UROBILINOGEN UA E U /dl 0 2   LEUKOCYTES UA  Negative   WBC UA /hpf 2-4*   RBC UA /hpf 2-4*   BACTERIA UA /hpf Occasional   EPITHELIAL CELLS WET PREP /hpf Occasional     7/15 EKG:  SR, RBBB  7/15 CT HEAD:  No acute intracranial hemorrhage or depressed calvarial fracture identified  Right frontal scalp soft tissue swelling is noted  Age-related involutional and scattered chronic microvascular ischemic change, stable  7/15 CT FACIAL BONES:  Right frontal scalp soft tissue swelling  No acute fracture identified of the maxillofacial bones    7/15 CT CSPINE:  No definite evidence for acute fracture or traumatic malalignment within the cervical spine  7/15 CT CAP:  No evidence for acute intrathoracic injury  No acute intra-abdominal abnormality identified  7/15 XR RIGHT KNEE:  No acute osseous abnormality  7/15 XR LEFT KNEE:  Nondisplaced patellar fracture  7/15 VAS CAROTID COMPLETE STUDY:   RIGHT:  There is <50% stenosis noted in the internal carotid artery  Plaque is  heterogenous and irregular  Vertebral artery flow is antegrade  There is no significant subclavian artery  disease  LEFT:  There is <50% stenosis noted in the internal carotid artery  Plaque is  heterogenous and irregular  Vertebral artery flow is antegrade  There is no significant subclavian artery  disease  ED Treatment:   Medication Administration from 07/15/2019 1436 to 07/15/2019 1759     None        Past Medical History:   Diagnosis Date    Anemia     Arthritis     Atrial fibrillation (HCC)     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 )     B12 deficiency     Back pain     Blind right eye     Cancer ([facility] )     prostate    Cardiac disorder     Chronic combined systolic and diastolic heart failure (HCC)     Chronic obstructive lung disease (HCC)     Chronic venous stasis dermatitis of both lower extremities     CKD (chronic kidney disease), stage IV (HCC)     Coronary artery disease     DDD (degenerative disc disease)     DM (diabetes mellitus), type 2 (HCC)     Type II, on insulin    BAER (dyspnea on exertion)     Easy bruising     Epistaxis     Gout     Gross hematuria     last assessed: 3/18/2015    Hepatitis     Herpes zoster     Hiatal hernia     History of cellulitis     BLE    History of prostate cancer     Radiation treatments      Hypercholesterolemia     Hyperlipidemia     Hypertension     Ischemic cardiomyopathy     Lung mass     last assessed: 9/21/2012    MI, old     Morbid obesity due to excess calories (HCC)     or severe obesity    Neuropathy     BLE    Obesity     Obstructive sleep apnea syndrome, severe     Pneumonia     last assessed: 7/24/2012    Shortness of breath     TIA (transient ischemic attack)     Urinary incontinence     Urinary retention     Use of cane as ambulatory aid     Independent with personal care       Present on Admission:   CKD (chronic kidney disease) stage 4, GFR 15-29 ml/min ([facility])   Morbid obesity (HCC)   Type 2 diabetes mellitus with hyperglycemia (HCC)   Atrial fibrillation (HCC)   Obstructive sleep apnea syndrome, severe   Malignant neoplasm of upper lobe of right lung (HCC)   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 stage 4 chronic kidney disease ([facility])      Admitting Diagnosis: Syncope [R55]  Head injury [S09 90XA]  Renal insufficiency [N28 9]  Elevated troponin I level [R74 8]  Injury of head, initial encounter [S09 90XA]  Contusion of knee and lower leg, right, initial encounter [S80 01XA, S80 11XA]  Contusion of knee and lower leg, left, initial encounter [S80 02XA, S80 12XA]  Closed sleeve fracture of left patella, initial encounter [S82 092A]  Age/Sex: 78 y o  male  Admission Orders:  Aleksey cho controlled diet  Telemetry  Knee immobolizer, oob with assist  IO  cpap  scd  Echo   Orthostatic bp  Current Facility-Administered Medications:  acetaminophen 650 mg Oral Q6H PRN   albuterol 2 5 mg Nebulization Q6H PRN   calcium carbonate 1,000 mg Oral Daily PRN   furosemide 80 mg Oral BID   gabapentin 300 mg Oral QAM   And      gabapentin 600 mg Oral HS   insulin detemir 45 Units Subcutaneous HS   insulin lispro 2-12 Units Subcutaneous HS   metoprolol tartrate 25 mg Oral BID   ondansetron 4 mg Intravenous Q6H PRN   pravastatin 80 mg Oral Daily With Dinner   senna 1 tablet Oral Daily   sodium chloride 100 mL/hr Intravenous Continuous   tamsulosin 0 4 mg Oral HS   traMADol 100 mg Oral BID   warfarin 5 mg Oral Daily (warfarin)       IP CONSULT TO ORTHOPEDIC SURGERY  IP CONSULT TO CASE MANAGEMENT  IP CONSULT TO [employer] Utilization Review Department  Phone: [phone]; Fax [phone]  [email or web address]  ATTENTION: Please call with any questions or concerns to [phone]  and carefully listen to the prompts so that you are directed to the right person  Send all requests for admission clinical reviews, approved or denied determinations and any other requests to fax [phone]   All voicemails are confidential

## 2019-07-16 NOTE — PHYSICAL THERAPY NOTE
Physical Therapy Evaluation    Patient Name: Aida Garrido    COFXF'E Date: 7/16/2019     Problem List  Patient Active Problem List   Diagnosis    Morbid [facility]    History of prostate cancer    Type 2 diabetes mellitus with hyperglycemia ([facility]    S/P CABG x 3    CKD (chronic kidney disease) stage 4, GFR 15-29 ml/min ([facility]    Body mass index (BMI) of 40 0 to 49 9    Wound of right lower extremity    Wound of left lower extremity    Hyperphosphatemia    Vitamin D deficiency    Renal cyst    Atrial fibrillation ([facility]    Blind right eye    Coronary artery disease    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) ([facility]    Obstructive sleep apnea syndrome, severe    Vision loss, 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    Hypokalemia    Anemia    Bilateral leg edema    Prostate cancer (HCC)    Severe obstructive sleep apnea-hypopnea syndrome    Malignant neoplasm of upper lobe of right lung (HCC)    Influenza A    Bacteriuria    Pneumonia of right lower lobe due to infectious organism ([facility]    Hypoglycemia associated with diabetes ([facility]    Elevated INR    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 stage 4 chronic kidney disease (HCC)    Obstructive chronic bronchitis without exacerbation ([facility]    Secondary hyperparathyroidism of renal origin ([facility]    Hematuria    Hypothyroidism    Closed nondisplaced fracture of left patella with routine healing        Past Medical History  Past Medical History:   Diagnosis Date    Anemia     Arthritis     Atrial fibrillation (HCC)     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[facility]     B12 deficiency     Back pain     Blind right eye     Cancer ([facility] )     prostate    Cardiac disorder     Chronic combined systolic and diastolic heart failure (HCC)     Chronic obstructive lung disease (HCC)     Chronic venous stasis dermatitis of both lower extremities     CKD (chronic kidney disease), stage IV (HCC)     Coronary artery disease     DDD (degenerative disc disease)     DM (diabetes mellitus), type 2 (HCC)     Type II, on insulin    BAER (dyspnea on exertion)     Easy bruising     Epistaxis     Gout     Gross hematuria     last assessed: 3/18/2015    Hepatitis     Herpes zoster     Hiatal hernia     History of cellulitis     BLE    History of prostate cancer     Radiation treatments   Hypercholesterolemia     Hyperlipidemia     Hypertension     Ischemic cardiomyopathy     Lung mass     last assessed: 9/21/2012    MI, old    [de-identified] Morbid obesity due to excess calories (HCC)     or severe obesity    Neuropathy     BLE    Obesity     Obstructive sleep apnea syndrome, severe     Pneumonia     last assessed: 7/24/2012    Shortness of breath     TIA (transient ischemic attack)     Urinary incontinence     Urinary retention     Use of cane as ambulatory aid     Independent with personal care          Past Surgical History  Past Surgical History:   Procedure Laterality Date    ABDOMINAL SURGERY      CARDIAC SURGERY      CATARACT EXTRACTION, BILATERAL      CORONARY ANGIOPLASTY WITH STENT PLACEMENT      2 stents    CORONARY ARTERY BYPASS GRAFT N/A 7/15/2016    Procedure: Intraop ADRIAN, CABG x 3 using LIMA to LAD, RSVG to OM1 and D1;  Surgeon: Staci Jaquez MD;  Location:  MAIN OR;  Service:    Pilo [place]      has stents    EYE SURGERY      Bilateral cataract removal    HERNIA REPAIR      umbilical hernia repair    LUNG CANCER SURGERY      Partial upper right lobectomy    LUNG SURGERY Left 2012    OTHER SURGICAL HISTORY      previous stent placement-no anatomy given    PILONIDAL CYST EXCISION      [place] CYSTOURETHROSCOPY,URETER CATHETER Left 3/9/2016    Procedure: CYSTOSCOPY WITH left RETROGRADE PYELOGRAM left double J stent removal;  Surgeon: Mary Kate Whittaker MD;  Location: [facility];  Service: Urology  NC TEMPORAL ARTERY LIGATN OR BX Bilateral 10/31/2017    Procedure: BIOPSY ARTERY TEMPORAL;  Surgeon: Dewayne Fisher MD;  Location: [facility];  Service: Vascular    RENAL ARTERY STENT  02/16/2015    transcath intravascular stent placement percutaneous renal    VASCULAR SURGERY             07/16/19 0916   Note Type   Note type Eval/Treat   Pain Assessment   Pain Assessment 0-10   Pain Score 8   Pain Location Leg   Pain Orientation Left   Home Living   Type of [address] One level; Able to live on main level with bedroom/bathroom; Performs ADLs on one level;Ramped entrance   [address] Walker;Cane;Wheelchair-manual   Prior Function   Level of [place] Independent with ADLs and functional mobility  ((I) ambulation with RW, recently got w/c for community)   Lives With Spouse  (and son)   Receives Help From Family   ADL Assistance Needs assistance  (assist with dressing, aide from [place] 2 days for bathing)   IADLs Needs assistance   Comments son drives   Restrictions/Precautions   Weight Bearing Precautions Per Order Yes   LLE Weight Bearing Per Order PWB  (25% WB)   Braces or Orthoses LE Immobilizer  (L LE immobilizer)   Other Precautions Fall Risk; Chair Alarm;O2;Contact/isolation;Pain   General   Family/Caregiver Present No   Cognition   Arousal/Participation Alert   Orientation Level Oriented to person;Oriented to place;Oriented to situation;Oriented to time   Following Commands Follows all commands and directions without difficulty   RLE Assessment   RLE Assessment WFL  (4/5)   LLE Assessment   LLE Assessment X  (unable to SLR in immobilizer, knee not tested, ankle [facility])   Coordination   Sensation [facility]   Light Touch   RLE Light Touch Grossly intact   LLE Light Touch Grossly intact   Bed Mobility   Additional Comments pt OOB in chair when PT entered room  Pt returned to sitting in chair with bell in reach     Transfers   Sit to Stand 3  Moderate assistance Additional items Assist x 2; Increased time required;Verbal cues;Armrests  (with RW)   Stand to Sit 3  Moderate assistance   Additional items Assist x 2;Verbal cues; Increased time required;Armrests  (with RW)   Additional Comments Pt requiring increased assistance for transfers due to L knee pain in immobilizer  Pt unable to maintain PWB during ambulation  Trialed 3 ft with RW requiring mod(A) x2 with difficulty advancing L LE  Pt at increased risk for falls   Ambulation/Elevation   Gait pattern Step to;L Foot drag; Antalgic;Narrow NICCI; Decreased L stance   Gait Assistance 3  Moderate assist   Additional items Assist x 2   Assistive Device Rolling walker   Distance 3ft with RW mod(A)x2   Balance   Static Sitting Good   Dynamic Sitting Fair +   Static Standing Fair  (with RW)   Dynamic Standing Fair -  (with RW)   Ambulatory Fair -  (with RW)   Endurance Deficit   Endurance Deficit Yes   Endurance Deficit Description limited ambulation and activity tolerance due to pain PWB and L knee in immobilizer   Activity Tolerance   Activity Tolerance Patient limited by fatigue;Patient limited by pain   Assessment   Prognosis Good   Problem List Decreased strength;Decreased range of motion;Decreased endurance; Impaired balance;Decreased mobility; Decreased safety awareness;Pain;Orthopedic restrictions   Assessment Pt is an 78year old male with complex PMH as listed above [facility] after a fall at home with L nondisplaced patella fx PWB in L knee immobilizer  Pt seen for high complexity PT evaluation presenting with decreased ROM and inability to perform SLR in immobilizer, increased pain with weightbearing and poor tolerance for ambulation with inability to maintain PWB L LE  Pt with difficulty advancing L LE for gait activity and to slide L LE forward during stand to sit transfers in immobilizer  Pt also requiring increased assistance x2 with all transfers and bed mobility   Pt unsafe to return home and is in need of continued activity in PT to improve all impairments and functional deficits to maximize current LOF  Goals   Patient Goals To feel better and return home   LTG Expiration Date 07/30/19   Long Term Goal #1 Decrease pain with WB to 4/10 at worse needed to improve all mobility  Improve bilateral LE strength by 1/2 muscle grade to improve bed mobility to (S) and transfers with RW to min(A)x1 in immobilizer maintaining PWB status   Long Term Goal #2 Improve standing and ambulatory balance to fair+ with RW to improve ambulation to 25ft with RW min(A)x1 maintaining PWB status no LOB   Plan   Treatment/Interventions Functional transfer training;LE strengthening/ROM; Therapeutic exercise; Endurance training;Patient/family training;Bed mobility;Gait training   PT Frequency 5x/wk  (5-7days/wk)   Recommendation   Recommendation Short-term skilled PT   PT - OK to Discharge Yes  (when medically stable to next level of care)   No SCD's  Pt seated in chair at bedside, bell in reach

## 2019-07-16 NOTE — ASSESSMENT & PLAN NOTE
Wt Readings from Last 3 Encounters:   07/15/19 (!) 153 kg (336 lb 6 8 oz)   06/27/19 (!) 140 kg (308 lb)   06/04/19 (!) 139 kg (306 lb 7 oz)      -continued home medications of Lasix and Lopressor

## 2019-07-16 NOTE — ASSESSMENT & PLAN NOTE
Lab Results   Component Value Date    HGBA1C 7 4 (H) 02/22/2019       Recent Labs     07/15/19  1955 07/15/19  2148   POCGLU 282* 213*       Blood Sugar Average: Last 72 hrs:  (P) 247 5     Continued basal/bolus insulin regimen  Continued with insulin sliding scale algorithm 3 sliding scale coverage

## 2019-07-16 NOTE — ASSESSMENT & PLAN NOTE
Baseline renal function is 2 4-2 7  Renal function improved past baseline  Continued diuretic regimen the form of Lasix 80 mg p o  B i d    Avoidance of nephro toxic agents was done

## 2019-07-16 NOTE — RESPIRATORY THERAPY NOTE
Refusal of cpap therapy   it was discovered patient has sleep apnea, when I questioned him, he stated that yes, he sometime wears a cpap  I told him I would get an order for the cpap and bring a machine into his room  He stated don't bother, he wasnt going to use the cpap   The nurse aide was in the room and also heard the refusal of the pap therapy

## 2019-07-16 NOTE — PROGRESS NOTES
Progress Note - Pablo Link 1940, 78 y o  male MRN: [id number]    Unit/Bed#: 416-01 Encounter: [id number]    Primary Care Provider: Ronny Velasco DO   Date and time admitted to hospital: 7/15/2019  2:39 PM        * Closed nondisplaced fracture of left patella with routine healing  Assessment & Plan  Ortho input appreciated  PT/OT  Patient will likely require short-term rehab, case management consult      CKD (chronic kidney disease) stage 4, GFR 15-29 ml/min (HCC)  Assessment & Plan  Baseline renal function is 2 4-2 7  Renal function improved past baseline  Continue diuretic regimen the form of Lasix 80 mg p o  B i d    Avoidance of nephro toxic agents    Hypothyroidism  Assessment & Plan  -Not on any medication  -TSH pending    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 stage 4 chronic kidney disease (HCC)  Assessment & Plan  Wt Readings from Last 3 Encounters:   07/15/19 (!) 153 kg (336 lb 6 8 oz)   06/27/19 (!) 140 kg (308 lb)   06/04/19 (!) 139 kg (306 lb 7 oz)      -continue home medications of Lasix and Lopressor        Malignant neoplasm of upper lobe of right lung [facility])  Assessment & Plan  Will recommend outpatient follow-up with Hematology/Oncology    Atrial fibrillation [facility])  Assessment & Plan  Rate controlled on current AV arsalan blocking regimen  Resume Coumadin  Trend daily INR    Type 2 diabetes mellitus with hyperglycemia [facility])  Assessment & Plan  Lab Results   Component Value Date    HGBA1C 7 4 (H) 02/22/2019       Recent Labs     07/15/19  1955   POCGLU 282*       Blood Sugar Average: Last 72 hrs:  (P) 282     Continue basal/bolus insulin regimen  Continue with insulin sliding scale algorithm 3 sliding scale coverage    Morbid obesity ([facility] [address] )  Assessment & Plan  -patient has a history of being morbidly obese  -nutrition consult        Progress Note - Pablo Link 78 y o  male MRN: [id number]    Unit/Bed#: 416-01 Encounter: [id number]        Subjective:   Patient seen examined at bedside, he states he was unable to work with physical therapy due to pain and left knee  Objective:     Vitals:   Vitals:    07/16/19 0804   BP: 146/64   Pulse: 81   Resp: 18   Temp: (!) 97 3 °F (36 3 °C)   SpO2: 97%     Body mass index is 52 69 kg/m²      Intake/Output Summary (Last 24 hours) at 7/16/2019 1004  Last data filed at 7/16/2019 0919  Gross per 24 hour   Intake 1265 ml   Output 350 ml   Net 915 ml       Physical Exam:   /64 (BP Location: Left arm)   Pulse 81   Temp (!) 97 3 °F (36 3 °C) (Temporal)   Resp 18   Ht 5' 7" (1 702 m)   Wt (!) 153 kg (336 lb 6 8 oz)   SpO2 97%   BMI 52 69 kg/m²   General appearance: alert and oriented, in no acute distress  Head: Normocephalic, without obvious abnormality, atraumatic  Lungs: clear to auscultation bilaterally  Heart:  Regularly irregular  Abdomen: soft, non-tender; bowel sounds normal; no masses,  no organomegaly  Extremities:  Chronic lower extremity venous stasis dermatitis, mild edema, left knee is in immobilizer  Pulses:  Palpable DP pulses  Neurologic: Grossly normal     Invasive Devices     Peripheral Intravenous Line            Peripheral IV 07/15/19 Right Antecubital less than 1 day                Results from last 7 days   Lab Units 07/16/19  0555 07/15/19  1455   WBC Thousand/uL 5 78 7 65   HEMOGLOBIN g/dL 11 3* 11 6*   HEMATOCRIT % 37 4 37 9   PLATELETS Thousands/uL 233 250       Results from last 7 days   Lab Units 07/16/19  0555 07/15/19  1455   POTASSIUM mmol/L 3 9 4 9   CHLORIDE mmol/L 105 103   CO2 mmol/L 30 28   BUN mg/dL 35* 37*   CREATININE mg/dL 2 06* 2 39*   CALCIUM mg/dL 8 5 8 4   ALK PHOS U/L 30* 35*   ALT U/L 17 19   AST U/L 17 18       Medication Administration - last 24 hours from 07/15/2019 1004 to 07/16/2019 1004       Date/Time Order Dose Route Action Action by     07/16/2019 0926 furosemide (LASIX) tablet 80 mg 80 mg Oral Given Dania Light RN 07/15/2019 1947 furosemide (LASIX) tablet 80 mg 80 mg Oral Given Stephen Santoyo RN     07/15/2019 2148 insulin detemir (LEVEMIR) subcutaneous injection 45 Units 45 Units Subcutaneous Given Stephen Santoyo RN     07/15/2019 2012 insulin lispro (HumaLOG) 100 units/mL subcutaneous injection 10 Units 10 Units Subcutaneous Not Given Stephen Santoyo RN     07/16/2019 0926 metoprolol tartrate (LOPRESSOR) tablet 25 mg 25 mg Oral Given Lola Sinclair RN     07/15/2019 1947 metoprolol tartrate (LOPRESSOR) tablet 25 mg 25 mg Oral Given Stephen Santoyo RN     07/15/2019 1947 pravastatin (PRAVACHOL) tablet 80 mg 80 mg Oral Given Stephen Santoyo RN     07/15/2019 2148 tamsulosin (FLOMAX) capsule 0 4 mg 0 4 mg Oral Given Stephen Santoyo RN     07/16/2019 0926 traMADol (ULTRAM) tablet 100 mg 100 mg Oral Given Lola Sinclair RN     07/15/2019 1946 traMADol (ULTRAM) tablet 100 mg 100 mg Oral Given Stephen Santoyo RN     07/16/2019 0531 sodium chloride 0 9 % infusion 100 mL/hr Intravenous [address] Stephen Santoyo RN     07/16/2019 0530 sodium chloride 0 9 % infusion 0 mL/hr Intravenous Stopped Stephen Santoyo RN     07/15/2019 1951 sodium chloride 0 9 % infusion 100 mL/hr Intravenous [address] Stephen Santoyo RN     07/16/2019 0163 senna (SENOKOT) tablet 8 6 mg 8 6 mg Oral Given Lola Sinclair RN     07/16/2019 0926 gabapentin (NEURONTIN) capsule 300 mg 300 mg Oral Given Lola Sinclair RN     07/15/2019 2148 gabapentin (NEURONTIN) capsule 600 mg 600 mg Oral Given Stephen Santoyo RN     07/15/2019 2148 insulin lispro (HumaLOG) 100 units/mL subcutaneous injection 2-12 Units 4 Units Subcutaneous Given Stephen Santoyo RN            Lab, Imaging and other studies: I have personally reviewed pertinent reports      VTE Pharmacologic Prophylaxis: Warfarin (Coumadin)  VTE Mechanical Prophylaxis: sequential compression device     Saeed Moyer MD  7/16/2019,10:04 AM

## 2019-07-17 PROBLEM — E03.9 HYPOTHYROIDISM: Status: RESOLVED | Noted: 2019-07-15 | Resolved: 2019-07-17

## 2019-07-17 LAB
ANION GAP SERPL CALCULATED.3IONS-SCNC: 7 MMOL/L (ref 4–13)
BASOPHILS # BLD AUTO: 0.06 THOUSANDS/ΜL (ref 0–0.1)
BASOPHILS NFR BLD AUTO: 1 % (ref 0–1)
BUN SERPL-MCNC: 36 MG/DL (ref 5–25)
CALCIUM SERPL-MCNC: 8 MG/DL (ref 8.3–10.1)
CHLORIDE SERPL-SCNC: 106 MMOL/L (ref 100–108)
CO2 SERPL-SCNC: 28 MMOL/L (ref 21–32)
CREAT SERPL-MCNC: 2.05 MG/DL (ref 0.6–1.3)
EOSINOPHIL # BLD AUTO: 0.25 THOUSAND/ΜL (ref 0–0.61)
EOSINOPHIL NFR BLD AUTO: 5 % (ref 0–6)
ERYTHROCYTE [DISTWIDTH] IN BLOOD BY AUTOMATED COUNT: 15.8 % (ref 11.6–15.1)
GFR SERPL CREATININE-BSD FRML MDRD: 30 ML/MIN/1.73SQ M
GLUCOSE SERPL-MCNC: 110 MG/DL (ref 65–140)
GLUCOSE SERPL-MCNC: 161 MG/DL (ref 65–140)
GLUCOSE SERPL-MCNC: 164 MG/DL (ref 65–140)
GLUCOSE SERPL-MCNC: 197 MG/DL (ref 65–140)
GLUCOSE SERPL-MCNC: 84 MG/DL (ref 65–140)
HCT VFR BLD AUTO: 34.5 % (ref 36.5–49.3)
HGB BLD-MCNC: 10.3 G/DL (ref 12–17)
IMM GRANULOCYTES # BLD AUTO: 0.02 THOUSAND/UL (ref 0–0.2)
IMM GRANULOCYTES NFR BLD AUTO: 0 % (ref 0–2)
INR PPP: 1.24 (ref 0.84–1.19)
LYMPHOCYTES # BLD AUTO: 0.86 THOUSANDS/ΜL (ref 0.6–4.47)
LYMPHOCYTES NFR BLD AUTO: 17 % (ref 14–44)
MCH RBC QN AUTO: 29 PG (ref 26.8–34.3)
MCHC RBC AUTO-ENTMCNC: 29.9 G/DL (ref 31.4–37.4)
MCV RBC AUTO: 97 FL (ref 82–98)
MONOCYTES # BLD AUTO: 0.56 THOUSAND/ΜL (ref 0.17–1.22)
MONOCYTES NFR BLD AUTO: 11 % (ref 4–12)
NEUTROPHILS # BLD AUTO: 3.36 THOUSANDS/ΜL (ref 1.85–7.62)
NEUTS SEG NFR BLD AUTO: 66 % (ref 43–75)
NRBC BLD AUTO-RTO: 0 /100 WBCS
PLATELET # BLD AUTO: 198 THOUSANDS/UL (ref 149–390)
PMV BLD AUTO: 11.2 FL (ref 8.9–12.7)
POTASSIUM SERPL-SCNC: 3.8 MMOL/L (ref 3.5–5.3)
PROTHROMBIN TIME: 15.7 SECONDS (ref 11.6–14.5)
RBC # BLD AUTO: 3.55 MILLION/UL (ref 3.88–5.62)
SODIUM SERPL-SCNC: 141 MMOL/L (ref 136–145)
WBC # BLD AUTO: 5.11 THOUSAND/UL (ref 4.31–10.16)

## 2019-07-17 PROCEDURE — 97116 GAIT TRAINING THERAPY: CPT

## 2019-07-17 PROCEDURE — 99024 POSTOP FOLLOW-UP VISIT: CPT | Performed by: PHYSICIAN ASSISTANT

## 2019-07-17 PROCEDURE — 97110 THERAPEUTIC EXERCISES: CPT

## 2019-07-17 PROCEDURE — 97530 THERAPEUTIC ACTIVITIES: CPT

## 2019-07-17 PROCEDURE — 85025 COMPLETE CBC W/AUTO DIFF WBC: CPT | Performed by: FAMILY MEDICINE

## 2019-07-17 PROCEDURE — 85610 PROTHROMBIN TIME: CPT | Performed by: FAMILY MEDICINE

## 2019-07-17 PROCEDURE — 99232 SBSQ HOSP IP/OBS MODERATE 35: CPT | Performed by: FAMILY MEDICINE

## 2019-07-17 PROCEDURE — 82948 REAGENT STRIP/BLOOD GLUCOSE: CPT

## 2019-07-17 PROCEDURE — 80048 BASIC METABOLIC PNL TOTAL CA: CPT | Performed by: FAMILY MEDICINE

## 2019-07-17 RX ORDER — POTASSIUM CHLORIDE 750 MG/1
10 TABLET, EXTENDED RELEASE ORAL ONCE
Status: COMPLETED | OUTPATIENT
Start: 2019-07-17 | End: 2019-07-17

## 2019-07-17 RX ADMIN — INSULIN LISPRO 1 UNITS: 100 INJECTION, SOLUTION INTRAVENOUS; SUBCUTANEOUS at 16:38

## 2019-07-17 RX ADMIN — INSULIN DETEMIR 45 UNITS: 100 INJECTION, SOLUTION SUBCUTANEOUS at 22:14

## 2019-07-17 RX ADMIN — TRAMADOL HYDROCHLORIDE 100 MG: 50 TABLET, COATED ORAL at 18:31

## 2019-07-17 RX ADMIN — SENNOSIDES 8.6 MG: 8.6 TABLET, FILM COATED ORAL at 08:04

## 2019-07-17 RX ADMIN — SODIUM CHLORIDE 100 ML/HR: 0.9 INJECTION, SOLUTION INTRAVENOUS at 14:03

## 2019-07-17 RX ADMIN — PRAVASTATIN SODIUM 80 MG: 40 TABLET ORAL at 16:35

## 2019-07-17 RX ADMIN — INSULIN LISPRO 2 UNITS: 100 INJECTION, SOLUTION INTRAVENOUS; SUBCUTANEOUS at 22:14

## 2019-07-17 RX ADMIN — WARFARIN SODIUM 5 MG: 5 TABLET ORAL at 18:32

## 2019-07-17 RX ADMIN — INSULIN LISPRO 1 UNITS: 100 INJECTION, SOLUTION INTRAVENOUS; SUBCUTANEOUS at 11:33

## 2019-07-17 RX ADMIN — GABAPENTIN 600 MG: 300 CAPSULE ORAL at 22:11

## 2019-07-17 RX ADMIN — POTASSIUM CHLORIDE 10 MEQ: 750 TABLET, EXTENDED RELEASE ORAL at 07:57

## 2019-07-17 RX ADMIN — GABAPENTIN 300 MG: 300 CAPSULE ORAL at 08:04

## 2019-07-17 RX ADMIN — FUROSEMIDE 80 MG: 80 TABLET ORAL at 08:04

## 2019-07-17 RX ADMIN — TRAMADOL HYDROCHLORIDE 100 MG: 50 TABLET, COATED ORAL at 08:04

## 2019-07-17 RX ADMIN — SODIUM CHLORIDE 100 ML/HR: 0.9 INJECTION, SOLUTION INTRAVENOUS at 02:57

## 2019-07-17 RX ADMIN — METOPROLOL TARTRATE 25 MG: 25 TABLET, FILM COATED ORAL at 08:04

## 2019-07-17 RX ADMIN — TAMSULOSIN HYDROCHLORIDE 0.4 MG: 0.4 CAPSULE ORAL at 22:12

## 2019-07-17 NOTE — SOCIAL WORK
Pt was accepted on 5th floor when medically ready  Pt does not have a secondary insurance  Notified patient that medicare will cover first 21 days 100 %   After that medicare covers 80% so Patient will have to privately pay or apply for medical assistance for the remaining 20 %  Pt and his wife are agreeable to same

## 2019-07-17 NOTE — SOCIAL WORK
[facility] center is unable to accept the patient

## 2019-07-17 NOTE — PLAN OF CARE
Problem: Potential for Falls  Goal: Patient will remain free of falls  Description  INTERVENTIONS:  - Assess patient frequently for physical needs  -  Identify cognitive and physical deficits and behaviors that affect risk of falls    -  Yoder fall precautions as indicated by assessment   - Educate patient/family on patient safety including physical limitations  - Instruct patient to call for assistance with activity based on assessment  - Modify environment to reduce risk of injury  - Consider OT/PT consult to assist with strengthening/mobility  Outcome: Progressing     Problem: Prexisting or High Potential for Compromised Skin Integrity  Goal: Skin integrity is maintained or improved  Description  INTERVENTIONS:  - Identify patients at risk for skin breakdown  - Assess and monitor skin integrity  - Assess and monitor nutrition and hydration status  - Monitor labs (i e  albumin)  - Assess for incontinence   - Turn and reposition patient  - Assist with mobility/ambulation  - Relieve pressure over bony prominences  - Avoid friction and shearing  - Provide appropriate hygiene as needed including keeping skin clean and dry  - Evaluate need for skin moisturizer/barrier cream  - Collaborate with interdisciplinary team (i e  Nutrition, Rehabilitation, etc )   - Patient/family teaching  Outcome: Progressing     Problem: DISCHARGE PLANNING - CARE MANAGEMENT  Goal: Discharge to post-acute care or home with appropriate resources  Description  INTERVENTIONS:  - Conduct assessment to determine patient/family and health care team treatment goals, and need for post-acute services based on payer coverage, community resources, and patient preferences, and barriers to discharge  - Address psychosocial, clinical, and financial barriers to discharge as identified in assessment in conjunction with the patient/family and health care team  - Arrange appropriate level of post-acute services according to patient's   needs and preference and payer coverage in collaboration with the physician and health care team  - Communicate with and update the patient/family, physician, and health care team regarding progress on the discharge plan  - Arrange appropriate transportation to post-acute venues  Pt will need STR on discharge   Outcome: Progressing     Problem: Nutrition/Hydration-ADULT  Goal: Nutrient/Hydration intake appropriate for improving, restoring or maintaining nutritional needs  Description  Monitor and assess patient's nutrition/hydration status for malnutrition (ex- brittle hair, bruises, dry skin, pale skin and conjunctiva, muscle wasting, smooth red tongue, and disorientation)  Collaborate with interdisciplinary team and initiate plan and interventions as ordered  Monitor patient's weight and dietary intake as ordered or per policy  Utilize nutrition screening tool and intervene per policy  Determine patient's food preferences and provide high-protein, high-caloric foods as appropriate       INTERVENTIONS:  - Monitor oral intake, urinary output, labs, and treatment plans  - Assess nutrition and hydration status and recommend course of action  - Evaluate amount of meals eaten  - Assist patient with eating if necessary   - Allow adequate time for meals  - Recommend/ encourage appropriate diets, oral nutritional supplements, and vitamin/mineral supplements  - Order, calculate, and assess calorie counts as needed  - Assess need for intravenous fluids  - Provide specific nutrition/hydration education as appropriate  - Include patient/family/caregiver in decisions related to nutrition   Outcome: Progressing

## 2019-07-17 NOTE — PLAN OF CARE
Problem: PHYSICAL THERAPY ADULT  Goal: Performs mobility at highest level of function for planned discharge setting  See evaluation for individualized goals  Outcome: Progressing  Note:   Prognosis: Good  Problem List: Decreased strength, Decreased range of motion, Decreased endurance, Impaired balance, Decreased mobility, Decreased safety awareness, Orthopedic restrictions, Pain  Assessment: Pt  Currently performing bed mobility, tx and ambulation at ( mod-min) x  1-2 level of function  Utilizing RW with fair balance and stability  Difficulty maintaining PWB LLE  Immobilizer in place  In comparison to previous session, Pt  With improvements in activity tolerance  Transfer training to increase functional task performance and  to promote safe sit/stand and stand/sit mobility  Pt  education  for hand postion and safety and technique with transfer training  Pt is in need of continued activity in PT to improve strength balance endurance mobility transfers and ambulation with return to maximize LOF  From PT/mobility standpoint, recommendation at time of d/c would be STR  in order to promote return to PLOF and independence  Recommendation: Short-term skilled PT         See flowsheet documentation for full assessment

## 2019-07-17 NOTE — OCCUPATIONAL THERAPY NOTE
OT Note     07/17/19 1058   Restrictions/Precautions   Braces or Orthoses LE Immobilizer   Other Precautions O2;Chair Alarm; Fall Risk;Contact/isolation   Transfers   Sit to Stand 3  Moderate assistance   Additional items Assist x 2; Increased time required;Verbal cues  (Bed ht raised)   Stand to Sit 3  Moderate assistance   Additional items Armrests; Verbal cues   Functional Mobility   Functional Mobility 4  Minimal assistance   Additional Comments A x 2 to complete approx  12' func mobility, cues to [employer] status, unsure compliance   Additional items Rolling walker   Therapeutic Excerise-Strength   UE Strength Yes   Right Upper Extremity- Strength   R Shoulder   (Deferred, unable complete on attempt)   R Elbow Elbow flexion;Elbow extension   R Wrist Wrist flexion;Wrist extension   R Hand   (Forearm pro/supination)   R Weight/Reps/Sets 3# thera p bar, 3# free wt 3 sets/15   Left Upper Extremity-Strength   L Shoulder   (Defered secondary unable on attempt)   L Elbow Elbow flexion;Elbow extension   L Wrist Wrist flexion;Wrist extension   L Hand   (fForearm pro/supination)   L Weights/Reps/Sets 3# thera p bar, 3# free wt 3 sets/15   Activity Tolerance   Activity Tolerance Patient tolerated treatment well   Assessment   Assessment Pt presents with multiple deficits affecting overall func performance as per initial eval   Unsure compliance with WBing limitations LLE  Spoke with OTR who is in agreement to continue active OT serviced in attempt to facilitate return to highest LOF  Plan   Goal Expiration Date 07/30/19   Treatment Day 1   Recommendation   OT Discharge Recommendation Short Term Rehab   Remains seated recliner at bedside  O2 @ 3 liters thruout  Chair alarm activated  All needs in reach

## 2019-07-17 NOTE — PLAN OF CARE
Problem: OCCUPATIONAL THERAPY ADULT  Goal: Performs self-care activities at highest level of function for planned discharge setting  See evaluation for individualized goals  Outcome: Progressing  Note:   Limitation: Decreased ADL status, Decreased UE ROM, Decreased UE strength, Decreased Safe judgement during ADL, Decreased endurance, Decreased self-care trans, Decreased high-level ADLs  Prognosis: Fair  Assessment: Pt presents with multiple deficits affecting overall func performance as per initial eval   Unsure compliance with WBing limitations LLE  Spoke with OTR who is in agreement to continue active OT serviced in attempt to facilitate return to highest LOF       OT Discharge Recommendation: Short Term Rehab  OT - OK to Discharge: No

## 2019-07-17 NOTE — OCCUPATIONAL THERAPY NOTE
OccupationalTherapy Evaluation     Patient Name: Suzanne Gamboa  [id number] Date: 7/16/2019  Problem List  Patient Active Problem List   Diagnosis    Morbid obesity ([facility] [address] )    History of prostate cancer    Type 2 diabetes mellitus with hyperglycemia ([facility] [address] )    S/P CABG x 3    CKD (chronic kidney disease) stage 4, GFR 15-29 ml/min ([facility])    Body mass index (BMI) of 40 0 to 49 9    Wound of right lower extremity    Wound of left lower extremity    Hyperphosphatemia    Vitamin D deficiency    Renal cyst    Atrial fibrillation ([facility] [address] )    Blind right eye    Coronary artery disease    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) ([facility] )    Obstructive sleep apnea syndrome, severe    Vision loss, 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    Hypokalemia    Anemia    Bilateral leg edema    Prostate cancer (HCC)    Severe obstructive sleep apnea-hypopnea syndrome    Malignant neoplasm of upper lobe of right lung (HCC)    Influenza A    Bacteriuria    Pneumonia of right lower lobe due to infectious organism ([facility] )    Hypoglycemia associated with diabetes ([facility] )    Elevated INR    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 stage 4 chronic kidney disease (HCC)    Obstructive chronic bronchitis without exacerbation ([address]    Secondary hyperparathyroidism of renal origin ([address] )    Hematuria    Hypothyroidism    Closed nondisplaced fracture of left patella with routine healing     Past Medical History  Past Medical History:   Diagnosis Date    Anemia     Arthritis     Atrial fibrillation ([facility])     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[address] )     B12 deficiency     Back pain     Blind right eye     Cancer ([address] )     prostate    Cardiac disorder     Chronic combined systolic and diastolic heart failure (HCC)     Chronic obstructive lung disease (HCC)     Chronic venous stasis dermatitis of both lower extremities     CKD (chronic kidney disease), stage IV ([facility] [address] )     Coronary artery disease     DDD (degenerative disc disease)     DM (diabetes mellitus), type 2 (HCC)     Type II, on insulin    BAER (dyspnea on exertion)     Easy bruising     Epistaxis     Gout     Gross hematuria     last assessed: 3/18/2015    Hepatitis     Herpes zoster     Hiatal hernia     History of cellulitis     BLE    History of prostate cancer     Radiation treatments   Hypercholesterolemia     Hyperlipidemia     Hypertension     Ischemic cardiomyopathy     Lung mass     last assessed: 9/21/2012    MI, [facility] Morbid obesity due to excess calories (HCC)     or severe obesity    Neuropathy     BLE    Obesity     Obstructive sleep apnea syndrome, severe     Pneumonia     last assessed: 7/24/2012    Shortness of breath     TIA (transient ischemic attack)     Urinary incontinence     Urinary retention     Use of cane as ambulatory aid     Independent with personal care       Past Surgical History  Past Surgical History:   Procedure Laterality Date    ABDOMINAL SURGERY      CARDIAC SURGERY      CATARACT EXTRACTION, BILATERAL      CORONARY ANGIOPLASTY WITH STENT PLACEMENT      2 stents    CORONARY ARTERY BYPASS GRAFT N/A 7/15/2016    Procedure: Intraop ADRIAN, CABG x 3 using LIMA to LAD, RSVG to OM1 and D1;  Surgeon: Marylen Glad, MD;  Location:  MAIN OR;  Service:    Micheleiajohn Munoz      has stents    EYE SURGERY      Bilateral cataract removal    HERNIA REPAIR      umbilical hernia repair    LUNG CANCER SURGERY      Partial upper right lobectomy    LUNG SURGERY Left 2012    OTHER SURGICAL HISTORY      previous stent placement-no anatomy given    PILONIDAL CYST EXCISION      ME CYSTOURETHROSCOPY,URETER CATHETER Left 3/9/2016    Procedure: CYSTOSCOPY WITH left RETROGRADE PYELOGRAM left double J stent removal;  Surgeon: Lan Toussaint MD;  Location: [facility];  Service: Urology    ME TEMPORAL ARTERY LIGATN OR BX Bilateral 10/31/2017 Procedure: BIOPSY ARTERY TEMPORAL;  Surgeon: Skip Reddy MD;  Location: [facility];  Service: Vascular    RENAL ARTERY STENT  02/16/2015    transcath intravascular stent placement percutaneous renal    VASCULAR SURGERY               07/16/19 0854   Note Type   Note type Eval/Treat   Restrictions/Precautions   Weight Bearing Precautions Per Order Yes   LLE Weight Bearing Per Order PWB   Braces or Orthoses LE Immobilizer   Other Precautions Contact/isolation; Chair Alarm;O2;Fall Risk   Pain Assessment   Pain Assessment 0-10   Pain Score 8   Pain Location Leg   Pain Orientation Left   Home Living   Type of [address] One level;Performs ADLs on one level; Able to live on main level with bedroom/bathroom; Ramped entrance   [address] Walker;Cane;Wheelchair-manual   Prior Function   Level of [place] Independent with ADLs and functional mobility   Lives With Spouse   Receives Help From Family   ADL Assistance Needs assistance   IADLs Needs assistance   Comments Son Drives  Patient has [place] assistance for bathing 2x a week for 1-2 hours   Psychosocial   Psychosocial (WDL) WDL   ADL   Where Assessed Edge of bed   UB Dressing Assistance 3  Moderate Assistance   LB Dressing Assistance 2  Maximal Assistance   Bed Mobility   Additional Comments Pt seated on bedside chair at the start and end of session  Transfers   Sit to Stand 3  Moderate assistance   Additional items Assist x 2   Stand to Sit 3  Moderate assistance   Additional items Assist x 2   Functional Mobility   Functional Mobility 3  Moderate assistance   Additional Comments Patient ambulated 3 feet with MOD A and use of bariatric walker and chair follow-up  Patient required cueing for sequencing and weight bearing for PWB of L LE      Balance   Static Sitting Good   Dynamic Sitting Fair +   Static Standing Fair   Dynamic Standing Fair -   Ambulatory Fair -   Activity Tolerance   Activity Tolerance Patient limited by fatigue;Patient limited by pain   RUE Assessment   RUE Assessment   (Limited ROM)   LUE Assessment   LUE Assessment   (Limited ROM)   Hand Function   Gross Motor Coordination Impaired   Fine Motor Coordination Functional   Sensation   Light Touch No apparent deficits   Cognition   Orientation Level Oriented X4   Assessment   Limitation Decreased ADL status; Decreased UE ROM; Decreased UE strength;Decreased Safe judgement during ADL;Decreased endurance;Decreased self-care trans;Decreased high-level ADLs   Prognosis Fair   Assessment Patient presenting with left LE patella fracture  Patient wearing Vivia Turpin during session  Patient demonsrating with decreased ROM, strength, balance, mobility and activity participation  Patient will benefit from continued OT services to address areas of improvement  Patient is expected to attend short term rehab prior to returning home  Goals   Patient Goals Get home   Short Term Goal #1 Patient will complete UE strengthening exercises to improve I with transfers   Short Term Goal #2 Patient will complete sit to stand with MIN A with use of RW   Short Term Goal  Patient will transfer to bedside commode with MIN A x 2   Plan   Treatment Interventions Visual perceptual retraining;Functional transfer training;UE strengthening/ROM; Patient/family training; Activityengagement   Goal Expiration Date 07/30/19   OT Frequency 3-5x/wk   Additional Treatment Session   Start Time 2521   End Time 0853   Treatment Assessment Patient completed sit to stand with MOD A X 2 and use of RW  Patient ambulated 3 steps with chair follow and MOD A x 2  Patient required cues for WB and sequencing  Patient seated on bedside chair at end of session with call bell and phone within reach   Patients spO2 was 94% on 3L and HR 74   Recommendation   OT Discharge Recommendation Short Term Rehab   OT - OK to Discharge No   Barthel Index   Feeding 10   Bathing 0   Grooming Score 5   Dressing Score 5   Bladder Score 10   Bowels Score 10   Toilet Use Score 5   Transfers (Bed/Chair) Score 5   Mobility (Level Surface) Score 5   Stairs Score 0   Barthel Index Score 55

## 2019-07-17 NOTE — PROGRESS NOTES
Progress Note - Orthopedics   Tony Maldonado 78 y o  male MRN: [id number]  Unit/Bed#: 416-01      Subjective:    78 y o male with slightly improved left knee pain and decreased left hip pain status post patellar fracture  Patient reports he was of and ambulated yesterday with some soreness  Pt doing ok  Pain controlled   Denies fevers chills, CP, SOB    Labs:  0   Lab Value Date/Time    HCT 34 5 (L) 07/17/2019 0514    HCT 37 4 07/16/2019 0555    HCT 37 9 07/15/2019 1455    HCT 39 5 09/16/2015 1137    HCT 36 8 06/02/2015 1158    HCT 35 5 (L) 04/27/2015 0620    HGB 10 3 (L) 07/17/2019 0514    HGB 11 3 (L) 07/16/2019 0555    HGB 11 6 (L) 07/15/2019 1455    HGB 13 0 09/16/2015 1137    HGB 11 9 (L) 06/02/2015 1158    HGB 11 5 (L) 04/27/2015 0620    INR 1 24 (H) 07/17/2019 0514    INR 1 00 04/21/2015 1408    WBC 5 11 07/17/2019 0514    WBC 5 78 07/16/2019 0555    WBC 7 65 07/15/2019 1455    WBC 6 14 09/16/2015 1137    WBC 11 29 (H) 06/02/2015 1158    WBC 5 84 04/27/2015 0620    ESR 18 (H) 10/24/2017 0650    CRP 8 4 (H) 10/24/2017 0650     Meds:    Current Facility-Administered Medications:     acetaminophen (TYLENOL) tablet 650 mg, 650 mg, Oral, Q6H PRN, Siva Muhammad MD    albuterol inhalation solution 2 5 mg, 2 5 mg, Nebulization, Q6H PRN, Siva Muhammad MD    calcium carbonate (TUMS) chewable tablet 1,000 mg, 1,000 mg, Oral, Daily PRN, Siva Muhammad MD    furosemide (LASIX) tablet 80 mg, 80 mg, Oral, BID, Siva Muhammad MD, 80 mg at 07/16/19 1837    gabapentin (NEURONTIN) capsule 300 mg, 300 mg, Oral, QAM, 300 mg at 07/16/19 0926 **AND** gabapentin (NEURONTIN) capsule 600 mg, 600 mg, Oral, HS, Siva Muhammad MD, 600 mg at 07/16/19 2250    insulin detemir (LEVEMIR) subcutaneous injection 45 Units, 45 Units, Subcutaneous, HS, Siva Muhammad MD, 45 Units at 07/16/19 2251    insulin lispro (HumaLOG) 100 units/mL subcutaneous injection 1-6 Units, 1-6 Units, Subcutaneous, TID AC, 2 Units at 07/16/19 1637 **AND** Fingerstick Glucose (POCT), , , TID AC, Jamari Jolly MD    insulin lispro (HumaLOG) 100 units/mL subcutaneous injection 2-12 Units, 2-12 Units, Subcutaneous, HS, Kelvin Guzmán MD, 2 Units at 07/16/19 2254    metoprolol tartrate (LOPRESSOR) tablet 25 mg, 25 mg, Oral, BID, Kelvin Guzmán MD, 25 mg at 07/16/19 1837    ondansetron (ZOFRAN) injection 4 mg, 4 mg, Intravenous, Q6H PRN, Kelvin Guzmán MD    pravastatin (PRAVACHOL) tablet 80 mg, 80 mg, Oral, Daily With Cande Arnold MD, 80 mg at 07/16/19 1633    senna (SENOKOT) tablet 8 6 mg, 1 tablet, Oral, Daily, Kelvin Guzmán MD, 8 6 mg at 07/16/19 0926    sodium chloride 0 9 % infusion, 100 mL/hr, Intravenous, Continuous, Kelvin Guzmán MD, Last Rate: 100 mL/hr at 07/17/19 0257, 100 mL/hr at 07/17/19 0257    tamsulosin (FLOMAX) capsule 0 4 mg, 0 4 mg, Oral, HS, Kelvin Guzmán MD, 0 4 mg at 07/16/19 2249    traMADol (ULTRAM) tablet 100 mg, 100 mg, Oral, BID, Kelvin Guzmán MD, 100 mg at 07/16/19 5599    warfarin (COUMADIN) tablet 5 mg, 5 mg, Oral, Daily (warfarin), Chino Hoang MD, 5 mg at 07/16/19 1837    Blood Culture:   Lab Results   Component Value Date    BLOODCX No Growth After 5 Days  12/14/2018     Wound Culture:   No results found for: WOUNDCULT    Ins and Outs:  I/O last 24 hours: In: 1540 [P O :540; I V :1000]  Out: 475 [Urine:475]    Physical:  Vitals:    07/17/19 0736   BP: 135/62   Pulse: 72   Resp: 18   Temp: 99 1 °F (37 3 °C)   SpO2: 96%     Musculoskeletal: left Lower Extremity  · Skin intact without ecchymosis anteriorly over the knee  Stasis dermatitis bilateral shins  · Knee immobilizer in place  · Active dorsiflexion and plantar flexion or intact to the left ankle as is sensation to the ankle  · Decrease tenderness with palpation over the left patella  · Tolerating knee immobilizer      Assessment:    79 y o male with nondisplaced left lateral patellar fracture treated conservatively a current due to multiple medical problems  Plan:  · 25% partial weight-bearing left lower extremity with walker in the immobilizer  · PT/OT for straight leg quad and hamstrings sets and ambulation  · Pain control per primary service  · DVT ppx  · Patient may need SNF for short-term rehab  · Should have follow-up with orthopedic office in 2 weeks for repeat x-ray left patella      Cisco Clemons PA-C

## 2019-07-17 NOTE — PHYSICAL THERAPY NOTE
PT treatment note      07/17/19 1032   Restrictions/Precautions   Weight Bearing Precautions Per Order Yes   LLE Weight Bearing Per Order PWB   Braces or Orthoses LE Immobilizer   Other Precautions   (O2;Chair Alarm; Fall Risk;Contact/isolation)   Bed Mobility   Sit to Supine 3  Moderate assistance   Additional items Assist x 1;HOB elevated; Bedrails;Assist x 2; Increased time required;Verbal cues;LE management   Transfers   Sit to Stand 3  Moderate assistance   Additional items Assist x 2;Bedrails; Increased time required;Verbal cues   Stand to Sit 3  Moderate assistance   Additional items Assist x 2;Armrests; Increased time required;Verbal cues   Ambulation/Elevation   Gait pattern   (Step to;L Foot drag; Antalgic;Narrow NICCI; Decreased L stance)   Gait Assistance 4  Minimal assist   Additional items Assist x 2;Verbal cues   Assistive Device Rolling walker   Distance 10' chair follow   Balance   Ambulatory Fair   Endurance Deficit   Endurance Deficit Yes   Activity Tolerance   Activity Tolerance Patient limited by fatigue;Patient limited by pain   Assessment   Prognosis Good   Problem List Decreased strength;Decreased range of motion;Decreased endurance; Impaired balance;Decreased mobility; Decreased safety awareness;Orthopedic restrictions;Pain   Assessment Pt  Currently performing bed mobility, tx and ambulation at ( mod-min) x  1-2 level of function  Utilizing RW with fair balance and stability  Difficulty maintaining PWB LLE  Immobilizer in place  In comparison to previous session, Pt  With improvements in activity tolerance  Transfer training to increase functional task performance and  to promote safe sit/stand and stand/sit mobility  Pt  education  for hand postion and safety and technique with transfer training  Pt is in need of continued activity in PT to improve strength balance endurance mobility transfers and ambulation with return to maximize LOF   From PT/mobility standpoint, recommendation at time of d/c would be STR  in order to promote return to PLOF and independence  Goals   LTG Expiration Date 07/30/19   Treatment Day 1   Plan   Treatment/Interventions Functional transfer training;LE strengthening/ROM; Therapeutic exercise; Endurance training;Bed mobility;Gait training   Progress Slow progress, decreased activity tolerance   Recommendation   Recommendation Short-term skilled PT   Pt  OOB in chair  with call bell within reach  and alarm on at end of PT session  Discussed with Kacey Jasso, PT today's treatment and patient's current level of function for care coordination

## 2019-07-18 ENCOUNTER — TRANSITIONAL CARE MANAGEMENT (OUTPATIENT)
Dept: FAMILY MEDICINE CLINIC | Facility: CLINIC | Age: 79
End: 2019-07-18

## 2019-07-18 ENCOUNTER — HOSPITAL ENCOUNTER (INPATIENT)
Facility: HOSPITAL | Age: 79
LOS: 20 days | Discharge: HOME HEALTH | DRG: 559 | End: 2019-08-07
Attending: INTERNAL MEDICINE | Admitting: INTERNAL MEDICINE
Payer: MEDICARE

## 2019-07-18 VITALS
HEART RATE: 85 BPM | OXYGEN SATURATION: 99 % | HEIGHT: 67 IN | SYSTOLIC BLOOD PRESSURE: 149 MMHG | DIASTOLIC BLOOD PRESSURE: 64 MMHG | TEMPERATURE: 97.6 F | BODY MASS INDEX: 49.44 KG/M2 | RESPIRATION RATE: 18 BRPM | WEIGHT: 315 LBS

## 2019-07-18 DIAGNOSIS — N18.4 CKD (CHRONIC KIDNEY DISEASE) STAGE 4, GFR 15-29 ML/MIN (HCC): Chronic | ICD-10-CM

## 2019-07-18 DIAGNOSIS — I48.20 CHRONIC ATRIAL FIBRILLATION (HCC): Primary | ICD-10-CM

## 2019-07-18 LAB
ANION GAP SERPL CALCULATED.3IONS-SCNC: 8 MMOL/L (ref 4–13)
BUN SERPL-MCNC: 40 MG/DL (ref 5–25)
CALCIUM SERPL-MCNC: 7.7 MG/DL (ref 8.3–10.1)
CHLORIDE SERPL-SCNC: 107 MMOL/L (ref 100–108)
CO2 SERPL-SCNC: 27 MMOL/L (ref 21–32)
CREAT SERPL-MCNC: 2.12 MG/DL (ref 0.6–1.3)
ERYTHROCYTE [DISTWIDTH] IN BLOOD BY AUTOMATED COUNT: 15.6 % (ref 11.6–15.1)
GFR SERPL CREATININE-BSD FRML MDRD: 28 ML/MIN/1.73SQ M
GLUCOSE SERPL-MCNC: 100 MG/DL (ref 65–140)
GLUCOSE SERPL-MCNC: 108 MG/DL (ref 65–140)
GLUCOSE SERPL-MCNC: 141 MG/DL (ref 65–140)
GLUCOSE SERPL-MCNC: 167 MG/DL (ref 65–140)
HCT VFR BLD AUTO: 33.3 % (ref 36.5–49.3)
HGB BLD-MCNC: 10.1 G/DL (ref 12–17)
MAGNESIUM SERPL-MCNC: 2 MG/DL (ref 1.6–2.6)
MCH RBC QN AUTO: 29.3 PG (ref 26.8–34.3)
MCHC RBC AUTO-ENTMCNC: 30.3 G/DL (ref 31.4–37.4)
MCV RBC AUTO: 97 FL (ref 82–98)
PLATELET # BLD AUTO: 228 THOUSANDS/UL (ref 149–390)
PMV BLD AUTO: 9.8 FL (ref 8.9–12.7)
POTASSIUM SERPL-SCNC: 3.9 MMOL/L (ref 3.5–5.3)
RBC # BLD AUTO: 3.45 MILLION/UL (ref 3.88–5.62)
SODIUM SERPL-SCNC: 142 MMOL/L (ref 136–145)
WBC # BLD AUTO: 5 THOUSAND/UL (ref 4.31–10.16)

## 2019-07-18 PROCEDURE — 85027 COMPLETE CBC AUTOMATED: CPT | Performed by: STUDENT IN AN ORGANIZED HEALTH CARE EDUCATION/TRAINING PROGRAM

## 2019-07-18 PROCEDURE — 97530 THERAPEUTIC ACTIVITIES: CPT

## 2019-07-18 PROCEDURE — 97535 SELF CARE MNGMENT TRAINING: CPT

## 2019-07-18 PROCEDURE — 80048 BASIC METABOLIC PNL TOTAL CA: CPT | Performed by: STUDENT IN AN ORGANIZED HEALTH CARE EDUCATION/TRAINING PROGRAM

## 2019-07-18 PROCEDURE — 97167 OT EVAL HIGH COMPLEX 60 MIN: CPT

## 2019-07-18 PROCEDURE — 97116 GAIT TRAINING THERAPY: CPT

## 2019-07-18 PROCEDURE — 82948 REAGENT STRIP/BLOOD GLUCOSE: CPT

## 2019-07-18 PROCEDURE — 83735 ASSAY OF MAGNESIUM: CPT | Performed by: STUDENT IN AN ORGANIZED HEALTH CARE EDUCATION/TRAINING PROGRAM

## 2019-07-18 PROCEDURE — 97163 PT EVAL HIGH COMPLEX 45 MIN: CPT

## 2019-07-18 RX ORDER — POTASSIUM CHLORIDE 20 MEQ/1
20 TABLET, EXTENDED RELEASE ORAL ONCE
Status: COMPLETED | OUTPATIENT
Start: 2019-07-18 | End: 2019-07-18

## 2019-07-18 RX ADMIN — SODIUM CHLORIDE 100 ML/HR: 0.9 INJECTION, SOLUTION INTRAVENOUS at 00:17

## 2019-07-18 RX ADMIN — METOPROLOL TARTRATE 25 MG: 25 TABLET, FILM COATED ORAL at 08:19

## 2019-07-18 RX ADMIN — TRAMADOL HYDROCHLORIDE 100 MG: 50 TABLET, COATED ORAL at 08:18

## 2019-07-18 RX ADMIN — FUROSEMIDE 80 MG: 80 TABLET ORAL at 08:19

## 2019-07-18 RX ADMIN — SENNOSIDES 8.6 MG: 8.6 TABLET, FILM COATED ORAL at 08:18

## 2019-07-18 RX ADMIN — GABAPENTIN 300 MG: 300 CAPSULE ORAL at 08:19

## 2019-07-18 RX ADMIN — POTASSIUM CHLORIDE 20 MEQ: 1500 TABLET, EXTENDED RELEASE ORAL at 07:51

## 2019-07-18 NOTE — DISCHARGE SUMMARY
[facility] Discharge Summary - Medical Isidoro Herrera 78 y o  male MRN: [id number]    [facility] [facility] Room / Bed: Blanca Dwyer [id number] Encounter: [id number]    BRIEF OVERVIEW  Admitting Provider: Taylor Nelson MD  Discharge Provider: Rafael Francisco MD    Discharge To:  Rehab/ SNF  Facility: [facility] 5th floor    Outpatient Follow-Up:   PCP for [facility]  Orthopedic for left patellar fracture in 2 weeks    Things to address at first follow up visit:   Rehab  Followup with ortho  Nutrition  Dementia screening    Labs and results pending at discharge:  None    Admission Date: 7/15/2019     Discharge Date: No discharge date for patient encounter  Primary Discharge Diagnosis  Principal Problem:    Closed nondisplaced fracture of left patella with routine healing  Active Problems: Morbid obesity ([address] )    Type 2 diabetes mellitus with hyperglycemia (HCC)    CKD (chronic kidney disease) stage 4, GFR 15-29 ml/min (HCC)    Atrial fibrillation (HCC)    Obstructive sleep apnea syndrome, severe    Malignant neoplasm of upper lobe of right lung (HCC)    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 stage 4 chronic kidney disease ([address] )  Resolved Problems:    Hypothyroidism    Consulting Providers   Ortho: Dr Thai Major and [address]    Procedures Performed/Pertinent Test results  VAS carotid complete study   Final Result      XR knee 4+ views Right injury   ED Interpretation   No fx       Final Result      No acute osseous abnormality  Workstation performed: [id number]         XR knee 4+ views left injury   ED Interpretation   Patellar fracture       Final Result      Nondisplaced patellar fracture  Findings concur with the preliminary ER interpretation              Workstation performed: [id number]         CT chest abdomen pelvis wo contrast   Final Result      No evidence for acute intrathoracic injury  No acute intra-abdominal abnormality identified  Remainder the findings, as described above  Workstation performed: [id number]         CT cervical spine without contrast   Final Result      No definite evidence for acute fracture or traumatic malalignment within the cervical spine  Workstation performed: [id number]         CT facial bones without contrast   Final Result      Right frontal scalp soft tissue swelling  No acute fracture identified of the maxillofacial bones  Workstation performed: [id number]         CT head without contrast   Final Result      No acute intracranial hemorrhage or depressed calvarial fracture identified  Right frontal scalp soft tissue swelling is noted  Age-related involutional and scattered chronic microvascular ischemic change, stable  Workstation performed: [id number]           EKG:  SUMMARY     PROCEDURE INFORMATION:  This was a technically difficult study      LEFT VENTRICLE:  Size was normal   Systolic function was normal  Ejection fraction was estimated to be 55 %  This study was inadequate for the evaluation of regional wall motion  Wall thickness was increased  There was mild assymetrical hypertrophy of the septum      LEFT ATRIUM:  The atrium was dilated      MITRAL VALVE:  There was trace regurgitation      TRICUSPID VALVE:  There was mild regurgitation  Estimated peak PA pressure was 35 mmHg  Results for Maribeth Kyra (MRN [id number]) as of 7/18/2019 09:26   Ref   Range 7/18/2019 05:51   Sodium Latest Ref Range: 136 - 145 mmol/L 142   Potassium Latest Ref Range: 3 5 - 5 3 mmol/L 3 9   Chloride Latest Ref Range: 100 - 108 mmol/L 107   CO2 Latest Ref Range: 21 - 32 mmol/L 27   Anion Gap Latest Ref Range: 4 - 13 mmol/L 8   BUN Latest Ref Range: 5 - 25 mg/dL 40 (H)   Creatinine Latest Ref Range: 0 60 - 1 30 mg/dL 2 12 (H)   Glucose, Random Latest Ref Range: 65 - 140 mg/dL 108   Calcium Latest Ref Range: 8 3 - 10 1 mg/dL 7 7 (L)   Results for Alfred Yang (MRN [id number]) as of 7/18/2019 09:26   Ref  Range 7/18/2019 05:51   WBC Latest Ref Range: 4 31 - 10 16 Thousand/uL 5 00   Red Blood Cell Count Latest Ref Range: 3 88 - 5 62 Million/uL 3 45 (L)   Hemoglobin Latest Ref Range: 12 0 - 17 0 g/dL 10 1 (L)   HCT Latest Ref Range: 36 5 - 49 3 % 33 3 (L)   MCV Latest Ref Range: 82 - 98 fL 97   MCH Latest Ref Range: 26 8 - 34 3 pg 29 3   MCHC Latest Ref Range: 31 4 - 37 4 g/dL 30 3 (L)   RDW Latest Ref Range: 11 6 - 15 1 % 15 6 (H)   Platelet Count Latest Ref Range: 149 - 390 Thousands/uL 228   MPV Latest Ref Range: 8 9 - 12 7 fL 9 8         HPI  As per admitting physician:  Roderick Pierson is an 77 y/o male with a PMH significant for DM, REYNA, HTN, HF, CKD, AFIB, and morbid obesity who presents to the ED because of a fall that occurred the night before  Patient is a poor historian and his sons were not available to give a full history  He states that he has no recollection of how he fell or why he fell  He does not recall any symptoms preceding the fall  He just remembers that he fell and waited for his 2nd son to arrive so that they can help him up  He does states that he has pain in his left knee  He does not know how he hurt his head or his knee when he fell  He currently denies any headaches, vision changes, dizziness, lightheadedness, chest pain, shortness of breath, nausea, vomiting, diarrhea, constipation, any urinary symptoms        ED course: Vitals WNL  -Labs: UA negative, PT/INR/PTT elevated; Troponin 0 07, BUN/Cr and Gluc elevated, Alk phos and total ptn and albumin decreased CBC: Anemia  -Imaging:   *Ct Chest Abdomen Pelvis Wo Contrast: Impression: No evidence for acute intrathoracic injury  No acute intra-abdominal abnormality identified  Remainder the findings, as described above  *Xr Knee 4+ Views Left Injury: Impression: Nondisplaced patellar fracture   Findings concur with the preliminary ER interpretation  *Xr Knee 4+ Views Right Injury: Impression: No acute osseous abnormality  *Ct Head Without Contrast: Impression: No acute intracranial hemorrhage or depressed calvarial fracture identified  Right frontal scalp soft tissue swelling is noted  Age-related involutional and scattered chronic microvascular ischemic change, stable  *Ct Facial Bones Without Contrast: Impression: Right frontal scalp soft tissue swelling  No acute fracture identified of the maxillofacial bones  *Ct Cervical Spine Without Contrast: Impression: No definite evidence for acute fracture or traumatic malalignment within the cervical spine        Hospital Course  * Closed nondisplaced fracture of left patella with routine healing  Assessment & Plan  Ortho consulted and recommended:  Knee immobilizer, and the 6 for pain, ice, nutrition, follow up with Ortho, SNF placement as he is not a good surgical candidate  PT/OT was consulted to evaluate and treat with recommendations of straight leg quad and hamstrings sets and ambulation  case management consulted for short-term rehab placement  F/u with ortho in 2 weeks for repeat XR of left patella  Discharge to rehab facility      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 stage 4 chronic kidney disease ([address] )  Assessment & Plan  Wt Readings from Last 3 Encounters:   07/15/19 (!) 153 kg (336 lb 6 8 oz)   06/27/19 (!) 140 kg (308 lb)   06/04/19 (!) 139 kg (306 lb 7 oz)      -continued home medications of Lasix and Lopressor        Malignant neoplasm of upper lobe of right lung ([address] )  Assessment & Plan  Will recommend outpatient follow-up with Hematology/Oncology    Obstructive sleep apnea syndrome, severe  Assessment & Plan  -continued CPAP    Atrial fibrillation (HCC)  Assessment & Plan  Rate controlled on current AV arsalan blocking regimen  Resumed Coumadin  Trended INR daily    CKD (chronic kidney disease) stage 4, GFR 15-29 ml/min ([facility])  Assessment & Plan  Baseline renal function is 2 4-2 7  Renal function improved past baseline  Continued diuretic regimen the form of Lasix 80 mg p o  B i d    Avoidance of nephro toxic agents was done    Type 2 diabetes mellitus with hyperglycemia [facility])  Assessment & Plan  Lab Results   Component Value Date    HGBA1C 7 4 (H) 02/22/2019       Recent Labs     07/15/19  1955 07/15/19  2148   POCGLU 282* 213*       Blood Sugar Average: Last 72 hrs:  (P) 247 5     Continued basal/bolus insulin regimen  Continued with insulin sliding scale algorithm 3 sliding scale coverage    Morbid obesity [address] [address] )  Assessment & Plan  -patient has a history of being morbidly obese  -nutrition was consulted    Hypothyroidismresolved as of 7/17/2019  Assessment & Plan  -Not on any medication  -TSH: 0 888 WNL  -resolved        Physical Exam at Discharge  General appearance: alert and morbidly obese  Lungs: clear to auscultation bilaterally  Heart: irregularly irregular rhythm  Abdomen: soft, non-tender; bowel sounds normal; no masses,  no organomegaly  Extremities: Left leg in brace, bilateral lower extremity chronic venous stasis    Medications   TAKE these medications     Morning Afternoon Evening Bedtime As Needed   albuterol (2 5 mg/3 mL) 0 083 % nebulizer solution   Take 1 vial (2 5 mg total) by nebulization every 6 (six) hours as needed for wheezing or shortness of breath   Refills:  0         aspirin 81 mg EC tablet   Commonly known as:  ECOTRIN LOW STRENGTH   Take 81 mg by mouth 2 (two) times a day   Refills:  0         Cholecalciferol 2000 units Tabs   Take 1 tablet (2,000 Units total) by mouth daily   Refills:  2         FLOMAX 0 4 mg   Generic drug:  tamsulosin   Take 0 4 mg by mouth daily at bedtime   Refills:  0         furosemide 80 mg tablet   Commonly known as:  LASIX   Take 1 tablet (80 mg total) by mouth 2 (two) times a day   Refills:  6 gabapentin 300 mg capsule   Commonly known as:  NEURONTIN   1 tablet in a m  at breakfast 1 tablet in early afternoon and 2 tablets at bedtime   Refills:  3         insulin detemir 100 units/mL subcutaneous injection   Commonly known as:  LEVEMIR   Inject 45 Units under the skin daily at bedtime   Refills:  0         insulin lispro 100 units/mL injection   Commonly known as:  HumaLOG   Inject 10 Units under the skin 3 (three) times a day with meals   Refills:  0         metoprolol tartrate 25 mg tablet   Commonly known as:  LOPRESSOR   Take 25 mg by mouth 2 (two) times a day   Refills:  0         potassium chloride 10 MEQ CR capsule   Commonly known as:  MICRO-K   Take 1 capsule (10 mEq total) by mouth 2 (two) times a day for 30 days   Refills:  5         PROFERRIN-FORTE 12-1 MG Tabs   Generic drug:  Fe Heme Polypeptide-Folic Acid   TAKE 1 TABLET BY MOUTH ONCE DAILY   Refills:  3         simvastatin 40 mg tablet   Commonly known as:  ZOCOR   Take 20 mg by mouth daily at bedtime   Refills:  0         traMADol 50 mg tablet   Commonly known as:  ULTRAM   2 tablet 2 times daily with 2 extra Strength Tylenol   Refills:  0         warfarin 5 mg tablet   Commonly known as:  COUMADIN   Take 5mg (1 tablet) daily on Monday, Wednesday, and Friday, and 2 5mg (1/2 tablet) on Tuesday, Thursday, Saturday, Sunday  Refills:  0               Allergies  Allergies   Allergen Reactions    Other Rash     Adhesive tape       Diet restrictions: Diabetic, renal, cardiac  Activity restrictions: As tolerated  Code Status: Level 3 - DNAR and DNI  Advance Directive and Living Will: <no information>  Power of :    POLST:      Discharge Condition: stable      Discharge  Statement   I spent 30 minutes discharging the patient  This time was spent on the day of discharge  I had direct contact with the patient on the day of discharge  Additional documentation is required if more than 30 minutes were spent on discharge

## 2019-07-18 NOTE — NURSING NOTE
Patient discharged to 5th floor SNF via wheelchair accompanied by PCA in no acute distress or pain with 3 L of oxygen being delivered via nasal canula   Report given to Rakel Noble at 5th floor SNF prior to patient's arrival

## 2019-07-18 NOTE — PLAN OF CARE
Problem: Potential for Falls  Goal: Patient will remain free of falls  Description  INTERVENTIONS:  - Assess patient frequently for physical needs  -  Identify cognitive and physical deficits and behaviors that affect risk of falls    -  [place] fall precautions as indicated by assessment   - Educate patient/family on patient safety including physical limitations  - Instruct patient to call for assistance with activity based on assessment  - Modify environment to reduce risk of injury  - Consider OT/PT consult to assist with strengthening/mobility  Outcome: Progressing     Problem: Prexisting or High Potential for Compromised Skin Integrity  Goal: Skin integrity is maintained or improved  Description  INTERVENTIONS:  - Identify patients at risk for skin breakdown  - Assess and monitor skin integrity  - Assess and monitor nutrition and hydration status  - Monitor labs (i e  albumin)  - Assess for incontinence   - Turn and reposition patient  - Assist with mobility/ambulation  - Relieve pressure over bony prominences  - Avoid friction and shearing  - Provide appropriate hygiene as needed including keeping skin clean and dry  - Evaluate need for skin moisturizer/barrier cream  - Collaborate with interdisciplinary team (i e  Nutrition, Rehabilitation, etc )   - Patient/family teaching  Outcome: Progressing     Problem: DISCHARGE PLANNING - CARE MANAGEMENT  Goal: Discharge to post-acute care or home with appropriate resources  Description  INTERVENTIONS:  - Conduct assessment to determine patient/family and health care team treatment goals, and need for post-acute services based on payer coverage, community resources, and patient preferences, and barriers to discharge  - Address psychosocial, clinical, and financial barriers to discharge as identified in assessment in conjunction with the patient/family and health care team  - Arrange appropriate level of post-acute services according to patient's   needs and preference and payer coverage in collaboration with the physician and health care team  - Communicate with and update the patient/family, physician, and health care team regarding progress on the discharge plan  - Arrange appropriate transportation to post-acute venues  Pt will need STR on discharge   Outcome: Progressing     Problem: Nutrition/Hydration-ADULT  Goal: Nutrient/Hydration intake appropriate for improving, restoring or maintaining nutritional needs  Description  Monitor and assess patient's nutrition/hydration status for malnutrition (ex- brittle hair, bruises, dry skin, pale skin and conjunctiva, muscle wasting, smooth red tongue, and disorientation)  Collaborate with interdisciplinary team and initiate plan and interventions as ordered  Monitor patient's weight and dietary intake as ordered or per policy  Utilize nutrition screening tool and intervene per policy  Determine patient's food preferences and provide high-protein, high-caloric foods as appropriate       INTERVENTIONS:  - Monitor oral intake, urinary output, labs, and treatment plans  - Assess nutrition and hydration status and recommend course of action  - Evaluate amount of meals eaten  - Assist patient with eating if necessary   - Allow adequate time for meals  - Recommend/ encourage appropriate diets, oral nutritional supplements, and vitamin/mineral supplements  - Order, calculate, and assess calorie counts as needed  - Assess need for intravenous fluids  - Provide specific nutrition/hydration education as appropriate  - Include patient/family/caregiver in decisions related to nutrition   Outcome: Progressing

## 2019-07-18 NOTE — PLAN OF CARE
Problem: Potential for Falls  Goal: Patient will remain free of falls  Description  INTERVENTIONS:  - Assess patient frequently for physical needs  -  Identify cognitive and physical deficits and behaviors that affect risk of falls    -  Olden fall precautions as indicated by assessment   - Educate patient/family on patient safety including physical limitations  - Instruct patient to call for assistance with activity based on assessment  - Modify environment to reduce risk of injury  - Consider OT/PT consult to assist with strengthening/mobility  Outcome: Progressing     Problem: Prexisting or High Potential for Compromised Skin Integrity  Goal: Skin integrity is maintained or improved  Description  INTERVENTIONS:  - Identify patients at risk for skin breakdown  - Assess and monitor skin integrity  - Assess and monitor nutrition and hydration status  - Monitor labs (i e  albumin)  - Assess for incontinence   - Turn and reposition patient  - Assist with mobility/ambulation  - Relieve pressure over bony prominences  - Avoid friction and shearing  - Provide appropriate hygiene as needed including keeping skin clean and dry  - Evaluate need for skin moisturizer/barrier cream  - Collaborate with interdisciplinary team (i e  Nutrition, Rehabilitation, etc )   - Patient/family teaching  Outcome: Progressing     Problem: DISCHARGE PLANNING - CARE MANAGEMENT  Goal: Discharge to post-acute care or home with appropriate resources  Description  INTERVENTIONS:  - Conduct assessment to determine patient/family and health care team treatment goals, and need for post-acute services based on payer coverage, community resources, and patient preferences, and barriers to discharge  - Address psychosocial, clinical, and financial barriers to discharge as identified in assessment in conjunction with the patient/family and health care team  - Arrange appropriate level of post-acute services according to patient's   needs and preference and payer coverage in collaboration with the physician and health care team  - Communicate with and update the patient/family, physician, and health care team regarding progress on the discharge plan  - Arrange appropriate transportation to post-acute venues  Pt will need STR on discharge   Outcome: Progressing     Problem: Nutrition/Hydration-ADULT  Goal: Nutrient/Hydration intake appropriate for improving, restoring or maintaining nutritional needs  Description  Monitor and assess patient's nutrition/hydration status for malnutrition (ex- brittle hair, bruises, dry skin, pale skin and conjunctiva, muscle wasting, smooth red tongue, and disorientation)  Collaborate with interdisciplinary team and initiate plan and interventions as ordered  Monitor patient's weight and dietary intake as ordered or per policy  Utilize nutrition screening tool and intervene per policy  Determine patient's food preferences and provide high-protein, high-caloric foods as appropriate       INTERVENTIONS:  - Monitor oral intake, urinary output, labs, and treatment plans  - Assess nutrition and hydration status and recommend course of action  - Evaluate amount of meals eaten  - Assist patient with eating if necessary   - Allow adequate time for meals  - Recommend/ encourage appropriate diets, oral nutritional supplements, and vitamin/mineral supplements  - Order, calculate, and assess calorie counts as needed  - Assess need for intravenous fluids  - Provide specific nutrition/hydration education as appropriate  - Include patient/family/caregiver in decisions related to nutrition   Outcome: Progressing

## 2019-07-18 NOTE — PHYSICAL THERAPY NOTE
PT treatment note      07/18/19 1016   Restrictions/Precautions   Weight Bearing Precautions Per Order Yes   LLE Weight Bearing Per Order PWB   Braces or Orthoses LE Immobilizer   Other Precautions   (Chair Alarm;O2;Fall Risk)   General   Family/Caregiver Present No   Transfers   Sit to Stand 4  Minimal assistance   Additional items Assist x 1; Armrests; Increased time required;Verbal cues   Stand to Sit 4  Minimal assistance   Additional items Assist x 1; Armrests; Increased time required;Verbal cues   Ambulation/Elevation   Gait pattern Decreased L stance;Decreased foot clearance; Step to   Gait Assistance 4  Minimal assist   Additional items Assist x 1;Verbal cues   Assistive Device Rolling walker   Distance 15' x 2 with cahir follow   Balance   Ambulatory Fair   Endurance Deficit   Endurance Deficit Yes   Activity Tolerance   Activity Tolerance Patient limited by fatigue   Assessment   Prognosis Good   Problem List Decreased strength;Decreased endurance;Decreased range of motion; Impaired balance;Decreased mobility; Decreased safety awareness;Orthopedic restrictions;Pain   Assessment Pt  Currently performing bed mobility, tx and ambulation at ( mod-min) x  1 level of function   Utilizing RW with fair balance and stability  Difficulty maintaining PWB LLE  Immobilizer in place  In comparison to previous session,  Transfer training to increase functional task performance and  to promote safe sit/stand and stand/sit mobility  Pt  education  for hand postion and safety and technique with transfer training  Pt is in need of continued activity in PT to improve strength balance endurance mobility transfers and ambulation with return to maximize LOF  From PT/mobility standpoint, recommendation at time of d/c would be STR  in order to promote return to PLOF and independence  Goals   LTG Expiration Date 07/30/19   Treatment Day 2   Plan   Treatment/Interventions Functional transfer training;LE strengthening/ROM; Therapeutic exercise; Endurance training;Bed mobility;Gait training   Progress Slow progress, decreased activity tolerance   Recommendation   Recommendation Short-term skilled PT   Pt  OOB in chair  with call bell within reach, scd's connected and turned on and alarm on at end of PT session  Discussed with Ema Mart, PT today's treatment and patient's current level of function for care coordination

## 2019-07-18 NOTE — NURSING NOTE
Pt bathed  Knee immobilizer removed and 3x4 5 cm fluid filled blister discovered on proximal L ankle  ABD placed over blister and immobilizer loosly placed over the area  Flaca Leigh PAC notified via [facility] text  Will continue to monitor

## 2019-07-19 LAB
GLUCOSE SERPL-MCNC: 107 MG/DL (ref 65–140)
GLUCOSE SERPL-MCNC: 73 MG/DL (ref 65–140)

## 2019-07-19 PROCEDURE — 97530 THERAPEUTIC ACTIVITIES: CPT

## 2019-07-19 PROCEDURE — 97116 GAIT TRAINING THERAPY: CPT

## 2019-07-19 PROCEDURE — 82948 REAGENT STRIP/BLOOD GLUCOSE: CPT

## 2019-07-19 PROCEDURE — 97110 THERAPEUTIC EXERCISES: CPT

## 2019-07-19 PROCEDURE — 99024 POSTOP FOLLOW-UP VISIT: CPT | Performed by: ORTHOPAEDIC SURGERY

## 2019-07-19 NOTE — PROGRESS NOTES
Patient has nondisplaced left patella fracture status post fall   patient allowed to weightbear as tolerated in the knee immobilizer   patient allowed to remove knee immobilizer at night   DVT prophylaxis as per the medical team   follow-up with Dr Vanessa Baldwin in 2 weeks x-ray left knee on arrival

## 2019-07-19 NOTE — CONSULTS
Progress Note - Wound   Estee Red 78 y o  male MRN: [id number]  Unit/Bed#: -01 Encounter: [id number]      Assessment:   Bilateral lower extremity edema with serous filled blisters, weeping, of left medial lower extremity  Knee immobilizer in place left lower extremity  Per ortho, allowed to remove at HS  Bottom 3 velcro straps loosened to gain access to left lower extremity for wound assessment  Knee immobilizer straps reapplied/secured post wound treatment  Plan:   1  Left lower extremity dressing change daily  Cleanse NSS  Apply Nourishing skin cream to dry intact skin LLE  Cover blisters with adaptic (vaseline gauze), followed by ABD  Secure with anita  2   Elevate legs to assist with edema control  3   Follow up with wound care RN 1 week  Other Ulcers 03/03/17 Leg Left Red, weeping  (Active)   Maricruz-wound Assessment Edema; Yellow-brown 7/19/2019  1:00 PM   Shape oval 7/19/2019  1:00 PM   Wound Length (cm) 1 cm 7/19/2019  1:00 PM   Wound Width (cm) 5 cm 7/19/2019  1:00 PM   Wound Depth (cm) 0 1 7/19/2019  1:00 PM   Calculated Wound Volume (cm^3) 0 5 cm^3 7/19/2019  1:00 PM   Drainage Amount Moderate 7/19/2019  1:00 PM   Drainage Description Serous 7/19/2019  1:00 PM   Treatment Elevated with pillow(s) 7/19/2019  1:00 PM   Dressings Vaseline gauze 7/19/2019  1:00 PM   Wound packed? No 7/19/2019  1:00 PM   Dressing Changed New dressing applied 7/19/2019  1:00 PM   Patient Tolerance Tolerated well 7/19/2019  1:00 PM     Brunilda Gonzalez RN   CWOCN  7/19/2019 14:33

## 2019-07-20 LAB
GLUCOSE SERPL-MCNC: 114 MG/DL (ref 65–140)
GLUCOSE SERPL-MCNC: 85 MG/DL (ref 65–140)

## 2019-07-20 PROCEDURE — 82948 REAGENT STRIP/BLOOD GLUCOSE: CPT

## 2019-07-21 LAB
GLUCOSE SERPL-MCNC: 51 MG/DL (ref 65–140)
GLUCOSE SERPL-MCNC: 52 MG/DL (ref 65–140)
GLUCOSE SERPL-MCNC: 65 MG/DL (ref 65–140)
GLUCOSE SERPL-MCNC: 97 MG/DL (ref 65–140)

## 2019-07-21 PROCEDURE — 97530 THERAPEUTIC ACTIVITIES: CPT

## 2019-07-21 PROCEDURE — 97535 SELF CARE MNGMENT TRAINING: CPT

## 2019-07-21 PROCEDURE — 82948 REAGENT STRIP/BLOOD GLUCOSE: CPT

## 2019-07-21 PROCEDURE — 97110 THERAPEUTIC EXERCISES: CPT

## 2019-07-22 LAB
ANION GAP SERPL CALCULATED.3IONS-SCNC: 6 MMOL/L (ref 4–13)
BUN SERPL-MCNC: 52 MG/DL (ref 5–25)
CALCIUM SERPL-MCNC: 7.9 MG/DL (ref 8.3–10.1)
CHLORIDE SERPL-SCNC: 107 MMOL/L (ref 100–108)
CO2 SERPL-SCNC: 31 MMOL/L (ref 21–32)
CREAT SERPL-MCNC: 2.19 MG/DL (ref 0.6–1.3)
GFR SERPL CREATININE-BSD FRML MDRD: 27 ML/MIN/1.73SQ M
GLUCOSE SERPL-MCNC: 119 MG/DL (ref 65–140)
GLUCOSE SERPL-MCNC: 75 MG/DL (ref 65–140)
GLUCOSE SERPL-MCNC: 76 MG/DL (ref 65–140)
INR PPP: 1.69 (ref 0.84–1.19)
POTASSIUM SERPL-SCNC: 4.3 MMOL/L (ref 3.5–5.3)
PROTHROMBIN TIME: 20 SECONDS (ref 11.6–14.5)
SODIUM SERPL-SCNC: 144 MMOL/L (ref 136–145)

## 2019-07-22 PROCEDURE — 80048 BASIC METABOLIC PNL TOTAL CA: CPT | Performed by: INTERNAL MEDICINE

## 2019-07-22 PROCEDURE — 82948 REAGENT STRIP/BLOOD GLUCOSE: CPT

## 2019-07-22 PROCEDURE — 97116 GAIT TRAINING THERAPY: CPT

## 2019-07-22 PROCEDURE — 97535 SELF CARE MNGMENT TRAINING: CPT

## 2019-07-22 PROCEDURE — 97110 THERAPEUTIC EXERCISES: CPT

## 2019-07-22 PROCEDURE — 85610 PROTHROMBIN TIME: CPT | Performed by: INTERNAL MEDICINE

## 2019-07-22 PROCEDURE — 97530 THERAPEUTIC ACTIVITIES: CPT

## 2019-07-23 LAB — GLUCOSE SERPL-MCNC: 168 MG/DL (ref 65–140)

## 2019-07-23 PROCEDURE — 82948 REAGENT STRIP/BLOOD GLUCOSE: CPT

## 2019-07-23 PROCEDURE — 97530 THERAPEUTIC ACTIVITIES: CPT

## 2019-07-23 PROCEDURE — 97110 THERAPEUTIC EXERCISES: CPT

## 2019-07-23 PROCEDURE — 97116 GAIT TRAINING THERAPY: CPT

## 2019-07-24 LAB
GLUCOSE SERPL-MCNC: 89 MG/DL (ref 65–140)
GLUCOSE SERPL-MCNC: 99 MG/DL (ref 65–140)

## 2019-07-24 PROCEDURE — 97110 THERAPEUTIC EXERCISES: CPT

## 2019-07-24 PROCEDURE — 97116 GAIT TRAINING THERAPY: CPT

## 2019-07-24 PROCEDURE — 82948 REAGENT STRIP/BLOOD GLUCOSE: CPT

## 2019-07-24 NOTE — WOUND OSTOMY CARE
Progress Note - Wound   Aida Garrido 78 y o  male MRN: [id number]  Unit/Bed#: -01 Encounter: [id number]      Assessment:   Left lower extremity edema with partial thickness ulcerations, mostly epthelialized and difficult to visualize open area-dermal   Patient sitting in wheelchair with legs dependant  Knee immobilizer in place  Plan:   1  Continue 1  Left lower extremity dressing change daily  Cleanse NSS  Apply Nourishing skin cream to dry intact skin LLE  Cover blisters with adaptic (vaseline gauze), followed by ABD  Secure with anita  2   Elevate legs to assist with edema control  Liini 22 chair? 3  Follow up with wound care RN 1 week  Other Ulcers 03/03/17 Leg Left Red, weeping  (Active)   Maricruz-wound Assessment Edema 7/24/2019 11:00 AM   Shape oval 7/19/2019  1:00 PM   Wound Length (cm) 6 cm 7/24/2019 11:00 AM   Wound Width (cm) 24 cm 7/24/2019 11:00 AM   Wound Depth (cm) 0 1 7/24/2019 11:00 AM   Calculated Wound Volume (cm^3) 14 4 cm^3 7/24/2019 11:00 AM   Drainage Amount Moderate 7/24/2019 11:00 AM   Drainage Description Serous 7/24/2019 11:00 AM   Treatment Cleansed 7/24/2019 11:00 AM   Dressings Vaseline gauze 7/24/2019 11:00 AM   Wound packed? No 7/24/2019 11:00 AM   Dressing Changed New dressing applied 7/24/2019 11:00 AM   Patient Tolerance Tolerated well 7/24/2019 11:00 AM     Oliver Burger RN   CWOCN  7/24/2019 12:09

## 2019-07-25 LAB — GLUCOSE SERPL-MCNC: 76 MG/DL (ref 65–140)

## 2019-07-25 PROCEDURE — 97530 THERAPEUTIC ACTIVITIES: CPT

## 2019-07-25 PROCEDURE — 97110 THERAPEUTIC EXERCISES: CPT

## 2019-07-25 PROCEDURE — 97116 GAIT TRAINING THERAPY: CPT

## 2019-07-25 PROCEDURE — 82948 REAGENT STRIP/BLOOD GLUCOSE: CPT

## 2019-07-25 PROCEDURE — 97535 SELF CARE MNGMENT TRAINING: CPT

## 2019-07-26 PROCEDURE — 97530 THERAPEUTIC ACTIVITIES: CPT

## 2019-07-26 PROCEDURE — 97110 THERAPEUTIC EXERCISES: CPT

## 2019-07-26 PROCEDURE — 97535 SELF CARE MNGMENT TRAINING: CPT

## 2019-07-26 PROCEDURE — 99024 POSTOP FOLLOW-UP VISIT: CPT | Performed by: ORTHOPAEDIC SURGERY

## 2019-07-26 NOTE — PROGRESS NOTES
Patient seen and examined patient seen and examined   stable left patella fracture   straight leg raise intact   no distal deficits   patient allowed to weightbear as tolerated with the knee immobilizer   patient needs knee immobilizer for another 2 weeks   patient can remove knee immobilizer at night   follow-up with Dr Raymond Halsted in the office in 2 weeks

## 2019-07-28 PROCEDURE — 97535 SELF CARE MNGMENT TRAINING: CPT

## 2019-07-28 PROCEDURE — 97110 THERAPEUTIC EXERCISES: CPT

## 2019-07-29 ENCOUNTER — TELEPHONE (OUTPATIENT)
Dept: OBGYN CLINIC | Facility: HOSPITAL | Age: 79
End: 2019-07-29

## 2019-07-29 PROCEDURE — 97535 SELF CARE MNGMENT TRAINING: CPT

## 2019-07-29 PROCEDURE — 97110 THERAPEUTIC EXERCISES: CPT

## 2019-07-29 PROCEDURE — 97530 THERAPEUTIC ACTIVITIES: CPT

## 2019-07-29 PROCEDURE — 97116 GAIT TRAINING THERAPY: CPT

## 2019-07-29 NOTE — TELEPHONE ENCOUNTER
Kiesha from the skilled nursing unit at [facility] is calling to find out if Dr Hayley Saez would see the patient on the unit, rather than in office  Please advise      Call back [phone]

## 2019-07-30 LAB
INR PPP: 2.96 (ref 0.84–1.19)
PROTHROMBIN TIME: 31.2 SECONDS (ref 11.6–14.5)

## 2019-07-30 PROCEDURE — 97530 THERAPEUTIC ACTIVITIES: CPT

## 2019-07-30 PROCEDURE — 97110 THERAPEUTIC EXERCISES: CPT

## 2019-07-30 PROCEDURE — 85610 PROTHROMBIN TIME: CPT | Performed by: NURSE PRACTITIONER

## 2019-07-30 PROCEDURE — 97116 GAIT TRAINING THERAPY: CPT

## 2019-07-31 PROCEDURE — 97116 GAIT TRAINING THERAPY: CPT

## 2019-07-31 PROCEDURE — 97530 THERAPEUTIC ACTIVITIES: CPT

## 2019-07-31 PROCEDURE — 97110 THERAPEUTIC EXERCISES: CPT

## 2019-07-31 NOTE — WOUND OSTOMY CARE
5th floor RN reported strike-through drainage  Denied signs/symptoms of infection  Recommended adding Maxorb to wound care daily treatment  Dressing change daily  Cleanse NSS  Cover weeping areas with adaptic/followed by Maxorb/followed by ABDs  Secure with kerlix  Follow up with wound care RN prn   Kathe Oconnor RN   CWOCN  7/31/2019 10:09

## 2019-08-01 PROCEDURE — 97110 THERAPEUTIC EXERCISES: CPT

## 2019-08-01 PROCEDURE — 97116 GAIT TRAINING THERAPY: CPT

## 2019-08-01 PROCEDURE — 97530 THERAPEUTIC ACTIVITIES: CPT

## 2019-08-02 PROCEDURE — 97110 THERAPEUTIC EXERCISES: CPT

## 2019-08-02 PROCEDURE — 97530 THERAPEUTIC ACTIVITIES: CPT

## 2019-08-02 PROCEDURE — 97116 GAIT TRAINING THERAPY: CPT

## 2019-08-04 PROCEDURE — 97535 SELF CARE MNGMENT TRAINING: CPT

## 2019-08-04 PROCEDURE — 97110 THERAPEUTIC EXERCISES: CPT

## 2019-08-05 PROCEDURE — 97530 THERAPEUTIC ACTIVITIES: CPT

## 2019-08-05 PROCEDURE — 97110 THERAPEUTIC EXERCISES: CPT

## 2019-08-05 PROCEDURE — 97116 GAIT TRAINING THERAPY: CPT

## 2019-08-06 LAB
ANION GAP SERPL CALCULATED.3IONS-SCNC: 5 MMOL/L (ref 4–13)
BUN SERPL-MCNC: 64 MG/DL (ref 5–25)
CALCIUM SERPL-MCNC: 7.9 MG/DL (ref 8.3–10.1)
CHLORIDE SERPL-SCNC: 105 MMOL/L (ref 100–108)
CO2 SERPL-SCNC: 31 MMOL/L (ref 21–32)
CREAT SERPL-MCNC: 2.53 MG/DL (ref 0.6–1.3)
ERYTHROCYTE [DISTWIDTH] IN BLOOD BY AUTOMATED COUNT: 15.2 % (ref 11.6–15.1)
GFR SERPL CREATININE-BSD FRML MDRD: 23 ML/MIN/1.73SQ M
GLUCOSE SERPL-MCNC: 173 MG/DL (ref 65–140)
HCT VFR BLD AUTO: 33.3 % (ref 36.5–49.3)
HGB BLD-MCNC: 10 G/DL (ref 12–17)
INR PPP: 2.12 (ref 0.84–1.19)
MCH RBC QN AUTO: 28.7 PG (ref 26.8–34.3)
MCHC RBC AUTO-ENTMCNC: 30 G/DL (ref 31.4–37.4)
MCV RBC AUTO: 95 FL (ref 82–98)
PLATELET # BLD AUTO: 280 THOUSANDS/UL (ref 149–390)
PMV BLD AUTO: 9.8 FL (ref 8.9–12.7)
POTASSIUM SERPL-SCNC: 4.8 MMOL/L (ref 3.5–5.3)
PROTHROMBIN TIME: 24 SECONDS (ref 11.6–14.5)
RBC # BLD AUTO: 3.49 MILLION/UL (ref 3.88–5.62)
SODIUM SERPL-SCNC: 141 MMOL/L (ref 136–145)
WBC # BLD AUTO: 5.32 THOUSAND/UL (ref 4.31–10.16)

## 2019-08-06 PROCEDURE — 97110 THERAPEUTIC EXERCISES: CPT

## 2019-08-06 PROCEDURE — 85027 COMPLETE CBC AUTOMATED: CPT | Performed by: INTERNAL MEDICINE

## 2019-08-06 PROCEDURE — 97530 THERAPEUTIC ACTIVITIES: CPT

## 2019-08-06 PROCEDURE — 85610 PROTHROMBIN TIME: CPT | Performed by: INTERNAL MEDICINE

## 2019-08-06 PROCEDURE — 80048 BASIC METABOLIC PNL TOTAL CA: CPT | Performed by: INTERNAL MEDICINE

## 2019-08-06 PROCEDURE — 97116 GAIT TRAINING THERAPY: CPT

## 2019-08-06 PROCEDURE — 97535 SELF CARE MNGMENT TRAINING: CPT

## 2019-08-08 ENCOUNTER — TRANSITIONAL CARE MANAGEMENT (OUTPATIENT)
Dept: FAMILY MEDICINE CLINIC | Facility: CLINIC | Age: 79
End: 2019-08-08

## 2019-08-09 ENCOUNTER — TELEPHONE (OUTPATIENT)
Dept: FAMILY MEDICINE CLINIC | Facility: CLINIC | Age: 79
End: 2019-08-09

## 2019-08-09 NOTE — TELEPHONE ENCOUNTER
[place] Nurse L/M requesting return call to discuss wound management - When I call Fan Trujillo back NO answer - L/M requesting detailed message that she would like given to Dr Girma Dunaway - It will be addressed on Mon if non urgent      Waiting on callback from [place]

## 2019-08-12 ENCOUNTER — APPOINTMENT (EMERGENCY)
Dept: CT IMAGING | Facility: HOSPITAL | Age: 79
DRG: 602 | End: 2019-08-12
Payer: MEDICARE

## 2019-08-12 ENCOUNTER — APPOINTMENT (EMERGENCY)
Dept: RADIOLOGY | Facility: HOSPITAL | Age: 79
DRG: 602 | End: 2019-08-12
Payer: MEDICARE

## 2019-08-12 ENCOUNTER — HOSPITAL ENCOUNTER (INPATIENT)
Facility: HOSPITAL | Age: 79
LOS: 2 days | Discharge: HOME HEALTH | DRG: 602 | End: 2019-08-15
Attending: EMERGENCY MEDICINE | Admitting: FAMILY MEDICINE
Payer: MEDICARE

## 2019-08-12 ENCOUNTER — APPOINTMENT (EMERGENCY)
Dept: NON INVASIVE DIAGNOSTICS | Facility: HOSPITAL | Age: 79
DRG: 602 | End: 2019-08-12
Payer: MEDICARE

## 2019-08-12 DIAGNOSIS — E66.01 MORBID OBESITY (HCC): ICD-10-CM

## 2019-08-12 DIAGNOSIS — L03.119 CELLULITIS OF EXTREMITY: ICD-10-CM

## 2019-08-12 DIAGNOSIS — E11.65 TYPE 2 DIABETES MELLITUS WITH HYPERGLYCEMIA, WITH LONG-TERM CURRENT USE OF INSULIN (HCC): ICD-10-CM

## 2019-08-12 DIAGNOSIS — L03.119 LOWER EXTREMITY CELLULITIS: Primary | ICD-10-CM

## 2019-08-12 DIAGNOSIS — N13.30 HYDRONEPHROSIS OF LEFT KIDNEY: ICD-10-CM

## 2019-08-12 DIAGNOSIS — Z79.4 TYPE 2 DIABETES MELLITUS WITH HYPERGLYCEMIA, WITH LONG-TERM CURRENT USE OF INSULIN (HCC): ICD-10-CM

## 2019-08-12 LAB
ALBUMIN SERPL BCP-MCNC: 2.8 G/DL (ref 3.5–5)
ALP SERPL-CCNC: 35 U/L (ref 46–116)
ALT SERPL W P-5'-P-CCNC: 15 U/L (ref 12–78)
ANION GAP SERPL CALCULATED.3IONS-SCNC: 8 MMOL/L (ref 4–13)
AST SERPL W P-5'-P-CCNC: 18 U/L (ref 5–45)
BACTERIA UR QL AUTO: ABNORMAL /HPF
BASOPHILS # BLD AUTO: 0.04 THOUSANDS/ΜL (ref 0–0.1)
BASOPHILS NFR BLD AUTO: 1 % (ref 0–1)
BILIRUB SERPL-MCNC: 0.3 MG/DL (ref 0.2–1)
BILIRUB UR QL STRIP: NEGATIVE
BUN SERPL-MCNC: 41 MG/DL (ref 5–25)
CALCIUM SERPL-MCNC: 8.1 MG/DL (ref 8.3–10.1)
CHLORIDE SERPL-SCNC: 108 MMOL/L (ref 100–108)
CLARITY UR: CLEAR
CO2 SERPL-SCNC: 28 MMOL/L (ref 21–32)
COLOR UR: YELLOW
CREAT SERPL-MCNC: 2.22 MG/DL (ref 0.6–1.3)
EOSINOPHIL # BLD AUTO: 0.11 THOUSAND/ΜL (ref 0–0.61)
EOSINOPHIL NFR BLD AUTO: 2 % (ref 0–6)
ERYTHROCYTE [DISTWIDTH] IN BLOOD BY AUTOMATED COUNT: 15.7 % (ref 11.6–15.1)
GFR SERPL CREATININE-BSD FRML MDRD: 27 ML/MIN/1.73SQ M
GLUCOSE SERPL-MCNC: 112 MG/DL (ref 65–140)
GLUCOSE SERPL-MCNC: 130 MG/DL (ref 65–140)
GLUCOSE SERPL-MCNC: 162 MG/DL (ref 65–140)
GLUCOSE UR STRIP-MCNC: NEGATIVE MG/DL
HCT VFR BLD AUTO: 35.4 % (ref 36.5–49.3)
HGB BLD-MCNC: 10.7 G/DL (ref 12–17)
HGB UR QL STRIP.AUTO: NEGATIVE
HOLD SPECIMEN: NORMAL
HOLD SPECIMEN: NORMAL
IMM GRANULOCYTES # BLD AUTO: 0.03 THOUSAND/UL (ref 0–0.2)
IMM GRANULOCYTES NFR BLD AUTO: 1 % (ref 0–2)
INR PPP: 2.09 (ref 0.84–1.19)
KETONES UR STRIP-MCNC: NEGATIVE MG/DL
LEUKOCYTE ESTERASE UR QL STRIP: NEGATIVE
LYMPHOCYTES # BLD AUTO: 0.71 THOUSANDS/ΜL (ref 0.6–4.47)
LYMPHOCYTES NFR BLD AUTO: 12 % (ref 14–44)
MCH RBC QN AUTO: 28.4 PG (ref 26.8–34.3)
MCHC RBC AUTO-ENTMCNC: 30.2 G/DL (ref 31.4–37.4)
MCV RBC AUTO: 94 FL (ref 82–98)
MONOCYTES # BLD AUTO: 0.51 THOUSAND/ΜL (ref 0.17–1.22)
MONOCYTES NFR BLD AUTO: 8 % (ref 4–12)
NEUTROPHILS # BLD AUTO: 4.77 THOUSANDS/ΜL (ref 1.85–7.62)
NEUTS SEG NFR BLD AUTO: 76 % (ref 43–75)
NITRITE UR QL STRIP: NEGATIVE
NON-SQ EPI CELLS URNS QL MICRO: ABNORMAL /HPF
NRBC BLD AUTO-RTO: 0 /100 WBCS
NT-PROBNP SERPL-MCNC: 7378 PG/ML
PH UR STRIP.AUTO: 7.5 [PH]
PLATELET # BLD AUTO: 271 THOUSANDS/UL (ref 149–390)
PMV BLD AUTO: 9.4 FL (ref 8.9–12.7)
POTASSIUM SERPL-SCNC: 4.5 MMOL/L (ref 3.5–5.3)
PROT SERPL-MCNC: 6.3 G/DL (ref 6.4–8.2)
PROT UR STRIP-MCNC: ABNORMAL MG/DL
PROTHROMBIN TIME: 23.7 SECONDS (ref 11.6–14.5)
RBC # BLD AUTO: 3.77 MILLION/UL (ref 3.88–5.62)
RBC #/AREA URNS AUTO: ABNORMAL /HPF
SODIUM SERPL-SCNC: 144 MMOL/L (ref 136–145)
SP GR UR STRIP.AUTO: 1.01 (ref 1–1.03)
TROPONIN I SERPL-MCNC: 0.07 NG/ML
TSH SERPL DL<=0.05 MIU/L-ACNC: 0.92 UIU/ML (ref 0.36–3.74)
UROBILINOGEN UR QL STRIP.AUTO: 0.2 E.U./DL
WBC # BLD AUTO: 6.17 THOUSAND/UL (ref 4.31–10.16)
WBC #/AREA URNS AUTO: ABNORMAL /HPF

## 2019-08-12 PROCEDURE — 87086 URINE CULTURE/COLONY COUNT: CPT | Performed by: EMERGENCY MEDICINE

## 2019-08-12 PROCEDURE — 87040 BLOOD CULTURE FOR BACTERIA: CPT | Performed by: EMERGENCY MEDICINE

## 2019-08-12 PROCEDURE — 84484 ASSAY OF TROPONIN QUANT: CPT | Performed by: EMERGENCY MEDICINE

## 2019-08-12 PROCEDURE — 36415 COLL VENOUS BLD VENIPUNCTURE: CPT | Performed by: EMERGENCY MEDICINE

## 2019-08-12 PROCEDURE — 87081 CULTURE SCREEN ONLY: CPT | Performed by: FAMILY MEDICINE

## 2019-08-12 PROCEDURE — 93005 ELECTROCARDIOGRAM TRACING: CPT

## 2019-08-12 PROCEDURE — 94664 DEMO&/EVAL PT USE INHALER: CPT

## 2019-08-12 PROCEDURE — 82948 REAGENT STRIP/BLOOD GLUCOSE: CPT

## 2019-08-12 PROCEDURE — 93970 EXTREMITY STUDY: CPT | Performed by: SURGERY

## 2019-08-12 PROCEDURE — 99285 EMERGENCY DEPT VISIT HI MDM: CPT | Performed by: EMERGENCY MEDICINE

## 2019-08-12 PROCEDURE — 84443 ASSAY THYROID STIM HORMONE: CPT | Performed by: EMERGENCY MEDICINE

## 2019-08-12 PROCEDURE — 94760 N-INVAS EAR/PLS OXIMETRY 1: CPT

## 2019-08-12 PROCEDURE — 81001 URINALYSIS AUTO W/SCOPE: CPT | Performed by: EMERGENCY MEDICINE

## 2019-08-12 PROCEDURE — 70450 CT HEAD/BRAIN W/O DYE: CPT

## 2019-08-12 PROCEDURE — 99285 EMERGENCY DEPT VISIT HI MDM: CPT

## 2019-08-12 PROCEDURE — 80053 COMPREHEN METABOLIC PANEL: CPT | Performed by: EMERGENCY MEDICINE

## 2019-08-12 PROCEDURE — 71045 X-RAY EXAM CHEST 1 VIEW: CPT

## 2019-08-12 PROCEDURE — 93970 EXTREMITY STUDY: CPT

## 2019-08-12 PROCEDURE — 74176 CT ABD & PELVIS W/O CONTRAST: CPT

## 2019-08-12 PROCEDURE — 96365 THER/PROPH/DIAG IV INF INIT: CPT

## 2019-08-12 PROCEDURE — 0T9B70Z DRAINAGE OF BLADDER WITH DRAINAGE DEVICE, VIA NATURAL OR ARTIFICIAL OPENING: ICD-10-PCS | Performed by: EMERGENCY MEDICINE

## 2019-08-12 PROCEDURE — 99220 PR INITIAL OBSERVATION CARE/DAY 70 MINUTES: CPT | Performed by: NURSE PRACTITIONER

## 2019-08-12 PROCEDURE — 83880 ASSAY OF NATRIURETIC PEPTIDE: CPT | Performed by: EMERGENCY MEDICINE

## 2019-08-12 PROCEDURE — 85610 PROTHROMBIN TIME: CPT | Performed by: EMERGENCY MEDICINE

## 2019-08-12 PROCEDURE — 85025 COMPLETE CBC W/AUTO DIFF WBC: CPT | Performed by: EMERGENCY MEDICINE

## 2019-08-12 RX ORDER — ACETAMINOPHEN 325 MG/1
650 TABLET ORAL ONCE
Status: COMPLETED | OUTPATIENT
Start: 2019-08-12 | End: 2019-08-12

## 2019-08-12 RX ORDER — WARFARIN SODIUM 5 MG/1
5 TABLET ORAL
Status: DISCONTINUED | OUTPATIENT
Start: 2019-08-12 | End: 2019-08-15 | Stop reason: HOSPADM

## 2019-08-12 RX ORDER — IRON POLYSACCHARIDE COMPLEX 150 MG
150 CAPSULE ORAL DAILY
COMMUNITY
End: 2019-11-12 | Stop reason: SDUPTHER

## 2019-08-12 RX ORDER — CEFAZOLIN SODIUM 2 G/50ML
2000 SOLUTION INTRAVENOUS EVERY 8 HOURS
Status: DISCONTINUED | OUTPATIENT
Start: 2019-08-13 | End: 2019-08-13

## 2019-08-12 RX ORDER — WARFARIN SODIUM 2.5 MG/1
2.5 TABLET ORAL
Status: DISCONTINUED | OUTPATIENT
Start: 2019-08-13 | End: 2019-08-15 | Stop reason: HOSPADM

## 2019-08-12 RX ORDER — TAMSULOSIN HYDROCHLORIDE 0.4 MG/1
0.4 CAPSULE ORAL
Status: DISCONTINUED | OUTPATIENT
Start: 2019-08-12 | End: 2019-08-15 | Stop reason: HOSPADM

## 2019-08-12 RX ORDER — GABAPENTIN 300 MG/1
600 CAPSULE ORAL 2 TIMES DAILY
Status: DISCONTINUED | OUTPATIENT
Start: 2019-08-12 | End: 2019-08-12

## 2019-08-12 RX ORDER — ALBUTEROL SULFATE 2.5 MG/3ML
2.5 SOLUTION RESPIRATORY (INHALATION) EVERY 6 HOURS PRN
Status: DISCONTINUED | OUTPATIENT
Start: 2019-08-12 | End: 2019-08-15 | Stop reason: HOSPADM

## 2019-08-12 RX ORDER — SODIUM CHLORIDE 9 MG/ML
75 INJECTION, SOLUTION INTRAVENOUS CONTINUOUS
Status: DISCONTINUED | OUTPATIENT
Start: 2019-08-12 | End: 2019-08-12

## 2019-08-12 RX ORDER — CEFAZOLIN SODIUM 2 G/50ML
2000 SOLUTION INTRAVENOUS ONCE
Status: COMPLETED | OUTPATIENT
Start: 2019-08-12 | End: 2019-08-12

## 2019-08-12 RX ORDER — ASPIRIN 81 MG/1
81 TABLET ORAL DAILY
Status: DISCONTINUED | OUTPATIENT
Start: 2019-08-13 | End: 2019-08-15 | Stop reason: HOSPADM

## 2019-08-12 RX ORDER — ATORVASTATIN CALCIUM 40 MG/1
40 TABLET, FILM COATED ORAL
Status: DISCONTINUED | OUTPATIENT
Start: 2019-08-13 | End: 2019-08-15 | Stop reason: HOSPADM

## 2019-08-12 RX ORDER — PRAVASTATIN SODIUM 40 MG
80 TABLET ORAL
Status: DISCONTINUED | OUTPATIENT
Start: 2019-08-13 | End: 2019-08-12

## 2019-08-12 RX ORDER — BISACODYL 10 MG
10 SUPPOSITORY, RECTAL RECTAL DAILY
COMMUNITY

## 2019-08-12 RX ORDER — ACETAMINOPHEN 500 MG
1000 TABLET ORAL 2 TIMES DAILY
COMMUNITY

## 2019-08-12 RX ORDER — ASPIRIN 81 MG/1
81 TABLET ORAL ONCE
Status: COMPLETED | OUTPATIENT
Start: 2019-08-12 | End: 2019-08-12

## 2019-08-12 RX ORDER — LIDOCAINE HYDROCHLORIDE 20 MG/ML
1 JELLY TOPICAL ONCE
Status: COMPLETED | OUTPATIENT
Start: 2019-08-12 | End: 2019-08-12

## 2019-08-12 RX ORDER — ACETAMINOPHEN 325 MG/1
650 TABLET ORAL EVERY 6 HOURS PRN
Status: DISCONTINUED | OUTPATIENT
Start: 2019-08-12 | End: 2019-08-15 | Stop reason: HOSPADM

## 2019-08-12 RX ADMIN — LIDOCAINE HYDROCHLORIDE 1 APPLICATION: 20 JELLY TOPICAL at 18:15

## 2019-08-12 RX ADMIN — INSULIN DETEMIR 45 UNITS: 100 INJECTION, SOLUTION SUBCUTANEOUS at 21:06

## 2019-08-12 RX ADMIN — TAMSULOSIN HYDROCHLORIDE 0.4 MG: 0.4 CAPSULE ORAL at 21:06

## 2019-08-12 RX ADMIN — WARFARIN SODIUM 5 MG: 5 TABLET ORAL at 20:30

## 2019-08-12 RX ADMIN — INSULIN LISPRO 2 UNITS: 100 INJECTION, SOLUTION INTRAVENOUS; SUBCUTANEOUS at 21:06

## 2019-08-12 RX ADMIN — CEFAZOLIN SODIUM 2000 MG: 2 SOLUTION INTRAVENOUS at 18:08

## 2019-08-12 RX ADMIN — ACETAMINOPHEN 650 MG: 325 TABLET, FILM COATED ORAL at 14:56

## 2019-08-12 RX ADMIN — ASPIRIN 81 MG: 81 TABLET, COATED ORAL at 21:06

## 2019-08-12 RX ADMIN — METOPROLOL TARTRATE 25 MG: 25 TABLET, FILM COATED ORAL at 20:30

## 2019-08-12 NOTE — ASSESSMENT & PLAN NOTE
Lab Results   Component Value Date    HGBA1C 6 3 07/16/2019       Recent Labs     08/12/19  1802   POCGLU 130       Blood Sugar Average: Last 72 hrs:  (P) 130     Accu-Cheks a c  HS  Insulin sliding scale  Continue Levemir 45 units q h s

## 2019-08-12 NOTE — TELEPHONE ENCOUNTER
[address] - Upon arrival into home Pt is Lethargic, drowsy, chills, difficult to rouse - Pt was sent back to [place]'s via ambulance  2   Africa Drake called [phone] - Asking for wound care orders to Pt leg - Has order in for Max Orb Extra plus on the one leg but asking for orders to use it on the open areas of the right leg as well    (Would like to have orders in case Pt is not readmitted )

## 2019-08-12 NOTE — ED PROVIDER NOTES
History  Chief Complaint   Patient presents with    Lethargy     home health nurse states that pt was more lethargic than normal, pt states he feels tired, ate very little yesterday  has oozing legs, states they are chronically like that  recently discharged from rehab     76-year-old male presents from home after being discharged 4 days ago from rehab  He was admitted for syncope and a patellar fracture  He was in rehab for approximately 4 weeks  His home health nurse ascending min to increased lethargy is family is having trouble waking him up today  He does use CPAP at night he is denying any fever or chills but he does feel cold he does not feel short of breath he is maintained on 3 L nasal cannula at home  He has had a slight cough  He has had no nausea or vomiting but did have some watery stools yesterday he is not currently on antibiotics there are no sick contacts his wife and son also help take care of him  Patient denies any abdominal or back pain he has problems with wheezing from his lower extremities as well as redness he states these are at baseline  Patient denying any headache no visual disturbance no numbness or tingling no trauma or falls he uses a walker and wheelchair to get around he has had no dysuria or increased urinary frequency he denies any melena or hematochezia  Last tetanus was 2017  Prior to Admission Medications   Prescriptions Last Dose Informant Patient Reported? Taking?    Cholecalciferol 2000 units TABS 8/11/2019 at Unknown time  No Yes   Sig: Take 1 tablet (2,000 Units total) by mouth daily   Lidocaine-Glycerin (PREPARATION H) 5-14 4 % CREA   Yes Yes   Sig: Insert into the rectum   PROFERRIN-FORTE 12-1 MG TABS   Yes No   Sig: TAKE 1 TABLET BY MOUTH ONCE DAILY   acetaminophen (TYLENOL) 500 mg tablet   Yes Yes   Sig: Take 1,000 mg by mouth 2 (two) times a day Administer with tramadol   albuterol (2 5 mg/3 mL) 0 083 % nebulizer solution   No Yes   Sig: Take 1 vial (2 5 mg total) by nebulization every 6 (six) hours as needed for wheezing or shortness of breath   aspirin (ECOTRIN LOW STRENGTH) 81 mg EC tablet 2019 at Unknown time Self Yes Yes   Sig: Take 81 mg by mouth 2 (two) times a day   bisacodyl (BISCOLAX) 10 mg suppository   Yes Yes   Sig: Insert 10 mg into the rectum daily   furosemide (LASIX) 80 mg tablet 2019 at Unknown time  No Yes   Sig: Take 1 tablet (80 mg total) by mouth 2 (two) times a day   gabapentin (NEURONTIN) 300 mg capsule 2019 at Unknown time  No Yes   Si tablet in a m  at breakfast 1 tablet in early afternoon and 2 tablets at bedtime   insulin detemir (LEVEMIR) 100 units/mL subcutaneous injection   No Yes   Sig: Inject 45 Units under the skin daily at bedtime   insulin lispro (HumaLOG) 100 units/mL injection   No Yes   Sig: Inject 10 Units under the skin 3 (three) times a day with meals   iron polysaccharides (FERREX) 150 mg capsule 2019 at Unknown time  Yes Yes   Sig: Take 150 mg by mouth daily   metoprolol tartrate (LOPRESSOR) 25 mg tablet 2019 at Unknown time Self Yes Yes   Sig: Take 25 mg by mouth 2 (two) times a day   potassium chloride (MICRO-K) 10 MEQ CR capsule   No Yes   Sig: Take 1 capsule (10 mEq total) by mouth 2 (two) times a day for 30 days   simvastatin (ZOCOR) 40 mg tablet 2019 at Unknown time Self Yes Yes   Sig: Take 20 mg by mouth daily at bedtime    tamsulosin (FLOMAX) 0 4 mg 2019 at Unknown time Self Yes Yes   Sig: Take 0 4 mg by mouth daily at bedtime     traMADol (ULTRAM) 50 mg tablet   No Yes   Si tablet 2 times daily with 2 extra Strength Tylenol   warfarin (COUMADIN) 5 mg tablet 2019 at Unknown time  No Yes   Sig: Take 5mg (1 tablet) daily on Monday, Wednesday, and Friday, and 2 5mg (1/2 tablet) on Tuesday, Thursday, Saturday,         Facility-Administered Medications: None       Past Medical History:   Diagnosis Date    Anemia     Arthritis     [facility]     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 )     B12 deficiency     Back pain     Blind right eye     Cancer [facility])     prostate     Cardiac disorder     Chronic combined systolic and diastolic heart failure (HCC)     Chronic obstructive lung disease (HCC)     Chronic venous stasis dermatitis of both lower extremities     CKD (chronic kidney disease), stage IV (HCC)     Coronary artery disease     DDD (degenerative disc disease)     DM (diabetes mellitus), type 2 (HCC)     Type II, on insulin    BAER (dyspnea on exertion)     Easy bruising     Epistaxis     Gout     Gross hematuria     last assessed: 3/18/2015    Hepatitis     Herpes zoster     Hiatal hernia     History of cellulitis     BLE    History of prostate cancer     Radiation treatments   Hypercholesterolemia     Hyperlipidemia     Hypertension     Ischemic cardiomyopathy     Lung cancer ([facility] ) 09/12/2019    per patient    Lung mass     last assessed: 9/21/2012    MI, old     Morbid obesity due to excess calories ([facility] )     or severe obesity    Neuropathy     BLE    Obesity     Obstructive sleep apnea syndrome, severe     Pneumonia     last assessed: 7/24/2012    Shortness of breath     TIA (transient ischemic attack)     Urinary incontinence     Urinary retention     Use of cane as ambulatory aid     Independent with personal care         Past Surgical History:   Procedure Laterality Date    ABDOMINAL SURGERY      CARDIAC SURGERY      CATARACT EXTRACTION, BILATERAL      CORONARY ANGIOPLASTY WITH STENT PLACEMENT      2 stents    CORONARY ARTERY BYPASS GRAFT N/A 7/15/2016    Procedure: Intraop ADRIAN, CABG x 3 using LIMA to LAD, RSVG to OM1 and D1;  Surgeon: Domi Arita MD;  [facility];  Service:    Zak Michael      has stents    EYE SURGERY      Bilateral cataract removal    HERNIA REPAIR      umbilical hernia repair    LUNG CANCER SURGERY      Partial upper right lobectomy    LUNG SURGERY Left 2012    OTHER SURGICAL HISTORY      previous stent placement-no anatomy given    PILONIDAL CYST EXCISION      NM CYSTOURETHROSCOPY,URETER CATHETER Left 3/9/2016    Procedure: CYSTOSCOPY WITH left RETROGRADE PYELOGRAM left double J stent removal;  Surgeon: Daniella Clemons MD;  Location: [facility];  Service: Urology    NM TEMPORAL ARTERY LIGATN OR BX Bilateral 10/31/2017    Procedure: BIOPSY ARTERY TEMPORAL;  Surgeon: Skip Reddy MD;  [facility]: [facility];  Service: Vascular    RENAL ARTERY STENT  02/16/2015    transcath intravascular stent placement percutaneous renal    VASCULAR SURGERY         Family History   Problem Relation Age of Onset    Diabetes Other     Hypertension Other     Cancer Other     Diabetes Mother     Heart disease Mother     Hypertension Mother     Diabetes type II Mother     Cancer Mother         unknown type    Diabetes Father     Diabetes Maternal Grandmother     Cancer Sister         unknown type    Cancer Other         unknown type     I have reviewed and agree with the history as documented  Social History     Tobacco Use    Smoking status: Former Smoker     Packs/day: 2 00     Years: 54 00     Pack years: 108 00     Last attempt to quit: 2004     Years since quitting: 15 6    Smokeless tobacco: Never Used    Tobacco comment: Pt is a non smoker   Substance Use Topics    Alcohol use: Never     Alcohol/week: 0 0 standard drinks     Frequency: Never     Drinks per session: Patient refused     Binge frequency: Never     Comment: 0    Drug use: Never        Review of Systems   Constitutional: Positive for activity change  Negative for chills and fever  HENT: Negative for congestion, ear pain, rhinorrhea, sneezing and sore throat  Eyes: Negative for discharge  Respiratory: Negative for cough and shortness of breath  Cardiovascular: Positive for leg swelling  Negative for chest pain  Gastrointestinal: Positive for abdominal pain and diarrhea   Negative for blood in stool, nausea and vomiting  Endocrine: Negative for polyuria  Genitourinary: Negative for difficulty urinating, dysuria, frequency and urgency  Musculoskeletal: Negative for back pain and myalgias  Skin: Positive for rash  Neurological: Negative for dizziness, weakness, numbness and headaches  Hematological: Negative for adenopathy  Psychiatric/Behavioral: Negative for confusion  All other systems reviewed and are negative  Physical Exam  Physical Exam   Constitutional: He is oriented to person, place, and time  He appears well-developed and well-nourished  No distress  HENT:   Head: Normocephalic and atraumatic  Right Ear: External ear normal    Left Ear: External ear normal    Nose: Nose normal    Mouth/Throat: Oropharynx is clear and moist    Eyes: Pupils are equal, round, and reactive to light  Conjunctivae and EOM are normal  Right eye exhibits no discharge  Left eye exhibits no discharge  No scleral icterus  Neck: Normal range of motion  Neck supple  Cardiovascular: Normal rate, regular rhythm, normal heart sounds and intact distal pulses  Pulmonary/Chest: Effort normal and breath sounds normal  No respiratory distress  He has no wheezes  He exhibits no tenderness  Abdominal: Soft  Bowel sounds are normal  He exhibits no distension and no mass  There is no tenderness  There is no rebound and no guarding  Back: no mildline or CVA tenderness   Genitourinary:   Genitourinary Comments: No sacral wounds declines rectal exam   Musculoskeletal: Normal range of motion  He exhibits edema  He exhibits no deformity  Lymphadenopathy:     He has no cervical adenopathy  Neurological: He is alert and oriented to person, place, and time  He displays normal reflexes  No cranial nerve deficit or sensory deficit  He exhibits normal muscle tone  Coordination normal    Gait steady   Skin: Rash noted  He is not diaphoretic     Bilaterally erythema to the lower extremities open wounds to the anterior shins please see media tab consistent with cellulitis  Psychiatric: He has a normal mood and affect  Nursing note and vitals reviewed        Vital Signs  ED Triage Vitals [08/12/19 1319]   Temperature Pulse Respirations Blood Pressure SpO2   98 4 °F (36 9 °C) 75 22 151/70 97 %      Temp Source Heart Rate Source Patient Position - Orthostatic VS BP Location FiO2 (%)   Temporal Monitor Lying Left arm --      Pain Score       No Pain           Vitals:    08/12/19 1900 08/12/19 1946 08/12/19 2027 08/12/19 2255   BP: 170/91 132/79 128/77 134/77   Pulse: 73 75 74 73   Patient Position - Orthostatic VS:             Visual Acuity      ED Medications  Medications   acetaminophen (TYLENOL) tablet 650 mg (has no administration in time range)   ceFAZolin (ANCEF) IVPB (premix) 2,000 mg (2,000 mg Intravenous New Bag 8/13/19 0329)   albuterol inhalation solution 2 5 mg (has no administration in time range)   insulin detemir (LEVEMIR) subcutaneous injection 45 Units (45 Units Subcutaneous Given 8/12/19 2106)   insulin lispro (HumaLOG) 100 units/mL subcutaneous injection 2-12 Units (has no administration in time range)   insulin lispro (HumaLOG) 100 units/mL subcutaneous injection 2-12 Units (2 Units Subcutaneous Given 8/12/19 2106)   metoprolol tartrate (LOPRESSOR) tablet 25 mg (25 mg Oral Given 8/12/19 2030)   Fe Heme Polypeptide-Folic Acid 73-1 MG TABS 1 tablet (has no administration in time range)   tamsulosin (FLOMAX) capsule 0 4 mg (0 4 mg Oral Given 8/12/19 2106)   warfarin (COUMADIN) tablet 5 mg (5 mg Oral Given 8/12/19 2030)   warfarin (COUMADIN) tablet 2 5 mg (has no administration in time range)   aspirin (ECOTRIN LOW STRENGTH) EC tablet 81 mg (has no administration in time range)   atorvastatin (LIPITOR) tablet 40 mg (has no administration in time range)   acetaminophen (TYLENOL) tablet 650 mg (650 mg Oral Given 8/12/19 1456)   ceFAZolin (ANCEF) IVPB (premix) 2,000 mg (0 mg Intravenous Stopped 8/12/19 1838) lidocaine (URO-JET) 2 % urethral/mucosal gel 1 application (1 application Urethral Given 8/12/19 1815)   aspirin (ECOTRIN LOW STRENGTH) EC tablet 81 mg (81 mg Oral Given 8/12/19 2106)       Diagnostic Studies  Results Reviewed     Procedure Component Value Units Date/Time    Fingerstick Glucose (POCT) [[id number]]  (Normal) Collected:  08/12/19 1802    Lab Status:  Final result Updated:  08/12/19 1803     POC Glucose 130 mg/dl     Urine Microscopic [[id number]]  (Abnormal) Collected:  08/12/19 1706    Lab Status:  Final result Specimen:  Urine, Clean Catch Updated:  08/12/19 1722     RBC, UA None Seen /hpf      WBC, UA 0-1 /hpf      Epithelial Cells Occasional /hpf      Bacteria, UA Occasional /hpf     UA w Reflex to Microscopic [[id number]]  (Abnormal) Collected:  08/12/19 1706    Lab Status:  Final result Specimen:  Urine, Clean Catch Updated:  08/12/19 1718     Color, UA Yellow     Clarity, UA Clear     Specific Gravity, UA 1 010     pH, UA 7 5     Leukocytes, UA Negative     Nitrite, UA Negative     Protein, UA Trace mg/dl      Glucose, UA Negative mg/dl      Ketones, UA Negative mg/dl      Urobilinogen, UA 0 2 E U /dl      Bilirubin, UA Negative     Blood, UA Negative    Urine culture [[id number]] Collected:  08/12/19 1707    Lab Status: In process Specimen:  Urine, Clean Catch Updated:  08/12/19 1712    Blood culture #1 [[id number]] Collected:  08/12/19 1335    Lab Status: In process Specimen:  Blood from Arm, Right Updated:  08/12/19 1616    Blood culture #2 [[id number]] Collected:  08/12/19 1610    Lab Status: In process Specimen:  Blood from Hand, Left Updated:  08/12/19 1616    TSH [[id number]]  (Normal) Collected:  08/12/19 1335    Lab Status:  Final result Specimen:  Blood from Arm, Right Updated:  08/12/19 1449     TSH 3RD GENERATON 0 915 uIU/mL     Narrative:       Patients undergoing fluorescein dye angiography may retain small amounts of fluorescein in the body for 48-72 hours post procedure   Samples containing fluorescein can produce falsely depressed TSH values  If the patient had this procedure,a specimen should be resubmitted post fluorescein clearance        B-type natriuretic peptide [[id number]]  (Abnormal) Collected:  08/12/19 1335    Lab Status:  Final result Specimen:  Blood from Arm, Right Updated:  08/12/19 1449     NT-proBNP 7,378 pg/mL     Protime-INR [[id number]]  (Abnormal) Collected:  08/12/19 1335    Lab Status:  Final result Specimen:  Blood from Arm, Right Updated:  08/12/19 1435     Protime 23 7 seconds      INR 2 09    Troponin I [[id number]]  (Abnormal) Collected:  08/12/19 1335    Lab Status:  Final result Specimen:  Blood from Arm, Right Updated:  08/12/19 1405     Troponin I 0 07 ng/mL     Comprehensive metabolic panel [[id number]]  (Abnormal) Collected:  08/12/19 1335    Lab Status:  Final result Specimen:  Blood from Arm, Right Updated:  08/12/19 1403     Sodium 144 mmol/L      Potassium 4 5 mmol/L      Chloride 108 mmol/L      CO2 28 mmol/L      ANION GAP 8 mmol/L      BUN 41 mg/dL      Creatinine 2 22 mg/dL      Glucose 112 mg/dL      Calcium 8 1 mg/dL      AST 18 U/L      ALT 15 U/L      Alkaline Phosphatase 35 U/L      Total Protein 6 3 g/dL      Albumin 2 8 g/dL      Total Bilirubin 0 30 mg/dL      eGFR 27 ml/min/1 73sq m     Narrative:       [place] guidelines for Chronic Kidney Disease (CKD):     Stage 1 with normal or high GFR (GFR > 90 mL/min/1 73 square meters)    Stage 2 Mild CKD (GFR = 60-89 mL/min/1 73 square meters)    Stage 3A Moderate CKD (GFR = 45-59 mL/min/1 73 square meters)    Stage 3B Moderate CKD (GFR = 30-44 mL/min/1 73 square meters)    Stage 4 Severe CKD (GFR = 15-29 mL/min/1 73 square meters)    Stage 5 End Stage CKD (GFR <15 mL/min/1 73 square meters)  Note: GFR calculation is accurate only with a steady state creatinine    CBC and differential [[id number]]  (Abnormal) Collected:  08/12/19 1335    Lab Status:  Final result Specimen:  Blood from Arm, Right Updated:  08/12/19 1350     WBC 6 17 Thousand/uL      RBC 3 77 Million/uL      Hemoglobin 10 7 g/dL      Hematocrit 35 4 %      MCV 94 fL      MCH 28 4 pg      MCHC 30 2 g/dL      RDW 15 7 %      MPV 9 4 fL      Platelets 667 Thousands/uL      nRBC 0 /100 WBCs      Neutrophils Relative 76 %      Immat GRANS % 1 %      Lymphocytes Relative 12 %      Monocytes Relative 8 %      Eosinophils Relative 2 %      Basophils Relative 1 %      Neutrophils Absolute 4 77 Thousands/µL      Immature Grans Absolute 0 03 Thousand/uL      Lymphocytes Absolute 0 71 Thousands/µL      Monocytes Absolute 0 51 Thousand/µL      Eosinophils Absolute 0 11 Thousand/µL      Basophils Absolute 0 04 Thousands/µL                  VAS lower limb venous duplex study, complete bilateral   Final Result by Thor Hahn MD (08/12 7199)      CT head without contrast   Final Result by Chauncey Hilario MD (08/12 4290)      No acute intracranial abnormality or significant change from prior  Workstation performed: [id number]         CT abdomen pelvis wo contrast   Final Result by Chauncey Hilario MD (08/12 4311)      1  New mild to moderate left hydronephrosis to level of the distal ureter  No obstructive stone demonstrated, though  Recommend correlation with urinalysis for evidence of hematuria or infection  2   Small amount of presacral fluid could suggest intra-abdominal inflammatory process or could relate to 3rd spacing given presence of superficial anasarca  Workstation performed: [id number]         XR chest 1 view portable   Final Result by Chauncey Hilario MD (08/12 3092)      No acute cardiopulmonary findings              Workstation performed: [id number]                    Procedures  ECG 12 Lead Documentation Only  Date/Time: 8/12/2019 2:21 PM  Performed by: Darien Gonzalez MD  Authorized by: Darien Gonzalez MD     Indications / Diagnosis:  Lethargy  ECG reviewed by me, the ED Provider: yes    Patient location:  ED  Previous ECG:     Previous ECG:  Compared to current    Comparison ECG info:  7/15/19    Similarity:  Changes noted  Rate:     ECG rate:  73    ECG rate assessment: normal    Rhythm:     Rhythm: sinus rhythm    QRS:     QRS axis:  Left  Comments:      RBBB;            ED Course  ED Course as of Aug 13 0705   [facility] Aug 12, 2019   1426 Troponin at baseline; HB improved vs last week  CR improved from 2 53 6 days ago      1453 Techinically difficulty study able to visualize to prox calf veins prelimin NEGATIVE venous doppler u/s      [address] [place] texted urology Nellene Moots regarding left hydronephrosis and hydrourter  Will follow in AM; recommends secondary to urinary distention on CT scan that jeter be placed patient agreeable      [address] texted slim for admit      1825 Dr Lisa Krueger recommends obs            HEART Risk Score      Most Recent Value   History  1 Filed at: 08/12/2019 1433   ECG  1 Filed at: 08/12/2019 1433   Age  2 Filed at: 08/12/2019 1433   Risk Factors  2 Filed at: 08/12/2019 1433   Troponin  1 Filed at: 08/12/2019 1433   Heart Score Risk Calculator   History  1 Filed at: 08/12/2019 1433   ECG  1 Filed at: 08/12/2019 1433   Age  2 Filed at: 08/12/2019 1433   Risk Factors  2 Filed at: 08/12/2019 1433   Troponin  1 Filed at: 08/12/2019 1433   HEART Score  7 Filed at: 08/12/2019 1433   HEART Score  7 Filed at: 08/12/2019 [facility]                            [facility]  Number of Diagnoses or Management Options  Diagnosis management comments: Mdm:  Set with lethargy will and on Coumadin will check this head CT for a subdural hematoma  Concern for infection giving given appearance of lower extremities  Will check for acute coronary syndrome CHF assess for dehydration given history of diarrhea        Disposition  Final diagnoses:   Lower extremity cellulitis - bilateral   Hydronephrosis of left kidney     Time reflects when diagnosis was documented in both MDM as applicable and the Disposition within this note     Time User Action Codes Description Comment    8/12/2019  5:56 PM Dilip Grater Add [M45 505] Lower extremity cellulitis     8/12/2019  5:56 PM Dilip Grater Modify [C43 642] Lower extremity cellulitis bilateral    8/12/2019  5:56 PM Dilip Grater Add [N13 30] Hydronephrosis of left kidney       ED Disposition     ED Disposition Condition Date/Time Comment    Admit Stable Mon Aug 12, 2019  6:29 PM Case was discussed with Dr Riddhi Rashid and the patient's admission status was agreed to be Admission Status: observation status to the service of Dr Riddhi Rashid           Follow-up Information    None         Current Discharge Medication List      CONTINUE these medications which have NOT CHANGED    Details   acetaminophen (TYLENOL) 500 mg tablet Take 1,000 mg by mouth 2 (two) times a day Administer with tramadol      albuterol (2 5 mg/3 mL) 0 083 % nebulizer solution Take 1 vial (2 5 mg total) by nebulization every 6 (six) hours as needed for wheezing or shortness of breath  Qty: 100 vial, Refills: 0    Associated Diagnoses: Influenza A; Acute infective tracheobronchitis      aspirin (ECOTRIN LOW STRENGTH) 81 mg EC tablet Take 81 mg by mouth 2 (two) times a day      bisacodyl (BISCOLAX) 10 mg suppository Insert 10 mg into the rectum daily      Cholecalciferol 2000 units TABS Take 1 tablet (2,000 Units total) by mouth daily  Qty: 30 tablet, Refills: 2    Associated Diagnoses: Hypokalemia      furosemide (LASIX) 80 mg tablet Take 1 tablet (80 mg total) by mouth 2 (two) times a day  Qty: 100 tablet, Refills: 6    Associated Diagnoses: Localized edema      gabapentin (NEURONTIN) 300 mg capsule 1 tablet in a m  at breakfast 1 tablet in early afternoon and 2 tablets at bedtime  Qty: 120 capsule, Refills: 3    Associated Diagnoses: Multifocal motor neuropathy (HCC)      insulin detemir (LEVEMIR) 100 units/mL subcutaneous injection Inject 45 Units under the skin daily at bedtime  Refills: 0    Associated Diagnoses: Type 2 diabetes mellitus without complication, without long-term current use of insulin ([facility])      insulin lispro (HumaLOG) 100 units/mL injection Inject 10 Units under the skin 3 (three) times a day with meals  Refills: 0    Associated Diagnoses: Type 2 diabetes mellitus with diabetic polyneuropathy, with long-term current use of insulin ([facility])      iron polysaccharides (FERREX) 150 mg capsule Take 150 mg by mouth daily      Lidocaine-Glycerin (PREPARATION H) 5-14 4 % CREA Insert into the rectum      metoprolol tartrate (LOPRESSOR) 25 mg tablet Take 25 mg by mouth 2 (two) times a day      potassium chloride (MICRO-K) 10 MEQ CR capsule Take 1 capsule (10 mEq total) by mouth 2 (two) times a day for 30 days  Qty: 60 capsule, Refills: 5    Associated Diagnoses: Hypokalemia      simvastatin (ZOCOR) 40 mg tablet Take 20 mg by mouth daily at bedtime       tamsulosin (FLOMAX) 0 4 mg Take 0 4 mg by mouth daily at bedtime        traMADol (ULTRAM) 50 mg tablet 2 tablet 2 times daily with 2 extra Strength Tylenol  Qty: 90 tablet, Refills: 0    Associated Diagnoses: Pain in both lower extremities      warfarin (COUMADIN) 5 mg tablet Take 5mg (1 tablet) daily on Monday, Wednesday, and Friday, and 2 5mg (1/2 tablet) on Tuesday, Thursday, Saturday, Sunday  Qty: 90 tablet, Refills: 0    Associated Diagnoses: Encounter for monitoring Coumadin therapy      PROFERRIN-FORTE 12-1 MG TABS TAKE 1 TABLET BY MOUTH ONCE DAILY  Refills: 3           No discharge procedures on file      ED Provider  Electronically Signed by           Savage Whitman MD  08/13/19 6216

## 2019-08-12 NOTE — CASE MANAGEMENT
I self referred the patient due to a recent admission from 7/18/2019-8/7/2019  Patient was dc'd from [facility]'s 5th floor  The patient home health nurse saw him today and called 911 due to the patient be lethargic  The patient stated since he has been d/c from the hospital he has gotten very weak  Patient lives in a trailer with his wife and son  Patient has a ramp into the trailer  He has a cane, walker, w/c,shower chair, O2 at 3 lpm, and CPAP  Patient [facility] O2 [facility]  The patient has [facility]  He does not drive his son drives him to his appointments  The patient does not receive meals on wheels  The patient has medicare A and B for his health insurance

## 2019-08-12 NOTE — PLAN OF CARE
Problem: Potential for Falls  Goal: Patient will remain free of falls  Description  INTERVENTIONS:  - Assess patient frequently for physical needs  -  Identify cognitive and physical deficits and behaviors that affect risk of falls    -  [place] fall precautions as indicated by assessment   - Educate patient/family on patient safety including physical limitations  - Instruct patient to call for assistance with activity based on assessment  - Modify environment to reduce risk of injury  - Consider OT/PT consult to assist with strengthening/mobility  Outcome: Progressing     Problem: PAIN - ADULT  Goal: Verbalizes/displays adequate comfort level or baseline comfort level  Description  Interventions:  - Encourage patient to monitor pain and request assistance  - Assess pain using appropriate pain scale  - Administer analgesics based on type and severity of pain and evaluate response  - Implement non-pharmacological measures as appropriate and evaluate response  - Consider cultural and social influences on pain and pain management  - Notify physician/advanced practitioner if interventions unsuccessful or patient reports new pain  Outcome: Progressing     Problem: INFECTION - ADULT  Goal: Absence or prevention of progression during hospitalization  Description  INTERVENTIONS:  - Assess and monitor for signs and symptoms of infection  - Monitor lab/diagnostic results  - Monitor all insertion sites, i e  indwelling lines, tubes, and drains  - Monitor endotracheal (as able) and nasal secretions for changes in amount and color  - [place] appropriate cooling/warming therapies per order  - Administer medications as ordered  - Instruct and encourage patient and family to use good hand hygiene technique  - Identify and instruct in appropriate isolation precautions for identified infection/condition  Outcome: Progressing  Goal: Absence of fever/infection during neutropenic period  Description  INTERVENTIONS:  - Monitor WBC  - Implement neutropenic guidelines  Outcome: Progressing     Problem: SAFETY ADULT  Goal: Maintain or return to baseline ADL function  Description  INTERVENTIONS:  -  Assess patient's ability to carry out ADLs; assess patient's baseline for ADL function and identify physical deficits which impact ability to perform ADLs (bathing, care of mouth/teeth, toileting, grooming, dressing, etc )  - Assess/evaluate cause of self-care deficits   - Assess range of motion  - Assess patient's mobility; develop plan if impaired  - Assess patient's need for assistive devices and provide as appropriate  - Encourage maximum independence but intervene and supervise when necessary  ¯ Involve family in performance of ADLs  ¯ Assess for home care needs following discharge   ¯ Request OT consult to assist with ADL evaluation and planning for discharge  ¯ Provide patient education as appropriate  Outcome: Progressing  Goal: Maintain or return mobility status to optimal level  Description  INTERVENTIONS:  - Assess patient's baseline mobility status (ambulation, transfers, stairs, etc )    - Identify cognitive and physical deficits and behaviors that affect mobility  - Identify mobility aids required to assist with transfers and/or ambulation (gait belt, sit-to-stand, lift, walker, cane, etc )  - [place] fall precautions as indicated by assessment  - Record patient progress and toleration of activity level on Mobility SBAR; progress patient to next Phase/Stage  - Instruct patient to call for assistance with activity based on assessment  - Request Rehabilitation consult to assist with strengthening/weightbearing, etc   Outcome: Progressing     Problem: DISCHARGE PLANNING  Goal: Discharge to home or other facility with appropriate resources  Description  INTERVENTIONS:  - Identify barriers to discharge w/patient and caregiver  - Arrange for needed discharge resources and transportation as appropriate  - Identify discharge learning needs (meds, wound care, etc )  - Arrange for interpretive services to assist at discharge as needed  - Refer to Case Management Department for coordinating discharge planning if the patient needs post-hospital services based on physician/advanced practitioner order or complex needs related to functional status, cognitive ability, or social support system  Outcome: Progressing     Problem: Knowledge Deficit  Goal: Patient/family/caregiver demonstrates understanding of disease process, treatment plan, medications, and discharge instructions  Description  Complete learning assessment and assess knowledge base  Interventions:  - Provide teaching at level of understanding  - Provide teaching via preferred learning methods  Outcome: Progressing     Problem: CARDIOVASCULAR - ADULT  Goal: Maintains optimal cardiac output and hemodynamic stability  Description  INTERVENTIONS:  - Monitor I/O, vital signs and rhythm  - Monitor for S/S and trends of decreased cardiac output i e  bleeding, hypotension  - Administer and titrate ordered vasoactive medications to optimize hemodynamic stability  - Assess quality of pulses, skin color and temperature  - Assess for signs of decreased coronary artery perfusion - ex   Angina  - Instruct patient to report change in severity of symptoms  Outcome: Progressing  Goal: Absence of cardiac dysrhythmias or at baseline rhythm  Description  INTERVENTIONS:  - Continuous cardiac monitoring, monitor vital signs, obtain 12 lead EKG if indicated  - Administer antiarrhythmic and heart rate control medications as ordered  - Monitor electrolytes and administer replacement therapy as ordered  Outcome: Progressing     Problem: RESPIRATORY - ADULT  Goal: Achieves optimal ventilation and oxygenation  Description  INTERVENTIONS:  - Assess for changes in respiratory status  - Assess for changes in mentation and behavior  - Position to facilitate oxygenation and minimize respiratory effort  - Oxygen administration by appropriate delivery method based on oxygen saturation (per order) or ABGs  - Initiate smoking cessation education as indicated  - Encourage broncho-pulmonary hygiene including cough, deep breathe, Incentive Spirometry  - Assess the need for suctioning and aspirate as needed  - Assess and instruct to report SOB or any respiratory difficulty  - Respiratory Therapy support as indicated  Outcome: Progressing     Problem: GENITOURINARY - ADULT  Goal: Maintains or returns to baseline urinary function  Description  INTERVENTIONS:  - Assess urinary function  - Encourage oral fluids to ensure adequate hydration  - Administer IV fluids as ordered to ensure adequate hydration  - Administer ordered medications as needed  - Offer frequent toileting  - Follow urinary retention protocol if ordered  Outcome: Progressing  Goal: Absence of urinary retention  Description  INTERVENTIONS:  - Assess patients ability to void and empty bladder  - Monitor I/O  - Bladder scan as needed  - Discuss with physician/AP medications to alleviate retention as needed  - Discuss catheterization for long term situations as appropriate  Outcome: Progressing  Goal: Urinary catheter remains patent  Description  INTERVENTIONS:  - Assess patency of urinary catheter  - If patient has a chronic jeter, consider changing catheter if non-functioning  - Follow guidelines for intermittent irrigation of non-functioning urinary catheter  Outcome: Progressing     Problem: METABOLIC, FLUID AND ELECTROLYTES - ADULT  Goal: Electrolytes maintained within normal limits  Description  INTERVENTIONS:  - Monitor labs and assess patient for signs and symptoms of electrolyte imbalances  - Administer electrolyte replacement as ordered  - Monitor response to electrolyte replacements, including repeat lab results as appropriate  - Instruct patient on fluid and nutrition as appropriate  Outcome: Progressing  Goal: Fluid balance maintained  Description  INTERVENTIONS:  - Monitor labs and assess for signs and symptoms of volume excess or deficit  - Monitor I/O and WT  - Instruct patient on fluid and nutrition as appropriate  Outcome: Progressing  Goal: Glucose maintained within target range  Description  INTERVENTIONS:  - Monitor Blood Glucose as ordered  - Assess for signs and symptoms of hyperglycemia and hypoglycemia  - Administer ordered medications to maintain glucose within target range  - Assess nutritional intake and initiate nutrition service referral as needed  Outcome: Progressing     Problem: SKIN/TISSUE INTEGRITY - ADULT  Goal: Skin integrity remains intact  Description  INTERVENTIONS  - Identify patients at risk for skin breakdown  - Assess and monitor skin integrity  - Assess and monitor nutrition and hydration status  - Monitor labs (i e  albumin)  - Assess for incontinence   - Turn and reposition patient  - Assist with mobility/ambulation  - Relieve pressure over bony prominences  - Avoid friction and shearing  - Provide appropriate hygiene as needed including keeping skin clean and dry  - Evaluate need for skin moisturizer/barrier cream  - Collaborate with interdisciplinary team (i e  Nutrition, Rehabilitation, etc )   - Patient/family teaching  Outcome: Progressing  Goal: Incision(s), wounds(s) or drain site(s) healing without S/S of infection  Description  INTERVENTIONS  - Assess and document risk factors for skin impairment   - Assess and document dressing, incision, wound bed, drain sites and surrounding tissue  - Initiate Nutrition services consult and/or wound management as needed  Outcome: Progressing  Goal: Oral mucous membranes remain intact  Description  INTERVENTIONS  - Assess oral mucosa and hygiene practices  - Implement preventative oral hygiene regimen  - Implement oral medicated treatments as ordered  - Initiate Nutrition services referral as needed  Outcome: Progressing     Problem: HEMATOLOGIC - ADULT  Goal: Maintains hematologic stability  Description  INTERVENTIONS  - Assess for signs and symptoms of bleeding or hemorrhage  - Monitor labs  - Administer supportive blood products/factors as ordered and appropriate  Outcome: Progressing

## 2019-08-12 NOTE — ASSESSMENT & PLAN NOTE
CC increased fatigue and has oozing legs  B/L LE ancef initiated in ER- Will continue  Wound care consulted   Bilateral lower extremities are erythematous and are oozing

## 2019-08-12 NOTE — ED NOTES
Patient transported to [facility], [address] [place] [address]  08/12/19 1209

## 2019-08-12 NOTE — ASSESSMENT & PLAN NOTE
Without exacerbation  Utilizes 3 L home O2 supplementation  Not requiring additional  Respiratory protocol in place

## 2019-08-12 NOTE — ASSESSMENT & PLAN NOTE
Wt Readings from Last 3 Encounters:   08/12/19 (!) 141 kg (311 lb 8 2 oz)   07/15/19 (!) 153 kg (336 lb 6 8 oz)   06/27/19 (!) 140 kg (308 lb)       Creatinine is currently at baseline 2 22  Continue prior to admission Lopressor  Currently holding prior to admission Lasix   Avoid Nephro toxic agents if possible

## 2019-08-12 NOTE — ED NOTES
Legs dressed with bioocclusive dressings, abds, and anita  Pt tolerated well        Meri Hoyt RN  08/12/19 [id number]

## 2019-08-12 NOTE — H&P
H&P- Manjula Gamble 1940, 78 y o  male MRN: [id number]    Unit/Bed#: 409-01 Encounter: [id number]    Primary Care Provider: Reed Salomon DO   Date and time admitted to hospital: 8/12/2019  1:20 PM        * Cellulitis of extremity  Assessment & Plan  CC increased fatigue and has oozing legs  B/L LE ancef initiated in ER- Will continue  Wound care consulted   Bilateral lower extremities are erythematous and are oozing       Obstructive chronic bronchitis without exacerbation (HCC)  Assessment & Plan  Without exacerbation  Utilizes 3 L home O2 supplementation  Not requiring additional  Respiratory protocol in place    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 stage 4 chronic kidney disease ([facility] )  Assessment & Plan  Wt Readings from Last 3 Encounters:   08/12/19 (!) 141 kg (311 lb 8 2 oz)   07/15/19 (!) 153 kg (336 lb 6 8 oz)   06/27/19 (!) 140 kg (308 lb)       Creatinine is currently at baseline 2 22  Continue prior to admission Lopressor  Currently holding prior to admission Lasix   Avoid Nephro toxic agents if possible      Severe obstructive sleep apnea-hypopnea syndrome  Assessment & Plan  Continue prior to admission CPAP  Respiratory protocol in place    Atrial fibrillation ([facility] )  Assessment & Plan  Currently rate controled  Currently anticoagulated on Coumadin-will continue  Currently INR 2 09-trend INR    Type 2 diabetes mellitus with hyperglycemia [facility])  Assessment & Plan  Lab Results   Component Value Date    HGBA1C 6 3 07/16/2019       Recent Labs     08/12/19  1802   POCGLU 130       Blood Sugar Average: Last 72 hrs:  (P) 130     Accu-Cheks a c  HS  Insulin sliding scale  Continue Levemir 45 units q h s      Morbid obesity ([facility] )  Assessment & Plan  Would benefit from outpatient counseling     Hydronephrosis  Assessment & Plan  Continue PTA Flomax  Urology consulted        VTE Prophylaxis: Warfarin (Coumadin)  / sequential compression device   Code Status: DNR/DNI  POLST: POLST is not applicable to this patient    Anticipated Length of Stay:  Patient will be admitted on an Observation basis with an anticipated length of stay of  Less than 2 midnights  Justification for Hospital Stay: b/l le cellulitis and left hydronephrosis    Total Time for Visit, including Counseling / Coordination of Care: 1 hour  Greater than 50% of this total time spent on direct patient counseling and coordination of care  Chief Complaint:   lethargic than normal,decreased appetite   has oozing legs    History of Present Illness:    Tony Maldonado is a 78 y o  male who presented to the ER via EMS for evaluation of increased lethargy, bilateral lower extremity edema with oozing  Patient has a past medical history including urinary retention TIA obstructive sleep apnea-CPAP, lobe is, neuropathy, lung cancer prostate cancer, ischemic cardiomyopathy, hypertension hyperlipidemia hypercholesterolemia history of cellulitis hiatal hernia hepatitis, diabetes mellitus type 2 coronary artery disease CKD, chronic combined systolic and diastolic failure AFib anemia  Chart review indicates patient being discharged 4 days ago from rehab  He was admitted for syncope and a patellar fracture and was in rehab for approximately 4 weeks  Chart review indicates home health nurse noticed increased lethargy and having waking today  Additionally patient wears baseline home O2 supplementation at 3 L 24 hours  Patient denies lightheadedness dizziness nausea vomiting diarrhea abdominal pain denies shortness of breath not required any additional supplemental oxygen, denies chest pain, patient denies dysuria urgency or frequency, denies hematochezia or melena  Patient however does admit to bilateral lower extremities swelling with some oozing, patient also reports difficulty with walking uses a walker and wheelchair to get around    Images and labs were collected emergency room-see below  Images showed left hydronephrosis and Urology will be consulted  Patient was admitted by emergency room doctor  Review of Systems:    Review of Systems   Constitutional: Positive for activity change and fatigue  Cardiovascular: Positive for leg swelling (b/ le )  Gastrointestinal: Positive for diarrhea  Neurological: Positive for weakness  All other systems reviewed and are negative  Past Medical and Surgical History:     Past Medical History:   Diagnosis Date    Anemia     Arthritis     Atrial fibrillation (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 )     B12 deficiency     Back pain     Blind right eye     Cancer ([facility] )     prostate     Cardiac disorder     Chronic combined systolic and diastolic heart failure (HCC)     Chronic obstructive lung disease (HCC)     Chronic venous stasis dermatitis of both lower extremities     CKD (chronic kidney disease), stage IV (HCC)     Coronary artery disease     DDD (degenerative disc disease)     DM (diabetes mellitus), type 2 (HCC)     Type II, on insulin    BAER (dyspnea on exertion)     Easy bruising     Epistaxis     Gout     Gross hematuria     last assessed: 3/18/2015    Hepatitis     Herpes zoster     Hiatal hernia     History of cellulitis     BLE    History of prostate cancer     Radiation treatments   Hypercholesterolemia     Hyperlipidemia     Hypertension     Ischemic cardiomyopathy     Lung cancer ([facility] ) 09/12/2019    per patient    Lung mass     last assessed: 9/21/2012    MI, old     Morbid obesity due to excess calories ([facility] )     or severe obesity    Neuropathy     BLE    Obesity     Obstructive sleep apnea syndrome, severe     Pneumonia     last assessed: 7/24/2012    Shortness of breath     TIA (transient ischemic attack)     Urinary incontinence     Urinary retention     Use of cane as ambulatory aid     Independent with personal care         Past Surgical History:   Procedure Laterality Date    ABDOMINAL SURGERY      CARDIAC SURGERY      CATARACT EXTRACTION, BILATERAL      CORONARY ANGIOPLASTY WITH STENT PLACEMENT      2 stents    CORONARY ARTERY BYPASS GRAFT N/A 7/15/2016    Procedure: Intraop DARIAN, CABG x 3 using LIMA to LAD, RSVG to OM1 and D1;  Surgeon: Maxwell Gomes MD;  [facility];  Service:    Ana María Gallagher      has stents    EYE SURGERY      Bilateral cataract removal    HERNIA REPAIR      umbilical hernia repair    LUNG CANCER SURGERY      Partial upper right lobectomy    LUNG SURGERY Left 2012    OTHER SURGICAL HISTORY      previous stent placement-no anatomy given    PILONIDAL CYST EXCISION      IN CYSTOURETHROSCOPY,URETER CATHETER Left 3/9/2016    Procedure: CYSTOSCOPY WITH left RETROGRADE PYELOGRAM left double J stent removal;  Surgeon: Kamryn Chaidez MD;  Location: [facility];  Service: Urology    [address] OR BX Bilateral 10/31/2017    Procedure: BIOPSY ARTERY TEMPORAL;  Surgeon: Tyson Sparks MD;  Location: [facility];  Service: Vascular    RENAL ARTERY STENT  02/16/2015    transcath intravascular stent placement percutaneous renal    VASCULAR SURGERY         Meds/Allergies:    Prior to Admission medications    Medication Sig Start Date End Date Taking?  Authorizing Provider   acetaminophen (TYLENOL) 500 mg tablet Take 1,000 mg by mouth 2 (two) times a day Administer with tramadol   Yes Historical Provider, MD   albuterol (2 5 mg/3 mL) 0 083 % nebulizer solution Take 1 vial (2 5 mg total) by nebulization every 6 (six) hours as needed for wheezing or shortness of breath 12/5/18  Yes Romelia Perrin, DO   aspirin (ECOTRIN LOW STRENGTH) 81 mg EC tablet Take 81 mg by mouth 2 (two) times a day   Yes Historical Provider, MD   bisacodyl (BISCOLAX) 10 mg suppository Insert 10 mg into the rectum daily   Yes Historical Provider, MD   Cholecalciferol 2000 units TABS Take 1 tablet (2,000 Units total) by mouth daily 6/19/18  Yes Rosi Madrigal PA-C furosemide (LASIX) 80 mg tablet Take 1 tablet (80 mg total) by mouth 2 (two) times a day 1/28/19  Yes Ana Cristina Mike MD   gabapentin (NEURONTIN) 300 mg capsule 1 tablet in a m  at breakfast 1 tablet in early afternoon and 2 tablets at bedtime 6/27/19  Yes Ronny Velasco DO   insulin detemir (LEVEMIR) 100 units/mL subcutaneous injection Inject 45 Units under the skin daily at bedtime 12/16/18  Yes Dee Urena PA-C   insulin lispro (HumaLOG) 100 units/mL injection Inject 10 Units under the skin 3 (three) times a day with meals 12/16/18  Yes Dee Urena PA-C   iron polysaccharides (FERREX) 150 mg capsule Take 150 mg by mouth daily   Yes Historical Provider, MD   Lidocaine-Glycerin (PREPARATION H) 5-14 4 % CREA Insert into the rectum   Yes Historical Provider, MD   metoprolol tartrate (LOPRESSOR) 25 mg tablet Take 25 mg by mouth 2 (two) times a day   Yes Historical Provider, MD   potassium chloride (MICRO-K) 10 MEQ CR capsule Take 1 capsule (10 mEq total) by mouth 2 (two) times a day for 30 days 2/21/18 8/12/19 Yes Ronny Velasco DO   simvastatin (ZOCOR) 40 mg tablet Take 20 mg by mouth daily at bedtime    Yes Historical Provider, MD   tamsulosin (FLOMAX) 0 4 mg Take 0 4 mg by mouth daily at bedtime     Yes Historical Provider, MD   traMADol (ULTRAM) 50 mg tablet 2 tablet 2 times daily with 2 extra Strength Tylenol 6/27/19  Yes Ronny Velasco DO   warfarin (COUMADIN) 5 mg tablet Take 5mg (1 tablet) daily on Monday, Wednesday, and Friday, and 2 5mg (1/2 tablet) on Tuesday, Thursday, Saturday, Sunday 12/16/18  Yes Marianne Byrd PA-C   PROFERRIN-FORTE 12-1 MG TABS TAKE 1 TABLET BY MOUTH ONCE DAILY 5/28/19   Historical Provider, MD     I have reviewed home medications with patient personally  Allergies:    Allergies   Allergen Reactions    Other Rash     Adhesive tape       Social History:     Marital Status: /Civil Union   Occupation: retired  Patient Pre-hospital Living Situation: dependent  Patient Pre-hospital Level of Mobility: limited  Patient Pre-hospital Diet Restrictions: denies  Substance Use History:   Social History     Substance and Sexual Activity   Alcohol Use Never    Alcohol/week: 0 0 standard drinks    Frequency: Never    Drinks per session: Patient refused    Binge frequency: Never    Comment: 0     Social History     Tobacco Use   Smoking Status Former Smoker    Packs/day: 2 00    Years: 54 00    Pack years: 108 00    Last attempt to quit: 2004    Years since quitting: 15 6   Smokeless Tobacco Never Used   Tobacco Comment    Pt is a non smoker     Social History     Substance and Sexual Activity   Drug Use Never       Family History:    Family History   Problem Relation Age of Onset    Diabetes Other     Hypertension Other     Cancer Other     Diabetes Mother     Heart disease Mother     Hypertension Mother     Diabetes type II Mother     Cancer Mother         unknown type    Diabetes Father     Diabetes Maternal Grandmother     Cancer Sister         unknown type    Cancer Other         unknown type       Physical Exam:     Vitals:   Blood Pressure: 132/79 (08/12/19 1946)  Pulse: 75 (08/12/19 1946)  Temperature: (!) 97 1 °F (36 2 °C) (08/12/19 1946)  Temp Source: Temporal (08/12/19 1946)  Respirations: 20 (08/12/19 1946)  Weight - Scale: (!) 141 kg (311 lb 8 2 oz) (08/12/19 1946)  SpO2: 97 % (08/12/19 1946)    Physical Exam   Constitutional: He is oriented to person, place, and time  He appears well-developed and well-nourished  HENT:   Head: Normocephalic and atraumatic  Eyes: Pupils are equal, round, and reactive to light  EOM are normal    Neck: Normal range of motion  Neck supple  No JVD present  No tracheal deviation present  No thyromegaly present  Cardiovascular: Normal rate, regular rhythm, normal heart sounds and intact distal pulses  Exam reveals no gallop and no friction rub  No murmur heard  Pulmonary/Chest: Effort normal  He has rhonchi     Abdominal: Soft  Bowel sounds are normal  He exhibits no distension  There is no tenderness  Genitourinary:   Genitourinary Comments: IUC in place draining blood tinged urine    Musculoskeletal: He exhibits edema (3 b/l le )  Neurological: He is alert and oriented to person, place, and time  No cranial nerve deficit  Skin: Capillary refill takes 2 to 3 seconds  There is erythema (b/l le)  Psychiatric: He has a normal mood and affect  His behavior is normal  Judgment and thought content normal          Additional Data:     Lab Results: I have personally reviewed pertinent reports  Results from last 7 days   Lab Units 08/12/19  1335   WBC Thousand/uL 6 17   HEMOGLOBIN g/dL 10 7*   HEMATOCRIT % 35 4*   PLATELETS Thousands/uL 271   NEUTROS PCT % 76*   LYMPHS PCT % 12*   MONOS PCT % 8   EOS PCT % 2     Results from last 7 days   Lab Units 08/12/19  1335   POTASSIUM mmol/L 4 5   CHLORIDE mmol/L 108   CO2 mmol/L 28   BUN mg/dL 41*   CREATININE mg/dL 2 22*   CALCIUM mg/dL 8 1*   ALK PHOS U/L 35*   ALT U/L 15   AST U/L 18     Results from last 7 days   Lab Units 08/12/19  1335   INR  2 09*       Imaging: I have personally reviewed pertinent reports  Ct Abdomen Pelvis Wo Contrast    Result Date: 8/12/2019  Narrative: CT ABDOMEN AND PELVIS WITHOUT IV CONTRAST INDICATION:   Abdominal pain and diarrhea  COMPARISON:  CT of the chest, abdomen, and pelvis dated 7/15/2019  TECHNIQUE:  CT examination of the abdomen and pelvis was performed without intravenous contrast   Axial, sagittal, and coronal 2D reformatted images were created from the source data and submitted for interpretation  Radiation dose length product (DLP) for this visit:  1537 69 mGy-cm   This examination, like all CT scans performed in the [facility], was performed utilizing techniques to minimize radiation dose exposure, including the use of iterative reconstruction and automated exposure control  Enteric contrast was not administered  FINDINGS: ABDOMEN LOWER CHEST:  Cardiomegaly  No acute findings in the lungs or visualized lower mediastinum  LIVER/BILIARY TREE:  Unremarkable  GALLBLADDER:  No calcified gallstones  No pericholecystic inflammatory change  SPLEEN:  Unremarkable  PANCREAS:  Atrophic  No focal masses or pancreatic ductal dilatation  ADRENAL GLANDS:  Unremarkable  KIDNEYS/URETERS:  Unchanged polycystic kidneys including hemorrhagic/proteinaceous cysts on the left and right lower pole cyst with thin calcified septation  No new or suspicious renal masses  No perinephric collections  Increased mild to moderate left hydronephrosis to the distal 3rd of the left ureter  Periureteral and peripelvic stranding on the left  No left ureteral stone or other urolithiasis  No right hydronephrosis  STOMACH AND BOWEL:  Stomach is normal for level of nondistention  No dilated or inflamed loops of small bowel  Scattered colonic diverticula without wall thickening or pericholecystic inflammation  APPENDIX:  No findings to suggest appendicitis  ABDOMINOPELVIC CAVITY:  Small amount of layering presacral fluid  No encapsulated collections  No free air  No mesenteric, retroperitoneal, or pelvic sidewall lymphadenopathy  VESSELS:  Aortoiliac atherosclerosis without aneurysm  PELVIS REPRODUCTIVE ORGANS:  Unremarkable for patient's age  URINARY BLADDER:  Distended  No wall thickening or stones  ABDOMINAL WALL/INGUINAL REGIONS:  Prior ventral hernia repair  Mild nonfocal anasarca  Large fat-containing left inguinal hernia  Small amount of fat in the right canal, as well  OSSEOUS STRUCTURES:  No acute fracture or destructive osseous lesion  Impression: 1  New mild to moderate left hydronephrosis to level of the distal ureter  No obstructive stone demonstrated, though  Recommend correlation with urinalysis for evidence of hematuria or infection   2   Small amount of presacral fluid could suggest intra-abdominal inflammatory process or could relate to 3rd spacing given presence of superficial anasarca  Workstation performed: [id number]     Ct Chest Abdomen Pelvis Wo Contrast    Result Date: 7/15/2019  Narrative: CT CHEST, ABDOMEN AND PELVIS WITHOUT IV CONTRAST INDICATION:   Fall on Coumadin kidney disease  COMPARISON:  CT from 6/4/2019 TECHNIQUE: CT examination of the chest, abdomen and pelvis was performed without intravenous contrast   Axial, sagittal, and coronal 2D reformatted images were created from the source data and submitted for interpretation  Radiation dose length product (DLP) for this visit:  2069 mGy-cm   This examination, like all CT scans performed in the [facility], was performed utilizing techniques to minimize radiation dose exposure, including the use of iterative reconstruction and automated exposure control  Enteric contrast was not administered  FINDINGS: CHEST LUNGS:  Respiratory motion limits evaluation of the lungs  There is no focal airspace consolidation or pulmonary contusion identified  There is no pneumothorax  PLEURA:  Unremarkable  HEART/GREAT VESSELS:  The heart is enlarged  There is coronary artery calcification  The thoracic aorta and main pulmonary artery are normal in caliber  MEDIASTINUM AND DENISSE:  Unremarkable  CHEST WALL AND LOWER NECK:   There is coarse calcification within the left thyroid lobe without focal suspicious lesion  The patient is status post sternotomy and CABG  ABDOMEN LIVER/BILIARY TREE:  Unremarkable  GALLBLADDER:  No calcified gallstones  No pericholecystic inflammatory change  SPLEEN:  Unremarkable  PANCREAS:  Unremarkable  ADRENAL GLANDS:  Unremarkable  KIDNEYS/URETERS:  There is cortical renal atrophy bilaterally  There polycystic kidneys again noted bilaterally, similar to the prior examination  STOMACH AND BOWEL:  Unremarkable  APPENDIX:  No findings to suggest appendicitis  ABDOMINOPELVIC CAVITY:  No ascites or free intraperitoneal air  No lymphadenopathy  VESSELS:  There are atherosclerotic changes of the aorta  PELVIS REPRODUCTIVE ORGANS:  Unremarkable for patient's age  URINARY BLADDER:  Unremarkable  ABDOMINAL WALL/INGUINAL REGIONS:  There is a fat-containing left inguinal hernia  There is soft tissue swelling within the ventral abdominal wall inferiorly, similar to the prior exam  OSSEOUS STRUCTURES:  There are multilevel degenerative changes of the spine  Impression: No evidence for acute intrathoracic injury  No acute intra-abdominal abnormality identified  Remainder the findings, as described above  Workstation performed: [id number]     Xr Chest 1 View Portable    Result Date: 8/12/2019  Narrative: CHEST INDICATION:   Lethargy  COMPARISON:  12/15/2019 EXAM PERFORMED/VIEWS:  XR CHEST PORTABLE FINDINGS:  Intact median sternotomy wires  Prior CABG  Unchanged cardiomegaly accentuated by AP technique  Pulmonary vasculature is indistinct but not frankly cephalized  No Kerley B lines  No focal consolidation  No pneumothorax or effusion  Mild, age-appropriate acromioclavicular degenerative changes  No acute osseous findings  Impression: No acute cardiopulmonary findings  Workstation performed: [id number]     Xr Knee 4+ Views Left Injury    Result Date: 7/15/2019  Narrative: LEFT KNEE INDICATION:   Pain after fall  COMPARISON:  None VIEWS:  XR KNEE 4+ VW LEFT INJURY FINDINGS: Nondisplaced vertically oriented fracture noted at the patella laterally  No dislocation  There is no joint effusion  Moderate narrowing noted of the medial compartment with mild overall degenerative spurring No lytic or blastic lesions are seen  There are atherosclerotic calcifications  Soft tissues are otherwise unremarkable  Impression: Nondisplaced patellar fracture  Findings concur with the preliminary ER interpretation  Workstation performed: [id number]     Xr Knee 4+ Views Right Injury    Result Date: 7/15/2019  Narrative: RIGHT KNEE INDICATION:   Pain after fall  COMPARISON:  None VIEWS:  XR KNEE 4+ VW RIGHT INJURY FINDINGS: There is no acute fracture or dislocation  There is no joint effusion  Moderate narrowing of the medial compartment  Mild degenerative spurring  No lytic or blastic lesions are seen  There are atherosclerotic calcifications  Soft tissues are otherwise unremarkable  Surgical clips noted in the proximal calf medially  Impression: No acute osseous abnormality  Workstation performed: [id number]     Ct Head Without Contrast    Result Date: 8/12/2019  Narrative: CT BRAIN - WITHOUT CONTRAST INDICATION:   Lethargy  COMPARISON:  7/15/2019 TECHNIQUE:  CT examination of the brain was performed  In addition to axial images, coronal 2D reformatted images were created and submitted for interpretation  Radiation dose length product (DLP) for this visit:  905 66 mGy-cm   This examination, like all CT scans performed in the [facility], was performed utilizing techniques to minimize radiation dose exposure, including the use of iterative  reconstruction and automated exposure control  IMAGE QUALITY:  Diagnostic  FINDINGS: PARENCHYMA:  Mild confluent periventricular white matter hypodensity consistent with chronic microangiopathic changes  No acute infarct  No parenchymal hemorrhage  No mass  VENTRICLES AND EXTRA-AXIAL SPACES:  Mild involutional volume loss  No hydrocephalus, herniation, or mass effect  No extra-axial or intraventricular hemorrhage  VISUALIZED ORBITS AND PARANASAL SINUSES:  Bilateral cataract surgeries  Orbits are, otherwise, unremarkable  Minimal chronic mucosal disease in the maxillary sinuses  No acute sinusitis or mastoid effusions  CALVARIUM AND EXTRACRANIAL SOFT TISSUES:  Mild cavernous carotid atherosclerotic calcifications  No displaced or depressed skull fractures  No soft tissue swelling  Impression: No acute intracranial abnormality or significant change from prior   Workstation performed: [id number] Ct Head Without Contrast    Result Date: 7/15/2019  Narrative: CT BRAIN - WITHOUT CONTRAST INDICATION:   Fall on Coumadin  COMPARISON:  CT from 11/30/2018 TECHNIQUE:  CT examination of the brain was performed  In addition to axial images, coronal 2D reformatted images were created and submitted for interpretation  Radiation dose length product (DLP) for this visit:  972 mGy-cm   This examination, like all CT scans performed in the [facility], was performed utilizing techniques to minimize radiation dose exposure, including the use of iterative reconstruction and automated exposure control  IMAGE QUALITY:  Diagnostic  FINDINGS: PARENCHYMA:  No intracranial mass, mass effect or midline shift  No CT signs of acute infarction  No acute parenchymal hemorrhage  There is age-related involutional and mild chronic microvascular ischemic change  VENTRICLES AND EXTRA-AXIAL SPACES:  Normal for the patient's age  VISUALIZED ORBITS AND PARANASAL SINUSES:  Unremarkable  CALVARIUM AND EXTRACRANIAL SOFT TISSUES:  There is right frontal scalp soft tissue swelling  There is no evidence for depressed calvarial fracture  Impression: No acute intracranial hemorrhage or depressed calvarial fracture identified  Right frontal scalp soft tissue swelling is noted  Age-related involutional and scattered chronic microvascular ischemic change, stable  Workstation performed: [id number]     Ct Facial Bones Without Contrast    Result Date: 7/15/2019  Narrative: CT FACIAL BONES WITHOUT INTRAVENOUS CONTRAST INDICATION:   Fall on Coumadin right frontal contusion  COMPARISON: None  TECHNIQUE:  Axial CT images were obtained through the facial bones with additional sagittal and coronal reconstructions  Radiation dose length product (DLP) for this visit:  340 mGy-cm     This examination, like all CT scans performed in the [facility], was performed utilizing techniques to minimize radiation dose exposure, including the use of iterative reconstruction and automated exposure control  IMAGE QUALITY:  Diagnostic  FINDINGS: FACIAL BONES:   No facial bone fracture identified  Normal alignment of the temporomandibular joints  No lytic or blastic lesion  ORBITS:  Orbital globes, optic nerves, and extraocular muscles appear symmetric and normal  There is no evidence of retrobulbar mass, abscess, or hematoma  Status post bilateral cataract surgery  SINUSES:  There is mild paranasal sinus mucosal thickening  There are no hemorrhagic fluid levels within the paranasal sinuses  Scattered periodontal lucencies are noted  There is rightward deviation of the bony nasal septum with spur formation  SOFT TISSUES:  There is right frontal scalp soft tissue swelling  Impression: Right frontal scalp soft tissue swelling  No acute fracture identified of the maxillofacial bones  Workstation performed: [id number]     Ct Cervical Spine Without Contrast    Result Date: 7/15/2019  Narrative: CT CERVICAL SPINE - WITHOUT CONTRAST INDICATION:   Fall on Coumadin  COMPARISON:  None  TECHNIQUE:  CT examination of the cervical spine was performed without intravenous contrast   Contiguous axial images were obtained  Sagittal and coronal reconstructions were performed  Radiation dose length product (DLP) for this visit:  1072 mGy-cm   This examination, like all CT scans performed in the [facility], was performed utilizing techniques to minimize radiation dose exposure, including the use of iterative reconstruction and automated exposure control  IMAGE QUALITY:  Study is slightly limited secondary to body habitus  FINDINGS: ALIGNMENT:  Normal alignment of the cervical spine  No subluxation  VERTEBRAL BODIES:  No acute fractures identified  The bones are osteopenic  There is incomplete fusion of the posterior arch of C1  DEGENERATIVE CHANGES:  There is multilevel disc space narrowing and ventral osteophytosis    There is no significant bony canal stenosis identified  There is atlantodental arthrosis noted with hypertrophy of the anterior arch of C1  PREVERTEBRAL AND PARASPINAL SOFT TISSUES:  Unremarkable  THORACIC INLET:  Normal      Impression: No definite evidence for acute fracture or traumatic malalignment within the cervical spine  Workstation performed: [id number]     Vas Carotid Complete Study    Result Date: 7/16/2019  Narrative:  [facility] REPORT CLINICAL: Indications:  Patient was admitted today for syncope and collapse  He was unable to provide a history or remain awake for the exam  Operative History: 2017-10-31 Temporal artery biopsy CABG Cardiac Cath Cardiac stents Risk Factors The patient has history of HTN, Diabetes (IDDM adult), HLD and CKD  He has no history of DVT  The patient current BMI is 52 91, Weight is 336 lb and height is 67 in  Clinical Right Pressure:(I V  present), Left Pressure:  153/85 mm Hg  FINDINGS:  Right        Impression  PSV  EDV (cm/s)  Direction of Flow  Ratio  Dist  ICA                 53          10                      1 15  Mid  ICA                  53          12                      1 16  Prox  ICA    1 - 49%      56          11                      1 22  Dist CCA                  37           3                            Mid CCA                   46           6                      0 64  Prox CCA                  71           8                     18 84  Ext Carotid               64           5                      1 40  Prox Vert                 29           5  Antegrade                 Subclavian                76           0                            Innominate                 4          33                             Left         Impression  PSV  EDV (cm/s)  Direction of Flow  Ratio  Dist  ICA                 45          11                      0 72  Mid  ICA                  37          10                      0 59  Prox   ICA    1 - 49%      40           5 0 64  Dist CCA                  48           9                            Mid CCA                   63           9                      1 16  Prox CCA                  55           9                            Ext Carotid               32           2                      0 51  Prox Vert                 31           9  Antegrade                 Subclavian                99           4                               CONCLUSION: Impression RIGHT: There is <50% stenosis noted in the internal carotid artery  Plaque is heterogenous and irregular  Vertebral artery flow is antegrade  There is no significant subclavian artery disease  LEFT: There is <50% stenosis noted in the internal carotid artery  Plaque is heterogenous and irregular  Vertebral artery flow is antegrade  There is no significant subclavian artery disease  Compared to previous study on 10/23/2017, there has been no significant change  Technically difficult due to habitus and snoring  Internal carotid artery stenosis determination by consensus criteria from: Janet Marie , et al  Carotid Artery Stenosis: Gray-Scale and Doppler US Diagnosis - [employer] of Radiologists in [address] [place] [address], Radiology 2003; [phone]  SIGNATURE: Electronically Signed by: Gerri Bray on 2019-07-16 02:06:10 PM      [facility] / Care Everywhere Records Reviewed: Yes     ** Please Note: This note has been constructed using a voice recognition system   **

## 2019-08-12 NOTE — ASSESSMENT & PLAN NOTE
Currently rate controled  Currently anticoagulated on Coumadin-will continue  Currently INR 2 09-trend INR

## 2019-08-13 ENCOUNTER — APPOINTMENT (INPATIENT)
Dept: RADIOLOGY | Facility: HOSPITAL | Age: 79
DRG: 602 | End: 2019-08-13
Payer: MEDICARE

## 2019-08-13 PROBLEM — J96.11 CHRONIC RESPIRATORY FAILURE WITH HYPOXIA (HCC): Status: ACTIVE | Noted: 2019-08-13

## 2019-08-13 LAB
ANION GAP SERPL CALCULATED.3IONS-SCNC: 9 MMOL/L (ref 4–13)
ATRIAL RATE: 300 BPM
BACTERIA UR CULT: NORMAL
BUN SERPL-MCNC: 36 MG/DL (ref 5–25)
CALCIUM SERPL-MCNC: 8.1 MG/DL (ref 8.3–10.1)
CHLORIDE SERPL-SCNC: 106 MMOL/L (ref 100–108)
CO2 SERPL-SCNC: 26 MMOL/L (ref 21–32)
CREAT SERPL-MCNC: 2.1 MG/DL (ref 0.6–1.3)
ERYTHROCYTE [DISTWIDTH] IN BLOOD BY AUTOMATED COUNT: 15.5 % (ref 11.6–15.1)
GFR SERPL CREATININE-BSD FRML MDRD: 29 ML/MIN/1.73SQ M
GLUCOSE P FAST SERPL-MCNC: 103 MG/DL (ref 65–99)
GLUCOSE SERPL-MCNC: 103 MG/DL (ref 65–140)
GLUCOSE SERPL-MCNC: 129 MG/DL (ref 65–140)
GLUCOSE SERPL-MCNC: 192 MG/DL (ref 65–140)
GLUCOSE SERPL-MCNC: 195 MG/DL (ref 65–140)
GLUCOSE SERPL-MCNC: 84 MG/DL (ref 65–140)
HCT VFR BLD AUTO: 34.5 % (ref 36.5–49.3)
HGB BLD-MCNC: 10.4 G/DL (ref 12–17)
MAGNESIUM SERPL-MCNC: 2.1 MG/DL (ref 1.6–2.6)
MCH RBC QN AUTO: 28.3 PG (ref 26.8–34.3)
MCHC RBC AUTO-ENTMCNC: 30.1 G/DL (ref 31.4–37.4)
MCV RBC AUTO: 94 FL (ref 82–98)
P AXIS: 79 DEGREES
PHOSPHATE SERPL-MCNC: 2.6 MG/DL (ref 2.3–4.1)
PLATELET # BLD AUTO: 286 THOUSANDS/UL (ref 149–390)
PMV BLD AUTO: 9.8 FL (ref 8.9–12.7)
POTASSIUM SERPL-SCNC: 3.7 MMOL/L (ref 3.5–5.3)
QRS AXIS: -71 DEGREES
QRSD INTERVAL: 126 MS
QT INTERVAL: 420 MS
QTC INTERVAL: 462 MS
RBC # BLD AUTO: 3.68 MILLION/UL (ref 3.88–5.62)
SODIUM SERPL-SCNC: 141 MMOL/L (ref 136–145)
T WAVE AXIS: 77 DEGREES
VENTRICULAR RATE: 73 BPM
WBC # BLD AUTO: 8.08 THOUSAND/UL (ref 4.31–10.16)

## 2019-08-13 PROCEDURE — 85027 COMPLETE CBC AUTOMATED: CPT | Performed by: NURSE PRACTITIONER

## 2019-08-13 PROCEDURE — 99222 1ST HOSP IP/OBS MODERATE 55: CPT | Performed by: PHYSICIAN ASSISTANT

## 2019-08-13 PROCEDURE — 82948 REAGENT STRIP/BLOOD GLUCOSE: CPT

## 2019-08-13 PROCEDURE — 99221 1ST HOSP IP/OBS SF/LOW 40: CPT | Performed by: PHYSICIAN ASSISTANT

## 2019-08-13 PROCEDURE — 83735 ASSAY OF MAGNESIUM: CPT | Performed by: NURSE PRACTITIONER

## 2019-08-13 PROCEDURE — 73630 X-RAY EXAM OF FOOT: CPT

## 2019-08-13 PROCEDURE — 93010 ELECTROCARDIOGRAM REPORT: CPT | Performed by: INTERNAL MEDICINE

## 2019-08-13 PROCEDURE — 84100 ASSAY OF PHOSPHORUS: CPT | Performed by: NURSE PRACTITIONER

## 2019-08-13 PROCEDURE — 99232 SBSQ HOSP IP/OBS MODERATE 35: CPT | Performed by: FAMILY MEDICINE

## 2019-08-13 PROCEDURE — 80048 BASIC METABOLIC PNL TOTAL CA: CPT | Performed by: NURSE PRACTITIONER

## 2019-08-13 PROCEDURE — 94760 N-INVAS EAR/PLS OXIMETRY 1: CPT

## 2019-08-13 RX ORDER — FUROSEMIDE 80 MG
80 TABLET ORAL
Status: DISCONTINUED | OUTPATIENT
Start: 2019-08-13 | End: 2019-08-15 | Stop reason: HOSPADM

## 2019-08-13 RX ORDER — CEFAZOLIN SODIUM 1 G/50ML
1000 SOLUTION INTRAVENOUS EVERY 8 HOURS
Status: DISCONTINUED | OUTPATIENT
Start: 2019-08-13 | End: 2019-08-15 | Stop reason: HOSPADM

## 2019-08-13 RX ORDER — OXYCODONE HYDROCHLORIDE 5 MG/1
5 TABLET ORAL EVERY 6 HOURS PRN
Status: DISCONTINUED | OUTPATIENT
Start: 2019-08-13 | End: 2019-08-15 | Stop reason: HOSPADM

## 2019-08-13 RX ADMIN — INSULIN DETEMIR 45 UNITS: 100 INJECTION, SOLUTION SUBCUTANEOUS at 21:18

## 2019-08-13 RX ADMIN — TAMSULOSIN HYDROCHLORIDE 0.4 MG: 0.4 CAPSULE ORAL at 21:15

## 2019-08-13 RX ADMIN — METOPROLOL TARTRATE 25 MG: 25 TABLET, FILM COATED ORAL at 17:04

## 2019-08-13 RX ADMIN — OXYCODONE HYDROCHLORIDE 5 MG: 5 TABLET ORAL at 19:53

## 2019-08-13 RX ADMIN — CEFAZOLIN SODIUM 2000 MG: 2 SOLUTION INTRAVENOUS at 12:11

## 2019-08-13 RX ADMIN — ATORVASTATIN CALCIUM 40 MG: 40 TABLET, FILM COATED ORAL at 17:04

## 2019-08-13 RX ADMIN — OXYCODONE HYDROCHLORIDE 5 MG: 5 TABLET ORAL at 10:02

## 2019-08-13 RX ADMIN — FUROSEMIDE 80 MG: 80 TABLET ORAL at 17:04

## 2019-08-13 RX ADMIN — ACETAMINOPHEN 650 MG: 325 TABLET, FILM COATED ORAL at 09:09

## 2019-08-13 RX ADMIN — CEFAZOLIN SODIUM 2000 MG: 2 SOLUTION INTRAVENOUS at 03:29

## 2019-08-13 RX ADMIN — INSULIN LISPRO 2 UNITS: 100 INJECTION, SOLUTION INTRAVENOUS; SUBCUTANEOUS at 17:05

## 2019-08-13 RX ADMIN — CEFAZOLIN SODIUM 1000 MG: 1 SOLUTION INTRAVENOUS at 19:54

## 2019-08-13 RX ADMIN — METOPROLOL TARTRATE 25 MG: 25 TABLET, FILM COATED ORAL at 08:38

## 2019-08-13 RX ADMIN — WARFARIN SODIUM 2.5 MG: 2.5 TABLET ORAL at 17:04

## 2019-08-13 RX ADMIN — FUROSEMIDE 80 MG: 80 TABLET ORAL at 10:01

## 2019-08-13 RX ADMIN — ASPIRIN 81 MG: 81 TABLET, COATED ORAL at 08:38

## 2019-08-13 RX ADMIN — INSULIN LISPRO 2 UNITS: 100 INJECTION, SOLUTION INTRAVENOUS; SUBCUTANEOUS at 21:18

## 2019-08-13 NOTE — PLAN OF CARE
Problem: DISCHARGE PLANNING - CARE MANAGEMENT  Goal: Discharge to post-acute care or home with appropriate resources  Description  INTERVENTIONS:  - Conduct assessment to determine patient/family and health care team treatment goals, and need for post-acute services based on payer coverage, community resources, and patient preferences, and barriers to discharge  - Address psychosocial, clinical, and financial barriers to discharge as identified in assessment in conjunction with the patient/family and health care team  - Arrange appropriate level of post-acute services according to patient's   needs and preference and payer coverage in collaboration with the physician and health care team  - Communicate with and update the patient/family, physician, and health care team regarding progress on the discharge plan  - Arrange appropriate transportation to post-acute venues    Pt's goal is to return home with his spouse and c   Outcome: Progressing

## 2019-08-13 NOTE — ASSESSMENT & PLAN NOTE
Rate controlled on current AV arsalan blocking regimen of metoprolol 25 mg p o  B i d   AC with Coumadin  Trend daily INR

## 2019-08-13 NOTE — PROGRESS NOTES
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 stage 4 chronic kidney disease ([facility] [address] )  Assessment & Plan  Wt Readings from Last 3 Encounters:   08/13/19 (!) 141 kg (310 lb 10 1 oz)   07/15/19 (!) 153 kg (336 lb 6 8 oz)   06/27/19 (!) 140 kg (308 lb)     Baseline renal function is 2 5-3  Resume Lasix 80 mg p o  B i d   Daily weights, intake and output, low-salt diet      Chronic respiratory failure with hypoxia (HCC)  Assessment & Plan  Patient saturating well on baseline utilization 3 L nasal cannula    Obstructive chronic bronchitis without exacerbation (HCC)  Assessment & Plan  Without exacerbation  Utilizes 3 L home O2 supplementation  Not requiring additional  Respiratory protocol in place    Atrial fibrillation (HCC)  Assessment & Plan  Rate controlled on current AV arsalan blocking regimen of metoprolol 25 mg p o  B i d   AC with Coumadin  Trend daily INR    Morbid obesity ([facility] )  Assessment & Plan  Therapeutic lifestyle changes        Progress Note - Shayna Poole 78 y o  male MRN: [id number]    Unit/Bed#: 409-01 Encounter: [id number]        Subjective:     Patient seen and examined at bedside  He complains of great toe pain       Objective:     Vitals:   Vitals:    08/13/19 0718   BP: 132/77   Pulse: 77   Resp: 18   Temp: 97 9 °F (36 6 °C)   SpO2: 96%     Body mass index is 48 65 kg/m²      Intake/Output Summary (Last 24 hours) at 8/13/2019 0932  Last data filed at 8/12/2019 2110  Gross per 24 hour   Intake 872 ml   Output 2200 ml   Net -1328 ml       Physical Exam:   /77   Pulse 77   Temp 97 9 °F (36 6 °C)   Resp 18   Ht 5' 7" (1 702 m)   Wt (!) 141 kg (310 lb 10 1 oz)   SpO2 96%   BMI 48 65 kg/m²   General appearance: alert and oriented, in no acute distress  Lungs: clear to auscultation bilaterally  Heart: regular rate and rhythm, S1, S2 normal, no murmur, click, rub or gallop  Abdomen: soft, non-tender; bowel sounds normal; no masses,  no organomegaly  Extremities: +1 edema, chronic wounds with chronic venous stasis  Pulses: 2+ and symmetric  Neurologic: Grossly normal     Invasive Devices     Peripheral Intravenous Line            Peripheral IV 08/12/19 Right Antecubital less than 1 day          Drain            Urethral Catheter Coude 16 Fr  less than 1 day                Results from last 7 days   Lab Units 08/13/19  0507 08/12/19  1335   WBC Thousand/uL 8 08 6 17   HEMOGLOBIN g/dL 10 4* 10 7*   HEMATOCRIT % 34 5* 35 4*   PLATELETS Thousands/uL 286 271       Results from last 7 days   Lab Units 08/13/19  0506 08/12/19  1335   POTASSIUM mmol/L 3 7 4 5   CHLORIDE mmol/L 106 108   CO2 mmol/L 26 28   BUN mg/dL 36* 41*   CREATININE mg/dL 2 10* 2 22*   CALCIUM mg/dL 8 1* 8 1*   ALK PHOS U/L  --  35*   ALT U/L  --  15   AST U/L  --  18       Medication Administration - last 24 hours from 08/12/2019 0932 to 08/13/2019 0932       Date/Time Order Dose Route Action Action by     08/12/2019 1456 acetaminophen (TYLENOL) tablet 650 mg 650 mg Oral Given Josiah Yu RN     08/12/2019 1838 ceFAZolin (ANCEF) IVPB (premix) 2,000 mg 0 mg Intravenous Stopped Ezequiel Husain RN     08/12/2019 1808 ceFAZolin (ANCEF) IVPB (premix) 2,000 mg 2,000 mg Intravenous New Bag Josiah Yu RN     08/12/2019 1815 lidocaine (URO-JET) 2 % urethral/mucosal gel 1 application 1 application Urethral Given Josiah Yu RN     08/13/2019 0909 acetaminophen (TYLENOL) tablet 650 mg 650 mg Oral Given Jacqueline Krueger RN 08/13/2019 0329 ceFAZolin (ANCEF) IVPB (premix) 2,000 mg 2,000 mg Intravenous New Bag Iza Benavides RN     08/12/2019 2106 aspirin (ECOTRIN LOW STRENGTH) EC tablet 81 mg 81 mg Oral Given Iza Benavides RN     08/12/2019 2030 gabapentin (NEURONTIN) capsule 600 mg 600 mg Oral Not Given Iza Benavides RN     08/12/2019 2106 insulin detemir (LEVEMIR) subcutaneous injection 45 Units 45 Units Subcutaneous Given Iza Benavides RN     08/13/2019 0753 insulin lispro (HumaLOG) 100 units/mL subcutaneous injection 2-12 Units 2 Units Subcutaneous Not Given Liban Weems RN     08/12/2019 2106 insulin lispro (HumaLOG) 100 units/mL subcutaneous injection 2-12 Units 2 Units Subcutaneous Given Iza Benavides RN     08/13/2019 7219 metoprolol tartrate (LOPRESSOR) tablet 25 mg 25 mg Oral Given Liban Weems RN     08/12/2019 2030 metoprolol tartrate (LOPRESSOR) tablet 25 mg 25 mg Oral Given Iza Benavides RN     08/12/2019 2106 tamsulosin (FLOMAX) capsule 0 4 mg 0 4 mg Oral Given Iza Benavides RN     08/12/2019 2030 warfarin (COUMADIN) tablet 5 mg 5 mg Oral Given Iza Benavides RN     08/13/2019 0014 aspirin (ECOTRIN LOW STRENGTH) EC tablet 81 mg 81 mg Oral Given Liban Weems RN            Lab, Imaging and other studies: I have personally reviewed pertinent reports      VTE Pharmacologic Prophylaxis: Warfarin (Coumadin)  VTE Mechanical Prophylaxis: sequential compression device     Kael Canales MD  8/13/2019,9:32 AM

## 2019-08-13 NOTE — UTILIZATION REVIEW
Initial Clinical Review    Admission: Date/Time/Statement: 8/13/19 @ 0934 Inpatient Written     Orders Placed This Encounter   Procedures    Inpatient Admission     Standing Status:   Standing     Number of Occurrences:   1     Order Specific Question:   Admitting Physician     Answer:   Walt Gomez     Order Specific Question:   Level of Care     Answer:   Med Surg [16]     Order Specific Question:   Estimated length of stay     Answer:   More than 2 Midnights     Order Specific Question:   Certification     Answer:   I certify that inpatient services are medically necessary for this patient for a duration of greater than two midnights  See H&P and MD Progress Notes for additional information about the patient's course of treatment  ED Arrival Information     Expected Arrival Acuity Means of Arrival Escorted By Service Admission Type    - 8/12/2019 13:14 Urgent [facility] General Medicine Urgent    Arrival Complaint    -        Chief Complaint   Patient presents with    Lethargy     home health nurse states that pt was more lethargic than normal, pt states he feels tired, ate very little yesterday  has oozing legs, states they are chronically like that  recently discharged from rehab     Assessment/Plan:  78 y o  male who presented to the ER via EMS for evaluation of increased lethargy, bilateral lower extremity edema with oozing  Patient has a past medical history including urinary retention TIA obstructive sleep apnea-CPAP, lobe is, neuropathy, lung cancer prostate cancer, ischemic cardiomyopathy, hypertension hyperlipidemia hypercholesterolemia history of cellulitis hiatal hernia hepatitis, diabetes mellitus type 2 coronary artery disease CKD, chronic combined systolic and diastolic failure AFib anemia  Chart review indicates patient being discharged 4 days ago from rehab  He was admitted for syncope and a patellar fracture and was in rehab for approximately 4 weeks    Chart review indicates home health nurse noticed increased lethargy and having waking today  Additionally patient wears baseline home O2 supplementation at 3 L 24 hours  Patient denies lightheadedness dizziness nausea vomiting diarrhea abdominal pain denies shortness of breath not required any additional supplemental oxygen, denies chest pain, patient denies dysuria urgency or frequency, denies hematochezia or melena  Patient however does admit to bilateral lower extremities swelling with some oozing, patient also reports difficulty with walking uses a walker and wheelchair to get around  Images and labs were collected emergency room-see below  Images showed left hydronephrosis and Urology will be consulted     Cellulitis of extremity  Assessment & Plan  CC increased fatigue and has oozing legs  B/L LE ancef initiated in ER- Will continue  Wound care consulted   Bilateral lower extremities are erythematous and are oozing      Obstructive chronic bronchitis without exacerbation (HCC)  Assessment & Plan  Without exacerbation  Utilizes 3 L home O2 supplementation  Not requiring additional  Respiratory protocol in place     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 stage 4 chronic kidney disease (HCC)  Assessment & Plan  Creatinine is currently at baseline 2 22  Continue prior to admission Lopressor  Currently holding prior to admission Lasix   Avoid Nephro toxic agents if possible     Severe obstructive sleep apnea-hypopnea syndrome  Assessment & Plan  Continue prior to admission CPAP  Respiratory protocol in place     Atrial fibrillation ([facility] [address] )  Assessment & Plan  Currently rate controled  Currently anticoagulated on Coumadin-will continue  Currently INR 2 09-trend INR     Type 2 diabetes mellitus with hyperglycemia (HCC)  Assessment & Plan  Blood Sugar Average: Last 72 hrs:  (P) 130      Accu-Cheks a c  HS  Insulin sliding scale  Continue Levemir 45 units q h s      Morbid obesity ([address] )  Assessment & Plan  Would benefit from outpatient counseling      Hydronephrosis  Assessment & Plan  Continue PTA Flomax  Urology consulted     VTE Prophylaxis: Warfarin (Coumadin)  / sequential compression device     ED Triage Vitals [08/12/19 1319]   Temperature Pulse Respirations Blood Pressure SpO2   98 4 °F (36 9 °C) 75 22 151/70 97 %      Temp Source Heart Rate Source Patient Position - Orthostatic VS BP Location FiO2 (%)   Temporal Monitor Lying Left arm --      Pain Score       No Pain        Wt Readings from Last 1 Encounters:   08/13/19 (!) 141 kg (310 lb 10 1 oz)     Additional Vital Signs:   Date/Time  Temp  Pulse  Resp  BP  MAP (mmHg)  SpO2  O2 Device  Patient Position - Orthostatic VS   08/13/19 15:09:06  97 3 °F (36 3 °C)Abnormal   60  18  153/68  96  97 %    Lying   08/13/19 07:18:20  97 9 °F (36 6 °C)  77  18  132/77  95  96 %       08/12/19 22:55:37  97 8 °F (36 6 °C)  73  16  134/77  96  98 %       08/12/19 2037              Nasal cannula     08/12/19 20:27:44    74    128/77  94  95 %       08/12/19 1956            97 %  Nasal cannula     08/12/19 19:46:33  97 1 °F (36 2 °C)Abnormal   75  20  132/79  97  97 %       08/12/19 1900    73  24Abnormal   170/91    96 %       08/12/19 1838    76  20  182/77Abnormal     97 %  Nasal cannula  Lying   08/12/19 1630    69  21  187/77Abnormal     99 %       08/12/19 1500              Nasal cannula     08/12/19 1430    79  22  185/83Abnormal            08/12/19 1400    70  24Abnormal   160/68    99 %  Nasal cannula  Lying   08/12/19 1319  98 4 °F (36 9 °C)  75  22  151/70    97 %  Nasal cannula  Lying     Pertinent Labs/Diagnostic Test Results:   Results from last 7 days   Lab Units 08/13/19  0507 08/12/19  1335   WBC Thousand/uL 8 08 6 17   HEMOGLOBIN g/dL 10 4* 10 7*   HEMATOCRIT % 34 5* 35 4*   PLATELETS Thousands/uL 286 271   NEUTROS ABS Thousands/µL  --  4 77 Results from last 7 days   Lab Units 08/13/19  0506 08/12/19  1335   SODIUM mmol/L 141 144   POTASSIUM mmol/L 3 7 4 5   CHLORIDE mmol/L 106 108   CO2 mmol/L 26 28   ANION GAP mmol/L 9 8   BUN mg/dL 36* 41*   CREATININE mg/dL 2 10* 2 22*   EGFR ml/min/1 73sq m 29 27   CALCIUM mg/dL 8 1* 8 1*   MAGNESIUM mg/dL 2 1  --    PHOSPHORUS mg/dL 2 6  --      Results from last 7 days   Lab Units 08/12/19  1335   AST U/L 18   ALT U/L 15   ALK PHOS U/L 35*   TOTAL PROTEIN g/dL 6 3*   ALBUMIN g/dL 2 8*   TOTAL BILIRUBIN mg/dL 0 30     Results from last 7 days   Lab Units 08/13/19  1641 08/13/19  1132 08/13/19  0747 08/12/19  2105 08/12/19  1802   POC GLUCOSE mg/dl 192* 129 84 162* 130     Results from last 7 days   Lab Units 08/13/19  0506 08/12/19  1335   GLUCOSE RANDOM mg/dL 103 112     Results from last 7 days   Lab Units 08/12/19  1335   TROPONIN I ng/mL 0 07*     Results from last 7 days   Lab Units 08/12/19  1335   PROTIME seconds 23 7*   INR  2 09*     Results from last 7 days   Lab Units 08/12/19  1335   TSH 3RD GENERATON uIU/mL 0 915     Results from last 7 days   Lab Units 08/12/19  1335   NT-PRO BNP pg/mL 7,378*     Results from last 7 days   Lab Units 08/12/19  1706   CLARITY UA  Clear   COLOR UA  Yellow   SPEC GRAV UA  1 010   PH UA  7 5   GLUCOSE UA mg/dl Negative   KETONES UA mg/dl Negative   BLOOD UA  Negative   PROTEIN UA mg/dl Trace*   NITRITE UA  Negative   BILIRUBIN UA  Negative   UROBILINOGEN UA E U /dl 0 2   LEUKOCYTES UA  Negative   WBC UA /hpf 0-1*   RBC UA /hpf None Seen   BACTERIA UA /hpf Occasional   EPITHELIAL CELLS WET PREP /hpf Occasional     Results from last 7 days   Lab Units 08/12/19  1707   URINE CULTURE  30,000-39,000 cfu/ml      CXR:  NAD  CT HEAD:  No acute intracranial abnormality or significant change from prior  CT AP:  1   New mild to moderate left hydronephrosis to level of the distal ureter  No obstructive stone demonstrated, though    Recommend correlation with urinalysis for evidence of hematuria or infection  2   Small amount of presacral fluid could suggest intra-abdominal inflammatory process or could relate to 3rd spacing given presence of superficial anasarca  VAS LOWER LIMB VENOUS DUPLEX:    RIGHT LOWER LIMB:  No evidence of acute or chronic deep vein thrombosis  No evidence of superficial thrombophlebitis noted  Doppler evaluation shows a normal response to augmentation maneuvers  Popliteal, posterior tibial and anterior tibial arterial Doppler waveforms are  hyperemic  Patency of mid and distal femoral vein done with color and spectral doppler  only  LEFT LOWER LIMB:  No evidence of acute or chronic deep vein thrombosis  No evidence of superficial thrombophlebitis noted  Doppler evaluation shows a normal response to augmentation maneuvers  Popliteal, posterior tibial and anterior tibial arterial Doppler waveforms are  hyperemic  Patency of mid and distal femoral vein done with color and spectral doppler  Only  XR LEFT FOOT:  No acute osseous abnormality    EKG:  SR WITH RBBB  ED Treatment:   Medication Administration from 08/12/2019 1313 to 08/12/2019 1936       Date/Time Order Dose Route Action     08/12/2019 1456 acetaminophen (TYLENOL) tablet 650 mg 650 mg Oral Given     08/12/2019 1808 ceFAZolin (ANCEF) IVPB (premix) 2,000 mg 2,000 mg Intravenous New Bag     08/12/2019 1815 lidocaine (URO-JET) 2 % urethral/mucosal gel 1 application 1 application Urethral Given        Past Medical History:   Diagnosis Date    Anemia     Arthritis     Atrial fibrillation (HCC)     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[address] )     B12 deficiency     Back pain     Blind right eye     Cancer ([facility] [address] )     prostate     Cardiac disorder     Chronic combined systolic and diastolic heart failure (HCC)     Chronic obstructive lung disease (HCC)     Chronic venous stasis dermatitis of both lower extremities     CKD (chronic kidney disease), stage IV (HCC)     Coronary artery disease     DDD (degenerative disc disease)     DM (diabetes mellitus), type 2 (HCC)     Type II, on insulin    BAER (dyspnea on exertion)     Easy bruising     Epistaxis     Gout     Gross hematuria     last assessed: 3/18/2015    Hepatitis     Herpes zoster     Hiatal hernia     History of cellulitis     BLE    History of prostate cancer     Radiation treatments   Hypercholesterolemia     Hyperlipidemia     Hypertension     Ischemic cardiomyopathy     Lung cancer ([facility] [address] ) 09/12/2019    per patient    Lung mass     last assessed: 9/21/2012    MI, old     Morbid obesity due to excess calories ([facility] )     or severe obesity    Neuropathy     BLE    Obesity     Obstructive sleep apnea syndrome, severe     Pneumonia     last assessed: 7/24/2012    Shortness of breath     TIA (transient ischemic attack)     Urinary incontinence     Urinary retention     Use of cane as ambulatory aid     Independent with personal care       Present on Admission:   Atrial fibrillation ([facility] [address] )   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 stage 4 chronic kidney disease (HCC)   Morbid obesity (HCC)   Obstructive chronic bronchitis without exacerbation (HCC)   Type 2 diabetes mellitus with hyperglycemia (HCC)   Severe obstructive sleep apnea-hypopnea syndrome   Cellulitis of extremity   Hydronephrosis      Admitting Diagnosis: Lethargy [R53 83]  Hydronephrosis of left kidney [N13 30]  Lower extremity cellulitis [L03 119]  Age/Sex: 78 y o  male  Admission Orders:  ARSLAN CHO CONTROLLED DIET, Accuchecks QAC and QHS with coverage  OOB WITH ASSIST  Daily weights, I/O  CPAP  O2 NC 2L  SCD  BLOOD CXS PENDING  Current Facility-Administered Medications:  acetaminophen 650 mg Oral Q6H PRN   albuterol 2 5 mg Nebulization Q6H PRN   aspirin 81 mg Oral Daily   atorvastatin 40 mg Oral Daily With Dinner   cefazolin 1,000 mg Intravenous Q8H   furosemide 80 mg Oral BID (diuretic)   insulin detemir 45 Units Subcutaneous HS   insulin lispro 2-12 Units Subcutaneous TID AC   insulin lispro 2-12 Units Subcutaneous HS   metoprolol tartrate 25 mg Oral BID   oxyCODONE 5 mg Oral Q6H PRN   tamsulosin 0 4 mg Oral HS   warfarin 2 5 mg Oral Once per day on Sun Tue Thu Sat   warfarin 5 mg Oral Once per day on Mon Wed Fri       IP CONSULT TO UROLOGY  IP CONSULT TO CASE MANAGEMENT  IP CONSULT TO WOUND CARE  IP CONSULT TO Jone Porter Utilization Review Department  Phone: [phone]; Fax [phone]  [email or web address]  ATTENTION: Please call with any questions or concerns to [phone]  and carefully listen to the prompts so that you are directed to the right person  Send all requests for admission clinical reviews, approved or denied determinations and any other requests to fax [phone]   All voicemails are confidential

## 2019-08-13 NOTE — SOCIAL WORK
Chart reviewed by case management, pt was changed to inpt status, pt is a 3o day readmission, pt was admitted 7/15/19 til 7/18/19 for a fall and the pt had a fx patellar, pt was d/c to the 5th floor, pt was d/c from there and sent home with [facility], pt was readmitted for cellulitis and hydronephrosis, pt is refusing any mote rehab, pt is agreeable to going home with [facility], [facility] was made aware of the admission, pt lives with his wife and she does help care for him, he lives in a trailer with  no steps inside and a ramp outside, pt has a cpap,walker, w/c and oxygen at home 32 liters from [facility], family was made aware to bring his portable tank from home when he is ready for d/c no d/c for today as discussed at care coordination rounds today, pt does go to [address] [facility] in [place], pt does not have a rx plan, I did review meds to beds with the pt and his wife, pt is refusing to go for any rehab, cm will continue to follow and assess for any additional d/c needs, Patient/caregiver received discharge checklist   Content reviewed  Patient/caregiver encouraged to participate in discharge plan of care prior to discharge home  CM reviewed d/c planning process including the following: identifying help at home, patient preference for d/c planning needs, availability of treatment team to discuss questions or concerns patient and/or family may have regarding understanding medications and recognizing signs and symptoms once discharged  CM also encouraged patient to follow up with all recommended appointments after discharge  Patient advised of importance for patient and family to participate in managing patients medical well being

## 2019-08-13 NOTE — RESPIRATORY THERAPY NOTE
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 stage 4 chronic kidney disease (HCC)    Obstructive chronic bronchitis without exacerbation (HCC)    Hydronephrosis      Home Pulmonary Medications:  None  Home Devices/Therapy: Home O2    Past Medical History:   Diagnosis Date    Anemia     Arthritis     Atrial fibrillation (HCC)     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HCC)     B12 deficiency     Back pain     Blind right eye     Cancer ([facility] )     prostate     Cardiac disorder     Chronic combined systolic and diastolic heart failure (HCC)     Chronic obstructive lung disease (HCC)     Chronic venous stasis dermatitis of both lower extremities     CKD (chronic kidney disease), stage IV (HCC)     Coronary artery disease     DDD (degenerative disc disease)     DM (diabetes mellitus), type 2 (HCC)     Type II, on insulin    BAER (dyspnea on exertion)     Easy bruising     Epistaxis     Gout     Gross hematuria     last assessed: 3/18/2015    Hepatitis     Herpes zoster     Hiatal hernia     History of cellulitis     BLE    History of prostate cancer     Radiation treatments      Hypercholesterolemia     Hyperlipidemia     Hypertension     Ischemic cardiomyopathy     Lung cancer ([facility] ) 09/12/2019    per patient    Lung mass     last assessed: 9/21/2012    MI, old     Morbid obesity due to excess calories ([facility] [address] )     or severe obesity    Neuropathy     BLE    Obesity     Obstructive sleep apnea syndrome, severe     Pneumonia     last assessed: 7/24/2012    Shortness of breath     TIA (transient ischemic attack)     Urinary incontinence     Urinary retention     Use of cane as ambulatory aid     Independent with personal care       Social History     Socioeconomic History    Marital status: /Civil Union     Spouse name: None    Number of children: None    Years of education: None    Highest education level: None   Occupational History    None   Social Needs    Financial resource strain: None    Food insecurity:     Worry: None     Inability: None    Transportation needs:     Medical: None     Non-medical: None   Tobacco Use    Smoking status: Former Smoker     Packs/day: 2 00     Years: 54 00     Pack years: 108 00     Last attempt to quit: 2004     Years since quitting: 15 6    Smokeless tobacco: Never Used    Tobacco comment: Pt is a non smoker   Substance and Sexual Activity    Alcohol use: Never     Alcohol/week: 0 0 standard drinks     Frequency: Never     Drinks per session: Patient refused     Binge frequency: Never     Comment: 0    Drug use: Never    Sexual activity: Never     Partners: Female     Birth control/protection: Abstinence   Lifestyle    Physical activity:     Days per week: None     Minutes per session: None    Stress: None   Relationships    Social connections:     Talks on phone: None     Gets together: None     Attends Jew service: None     Active member of club or organization: None     Attends meetings of clubs or organizations: None     Relationship status: None    Intimate partner violence:     Fear of current or ex partner: None     Emotionally abused: None     Physically abused: None     Forced sexual activity: None   Other Topics Concern    None   Social History Narrative    No advance directives    No living will    Patient has living will       Subjective         Objective    Physical Exam:   Assessment Type: Assess only  General Appearance: Alert, Awake  Respiratory Pattern: Normal  Chest Assessment: Chest expansion symmetrical  Bilateral Breath Sounds: Diminished, Clear  Cough: None  O2 Device: [email or web address]    Vitals:  Blood pressure 128/77, pulse 74, temperature (!) 97 1 °F (36 2 °C), temperature source Temporal, resp  rate 18, weight (!) 141 kg (311 lb 8 2 oz), SpO2 95 %  Imaging and other studies: I have personally reviewed pertinent reports  O2 Device: [email or web address]     Plan    Respiratory Plan: Mild Distress pathway      PRN Albuterol  Pt  Refuses to wear BiPAP  Jes Gamez RN was present when pt  Refused CPAP

## 2019-08-13 NOTE — CONSULTS
Consultation - Urology   Suzanne Gamboa 78 y o  male MRN: [id number]  Unit/Bed#: 409-01 Encounter: [id number]      Assessment/Plan      Assessment:  Mild/Mod Left hydronephrosis  Stage 4 CKD    Plan:  Continue Flomax  Avoid nephrotoxic agents  Maintain jeter  Antibiotics per primary  Monitor I/O's  Follow AM labs to include CBC and CMP    History of Present Illness   Attending: Celena Jerry MD  Reason for Consult / Principal Problem: left hydronephrosis    HPI: Suzanne Gamboa is a 78y o  year old male who presented to the [facility] ([place], [place]) ED for evaluation of lethargy and "oozing" legs  At that time he also complained of mild abdominal pain and watery stools the previous day    A CT abdomin/pelvis was performed which revealed new mild to moderate left hydronephrosis to level of the distal ureter   No obstructive stone demonstrated  He is a known patient of Dr Tompkins Platinum  Has a history of stage 4 CKD for which he has refused dialysis  He denies fevers/chills  He denies urinary frequency and dysuria  Inpatient consult to Urology  Consult performed by: Betty Flores PA-C  Consult ordered by: RETA Brambila          Review of Systems   Constitutional: Positive for activity change and fatigue  Cardiovascular: Positive for leg swelling  Gastrointestinal: Positive for abdominal pain and diarrhea  Skin: Positive for rash  Erythema of bilateral lower legs   Neurological: Positive for weakness  All other systems reviewed and are negative        Historical Information   Past Medical History:   Diagnosis Date    Anemia     Arthritis     Atrial fibrillation [facility]     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[facility] )     B12 deficiency     Back pain     Blind right eye     Cancer [facility])     prostate     Cardiac disorder     Chronic combined systolic and diastolic heart failure (HCC)     Chronic obstructive lung disease (HCC)     Chronic venous stasis dermatitis of both lower extremities     CKD [facility]chronic [facility] IV [facility]     Coronary artery disease     DDD (degenerative disc disease)     DM (diabetes mellitus), type 2 (HCC)     Type II, on insulin    BAER (dyspnea on exertion)     Easy bruising     Epistaxis     Gout     Gross hematuria     last assessed: 3/18/2015    Hepatitis     Herpes zoster     Hiatal hernia     History of cellulitis     BLE    History of prostate cancer     Radiation treatments   Hypercholesterolemia     Hyperlipidemia     Hypertension     Ischemic cardiomyopathy     Lung cancer ([facility] ) 09/12/2019    per patient    Lung mass     last assessed: 9/21/2012    MI, old     Morbid obesity due to excess calories ([facility] )     or severe obesity    Neuropathy     BLE    Obesity     Obstructive sleep apnea syndrome, severe     Pneumonia     last assessed: 7/24/2012    Shortness of breath     TIA (transient ischemic attack)     Urinary incontinence     Urinary retention     Use of cane as ambulatory aid     Independent with personal care       Past Surgical History:   Procedure Laterality Date    ABDOMINAL SURGERY      CARDIAC SURGERY      CATARACT EXTRACTION, BILATERAL      CORONARY ANGIOPLASTY WITH STENT PLACEMENT      2 stents    CORONARY ARTERY BYPASS GRAFT N/A 7/15/2016    Procedure: Intraop ADRIAN, CABG x 3 using LIMA to LAD, RSVG to OM1 and D1;  Surgeon: Estela Logan MD;  Location:  MAIN OR;  Service:    Lenora Will      has stents    EYE SURGERY      Bilateral cataract removal    HERNIA REPAIR      umbilical hernia repair    LUNG CANCER SURGERY      Partial upper right lobectomy    LUNG SURGERY Left 2012    OTHER SURGICAL HISTORY      previous stent placement-no anatomy given    PILONIDAL CYST EXCISION      [place] CYSTOURETHROSCOPY,URETER CATHETER Left 3/9/2016    Procedure: CYSTOSCOPY WITH left RETROGRADE PYELOGRAM left double J stent removal;  Surgeon: Bucky Aguayo MD;  [facility];  Service: Urology    [place] [address] Bilateral 10/31/2017    Procedure: BIOPSY ARTERY TEMPORAL;  Surgeon: Jose Antonio Ewing MD;  Location: [facility] MAIN OR;  Service: Vascular    RENAL ARTERY STENT  02/16/2015    transcath intravascular stent placement percutaneous renal    VASCULAR SURGERY       Social History   Social History     Substance and Sexual Activity   Alcohol Use Never    Alcohol/week: 0 0 standard drinks    Frequency: Never    Drinks per session: Patient refused    Binge frequency: Never    Comment: 0     Social History     Substance and Sexual Activity   Drug Use Never     Social History     Tobacco Use   Smoking Status Former Smoker    Packs/day: 2 00    Years: 54 00    Pack years: 108 00    Last attempt to quit: 2004    Years since quitting: 15 6   Smokeless Tobacco Never Used   Tobacco Comment    Pt is a non smoker     Family History: reviewed    Meds/Allergies      current meds:   Current Facility-Administered Medications   Medication Dose Route Frequency    acetaminophen (TYLENOL) tablet 650 mg  650 mg Oral Q6H PRN    albuterol inhalation solution 2 5 mg  2 5 mg Nebulization Q6H PRN    aspirin (ECOTRIN LOW STRENGTH) EC tablet 81 mg  81 mg Oral Daily    atorvastatin (LIPITOR) tablet 40 mg  40 mg Oral Daily With Dinner    ceFAZolin (ANCEF) IVPB (premix) 2,000 mg  2,000 mg Intravenous Q8H    furosemide (LASIX) tablet 80 mg  80 mg Oral BID (diuretic)    insulin detemir (LEVEMIR) subcutaneous injection 45 Units  45 Units Subcutaneous HS    insulin lispro (HumaLOG) 100 units/mL subcutaneous injection 2-12 Units  2-12 Units Subcutaneous TID AC    insulin lispro (HumaLOG) 100 units/mL subcutaneous injection 2-12 Units  2-12 Units Subcutaneous HS    metoprolol tartrate (LOPRESSOR) tablet 25 mg  25 mg Oral BID    oxyCODONE (ROXICODONE) IR tablet 5 mg  5 mg Oral Q6H PRN    tamsulosin (FLOMAX) capsule 0 4 mg  0 4 mg Oral HS    warfarin (COUMADIN) tablet 2 5 mg  2 5 mg Oral Once per day on Sun Tue Thu Sat    warfarin (COUMADIN) tablet 5 mg  5 mg Oral Once per day on Mon Wed Fri     Allergies   Allergen Reactions    Other Rash     Adhesive tape       Objective   Vitals: Blood pressure 132/77, pulse 77, temperature 97 9 °F (36 6 °C), resp  rate 18, height 5' 7" (1 702 m), weight (!) 141 kg (310 lb 10 1 oz), SpO2 96 %  I/O last 24 hours: In: 579 [P O :472; I V :400]  Out: 2200 [Urine:2200]    Invasive Devices     Peripheral Intravenous Line            Peripheral IV 08/12/19 Right Antecubital less than 1 day          Drain            Urethral Catheter Coude 16 Fr  less than 1 day                Physical Exam   Constitutional: He is oriented to person, place, and time  He appears well-developed  No distress  HENT:   Head: Normocephalic and atraumatic  Mouth/Throat: Oropharynx is clear and moist    Eyes: Pupils are equal, round, and reactive to light  Conjunctivae and EOM are normal    Neck: Normal range of motion  Neck supple  No tracheal deviation present  Cardiovascular: Normal rate, regular rhythm, normal heart sounds and intact distal pulses  Pulmonary/Chest: Effort normal and breath sounds normal  No respiratory distress  Abdominal: Soft  Bowel sounds are normal  He exhibits no distension and no mass  There is no tenderness  Musculoskeletal: He exhibits edema  Neurological: He is alert and oriented to person, place, and time  No cranial nerve deficit  Skin: Skin is warm  Capillary refill takes 2 to 3 seconds  He is not diaphoretic  There is erythema  Bilateral lower extremity erythema and edema   Psychiatric: He has a normal mood and affect  His behavior is normal  Thought content normal    Nursing note and vitals reviewed        Lab Results:    CBC:   Lab Results   Component Value Date    WBC 8 08 08/13/2019    HGB 10 4 (L) 08/13/2019    HCT 34 5 (L) 08/13/2019    MCV 94 08/13/2019     08/13/2019    MCH 28 3 08/13/2019    MCHC 30 1 (L) 08/13/2019    RDW 15 5 (H) 08/13/2019    MPV 9 8 08/13/2019    NRBC 0 08/12/2019     CMP:   Lab Results   Component Value Date    SODIUM 141 08/13/2019     08/13/2019    CO2 26 08/13/2019    BUN 36 (H) 08/13/2019    CREATININE 2 10 (H) 08/13/2019    CALCIUM 8 1 (L) 08/13/2019    AST 18 08/12/2019    ALT 15 08/12/2019    ALKPHOS 35 (L) 08/12/2019    EGFR 29 08/13/2019     Urinalysis:   Lab Results   Component Value Date    COLORU Yellow 08/12/2019    CLARITYU Clear 08/12/2019    SPECGRAV 1 010 08/12/2019    PHUR 7 5 08/12/2019    LEUKOCYTESUR Negative 08/12/2019    NITRITE Negative 08/12/2019    GLUCOSEU Negative 08/12/2019    KETONESU Negative 08/12/2019    BILIRUBINUR Negative 08/12/2019    BLOODU Negative 08/12/2019       Imaging Studies:   CT abdomen pelvis wo contrast   Final Result by Stephanie Barrett MD (08/12 6106)       1  New mild to moderate left hydronephrosis to level of the distal ureter  No obstructive stone demonstrated, though  Recommend correlation with urinalysis for evidence of hematuria or infection        2   Small amount of presacral fluid could suggest intra-abdominal inflammatory process or could relate to 3rd spacing given presence of superficial anasarca          EKG, Pathology, and Other Studies:  I have personally reviewed pertinent reports  VTE Prophylaxis: Warfarin (Coumadin)    Code Status: Level 3 - DNAR and DNI  Advance Directive and Living Will:      Power of :    POLST:      Counseling / Coordination of Care  Total floor / unit time spent today 40 minutes  Greater than 50% of total time was spent with the patient and / or family counseling and / or coordination of care   A description of the counseling / coordination of care    Roberth Duncan PA-C

## 2019-08-13 NOTE — ASSESSMENT & PLAN NOTE
Wt Readings from Last 3 Encounters:   08/13/19 (!) 141 kg (310 lb 10 1 oz)   07/15/19 (!) 153 kg (336 lb 6 8 oz)   06/27/19 (!) 140 kg (308 lb)     Baseline renal function is 2 5-3  Resume Lasix 80 mg p o  B i d   Daily weights, intake and output, low-salt diet

## 2019-08-14 ENCOUNTER — TELEPHONE (OUTPATIENT)
Dept: OTHER | Facility: HOSPITAL | Age: 79
End: 2019-08-14

## 2019-08-14 LAB
ANION GAP SERPL CALCULATED.3IONS-SCNC: 10 MMOL/L (ref 4–13)
BASOPHILS # BLD AUTO: 0.04 THOUSANDS/ΜL (ref 0–0.1)
BASOPHILS NFR BLD AUTO: 1 % (ref 0–1)
BUN SERPL-MCNC: 31 MG/DL (ref 5–25)
CALCIUM SERPL-MCNC: 8.2 MG/DL (ref 8.3–10.1)
CHLORIDE SERPL-SCNC: 107 MMOL/L (ref 100–108)
CO2 SERPL-SCNC: 27 MMOL/L (ref 21–32)
CREAT SERPL-MCNC: 2 MG/DL (ref 0.6–1.3)
EOSINOPHIL # BLD AUTO: 0.29 THOUSAND/ΜL (ref 0–0.61)
EOSINOPHIL NFR BLD AUTO: 4 % (ref 0–6)
ERYTHROCYTE [DISTWIDTH] IN BLOOD BY AUTOMATED COUNT: 15.6 % (ref 11.6–15.1)
GFR SERPL CREATININE-BSD FRML MDRD: 30 ML/MIN/1.73SQ M
GLUCOSE SERPL-MCNC: 114 MG/DL (ref 65–140)
GLUCOSE SERPL-MCNC: 148 MG/DL (ref 65–140)
GLUCOSE SERPL-MCNC: 185 MG/DL (ref 65–140)
GLUCOSE SERPL-MCNC: 37 MG/DL (ref 65–140)
GLUCOSE SERPL-MCNC: 39 MG/DL (ref 65–140)
GLUCOSE SERPL-MCNC: 72 MG/DL (ref 65–140)
GLUCOSE SERPL-MCNC: 81 MG/DL (ref 65–140)
HCT VFR BLD AUTO: 33.4 % (ref 36.5–49.3)
HGB BLD-MCNC: 10 G/DL (ref 12–17)
IMM GRANULOCYTES # BLD AUTO: 0.02 THOUSAND/UL (ref 0–0.2)
IMM GRANULOCYTES NFR BLD AUTO: 0 % (ref 0–2)
INR PPP: 2.63 (ref 0.84–1.19)
LYMPHOCYTES # BLD AUTO: 0.93 THOUSANDS/ΜL (ref 0.6–4.47)
LYMPHOCYTES NFR BLD AUTO: 13 % (ref 14–44)
MCH RBC QN AUTO: 27.8 PG (ref 26.8–34.3)
MCHC RBC AUTO-ENTMCNC: 29.9 G/DL (ref 31.4–37.4)
MCV RBC AUTO: 93 FL (ref 82–98)
MONOCYTES # BLD AUTO: 0.65 THOUSAND/ΜL (ref 0.17–1.22)
MONOCYTES NFR BLD AUTO: 9 % (ref 4–12)
MRSA NOSE QL CULT: NORMAL
NEUTROPHILS # BLD AUTO: 5.46 THOUSANDS/ΜL (ref 1.85–7.62)
NEUTS SEG NFR BLD AUTO: 73 % (ref 43–75)
NRBC BLD AUTO-RTO: 0 /100 WBCS
PLATELET # BLD AUTO: 271 THOUSANDS/UL (ref 149–390)
PMV BLD AUTO: 9.7 FL (ref 8.9–12.7)
POTASSIUM SERPL-SCNC: 3.3 MMOL/L (ref 3.5–5.3)
PROTHROMBIN TIME: 28.4 SECONDS (ref 11.6–14.5)
RBC # BLD AUTO: 3.6 MILLION/UL (ref 3.88–5.62)
SODIUM SERPL-SCNC: 144 MMOL/L (ref 136–145)
WBC # BLD AUTO: 7.39 THOUSAND/UL (ref 4.31–10.16)

## 2019-08-14 PROCEDURE — 97163 PT EVAL HIGH COMPLEX 45 MIN: CPT | Performed by: PHYSICAL THERAPIST

## 2019-08-14 PROCEDURE — G8979 MOBILITY GOAL STATUS: HCPCS | Performed by: PHYSICAL THERAPIST

## 2019-08-14 PROCEDURE — 97530 THERAPEUTIC ACTIVITIES: CPT | Performed by: PHYSICAL THERAPIST

## 2019-08-14 PROCEDURE — G8978 MOBILITY CURRENT STATUS: HCPCS | Performed by: PHYSICAL THERAPIST

## 2019-08-14 PROCEDURE — 82948 REAGENT STRIP/BLOOD GLUCOSE: CPT

## 2019-08-14 PROCEDURE — 99232 SBSQ HOSP IP/OBS MODERATE 35: CPT | Performed by: FAMILY MEDICINE

## 2019-08-14 PROCEDURE — 85610 PROTHROMBIN TIME: CPT | Performed by: FAMILY MEDICINE

## 2019-08-14 PROCEDURE — 85025 COMPLETE CBC W/AUTO DIFF WBC: CPT | Performed by: FAMILY MEDICINE

## 2019-08-14 PROCEDURE — 80048 BASIC METABOLIC PNL TOTAL CA: CPT | Performed by: FAMILY MEDICINE

## 2019-08-14 RX ADMIN — ASPIRIN 81 MG: 81 TABLET, COATED ORAL at 08:48

## 2019-08-14 RX ADMIN — OXYCODONE HYDROCHLORIDE 5 MG: 5 TABLET ORAL at 21:01

## 2019-08-14 RX ADMIN — OXYCODONE HYDROCHLORIDE 5 MG: 5 TABLET ORAL at 10:53

## 2019-08-14 RX ADMIN — TAMSULOSIN HYDROCHLORIDE 0.4 MG: 0.4 CAPSULE ORAL at 21:00

## 2019-08-14 RX ADMIN — WARFARIN SODIUM 5 MG: 5 TABLET ORAL at 18:04

## 2019-08-14 RX ADMIN — CEFAZOLIN SODIUM 1000 MG: 1 SOLUTION INTRAVENOUS at 21:00

## 2019-08-14 RX ADMIN — METOPROLOL TARTRATE 25 MG: 25 TABLET, FILM COATED ORAL at 08:48

## 2019-08-14 RX ADMIN — FUROSEMIDE 80 MG: 80 TABLET ORAL at 08:48

## 2019-08-14 RX ADMIN — INSULIN DETEMIR 45 UNITS: 100 INJECTION, SOLUTION SUBCUTANEOUS at 21:00

## 2019-08-14 RX ADMIN — OXYCODONE HYDROCHLORIDE 5 MG: 5 TABLET ORAL at 02:08

## 2019-08-14 RX ADMIN — METOPROLOL TARTRATE 25 MG: 25 TABLET, FILM COATED ORAL at 18:04

## 2019-08-14 RX ADMIN — FUROSEMIDE 80 MG: 80 TABLET ORAL at 16:36

## 2019-08-14 RX ADMIN — CEFAZOLIN SODIUM 1000 MG: 1 SOLUTION INTRAVENOUS at 04:02

## 2019-08-14 RX ADMIN — INSULIN LISPRO 2 UNITS: 100 INJECTION, SOLUTION INTRAVENOUS; SUBCUTANEOUS at 20:59

## 2019-08-14 RX ADMIN — ATORVASTATIN CALCIUM 40 MG: 40 TABLET, FILM COATED ORAL at 16:36

## 2019-08-14 RX ADMIN — CEFAZOLIN SODIUM 1000 MG: 1 SOLUTION INTRAVENOUS at 12:35

## 2019-08-14 NOTE — TELEPHONE ENCOUNTER
Estee Roland is an 42-year-old male known to [place] office for BPH, urinary retention recently seen in consultation at [facility] for left hydronephrosis status post catheter insertion  Please contact patient/caregiver with hospital follow-up for void trial in office in approximately 3--4 weeks  Thank you

## 2019-08-14 NOTE — MALNUTRITION/BMI
This medical record reflects morbid obesity  Malnutrition Findings:              BMI Findings:  BMI Classifications: Morbid Obesity 45-49 9     Body mass index is 48 75 kg/m²  See Nutrition note dated 8/14/19 for additional details  Completed nutrition assessment is viewable in the nutrition documentation

## 2019-08-14 NOTE — ASSESSMENT & PLAN NOTE
Day 3 of Ancef  Blood cultures negative x2  Wound care consult  Anticipate discharge tomorrow on Keflex

## 2019-08-14 NOTE — PLAN OF CARE
Problem: PHYSICAL THERAPY ADULT  Goal: Performs mobility at highest level of function for planned discharge setting  See evaluation for individualized goals  Outcome: Progressing  Note:   Prognosis: Good  Problem List: Decreased strength, Decreased endurance, Impaired balance, Decreased mobility, Impaired sensation, Decreased safety awareness  Assessment: Pt is an 78year old male with complex PMH as listed above with recent hospitalization in july 2019 and with short term rehab stay with recent discharge one week ago, presenting to [facility] with increased LE swelling edema weeping and increased lethargy found to have hydronephrosis and LE cellulitis  Pt seen for high complexity PT evaluation presenting with decreased LE strength decreased balance endurance and mobility including transfers and ambulation  Pt with decreased ambulatory endurance and SOB with drop in spO2 from 96% to 90% with 30ft ambulation  Pt requiring min(A) for transfers with RW and limited with ambulation to less than household distance  Pt is currently functioning at a level decreased from baseline and would benefit from continued activity in PT to improve strength balance endurance mobility transfers and ambulation to maximize current LOF and return to (S) to (I) level with no drop in spO2 on baseline O2 level  Recommendation: Home PT     PT - OK to Discharge: Yes(when medically stable for discharge)    See flowsheet documentation for full assessment

## 2019-08-14 NOTE — PHYSICAL THERAPY NOTE
Physical Therapy Evaluation and Treatment    Patient Name: Diaz Singh    LVPXF'O Date: 8/14/2019     Problem List  Patient Active Problem List   Diagnosis    Morbid obesity ([facility] )    History of prostate cancer    Type 2 diabetes mellitus with hyperglycemia ([facility] )    S/P CABG x 3    CKD (chronic kidney disease) stage 4, GFR 15-29 ml/min ([facility])    Body mass index (BMI) of 40 0 to 49 9    Wound of right lower extremity    Wound of left lower extremity    Hyperphosphatemia    Vitamin D deficiency    Renal cyst    Atrial fibrillation ([facility] )    Blind right eye    Coronary artery disease    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) ([facility] )    Obstructive sleep apnea syndrome, severe    Vision loss, 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    Hypokalemia    Anemia    Bilateral leg edema    Prostate cancer (HCC)    Severe obstructive sleep apnea-hypopnea syndrome    Malignant neoplasm of upper lobe of right lung (HCC)    Influenza A    Bacteriuria    Pneumonia of right lower lobe due to infectious organism ([facility] )    Hypoglycemia associated with diabetes ([facility] )    Elevated INR    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 stage 4 chronic kidney disease (HCC)    Obstructive chronic bronchitis without exacerbation ([address]    Secondary hyperparathyroidism of renal origin ([address] )    Hematuria    Closed nondisplaced fracture of left patella with routine healing    Cellulitis of extremity    Hydronephrosis    Chronic respiratory failure with hypoxia ([facility] )        Past Medical History  Past Medical History:   Diagnosis Date    Anemia     Arthritis     Atrial fibrillation (HCC)     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HCC)     B12 deficiency     Back pain     Blind right eye     Cancer ([address]     prostate     Cardiac disorder     Chronic combined systolic and diastolic heart failure (HCC)     Chronic obstructive lung disease (HCC)     Chronic venous stasis dermatitis of both lower extremities     CKD (chronic kidney disease), stage IV (HCC)     Coronary artery disease     DDD (degenerative disc disease)     DM (diabetes mellitus), type 2 (HCC)     Type II, on insulin    BAER (dyspnea on exertion)     Easy bruising     Epistaxis     Gout     Gross hematuria     last assessed: 3/18/2015    Hepatitis     Herpes zoster     Hiatal hernia     History of cellulitis     BLE    History of prostate cancer     Radiation treatments   Hypercholesterolemia     Hyperlipidemia     Hypertension     Ischemic cardiomyopathy     Lung cancer ([facility] ) 09/12/2019    per patient    Lung mass     last assessed: 9/21/2012    MI, old     Morbid obesity due to excess calories ([facility] )     or severe obesity    Neuropathy     BLE    Obesity     Obstructive sleep apnea syndrome, severe     Pneumonia     last assessed: 7/24/2012    Shortness of breath     TIA (transient ischemic attack)     Urinary incontinence     Urinary retention     Use of cane as ambulatory aid     Independent with personal care          Past Surgical History  Past Surgical History:   Procedure Laterality Date    ABDOMINAL SURGERY      CARDIAC SURGERY      CATARACT EXTRACTION, BILATERAL      CORONARY ANGIOPLASTY WITH STENT PLACEMENT      2 stents    CORONARY ARTERY BYPASS GRAFT N/A 7/15/2016    Procedure: Intraop ADRIAN, CABG x 3 using LIMA to LAD, RSVG to OM1 and D1;  Surgeon: Marylen Glad, MD;  [facility];  Service:    Karl Munoz      has stents    EYE SURGERY      Bilateral cataract removal    HERNIA REPAIR      umbilical hernia repair    LUNG CANCER SURGERY      Partial upper right lobectomy    LUNG SURGERY Left 2012    OTHER SURGICAL HISTORY      previous stent placement-no anatomy given    PILONIDAL CYST EXCISION      [place] CYSTOURETHROSCOPY,URETER CATHETER Left 3/9/2016    Procedure: CYSTOSCOPY WITH left RETROGRADE PYELOGRAM left double J stent removal;  Surgeon: Ras Aguirre MD;  [facility];  Service: Urology    HI TEMPORAL ARTERY LIGATN OR BX Bilateral 10/31/2017    Procedure: BIOPSY ARTERY TEMPORAL;  Surgeon: Alex Real MD;  [facility]: [facility];  Service: Vascular    RENAL ARTERY STENT  02/16/2015    transcath intravascular stent placement percutaneous renal    VASCULAR SURGERY             08/14/19 1341   Note Type   Note type Eval/Treat   Pain Assessment   Pain Assessment No/denies pain   Pain Score No Pain   Home Living   Type of Home Mobile home   Home Layout One level; Able to live on main level with bedroom/bathroom; Performs ADLs on one level;Ramped entrance   [address] Walker;Cane;Wheelchair-manual  (C-PAP and 3L's O2 at home)   Additional Comments Pt has aide from [place] coming in 2x/wk for bathing and pt is active with home health care   Prior Function   Level of [place] Needs assistance with ADLs and functional mobility  (ambulation with RW household distance)   Lives With Spouse  (and son)   Receives Help From Family;Home health;Personal care attendant   ADL Assistance Needs assistance  (spouse assists of aides from [place])   IADLs Needs assistance  (spouse performs IADL's)   Comments family/son drives   Restrictions/Precautions   Weight Bearing Precautions Per Order No  (pt states he hasn't been using immobilizer, denies knee pain)   Other Precautions Chair Alarm;Multiple lines;O2;Fall Risk;Contact/isolation   General   Family/Caregiver Present No   Cognition   Arousal/Participation Alert   Orientation Level Oriented X4   Following Commands Follows all commands and directions without difficulty   RLE Assessment   RLE Assessment WFL  (4/5)   LLE Assessment   LLE Assessment WFL  (4/5)   Coordination   Sensation WFL  (except plantar surface of feet decreased to light touch)   Light Touch   RLE Light Touch Grossly intact   LLE Light Touch Grossly intact   Bed Mobility   Additional Comments pt seated at EOB when PT entered room and returned to sitting at EOB after ambulation  See treatment note for transfer from bed to chair  Transfers   Sit to Stand 4  Minimal assistance   Additional items Assist x 1;Verbal cues; Increased time required  (with RW)   Stand to Sit 4  Minimal assistance   Additional items Assist x 1; Increased time required;Verbal cues  (with RW)   Stand pivot 4  Minimal assistance   Additional items Assist x 1; Increased time required;Verbal cues  (with RW)   Additional Comments pt able to ambulate with RW with close CGA/(S) with SOB and fatigue limiting ambulation  SpO2 on 2 5L's at rest 96% HR 60 and after ambulation 90-94%   Pt requiring 1-2 minutes seated rest for respiratory rate to return to baseline and Spo2 to return to 96%   Ambulation/Elevation   Gait pattern Short stride;Decreased foot clearance; Forward Flexion   Gait Assistance 5  Supervision  (close CGA)   Assistive Device Rolling walker   Distance 30ft with RW close CGA/(S) no LOB but fatigue and SOB limiting ambulation tolerance   Balance   Static Sitting Good   Dynamic Sitting Good   Static Standing Fair +   Dynamic Standing Fair   Ambulatory Fair  (standing and ambulation with RW)   Endurance Deficit   Endurance Deficit Yes   Endurance Deficit Description pt with fatigue and SOB limiting ambulation and activity tolerance   Activity Tolerance   Activity Tolerance Patient limited by fatigue   Assessment   Prognosis Good   Problem List Decreased strength;Decreased endurance; Impaired balance;Decreased mobility; Impaired sensation;Decreased safety awareness   Assessment Pt is an 78year old male with complex PMH as listed above with recent hospitalization in july 2019 and with short term rehab stay with recent discharge one week ago, presenting to [address] [place] [address] with increased LE swelling edema weeping and increased lethargy found to have hydronephrosis and LE cellulitis  Pt seen for high complexity PT evaluation presenting with decreased LE strength decreased balance endurance and mobility including transfers and ambulation  Pt with decreased ambulatory endurance and SOB with drop in spO2 from 96% to 90% with 30ft ambulation  Pt requiring min(A) for transfers with RW and limited with ambulation to less than household distance  Pt is currently functioning at a level decreased from baseline and would benefit from continued activity in PT to improve strength balance endurance mobility transfers and ambulation to maximize current LOF and return to (S) to (I) level with no drop in spO2 on baseline O2 level  Goals   Patient Goals To return home on discharge   LTG Expiration Date 08/21/19   Long Term Goal #1 Improve bilateral LE strength by 1/2 muscle grade to improve all bed mobility to (S) with bedrail and to improve transfers to (S) with RW no drop in SpO2 below 90%    Long Term Goal #2 Improve standing and ambulatory balance to fair+ with RW to improve ambulation to 125ft with RW (S) no LOB or drop in SpO2 below 90%   Treatment Day 1   Plan   Treatment/Interventions Functional transfer training;LE strengthening/ROM; Therapeutic exercise; Endurance training;Patient/family training;Bed mobility;Gait training   PT Frequency   (3-5x/wk)   Recommendation   Recommendation Home PT   PT - OK to Discharge Yes  (when medically stable for discharge)   PHYSICAL THERAPY TREATMENT NOTE    Time In: 13:30    Time Out: 13:40  Total Time: 10 min  MRN: [id number]    S: Pt states he has no pain  Pt agreeable to sit OOB in chair and states he has been fully weightbearing without immobilizer on L knee  O: Therapeutic Activities:     Sit > stand with min A of 1 with RW; stand > sit with min A of 1 with RW; stand pivot transfer with RW bed to chair min(A)x1    Ambulation with rollator walker with close CGA 3ft from bed to chair; ambulates with forward flex fatigue and SOB       Vitals:  Heart Rate =  bpm, SpO2 = 90-96%  on O2 via NC @3L/min  A: Pt requiring min(A) for all transfers with RW  Limited ambulation and activity tolerance due to fatigue and mild SOB however no drop below 90% on 3L's  Pt requiring 1-2 minutes seated rest after activity for respiratory rate to return to baseline  Pt seated in recliner with LE's elevated to assist with LE swelling  Pt is in need of continued activity in PT to improve strength balance endurance mobility transfers and ambulation to maximize current LOF  P: See initial evaluation for full PT poc  Patient returned to recliner chair with LEs elevated and chair alarm activated; patient positioned with all needs, including call bell, within reach      Claribel Joshi, PT, MPT

## 2019-08-14 NOTE — RESPIRATORY THERAPY NOTE
Refusing pap therapy     Patient stated he does not want to wear the pap therapy here in the hospital  He stated he is okay on the oxygen   He stated he uses it sometimes at home, but that it is bothersome

## 2019-08-14 NOTE — CONSULTS
Progress Note - Wound   Comfort Zuniga 78 y o  male MRN: [id number]  Unit/Bed#: 409-01 Encounter: [id number]      Assessment:   Chronic lower leg edema  Scattered weeping areas bilateral lower extremities  Currently completing IV antibiotic treatment for lower extremity cellulitis    Plan:   1  Wash legs with soap and water  Rinse with saline  Oil emulsion dressing to weeping areas, followed by ABD and kerlix  Change daily and PRN soilage/detachement  2   Elevate legs to assist with edema control  Vitals: Blood pressure 160/80, pulse 78, temperature 97 9 °F (36 6 °C), resp  rate 18, height 5' 7" (1 702 m), weight (!) 141 kg (311 lb 4 6 oz), SpO2 99 %  ,Body mass index is 48 75 kg/m²  Other Ulcers 03/03/17 Leg Left Red, weeping  (Active)     Martín Mason RN   CWOCN  8/14/2019 15:37

## 2019-08-14 NOTE — DISCHARGE INSTR - OTHER ORDERS
1   Wash legs with soap and water  Rinse with saline  Oil emulsion dressing to weeping areas, followed by ABD and kerlix  Change daily and PRN soilage/detachement

## 2019-08-14 NOTE — PROGRESS NOTES
Progress Note - Michela Kerr 1940, 78 y o  male MRN: [id number]    Unit/Bed#: 409-01 Encounter: [id number]    Primary Care Provider: Tomma Lefort, DO   Date and time admitted to hospital: 8/12/2019  1:20 PM        * Cellulitis of extremity  Assessment & Plan  Day 3 of Ancef  Blood cultures negative x2  Wound care consult  Anticipate discharge tomorrow on Keflex    Hydronephrosis  Assessment & Plan  Continue Flomax  Renal function at baseline  Maintain Bush per Urology recommendation    Chronic respiratory failure with hypoxia [facility])  Assessment & Plan  Patient saturating well on baseline utilization 3 L nasal cannula    Severe obstructive sleep apnea-hypopnea syndrome  Assessment & Plan  Patient refusing CPAP therapy  Respiratory protocol in place    Atrial fibrillation (HCC)  Assessment & Plan  Rate controlled on current AV arsalan blocking regimen of metoprolol 25 mg p o  B i d   AC with Coumadin  INR is therapeutic        Progress Note - Michela Kerr 78 y o  male MRN: [id number]    Unit/Bed#: 409-01 Encounter: [id number]        Subjective:   Patient seen and examined at bedside, he has no acute complaints    Objective:     Vitals:   Vitals:    08/14/19 0715   BP: 160/80   Pulse: 78   Resp: 18   Temp:    SpO2: 99%     Body mass index is 48 75 kg/m²      Intake/Output Summary (Last 24 hours) at 8/14/2019 1044  Last data filed at 8/13/2019 2058  Gross per 24 hour   Intake 480 ml   Output 2900 ml   Net -2420 ml       Physical Exam:   /80   Pulse 78   Temp 97 9 °F (36 6 °C)   Resp 18   Ht 5' 7" (1 702 m)   Wt (!) 141 kg (311 lb 4 6 oz)   SpO2 99%   BMI 48 75 kg/m²   General appearance: alert and oriented, in no acute distress  Lungs: clear to auscultation bilaterally  Heart: regular rate and rhythm, S1, S2 normal, no murmur, click, rub or gallop  Abdomen: soft, non-tender; bowel sounds normal; no masses,  no organomegaly  Extremities:  Area of erythema with slight improvement, +1 lower extremity edema  Pulses: 2+ and symmetric  Neurologic: Grossly normal     Invasive Devices     Peripheral Intravenous Line            Peripheral IV 08/12/19 Right Antecubital 1 day          Drain            Urethral Catheter Coude 16 Fr  1 day                Results from last 7 days   Lab Units 08/14/19  0443 08/13/19  0507 08/12/19  1335   WBC Thousand/uL 7 39 8 08 6 17   HEMOGLOBIN g/dL 10 0* 10 4* 10 7*   HEMATOCRIT % 33 4* 34 5* 35 4*   PLATELETS Thousands/uL 271 286 271       Results from last 7 days   Lab Units 08/14/19  0443 08/13/19  0506 08/12/19  1335   POTASSIUM mmol/L 3 3* 3 7 4 5   CHLORIDE mmol/L 107 106 108   CO2 mmol/L 27 26 28   BUN mg/dL 31* 36* 41*   CREATININE mg/dL 2 00* 2 10* 2 22*   CALCIUM mg/dL 8 2* 8 1* 8 1*   ALK PHOS U/L  --   --  35*   ALT U/L  --   --  15   AST U/L  --   --  18       Medication Administration - last 24 hours from 08/13/2019 1044 to 08/14/2019 1044       Date/Time Order Dose Route Action Action by     08/13/2019 1211 ceFAZolin (ANCEF) IVPB (premix) 2,000 mg 2,000 mg Intravenous [facility], [address] [place] [address]     08/13/2019 2118 insulin detemir (LEVEMIR) subcutaneous injection 45 Units 45 Units Subcutaneous Given Toni Albrecht RN     08/14/2019 0756 insulin lispro (HumaLOG) 100 units/mL subcutaneous injection 2-12 Units 2 Units Subcutaneous Not Given Arnell Duane, RN     08/13/2019 1705 insulin lispro (HumaLOG) 100 units/mL subcutaneous injection 2-12 Units 2 Units Subcutaneous Given Toni Albrecht RN     08/13/2019 1133 insulin lispro (HumaLOG) 100 units/mL subcutaneous injection 2-12 Units 2 Units Subcutaneous Not Given Toni Albrecht RN     08/13/2019 2118 insulin lispro (HumaLOG) 100 units/mL subcutaneous injection 2-12 Units 2 Units Subcutaneous Given Toni Albrecht RN     08/14/2019 0848 metoprolol tartrate (LOPRESSOR) tablet 25 mg 25 mg Oral Given Ashlee Sawyer RN     08/13/2019 1704 metoprolol tartrate (LOPRESSOR) tablet 25 mg 25 mg Oral Given Bettina Morales Alva Barrientos RN     08/13/2019 2115 tamsulosin (FLOMAX) capsule 0 4 mg 0 4 mg Oral Given Karen Combs RN     08/13/2019 1704 warfarin (COUMADIN) tablet 2 5 mg 2 5 mg Oral Given Karen Combs RN     08/14/2019 0848 aspirin (ECOTRIN LOW STRENGTH) EC tablet 81 mg 81 mg Oral Given Deborah Luna RN     08/13/2019 1704 atorvastatin (LIPITOR) tablet 40 mg 40 mg Oral Given Karen Combs RN     08/14/2019 7274 oxyCODONE (ROXICODONE) IR tablet 5 mg 5 mg Oral Given Claudia Li RN     08/13/2019 1953 oxyCODONE (ROXICODONE) IR tablet 5 mg 5 mg Oral Given Karen Combs RN     08/14/2019 0848 furosemide (LASIX) tablet 80 mg 80 mg Oral Given Deborah Luna RN     08/13/2019 1704 furosemide (LASIX) tablet 80 mg 80 mg Oral Given Karen Combs RN     08/14/2019 0402 ceFAZolin (ANCEF) IVPB (premix) 1,000 mg 1,000 mg Intravenous [address] Claudia Li RN     08/13/2019 1954 ceFAZolin (ANCEF) IVPB (premix) 1,000 mg 1,000 mg Intravenous New Bag Karen Combs RN            Lab, Imaging and other studies: I have personally reviewed pertinent reports      VTE Pharmacologic Prophylaxis: Warfarin (Coumadin)  VTE Mechanical Prophylaxis: sequential compression device     Mark Arevalo MD  8/14/2019,10:44 AM

## 2019-08-14 NOTE — ASSESSMENT & PLAN NOTE
Rate controlled on current AV arsalan blocking regimen of metoprolol 25 mg p o  B i d   AC with Coumadin  INR is therapeutic

## 2019-08-15 VITALS
HEIGHT: 67 IN | RESPIRATION RATE: 22 BRPM | HEART RATE: 79 BPM | DIASTOLIC BLOOD PRESSURE: 71 MMHG | SYSTOLIC BLOOD PRESSURE: 142 MMHG | BODY MASS INDEX: 49.07 KG/M2 | WEIGHT: 312.61 LBS | TEMPERATURE: 97.9 F | OXYGEN SATURATION: 97 %

## 2019-08-15 LAB — GLUCOSE SERPL-MCNC: 84 MG/DL (ref 65–140)

## 2019-08-15 PROCEDURE — 82948 REAGENT STRIP/BLOOD GLUCOSE: CPT

## 2019-08-15 PROCEDURE — 97116 GAIT TRAINING THERAPY: CPT

## 2019-08-15 PROCEDURE — 97530 THERAPEUTIC ACTIVITIES: CPT

## 2019-08-15 PROCEDURE — 99239 HOSP IP/OBS DSCHRG MGMT >30: CPT | Performed by: FAMILY MEDICINE

## 2019-08-15 RX ORDER — CEPHALEXIN 500 MG/1
500 CAPSULE ORAL EVERY 6 HOURS SCHEDULED
Qty: 20 CAPSULE | Refills: 0 | Status: SHIPPED | OUTPATIENT
Start: 2019-08-15 | End: 2019-08-20 | Stop reason: SDUPTHER

## 2019-08-15 RX ORDER — ATORVASTATIN CALCIUM 40 MG/1
40 TABLET, FILM COATED ORAL
Qty: 30 TABLET | Refills: 0 | Status: SHIPPED | OUTPATIENT
Start: 2019-08-15

## 2019-08-15 RX ADMIN — METOPROLOL TARTRATE 25 MG: 25 TABLET, FILM COATED ORAL at 10:50

## 2019-08-15 RX ADMIN — FUROSEMIDE 80 MG: 80 TABLET ORAL at 07:52

## 2019-08-15 RX ADMIN — ASPIRIN 81 MG: 81 TABLET, COATED ORAL at 10:48

## 2019-08-15 RX ADMIN — CEFAZOLIN SODIUM 1000 MG: 1 SOLUTION INTRAVENOUS at 04:15

## 2019-08-15 RX ADMIN — OXYCODONE HYDROCHLORIDE 5 MG: 5 TABLET ORAL at 03:02

## 2019-08-15 NOTE — SOCIAL WORK
A post acute care recommendation was made by your care team for [facility] AT [facility]  Discussed [place] [facility] with patient  Choice is to return/continue services  [facility] to be resumed on dc

## 2019-08-15 NOTE — PLAN OF CARE
Problem: Potential for Falls  Goal: Patient will remain free of falls  Description  INTERVENTIONS:  - Assess patient frequently for physical needs  -  Identify cognitive and physical deficits and behaviors that affect risk of falls    -  [place] fall precautions as indicated by assessment   - Educate patient/family on patient safety including physical limitations  - Instruct patient to call for assistance with activity based on assessment  - Modify environment to reduce risk of injury  - Consider OT/PT consult to assist with strengthening/mobility  Outcome: Progressing     Problem: PAIN - ADULT  Goal: Verbalizes/displays adequate comfort level or baseline comfort level  Description  Interventions:  - Encourage patient to monitor pain and request assistance  - Assess pain using appropriate pain scale  - Administer analgesics based on type and severity of pain and evaluate response  - Implement non-pharmacological measures as appropriate and evaluate response  - Consider cultural and social influences on pain and pain management  - Notify physician/advanced practitioner if interventions unsuccessful or patient reports new pain  Outcome: Progressing     Problem: INFECTION - ADULT  Goal: Absence or prevention of progression during hospitalization  Description  INTERVENTIONS:  - Assess and monitor for signs and symptoms of infection  - Monitor lab/diagnostic results  - Monitor all insertion sites, i e  indwelling lines, tubes, and drains  - Monitor endotracheal (as able) and nasal secretions for changes in amount and color  - [place] appropriate cooling/warming therapies per order  - Administer medications as ordered  - Instruct and encourage patient and family to use good hand hygiene technique  - Identify and instruct in appropriate isolation precautions for identified infection/condition  Outcome: Progressing  Goal: Absence of fever/infection during neutropenic period  Description  INTERVENTIONS:  - Monitor WBC  - Implement neutropenic guidelines  Outcome: Progressing     Problem: SAFETY ADULT  Goal: Maintain or return to baseline ADL function  Description  INTERVENTIONS:  -  Assess patient's ability to carry out ADLs; assess patient's baseline for ADL function and identify physical deficits which impact ability to perform ADLs (bathing, care of mouth/teeth, toileting, grooming, dressing, etc )  - Assess/evaluate cause of self-care deficits   - Assess range of motion  - Assess patient's mobility; develop plan if impaired  - Assess patient's need for assistive devices and provide as appropriate  - Encourage maximum independence but intervene and supervise when necessary  ¯ Involve family in performance of ADLs  ¯ Assess for home care needs following discharge   ¯ Request OT consult to assist with ADL evaluation and planning for discharge  ¯ Provide patient education as appropriate  Outcome: Progressing  Goal: Maintain or return mobility status to optimal level  Description  INTERVENTIONS:  - Assess patient's baseline mobility status (ambulation, transfers, stairs, etc )    - Identify cognitive and physical deficits and behaviors that affect mobility  - Identify mobility aids required to assist with transfers and/or ambulation (gait belt, sit-to-stand, lift, walker, cane, etc )  - [place] fall precautions as indicated by assessment  - Record patient progress and toleration of activity level on Mobility SBAR; progress patient to next Phase/Stage  - Instruct patient to call for assistance with activity based on assessment  - Request Rehabilitation consult to assist with strengthening/weightbearing, etc   Outcome: Progressing     Problem: DISCHARGE PLANNING  Goal: Discharge to home or other facility with appropriate resources  Description  INTERVENTIONS:  - Identify barriers to discharge w/patient and caregiver  - Arrange for needed discharge resources and transportation as appropriate  - Identify discharge learning needs (meds, wound care, etc )  - Arrange for interpretive services to assist at discharge as needed  - Refer to Case Management Department for coordinating discharge planning if the patient needs post-hospital services based on physician/advanced practitioner order or complex needs related to functional status, cognitive ability, or social support system  Outcome: Progressing     Problem: Knowledge Deficit  Goal: Patient/family/caregiver demonstrates understanding of disease process, treatment plan, medications, and discharge instructions  Description  Complete learning assessment and assess knowledge base  Interventions:  - Provide teaching at level of understanding  - Provide teaching via preferred learning methods  Outcome: Progressing     Problem: CARDIOVASCULAR - ADULT  Goal: Maintains optimal cardiac output and hemodynamic stability  Description  INTERVENTIONS:  - Monitor I/O, vital signs and rhythm  - Monitor for S/S and trends of decreased cardiac output i e  bleeding, hypotension  - Administer and titrate ordered vasoactive medications to optimize hemodynamic stability  - Assess quality of pulses, skin color and temperature  - Assess for signs of decreased coronary artery perfusion - ex   Angina  - Instruct patient to report change in severity of symptoms  Outcome: Progressing  Goal: Absence of cardiac dysrhythmias or at baseline rhythm  Description  INTERVENTIONS:  - Continuous cardiac monitoring, monitor vital signs, obtain 12 lead EKG if indicated  - Administer antiarrhythmic and heart rate control medications as ordered  - Monitor electrolytes and administer replacement therapy as ordered  Outcome: Progressing     Problem: RESPIRATORY - ADULT  Goal: Achieves optimal ventilation and oxygenation  Description  INTERVENTIONS:  - Assess for changes in respiratory status  - Assess for changes in mentation and behavior  - Position to facilitate oxygenation and minimize respiratory effort  - Oxygen administration by appropriate delivery method based on oxygen saturation (per order) or ABGs  - Initiate smoking cessation education as indicated  - Encourage broncho-pulmonary hygiene including cough, deep breathe, Incentive Spirometry  - Assess the need for suctioning and aspirate as needed  - Assess and instruct to report SOB or any respiratory difficulty  - Respiratory Therapy support as indicated  Outcome: Progressing     Problem: GENITOURINARY - ADULT  Goal: Maintains or returns to baseline urinary function  Description  INTERVENTIONS:  - Assess urinary function  - Encourage oral fluids to ensure adequate hydration  - Administer IV fluids as ordered to ensure adequate hydration  - Administer ordered medications as needed  - Offer frequent toileting  - Follow urinary retention protocol if ordered  Outcome: Progressing  Goal: Absence of urinary retention  Description  INTERVENTIONS:  - Assess patients ability to void and empty bladder  - Monitor I/O  - Bladder scan as needed  - Discuss with physician/AP medications to alleviate retention as needed  - Discuss catheterization for long term situations as appropriate  Outcome: Progressing  Goal: Urinary catheter remains patent  Description  INTERVENTIONS:  - Assess patency of urinary catheter  - If patient has a chronic jeter, consider changing catheter if non-functioning  - Follow guidelines for intermittent irrigation of non-functioning urinary catheter  Outcome: Progressing     Problem: METABOLIC, FLUID AND ELECTROLYTES - ADULT  Goal: Electrolytes maintained within normal limits  Description  INTERVENTIONS:  - Monitor labs and assess patient for signs and symptoms of electrolyte imbalances  - Administer electrolyte replacement as ordered  - Monitor response to electrolyte replacements, including repeat lab results as appropriate  - Instruct patient on fluid and nutrition as appropriate  Outcome: Progressing  Goal: Fluid balance maintained  Description  INTERVENTIONS:  - Monitor labs and assess for signs and symptoms of volume excess or deficit  - Monitor I/O and WT  - Instruct patient on fluid and nutrition as appropriate  Outcome: Progressing  Goal: Glucose maintained within target range  Description  INTERVENTIONS:  - Monitor Blood Glucose as ordered  - Assess for signs and symptoms of hyperglycemia and hypoglycemia  - Administer ordered medications to maintain glucose within target range  - Assess nutritional intake and initiate nutrition service referral as needed  Outcome: Progressing     Problem: SKIN/TISSUE INTEGRITY - ADULT  Goal: Skin integrity remains intact  Description  INTERVENTIONS  - Identify patients at risk for skin breakdown  - Assess and monitor skin integrity  - Assess and monitor nutrition and hydration status  - Monitor labs (i e  albumin)  - Assess for incontinence   - Turn and reposition patient  - Assist with mobility/ambulation  - Relieve pressure over bony prominences  - Avoid friction and shearing  - Provide appropriate hygiene as needed including keeping skin clean and dry  - Evaluate need for skin moisturizer/barrier cream  - Collaborate with interdisciplinary team (i e  Nutrition, Rehabilitation, etc )   - Patient/family teaching  Outcome: Progressing  Goal: Incision(s), wounds(s) or drain site(s) healing without S/S of infection  Description  INTERVENTIONS  - Assess and document risk factors for skin impairment   - Assess and document dressing, incision, wound bed, drain sites and surrounding tissue  - Initiate Nutrition services consult and/or wound management as needed  Outcome: Progressing  Goal: Oral mucous membranes remain intact  Description  INTERVENTIONS  - Assess oral mucosa and hygiene practices  - Implement preventative oral hygiene regimen  - Implement oral medicated treatments as ordered  - Initiate Nutrition services referral as needed  Outcome: Progressing     Problem: HEMATOLOGIC - ADULT  Goal: Maintains hematologic stability  Description  INTERVENTIONS  - Assess for signs and symptoms of bleeding or hemorrhage  - Monitor labs  - Administer supportive blood products/factors as ordered and appropriate  Outcome: Progressing     Problem: Prexisting or High Potential for Compromised Skin Integrity  Goal: Skin integrity is maintained or improved  Description  INTERVENTIONS:  - Identify patients at risk for skin breakdown  - Assess and monitor skin integrity  - Assess and monitor nutrition and hydration status  - Monitor labs (i e  albumin)  - Assess for incontinence   - Turn and reposition patient  - Assist with mobility/ambulation  - Relieve pressure over bony prominences  - Avoid friction and shearing  - Provide appropriate hygiene as needed including keeping skin clean and dry  - Evaluate need for skin moisturizer/barrier cream  - Collaborate with interdisciplinary team (i e  Nutrition, Rehabilitation, etc )   - Patient/family teaching  Outcome: Progressing     Problem: DISCHARGE PLANNING - CARE MANAGEMENT  Goal: Discharge to post-acute care or home with appropriate resources  Description  INTERVENTIONS:  - Conduct assessment to determine patient/family and health care team treatment goals, and need for post-acute services based on payer coverage, community resources, and patient preferences, and barriers to discharge  - Address psychosocial, clinical, and financial barriers to discharge as identified in assessment in conjunction with the patient/family and health care team  - Arrange appropriate level of post-acute services according to patient's   needs and preference and payer coverage in collaboration with the physician and health care team  - Communicate with and update the patient/family, physician, and health care team regarding progress on the discharge plan  - Arrange appropriate transportation to post-acute venues    Pt's goal is to return home with his spouse and [facility]   Outcome: Progressing     Problem: Nutrition/Hydration-ADULT  Goal: Nutrient/Hydration intake appropriate for improving, restoring or maintaining nutritional needs  Description  Monitor and assess patient's nutrition/hydration status for malnutrition  Collaborate with interdisciplinary team and initiate plan and interventions as ordered  Monitor patient's weight and dietary intake as ordered or per policy  Utilize nutrition screening tool and intervene as necessary  Determine patient's food preferences and provide high-protein, high-caloric foods as appropriate       INTERVENTIONS:  - Monitor oral intake, urinary output, labs, and treatment plans  - Assess nutrition and hydration status and recommend course of action  - Evaluate amount of meals eaten  - Assist patient with eating if necessary   - Allow adequate time for meals  - Recommend/ encourage appropriate diets, oral nutritional supplements, and vitamin/mineral supplements  - Order, calculate, and assess calorie counts as needed  - Recommend, monitor, and adjust tube feedings and TPN/PPN based on assessed needs  - Assess need for intravenous fluids  - Provide specific nutrition/hydration education as appropriate  - Include patient/family/caregiver in decisions related to nutrition  Outcome: Progressing

## 2019-08-15 NOTE — DISCHARGE SUMMARY
Discharge Summary - Ekaterina Rico 78 y o  male MRN: [id number]    Unit/Bed#: 048-24 Encounter: [id number]    Admission Date:   Admission Orders (From admission, onward)     Ordered        08/13/19 0934  Inpatient Admission  Once         08/12/19 1834  Place in Observation (expected length of stay for this patient is less than two midnights)  Once                     Admitting Diagnosis: Lethargy [R53 83]  Hydronephrosis of left kidney [N13 30]  Lower extremity cellulitis [L03 119]    HPI: Ekaterina Rico is a 78 y o  male who presented to the ER via EMS for evaluation of increased lethargy, bilateral lower extremity edema with oozing  Patient has a past medical history including urinary retention TIA obstructive sleep apnea-CPAP, lobe is, neuropathy, lung cancer prostate cancer, ischemic cardiomyopathy, hypertension hyperlipidemia hypercholesterolemia history of cellulitis hiatal hernia hepatitis, diabetes mellitus type 2 coronary artery disease CKD, chronic combined systolic and diastolic failure AFib anemia  Chart review indicates patient being discharged 4 days ago from rehab  He was admitted for syncope and a patellar fracture and was in rehab for approximately 4 weeks  Chart review indicates home health nurse noticed increased lethargy and having waking today  Additionally patient wears baseline home O2 supplementation at 3 L 24 hours  Patient denies lightheadedness dizziness nausea vomiting diarrhea abdominal pain denies shortness of breath not required any additional supplemental oxygen, denies chest pain, patient denies dysuria urgency or frequency, denies hematochezia or melena  Patient however does admit to bilateral lower extremities swelling with some oozing, patient also reports difficulty with walking uses a walker and wheelchair to get around  Images and labs were collected emergency room-see below  Images showed left hydronephrosis and Urology will be consulted    Patient was admitted by emergency room doctor    Procedures Performed:   Orders Placed This Encounter   Procedures    ED ECG Documentation Only       Summary of Hospital Course:     1) Bilateral lower extremity cellulitis  Patient was admitted to the medical floor, initiated on IV Ancef, area of erythema was demarcated and wound consult was obtained  He completed 3 days of IV Ancef, wound care made recommendations and advised patient to follow up as an outpatient at the [facility]  Blood cultures were obtained x2 and were negative prior to discharge  2) Hydronephrosis:  Consultation with Urology was obtained, renal function stayed at baseline, patient had a Bush inserted will follow up with Urology as an outpatient for voiding trial     3) Severe obstructive sleep apnea:  CPAP was continued during inpatient stay  Significant Findings, Care, Treatment and Services Provided:     CT A/P    1   New mild to moderate left hydronephrosis to level of the distal ureter   No obstructive stone demonstrated, though   Recommend correlation with urinalysis for evidence of hematuria or infection  2   Small amount of presacral fluid could suggest intra-abdominal inflammatory process or could relate to 3rd spacing given presence of superficial anasarca  Complications: none    Discharge Diagnosis: see above    Resolved Problems  Date Reviewed: 8/12/2019    None          Condition at Discharge: fair         Discharge instructions/Information to patient and family:   See after visit summary for information provided to patient and family  Provisions for Follow-Up Care:  See after visit summary for information related to follow-up care and any pertinent home health orders  PCP: Mary Martinez DO    Disposition: Home    Planned Readmission: No    Discharge Statement   I spent 40 minutes discharging the patient  This time was spent on the day of discharge  I had direct contact with the patient on the day of discharge  Additional documentation is required if more than 30 minutes were spent on discharge  Discharge Medications:  See after visit summary for reconciled discharge medications provided to patient and family

## 2019-08-15 NOTE — NURSING NOTE
Pt wheeled off unit via wheelchair by PCA, accompanied by son  Discharge instructions given to pt and son  Catheter care education and supplies given  Verbal understanding of same  Pt in no acute distress

## 2019-08-15 NOTE — SOCIAL WORK
Discussed with patient preferences on discharge;understanding how to manage health at home; purpose of taking medications; importance of follow up care/appointments; and symptoms to watch out for once discharged home  Pt is being dc'd home on this date  Scheduled a follow up appointment with pt's PCP Dr Femi Nevarez will be Tuesday August 20th at [address] [address] [facility] also notified [facility] and [facility] sent over   Pt's son to pick pt up around 11am

## 2019-08-15 NOTE — PHYSICAL THERAPY NOTE
PT Treatment Note      08/15/19 0941   Restrictions/Precautions   Other Precautions   (Chair Alarm;Multiple lines;O2;Fall Risk)   Subjective   Subjective Agreeable to therapy   Bed Mobility   Additional Comments EOB at start of session    Transfers   Sit to Stand 4  Minimal assistance   Additional items Assist x 1; Increased time required;Verbal cues   Stand to Sit 4  Minimal assistance   Additional items Assist x 1; Armrests; Increased time required;Verbal cues   Stand pivot 4  Minimal assistance   Additional items Verbal cues   Ambulation/Elevation   Gait pattern   (Short stride;Decreased foot clearance; Forward Flexion)   Gait Assistance 5  Supervision   Assistive Device Rolling walker   Distance 20'   Balance   Ambulatory Fair   Endurance Deficit   Endurance Deficit Yes   Activity Tolerance   Activity Tolerance Patient limited by fatigue   Assessment   Prognosis Good   Problem List Decreased strength;Decreased endurance; Impaired balance;Decreased mobility; Decreased safety awareness   Assessment Pt  Currently performing  tx and ambulation at (min-CGA ) x level of function  Utilizing RW with fair balance and stability  Tolerates short distances with no episodes of LOB  Limited by fatigue  Transfer training to increase functional task performance and  to promote safe sit/stand and stand/sit mobility  Pt  education  for hand postion and safety and technique with transfer training  Pt is in need of continued activity in PT to improve strength balance endurance mobility transfers and ambulation with return to maximize LOF  From PT/mobility standpoint, recommendation at time of d/c would be home PT  in order to promote return to PLOF and independence  Goals   LTG Expiration Date 08/21/19   Treatment Day 2   Plan   Treatment/Interventions Functional transfer training;LE strengthening/ROM; Therapeutic exercise; Endurance training;Bed mobility;Gait training   Progress Progressing toward goals   Recommendation Recommendation Home PT   Pt  OOB in chair   with call bell within reach at end of PT session  Discussed with Kacey Jasso, PT today's treatment and patient's current level of function for care coordination

## 2019-08-16 ENCOUNTER — TELEPHONE (OUTPATIENT)
Dept: FAMILY MEDICINE CLINIC | Facility: CLINIC | Age: 79
End: 2019-08-16

## 2019-08-16 ENCOUNTER — TRANSITIONAL CARE MANAGEMENT (OUTPATIENT)
Dept: FAMILY MEDICINE CLINIC | Facility: CLINIC | Age: 79
End: 2019-08-16

## 2019-08-16 NOTE — TELEPHONE ENCOUNTER
I called patient and his caregiver and set him up with an appt on 9/19/19 at 1:45PM AND IT WAS CONFIRMED

## 2019-08-16 NOTE — TELEPHONE ENCOUNTER
Pt recent D/C from hospital TCM appt scheduled 8/20/19 @ [facility] with Lokesh Fisher     - Wife called with a question from Dr Juliette Hernandez  Asking who would be removing Pt's cath      ALSO - EMR shows Scheduled Uro visit 9/19/19 for [facility] FU Void Trial

## 2019-08-17 LAB
BACTERIA BLD CULT: NORMAL
BACTERIA BLD CULT: NORMAL

## 2019-08-19 ENCOUNTER — TELEPHONE (OUTPATIENT)
Dept: FAMILY MEDICINE CLINIC | Facility: CLINIC | Age: 79
End: 2019-08-19

## 2019-08-19 ENCOUNTER — TELEPHONE (OUTPATIENT)
Dept: UROLOGY | Facility: AMBULATORY SURGERY CENTER | Age: 79
End: 2019-08-19

## 2019-08-19 ENCOUNTER — ANTICOAG VISIT (OUTPATIENT)
Dept: FAMILY MEDICINE CLINIC | Facility: CLINIC | Age: 79
End: 2019-08-19

## 2019-08-19 LAB — INR PPP: 2.4 (ref 0.84–1.19)

## 2019-08-19 NOTE — TELEPHONE ENCOUNTER
His pt 26 2 inr 2 4 today  He is on 5mg Monday, Wednesday and Friday and 2 5mg the rest and will repeat inr in one week    He just came home from the hospital

## 2019-08-19 NOTE — TELEPHONE ENCOUNTER
Contacted and spoke with Daljit harrison, visiting nurse from [facility], to schedule void trial for patient  Patient has established appointment on 09/24/2019 at 1400 with Efren Marvin at the [place] office  Fax order asking Daljit harrison to remove patient's jeter 09/24/2019 between 8-830 am and patient will be seen the same day at 1400 for post void trial Confirmation received  Patient's wife aware of plans too

## 2019-08-19 NOTE — TELEPHONE ENCOUNTER
Patient was in the hospital on the 12th   And the hospital put a jeter cath in   and the home care worker is not sure when it has to come out   Patient is having retention that's why the hospital put one in     [facility]

## 2019-08-20 ENCOUNTER — OFFICE VISIT (OUTPATIENT)
Dept: FAMILY MEDICINE CLINIC | Facility: CLINIC | Age: 79
End: 2019-08-20
Payer: MEDICARE

## 2019-08-20 VITALS
DIASTOLIC BLOOD PRESSURE: 60 MMHG | OXYGEN SATURATION: 82 % | SYSTOLIC BLOOD PRESSURE: 140 MMHG | BODY MASS INDEX: 49.44 KG/M2 | WEIGHT: 315 LBS | HEIGHT: 67 IN

## 2019-08-20 DIAGNOSIS — L03.119 CELLULITIS AND ABSCESS OF LOWER EXTREMITY: Primary | ICD-10-CM

## 2019-08-20 DIAGNOSIS — IMO0001 TRANSITION OF CARE PERFORMED WITH SHARING OF CLINICAL SUMMARY: ICD-10-CM

## 2019-08-20 DIAGNOSIS — L03.119 CELLULITIS OF EXTREMITY: ICD-10-CM

## 2019-08-20 DIAGNOSIS — L02.419 CELLULITIS AND ABSCESS OF LOWER EXTREMITY: Primary | ICD-10-CM

## 2019-08-20 PROCEDURE — 99496 TRANSJ CARE MGMT HIGH F2F 7D: CPT | Performed by: FAMILY MEDICINE

## 2019-08-20 RX ORDER — CEPHALEXIN 500 MG/1
500 CAPSULE ORAL EVERY 6 HOURS SCHEDULED
Qty: 30 CAPSULE | Refills: 0 | Status: SHIPPED | OUTPATIENT
Start: 2019-08-20 | End: 2019-08-25

## 2019-08-20 NOTE — PROGRESS NOTES
Assessment/Plan:      Diagnoses and all orders for this visit:    Cellulitis and abscess of lower extremity    Transition of care performed with sharing of clinical summary         Subjective:     Patient ID: Angelic Ivey is a 78 y o  male  Mr  Has says here for a transition of care visit from the hospital I have reviewed his hospital record he was in from the 12th through the 15th and is out of the hospital now approximately 5 days on he was treated for urinary retention with a left hydronephrosis he has a Bush indwelling Bush catheter was decompressed on he also had neurologic changes and had was obtunded on he was treated with cephalexin and is discharged on cephalexin I redressed the skin tear on his right [facility] dorsal hand which looks good it is healing up quite nicely      Review of Systems   Constitutional: Negative for activity change, appetite change, diaphoresis, fatigue and fever  HENT: Negative  Eyes: Negative  Respiratory: Positive for shortness of breath  Negative for apnea, cough, chest tightness and wheezing  Cardiovascular: Positive for leg swelling  Negative for chest pain and palpitations  Gastrointestinal: Negative for abdominal distention, abdominal pain, anal bleeding, constipation, diarrhea, nausea and vomiting  Endocrine: Negative for cold intolerance, heat intolerance, polydipsia, polyphagia and polyuria  Genitourinary: Positive for difficulty urinating  Negative for dysuria, flank pain, hematuria and urgency  Musculoskeletal: Negative for arthralgias, back pain, gait problem, joint swelling and myalgias  Skin: Negative for color change, rash and wound  Allergic/Immunologic: Negative for environmental allergies, food allergies and immunocompromised state  Neurological: Negative for dizziness, seizures, syncope, speech difficulty, numbness and headaches  Hematological: Negative for adenopathy  Does not bruise/bleed easily     Psychiatric/Behavioral: Negative for agitation, behavioral problems, hallucinations, sleep disturbance and suicidal ideas  Objective:     Physical Exam   Constitutional: He is oriented to person, place, and time  He appears well-developed and well-nourished  No distress  HENT:   Head: Normocephalic  Right Ear: External ear normal    Left Ear: External ear normal    Nose: Nose normal    Mouth/Throat: Oropharynx is clear and moist    Eyes: Pupils are equal, round, and reactive to light  Conjunctivae and EOM are normal  Right eye exhibits no discharge  Left eye exhibits no discharge  No scleral icterus  Neck: Normal range of motion  No tracheal deviation present  No thyromegaly present  Cardiovascular: Normal rate, regular rhythm and normal heart sounds  Exam reveals no gallop and no friction rub  No murmur heard  Pulmonary/Chest: Effort normal and breath sounds normal  No respiratory distress  He has no wheezes  Abdominal: Soft  Bowel sounds are normal  He exhibits no mass  There is no tenderness  There is no guarding  Musculoskeletal: He exhibits no edema or deformity  Lymphadenopathy:     He has no cervical adenopathy  Neurological: He is alert and oriented to person, place, and time  No cranial nerve deficit  Skin: Skin is warm and dry  No rash noted  He is not diaphoretic  No erythema  Psychiatric: He has a normal mood and affect   Thought content normal          Vitals:    08/20/19 1505   BP: 140/60   BP Location: Left arm   Patient Position: Sitting   SpO2: (!) 82%   Weight: (!) 144 kg (317 lb)   Height: 5' 7" (1 702 m)       The following portions of the patient's history were reviewed and updated as appropriate:   He  has a past medical history of Anemia, Arthritis, Atrial fibrillation ([address] ), 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[address] ), B12 deficiency, Back pain, Blind right eye, Cancer ([address] ), Cardiac disorder, Chronic combined systolic and diastolic heart failure ([address] ), Chronic obstructive lung disease ([address] ), Chronic venous stasis dermatitis of both lower extremities, CKD (chronic kidney disease), stage IV ([facility] ), Coronary artery disease, DDD (degenerative disc disease), DM (diabetes mellitus), type 2 ([address] ), BAER (dyspnea on exertion), Easy bruising, Epistaxis, Gout, Gross hematuria, Hepatitis, Herpes zoster, Hiatal hernia, History of cellulitis, History of prostate cancer, Hypercholesterolemia, Hyperlipidemia, Hypertension, Ischemic cardiomyopathy, Lung cancer ([facility] ) (09/12/2019), Lung mass, MI, old, Morbid obesity due to excess calories ([address] ), Neuropathy, Obesity, Obstructive sleep apnea syndrome, severe, Pneumonia, Shortness of breath, TIA (transient ischemic attack), Urinary incontinence, Urinary retention, and Use of cane as ambulatory aid    He   Patient Active Problem List    Diagnosis Date Noted    Chronic respiratory failure with hypoxia ([facility] ) 08/13/2019    Cellulitis of extremity 08/12/2019    Hydronephrosis 08/12/2019    Closed nondisplaced fracture of left patella with routine healing 07/15/2019    Hematuria 06/04/2019    Secondary hyperparathyroidism of renal origin ([facility] ) 02/20/2019    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 stage 4 chronic kidney disease ([facility] ) 02/14/2019    Obstructive chronic bronchitis without exacerbation ([facility] ) 02/14/2019    Elevated INR 12/15/2018    Pneumonia of right lower lobe due to infectious organism ([facility] ) 12/14/2018    Hypoglycemia associated with diabetes ([facility] ) 12/14/2018    Influenza A 11/30/2018    Bacteriuria 11/30/2018    Malignant neoplasm of upper lobe of right lung ([facility] ) 04/10/2018    Hypokalemia 10/25/2017    Vision loss, 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 10/21/2017    Atrial fibrillation ([address] )     Blind right eye     Coronary artery disease     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) ([facility] )     Obstructive sleep apnea syndrome, severe     Severe obstructive sleep apnea-hypopnea syndrome 04/13/2017    Wound of right lower extremity 03/05/2017    Wound of left lower extremity 03/05/2017    Hyperphosphatemia 03/05/2017    Vitamin D deficiency 03/05/2017    Renal cyst 03/05/2017    Body mass index (BMI) of 40 0 to 49 9 03/04/2017    Anemia 10/04/2016    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    CKD (chronic kidney disease) stage 4, GFR 15-29 ml/min ([facility]) 07/16/2016    Morbid obesity ([facility] )     History of prostate cancer     Type 2 diabetes mellitus with hyperglycemia ([facility] )     Bilateral leg edema 01/19/2016    Prostate cancer ([place] [address] ) 07/24/2012     He  has a past surgical history that includes Coronary angioplasty with stent; Cystoscopy; Lung cancer surgery; Cataract extraction, bilateral; Eye surgery; Hernia repair; Vascular surgery; Cardiac surgery; Abdominal surgery; Coronary artery bypass graft (N/A, 7/15/2016); pr cystourethroscopy,ureter catheter (Left, 3/9/2016); PILONIDAL CYST EXCISION; pr temporal artery ligatn or bx (Bilateral, 10/31/2017); Lung surgery (Left, 2012); Other surgical history; and Renal artery stent (02/16/2015)  His family history includes Cancer in his mother, other, other, and sister; Diabetes in his father, maternal grandmother, mother, and other; Diabetes type II in his mother; Heart disease in his mother; Hypertension in his mother and other  He  reports that he quit smoking about 15 years ago  He has a 108 00 pack-year smoking history  He has never used smokeless tobacco  He reports that he does not drink alcohol or use drugs    Current Outpatient Medications   Medication Sig Dispense Refill    acetaminophen (TYLENOL) 500 mg tablet Take 1,000 mg by mouth 2 (two) times a day Administer with tramadol      albuterol (2 5 mg/3 mL) 0 083 % nebulizer solution Take 1 vial (2 5 mg total) by nebulization every 6 (six) hours as needed for wheezing or shortness of breath 100 vial 0    aspirin (ECOTRIN LOW STRENGTH) 81 mg EC tablet Take 81 mg by mouth 2 (two) times a day      atorvastatin (LIPITOR) 40 mg tablet Take 1 tablet (40 mg total) by mouth daily with dinner 30 tablet 0    bisacodyl (BISCOLAX) 10 mg suppository Insert 10 mg into the rectum daily      cephalexin (KEFLEX) 500 mg capsule Take 1 capsule (500 mg total) by mouth every 6 (six) hours for 5 days 20 capsule 0    Cholecalciferol 2000 units TABS Take 1 tablet (2,000 Units total) by mouth daily 30 tablet 2    furosemide (LASIX) 80 mg tablet Take 1 tablet (80 mg total) by mouth 2 (two) times a day 100 tablet 6    gabapentin (NEURONTIN) 300 mg capsule 1 tablet in a m  at breakfast 1 tablet in early afternoon and 2 tablets at bedtime 120 capsule 3    insulin detemir (LEVEMIR) 100 units/mL subcutaneous injection Inject 45 Units under the skin daily at bedtime  0    insulin lispro (HumaLOG) 100 units/mL injection Inject 10 Units under the skin 3 (three) times a day with meals  0    iron polysaccharides (FERREX) 150 mg capsule Take 150 mg by mouth daily      Lidocaine-Glycerin (PREPARATION H) 5-14 4 % CREA Insert into the rectum      metoprolol tartrate (LOPRESSOR) 25 mg tablet Take 25 mg by mouth 2 (two) times a day      potassium chloride (MICRO-K) 10 MEQ CR capsule Take 1 capsule (10 mEq total) by mouth 2 (two) times a day for 30 days 60 capsule 5    PROFERRIN-FORTE 12-1 MG TABS TAKE 1 TABLET BY MOUTH ONCE DAILY  3    tamsulosin (FLOMAX) 0 4 mg Take 0 4 mg by mouth daily at bedtime        traMADol (ULTRAM) 50 mg tablet 2 tablet 2 times daily with 2 extra Strength Tylenol 90 tablet 0    warfarin (COUMADIN) 5 mg tablet Take 5mg (1 tablet) daily on Monday, Wednesday, and Friday, and 2 5mg (1/2 tablet) on Tuesday, Thursday, Saturday, Sunday  90 tablet 0     No current facility-administered medications for this visit        Current Outpatient Medications on File Prior to Visit   Medication Sig    acetaminophen (TYLENOL) 500 mg tablet Take 1,000 mg by mouth 2 (two) times a day Administer with tramadol  albuterol (2 5 mg/3 mL) 0 083 % nebulizer solution Take 1 vial (2 5 mg total) by nebulization every 6 (six) hours as needed for wheezing or shortness of breath    aspirin (ECOTRIN LOW STRENGTH) 81 mg EC tablet Take 81 mg by mouth 2 (two) times a day    atorvastatin (LIPITOR) 40 mg tablet Take 1 tablet (40 mg total) by mouth daily with dinner    bisacodyl (BISCOLAX) 10 mg suppository Insert 10 mg into the rectum daily    cephalexin (KEFLEX) 500 mg capsule Take 1 capsule (500 mg total) by mouth every 6 (six) hours for 5 days    Cholecalciferol 2000 units TABS Take 1 tablet (2,000 Units total) by mouth daily    furosemide (LASIX) 80 mg tablet Take 1 tablet (80 mg total) by mouth 2 (two) times a day    gabapentin (NEURONTIN) 300 mg capsule 1 tablet in a m  at breakfast 1 tablet in early afternoon and 2 tablets at bedtime    insulin detemir (LEVEMIR) 100 units/mL subcutaneous injection Inject 45 Units under the skin daily at bedtime    insulin lispro (HumaLOG) 100 units/mL injection Inject 10 Units under the skin 3 (three) times a day with meals    iron polysaccharides (FERREX) 150 mg capsule Take 150 mg by mouth daily    Lidocaine-Glycerin (PREPARATION H) 5-14 4 % CREA Insert into the rectum    metoprolol tartrate (LOPRESSOR) 25 mg tablet Take 25 mg by mouth 2 (two) times a day    potassium chloride (MICRO-K) 10 MEQ CR capsule Take 1 capsule (10 mEq total) by mouth 2 (two) times a day for 30 days    PROFERRIN-FORTE 12-1 MG TABS TAKE 1 TABLET BY MOUTH ONCE DAILY    tamsulosin (FLOMAX) 0 4 mg Take 0 4 mg by mouth daily at bedtime      traMADol (ULTRAM) 50 mg tablet 2 tablet 2 times daily with 2 extra Strength Tylenol    warfarin (COUMADIN) 5 mg tablet Take 5mg (1 tablet) daily on Monday, Wednesday, and Friday, and 2 5mg (1/2 tablet) on Tuesday, Thursday, Saturday, Sunday  No current facility-administered medications on file prior to visit  He is allergic to other       Transitional Care Management Review:  Shayna Poole is a 78 y o  male here for TCM follow up  During the TCM phone call patient stated:    TCM Call (since 7/20/2019)     Date and time call was made  8/16/2019  2:59 PM    Hospital care reviewed  Records reviewed    Patient was hospitialized at  [address]    Date of Admission  08/12/19    Date of discharge  08/15/19    Diagnosis  Lethargic, Cellulitis of LE    Disposition  Home <img src='C:FILES (X86)    Were the patients medications reviewed and updated  No    Current Symptoms  None <img src='C:FILES (X86)      TCM Call (since 7/20/2019)     Clinical Progress Wound  Unchanged    Post hospital issues  None <img src='C:FILES (X86)    Should patient be enrolled in anticoag monitoring? Yes <img src='C:FILES (X86)    Scheduled for follow up? Yes <img src='C:FILES (X86)    Patients specialists  Urologist; Other (comment); Nephrologist    Nephrologist name  Bouchra Giles    Urologist name  Kezia Bass: 9/19/2019    Other specialists names  Dr Colonel Munoz: 08/27/2019    Did you obtain your prescribed medications  Yes    Do you need help managing your prescriptions or medications  No <img src='C:FILES (X86)    Is transportation to your appointment needed  No <img src='C:FILES (X86)    I have advised the patient to call PCP with any new or worsening symptoms  [address]; Spouse or Significiant other    Support System  Family;  Children    The type of support provided  Emotional; Financial; Physical    Do you have social support  Yes, as much as I need    Are you recieving any outpatient services  No <img src='C:FILES (X86)    What type of services  PT AND OT    Are you recieving home care services  Yes <img src='C:FILES (X86)    Types of home care services  Nurse visit    Are you using any community resources  Yes    Which resources are you recieving  [facility] financial assistance    Current waiver services  No    Have you fallen in the last 12 months  Yes <img src='C:FILES (X86)    How many times  2x before hospital and 1x during    Interperter language line needed  No    Counseling  Family <img src='C:FILES (D73)    Comments  SPOKE TO KELLEY,PTS WIFE              Mary Martinez, DO

## 2019-08-27 ENCOUNTER — APPOINTMENT (OUTPATIENT)
Dept: RADIOLOGY | Facility: MEDICAL CENTER | Age: 79
End: 2019-08-27
Payer: MEDICARE

## 2019-08-27 ENCOUNTER — OFFICE VISIT (OUTPATIENT)
Dept: OBGYN CLINIC | Facility: CLINIC | Age: 79
End: 2019-08-27

## 2019-08-27 VITALS
BODY MASS INDEX: 49.44 KG/M2 | WEIGHT: 315 LBS | HEIGHT: 67 IN | HEART RATE: 61 BPM | SYSTOLIC BLOOD PRESSURE: 118 MMHG | DIASTOLIC BLOOD PRESSURE: 75 MMHG

## 2019-08-27 DIAGNOSIS — G89.29 CHRONIC PAIN OF LEFT KNEE: Primary | ICD-10-CM

## 2019-08-27 DIAGNOSIS — S82.092A CLOSED SLEEVE FRACTURE OF LEFT PATELLA, INITIAL ENCOUNTER: ICD-10-CM

## 2019-08-27 DIAGNOSIS — M25.562 CHRONIC PAIN OF LEFT KNEE: Primary | ICD-10-CM

## 2019-08-27 DIAGNOSIS — M25.562 ACUTE PAIN OF LEFT KNEE: ICD-10-CM

## 2019-08-27 PROCEDURE — 73562 X-RAY EXAM OF KNEE 3: CPT

## 2019-08-27 PROCEDURE — 99024 POSTOP FOLLOW-UP VISIT: CPT | Performed by: ORTHOPAEDIC SURGERY

## 2019-08-27 NOTE — PROGRESS NOTES
78 y o male presents for ER follow-up of left patella fracture with injury date 07/14/2019  This was a result of the fall  Patient has multiple falls landing on his knees  Initial x-rays showed fracture of the left knee and nothing of the right  He is now 6 weeks post injury  He is here for x-rays and follow-up  He denies any significant pain about the knee states he never really had much at all  States he has no pain at current  He is currently in a wheelchair  Denies any numbness or tingling  Notes that he still has weeping of his leg shin area  He denies pain with ambulation or standing and the left patella  He is refusing any type of surgery if it would be needed  Review of Systems  Review of systems negative unless otherwise specified in HPI    Past Medical History  Past Medical History:   Diagnosis Date    Anemia     Arthritis     [facility]     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[facility] [facility] )     B12 deficiency     Back pain     Blind right eye     Cancer ([facility] )     prostate     Cardiac disorder     Chronic combined systolic and diastolic heart failure (HCC)     Chronic obstructive lung disease (HCC)     Chronic venous stasis dermatitis of both lower extremities     CKD (chronic kidney disease), stage IV (HCC)     Coronary artery disease     DDD (degenerative disc disease)     DM (diabetes mellitus), type 2 (HCC)     Type II, on insulin    BAER (dyspnea on exertion)     Easy bruising     Epistaxis     Fractures     Gout     Gross hematuria     last assessed: 3/18/2015    Hepatitis     Herpes zoster     Hiatal hernia     History of cellulitis     BLE    History of prostate cancer     Radiation treatments      Hypercholesterolemia     Hyperlipidemia     Hypertension     Ischemic cardiomyopathy     Lung cancer ([facility] ) 09/12/2019    per patient    Lung mass     last assessed: 9/21/2012    MI, old     Morbid obesity due to excess calories ([address] )     or severe obesity  Neuropathy     BLE    Obesity     Obstructive sleep apnea syndrome, severe     Pneumonia     last assessed: 7/24/2012    Shortness of breath     TIA (transient ischemic attack)     Urinary incontinence     Urinary retention     Use of cane as ambulatory aid     Independent with personal care       Past Surgical History  Past Surgical History:   Procedure Laterality Date    ABDOMINAL SURGERY      CARDIAC SURGERY      CATARACT EXTRACTION, BILATERAL      CORONARY ANGIOPLASTY WITH STENT PLACEMENT      2 stents    CORONARY ARTERY BYPASS GRAFT N/A 7/15/2016    Procedure: Intraop ADRIAN, CABG x 3 using LIMA to LAD, RSVG to OM1 and D1;  Surgeon: Sj Lama MD;  [facility];  Service:    Delbra Diver      has stents    EYE SURGERY      Bilateral cataract removal    HERNIA REPAIR      umbilical hernia repair    LUNG CANCER SURGERY      Partial upper right lobectomy    LUNG SURGERY Left 2012    OTHER SURGICAL HISTORY      previous stent placement-no anatomy given    PILONIDAL CYST EXCISION      IN CYSTOURETHROSCOPY,URETER CATHETER Left 3/9/2016    Procedure: CYSTOSCOPY WITH left RETROGRADE PYELOGRAM left double J stent removal;  Surgeon: Greg Watkins MD;  [facility];  Service: Urology    [facility] OR BX Bilateral 10/31/2017    Procedure: BIOPSY ARTERY TEMPORAL;  Surgeon: Seth Webber MD;  [facility];  Service: Vascular    RENAL ARTERY STENT  02/16/2015    transcath intravascular stent placement percutaneous renal    VASCULAR SURGERY       Current Medications  Current Outpatient Medications on File Prior to Visit   Medication Sig Dispense Refill    acetaminophen (TYLENOL) 500 mg tablet Take 1,000 mg by mouth 2 (two) times a day Administer with tramadol      albuterol (2 5 mg/3 mL) 0 083 % nebulizer solution Take 1 vial (2 5 mg total) by nebulization every 6 (six) hours as needed for wheezing or shortness of breath 100 vial 0    aspirin (ECOTRIN LOW STRENGTH) 81 mg EC tablet Take 81 mg by mouth 2 (two) times a day      atorvastatin (LIPITOR) 40 mg tablet Take 1 tablet (40 mg total) by mouth daily with dinner 30 tablet 0    bisacodyl (BISCOLAX) 10 mg suppository Insert 10 mg into the rectum daily      Cholecalciferol 2000 units TABS Take 1 tablet (2,000 Units total) by mouth daily 30 tablet 2    furosemide (LASIX) 80 mg tablet Take 1 tablet (80 mg total) by mouth 2 (two) times a day 100 tablet 6    gabapentin (NEURONTIN) 300 mg capsule 1 tablet in a m  at breakfast 1 tablet in early afternoon and 2 tablets at bedtime 120 capsule 3    insulin detemir (LEVEMIR) 100 units/mL subcutaneous injection Inject 45 Units under the skin daily at bedtime  0    insulin lispro (HumaLOG) 100 units/mL injection Inject 10 Units under the skin 3 (three) times a day with meals  0    iron polysaccharides (FERREX) 150 mg capsule Take 150 mg by mouth daily      Lidocaine-Glycerin (PREPARATION H) 5-14 4 % CREA Insert into the rectum      metoprolol tartrate (LOPRESSOR) 25 mg tablet Take 25 mg by mouth 2 (two) times a day      potassium chloride (MICRO-K) 10 MEQ CR capsule Take 1 capsule (10 mEq total) by mouth 2 (two) times a day for 30 days 60 capsule 5    PROFERRIN-FORTE 12-1 MG TABS TAKE 1 TABLET BY MOUTH ONCE DAILY  3    tamsulosin (FLOMAX) 0 4 mg Take 0 4 mg by mouth daily at bedtime        traMADol (ULTRAM) 50 mg tablet 2 tablet 2 times daily with 2 extra Strength Tylenol 90 tablet 0    warfarin (COUMADIN) 5 mg tablet Take 5mg (1 tablet) daily on Monday, Wednesday, and Friday, and 2 5mg (1/2 tablet) on Tuesday, Thursday, Saturday, Sunday  90 tablet 0     No current facility-administered medications on file prior to visit        Recent Labs [facility])  0   Lab Value Date/Time    HCT 33 4 (L) 08/14/2019 0443    HCT 39 5 09/16/2015 1137    HGB 10 0 (L) 08/14/2019 0443    HGB 13 0 09/16/2015 1137    WBC 7 39 08/14/2019 0443    WBC 6 14 09/16/2015 1137    INR 2 40 (A) 08/19/2019    INR 1 00 04/21/2015 1408    ESR 18 (H) 10/24/2017 0650    CRP 8 4 (H) 10/24/2017 0650    GLUCOSE 229 (H) 07/15/2016 1845    GLUCOSE 256 (H) 09/16/2015 1137    HGBA1C 6 3 07/16/2019 0555    HGBA1C 9 6 (H) 02/14/2015 0539     Physical exam  · General: Awake, Alert, Oriented  · Eyes: Pupils equal, round and reactive to light  · Heart: regular rate and rhythm  · Lungs: No audible wheezing  · Abdomen: soft  left Knee exam  · Left shins wrapped with cling  Left knee without effusion erythema or warmth  No tenderness with palpation over the patella  No patellar crepitation  Patient can actively extend the knee and a seated position without pain  Procedure  None    Imaging  I personally reviewed x-rays left knee taken the office today which note lateral left patellar fracture and adequate position alignment with early healing and no significant displacement  1  Chronic pain of left knee    2  Closed sleeve fracture of left patella, initial encounter      Assessment:  left knee patellar fracture, 6 weeks post injury without pain and clinically doing well  Plan: We will have the patient continue activity as tolerated  Continue working on motion  Caution with ambulation due to his frequent falls recently  Recheck in 3 months with final x-ray  Continue treatment wound care for his weeping shins  Continue calcium intake by diet

## 2019-08-27 NOTE — PATIENT INSTRUCTIONS
We will have the patient continue activity as tolerated  Continue working on motion  Caution with ambulation due to his frequent falls recently  Recheck in 3 months with final x-ray  Continue treatment wound care for his weeping shins  Continue calcium intake by diet

## 2019-08-28 ENCOUNTER — TELEPHONE (OUTPATIENT)
Dept: FAMILY MEDICINE CLINIC | Facility: CLINIC | Age: 79
End: 2019-08-28

## 2019-08-28 ENCOUNTER — ANTICOAG VISIT (OUTPATIENT)
Dept: FAMILY MEDICINE CLINIC | Facility: CLINIC | Age: 79
End: 2019-08-28

## 2019-08-28 DIAGNOSIS — M79.605 PAIN IN BOTH LOWER EXTREMITIES: ICD-10-CM

## 2019-08-28 DIAGNOSIS — M79.604 PAIN IN BOTH LOWER EXTREMITIES: ICD-10-CM

## 2019-08-28 LAB — INR PPP: 2.4 (ref 0.84–1.19)

## 2019-08-28 RX ORDER — TRAMADOL HYDROCHLORIDE 50 MG/1
TABLET ORAL
Qty: 90 TABLET | Refills: 0 | Status: SHIPPED | OUTPATIENT
Start: 2019-08-28 | End: 2019-09-17 | Stop reason: SDUPTHER

## 2019-08-28 NOTE — TELEPHONE ENCOUNTER
Pharm received Rx refill request for Tramadol #50 Si tabs BID - Dx: Right Leg Pain    Need to know if pain is Chronic or Acute - It will determine how many can be dispensed      Call pharm with 's response

## 2019-08-28 NOTE — TELEPHONE ENCOUNTER
INR results today - 2 4    Coumadin 5mg M/W/F, 2 5mg all other days    Chung Styles with [facility] was told to have Pt stay on same dose & recheck INR next week      Information was given to  verbally - Results have been recorded

## 2019-09-03 ENCOUNTER — ANTICOAG VISIT (OUTPATIENT)
Dept: FAMILY MEDICINE CLINIC | Facility: CLINIC | Age: 79
End: 2019-09-03

## 2019-09-03 ENCOUNTER — TELEPHONE (OUTPATIENT)
Dept: FAMILY MEDICINE CLINIC | Facility: CLINIC | Age: 79
End: 2019-09-03

## 2019-09-03 LAB — INR PPP: 1.7 (ref 0.84–1.19)

## 2019-09-11 ENCOUNTER — CONSULT (OUTPATIENT)
Dept: NEPHROLOGY | Facility: CLINIC | Age: 79
End: 2019-09-11
Payer: MEDICARE

## 2019-09-11 VITALS — HEIGHT: 67 IN | SYSTOLIC BLOOD PRESSURE: 130 MMHG | DIASTOLIC BLOOD PRESSURE: 66 MMHG | BODY MASS INDEX: 49.65 KG/M2

## 2019-09-11 DIAGNOSIS — I50.43 HYPERTENSIVE HEART AND KIDNEY DISEASE WITH ACUTE ON CHRONIC COMBINED SYSTOLIC AND DIASTOLIC CONGESTIVE HEART FAILURE AND STAGE 4 CHRONIC KIDNEY DISEASE (HCC): ICD-10-CM

## 2019-09-11 DIAGNOSIS — G47.33 OBSTRUCTIVE SLEEP APNEA SYNDROME, SEVERE: ICD-10-CM

## 2019-09-11 DIAGNOSIS — E87.6 HYPOKALEMIA: ICD-10-CM

## 2019-09-11 DIAGNOSIS — E66.01 MORBID OBESITY (HCC): ICD-10-CM

## 2019-09-11 DIAGNOSIS — I13.0 HYPERTENSIVE HEART AND KIDNEY DISEASE WITH ACUTE ON CHRONIC COMBINED SYSTOLIC AND DIASTOLIC CONGESTIVE HEART FAILURE AND STAGE 4 CHRONIC KIDNEY DISEASE (HCC): ICD-10-CM

## 2019-09-11 DIAGNOSIS — N18.4 HYPERTENSIVE HEART AND KIDNEY DISEASE WITH ACUTE ON CHRONIC COMBINED SYSTOLIC AND DIASTOLIC CONGESTIVE HEART FAILURE AND STAGE 4 CHRONIC KIDNEY DISEASE (HCC): ICD-10-CM

## 2019-09-11 DIAGNOSIS — N18.4 CKD (CHRONIC KIDNEY DISEASE) STAGE 4, GFR 15-29 ML/MIN (HCC): Primary | Chronic | ICD-10-CM

## 2019-09-11 DIAGNOSIS — N25.81 SECONDARY HYPERPARATHYROIDISM OF RENAL ORIGIN (HCC): Chronic | ICD-10-CM

## 2019-09-11 DIAGNOSIS — R60.0 LOCALIZED EDEMA: ICD-10-CM

## 2019-09-11 PROBLEM — R33.9 URINARY RETENTION: Status: ACTIVE | Noted: 2019-09-11

## 2019-09-11 PROCEDURE — 1124F ACP DISCUSS-NO DSCNMKR DOCD: CPT | Performed by: INTERNAL MEDICINE

## 2019-09-11 PROCEDURE — 99215 OFFICE O/P EST HI 40 MIN: CPT | Performed by: INTERNAL MEDICINE

## 2019-09-11 RX ORDER — FUROSEMIDE 80 MG
80 TABLET ORAL 2 TIMES DAILY
Qty: 180 TABLET | Refills: 1 | Status: SHIPPED | OUTPATIENT
Start: 2019-09-11

## 2019-09-11 NOTE — ASSESSMENT & PLAN NOTE
Continue potassium supplementation with diuretics  Will need to continue to monitor patient's blood work closely given aggressive diuresis and need for supplementation of electrolytes

## 2019-09-11 NOTE — ASSESSMENT & PLAN NOTE
Continue Bush catheter  Patient to see Urology near future and have a trial to see if catheter can be removed entirely

## 2019-09-11 NOTE — PROGRESS NOTES
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 stage 4 chronic kidney disease ([facility] [address] )  Wt Readings from Last 3 Encounters:   08/27/19 (!) 144 kg (317 lb)   08/20/19 (!) 144 kg (317 lb)   08/15/19 (!) 142 kg (312 lb 9 8 oz)     Patient is currently compensated Ramez Robertson to her risk for decompensation  Continue with Lasix 80 mg twice a day  Continue low-sodium diet  Hypokalemia  Continue potassium supplementation with diuretics  Will need to continue to monitor patient's blood work closely given aggressive diuresis and need for supplementation of electrolytes  Urinary retention  Continue Bush catheter  Patient to see Urology near future and have a trial to see if catheter can be removed entirely  Obstructive sleep apnea syndrome, severe  Continue CPAP at home         Problem List Items Addressed This Visit        Endocrine    Secondary hyperparathyroidism of renal origin ([facility] )       Respiratory    Obstructive sleep apnea syndrome, severe     Continue CPAP at home            Cardiovascular and Mediastinum    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 stage 4 chronic kidney disease ([facility] )     Wt Readings from Last 3 Encounters:   08/27/19 (!) 144 kg (317 lb)   08/20/19 (!) 144 kg (317 lb)   08/15/19 (!) 142 kg (312 lb 9 8 oz)     Patient is currently compensated Ramez Robertson to her risk for decompensation    Continue with Lasix 80 mg twice a day   Continue low-sodium diet  Relevant Medications    furosemide (LASIX) 80 mg tablet       Genitourinary    CKD (chronic kidney disease) stage 4, GFR 15-29 ml/min ([facility]) - Primary (Chronic)     Renal function is doing well at this time, however, I am concerned that he may be losing muscle mass which is why his creatinine may be improving which does not reflect a true increase in renal function, but rather stable  Relevant Medications    furosemide (LASIX) 80 mg tablet       Other    Morbid obesity (HCC)    Hypokalemia     Continue potassium supplementation with diuretics  Will need to continue to monitor patient's blood work closely given aggressive diuresis and need for supplementation of electrolytes  Localized edema    Relevant Medications    furosemide (LASIX) 80 mg tablet            Subjective:      Patient ID: Guerda Garcia is a 78 y o  male  Patient's edema is about stable, claims to be on current medications and has been compliant  Patient's diet is improved remains on some high sodium foods  Patient's diabetes is stable  Takes all medications including insulin as prescribed  Patient's diet acute continue to improve lowering some obvious sources of high carbohydrate including breads  Denies any significant issues with hypoglycemic events  Hypertension   This is a chronic problem  The current episode started more than 1 year ago  The problem is unchanged  The problem is controlled  Associated symptoms include shortness of breath  Pertinent negatives include no chest pain  There are no associated agents to hypertension  Risk factors for coronary artery disease include male gender, diabetes mellitus, dyslipidemia, obesity and sedentary lifestyle  The current treatment provides no improvement  Compliance problems include diet and exercise  Hypertensive end-organ damage includes kidney disease  Identifiable causes of hypertension include chronic renal disease  The following portions of the patient's history were reviewed and updated as appropriate: allergies, current medications, past family history, past medical history, past social history, past surgical history and problem list     Review of Systems   Constitutional: Positive for fatigue  Respiratory: Positive for shortness of breath  Cardiovascular: Negative for chest pain  Genitourinary: Negative for decreased urine volume and frequency  All other systems reviewed and are negative          Social History     Socioeconomic History    Marital status: /Civil Union     Spouse name: None    Number of children: None    Years of education: None    Highest education level: None   Occupational History    None   Social Needs    Financial resource strain: None    Food insecurity:     Worry: None     Inability: None    Transportation needs:     Medical: None     Non-medical: None   Tobacco Use    Smoking status: Former Smoker     Packs/day: 2 00     Years: 54 00     Pack years: 108 00     Last attempt to quit: 2004     Years since quitting: 15 7    Smokeless tobacco: Never Used    Tobacco comment: Pt is a non smoker   Substance and Sexual Activity    Alcohol use: Never     Alcohol/week: 0 0 standard drinks     Frequency: Never     Drinks per session: Patient refused     Binge frequency: Never     Comment: 0    Drug use: Never    Sexual activity: Never     Partners: Female     Birth control/protection: Abstinence   Lifestyle    Physical activity:     Days per week: None     Minutes per session: None    Stress: None   Relationships    Social connections:     Talks on phone: None     Gets together: None     Attends Jainism service: None     Active member of club or organization: None     Attends meetings of clubs or organizations: None     Relationship status: None    Intimate partner violence:     Fear of current or ex partner: None     Emotionally abused: None     Physically abused: None Forced sexual activity: None   Other Topics Concern    None   Social History Narrative    No advance directives    No living will    Patient has living will     Past Medical History:   Diagnosis Date    Anemia     Arthritis     Atrial fibrillation ([facility] )     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[facility] )     B12 deficiency     Back pain     Blind right eye     Cancer ([facility] )     prostate     Cardiac disorder     Chronic combined systolic and diastolic heart failure (HCC)     Chronic obstructive lung disease (HCC)     Chronic venous stasis dermatitis of both lower extremities     CKD (chronic kidney disease), stage IV (HCC)     Coronary artery disease     DDD (degenerative disc disease)     DM (diabetes mellitus), type 2 (HCC)     Type II, on insulin    BAER (dyspnea on exertion)     Easy bruising     Epistaxis     Fractures     Gout     Gross hematuria     last assessed: 3/18/2015    Hepatitis     Herpes zoster     Hiatal hernia     History of cellulitis     BLE    History of prostate cancer     Radiation treatments   Hypercholesterolemia     Hyperlipidemia     Hypertension     Ischemic cardiomyopathy     Lung cancer ([facility] ) 09/12/2019    per patient    Lung mass     last assessed: 9/21/2012    MI, old     Morbid obesity due to excess calories ([facility] )     or severe obesity    Neuropathy     BLE    Obesity     Obstructive sleep apnea syndrome, severe     Pneumonia     last assessed: 7/24/2012    Shortness of breath     TIA (transient ischemic attack)     Urinary incontinence     Urinary retention     Use of cane as ambulatory aid     Independent with personal care       Past Surgical History:   Procedure Laterality Date    ABDOMINAL SURGERY      CARDIAC SURGERY      CATARACT EXTRACTION, BILATERAL      CORONARY ANGIOPLASTY WITH STENT PLACEMENT      2 stents    CORONARY ARTERY BYPASS GRAFT N/A 7/15/2016    Procedure: Intraop ADRIAN, CABG x 3 using LIMA to LAD, RSVG to OM1 and D1; Surgeon: Dontae Lorenzo MD;  [facility];  Service:    Christine Murillo      has stents    EYE SURGERY      Bilateral cataract removal    HERNIA REPAIR      umbilical hernia repair    LUNG CANCER SURGERY      Partial upper right lobectomy    LUNG SURGERY Left 2012    OTHER SURGICAL HISTORY      previous stent placement-no anatomy given    PILONIDAL CYST EXCISION      MD CYSTOURETHROSCOPY,URETER CATHETER Left 3/9/2016    Procedure: CYSTOSCOPY WITH left RETROGRADE PYELOGRAM left double J stent removal;  Surgeon: Sam Collier MD;  [facility];  Service: Urology    MD TEMPORAL ARTERY LIGATN OR BX Bilateral 10/31/2017    Procedure: BIOPSY ARTERY TEMPORAL;  Surgeon: Levi Liu MD;  [facility];  Service: Vascular    RENAL ARTERY STENT  02/16/2015    transcath intravascular stent placement percutaneous renal    VASCULAR SURGERY         Current Outpatient Medications:     acetaminophen (TYLENOL) 500 mg tablet, Take 1,000 mg by mouth 2 (two) times a day Administer with tramadol, Disp: , Rfl:     albuterol (2 5 mg/3 mL) 0 083 % nebulizer solution, Take 1 vial (2 5 mg total) by nebulization every 6 (six) hours as needed for wheezing or shortness of breath, Disp: 100 vial, Rfl: 0    aspirin (ECOTRIN LOW STRENGTH) 81 mg EC tablet, Take 81 mg by mouth 2 (two) times a day, Disp: , Rfl:     atorvastatin (LIPITOR) 40 mg tablet, Take 1 tablet (40 mg total) by mouth daily with dinner, Disp: 30 tablet, Rfl: 0    bisacodyl (BISCOLAX) 10 mg suppository, Insert 10 mg into the rectum daily, Disp: , Rfl:     Cholecalciferol 2000 units TABS, Take 1 tablet (2,000 Units total) by mouth daily, Disp: 30 tablet, Rfl: 2    furosemide (LASIX) 80 mg tablet, Take 1 tablet (80 mg total) by mouth 2 (two) times a day, Disp: 180 tablet, Rfl: 1    gabapentin (NEURONTIN) 300 mg capsule, 1 tablet in a m  at breakfast 1 tablet in early afternoon and 2 tablets at bedtime, Disp: 120 capsule, Rfl: 3    insulin detemir (LEVEMIR) 100 units/mL subcutaneous injection, Inject 45 Units under the skin daily at bedtime, Disp: , Rfl: 0    insulin lispro (HumaLOG) 100 units/mL injection, Inject 10 Units under the skin 3 (three) times a day with meals, Disp: , Rfl: 0    iron polysaccharides (FERREX) 150 mg capsule, Take 150 mg by mouth daily, Disp: , Rfl:     Lidocaine-Glycerin (PREPARATION H) 5-14 4 % CREA, Insert into the rectum, Disp: , Rfl:     metoprolol tartrate (LOPRESSOR) 25 mg tablet, Take 25 mg by mouth 2 (two) times a day, Disp: , Rfl:     PROFERRIN-FORTE 12-1 MG TABS, TAKE 1 TABLET BY MOUTH ONCE DAILY, Disp: , Rfl: 3    tamsulosin (FLOMAX) 0 4 mg, Take 0 4 mg by mouth daily at bedtime  , Disp: , Rfl:     traMADol (ULTRAM) 50 mg tablet, 2 tablet 2 times daily with 2 extra Strength Tylenol, Disp: 90 tablet, Rfl: 0    warfarin (COUMADIN) 5 mg tablet, Take 5mg (1 tablet) daily on Monday, Wednesday, and Friday, and 2 5mg (1/2 tablet) on Tuesday, Thursday, Saturday, Sunday  , Disp: 90 tablet, Rfl: 0    potassium chloride (MICRO-K) 10 MEQ CR capsule, Take 1 capsule (10 mEq total) by mouth 2 (two) times a day for 30 days, Disp: 60 capsule, Rfl: 5    Lab Results   Component Value Date     09/16/2015    SODIUM 144 08/14/2019    K 3 3 (L) 08/14/2019     08/14/2019    CO2 27 08/14/2019    ANIONGAP 9 09/16/2015    AGAP 10 08/14/2019    BUN 31 (H) 08/14/2019    CREATININE 2 00 (H) 08/14/2019    GLUC 72 08/14/2019    GLUF 103 (H) 08/13/2019    CALCIUM 8 2 (L) 08/14/2019    AST 18 08/12/2019    ALT 15 08/12/2019    ALKPHOS 35 (L) 08/12/2019    PROT 6 1 (L) 09/16/2015    TP 6 3 (L) 08/12/2019    BILITOT 0 23 09/16/2015    TBILI 0 30 08/12/2019    EGFR 30 08/14/2019     Lab Results   Component Value Date    WBC 7 39 08/14/2019    HGB 10 0 (L) 08/14/2019    HCT 33 4 (L) 08/14/2019    MCV 93 08/14/2019     08/14/2019     Lab Results   Component Value Date    CHOLESTEROL 130 07/16/2019    CHOLESTEROL 162 02/22/2019 CHOLESTEROL 160 10/22/2017     Lab Results   Component Value Date    HDL 47 07/16/2019    HDL 42 02/22/2019    HDL 56 10/22/2017     Lab Results   Component Value Date    LDLCALC 55 07/16/2019    LDLCALC 73 02/22/2019    LDLCALC 83 10/22/2017     Lab Results   Component Value Date    TRIG 140 07/16/2019    TRIG 236 (H) 02/22/2019    TRIG 105 10/22/2017     No results found for: [place], [place]  Lab Results   Component Value Date    GLN6RZONCRJC 0 915 08/12/2019     Lab Results   Component Value Date    PTH 91 4 (H) 11/02/2018    CALCIUM 8 2 (L) 08/14/2019    PHOS 2 6 08/13/2019     No results found for: SPEP, UPEP  No results found for: CRISTOBAL LERMA4HUR        Objective:      /66 (BP Location: Right arm, Patient Position: Sitting, Cuff Size: Large)   Ht 5' 7" (1 702 m)   BMI 49 65 kg/m²          Physical Exam   Constitutional: He is oriented to person, place, and time  He appears well-developed and well-nourished  No distress  HENT:   Head: Normocephalic and atraumatic  Eyes: Conjunctivae are normal    Neck: Neck supple  Cardiovascular: Normal rate and regular rhythm  Pulmonary/Chest: Effort normal and breath sounds normal    Abdominal: Soft  Musculoskeletal: He exhibits edema (+1 edema bilateral lower extremities)  Neurological: He is alert and oriented to person, place, and time  No cranial nerve deficit  Skin: Skin is warm  No rash noted  Psychiatric: He has a normal mood and affect   His behavior is normal

## 2019-09-11 NOTE — ASSESSMENT & PLAN NOTE
Wt Readings from Last 3 Encounters:   08/27/19 (!) 144 kg (317 lb)   08/20/19 (!) 144 kg (317 lb)   08/15/19 (!) 142 kg (312 lb 9 8 oz)     Patient is currently compensated Greg De Los Santos to her risk for decompensation  Continue with Lasix 80 mg twice a day  Continue low-sodium diet

## 2019-09-11 NOTE — ASSESSMENT & PLAN NOTE
Renal function is doing well at this time, however, I am concerned that he may be losing muscle mass which is why his creatinine may be improving which does not reflect a true increase in renal function, but rather stable

## 2019-09-13 ENCOUNTER — OFFICE VISIT (OUTPATIENT)
Dept: UROLOGY | Facility: CLINIC | Age: 79
End: 2019-09-13
Payer: MEDICARE

## 2019-09-13 VITALS — DIASTOLIC BLOOD PRESSURE: 70 MMHG | HEART RATE: 60 BPM | SYSTOLIC BLOOD PRESSURE: 120 MMHG

## 2019-09-13 DIAGNOSIS — R33.9 RETENTION, URINE: ICD-10-CM

## 2019-09-13 DIAGNOSIS — I21.02 ST ELEVATION (STEMI) MYOCARDIAL INFARCTION INVOLVING LEFT ANTERIOR DESCENDING CORONARY ARTERY (HCC): Primary | ICD-10-CM

## 2019-09-13 LAB — POST-VOID RESIDUAL VOLUME, ML POC: 33 ML

## 2019-09-13 PROCEDURE — 99214 OFFICE O/P EST MOD 30 MIN: CPT | Performed by: PHYSICIAN ASSISTANT

## 2019-09-13 PROCEDURE — 51798 US URINE CAPACITY MEASURE: CPT | Performed by: PHYSICIAN ASSISTANT

## 2019-09-13 NOTE — PROGRESS NOTES
9/13/2019      Chief Complaint   Patient presents with    Prostate Cancer    Urinary Retention         Assessment and Plan    78 y o  male managed by Dr Guillermina Chu    1  Prostate cancer  -s/p XRT and intermittent ADT  - PSA risen from 0 5  to 1 8 within past six months  -lupron/prolia administered 5/30/19  -PSA due 12/2019    2  Left-sided hydronephrosis  - Bush catheter placed 08/13/2019  -Bush catheter removed this morning  - did urinate, incontinent into brief during the day today  - PVR 33 mL    US k/b 1 week, PVR 6 weeks, PSA and OV as scheduled 12/5/19        History of Present Illness  Angelic Ivey is a 78 y o  male here for evaluation of advanced prostate cancer initially underwent external beam radiation therapy patient believes circa year 2000  This was followed by intermittent ADT for biochemical recurrence and castrate sensitive disease  Last injections November 2016 and 2017  His PSA has been low, 0 5 (Dec 2017), 0 4 (January 2018), 0 2 (February 2018) since last injection  PSA has risen to 1 8 (May 2019)  Will provide lupron/prolia at today's visit with PSA recheck 6 months  Hopefully he will have good response to Lupron, and we can continue intermittent ADT in the future  Patient did at one point have left-sided hydronephrosis requiring stent, this has since been removed and follow-up ultrasounds were negative  He also has bilateral renal cysts which have also been stable on past imaging  He remains on warfarin anticoagulation following his open heart surgery and embolic stroke in 6379  He is not having any significant issues with urination except some occasional leakage  He has not had any recent hematuria  He denies any weight loss, bony pain, nighttime fevers  He reports no other major medical changes, no hospitalizations >6 months  Patient was hospitalized for generalized weakness lower extremity edema and lower extremity cellulitis and diarrhea    Incidental finding of recurrent left mild/mod hydronephrosis and hydroureter on CT scan  Patient has been on Flomax for years  His renal function is at baseline even at time of identification of the hydronephrosis  Bush catheter was placed 08/13/2019, until void trial today  It is unclear bladder volume that was decompressed, but imaging did not show a significantly distended bladder  I believe the Bush catheter was placed for for hydronephrosis than for true retention  He tolerated this without difficulty, he did not have any infectious symptoms or bleeding during that time period he has no flank pain nor did he in the past     Review of Systems   Constitutional: Negative  Respiratory: Negative  Cardiovascular: Positive for leg swelling  Genitourinary: Negative for decreased urine volume, difficulty urinating, dysuria, flank pain, frequency, hematuria and urgency  Incontinence   Musculoskeletal: Negative  Past Medical History  Past Medical History:   Diagnosis Date    Anemia     Arthritis     Atrial fibrillation (HCC)     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HCC)     B12 deficiency     Back pain     Blind right eye     Cancer [facility] [address] )     prostate     Cardiac disorder     Chronic combined systolic and diastolic heart failure (HCC)     Chronic obstructive lung disease (HCC)     Chronic venous stasis dermatitis of both lower extremities     CKD (chronic kidney disease), stage IV (HCC)     Coronary artery disease     DDD (degenerative disc disease)     DM (diabetes mellitus), type 2 (HCC)     Type II, on insulin    BAER (dyspnea on exertion)     Easy bruising     Epistaxis     Fractures     Gout     Gross hematuria     last assessed: 3/18/2015    Hepatitis     Herpes zoster     Hiatal hernia     History of cellulitis     BLE    History of prostate cancer     Radiation treatments      Hypercholesterolemia     Hyperlipidemia     Hypertension     Ischemic cardiomyopathy     Lung cancer ([facility] [address] ) 09/12/2019    per patient    Lung mass     last assessed: 9/21/2012    MI, old     Morbid obesity due to excess calories (HCC)     or severe obesity    Neuropathy     BLE    Obesity     Obstructive sleep apnea syndrome, severe     Pneumonia     last assessed: 7/24/2012    Shortness of breath     TIA (transient ischemic attack)     Urinary incontinence     Urinary retention     Use of cane as ambulatory aid     Independent with personal care       Past Social History  Past Surgical History:   Procedure Laterality Date    ABDOMINAL SURGERY      CARDIAC SURGERY      CATARACT EXTRACTION, BILATERAL      CORONARY ANGIOPLASTY WITH STENT PLACEMENT      2 stents    CORONARY ARTERY BYPASS GRAFT N/A 7/15/2016    Procedure: Intraop ADRIAN, CABG x 3 using LIMA to LAD, RSVG to OM1 and D1;  Surgeon: Larry Trevino MD;  [facility];  Service:    Labette Quirino      has stents    EYE SURGERY      Bilateral cataract removal    HERNIA REPAIR      umbilical hernia repair    LUNG CANCER SURGERY      Partial upper right lobectomy    LUNG SURGERY Left 2012    OTHER SURGICAL HISTORY      previous stent placement-no anatomy given    PILONIDAL CYST EXCISION      NE CYSTOURETHROSCOPY,URETER CATHETER Left 3/9/2016    Procedure: CYSTOSCOPY WITH left RETROGRADE PYELOGRAM left double J stent removal;  Surgeon: Miriam Be MD;  Location[facility];  Service: Urology    NE TEMPORAL ARTERY LIGATN OR BX Bilateral 10/31/2017    Procedure: BIOPSY ARTERY TEMPORAL;  Surgeon: Austin Orona MD;  [facility];  Service: Vascular    RENAL ARTERY STENT  02/16/2015    transcath intravascular stent placement percutaneous renal    VASCULAR SURGERY       Social History     Tobacco Use   Smoking Status Former Smoker    Packs/day: 2 00    Years: 54 00    Pack years: 108 00    Last attempt to quit: 2004    Years since quitting: 15 7   Smokeless Tobacco Never Used   Tobacco Comment    Pt is a non smoker     Past Family History  Family History   Problem Relation Age of Onset    Diabetes Other     Hypertension Other     Cancer Other     Diabetes Mother     Heart disease Mother     Hypertension Mother     Diabetes type II Mother     Cancer Mother         unknown type    Diabetes Father     Diabetes Maternal Grandmother     Cancer Sister         unknown type    Cancer Other         unknown type     Past Social history  Social History     Socioeconomic History    Marital status: /Civil Union     Spouse name: Not on file    Number of children: Not on file    Years of education: Not on file    Highest education level: Not on file   Occupational History    Not on file   Social Needs    Financial resource strain: Not on file    Food insecurity:     Worry: Not on file     Inability: Not on file    Transportation needs:     Medical: Not on file     Non-medical: Not on file   Tobacco Use    Smoking status: Former Smoker     Packs/day: 2 00     Years: 54 00     Pack years: 108 00     Last attempt to quit: 2004     Years since quitting: 15 7    Smokeless tobacco: Never Used    Tobacco comment: Pt is a non smoker   Substance and Sexual Activity    Alcohol use: Never     Alcohol/week: 0 0 standard drinks     Frequency: Never     Drinks per session: Patient refused     Binge frequency: Never     Comment: 0    Drug use: Never    Sexual activity: Never     Partners: Female     Birth control/protection: Abstinence   Lifestyle    Physical activity:     Days per week: Not on file     Minutes per session: Not on file    Stress: Not on file   Relationships    Social connections:     Talks on phone: Not on file     Gets together: Not on file     Attends [employer] service: Not on file     Active member of club or organization: Not on file     Attends meetings of clubs or organizations: Not on file     Relationship status: Not on file    Intimate partner violence:     Fear of current or ex partner: Not on file Emotionally abused: Not on file     Physically abused: Not on file     Forced sexual activity: Not on file   Other Topics Concern    Not on file   Social History Narrative    No advance directives    No living will    Patient has living will     Current Medications  Current Outpatient Medications   Medication Sig Dispense Refill    acetaminophen (TYLENOL) 500 mg tablet Take 1,000 mg by mouth 2 (two) times a day Administer with tramadol      albuterol (2 5 mg/3 mL) 0 083 % nebulizer solution Take 1 vial (2 5 mg total) by nebulization every 6 (six) hours as needed for wheezing or shortness of breath 100 vial 0    aspirin (ECOTRIN LOW STRENGTH) 81 mg EC tablet Take 81 mg by mouth 2 (two) times a day      atorvastatin (LIPITOR) 40 mg tablet Take 1 tablet (40 mg total) by mouth daily with dinner 30 tablet 0    bisacodyl (BISCOLAX) 10 mg suppository Insert 10 mg into the rectum daily      Cholecalciferol 2000 units TABS Take 1 tablet (2,000 Units total) by mouth daily 30 tablet 2    furosemide (LASIX) 80 mg tablet Take 1 tablet (80 mg total) by mouth 2 (two) times a day 180 tablet 1    gabapentin (NEURONTIN) 300 mg capsule 1 tablet in a m  at breakfast 1 tablet in early afternoon and 2 tablets at bedtime 120 capsule 3    insulin detemir (LEVEMIR) 100 units/mL subcutaneous injection Inject 45 Units under the skin daily at bedtime  0    insulin lispro (HumaLOG) 100 units/mL injection Inject 10 Units under the skin 3 (three) times a day with meals  0    iron polysaccharides (FERREX) 150 mg capsule Take 150 mg by mouth daily      Lidocaine-Glycerin (PREPARATION H) 5-14 4 % CREA Insert into the rectum      metoprolol tartrate (LOPRESSOR) 25 mg tablet Take 25 mg by mouth 2 (two) times a day      PROFERRIN-FORTE 12-1 MG TABS TAKE 1 TABLET BY MOUTH ONCE DAILY  3    tamsulosin (FLOMAX) 0 4 mg Take 0 4 mg by mouth daily at bedtime        traMADol (ULTRAM) 50 mg tablet 2 tablet 2 times daily with 2 extra Strength Tylenol 90 tablet 0    warfarin (COUMADIN) 5 mg tablet Take 5mg (1 tablet) daily on Monday, Wednesday, and Friday, and 2 5mg (1/2 tablet) on Tuesday, Thursday, Saturday, Sunday  90 tablet 0    potassium chloride (MICRO-K) 10 MEQ CR capsule Take 1 capsule (10 mEq total) by mouth 2 (two) times a day for 30 days 60 capsule 5     No current facility-administered medications for this visit  Allergies  Allergies   Allergen Reactions    Other Rash     Adhesive tape         The following portions of the patient's history were reviewed and updated as appropriate: allergies, current medications, past medical history, past social history, past surgical history and problem list       Vitals  Vitals:    09/13/19 1520   BP: 120/70   Pulse: 60       Physical Exam   Constitutional: He is oriented to person, place, and time  He appears well-developed and well-nourished  No distress  Seated in wheelchair, awake, pleasant, conversive   HENT:   Head: Normocephalic and atraumatic  Pulmonary/Chest: Effort normal    Musculoskeletal: He exhibits no edema  Neurological: He is alert and oriented to person, place, and time  Gait normal    Skin: Skin is warm and dry  He is not diaphoretic  Psychiatric: He has a normal mood and affect  His speech is normal and behavior is normal    Nursing note and vitals reviewed          Results  No results found for this or any previous visit (from the past 1 hour(s)) ]  Lab Results   Component Value Date    PSA 1 8 05/21/2019    PSA 0 5 12/13/2018    PSA 0 2 02/14/2018    PSA 0 4 01/15/2018     Lab Results   Component Value Date    GLUCOSE 229 (H) 07/15/2016    CALCIUM 8 2 (L) 08/14/2019     09/16/2015    K 3 3 (L) 08/14/2019    CO2 27 08/14/2019     08/14/2019    BUN 31 (H) 08/14/2019    CREATININE 2 00 (H) 08/14/2019     Lab Results   Component Value Date    WBC 7 39 08/14/2019    HGB 10 0 (L) 08/14/2019    HCT 33 4 (L) 08/14/2019    MCV 93 08/14/2019     08/14/2019 Orders  Orders Placed This Encounter   Procedures    US kidney and bladder with pvr     Standing Status:   Future     Standing Expiration Date:   9/13/2023     Scheduling Instructions:      13 years and Up - 1)  Full bladder required  2)  Please drink 24-32 oz of water 1 hour prior to appointment time  3)  No voiding 1 hour prior to appointment time  "Prep required if being scheduled in conjunction withother studies, refer to those examination's Preps first before scheduling  Patient must drink 24 oz of water 60 minutes before your scheduled appointment time  This test requires you to have a FULL bladder  Please donot urinate 1 hour before your appointment time  Patient is not to urinate until their Ultrasound is completed  Bladder filling is a key part of this study and needs to be full for accurate imaging during this exam             Please bring your insurance cards, a form of photo ID and a list of your medications with you  Arrive 15 minutes prior to your appointment time in order to register              If you need to have lab work or aurinalysis, please do this AFTER your ultrasound  "          POCT Measure PVR

## 2019-09-13 NOTE — PATIENT INSTRUCTIONS
Hydronephrosis   WHAT YOU NEED TO KNOW:   Hydronephrosis is swelling in one or both kidneys caused by urine buildup  Urine normally flows from the kidneys to the bladder through tubes called ureters  A blockage in the ureters can prevent urine from flowing properly  Urine flow may also be prevented or slowed if your kidneys do not work correctly  Urine flows back into your urinary tract  Pressure builds up in the kidney and causes swelling  DISCHARGE INSTRUCTIONS:   Follow up with your healthcare provider or specialist as directed: You may need to be referred to a gynecologist, oncologist, or urologist for more tests and treatment  Write down your questions so you remember to ask them during your visits  Contact your healthcare provider if:   · Your abdomen feels full  · You have a change in how much or how often you urinate  · You urinate more times at night and in larger amounts than during the day  · You have mild lower back pain or pain on one side when you urinate  · You have questions or concerns about your condition or care  Return to the emergency department if:   · You have severe, stabbing back pain  · You have blood in your urine  · You cannot urinate, or you urinate very little  © 2017 [employer]  Information is for End User's use only and may not be sold, redistributed or otherwise used for commercial purposes  All illustrations and images included in CareNotes® are the copyrighted property of [employer] , [employer]  or [employer]ules Lewis  The above information is an  only  It is not intended as medical advice for individual conditions or treatments  Talk to your doctor, nurse or pharmacist before following any medical regimen to see if it is safe and effective for you  Urinary Retention in Men   WHAT YOU NEED TO KNOW:   Urinary retention is a condition that develops when your bladder does not empty completely when you urinate  DISCHARGE INSTRUCTIONS:   Medicines:   · Medicines  can help decrease the size of your prostate, fight infection, and help you urinate more easily  · Take your medicine as directed  Contact your healthcare provider if you think your medicine is not helping or if you have side effects  Tell him or her if you are allergic to any medicine  Keep a list of the medicines, vitamins, and herbs you take  Include the amounts, and when and why you take them  Bring the list or the pill bottles to follow-up visits  Carry your medicine list with you in case of an emergency  Bush catheter care: You may need a Bush catheter for up to 2 weeks at home  Healthcare providers will give you a smaller leg bag to collect urine  Keep the bag below your waist  This will prevent urine from flowing back into your bladder and causing an infection or other problems  Also, keep the tube free of kinks so the urine will drain properly  Do not pull on the catheter  This can cause pain and bleeding, and may cause the catheter to come out  Ask your healthcare provider or urologist for more information on Bush catheter care  Urinate regularly:  When your catheter is removed, do not let your bladder become too full before you urinate  Set regular times each day to urinate  Urinate as soon as you feel the need or at least every 3 hours while you are awake  Do not drink liquids before you go to bed  Urinate right before you go to bed  Follow up with your healthcare provider or urologist as directed:  Write down your questions so you remember to ask them during your visits  Contact your healthcare provider or urologist if:   · You have a fever  · You have pain when you urinate  · You have blood in your urine  · You have problems with your catheter  · You have questions or concerns about your condition or care  Return to the emergency department if:   · You have severe abdominal pain      · You are breathing faster than usual     · Your heartbeat is faster than usual     · Your face, hands, feet, or ankles are swollen  © 2017 [employer] Information is for End User's use only and may not be sold, redistributed or otherwise used for commercial purposes  All illustrations and images included in CareNotes® are the copyrighted property of [employer]  or Jules Lewis  The above information is an  only  It is not intended as medical advice for individual conditions or treatments  Talk to your doctor, nurse or pharmacist before following any medical regimen to see if it is safe and effective for you

## 2019-09-17 DIAGNOSIS — E87.6 HYPOKALEMIA: ICD-10-CM

## 2019-09-17 DIAGNOSIS — M79.605 PAIN IN BOTH LOWER EXTREMITIES: ICD-10-CM

## 2019-09-17 DIAGNOSIS — M79.604 PAIN IN BOTH LOWER EXTREMITIES: ICD-10-CM

## 2019-09-17 RX ORDER — TRAMADOL HYDROCHLORIDE 50 MG/1
TABLET ORAL
Qty: 90 TABLET | Refills: 0 | Status: SHIPPED | OUTPATIENT
Start: 2019-09-17 | End: 2019-10-09 | Stop reason: SDUPTHER

## 2019-09-17 RX ORDER — POTASSIUM CHLORIDE 750 MG/1
10 CAPSULE, EXTENDED RELEASE ORAL 2 TIMES DAILY
Qty: 60 CAPSULE | Refills: 5 | Status: SHIPPED | OUTPATIENT
Start: 2019-09-17 | End: 2019-10-16 | Stop reason: SDUPTHER

## 2019-09-17 NOTE — TELEPHONE ENCOUNTER
Patient was given a prescription for 90 tramadol refill the  web site was accessed and the there were no red flags

## 2019-09-18 ENCOUNTER — HOSPITAL ENCOUNTER (OUTPATIENT)
Dept: ULTRASOUND IMAGING | Facility: HOSPITAL | Age: 79
Discharge: HOME | End: 2019-09-18
Payer: MEDICARE

## 2019-09-18 ENCOUNTER — TELEPHONE (OUTPATIENT)
Dept: UROLOGY | Facility: CLINIC | Age: 79
End: 2019-09-18

## 2019-09-18 DIAGNOSIS — R33.9 RETENTION, URINE: ICD-10-CM

## 2019-09-18 PROCEDURE — 51798 US URINE CAPACITY MEASURE: CPT

## 2019-09-18 NOTE — TELEPHONE ENCOUNTER
Received phone call from Kacy at [facility] reporting patient's imaging shows significant findings  Please review

## 2019-09-19 ENCOUNTER — ANTICOAG VISIT (OUTPATIENT)
Dept: FAMILY MEDICINE CLINIC | Facility: CLINIC | Age: 79
End: 2019-09-19

## 2019-09-19 ENCOUNTER — TELEPHONE (OUTPATIENT)
Dept: FAMILY MEDICINE CLINIC | Facility: CLINIC | Age: 79
End: 2019-09-19

## 2019-09-19 LAB — INR PPP: 1.7 (ref 0.84–1.19)

## 2019-09-19 NOTE — TELEPHONE ENCOUNTER
Called Pt with results - Also called [address] [place] [address] with message - Recorded results

## 2019-09-19 NOTE — TELEPHONE ENCOUNTER
His prot time today is  20 7 and an inr 1 7  He is on 5 mg Monday, Wednesday and Friday and 2 5mg the rest  He will be seeing him next Monday and Thursday

## 2019-09-19 NOTE — TELEPHONE ENCOUNTER
Change the Thursday dose to 5 mg instead of 2 5 mg otherwise everything stays the same and I would not check another INR for at least 1 week

## 2019-09-26 ENCOUNTER — ANTICOAG VISIT (OUTPATIENT)
Dept: FAMILY MEDICINE CLINIC | Facility: CLINIC | Age: 79
End: 2019-09-26

## 2019-09-26 ENCOUNTER — TELEPHONE (OUTPATIENT)
Dept: FAMILY MEDICINE CLINIC | Facility: CLINIC | Age: 79
End: 2019-09-26

## 2019-09-26 LAB — INR PPP: 2.1 (ref 0.84–1.19)

## 2019-09-26 NOTE — TELEPHONE ENCOUNTER
Today's results - INR  2 1    Coumadin - 2 5 mg Sun/Tues/Sat - 5 mg daily rest of wk    Called Pt with any change - HC will be in home x 2 next week if you need him

## 2019-10-02 ENCOUNTER — TELEPHONE (OUTPATIENT)
Dept: FAMILY MEDICINE CLINIC | Facility: CLINIC | Age: 79
End: 2019-10-02

## 2019-10-02 DIAGNOSIS — N30.00 ACUTE CYSTITIS WITHOUT HEMATURIA: Primary | ICD-10-CM

## 2019-10-02 RX ORDER — LEVOFLOXACIN 500 MG/1
500 TABLET, FILM COATED ORAL EVERY 24 HOURS
Qty: 5 TABLET | Refills: 0 | Status: SHIPPED | OUTPATIENT
Start: 2019-10-02 | End: 2019-10-07

## 2019-10-02 NOTE — TELEPHONE ENCOUNTER
Got a call from [facility] due to potential drug interaction with the coumadin and Levaquin  Gave the ok,  His inr on 9/26 was 2 10    Did you want nohemi from [facility] to get an inr?

## 2019-10-03 NOTE — TELEPHONE ENCOUNTER
Called Brenda with [facility] - [phone] - Pt has not yet started the Levaquin yet - Brenda will recheck his INR on Monday & call office with results - Would this be okay?

## 2019-10-07 ENCOUNTER — TELEPHONE (OUTPATIENT)
Dept: FAMILY MEDICINE CLINIC | Facility: CLINIC | Age: 79
End: 2019-10-07

## 2019-10-07 NOTE — TELEPHONE ENCOUNTER
INR - 2 2 -    Coumadin 5mg Mon/Wed/Thur/Fri,   2 5mg daily rest of wk    Urine situation seems to have resolved    Will be back in home Thurs if you would like another INR F/U

## 2019-10-08 LAB — INR PPP: 2.2 (ref 0.84–1.19)

## 2019-10-09 ENCOUNTER — ANTICOAG VISIT (OUTPATIENT)
Dept: FAMILY MEDICINE CLINIC | Facility: CLINIC | Age: 79
End: 2019-10-09

## 2019-10-09 DIAGNOSIS — M79.605 PAIN IN BOTH LOWER EXTREMITIES: ICD-10-CM

## 2019-10-09 DIAGNOSIS — M79.604 PAIN IN BOTH LOWER EXTREMITIES: ICD-10-CM

## 2019-10-09 RX ORDER — TRAMADOL HYDROCHLORIDE 50 MG/1
TABLET ORAL
Qty: 90 TABLET | Refills: 0 | Status: SHIPPED | OUTPATIENT
Start: 2019-10-09 | End: 2019-10-30 | Stop reason: SDUPTHER

## 2019-10-16 DIAGNOSIS — E87.6 HYPOKALEMIA: ICD-10-CM

## 2019-10-16 RX ORDER — POTASSIUM CHLORIDE 750 MG/1
10 CAPSULE, EXTENDED RELEASE ORAL 2 TIMES DAILY
Qty: 60 CAPSULE | Refills: 5 | Status: SHIPPED | OUTPATIENT
Start: 2019-10-16 | End: 2019-10-21

## 2019-10-21 ENCOUNTER — TELEPHONE (OUTPATIENT)
Dept: FAMILY MEDICINE CLINIC | Facility: CLINIC | Age: 79
End: 2019-10-21

## 2019-10-21 DIAGNOSIS — G61.82 MULTIFOCAL MOTOR NEUROPATHY (HCC): ICD-10-CM

## 2019-10-21 DIAGNOSIS — E87.6 HYPOKALEMIA: Primary | ICD-10-CM

## 2019-10-21 DIAGNOSIS — E87.6 HYPOKALEMIA: ICD-10-CM

## 2019-10-21 RX ORDER — POTASSIUM CHLORIDE 750 MG/1
10 CAPSULE, EXTENDED RELEASE ORAL 2 TIMES DAILY
Qty: 60 CAPSULE | Refills: 5 | Status: SHIPPED | OUTPATIENT
Start: 2019-10-21 | End: 2019-11-04 | Stop reason: SDUPTHER

## 2019-10-21 RX ORDER — POTASSIUM CHLORIDE 750 MG/1
10 TABLET, EXTENDED RELEASE ORAL 2 TIMES DAILY
Qty: 180 TABLET | Refills: 1 | Status: SHIPPED | OUTPATIENT
Start: 2019-10-21

## 2019-10-21 RX ORDER — GABAPENTIN 300 MG/1
CAPSULE ORAL
Qty: 150 CAPSULE | Refills: 3 | Status: SHIPPED | OUTPATIENT
Start: 2019-10-21 | End: 2020-01-31 | Stop reason: SDUPTHER

## 2019-10-21 NOTE — TELEPHONE ENCOUNTER
Home care called stating that patient needs the tablets of the potassium and not the capsule they are to expensive please change and send to [facility]

## 2019-10-21 NOTE — TELEPHONE ENCOUNTER
He wanted to see if you wanted to see him sooner, ( has an appt nov 3rd) the tramadol is not helping him, he gets pain in his toes at night and it is a 10    He does take the 2 tramadol twicea day

## 2019-10-24 ENCOUNTER — OFFICE VISIT (OUTPATIENT)
Dept: CARDIOLOGY CLINIC | Facility: HOSPITAL | Age: 79
End: 2019-10-24
Payer: MEDICARE

## 2019-10-24 VITALS
WEIGHT: 312.61 LBS | HEIGHT: 67 IN | SYSTOLIC BLOOD PRESSURE: 130 MMHG | DIASTOLIC BLOOD PRESSURE: 76 MMHG | BODY MASS INDEX: 49.07 KG/M2

## 2019-10-24 DIAGNOSIS — I50.32 CHRONIC DIASTOLIC (CONGESTIVE) HEART FAILURE (HCC): ICD-10-CM

## 2019-10-24 DIAGNOSIS — I48.0 PAROXYSMAL ATRIAL FIBRILLATION (HCC): Primary | ICD-10-CM

## 2019-10-24 DIAGNOSIS — I45.10 RBBB: ICD-10-CM

## 2019-10-24 DIAGNOSIS — R60.0 BILATERAL LEG EDEMA: ICD-10-CM

## 2019-10-24 DIAGNOSIS — N18.4 HYPERTENSIVE HEART AND KIDNEY DISEASE WITH ACUTE ON CHRONIC COMBINED SYSTOLIC AND DIASTOLIC CONGESTIVE HEART FAILURE AND STAGE 4 CHRONIC KIDNEY DISEASE (HCC): ICD-10-CM

## 2019-10-24 DIAGNOSIS — I13.0 HYPERTENSIVE HEART AND KIDNEY DISEASE WITH ACUTE ON CHRONIC COMBINED SYSTOLIC AND DIASTOLIC CONGESTIVE HEART FAILURE AND STAGE 4 CHRONIC KIDNEY DISEASE (HCC): ICD-10-CM

## 2019-10-24 DIAGNOSIS — I50.43 HYPERTENSIVE HEART AND KIDNEY DISEASE WITH ACUTE ON CHRONIC COMBINED SYSTOLIC AND DIASTOLIC CONGESTIVE HEART FAILURE AND STAGE 4 CHRONIC KIDNEY DISEASE (HCC): ICD-10-CM

## 2019-10-24 PROCEDURE — 99214 OFFICE O/P EST MOD 30 MIN: CPT | Performed by: INTERNAL MEDICINE

## 2019-10-24 PROCEDURE — 93000 ELECTROCARDIOGRAM COMPLETE: CPT | Performed by: INTERNAL MEDICINE

## 2019-10-24 NOTE — PROGRESS NOTES
Cardiology Follow Up    Ariela Dominguez  1940  [id number]  [address] [facility] [place]  [address] [place]  [place] [address]  [phone]    1  Paroxysmal atrial fibrillation (HCC)  POCT ECG   2  Chronic diastolic (congestive) heart failure ([facility] )     3  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 stage 4 chronic kidney disease ([address] )     4  Bilateral leg edema     5  RBBB  POCT ECG         Discussion/Summary: I will decrease his Metoprolol to 12 5 mg BID  I advised him to call if he gets any dizziness, syncope, near syncope  No cardiac testing is ordered  RTO 1 year  Interval History: He has not had any significant cardiac problems since his last OV  His CHF is fairly well controlled with Lasix 80 mg BID  BP is controlled  EF is 55% by last ECHO  ECG today shows SR, RBBB   HR 62 BPM     Patient Active Problem List   Diagnosis    [facility]    History of prostate cancer    Type 2 diabetes mellitus with hyperglycemia ([facility] )    S/P CABG x 3    CKD (chronic kidney disease) stage 4, GFR 15-29 ml/min ([facility])    Body mass index (BMI) of 40 0 to 49 9    Wound of right lower extremity    Wound of left lower extremity    Hyperphosphatemia    Vitamin D deficiency    Renal cyst    Atrial fibrillation ([address] )    Blind right eye    Coronary artery disease    Obstructive sleep apnea syndrome, severe    Vision loss, 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    Hypokalemia    Anemia    Bilateral leg edema    Prostate cancer (HCC)    Severe obstructive sleep apnea-hypopnea syndrome    Malignant neoplasm of upper lobe of right lung (HCC)    Influenza A    Bacteriuria    Pneumonia of right lower lobe due to infectious organism ([facility] )    Hypoglycemia associated with diabetes ([facility] )    Elevated INR    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 stage 4 chronic kidney disease (HCC)    Obstructive chronic bronchitis without exacerbation (HCC)    Secondary hyperparathyroidism of renal origin ([facility] )    Hematuria    Closed nondisplaced fracture of left patella with routine healing    Cellulitis of extremity    Hydronephrosis    Chronic respiratory failure with hypoxia (HCC)    Localized edema    Urinary retention    Chronic diastolic (congestive) heart failure (HCC)    RBBB     Past Medical History:   Diagnosis Date    Anemia     Arthritis     Atherosclerotic heart disease of native coronary artery without angina pectoris     Atrial fibrillation (HCC)     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HCC)     B12 deficiency     Back pain     Benign prostatic hyperplasia without urinary obstruction     Blind right eye     Cancer [facility] )     prostate     Cardiac disorder     CHF (congestive heart failure) (HCC)     Chronic combined systolic and diastolic heart failure (HCC)     Chronic kidney disease, stage 3 (HCC)     Chronic obstructive lung disease (HCC)     Chronic venous stasis dermatitis of both lower extremities     CKD (chronic kidney disease), stage IV (HCC)     Coronary artery disease     DDD (degenerative disc disease)     DM (diabetes mellitus), type 2 (HCC)     Type II, on insulin    BAER (dyspnea on exertion)     Easy bruising     Edema     Epistaxis     Essential hypertension     Fractures     Gout     Gross hematuria     last assessed: 3/18/2015    Hepatitis     Herpes zoster     Hiatal hernia     History of cellulitis     BLE    History of prostate cancer     Radiation treatments      Hypercholesterolemia     Hyperlipidemia     Hypertension     Iron deficiency anemia     Ischemic cardiomyopathy     Long term (current) use of insulin (HCC)     Lung cancer ([facility] ) 09/12/2019    per patient    Lung cancer [facility])     s/p lobectomy and chemo Tx     Lung mass     last assessed: 9/21/2012    MI, old     Morbid obesity due to excess calories (HCC)     or severe obesity    Neuropathy     BLE    Obesity     Obstructive sleep apnea syndrome, severe     Pneumonia     last assessed: 7/24/2012    Prostate cancer ([facility] )     No active treatment currently, sp Rad Tx 42     Shortness of breath     TIA (transient ischemic attack)     Ulcer of lower extremity (HCC)     Urinary incontinence     Urinary retention     Use of cane as ambulatory aid     Independent with personal care       Social History     Socioeconomic History    Marital status: /Civil Union     Spouse name: Not on file    Number of children: Not on file    Years of education: Not on file    Highest education level: Not on file   Occupational History    Occupation:   - [employer]    Social Needs    Financial resource strain: Not on file    Food insecurity:     Worry: Not on file     Inability: Not on file   PEAK Surgical needs:     Medical: Not on file     Non-medical: Not on file   Tobacco Use    Smoking status: Former Smoker     Packs/day: 2 00     Years: 54 00     Pack years: 108 00     Last attempt to quit: 2004     Years since quitting: 15 8    Smokeless tobacco: Never Used    Tobacco comment: Pt is a non smoker   Substance and Sexual Activity    Alcohol use: Never     Alcohol/week: 0 0 standard drinks     Frequency: Never     Drinks per session: Patient refused     Binge frequency: Never     Comment: 0    Drug use: Never    Sexual activity: Never     Partners: Female     Birth control/protection: Abstinence   Lifestyle    Physical activity:     Days per week: Not on file     Minutes per session: Not on file    Stress: Not on file   Relationships    Social connections:     Talks on phone: Not on file     Gets together: Not on file     Attends [employer] service: Not on file     Active member of club or organization: Not on file     Attends meetings of clubs or organizations: Not on file     Relationship status: Not on file    Intimate partner violence:     Fear of current or ex partner: Not on file     Emotionally abused: Not on file     Physically abused: Not on file     Forced sexual activity: Not on file   Other Topics Concern    Not on file   Social History Narrative    No advance directives    No living will    Patient has living will        Consumes on average -510 cups of regular coffee per day       Family History   Problem Relation Age of Onset    Diabetes Other     Hypertension Other     Cancer Other     Diabetes Mother     Heart disease Mother     Hypertension Mother     Diabetes type II Mother     Cancer Mother         unknown type    Diabetes Father     Diabetes type II Father     Diabetes Maternal Grandmother     Cancer Sister         unknown type    Cancer Other         unknown type     Past Surgical History:   Procedure Laterality Date    ABDOMINAL SURGERY      CARDIAC SURGERY      CATARACT EXTRACTION, BILATERAL      CORONARY ANGIOPLASTY WITH STENT PLACEMENT      2 stents    CORONARY ARTERY BYPASS GRAFT N/A 7/15/2016    Procedure: Intraop ADRIAN, CABG x 3 using LIMA to LAD, RSVG to OM1 and D1;  Surgeon: Jone Hampton MD;  [facility];  Service:    Sam Alejandro      has stents    EYE SURGERY      Bilateral cataract removal    HERNIA REPAIR      umbilical hernia repair    LUNG CANCER SURGERY      Partial upper right lobectomy    LUNG SURGERY Left 2012    OTHER SURGICAL HISTORY      previous stent placement-no anatomy given    PILONIDAL CYST EXCISION      VT CYSTOURETHROSCOPY,URETER CATHETER Left 3/9/2016    Procedure: CYSTOSCOPY WITH left RETROGRADE PYELOGRAM left double J stent removal;  Surgeon: Iva Morgan MD;  Location: [facility];  Service: Urology    VT TEMPORAL ARTERY LIGATN OR BX Bilateral 10/31/2017    Procedure: BIOPSY ARTERY TEMPORAL;  Surgeon: Tommie Tesfaye MD;  [facility]: [facility]; Service: Vascular    RENAL ARTERY STENT  02/16/2015    transcath intravascular stent placement percutaneous renal    VASCULAR SURGERY         Current Outpatient Medications:     acetaminophen (TYLENOL) 500 mg tablet, Take 1,000 mg by mouth 2 (two) times a day Administer with tramadol, Disp: , Rfl:     albuterol (2 5 mg/3 mL) 0 083 % nebulizer solution, Take 1 vial (2 5 mg total) by nebulization every 6 (six) hours as needed for wheezing or shortness of breath, Disp: 100 vial, Rfl: 0    aspirin (ECOTRIN LOW STRENGTH) 81 mg EC tablet, Take 81 mg by mouth 2 (two) times a day, Disp: , Rfl:     atorvastatin (LIPITOR) 40 mg tablet, Take 1 tablet (40 mg total) by mouth daily with dinner, Disp: 30 tablet, Rfl: 0    bisacodyl (BISCOLAX) 10 mg suppository, Insert 10 mg into the rectum daily, Disp: , Rfl:     Cholecalciferol 2000 units TABS, Take 1 tablet (2,000 Units total) by mouth daily, Disp: 30 tablet, Rfl: 2    furosemide (LASIX) 80 mg tablet, Take 1 tablet (80 mg total) by mouth 2 (two) times a day, Disp: 180 tablet, Rfl: 1    gabapentin (NEURONTIN) 300 mg capsule, 1 tablet in a m  at breakfast 1 tablet in early afternoon and 3 tablets at bedtime, Disp: 150 capsule, Rfl: 3    insulin detemir (LEVEMIR) 100 units/mL subcutaneous injection, Inject 45 Units under the skin daily at bedtime, Disp: , Rfl: 0    insulin lispro (HumaLOG) 100 units/mL injection, Inject 10 Units under the skin 3 (three) times a day with meals, Disp: , Rfl: 0    iron polysaccharides (FERREX) 150 mg capsule, Take 150 mg by mouth daily, Disp: , Rfl:     Lidocaine-Glycerin (PREPARATION H) 5-14 4 % CREA, Insert into the rectum, Disp: , Rfl:     metoprolol tartrate (LOPRESSOR) 25 mg tablet, Take 25 mg by mouth 2 (two) times a day, Disp: , Rfl:     potassium chloride (K-DUR,KLOR-CON) 10 mEq tablet, Take 1 tablet (10 mEq total) by mouth 2 (two) times a day, Disp: 180 tablet, Rfl: 1    potassium chloride (MICRO-K) 10 MEQ CR capsule, Take 1 capsule (10 mEq total) by mouth 2 (two) times a day, Disp: 60 capsule, Rfl: 5    PROFERRIN-FORTE 12-1 MG TABS, TAKE 1 TABLET BY MOUTH ONCE DAILY, Disp: , Rfl: 3    tamsulosin (FLOMAX) 0 4 mg, Take 0 4 mg by mouth daily at bedtime  , Disp: , Rfl:     traMADol (ULTRAM) 50 mg tablet, 2 tablet 2 times daily with 2 extra Strength Tylenol, Disp: 90 tablet, Rfl: 0    warfarin (COUMADIN) 5 mg tablet, Take 5mg (1 tablet) daily on Monday, Wednesday, and Friday, and 2 5mg (1/2 tablet) on Tuesday, Thursday, Saturday, Sunday  , Disp: 90 tablet, Rfl: 0  Allergies   Allergen Reactions    Other Rash     Adhesive tape     Vitals:    10/24/19 0857   BP: 130/76   BP Location: Left arm   Patient Position: Sitting   Cuff Size: Standard   Weight: (!) 142 kg (312 lb 9 8 oz)   Height: 5' 7" (1 702 m)     Weight (last 2 days)     Date/Time   Weight    10/24/19 0857   (!) 142 (312 61)             Blood pressure 130/76, height 5' 7" (1 702 m), weight (!) 142 kg (312 lb 9 8 oz)  , Body mass index is 48 96 kg/m²  Labs:   Anticoag visit on 10/09/2019   Component Date Value    INR 10/08/2019 2 20*   Anticoag visit on 09/26/2019   Component Date Value    INR 09/26/2019 2 10*   Anticoag visit on 09/19/2019   Component Date Value    INR 09/19/2019 1 70*   Office Visit on 09/13/2019   Component Date Value    POST-VOID RESIDUAL VOLUM* 09/13/2019 33    Anticoag visit on 09/03/2019   Component Date Value    INR 09/03/2019 1 70*   Anticoag visit on 08/28/2019   Component Date Value    INR 08/28/2019 2 40*   Anticoag visit on 08/19/2019   Component Date Value    INR 08/19/2019 2 40*   Admission on 08/12/2019, Discharged on 08/15/2019   Component Date Value    WBC 08/12/2019 6 17     RBC 08/12/2019 3 77*    Hemoglobin 08/12/2019 10 7*    Hematocrit 08/12/2019 35 4*    MCV 08/12/2019 94     MCH 08/12/2019 28 4     MCHC 08/12/2019 30 2*    RDW 08/12/2019 15 7*    MPV 08/12/2019 9 4     Platelets 67/85/2441 271     nRBC 08/12/2019 0  Neutrophils Relative 08/12/2019 76*    Immat GRANS % 08/12/2019 1     Lymphocytes Relative 08/12/2019 12*    Monocytes Relative 08/12/2019 8     Eosinophils Relative 08/12/2019 2     Basophils Relative 08/12/2019 1     Neutrophils Absolute 08/12/2019 4 77     Immature Grans Absolute 08/12/2019 0 03     Lymphocytes Absolute 08/12/2019 0 71     Monocytes Absolute 08/12/2019 0 51     Eosinophils Absolute 08/12/2019 0 11     Basophils Absolute 08/12/2019 0 04     Sodium 08/12/2019 144     Potassium 08/12/2019 4 5     Chloride 08/12/2019 108     CO2 08/12/2019 28     ANION GAP 08/12/2019 8     BUN 08/12/2019 41*    Creatinine 08/12/2019 2 22*    Glucose 08/12/2019 112     Calcium 08/12/2019 8 1*    AST 08/12/2019 18     ALT 08/12/2019 15     Alkaline Phosphatase 08/12/2019 35*    Total Protein 08/12/2019 6 3*    Albumin 08/12/2019 2 8*    Total Bilirubin 08/12/2019 0 30     eGFR 08/12/2019 27     Troponin I 08/12/2019 0 07*    Extra Tube 08/12/2019 Hold for add-ons   Extra Tube 08/12/2019 Hold for add-ons   Blood Culture 08/12/2019 No Growth After 5 Days   Blood Culture 08/12/2019 No Growth After 5 Days       TSH 3RD GENERATON 08/12/2019 0 915     NT-proBNP 08/12/2019 7,378*    Protime 08/12/2019 23 7*    INR 08/12/2019 2 09*    Urine Culture 08/12/2019 30,000-39,000 cfu/ml      Color, UA 08/12/2019 Yellow     Clarity, UA 08/12/2019 Clear     Specific Gravity, UA 08/12/2019 1 010     pH, UA 08/12/2019 7 5     Leukocytes, UA 08/12/2019 Negative     Nitrite, UA 08/12/2019 Negative     Protein, UA 08/12/2019 Trace*    Glucose, UA 08/12/2019 Negative     Ketones, UA 08/12/2019 Negative     Urobilinogen, UA 08/12/2019 0 2     Bilirubin, UA 08/12/2019 Negative     Blood, UA 08/12/2019 Negative     RBC, UA 08/12/2019 None Seen     WBC, UA 08/12/2019 0-1*    Epithelial Cells 08/12/2019 Occasional     Bacteria, UA 08/12/2019 Occasional     POC Glucose 08/12/2019 130     MRSA Culture Only 08/12/2019 No Methicillin Resistant Staphlyococcus aureus (MRSA) isolated     POC Glucose 08/12/2019 162*    Sodium 08/13/2019 141     Potassium 08/13/2019 3 7     Chloride 08/13/2019 106     CO2 08/13/2019 26     ANION GAP 08/13/2019 9     BUN 08/13/2019 36*    Creatinine 08/13/2019 2 10*    Glucose 08/13/2019 103     Glucose, Fasting 08/13/2019 103*    Calcium 08/13/2019 8 1*    eGFR 08/13/2019 29     Magnesium 08/13/2019 2 1     Phosphorus 08/13/2019 2 6     WBC 08/13/2019 8 08     RBC 08/13/2019 3 68*    Hemoglobin 08/13/2019 10 4*    Hematocrit 08/13/2019 34 5*    MCV 08/13/2019 94     MCH 08/13/2019 28 3     MCHC 08/13/2019 30 1*    RDW 08/13/2019 15 5*    Platelets 96/53/4844 286     MPV 08/13/2019 9 8     POC Glucose 08/13/2019 129     Ventricular Rate 08/12/2019 73     Atrial Rate 08/12/2019 300     QRSD Interval 08/12/2019 126     QT Interval 08/12/2019 420     QTC Interval 08/12/2019 462     P Axis 08/12/2019 79     QRS [place] 08/12/2019 -71     T Wave Axis 08/12/2019 77     POC Glucose 08/13/2019 84     POC Glucose 08/13/2019 192*    POC Glucose 08/13/2019 195*    WBC 08/14/2019 7 39     RBC 08/14/2019 3 60*    Hemoglobin 08/14/2019 10 0*    Hematocrit 08/14/2019 33 4*    MCV 08/14/2019 93     MCH 08/14/2019 27 8     MCHC 08/14/2019 29 9*    RDW 08/14/2019 15 6*    MPV 08/14/2019 9 7     Platelets 06/03/0296 271     nRBC 08/14/2019 0     Neutrophils Relative 08/14/2019 73     Immat GRANS % 08/14/2019 0     Lymphocytes Relative 08/14/2019 13*    Monocytes Relative 08/14/2019 9     Eosinophils Relative 08/14/2019 4     Basophils Relative 08/14/2019 1     Neutrophils Absolute 08/14/2019 5 46     Immature Grans Absolute 08/14/2019 0 02     Lymphocytes Absolute 08/14/2019 0 93     Monocytes Absolute 08/14/2019 0 65     Eosinophils Absolute 08/14/2019 0 29     Basophils Absolute 08/14/2019 0 04     Sodium 08/14/2019 144     Potassium 08/14/2019 3 3*    Chloride 08/14/2019 107     CO2 08/14/2019 27     ANION GAP 08/14/2019 10     BUN 08/14/2019 31*    Creatinine 08/14/2019 2 00*    Glucose 08/14/2019 72     Calcium 08/14/2019 8 2*    eGFR 08/14/2019 30     Protime 08/14/2019 28 4*    INR 08/14/2019 2 63*    POC Glucose 08/14/2019 37*    POC Glucose 08/14/2019 39*    POC Glucose 08/14/2019 81     POC Glucose 08/14/2019 114     POC Glucose 08/14/2019 148*    POC Glucose 08/14/2019 185*    POC Glucose 08/15/2019 84    Admission on 07/18/2019, Discharged on 08/07/2019   Component Date Value    POC Glucose 07/18/2019 167*    POC Glucose 07/19/2019 107     POC Glucose 07/19/2019 73     POC Glucose 07/20/2019 85     POC Glucose 07/20/2019 114     POC Glucose 07/21/2019 51*    POC Glucose 07/21/2019 52*    POC Glucose 07/21/2019 65     POC Glucose 07/21/2019 97     Protime 07/22/2019 20 0*    INR 07/22/2019 1 69*    Sodium 07/22/2019 144     Potassium 07/22/2019 4 3     Chloride 07/22/2019 107     CO2 07/22/2019 31     ANION GAP 07/22/2019 6     BUN 07/22/2019 52*    Creatinine 07/22/2019 2 19*    Glucose 07/22/2019 75     Calcium 07/22/2019 7 9*    eGFR 07/22/2019 27     POC Glucose 07/22/2019 76     POC Glucose 07/22/2019 119     POC Glucose 07/23/2019 168*    POC Glucose 07/24/2019 99     POC Glucose 07/24/2019 89     POC Glucose 07/25/2019 76     Protime 07/30/2019 31 2*    INR 07/30/2019 2 96*    Protime 08/06/2019 24 0*    INR 08/06/2019 2 12*    Sodium 08/06/2019 141     Potassium 08/06/2019 4 8     Chloride 08/06/2019 105     CO2 08/06/2019 31     ANION GAP 08/06/2019 5     BUN 08/06/2019 64*    Creatinine 08/06/2019 2 53*    Glucose 08/06/2019 173*    Calcium 08/06/2019 7 9*    eGFR 08/06/2019 23     WBC 08/06/2019 5 32     RBC 08/06/2019 3 49*    Hemoglobin 08/06/2019 10 0*    Hematocrit 08/06/2019 33 3*    MCV 08/06/2019 95     MCH 08/06/2019 28 7     MCHC 08/06/2019 30 0*    RDW 08/06/2019 15 2*    Platelets 28/13/7495 280     MPV 08/06/2019 9 8    Admission on 07/15/2019, Discharged on 07/18/2019   Component Date Value    WBC 07/15/2019 7 65     RBC 07/15/2019 4 01     Hemoglobin 07/15/2019 11 6*    Hematocrit 07/15/2019 37 9     MCV 07/15/2019 95     MCH 07/15/2019 28 9     MCHC 07/15/2019 30 6*    RDW 07/15/2019 15 7*    MPV 07/15/2019 10 1     Platelets 45/57/2828 250     nRBC 07/15/2019 0     Neutrophils Relative 07/15/2019 76*    Immat GRANS % 07/15/2019 1     Lymphocytes Relative 07/15/2019 10*    Monocytes Relative 07/15/2019 10     Eosinophils Relative 07/15/2019 2     Basophils Relative 07/15/2019 1     Neutrophils Absolute 07/15/2019 5 85     Immature Grans Absolute 07/15/2019 0 04     Lymphocytes Absolute 07/15/2019 0 79     Monocytes Absolute 07/15/2019 0 74     Eosinophils Absolute 07/15/2019 0 18     Basophils Absolute 07/15/2019 0 05     Protime 07/15/2019 18 7*    INR 07/15/2019 1 55*    PTT 07/15/2019 39*    Sodium 07/15/2019 138     Potassium 07/15/2019 4 9     Chloride 07/15/2019 103     CO2 07/15/2019 28     ANION GAP 07/15/2019 7     BUN 07/15/2019 37*    Creatinine 07/15/2019 2 39*    Glucose 07/15/2019 208*    Calcium 07/15/2019 8 4     AST 07/15/2019 18     ALT 07/15/2019 19     Alkaline Phosphatase 07/15/2019 35*    Total Protein 07/15/2019 6 2*    Albumin 07/15/2019 2 9*    Total Bilirubin 07/15/2019 0 40     eGFR 07/15/2019 25     Color, UA 07/15/2019 Yellow     Clarity, UA 07/15/2019 Clear     Specific Gravity, UA 07/15/2019 1 010     pH, UA 07/15/2019 6 5     Leukocytes, UA 07/15/2019 Negative     Nitrite, UA 07/15/2019 Negative     Protein, UA 07/15/2019 Trace*    Glucose, UA 07/15/2019 Negative     Ketones, UA 07/15/2019 Negative     Urobilinogen, UA 07/15/2019 0 2     Bilirubin, UA 07/15/2019 Negative     Blood, UA 07/15/2019 Trace-Intact*    Troponin I 07/15/2019 0 07*    Troponin I 07/15/2019 0 06*    POC Glucose 07/15/2019 282*    Troponin I 07/16/2019 0 08*    RBC, UA 07/15/2019 2-4*    WBC, UA 07/15/2019 2-4*    Epithelial Cells 07/15/2019 Occasional     Bacteria, UA 07/15/2019 Occasional     POC Glucose 07/15/2019 213*    Sodium 07/16/2019 142     Potassium 07/16/2019 3 9     Chloride 07/16/2019 105     CO2 07/16/2019 30     ANION GAP 07/16/2019 7     BUN 07/16/2019 35*    Creatinine 07/16/2019 2 06*    Glucose 07/16/2019 98     Calcium 07/16/2019 8 5     AST 07/16/2019 17     ALT 07/16/2019 17     Alkaline Phosphatase 07/16/2019 30*    Total Protein 07/16/2019 6 2*    Albumin 07/16/2019 2 7*    Total Bilirubin 07/16/2019 0 40     eGFR 07/16/2019 29     Magnesium 07/16/2019 2 1     Phosphorus 07/16/2019 3 3     WBC 07/16/2019 5 78     RBC 07/16/2019 3 91     Hemoglobin 07/16/2019 11 3*    Hematocrit 07/16/2019 37 4     MCV 07/16/2019 96     MCH 07/16/2019 28 9     MCHC 07/16/2019 30 2*    RDW 07/16/2019 15 8*    MPV 07/16/2019 9 9     Platelets 36/13/0774 233     nRBC 07/16/2019 0     Neutrophils Relative 07/16/2019 74     Immat GRANS % 07/16/2019 0     Lymphocytes Relative 07/16/2019 10*    Monocytes Relative 07/16/2019 11     Eosinophils Relative 07/16/2019 4     Basophils Relative 07/16/2019 1     Neutrophils Absolute 07/16/2019 4 28     Immature Grans Absolute 07/16/2019 0 02     Lymphocytes Absolute 07/16/2019 0 60     Monocytes Absolute 07/16/2019 0 61     Eosinophils Absolute 07/16/2019 0 22     Basophils Absolute 07/16/2019 0 05     Hemoglobin A1C 07/16/2019 6 3     EAG 07/16/2019 134     Cholesterol 07/16/2019 130     Triglycerides 07/16/2019 140     HDL, Direct 07/16/2019 47     LDL Calculated 07/16/2019 55     TSH 3RD GENERATON 07/16/2019 0 888     Ventricular Rate 07/15/2019 72     Atrial Rate 07/15/2019 72     [place] Interval 07/15/2019 170     QRSD Interval 07/15/2019 162     QT Interval 07/15/2019 452     QTC Interval 07/15/2019 494     P Axis 07/15/2019 -5     QRS [place] 07/15/2019 129     T Wave Axis 07/15/2019 -13     POC Glucose 07/16/2019 194*    POC Glucose 07/16/2019 166*    WBC 07/17/2019 5 11     RBC 07/17/2019 3 55*    Hemoglobin 07/17/2019 10 3*    Hematocrit 07/17/2019 34 5*    MCV 07/17/2019 97     MCH 07/17/2019 29 0     MCHC 07/17/2019 29 9*    RDW 07/17/2019 15 8*    MPV 07/17/2019 11 2     Platelets 00/36/4845 198     nRBC 07/17/2019 0     Neutrophils Relative 07/17/2019 66     Immat GRANS % 07/17/2019 0     Lymphocytes Relative 07/17/2019 17     Monocytes Relative 07/17/2019 11     Eosinophils Relative 07/17/2019 5     Basophils Relative 07/17/2019 1     Neutrophils Absolute 07/17/2019 3 36     Immature Grans Absolute 07/17/2019 0 02     Lymphocytes Absolute 07/17/2019 0 86     Monocytes Absolute 07/17/2019 0 56     Eosinophils Absolute 07/17/2019 0 25     Basophils Absolute 07/17/2019 0 06     Sodium 07/17/2019 141     Potassium 07/17/2019 3 8     Chloride 07/17/2019 106     CO2 07/17/2019 28     ANION GAP 07/17/2019 7     BUN 07/17/2019 36*    Creatinine 07/17/2019 2 05*    Glucose 07/17/2019 110     Calcium 07/17/2019 8 0*    eGFR 07/17/2019 30     Protime 07/17/2019 15 7*    INR 07/17/2019 1 24*    POC Glucose 07/17/2019 84     POC Glucose 07/17/2019 161*    POC Glucose 07/17/2019 164*    POC Glucose 07/17/2019 197*    Sodium 07/18/2019 142     Potassium 07/18/2019 3 9     Chloride 07/18/2019 107     CO2 07/18/2019 27     ANION GAP 07/18/2019 8     BUN 07/18/2019 40*    Creatinine 07/18/2019 2 12*    Glucose 07/18/2019 108     Calcium 07/18/2019 7 7*    eGFR 07/18/2019 28     WBC 07/18/2019 5 00     RBC 07/18/2019 3 45*    Hemoglobin 07/18/2019 10 1*    Hematocrit 07/18/2019 33 3*    MCV 07/18/2019 97     MCH 07/18/2019 29 3     MCHC 07/18/2019 30 3*    RDW 07/18/2019 15 6*    Platelets 58/62/3399 228     MPV 07/18/2019 9 8     Magnesium 07/18/2019 2 0     POC Glucose 07/18/2019 100     POC Glucose 07/18/2019 141*   There may be more visits with results that are not included  Imaging: No results found  Review of Systems:  Review of Systems   Constitutional: Negative for diaphoresis, fatigue, fever and unexpected weight change  HENT: Negative  Respiratory: Positive for shortness of breath  Negative for cough and wheezing  Cardiovascular: Positive for leg swelling  Negative for chest pain and palpitations  Gastrointestinal: Negative for abdominal pain, diarrhea and nausea  Musculoskeletal: Negative for gait problem and myalgias  Skin: Negative for rash  Neurological: Negative for dizziness and numbness  Psychiatric/Behavioral: Negative  Physical Exam:  Physical Exam   Constitutional: He is oriented to person, place, and time  He appears well-developed and well-nourished  HENT:   Head: Normocephalic and atraumatic  Eyes: Pupils are equal, round, and reactive to light  Neck: Normal range of motion  Neck supple  No JVD present  Cardiovascular: Regular rhythm, S1 normal, S2 normal and normal pulses  Pulses:       Carotid pulses are 2+ on the right side, and 2+ on the left side  Pulmonary/Chest: Effort normal and breath sounds normal  He has no wheezes  He has no rales  Abdominal: Soft  Bowel sounds are normal  There is no tenderness  Musculoskeletal: Normal range of motion  He exhibits edema  He exhibits no tenderness  Neurological: He is alert and oriented to person, place, and time  He has normal reflexes  No cranial nerve deficit  Skin: Skin is warm  Psychiatric: He has a normal mood and affect

## 2019-10-25 ENCOUNTER — DOCUMENTATION (OUTPATIENT)
Dept: CARDIOLOGY CLINIC | Facility: HOSPITAL | Age: 79
End: 2019-10-25

## 2019-10-25 NOTE — PROGRESS NOTES
Received phone call from [place], [address] [facility], [address] [facility] re: patient  She informs that his HR is 38-42, BP is 100/60, skin is cool to touch, but dry  Today he was seeing things-a hand coming down from above and sleeping a lot per his wife  D/W Bea Sage PA-C pt to stop metoprolol  Wife informs that Dr Dex Olivo has told him to stop it yesterday @ [facility]  I S/W pt  He stated that he does not feel bad and he does not want to come to the ER  Instructed her to call 911 if  his condition worsens and he is agreeable to come to the ER

## 2019-10-28 NOTE — PROGRESS NOTES
I S/W Nigel Sarah' wife and she said that he is still "not right yet " The [facility] came on Saturday and his HR was better, but could not remember the #, will be coming again tomorrow AM  I asked for the nurse to call while they are there and she said that she would tell him to do so  She said he definitely stopped the metoprolol  She does not think that he is up to coming in for HM this week  Will keep you informed

## 2019-10-30 ENCOUNTER — TELEPHONE (OUTPATIENT)
Dept: FAMILY MEDICINE CLINIC | Facility: CLINIC | Age: 79
End: 2019-10-30

## 2019-10-30 DIAGNOSIS — M79.604 PAIN IN BOTH LOWER EXTREMITIES: ICD-10-CM

## 2019-10-30 DIAGNOSIS — M79.605 PAIN IN BOTH LOWER EXTREMITIES: ICD-10-CM

## 2019-10-30 RX ORDER — TRAMADOL HYDROCHLORIDE 50 MG/1
TABLET ORAL
Qty: 90 TABLET | Refills: 0 | Status: CANCELLED | OUTPATIENT
Start: 2019-10-30

## 2019-10-30 RX ORDER — TRAMADOL HYDROCHLORIDE 50 MG/1
TABLET ORAL
Qty: 90 TABLET | Refills: 0 | Status: SHIPPED | OUTPATIENT
Start: 2019-10-30 | End: 2019-11-20 | Stop reason: SDUPTHER

## 2019-11-01 ENCOUNTER — ANTICOAG VISIT (OUTPATIENT)
Dept: FAMILY MEDICINE CLINIC | Facility: CLINIC | Age: 79
End: 2019-11-01

## 2019-11-01 ENCOUNTER — TELEPHONE (OUTPATIENT)
Dept: FAMILY MEDICINE CLINIC | Facility: CLINIC | Age: 79
End: 2019-11-01

## 2019-11-01 LAB — INR PPP: 1.7 (ref 0.84–1.19)

## 2019-11-01 NOTE — TELEPHONE ENCOUNTER
So he takes 2 5 Tuesday Saturday Sunday 5 mg the rest of the week just switch on 1 of the 2 5 mg days to a 5 mg day so he will only take 2 5 mg twice a week instead of 3 times a week

## 2019-11-01 NOTE — TELEPHONE ENCOUNTER
Today's INR 1 7    Coumadin:  2 5 mg Tues/Sat/Sun -- 5 mg daily rest of wk    Please call Miguel Angel Rivera with next INR & any new orders

## 2019-11-04 ENCOUNTER — OFFICE VISIT (OUTPATIENT)
Dept: FAMILY MEDICINE CLINIC | Facility: CLINIC | Age: 79
End: 2019-11-04
Payer: MEDICARE

## 2019-11-04 VITALS
WEIGHT: 306 LBS | HEIGHT: 67 IN | DIASTOLIC BLOOD PRESSURE: 52 MMHG | BODY MASS INDEX: 48.03 KG/M2 | SYSTOLIC BLOOD PRESSURE: 114 MMHG

## 2019-11-04 DIAGNOSIS — E11.65 TYPE 2 DIABETES MELLITUS WITH HYPERGLYCEMIA, WITH LONG-TERM CURRENT USE OF INSULIN (HCC): Primary | ICD-10-CM

## 2019-11-04 DIAGNOSIS — Z79.4 TYPE 2 DIABETES MELLITUS WITH HYPERGLYCEMIA, WITH LONG-TERM CURRENT USE OF INSULIN (HCC): Primary | ICD-10-CM

## 2019-11-04 PROCEDURE — 99213 OFFICE O/P EST LOW 20 MIN: CPT | Performed by: FAMILY MEDICINE

## 2019-11-04 NOTE — PROGRESS NOTES
Assessment/Plan:    Problem List Items Addressed This Visit        Endocrine    Type 2 diabetes mellitus with hyperglycemia ([facility] ) - Primary           Diagnoses and all orders for this visit:    Type 2 diabetes mellitus with hyperglycemia, with long-term current use of insulin ([facility] )        No problem-specific Assessment & Plan notes found for this encounter  Subjective:      Patient ID: José Luis Cabrera is a 78 y o  male  Follow-up visit on Mr Dyer S blood sugars ir sometimes low sometimes high mostly though a low patient needs hemoglobin A1c and chemistry panel to assess his diabetic control diet is fairly good on he has pain in the left shoulder going back to a fall he had last July x-rays and CT did not show any fracture of the shoulder but he does have limited ability to abduct the shoulder due to pain      The following portions of the patient's history were reviewed and updated as appropriate:   He has a past medical history of Anemia, Arthritis, Atherosclerotic heart disease of native coronary artery without angina pectoris, Atrial fibrillation ([address] ), 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[address] ), B12 deficiency, Back pain, Benign prostatic hyperplasia without urinary obstruction, Blind right eye, Cancer ([address] ), Cardiac disorder, CHF (congestive heart failure) ([address] ), Chronic combined systolic and diastolic heart failure ([address] ), Chronic kidney disease, stage 3 ([address] ), Chronic obstructive lung disease ([address] ), Chronic venous stasis dermatitis of both lower extremities, CKD (chronic kidney disease), stage IV ([address] ), Coronary artery disease, DDD (degenerative disc disease), DM (diabetes mellitus), type 2 ([address] ), BAER (dyspnea on exertion), Easy bruising, Edema, Epistaxis, Essential hypertension, Fractures, Gout, Gross hematuria, Hepatitis, Herpes zoster, Hiatal hernia, History of cellulitis, History of prostate cancer, Hypercholesterolemia, Hyperlipidemia, Hypertension, Iron deficiency anemia, Ischemic cardiomyopathy, Long Medication: Tramadol    Date of last refill: 11/30/2016 for 60 tabs  [place]    Last office visit: 9/27/2016  Due back to clinic per last office note:  RTC in 6 months  Date next office visit scheduled:  None schedule term (current) use of insulin ([facility] ), Lung cancer ([facility] ) (09/12/2019), Lung cancer ([facility] ), Lung mass, MI, old, Morbid obesity due to excess calories ([facility] ), Neuropathy, Obesity, Obstructive sleep apnea syndrome, severe, Pneumonia, Prostate cancer ([facility] ), Shortness of breath, TIA (transient ischemic attack), Ulcer of lower extremity ([facility] ), Urinary incontinence, Urinary retention, and Use of cane as ambulatory aid ,  does not have any pertinent problems on file  ,   has a past surgical history that includes Coronary angioplasty with stent; Cystoscopy; Lung cancer surgery; Cataract extraction, bilateral; Eye surgery; Hernia repair; Vascular surgery; Cardiac surgery; Abdominal surgery; Coronary artery bypass graft (N/A, 7/15/2016); pr cystourethroscopy,ureter catheter (Left, 3/9/2016); PILONIDAL CYST EXCISION; pr temporal artery ligatn or bx (Bilateral, 10/31/2017); Lung surgery (Left, 2012); Other surgical history; and Renal artery stent (02/16/2015)  ,  family history includes Cancer in his mother, other, other, and sister; Diabetes in his father, maternal grandmother, mother, and other; Diabetes type II in his father and mother; Heart disease in his mother; Hypertension in his mother and other  ,   reports that he quit smoking about 15 years ago  He has a 108 00 pack-year smoking history  He has never used smokeless tobacco  He reports that he does not drink alcohol or use drugs  ,  is allergic to other     Current Outpatient Medications   Medication Sig Dispense Refill    acetaminophen (TYLENOL) 500 mg tablet Take 1,000 mg by mouth 2 (two) times a day Administer with tramadol      albuterol (2 5 mg/3 mL) 0 083 % nebulizer solution Take 1 vial (2 5 mg total) by nebulization every 6 (six) hours as needed for wheezing or shortness of breath 100 vial 0    aspirin (ECOTRIN LOW STRENGTH) 81 mg EC tablet Take 81 mg by mouth 2 (two) times a day      atorvastatin (LIPITOR) 40 mg tablet Take 1 tablet (40 mg total) by mouth daily with dinner 30 tablet 0    bisacodyl (BISCOLAX) 10 mg suppository Insert 10 mg into the rectum daily      Cholecalciferol 2000 units TABS Take 1 tablet (2,000 Units total) by mouth daily 30 tablet 2    furosemide (LASIX) 80 mg tablet Take 1 tablet (80 mg total) by mouth 2 (two) times a day 180 tablet 1    gabapentin (NEURONTIN) 300 mg capsule 1 tablet in a m  at breakfast 1 tablet in early afternoon and 3 tablets at bedtime 150 capsule 3    insulin detemir (LEVEMIR) 100 units/mL subcutaneous injection Inject 45 Units under the skin daily at bedtime  0    insulin lispro (HumaLOG) 100 units/mL injection Inject 10 Units under the skin 3 (three) times a day with meals  0    iron polysaccharides (FERREX) 150 mg capsule Take 150 mg by mouth daily      Lidocaine-Glycerin (PREPARATION H) 5-14 4 % CREA Insert into the rectum      metoprolol tartrate (LOPRESSOR) 25 mg tablet Take 25 mg by mouth 2 (two) times a day      potassium chloride (K-DUR,KLOR-CON) 10 mEq tablet Take 1 tablet (10 mEq total) by mouth 2 (two) times a day 180 tablet 1    PROFERRIN-FORTE 12-1 MG TABS TAKE 1 TABLET BY MOUTH ONCE DAILY  3    tamsulosin (FLOMAX) 0 4 mg Take 0 4 mg by mouth daily at bedtime        traMADol (ULTRAM) 50 mg tablet 2 tablet 2 times daily with 2 extra Strength Tylenol 90 tablet 0    warfarin (COUMADIN) 5 mg tablet Take 5mg (1 tablet) daily on Monday, Wednesday, and Friday, and 2 5mg (1/2 tablet) on Tuesday, Thursday, Saturday, Sunday  90 tablet 0     No current facility-administered medications for this visit  Review of Systems   Constitutional: Negative for activity change, appetite change, diaphoresis, fatigue and fever  HENT: Negative  Eyes: Negative  Respiratory: Negative for apnea, cough, chest tightness, shortness of breath and wheezing  Cardiovascular: Negative for chest pain, palpitations and leg swelling     Gastrointestinal: Negative for abdominal distention, abdominal pain, anal bleeding, constipation, diarrhea, nausea and vomiting  Endocrine: Negative for cold intolerance, heat intolerance, polydipsia, polyphagia and polyuria  Genitourinary: Negative for difficulty urinating, dysuria, flank pain, hematuria and urgency  Musculoskeletal: Negative for arthralgias, back pain, gait problem, joint swelling and myalgias  Left shoulder pain   Skin: Negative for color change, rash and wound  Allergic/Immunologic: Negative for environmental allergies, food allergies and immunocompromised state  Neurological: Negative for dizziness, seizures, syncope, speech difficulty, numbness and headaches  Hematological: Negative for adenopathy  Does not bruise/bleed easily  Psychiatric/Behavioral: Negative for agitation, behavioral problems, hallucinations, sleep disturbance and suicidal ideas  Objective:  Vitals:    11/04/19 0913   BP: 114/52   BP Location: Left arm   Patient Position: Sitting   Cuff Size: Standard   Weight: (!) 139 kg (306 lb)   Height: 5' 7" (1 702 m)     Body mass index is 47 93 kg/m²  Physical Exam   Constitutional: He is oriented to person, place, and time  He appears well-developed and well-nourished  No distress  HENT:   Head: Normocephalic  Right Ear: External ear normal    Left Ear: External ear normal    Nose: Nose normal    Mouth/Throat: Oropharynx is clear and moist    Eyes: Pupils are equal, round, and reactive to light  Conjunctivae and EOM are normal  Right eye exhibits no discharge  Left eye exhibits no discharge  No scleral icterus  Neck: Normal range of motion  No tracheal deviation present  No thyromegaly present  Cardiovascular: Normal rate, regular rhythm and normal heart sounds  Exam reveals no gallop and no friction rub  Pulses are no weak pulses  No murmur heard  Pulses:       Dorsalis pedis pulses are 2+ on the right side, and 2+ on the left side  Posterior tibial pulses are 2+ on the right side, and 2+ on the left side  Pulmonary/Chest: Effort normal and breath sounds normal  No respiratory distress  He has no wheezes  Abdominal: Soft  Bowel sounds are normal  He exhibits no mass  There is no tenderness  There is no guarding  Musculoskeletal: He exhibits no edema or deformity  Limited range of motion left shoulder inability to abduct beyond 70°   Feet:   Right Foot:   Skin Integrity: Negative for ulcer, skin breakdown, erythema, warmth, callus or dry skin  Left Foot:   Skin Integrity: Negative for ulcer, skin breakdown, erythema, warmth, callus or dry skin  Lymphadenopathy:     He has no cervical adenopathy  Neurological: He is alert and oriented to person, place, and time  No cranial nerve deficit  Skin: Skin is warm and dry  No rash noted  He is not diaphoretic  No erythema  Psychiatric: He has a normal mood and affect  Thought content normal      Patient's shoes and socks removed  Right Foot/Ankle   Right Foot Inspection  Skin Exam: skin normal and skin intact no dry skin, no warmth, no callus, no erythema, no maceration, no abnormal color, no pre-ulcer, no ulcer and no callus                          Toe Exam: ROM and strength within normal limits  Sensory   Vibration: intact  Proprioception: intact   Monofilament testing: intact  Vascular  Capillary refills: < 3 seconds  The right DP pulse is 2+  The right PT pulse is 2+  Left Foot/Ankle  Left Foot Inspection  Skin Exam: skin normal and skin intactno dry skin, no warmth, no erythema, no maceration, normal color, no pre-ulcer, no ulcer and no callus                         Toe Exam: ROM and strength within normal limits                   Sensory   Vibration: intact  Proprioception: intact  Monofilament: intact  Vascular  Capillary refills: < 3 seconds  The left DP pulse is 2+  The left PT pulse is 2+  Assign Risk Category:  No deformity present; No loss of protective sensation;  No weak pulses       Risk: 0

## 2019-11-05 ENCOUNTER — TELEPHONE (OUTPATIENT)
Dept: FAMILY MEDICINE CLINIC | Facility: CLINIC | Age: 79
End: 2019-11-05

## 2019-11-05 ENCOUNTER — APPOINTMENT (OUTPATIENT)
Dept: LAB | Facility: MEDICAL CENTER | Age: 79
End: 2019-11-05
Payer: MEDICARE

## 2019-11-05 LAB
EST. AVERAGE GLUCOSE BLD GHB EST-MCNC: 131 MG/DL
HBA1C MFR BLD: 6.2 % (ref 4.2–6.3)

## 2019-11-05 PROCEDURE — 83036 HEMOGLOBIN GLYCOSYLATED A1C: CPT | Performed by: FAMILY MEDICINE

## 2019-11-05 PROCEDURE — 36415 COLL VENOUS BLD VENIPUNCTURE: CPT | Performed by: FAMILY MEDICINE

## 2019-11-05 NOTE — TELEPHONE ENCOUNTER
Lanita Cooks from [facility] was in to draw labs on the patient but he is extremely hard to get blood from, he was able to get everything but the BMP  They are asking if we can send in a mobile lab in get the rest of the labs needed  Please advise

## 2019-11-08 ENCOUNTER — TELEPHONE (OUTPATIENT)
Dept: FAMILY MEDICINE CLINIC | Facility: CLINIC | Age: 79
End: 2019-11-08

## 2019-11-08 DIAGNOSIS — Z92.29 HISTORY OF COUMADIN THERAPY: Primary | ICD-10-CM

## 2019-11-08 DIAGNOSIS — I48.0 PAROXYSMAL ATRIAL FIBRILLATION (HCC): ICD-10-CM

## 2019-11-08 DIAGNOSIS — I50.32 CHRONIC DIASTOLIC (CONGESTIVE) HEART FAILURE (HCC): ICD-10-CM

## 2019-11-08 NOTE — TELEPHONE ENCOUNTER
He is unable to draw his labs  due to poor veins and inr can not be obtained due to the cost of strips  Needs to be set up with mobile labs    Faxed to [place]

## 2019-11-12 ENCOUNTER — CLINICAL SUPPORT (OUTPATIENT)
Dept: UROLOGY | Facility: CLINIC | Age: 79
End: 2019-11-12
Payer: MEDICARE

## 2019-11-12 VITALS — HEART RATE: 76 BPM | DIASTOLIC BLOOD PRESSURE: 70 MMHG | SYSTOLIC BLOOD PRESSURE: 130 MMHG | RESPIRATION RATE: 20 BRPM

## 2019-11-12 DIAGNOSIS — C61 PROSTATE CANCER (HCC): Primary | ICD-10-CM

## 2019-11-12 DIAGNOSIS — E61.1 IRON DEFICIENCY: Primary | ICD-10-CM

## 2019-11-12 DIAGNOSIS — R33.9 URINARY RETENTION: ICD-10-CM

## 2019-11-12 LAB — POST-VOID RESIDUAL VOLUME, ML POC: 39 ML

## 2019-11-12 PROCEDURE — 51798 US URINE CAPACITY MEASURE: CPT | Performed by: UROLOGY

## 2019-11-12 RX ORDER — IRON POLYSACCHARIDE COMPLEX 150 MG
150 CAPSULE ORAL DAILY
Qty: 30 CAPSULE | Refills: 5 | Status: SHIPPED | OUTPATIENT
Start: 2019-11-12

## 2019-11-12 NOTE — PROGRESS NOTES
Meme Carranza is a 78 y o  male  Chief Complaint     Urinary Retention; PVR        Vitals:    11/12/19 1036   BP: 130/70   Pulse: 76   Resp: 20     Patient returns to office for 6 week post void residual    Recent Results (from the past 1 hour(s))   POCT Measure PVR    Collection Time: 11/12/19 10:55 AM   Result Value Ref Range    POST-VOID RESIDUAL VOLUME, ML POC 39 mL     Patient to return as scheduled on November 5 2019 for prostate cancer follow up  Patient to obtain PSA prior to visit

## 2019-11-14 LAB — INR PPP: 2.66 (ref 0.84–1.19)

## 2019-11-15 ENCOUNTER — ANTICOAG VISIT (OUTPATIENT)
Dept: FAMILY MEDICINE CLINIC | Facility: CLINIC | Age: 79
End: 2019-11-15

## 2019-11-15 NOTE — RESULT ENCOUNTER NOTE
Call patient to notify normal results labs are stable kidney function is reduced but stable INR is good repeat labs in 1 month

## 2019-11-20 DIAGNOSIS — M79.605 PAIN IN BOTH LOWER EXTREMITIES: ICD-10-CM

## 2019-11-20 DIAGNOSIS — M79.604 PAIN IN BOTH LOWER EXTREMITIES: ICD-10-CM

## 2019-11-20 RX ORDER — TRAMADOL HYDROCHLORIDE 50 MG/1
TABLET ORAL
Qty: 90 TABLET | Refills: 0 | Status: SHIPPED | OUTPATIENT
Start: 2019-11-20 | End: 2019-12-11 | Stop reason: SDUPTHER

## 2019-11-26 ENCOUNTER — OFFICE VISIT (OUTPATIENT)
Dept: OBGYN CLINIC | Facility: CLINIC | Age: 79
End: 2019-11-26
Payer: MEDICARE

## 2019-11-26 ENCOUNTER — APPOINTMENT (OUTPATIENT)
Dept: RADIOLOGY | Facility: MEDICAL CENTER | Age: 79
End: 2019-11-26
Payer: MEDICARE

## 2019-11-26 VITALS
BODY MASS INDEX: 48.03 KG/M2 | HEIGHT: 67 IN | HEART RATE: 80 BPM | DIASTOLIC BLOOD PRESSURE: 71 MMHG | SYSTOLIC BLOOD PRESSURE: 105 MMHG | WEIGHT: 306 LBS

## 2019-11-26 DIAGNOSIS — S82.092D CLOSED SLEEVE FRACTURE OF LEFT PATELLA WITH ROUTINE HEALING, SUBSEQUENT ENCOUNTER: ICD-10-CM

## 2019-11-26 DIAGNOSIS — M75.42 IMPINGEMENT SYNDROME OF LEFT SHOULDER: ICD-10-CM

## 2019-11-26 DIAGNOSIS — M25.512 CHRONIC LEFT SHOULDER PAIN: ICD-10-CM

## 2019-11-26 DIAGNOSIS — G89.29 CHRONIC LEFT SHOULDER PAIN: ICD-10-CM

## 2019-11-26 DIAGNOSIS — S82.092D CLOSED SLEEVE FRACTURE OF LEFT PATELLA WITH ROUTINE HEALING, SUBSEQUENT ENCOUNTER: Primary | ICD-10-CM

## 2019-11-26 PROCEDURE — 20610 DRAIN/INJ JOINT/BURSA W/O US: CPT | Performed by: PHYSICIAN ASSISTANT

## 2019-11-26 PROCEDURE — 99213 OFFICE O/P EST LOW 20 MIN: CPT | Performed by: PHYSICIAN ASSISTANT

## 2019-11-26 PROCEDURE — 73060 X-RAY EXAM OF HUMERUS: CPT

## 2019-11-26 PROCEDURE — 73562 X-RAY EXAM OF KNEE 3: CPT

## 2019-11-26 RX ORDER — LIDOCAINE HYDROCHLORIDE 10 MG/ML
2 INJECTION, SOLUTION INFILTRATION; PERINEURAL
Status: COMPLETED | OUTPATIENT
Start: 2019-11-26 | End: 2019-11-26

## 2019-11-26 RX ORDER — BETAMETHASONE SODIUM PHOSPHATE AND BETAMETHASONE ACETATE 3; 3 MG/ML; MG/ML
6 INJECTION, SUSPENSION INTRA-ARTICULAR; INTRALESIONAL; INTRAMUSCULAR; SOFT TISSUE
Status: COMPLETED | OUTPATIENT
Start: 2019-11-26 | End: 2019-11-26

## 2019-11-26 RX ADMIN — BETAMETHASONE SODIUM PHOSPHATE AND BETAMETHASONE ACETATE 6 MG: 3; 3 INJECTION, SUSPENSION INTRA-ARTICULAR; INTRALESIONAL; INTRAMUSCULAR; SOFT TISSUE at 10:43

## 2019-11-26 RX ADMIN — LIDOCAINE HYDROCHLORIDE 2 ML: 10 INJECTION, SOLUTION INFILTRATION; PERINEURAL at 10:43

## 2019-11-26 NOTE — PATIENT INSTRUCTIONS
Patient elected cortisone injection which provided in the office today  He is ice the shoulder 20 minutes 3 times today and use Tylenol as needed  We will re-evaluate the shoulder in 8 weeks to note his progress  Patient will continue working on his physical therapy home exercises that he was due to do on his own  He has also shown finger walks up the wall while facing the wall and perpendicular to the wall also broom stick handle lift to work on range of motion of the shoulder  Again he does not want any surgical intervention thus questioning if there is no improvement whether MRI would be evaluated  Patient was also instructed to follow-up with his PCP and likely a general surgeon for the subcutaneous mass in his left abdomen

## 2019-11-26 NOTE — PROGRESS NOTES
78 y o male presents for four-month follow-up of left patella fracture closed, treated non operatively with injury date 07/04/2019  Patient denies any pain about his left knee  He does note that he has some pain in his left shoulder radiates down his arm slightly  He states he had remote injury to his left shoulder many years ago at work  He notes that when he fell in July fell on this left side  He occasionally gets some discomfort in the shoulder at night  Denies any numbness or tingling  He is right-hand dominant  Notes some difficulty lifting the arm  He had therapy at home  He is diabetic and is on insulin  He has had cortisone injections in the past without incident  Patient does not want any surgery      Review of Systems  Review of systems negative unless otherwise specified in HPI    Past Medical History  Past Medical History:   Diagnosis Date    Anemia     Arthritis     Atherosclerotic heart disease of native coronary artery without angina pectoris     Atrial fibrillation (HCC)     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 )     B12 deficiency     Back pain     Benign prostatic hyperplasia without urinary obstruction     Blind right eye     Cancer ([facility] )     prostate     Cardiac disorder     CHF (congestive heart failure) (HCC)     Chronic combined systolic and diastolic heart failure (HCC)     Chronic kidney disease, stage 3 (HCC)     Chronic obstructive lung disease (HCC)     Chronic venous stasis dermatitis of both lower extremities     CKD (chronic kidney disease), stage IV (HCC)     Coronary artery disease     DDD (degenerative disc disease)     DM (diabetes mellitus), type 2 (HCC)     Type II, on insulin    BAER (dyspnea on exertion)     Easy bruising     Edema     Epistaxis     Essential hypertension     Fractures     Gout     Gross hematuria     last assessed: 3/18/2015    Hepatitis     Herpes zoster     Hiatal hernia     History of cellulitis     BLE    History of prostate cancer     Radiation treatments   Hypercholesterolemia     Hyperlipidemia     Hypertension     Iron deficiency anemia     Ischemic cardiomyopathy     Long term (current) use of insulin (HCC)     Lung cancer ([facility] ) 09/12/2019    per patient    Lung cancer [facility])     s/p lobectomy and chemo Tx     Lung mass     last assessed: 9/21/2012    MI, old     Morbid obesity due to excess calories ([facility] )     or severe obesity    Neuropathy     BLE    Obesity     Obstructive sleep apnea syndrome, severe     Pneumonia     last assessed: 7/24/2012    [facility] )     No active treatment currently, sp Rad Tx 42     Shortness of breath     TIA (transient ischemic attack)     Ulcer of lower extremity (HCC)     Urinary incontinence     Urinary retention     Use of cane as ambulatory aid     Independent with personal care       Past Surgical History  Past Surgical History:   Procedure Laterality Date    ABDOMINAL SURGERY      CARDIAC SURGERY      CATARACT EXTRACTION, BILATERAL      CORONARY ANGIOPLASTY WITH STENT PLACEMENT      2 stents    CORONARY ARTERY BYPASS GRAFT N/A 7/15/2016    Procedure: Intraop ADRIAN, CABG x 3 using LIMA to LAD, RSVG to OM1 and D1;  Surgeon: Terrell Woodard MD;  [facility];  Service:    Juan Manuel Russell      has stents    EYE SURGERY      Bilateral cataract removal    HERNIA REPAIR      umbilical hernia repair    LUNG CANCER SURGERY      Partial upper right lobectomy    LUNG SURGERY Left 2012    OTHER SURGICAL HISTORY      previous stent placement-no anatomy given    PILONIDAL CYST EXCISION      WY CYSTOURETHROSCOPY,URETER CATHETER Left 3/9/2016    Procedure: CYSTOSCOPY WITH left RETROGRADE PYELOGRAM left double J stent removal;  Surgeon: Roman Abrams MD;  [facility];  Service: Urology    [facility] OR BX Bilateral 10/31/2017    Procedure: BIOPSY ARTERY TEMPORAL;  Surgeon: Khalida Damian MD;  [facility];  Service: Vascular    RENAL ARTERY STENT  02/16/2015    transcath intravascular stent placement percutaneous renal    VASCULAR SURGERY       Current Medications  Current Outpatient Medications on File Prior to Visit   Medication Sig Dispense Refill    acetaminophen (TYLENOL) 500 mg tablet Take 1,000 mg by mouth 2 (two) times a day Administer with tramadol      albuterol (2 5 mg/3 mL) 0 083 % nebulizer solution Take 1 vial (2 5 mg total) by nebulization every 6 (six) hours as needed for wheezing or shortness of breath 100 vial 0    aspirin (ECOTRIN LOW STRENGTH) 81 mg EC tablet Take 81 mg by mouth 2 (two) times a day      atorvastatin (LIPITOR) 40 mg tablet Take 1 tablet (40 mg total) by mouth daily with dinner 30 tablet 0    bisacodyl (BISCOLAX) 10 mg suppository Insert 10 mg into the rectum daily      Cholecalciferol 2000 units TABS Take 1 tablet (2,000 Units total) by mouth daily 30 tablet 2    furosemide (LASIX) 80 mg tablet Take 1 tablet (80 mg total) by mouth 2 (two) times a day 180 tablet 1    gabapentin (NEURONTIN) 300 mg capsule 1 tablet in a m  at breakfast 1 tablet in early afternoon and 3 tablets at bedtime 150 capsule 3    insulin detemir (LEVEMIR) 100 units/mL subcutaneous injection Inject 45 Units under the skin daily at bedtime  0    insulin lispro (HumaLOG) 100 units/mL injection Inject 10 Units under the skin 3 (three) times a day with meals  0    iron polysaccharides (FERREX) 150 mg capsule Take 1 capsule (150 mg total) by mouth daily 30 capsule 5    Lidocaine-Glycerin (PREPARATION H) 5-14 4 % CREA Insert into the rectum      potassium chloride (K-DUR,KLOR-CON) 10 mEq tablet Take 1 tablet (10 mEq total) by mouth 2 (two) times a day 180 tablet 1    PROFERRIN-FORTE 12-1 MG TABS TAKE 1 TABLET BY MOUTH ONCE DAILY  3    tamsulosin (FLOMAX) 0 4 mg Take 0 4 mg by mouth daily at bedtime        traMADol (ULTRAM) 50 mg tablet 2 tablet 2 times daily with 2 extra Strength Tylenol 90 tablet 0    warfarin (COUMADIN) 5 mg tablet Take 5mg (1 tablet) daily on Monday, Wednesday, and Friday, and 2 5mg (1/2 tablet) on Tuesday, Thursday, Saturday, Sunday  90 tablet 0    metoprolol tartrate (LOPRESSOR) 25 mg tablet Take 25 mg by mouth 2 (two) times a day       No current facility-administered medications on file prior to visit  Recent Labs [facility])  0   Lab Value Date/Time    HCT 33 4 (L) 08/14/2019 0443    HCT 39 5 09/16/2015 1137    HGB 10 0 (L) 08/14/2019 0443    HGB 13 0 09/16/2015 1137    WBC 7 39 08/14/2019 0443    WBC 6 14 09/16/2015 1137    INR 2 66 (A) 11/14/2019    INR 1 00 04/21/2015 1408    ESR 18 (H) 10/24/2017 0650    CRP 8 4 (H) 10/24/2017 0650    GLUCOSE 229 (H) 07/15/2016 1845    GLUCOSE 256 (H) 09/16/2015 1137    HGBA1C 6 2 11/05/2019 1326    HGBA1C 9 6 (H) 02/14/2015 0539     Physical exam  · General: Awake, Alert, Oriented  · Eyes: Pupils equal, round and reactive to light  · Heart: regular rate and rhythm  · Lungs: No audible wheezing  · Abdomen: soft  left Knee exam  · No tenderness with palpation about the patella  Patient can actively extend the knee fully with good strength without pain  There is no effusion no ecchymosis no deformity  Left shoulder tenderness with palpation lateral to the acromion  Minimal discomfort at the [facility] joint  Patient has some weakness with supraspinatus empty can testing  His forward flexion is approximately 90°  Abduction approximately 90°  There is tightness with passive external rotation with the shoulder abducted, which is to approximately 70° of external rotation  There is positive impingement sign  Minimal weakness with resisted external rotation with the elbow at the side  He is grossly neurovascular intact to the left upper extremity  Left elbow is without effusion, deformity, swelling, erythema or warmth  He has full range of motion the elbow grossly      Patient also has a firm mass in the subcutaneous tissue of his left abdomen with irregular borders  Overlying skin is unremarkable  Imaging  I personally reviewed x-rays of the left humerus taken in the office today which note no gross fracture dislocation  There is slight high riding of the humeral head and AC joint arthritis  X-rays left knee note healing patellar fracture with fracture line still family evident but consolidation appearing from previous x-ray  Distinct shoulder films would be better evaluation of left shoulder  1  Closed sleeve fracture of left patella with routine healing, subsequent encounter    2  Chronic left shoulder pain      Assessment:  left knee patellar fracture 4 months post injury healing with left shoulder impingement likely rotator cuff tear, of which patient does not want any surgical intervention  Large joint arthrocentesis: L subacromial bursa  Date/Time: 11/26/2019 10:43 AM  Consent given by: patient  Timeout: Immediately prior to procedure a time out was called to verify the correct patient, procedure, equipment, support staff and site/side marked as required   Supporting Documentation  Indications: pain   Procedure Details  Location: shoulder (Subacromial and glenohumeral) - L subacromial bursa  Preparation: Patient was prepped and draped in the usual sterile fashion  Needle size: 22 G  Ultrasound guidance: no  Approach: posterior  Medications administered: 6 mg betamethasone acetate-betamethasone sodium phosphate 6 (3-3) mg/mL; 2 mL lidocaine 1 %    Patient tolerance: patient tolerated the procedure well with no immediate complications  Dressing:  Sterile dressing applied    Pre injection patient/wife and son were cautioned about glucose elevation and need to monitor glucose and possibly adjust insulin dose        Plan:  Patient elected cortisone injection which provided in the office today  He is ice the shoulder 20 minutes 3 times today and use Tylenol as needed    We will re-evaluate the shoulder in 8 weeks to note his progress  Patient will continue working on his physical therapy home exercises that he was due to do on his own  He has also shown finger walks up the wall while facing the wall and perpendicular to the wall also broom stick handle lift to work on range of motion of the shoulder  Again he does not want any surgical intervention thus questioning if there is no improvement whether MRI would be evaluated  Patient was also instructed to follow-up with his PCP and likely a general surgeon for the subcutaneous mass in his left abdomen

## 2019-12-05 ENCOUNTER — OFFICE VISIT (OUTPATIENT)
Dept: UROLOGY | Facility: CLINIC | Age: 79
End: 2019-12-05
Payer: MEDICARE

## 2019-12-05 VITALS
DIASTOLIC BLOOD PRESSURE: 78 MMHG | WEIGHT: 306 LBS | SYSTOLIC BLOOD PRESSURE: 108 MMHG | BODY MASS INDEX: 47.93 KG/M2 | HEART RATE: 66 BPM

## 2019-12-05 DIAGNOSIS — C61 PROSTATE CANCER (HCC): Primary | ICD-10-CM

## 2019-12-05 PROCEDURE — 99213 OFFICE O/P EST LOW 20 MIN: CPT | Performed by: PHYSICIAN ASSISTANT

## 2019-12-05 NOTE — PROGRESS NOTES
12/5/2019      Chief Complaint   Patient presents with    Prostate Cancer         Assessment and Plan    78 y o  male managed by Dr Braxton Gomez    1  Prostate cancer  - s/p XRT and intermittent ADT  - PSA responded to Lupron administered 5/30/19 from 1 8 now down to 0 6  - lupron/prolia last administered 5/30/19  - PSA due 6 months    2  Left-sided hydronephrosis  - Bush back in September 2019, with passed void trial  - f/u US 9/18/19 with resolving hydronephrosis    PSA and OV 6 months with Dr Padma Talley        History of Present Illness  Ba Jennings is a 78 y o  male here for evaluation of advanced prostate cancer initially underwent external beam radiation therapy patient believes circa year 2000  This was followed by intermittent ADT for biochemical recurrence and castrate sensitive disease with injections in November 2016 and 2017  His PSA had responded and stayed low around 0 4 up until rise to 1 8 this May 2019   Last Lupron/Prolia was given 05/30/2019  Returns today with a PSA of 0 6      Patient did at one point have left-sided hydronephrosis requiring stent, this has since been removed and follow-up ultrasounds were negative  He developed hydronephrosis again this summer and a Bush catheter was placed, followup imaging showed resolution of hydro  He also has bilateral renal cysts which have also been stable on past imaging  He remains on warfarin anticoagulation following his open heart surgery and embolic stroke in 5348  He is not having any significant issues with urination except for incontinence (primarily nighttime) during the day has some control to urinate into a urinal but does wear a brief all day  He has not had any recent hematuria  He denies any weight loss, bony pain, nighttime fevers  Review of Systems   Constitutional: Negative  Respiratory: Negative  Cardiovascular: Positive for leg swelling  Genitourinary: Positive for difficulty urinating (incontinence)   Negative for decreased urine volume, dysuria, flank pain, frequency, hematuria and urgency  Musculoskeletal: Positive for gait problem  Past Medical History  Past Medical History:   Diagnosis Date    Anemia     Arthritis     Atherosclerotic heart disease of native coronary artery without angina pectoris     Atrial fibrillation (HCC)     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HCC)     B12 deficiency     Back pain     Benign prostatic hyperplasia without urinary obstruction     Blind right eye     Cancer ([facility] )     prostate     Cardiac disorder     CHF (congestive heart failure) (HCC)     Chronic combined systolic and diastolic heart failure (HCC)     Chronic kidney disease, stage 3 (HCC)     Chronic obstructive lung disease (HCC)     Chronic venous stasis dermatitis of both lower extremities     CKD (chronic kidney disease), stage IV (HCC)     Coronary artery disease     DDD (degenerative disc disease)     DM (diabetes mellitus), type 2 (HCC)     Type II, on insulin    BAER (dyspnea on exertion)     Easy bruising     Edema     Epistaxis     Essential hypertension     Fractures     Gout     Gross hematuria     last assessed: 3/18/2015    Hepatitis     Herpes zoster     Hiatal hernia     History of cellulitis     BLE    History of prostate cancer     Radiation treatments      Hypercholesterolemia     Hyperlipidemia     Hypertension     Iron deficiency anemia     Ischemic cardiomyopathy     Long term (current) use of insulin (HCC)     Lung cancer ([place] [address] ) 09/12/2019    per patient    Lung cancer [facility])     s/p lobectomy and chemo Tx     Lung mass     last assessed: 9/21/2012    MI, old     Morbid obesity due to excess calories ([place] [address] )     or severe obesity    Neuropathy     BLE    Obesity     Obstructive sleep apnea syndrome, severe     Pneumonia     last assessed: 7/24/2012    Prostate cancer ([place] [address] )     No active treatment currently, sp Rad Tx 42     Shortness of breath     TIA (transient ischemic attack)     Ulcer of lower extremity ([facility] [address] )     Urinary incontinence     Urinary retention     Use of cane as ambulatory aid     Independent with personal care       Past Social History  Past Surgical History:   Procedure Laterality Date    ABDOMINAL SURGERY      CARDIAC SURGERY      CATARACT EXTRACTION, BILATERAL      CORONARY ANGIOPLASTY WITH STENT PLACEMENT      2 stents    CORONARY ARTERY BYPASS GRAFT N/A 7/15/2016    Procedure: Intraop ADRIAN, CABG x 3 using LIMA to LAD, RSVG to OM1 and D1;  Surgeon: Leonard Boland MD;  [facility];  Service:    Luis Clayton      has stents    EYE SURGERY      Bilateral cataract removal    HERNIA REPAIR      umbilical hernia repair    LUNG CANCER SURGERY      Partial upper right lobectomy    LUNG SURGERY Left 2012    OTHER SURGICAL HISTORY      previous stent placement-no anatomy given    PILONIDAL CYST EXCISION      WY CYSTOURETHROSCOPY,URETER CATHETER Left 3/9/2016    Procedure: CYSTOSCOPY WITH left RETROGRADE PYELOGRAM left double J stent removal;  Surgeon: Reyna Taylor MD;  [facility];  Service: Urology    WY TEMPORAL ARTERY LIGATN OR BX Bilateral 10/31/2017    Procedure: BIOPSY ARTERY TEMPORAL;  Surgeon: Isela Niño MD;  [facility];  Service: Vascular    RENAL ARTERY STENT  02/16/2015    transcath intravascular stent placement percutaneous renal    VASCULAR SURGERY       Social History     Tobacco Use   Smoking Status Former Smoker    Packs/day: 2 00    Years: 54 00    Pack years: 108 00    Last attempt to quit: 2004    Years since quitting: 15 9   Smokeless Tobacco Never Used   Tobacco Comment    Pt is a non smoker     Past Family History  Family History   Problem Relation Age of Onset    Diabetes Other     Hypertension Other     Cancer Other     Diabetes Mother     Heart disease Mother     Hypertension Mother     Diabetes type II Mother     Cancer Mother         unknown type    Diabetes Father    Iva Cazares Diabetes type II Father     Diabetes Maternal Grandmother     Cancer Sister         unknown type    Cancer Other         unknown type     Past Social history  Social History     Socioeconomic History    Marital status: /Civil Union     Spouse name: Not on file    Number of children: Not on file    Years of education: Not on file    Highest education level: Not on file   Occupational History    Occupation:   - [employer]    Social Needs    Financial resource strain: Not on file    Food insecurity:     Worry: Not on file     Inability: Not on file   Wowcracy needs:     Medical: Not on file     Non-medical: Not on file   Tobacco Use    Smoking status: Former Smoker     Packs/day: 2 00     Years: 54 00     Pack years: 108 00     Last attempt to quit: 2004     Years since quitting: 15 9    Smokeless tobacco: Never Used    Tobacco comment: Pt is a non smoker   Substance and Sexual Activity    Alcohol use: Never     Alcohol/week: 0 0 standard drinks     Frequency: Never     Drinks per session: Patient refused     Binge frequency: Never     Comment: 0    Drug use: Never    Sexual activity: Never     Partners: Female     Birth control/protection: Abstinence   Lifestyle    Physical activity:     Days per week: Not on file     Minutes per session: Not on file    Stress: Not on file   Relationships    Social connections:     Talks on phone: Not on file     Gets together: Not on file     Attends Confucianism service: Not on file     Active member of club or organization: Not on file     Attends meetings of clubs or organizations: Not on file     Relationship status: Not on file    Intimate partner violence:     Fear of current or ex partner: Not on file     Emotionally abused: Not on file     Physically abused: Not on file     Forced sexual activity: Not on file   Other Topics Concern    Not on file   Social History Narrative    No advance directives    No living will    Patient has living will        Consumes on average -510 cups of regular coffee per day      Current Medications  Current Outpatient Medications   Medication Sig Dispense Refill    acetaminophen (TYLENOL) 500 mg tablet Take 1,000 mg by mouth 2 (two) times a day Administer with tramadol      albuterol (2 5 mg/3 mL) 0 083 % nebulizer solution Take 1 vial (2 5 mg total) by nebulization every 6 (six) hours as needed for wheezing or shortness of breath 100 vial 0    aspirin (ECOTRIN LOW STRENGTH) 81 mg EC tablet Take 81 mg by mouth 2 (two) times a day      atorvastatin (LIPITOR) 40 mg tablet Take 1 tablet (40 mg total) by mouth daily with dinner 30 tablet 0    bisacodyl (BISCOLAX) 10 mg suppository Insert 10 mg into the rectum daily      Cholecalciferol 2000 units TABS Take 1 tablet (2,000 Units total) by mouth daily 30 tablet 2    furosemide (LASIX) 80 mg tablet Take 1 tablet (80 mg total) by mouth 2 (two) times a day 180 tablet 1    gabapentin (NEURONTIN) 300 mg capsule 1 tablet in a m  at breakfast 1 tablet in early afternoon and 3 tablets at bedtime 150 capsule 3    insulin detemir (LEVEMIR) 100 units/mL subcutaneous injection Inject 45 Units under the skin daily at bedtime  0    insulin lispro (HumaLOG) 100 units/mL injection Inject 10 Units under the skin 3 (three) times a day with meals  0    iron polysaccharides (FERREX) 150 mg capsule Take 1 capsule (150 mg total) by mouth daily 30 capsule 5    Lidocaine-Glycerin (PREPARATION H) 5-14 4 % CREA Insert into the rectum      metoprolol tartrate (LOPRESSOR) 25 mg tablet Take 25 mg by mouth 2 (two) times a day      potassium chloride (K-DUR,KLOR-CON) 10 mEq tablet Take 1 tablet (10 mEq total) by mouth 2 (two) times a day 180 tablet 1    PROFERRIN-FORTE 12-1 MG TABS TAKE 1 TABLET BY MOUTH ONCE DAILY  3    tamsulosin (FLOMAX) 0 4 mg Take 0 4 mg by mouth daily at bedtime        traMADol (ULTRAM) 50 mg tablet 2 tablet 2 times daily with 2 extra Strength Tylenol 90 tablet 0    warfarin (COUMADIN) 5 mg tablet Take 5mg (1 tablet) daily on Monday, Wednesday, and Friday, and 2 5mg (1/2 tablet) on Tuesday, Thursday, Saturday, Sunday  90 tablet 0     No current facility-administered medications for this visit  Allergies  Allergies   Allergen Reactions    Other Rash     Adhesive tape         The following portions of the patient's history were reviewed and updated as appropriate: allergies, current medications, past medical history, past social history, past surgical history and problem list       Vitals  Vitals:    12/05/19 1011   BP: 108/78   Pulse: 66   Weight: (!) 139 kg (306 lb)       Physical Exam   Constitutional: He is oriented to person, place, and time  He appears well-developed and well-nourished  No distress  Morbidly obese white male in wheelchair awake conversive   HENT:   Head: Normocephalic and atraumatic  Pulmonary/Chest: Effort normal    Nasal canula o2   Musculoskeletal: He exhibits no edema  Neurological: He is alert and oriented to person, place, and time  Gait normal    Skin: Skin is warm and dry  He is not diaphoretic  Psychiatric: He has a normal mood and affect  His speech is normal and behavior is normal    Nursing note and vitals reviewed          Results  No results found for this or any previous visit (from the past 1 hour(s)) ]  Lab Results   Component Value Date    PSA 1 8 05/21/2019    PSA 0 5 12/13/2018    PSA 0 2 02/14/2018    PSA 0 4 01/15/2018     Lab Results   Component Value Date    GLUCOSE 229 (H) 07/15/2016    CALCIUM 8 2 (L) 08/14/2019     09/16/2015    K 3 3 (L) 08/14/2019    CO2 27 08/14/2019     08/14/2019    BUN 31 (H) 08/14/2019    CREATININE 2 00 (H) 08/14/2019     Lab Results   Component Value Date    WBC 7 39 08/14/2019    HGB 10 0 (L) 08/14/2019    HCT 33 4 (L) 08/14/2019    MCV 93 08/14/2019     08/14/2019         Orders  Orders Placed This Encounter   Procedures    PSA Total, Diagnostic     Standing Status:   Future     Standing Expiration Date:   12/5/2020

## 2019-12-11 DIAGNOSIS — M79.604 PAIN IN BOTH LOWER EXTREMITIES: ICD-10-CM

## 2019-12-11 DIAGNOSIS — M79.605 PAIN IN BOTH LOWER EXTREMITIES: ICD-10-CM

## 2019-12-11 RX ORDER — TRAMADOL HYDROCHLORIDE 50 MG/1
TABLET ORAL
Qty: 90 TABLET | Refills: 0 | Status: SHIPPED | OUTPATIENT
Start: 2019-12-11 | End: 2020-01-03 | Stop reason: SDUPTHER

## 2019-12-17 ENCOUNTER — TELEPHONE (OUTPATIENT)
Dept: FAMILY MEDICINE CLINIC | Facility: CLINIC | Age: 79
End: 2019-12-17

## 2019-12-17 DIAGNOSIS — L03.119 CELLULITIS OF LOWER EXTREMITY, UNSPECIFIED LATERALITY: Primary | ICD-10-CM

## 2019-12-17 RX ORDER — CEPHALEXIN 500 MG/1
500 CAPSULE ORAL EVERY 6 HOURS SCHEDULED
Qty: 40 CAPSULE | Refills: 0 | Status: SHIPPED | OUTPATIENT
Start: 2019-12-17 | End: 2019-12-27

## 2019-12-17 NOTE — TELEPHONE ENCOUNTER
CC: Bilateral redness on shins - Warm to touch - looks like Cellulitis    Asking if Dr would want to Rx an antibiotic before it gets too far out of control?     Allergy: Adhesive tape    Pharm: [email or web address]    Call Home care if Rx is sent to pharm

## 2020-01-03 DIAGNOSIS — M79.605 PAIN IN BOTH LOWER EXTREMITIES: ICD-10-CM

## 2020-01-03 DIAGNOSIS — M79.604 PAIN IN BOTH LOWER EXTREMITIES: ICD-10-CM

## 2020-01-03 RX ORDER — TRAMADOL HYDROCHLORIDE 50 MG/1
TABLET ORAL
Qty: 90 TABLET | Refills: 0 | Status: SHIPPED | OUTPATIENT
Start: 2020-01-03 | End: 2020-02-19 | Stop reason: SDUPTHER

## 2020-01-03 RX ORDER — TRAMADOL HYDROCHLORIDE 50 MG/1
TABLET ORAL
Qty: 90 TABLET | Refills: 0 | Status: CANCELLED | OUTPATIENT
Start: 2020-01-03

## 2020-01-13 ENCOUNTER — ANTICOAG VISIT (OUTPATIENT)
Dept: FAMILY MEDICINE CLINIC | Facility: CLINIC | Age: 80
End: 2020-01-13

## 2020-01-13 DIAGNOSIS — E11.9 TYPE 2 DIABETES MELLITUS WITHOUT COMPLICATION, WITHOUT LONG-TERM CURRENT USE OF INSULIN (HCC): ICD-10-CM

## 2020-01-13 LAB — INR PPP: 2.65 (ref 0.84–1.19)

## 2020-01-13 NOTE — RESULT ENCOUNTER NOTE
Please call the patient regarding his abnormal result    PT INR is good continue same dose of Coumadin kidney function is reduced but stable blood sugar was 280 I would increase his long-acting insulin by 5 units

## 2020-01-15 DIAGNOSIS — Z51.81 ENCOUNTER FOR MONITORING COUMADIN THERAPY: ICD-10-CM

## 2020-01-15 DIAGNOSIS — Z79.01 ENCOUNTER FOR MONITORING COUMADIN THERAPY: ICD-10-CM

## 2020-01-15 RX ORDER — WARFARIN SODIUM 5 MG/1
TABLET ORAL
Qty: 90 TABLET | Refills: 0 | Status: SHIPPED | OUTPATIENT
Start: 2020-01-15

## 2020-01-17 ENCOUNTER — TELEPHONE (OUTPATIENT)
Dept: FAMILY MEDICINE CLINIC | Facility: CLINIC | Age: 80
End: 2020-01-17

## 2020-01-17 DIAGNOSIS — J96.11 CHRONIC RESPIRATORY FAILURE WITH HYPOXIA (HCC): Primary | ICD-10-CM

## 2020-01-17 DIAGNOSIS — R60.0 BILATERAL LEG EDEMA: ICD-10-CM

## 2020-01-17 DIAGNOSIS — S81.802D WOUND OF LEFT LOWER EXTREMITY, SUBSEQUENT ENCOUNTER: ICD-10-CM

## 2020-01-17 DIAGNOSIS — N18.4 CKD (CHRONIC KIDNEY DISEASE) STAGE 4, GFR 15-29 ML/MIN (HCC): Chronic | ICD-10-CM

## 2020-01-17 DIAGNOSIS — S81.801D WOUND OF RIGHT LOWER EXTREMITY, SUBSEQUENT ENCOUNTER: ICD-10-CM

## 2020-01-17 NOTE — TELEPHONE ENCOUNTER
So which hospice is he going with because if he is going with [facility] then his care would be transferred to Dr Cyn Lantigua who is  the hospice physician I will be happy to order the consult with hospice just to know which 1 it is

## 2020-01-17 NOTE — TELEPHONE ENCOUNTER
Pt had conversation with  today, that he is ready for hospice services - He does not want Tx anymore he has his Chronic wound     Will be admitted Monday 1/20/2020  Dx: End Stage renal failure & Heart failure    If Dr would please write the orders      Fax order to [phone]

## 2020-01-31 DIAGNOSIS — G61.82 MULTIFOCAL MOTOR NEUROPATHY (HCC): ICD-10-CM

## 2020-01-31 RX ORDER — GABAPENTIN 300 MG/1
CAPSULE ORAL
Qty: 150 CAPSULE | Refills: 3 | Status: SHIPPED | OUTPATIENT
Start: 2020-01-31 | End: 2020-02-07 | Stop reason: SDUPTHER

## 2020-02-07 DIAGNOSIS — G61.82 MULTIFOCAL MOTOR NEUROPATHY (HCC): ICD-10-CM

## 2020-02-07 RX ORDER — GABAPENTIN 300 MG/1
CAPSULE ORAL
Qty: 150 CAPSULE | Refills: 3 | Status: SHIPPED | OUTPATIENT
Start: 2020-02-07

## 2020-02-10 ENCOUNTER — TELEPHONE (OUTPATIENT)
Dept: FAMILY MEDICINE CLINIC | Facility: CLINIC | Age: 80
End: 2020-02-10

## 2020-02-10 LAB — INR PPP: 8 (ref 0.84–1.19)

## 2020-02-10 NOTE — TELEPHONE ENCOUNTER
So when was the last fingerstick INR and how long has he been off of the Coumadin and when can we next the get the next fingerstick INR I would really like 1 tomorrow

## 2020-02-10 NOTE — TELEPHONE ENCOUNTER
Finger stick done today -- told today not to take coumadin until he hears back from [facility]    Do you want another finger stick tomorrow?

## 2020-02-10 NOTE — TELEPHONE ENCOUNTER
Finger stick INR 8 0 - Pt was told by [facility] Nurse not to take any coumadin until Pt hears back from office    Reason for finger stick was Hematuria - Pt not due for INR till Sat

## 2020-02-11 ENCOUNTER — TELEPHONE (OUTPATIENT)
Dept: FAMILY MEDICINE CLINIC | Facility: CLINIC | Age: 80
End: 2020-02-11

## 2020-02-11 ENCOUNTER — ANTICOAG VISIT (OUTPATIENT)
Dept: FAMILY MEDICINE CLINIC | Facility: CLINIC | Age: 80
End: 2020-02-11

## 2020-02-13 ENCOUNTER — ANTICOAG VISIT (OUTPATIENT)
Dept: FAMILY MEDICINE CLINIC | Facility: CLINIC | Age: 80
End: 2020-02-13

## 2020-02-13 ENCOUNTER — TELEPHONE (OUTPATIENT)
Dept: FAMILY MEDICINE CLINIC | Facility: CLINIC | Age: 80
End: 2020-02-13

## 2020-02-13 LAB — INR PPP: 4.6 (ref 0.84–1.19)

## 2020-02-13 NOTE — TELEPHONE ENCOUNTER
Called homecare with message to have Pt cut back to 2 5 mg daily rather than confusing her with half of 2 5 & have a 5mg tablet - Pt will recheck on 2/1/7

## 2020-02-17 ENCOUNTER — ANTICOAG VISIT (OUTPATIENT)
Dept: FAMILY MEDICINE CLINIC | Facility: CLINIC | Age: 80
End: 2020-02-17

## 2020-02-17 ENCOUNTER — TELEPHONE (OUTPATIENT)
Dept: FAMILY MEDICINE CLINIC | Facility: CLINIC | Age: 80
End: 2020-02-17

## 2020-02-17 LAB — INR PPP: 2.2 (ref 0.84–1.19)

## 2020-02-19 DIAGNOSIS — M79.604 PAIN IN BOTH LOWER EXTREMITIES: ICD-10-CM

## 2020-02-19 DIAGNOSIS — M79.605 PAIN IN BOTH LOWER EXTREMITIES: ICD-10-CM

## 2020-02-19 RX ORDER — TRAMADOL HYDROCHLORIDE 50 MG/1
TABLET ORAL
Qty: 90 TABLET | Refills: 0 | Status: SHIPPED | OUTPATIENT
Start: 2020-02-19

## 2020-02-20 ENCOUNTER — TELEPHONE (OUTPATIENT)
Dept: FAMILY MEDICINE CLINIC | Facility: CLINIC | Age: 80
End: 2020-02-20

## 2020-02-20 ENCOUNTER — ANTICOAG VISIT (OUTPATIENT)
Dept: FAMILY MEDICINE CLINIC | Facility: CLINIC | Age: 80
End: 2020-02-20

## 2020-02-20 LAB — INR PPP: 2.7 (ref 0.84–1.19)

## 2020-02-20 NOTE — TELEPHONE ENCOUNTER
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

## 2020-02-20 NOTE — TELEPHONE ENCOUNTER
Patient has multiple bruises and the hospice medical director is requesting to stop the coumadin and due aspirin therapy

## 2020-02-20 NOTE — TELEPHONE ENCOUNTER
Mimi Diop made aware, she will speak to the patient on the pro and cons of coumadin and Dr Mcconnell Furnish high recommendation to stay on the coumadin

## 2020-02-24 ENCOUNTER — TELEPHONE (OUTPATIENT)
Dept: FAMILY MEDICINE CLINIC | Facility: CLINIC | Age: 80
End: 2020-02-24

## 2020-02-24 NOTE — TELEPHONE ENCOUNTER
Discussed the pro's and con's of coumadin and he decided to stay on the coumadin    She wants know when is the next inr

## 2020-02-28 ENCOUNTER — TELEPHONE (OUTPATIENT)
Dept: FAMILY MEDICINE CLINIC | Facility: CLINIC | Age: 80
End: 2020-02-28

## 2020-02-28 DIAGNOSIS — N30.00 ACUTE CYSTITIS WITHOUT HEMATURIA: Primary | ICD-10-CM

## 2020-02-28 RX ORDER — CEPHALEXIN 250 MG/5ML
10 POWDER, FOR SUSPENSION ORAL EVERY 8 HOURS SCHEDULED
Qty: 100 ML | Refills: 0 | Status: SHIPPED | OUTPATIENT
Start: 2020-02-28 | End: 2020-03-09

## 2020-02-28 NOTE — TELEPHONE ENCOUNTER
His urine came back with + 2 bacteria  The culture is pending  He had a change in condition, he is semi comatose and bed bound    If antibiotic prescribed, will need liquid

## 2020-03-04 DIAGNOSIS — N30.00 ACUTE CYSTITIS WITHOUT HEMATURIA: ICD-10-CM

## 2020-03-04 RX ORDER — CEPHALEXIN 250 MG/5ML
POWDER, FOR SUSPENSION ORAL
Qty: 300 ML | Refills: 0 | OUTPATIENT
Start: 2020-03-04

## 2024-04-18 NOTE — ASSESSMENT & PLAN NOTE
Problem: Adult Inpatient Plan of Care  Goal: Plan of Care Review  Flowsheets (Taken 4/18/2024 1041)  Plan of Care Reviewed With: patient      Rate controlled on current AV arsalan blocking regimen  Resume Coumadin  Trend daily INR

## (undated) DEVICE — ADHESIVE SKN CLSR HISTOACRYL FLEX 0.5ML LF

## (undated) DEVICE — DRAPE SHEET THREE QUARTER

## (undated) DEVICE — SUT MONOCRYL 5-0 P-3 18 IN Y493G

## (undated) DEVICE — LIGACLIP MCA MULTIPLE CLIP APPLIERS, 20 SMALL CLIPS: Brand: LIGACLIP

## (undated) DEVICE — POV-IOD SWAB STICKS

## (undated) DEVICE — REM POLYHESIVE ADULT PATIENT RETURN ELECTRODE: Brand: VALLEYLAB

## (undated) DEVICE — GLOVE SRG BIOGEL ORTHOPEDIC 7.5

## (undated) DEVICE — STRL UNIVERSAL OUTPATIENT PACK: Brand: CARDINAL HEALTH

## (undated) DEVICE — ULTRACLEAN ACCESSORY ELECTRODE 1" (2.54 CM) COATED BLADE: Brand: ULTRACLEAN

## (undated) DEVICE — INTENDED FOR TISSUE SEPARATION, AND OTHER PROCEDURES THAT REQUIRE A SHARP SURGICAL BLADE TO PUNCTURE OR CUT.: Brand: BARD-PARKER SAFETY BLADES SIZE 15, STERILE

## (undated) DEVICE — SUT SILK 4-0 18 IN A183H